# Patient Record
Sex: MALE | Race: WHITE | Employment: UNEMPLOYED | URBAN - METROPOLITAN AREA
[De-identification: names, ages, dates, MRNs, and addresses within clinical notes are randomized per-mention and may not be internally consistent; named-entity substitution may affect disease eponyms.]

---

## 2020-01-01 ENCOUNTER — TELEPHONE (OUTPATIENT)
Dept: ONCOLOGY | Facility: CLINIC | Age: 50
End: 2020-01-01

## 2020-01-01 ENCOUNTER — TRANSFERRED RECORDS (OUTPATIENT)
Dept: HEALTH INFORMATION MANAGEMENT | Facility: CLINIC | Age: 50
End: 2020-01-01

## 2020-01-01 ENCOUNTER — PATIENT OUTREACH (OUTPATIENT)
Dept: ONCOLOGY | Facility: CLINIC | Age: 50
End: 2020-01-01

## 2020-01-01 ENCOUNTER — VIRTUAL VISIT (OUTPATIENT)
Dept: ONCOLOGY | Facility: CLINIC | Age: 50
End: 2020-01-01
Attending: INTERNAL MEDICINE
Payer: COMMERCIAL

## 2020-01-01 ENCOUNTER — DOCUMENTATION ONLY (OUTPATIENT)
Facility: CLINIC | Age: 50
End: 2020-01-01

## 2020-01-01 ENCOUNTER — CARE COORDINATION (OUTPATIENT)
Dept: ONCOLOGY | Facility: CLINIC | Age: 50
End: 2020-01-01

## 2020-01-01 ENCOUNTER — ONCOLOGY VISIT (OUTPATIENT)
Dept: ONCOLOGY | Facility: CLINIC | Age: 50
End: 2020-01-01
Attending: DIETITIAN, REGISTERED
Payer: MEDICAID

## 2020-01-01 ENCOUNTER — VIRTUAL VISIT (OUTPATIENT)
Dept: ONCOLOGY | Facility: CLINIC | Age: 50
End: 2020-01-01
Attending: PHYSICIAN ASSISTANT
Payer: COMMERCIAL

## 2020-01-01 ENCOUNTER — APPOINTMENT (OUTPATIENT)
Dept: GENERAL RADIOLOGY | Facility: CLINIC | Age: 50
End: 2020-01-01
Attending: STUDENT IN AN ORGANIZED HEALTH CARE EDUCATION/TRAINING PROGRAM
Payer: MEDICAID

## 2020-01-01 ENCOUNTER — APPOINTMENT (OUTPATIENT)
Dept: LAB | Facility: CLINIC | Age: 50
DRG: 847 | End: 2020-01-01
Attending: INTERNAL MEDICINE
Payer: COMMERCIAL

## 2020-01-01 ENCOUNTER — VIRTUAL VISIT (OUTPATIENT)
Dept: ONCOLOGY | Facility: CLINIC | Age: 50
End: 2020-01-01
Payer: MEDICAID

## 2020-01-01 ENCOUNTER — TELEPHONE (OUTPATIENT)
Dept: PALLIATIVE CARE | Facility: CLINIC | Age: 50
End: 2020-01-01

## 2020-01-01 ENCOUNTER — DOCUMENTATION ONLY (OUTPATIENT)
Dept: PHARMACY | Facility: CLINIC | Age: 50
End: 2020-01-01

## 2020-01-01 ENCOUNTER — VIRTUAL VISIT (OUTPATIENT)
Dept: ONCOLOGY | Facility: CLINIC | Age: 50
End: 2020-01-01
Attending: PHYSICIAN ASSISTANT
Payer: MEDICAID

## 2020-01-01 ENCOUNTER — APPOINTMENT (OUTPATIENT)
Dept: GENERAL RADIOLOGY | Facility: CLINIC | Age: 50
DRG: 847 | End: 2020-01-01
Attending: PHYSICIAN ASSISTANT
Payer: COMMERCIAL

## 2020-01-01 ENCOUNTER — APPOINTMENT (OUTPATIENT)
Dept: PHYSICAL THERAPY | Facility: CLINIC | Age: 50
End: 2020-01-01
Attending: INTERNAL MEDICINE
Payer: COMMERCIAL

## 2020-01-01 ENCOUNTER — PATIENT OUTREACH (OUTPATIENT)
Dept: CARE COORDINATION | Facility: CLINIC | Age: 50
End: 2020-01-01

## 2020-01-01 ENCOUNTER — APPOINTMENT (OUTPATIENT)
Dept: LAB | Facility: CLINIC | Age: 50
End: 2020-01-01
Attending: INTERNAL MEDICINE
Payer: COMMERCIAL

## 2020-01-01 ENCOUNTER — DOCUMENTATION ONLY (OUTPATIENT)
Dept: ONCOLOGY | Facility: CLINIC | Age: 50
End: 2020-01-01

## 2020-01-01 ENCOUNTER — HOSPITAL ENCOUNTER (OUTPATIENT)
Dept: PET IMAGING | Facility: CLINIC | Age: 50
Discharge: HOME | End: 2020-06-08
Attending: PHYSICIAN ASSISTANT | Admitting: PHYSICIAN ASSISTANT
Payer: COMMERCIAL

## 2020-01-01 ENCOUNTER — HOSPITAL ENCOUNTER (INPATIENT)
Facility: CLINIC | Age: 50
LOS: 1 days | Discharge: HOSPICE | End: 2020-10-30
Attending: INTERNAL MEDICINE | Admitting: INTERNAL MEDICINE
Payer: COMMERCIAL

## 2020-01-01 ENCOUNTER — APPOINTMENT (OUTPATIENT)
Dept: CT IMAGING | Facility: CLINIC | Age: 50
DRG: 092 | End: 2020-01-01
Attending: EMERGENCY MEDICINE
Payer: COMMERCIAL

## 2020-01-01 ENCOUNTER — TELEPHONE (OUTPATIENT)
Dept: DENTISTRY | Facility: CLINIC | Age: 50
End: 2020-01-01

## 2020-01-01 ENCOUNTER — APPOINTMENT (OUTPATIENT)
Dept: PHYSICAL THERAPY | Facility: CLINIC | Age: 50
DRG: 092 | End: 2020-01-01
Payer: COMMERCIAL

## 2020-01-01 ENCOUNTER — APPOINTMENT (OUTPATIENT)
Dept: GENERAL RADIOLOGY | Facility: CLINIC | Age: 50
DRG: 092 | End: 2020-01-01
Attending: EMERGENCY MEDICINE
Payer: COMMERCIAL

## 2020-01-01 ENCOUNTER — ANESTHESIA EVENT (OUTPATIENT)
Dept: GASTROENTEROLOGY | Facility: CLINIC | Age: 50
End: 2020-01-01
Payer: COMMERCIAL

## 2020-01-01 ENCOUNTER — INFUSION THERAPY VISIT (OUTPATIENT)
Dept: ONCOLOGY | Facility: CLINIC | Age: 50
End: 2020-01-01
Attending: INTERNAL MEDICINE
Payer: MEDICAID

## 2020-01-01 ENCOUNTER — ALLIED HEALTH/NURSE VISIT (OUTPATIENT)
Dept: RADIATION ONCOLOGY | Facility: CLINIC | Age: 50
End: 2020-01-01
Attending: RADIOLOGY
Payer: COMMERCIAL

## 2020-01-01 ENCOUNTER — APPOINTMENT (OUTPATIENT)
Dept: GENERAL RADIOLOGY | Facility: CLINIC | Age: 50
End: 2020-01-01
Attending: PHYSICIAN ASSISTANT
Payer: COMMERCIAL

## 2020-01-01 ENCOUNTER — APPOINTMENT (OUTPATIENT)
Dept: PHYSICAL THERAPY | Facility: CLINIC | Age: 50
DRG: 847 | End: 2020-01-01
Attending: PHYSICIAN ASSISTANT
Payer: COMMERCIAL

## 2020-01-01 ENCOUNTER — NURSE TRIAGE (OUTPATIENT)
Dept: NURSING | Facility: CLINIC | Age: 50
End: 2020-01-01

## 2020-01-01 ENCOUNTER — APPOINTMENT (OUTPATIENT)
Dept: PET IMAGING | Facility: CLINIC | Age: 50
End: 2020-01-01
Attending: PHYSICIAN ASSISTANT
Payer: MEDICAID

## 2020-01-01 ENCOUNTER — ANESTHESIA (OUTPATIENT)
Dept: GASTROENTEROLOGY | Facility: CLINIC | Age: 50
End: 2020-01-01
Payer: COMMERCIAL

## 2020-01-01 ENCOUNTER — APPOINTMENT (OUTPATIENT)
Dept: CT IMAGING | Facility: CLINIC | Age: 50
End: 2020-01-01
Attending: PHYSICIAN ASSISTANT
Payer: MEDICAID

## 2020-01-01 ENCOUNTER — INFUSION THERAPY VISIT (OUTPATIENT)
Dept: ONCOLOGY | Facility: CLINIC | Age: 50
End: 2020-01-01
Attending: PATHOLOGY
Payer: COMMERCIAL

## 2020-01-01 ENCOUNTER — HOSPITAL ENCOUNTER (INPATIENT)
Facility: CLINIC | Age: 50
LOS: 4 days | Discharge: HOME | DRG: 092 | End: 2020-10-24
Attending: EMERGENCY MEDICINE | Admitting: STUDENT IN AN ORGANIZED HEALTH CARE EDUCATION/TRAINING PROGRAM
Payer: COMMERCIAL

## 2020-01-01 ENCOUNTER — HOSPITAL ENCOUNTER (INPATIENT)
Facility: CLINIC | Age: 50
LOS: 3 days | Discharge: HOME | End: 2020-08-05
Attending: EMERGENCY MEDICINE | Admitting: INTERNAL MEDICINE
Payer: COMMERCIAL

## 2020-01-01 ENCOUNTER — HOSPITAL ENCOUNTER (OUTPATIENT)
Dept: PET IMAGING | Facility: CLINIC | Age: 50
Discharge: HOME | End: 2020-09-15
Attending: PHYSICIAN ASSISTANT | Admitting: PHYSICIAN ASSISTANT
Payer: COMMERCIAL

## 2020-01-01 ENCOUNTER — HOME INFUSION (PRE-WILLOW HOME INFUSION) (OUTPATIENT)
Dept: PHARMACY | Facility: CLINIC | Age: 50
End: 2020-01-01

## 2020-01-01 ENCOUNTER — APPOINTMENT (OUTPATIENT)
Dept: CARDIOLOGY | Facility: CLINIC | Age: 50
DRG: 092 | End: 2020-01-01
Attending: PHYSICIAN ASSISTANT
Payer: COMMERCIAL

## 2020-01-01 ENCOUNTER — ONCOLOGY VISIT (OUTPATIENT)
Dept: ONCOLOGY | Facility: CLINIC | Age: 50
DRG: 847 | End: 2020-01-01
Attending: PHYSICIAN ASSISTANT
Payer: COMMERCIAL

## 2020-01-01 ENCOUNTER — TELEPHONE (OUTPATIENT)
Dept: TRANSPLANT | Facility: CLINIC | Age: 50
End: 2020-01-01

## 2020-01-01 ENCOUNTER — APPOINTMENT (OUTPATIENT)
Dept: CARDIOLOGY | Facility: CLINIC | Age: 50
End: 2020-01-01
Attending: INTERNAL MEDICINE
Payer: MEDICAID

## 2020-01-01 ENCOUNTER — HOSPITAL ENCOUNTER (OUTPATIENT)
Dept: PET IMAGING | Facility: CLINIC | Age: 50
End: 2020-07-31
Attending: INTERNAL MEDICINE
Payer: COMMERCIAL

## 2020-01-01 ENCOUNTER — APPOINTMENT (OUTPATIENT)
Dept: INTERVENTIONAL RADIOLOGY/VASCULAR | Facility: CLINIC | Age: 50
End: 2020-01-01
Attending: NURSE PRACTITIONER
Payer: MEDICAID

## 2020-01-01 ENCOUNTER — HOSPITAL ENCOUNTER (OUTPATIENT)
Facility: CLINIC | Age: 50
End: 2020-01-01
Attending: INTERNAL MEDICINE | Admitting: INTERNAL MEDICINE
Payer: COMMERCIAL

## 2020-01-01 ENCOUNTER — HOSPITAL ENCOUNTER (OUTPATIENT)
Facility: CLINIC | Age: 50
Setting detail: OBSERVATION
Discharge: HOME | End: 2020-09-19
Attending: INTERNAL MEDICINE | Admitting: PHYSICIAN ASSISTANT
Payer: COMMERCIAL

## 2020-01-01 ENCOUNTER — OFFICE VISIT (OUTPATIENT)
Dept: TRANSPLANT | Facility: CLINIC | Age: 50
End: 2020-01-01
Attending: INTERNAL MEDICINE
Payer: COMMERCIAL

## 2020-01-01 ENCOUNTER — HOSPITAL ENCOUNTER (INPATIENT)
Facility: CLINIC | Age: 50
LOS: 1 days | Discharge: HOSPICE | End: 2020-11-02
Attending: INTERNAL MEDICINE | Admitting: INTERNAL MEDICINE
Payer: COMMERCIAL

## 2020-01-01 ENCOUNTER — TELEPHONE (OUTPATIENT)
Dept: PHARMACY | Facility: CLINIC | Age: 50
End: 2020-01-01

## 2020-01-01 ENCOUNTER — APPOINTMENT (OUTPATIENT)
Dept: LAB | Facility: CLINIC | Age: 50
End: 2020-01-01
Payer: COMMERCIAL

## 2020-01-01 ENCOUNTER — APPOINTMENT (OUTPATIENT)
Dept: PHYSICAL THERAPY | Facility: CLINIC | Age: 50
DRG: 092 | End: 2020-01-01
Attending: PHYSICIAN ASSISTANT
Payer: COMMERCIAL

## 2020-01-01 ENCOUNTER — HOSPITAL ENCOUNTER (INPATIENT)
Facility: CLINIC | Age: 50
LOS: 9 days | Discharge: HOME | End: 2020-03-19
Attending: EMERGENCY MEDICINE | Admitting: INTERNAL MEDICINE
Payer: MEDICAID

## 2020-01-01 ENCOUNTER — APPOINTMENT (OUTPATIENT)
Dept: GENERAL RADIOLOGY | Facility: CLINIC | Age: 50
End: 2020-01-01
Attending: EMERGENCY MEDICINE
Payer: MEDICAID

## 2020-01-01 ENCOUNTER — APPOINTMENT (OUTPATIENT)
Dept: OCCUPATIONAL THERAPY | Facility: CLINIC | Age: 50
DRG: 092 | End: 2020-01-01
Attending: PHYSICIAN ASSISTANT
Payer: COMMERCIAL

## 2020-01-01 ENCOUNTER — PATIENT OUTREACH (OUTPATIENT)
Dept: GASTROENTEROLOGY | Facility: CLINIC | Age: 50
End: 2020-01-01

## 2020-01-01 ENCOUNTER — MYC MEDICAL ADVICE (OUTPATIENT)
Dept: ONCOLOGY | Facility: CLINIC | Age: 50
End: 2020-01-01

## 2020-01-01 ENCOUNTER — APPOINTMENT (OUTPATIENT)
Dept: MRI IMAGING | Facility: CLINIC | Age: 50
DRG: 092 | End: 2020-01-01
Attending: EMERGENCY MEDICINE
Payer: COMMERCIAL

## 2020-01-01 ENCOUNTER — APPOINTMENT (OUTPATIENT)
Dept: MRI IMAGING | Facility: CLINIC | Age: 50
End: 2020-01-01
Attending: PHYSICIAN ASSISTANT
Payer: COMMERCIAL

## 2020-01-01 ENCOUNTER — TELEPHONE (OUTPATIENT)
Dept: CARE COORDINATION | Facility: CLINIC | Age: 50
End: 2020-01-01

## 2020-01-01 ENCOUNTER — HOSPITAL ENCOUNTER (INPATIENT)
Facility: CLINIC | Age: 50
LOS: 2 days | Discharge: HOME | DRG: 847 | End: 2020-09-02
Attending: INTERNAL MEDICINE | Admitting: INTERNAL MEDICINE
Payer: COMMERCIAL

## 2020-01-01 ENCOUNTER — DOCUMENTATION ONLY (OUTPATIENT)
Dept: OTHER | Facility: CLINIC | Age: 50
End: 2020-01-01

## 2020-01-01 ENCOUNTER — HOSPITAL ENCOUNTER (INPATIENT)
Facility: CLINIC | Age: 50
LOS: 3 days | Discharge: HOME | End: 2020-08-10
Attending: INTERNAL MEDICINE | Admitting: INTERNAL MEDICINE
Payer: COMMERCIAL

## 2020-01-01 ENCOUNTER — APPOINTMENT (OUTPATIENT)
Dept: PHYSICAL THERAPY | Facility: CLINIC | Age: 50
DRG: 847 | End: 2020-01-01
Attending: INTERNAL MEDICINE
Payer: COMMERCIAL

## 2020-01-01 ENCOUNTER — APPOINTMENT (OUTPATIENT)
Dept: LAB | Facility: CLINIC | Age: 50
End: 2020-01-01
Attending: PHYSICIAN ASSISTANT
Payer: MEDICAID

## 2020-01-01 VITALS
OXYGEN SATURATION: 98 % | DIASTOLIC BLOOD PRESSURE: 87 MMHG | WEIGHT: 221.4 LBS | TEMPERATURE: 98.1 F | SYSTOLIC BLOOD PRESSURE: 153 MMHG | HEART RATE: 74 BPM | RESPIRATION RATE: 18 BRPM | BODY MASS INDEX: 31.77 KG/M2

## 2020-01-01 VITALS
HEART RATE: 65 BPM | OXYGEN SATURATION: 99 % | TEMPERATURE: 98.4 F | RESPIRATION RATE: 18 BRPM | DIASTOLIC BLOOD PRESSURE: 82 MMHG | BODY MASS INDEX: 29.93 KG/M2 | SYSTOLIC BLOOD PRESSURE: 128 MMHG | WEIGHT: 208.6 LBS

## 2020-01-01 VITALS
OXYGEN SATURATION: 100 % | RESPIRATION RATE: 16 BRPM | SYSTOLIC BLOOD PRESSURE: 117 MMHG | TEMPERATURE: 97.5 F | HEART RATE: 67 BPM | BODY MASS INDEX: 29.17 KG/M2 | WEIGHT: 197.5 LBS | DIASTOLIC BLOOD PRESSURE: 76 MMHG

## 2020-01-01 VITALS
RESPIRATION RATE: 24 BRPM | BODY MASS INDEX: 31.42 KG/M2 | HEART RATE: 77 BPM | WEIGHT: 219 LBS | SYSTOLIC BLOOD PRESSURE: 129 MMHG | DIASTOLIC BLOOD PRESSURE: 79 MMHG | TEMPERATURE: 98.3 F | OXYGEN SATURATION: 98 %

## 2020-01-01 VITALS
SYSTOLIC BLOOD PRESSURE: 124 MMHG | DIASTOLIC BLOOD PRESSURE: 68 MMHG | HEIGHT: 69 IN | WEIGHT: 202.6 LBS | RESPIRATION RATE: 16 BRPM | HEART RATE: 79 BPM | BODY MASS INDEX: 30.01 KG/M2 | TEMPERATURE: 97.8 F | OXYGEN SATURATION: 97 %

## 2020-01-01 VITALS
RESPIRATION RATE: 18 BRPM | HEART RATE: 57 BPM | DIASTOLIC BLOOD PRESSURE: 88 MMHG | SYSTOLIC BLOOD PRESSURE: 151 MMHG | WEIGHT: 210.1 LBS | TEMPERATURE: 98 F | OXYGEN SATURATION: 99 % | BODY MASS INDEX: 30.15 KG/M2

## 2020-01-01 VITALS
RESPIRATION RATE: 16 BRPM | SYSTOLIC BLOOD PRESSURE: 133 MMHG | TEMPERATURE: 97 F | HEART RATE: 59 BPM | DIASTOLIC BLOOD PRESSURE: 74 MMHG | BODY MASS INDEX: 30.79 KG/M2 | WEIGHT: 214.6 LBS | OXYGEN SATURATION: 100 %

## 2020-01-01 VITALS
RESPIRATION RATE: 16 BRPM | SYSTOLIC BLOOD PRESSURE: 148 MMHG | BODY MASS INDEX: 28.37 KG/M2 | WEIGHT: 192.1 LBS | DIASTOLIC BLOOD PRESSURE: 81 MMHG | TEMPERATURE: 98.1 F | OXYGEN SATURATION: 99 % | HEART RATE: 80 BPM

## 2020-01-01 VITALS
BODY MASS INDEX: 27.97 KG/M2 | SYSTOLIC BLOOD PRESSURE: 121 MMHG | DIASTOLIC BLOOD PRESSURE: 79 MMHG | HEART RATE: 73 BPM | OXYGEN SATURATION: 100 % | WEIGHT: 189.4 LBS | RESPIRATION RATE: 16 BRPM | TEMPERATURE: 96.4 F

## 2020-01-01 VITALS
OXYGEN SATURATION: 96 % | SYSTOLIC BLOOD PRESSURE: 105 MMHG | RESPIRATION RATE: 16 BRPM | TEMPERATURE: 96.4 F | DIASTOLIC BLOOD PRESSURE: 64 MMHG | HEART RATE: 66 BPM

## 2020-01-01 VITALS
HEIGHT: 70 IN | TEMPERATURE: 97.4 F | BODY MASS INDEX: 33.63 KG/M2 | SYSTOLIC BLOOD PRESSURE: 166 MMHG | OXYGEN SATURATION: 96 % | HEART RATE: 63 BPM | RESPIRATION RATE: 20 BRPM | DIASTOLIC BLOOD PRESSURE: 86 MMHG | WEIGHT: 234.9 LBS

## 2020-01-01 VITALS
DIASTOLIC BLOOD PRESSURE: 72 MMHG | TEMPERATURE: 98 F | RESPIRATION RATE: 18 BRPM | HEART RATE: 69 BPM | WEIGHT: 212.6 LBS | SYSTOLIC BLOOD PRESSURE: 131 MMHG | BODY MASS INDEX: 30.5 KG/M2 | OXYGEN SATURATION: 100 %

## 2020-01-01 VITALS
RESPIRATION RATE: 18 BRPM | BODY MASS INDEX: 28.55 KG/M2 | TEMPERATURE: 98.9 F | DIASTOLIC BLOOD PRESSURE: 76 MMHG | OXYGEN SATURATION: 99 % | HEART RATE: 82 BPM | WEIGHT: 193.3 LBS | SYSTOLIC BLOOD PRESSURE: 120 MMHG

## 2020-01-01 VITALS
OXYGEN SATURATION: 100 % | BODY MASS INDEX: 30.49 KG/M2 | HEART RATE: 68 BPM | DIASTOLIC BLOOD PRESSURE: 89 MMHG | SYSTOLIC BLOOD PRESSURE: 143 MMHG | RESPIRATION RATE: 16 BRPM | WEIGHT: 212.5 LBS | TEMPERATURE: 98.8 F

## 2020-01-01 VITALS
WEIGHT: 189.4 LBS | RESPIRATION RATE: 14 BRPM | SYSTOLIC BLOOD PRESSURE: 106 MMHG | BODY MASS INDEX: 27.97 KG/M2 | OXYGEN SATURATION: 93 % | DIASTOLIC BLOOD PRESSURE: 67 MMHG | HEART RATE: 70 BPM | TEMPERATURE: 97.3 F

## 2020-01-01 VITALS
OXYGEN SATURATION: 100 % | HEART RATE: 68 BPM | RESPIRATION RATE: 16 BRPM | DIASTOLIC BLOOD PRESSURE: 84 MMHG | TEMPERATURE: 98.5 F | BODY MASS INDEX: 30.35 KG/M2 | SYSTOLIC BLOOD PRESSURE: 137 MMHG | WEIGHT: 211.5 LBS

## 2020-01-01 VITALS
OXYGEN SATURATION: 96 % | RESPIRATION RATE: 18 BRPM | WEIGHT: 199 LBS | HEART RATE: 71 BPM | BODY MASS INDEX: 28.55 KG/M2 | DIASTOLIC BLOOD PRESSURE: 81 MMHG | TEMPERATURE: 98 F | SYSTOLIC BLOOD PRESSURE: 140 MMHG

## 2020-01-01 VITALS
OXYGEN SATURATION: 98 % | BODY MASS INDEX: 31.84 KG/M2 | RESPIRATION RATE: 16 BRPM | HEIGHT: 69 IN | TEMPERATURE: 98.3 F | WEIGHT: 215 LBS | HEART RATE: 58 BPM | SYSTOLIC BLOOD PRESSURE: 144 MMHG | DIASTOLIC BLOOD PRESSURE: 85 MMHG

## 2020-01-01 VITALS
OXYGEN SATURATION: 98 % | HEART RATE: 71 BPM | RESPIRATION RATE: 16 BRPM | TEMPERATURE: 98.4 F | DIASTOLIC BLOOD PRESSURE: 83 MMHG | SYSTOLIC BLOOD PRESSURE: 148 MMHG

## 2020-01-01 VITALS
WEIGHT: 202.8 LBS | TEMPERATURE: 96.7 F | RESPIRATION RATE: 18 BRPM | DIASTOLIC BLOOD PRESSURE: 74 MMHG | HEART RATE: 68 BPM | BODY MASS INDEX: 30.04 KG/M2 | SYSTOLIC BLOOD PRESSURE: 149 MMHG | HEIGHT: 69 IN | OXYGEN SATURATION: 99 %

## 2020-01-01 VITALS
DIASTOLIC BLOOD PRESSURE: 89 MMHG | BODY MASS INDEX: 28.33 KG/M2 | RESPIRATION RATE: 18 BRPM | SYSTOLIC BLOOD PRESSURE: 138 MMHG | WEIGHT: 191.3 LBS | HEART RATE: 65 BPM | TEMPERATURE: 98.4 F | OXYGEN SATURATION: 95 % | HEIGHT: 69 IN

## 2020-01-01 VITALS
DIASTOLIC BLOOD PRESSURE: 69 MMHG | HEART RATE: 65 BPM | HEIGHT: 69 IN | SYSTOLIC BLOOD PRESSURE: 154 MMHG | RESPIRATION RATE: 16 BRPM | TEMPERATURE: 97.5 F | WEIGHT: 213.4 LBS | BODY MASS INDEX: 31.61 KG/M2 | OXYGEN SATURATION: 99 %

## 2020-01-01 VITALS
HEART RATE: 63 BPM | SYSTOLIC BLOOD PRESSURE: 155 MMHG | TEMPERATURE: 98.5 F | RESPIRATION RATE: 18 BRPM | OXYGEN SATURATION: 100 % | DIASTOLIC BLOOD PRESSURE: 112 MMHG

## 2020-01-01 VITALS
SYSTOLIC BLOOD PRESSURE: 143 MMHG | OXYGEN SATURATION: 99 % | BODY MASS INDEX: 30.13 KG/M2 | WEIGHT: 210 LBS | TEMPERATURE: 98 F | RESPIRATION RATE: 16 BRPM | DIASTOLIC BLOOD PRESSURE: 79 MMHG | HEART RATE: 69 BPM

## 2020-01-01 VITALS
BODY MASS INDEX: 28.41 KG/M2 | OXYGEN SATURATION: 98 % | TEMPERATURE: 98.9 F | WEIGHT: 198 LBS | DIASTOLIC BLOOD PRESSURE: 76 MMHG | RESPIRATION RATE: 16 BRPM | SYSTOLIC BLOOD PRESSURE: 119 MMHG | HEART RATE: 67 BPM

## 2020-01-01 DIAGNOSIS — K12.30 MUCOSITIS: Primary | ICD-10-CM

## 2020-01-01 DIAGNOSIS — I48.0 PAROXYSMAL ATRIAL FIBRILLATION (H): ICD-10-CM

## 2020-01-01 DIAGNOSIS — C83.38 DIFFUSE LARGE B-CELL LYMPHOMA OF LYMPH NODES OF MULTIPLE REGIONS (H): ICD-10-CM

## 2020-01-01 DIAGNOSIS — M62.81 MUSCLE WEAKNESS (GENERALIZED): ICD-10-CM

## 2020-01-01 DIAGNOSIS — C83.38 DIFFUSE LARGE B-CELL LYMPHOMA OF LYMPH NODES OF MULTIPLE REGIONS (H): Primary | ICD-10-CM

## 2020-01-01 DIAGNOSIS — G89.3 CANCER RELATED PAIN: ICD-10-CM

## 2020-01-01 DIAGNOSIS — C83.398 DIFFUSE LARGE B-CELL LYMPHOMA OF EXTRANODAL SITE: ICD-10-CM

## 2020-01-01 DIAGNOSIS — C83.30 DIFFUSE LARGE B-CELL LYMPHOMA, UNSPECIFIED BODY REGION (H): Primary | ICD-10-CM

## 2020-01-01 DIAGNOSIS — R52 UNCONTROLLED PAIN: ICD-10-CM

## 2020-01-01 DIAGNOSIS — D64.9 ANEMIA, UNSPECIFIED TYPE: ICD-10-CM

## 2020-01-01 DIAGNOSIS — G62.9 NEUROPATHY: ICD-10-CM

## 2020-01-01 DIAGNOSIS — R10.13 ABDOMINAL PAIN, EPIGASTRIC: ICD-10-CM

## 2020-01-01 DIAGNOSIS — E83.51 HYPOCALCEMIA: ICD-10-CM

## 2020-01-01 DIAGNOSIS — I48.92 ATRIAL FLUTTER, UNSPECIFIED TYPE (H): ICD-10-CM

## 2020-01-01 DIAGNOSIS — E87.6 HYPOKALEMIA: ICD-10-CM

## 2020-01-01 DIAGNOSIS — G47.30 SLEEP APNEA, UNSPECIFIED TYPE: ICD-10-CM

## 2020-01-01 DIAGNOSIS — Z71.9 VISIT FOR COUNSELING: Primary | ICD-10-CM

## 2020-01-01 DIAGNOSIS — R52 PAIN CRISIS: ICD-10-CM

## 2020-01-01 DIAGNOSIS — M54.50 ACUTE MIDLINE LOW BACK PAIN WITHOUT SCIATICA: ICD-10-CM

## 2020-01-01 DIAGNOSIS — R06.6 HICCUPS: ICD-10-CM

## 2020-01-01 DIAGNOSIS — C83.30 DIFFUSE LARGE B-CELL LYMPHOMA, UNSPECIFIED BODY REGION (H): ICD-10-CM

## 2020-01-01 DIAGNOSIS — R19.00 ABDOMINAL MASS, UNSPECIFIED ABDOMINAL LOCATION: ICD-10-CM

## 2020-01-01 DIAGNOSIS — R52 UNCONTROLLED PAIN: Primary | ICD-10-CM

## 2020-01-01 DIAGNOSIS — D50.9 IRON DEFICIENCY ANEMIA, UNSPECIFIED IRON DEFICIENCY ANEMIA TYPE: ICD-10-CM

## 2020-01-01 DIAGNOSIS — E83.42 HYPOMAGNESEMIA: Primary | ICD-10-CM

## 2020-01-01 DIAGNOSIS — N17.9 ACUTE KIDNEY INJURY (H): ICD-10-CM

## 2020-01-01 DIAGNOSIS — C83.398 DIFFUSE LARGE B-CELL LYMPHOMA OF EXTRANODAL SITE: Primary | ICD-10-CM

## 2020-01-01 DIAGNOSIS — C79.51 METASTATIC CANCER TO SPINE (H): ICD-10-CM

## 2020-01-01 DIAGNOSIS — E83.42 HYPOMAGNESEMIA: ICD-10-CM

## 2020-01-01 DIAGNOSIS — K21.9 GASTROESOPHAGEAL REFLUX DISEASE WITHOUT ESOPHAGITIS: Primary | ICD-10-CM

## 2020-01-01 DIAGNOSIS — K59.03 DRUG-INDUCED CONSTIPATION: ICD-10-CM

## 2020-01-01 DIAGNOSIS — R52 PAIN CRISIS: Primary | ICD-10-CM

## 2020-01-01 DIAGNOSIS — Z20.822 COVID-19 RULED OUT BY LABORATORY TESTING: ICD-10-CM

## 2020-01-01 DIAGNOSIS — I63.9 CEREBROVASCULAR ACCIDENT (CVA), UNSPECIFIED MECHANISM (H): ICD-10-CM

## 2020-01-01 DIAGNOSIS — E87.6 HYPOKALEMIA: Primary | ICD-10-CM

## 2020-01-01 DIAGNOSIS — Z87.891 PERSONAL HISTORY OF TOBACCO USE, PRESENTING HAZARDS TO HEALTH: ICD-10-CM

## 2020-01-01 DIAGNOSIS — F43.21 ADJUSTMENT DISORDER WITH DEPRESSED MOOD: ICD-10-CM

## 2020-01-01 DIAGNOSIS — R53.1 WEAKNESS: Primary | ICD-10-CM

## 2020-01-01 DIAGNOSIS — R07.89 DISCOMFORT IN CHEST: ICD-10-CM

## 2020-01-01 DIAGNOSIS — K21.9 GASTROESOPHAGEAL REFLUX DISEASE, UNSPECIFIED WHETHER ESOPHAGITIS PRESENT: ICD-10-CM

## 2020-01-01 DIAGNOSIS — Z20.828 CONTACT WITH AND (SUSPECTED) EXPOSURE TO OTHER VIRAL COMMUNICABLE DISEASES: ICD-10-CM

## 2020-01-01 DIAGNOSIS — K12.31 MUCOSITIS DUE TO CHEMOTHERAPY: ICD-10-CM

## 2020-01-01 DIAGNOSIS — I31.39 PERICARDIAL EFFUSION: ICD-10-CM

## 2020-01-01 DIAGNOSIS — I10 ESSENTIAL HYPERTENSION: ICD-10-CM

## 2020-01-01 DIAGNOSIS — K08.89 PAIN, DENTAL: ICD-10-CM

## 2020-01-01 DIAGNOSIS — K21.9 GASTROESOPHAGEAL REFLUX DISEASE WITHOUT ESOPHAGITIS: ICD-10-CM

## 2020-01-01 DIAGNOSIS — J90 PLEURAL EFFUSION: ICD-10-CM

## 2020-01-01 DIAGNOSIS — M62.81 GENERALIZED MUSCLE WEAKNESS: ICD-10-CM

## 2020-01-01 DIAGNOSIS — R29.898 LEFT LEG WEAKNESS: ICD-10-CM

## 2020-01-01 LAB
ABO + RH BLD: NORMAL
ABS BASOPHILS: 0 CELLS/MM3 (ref 0–0.1)
ABS EOSINOPHILS: 0 CELLS/MM3 (ref 0–0.2)
ABS EOSINOPHILS: 0.02 CELLS/MM3 (ref 0–0.2)
ABS EOSINOPHILS: 0.02 CELLS/MM3 (ref 0–0.2)
ABS EOSINOPHILS: 0.03 CELLS/MM3 (ref 0–0.2)
ABS EOSINOPHILS: 0.03 CELLS/MM3 (ref 0–0.2)
ABS LYMPHOCYTES: 0.12 CELLS/MM3 (ref 1–5)
ABS LYMPHOCYTES: 0.14 CELLS/MM3 (ref 1–5)
ABS LYMPHOCYTES: 0.14 CELLS/MM3 (ref 1–5)
ABS LYMPHOCYTES: 0.17 CELLS/MM3 (ref 1–5)
ABS LYMPHOCYTES: 0.21 CELLS/MM3 (ref 1–5)
ABS MONOCYTE: 0 CELLS/MM3 (ref 0.3–1.2)
ABS MONOCYTE: 0.16 CELLS/MM3 (ref 0.3–1.2)
ABS MONOCYTE: 0.19 CELLS/MM3 (ref 0.3–1.2)
ABS MONOCYTE: 0.21 CELLS/MM3 (ref 0.3–1.2)
ABS MONOCYTE: 0.65 CELLS/MM3 (ref 0.6–1.2)
ABS NEUTROPHILS: 0.28 CELLS/MM3 (ref 1.5–6)
ABS NEUTROPHILS: 0.59 CELLS/MM3 (ref 1.5–6)
ABS NEUTROPHILS: 0.8 CELLS/MM3 (ref 1.5–6)
ABS NEUTROPHILS: 1.25 CELLS/MM3 (ref 1.5–6)
ABS NEUTROPHILS: 5.23 CELLS/MM3 (ref 1.5–6)
ACANTHOCYTES BLD QL SMEAR: ABNORMAL
ALBUMIN SERPL-MCNC: 1.8 G/DL (ref 3.4–5)
ALBUMIN SERPL-MCNC: 1.9 G/DL (ref 3.4–5)
ALBUMIN SERPL-MCNC: 2 G/DL (ref 3.4–5)
ALBUMIN SERPL-MCNC: 2.1 G/DL (ref 3.4–5)
ALBUMIN SERPL-MCNC: 2.2 G/DL (ref 3.4–5)
ALBUMIN SERPL-MCNC: 2.3 G/DL (ref 3.4–5)
ALBUMIN SERPL-MCNC: 2.3 G/DL (ref 3.4–5)
ALBUMIN SERPL-MCNC: 2.4 G/DL (ref 3.4–5)
ALBUMIN SERPL-MCNC: 2.6 G/DL (ref 3.4–5)
ALBUMIN SERPL-MCNC: 2.9 G/DL (ref 3.4–5)
ALBUMIN SERPL-MCNC: 2.9 G/DL (ref 3.4–5)
ALBUMIN SERPL-MCNC: 3.2 G/DL
ALBUMIN SERPL-MCNC: 3.3 G/DL (ref 3.5–5.2)
ALBUMIN SERPL-MCNC: 3.3 G/DL (ref 3.5–5.2)
ALBUMIN SERPL-MCNC: 3.4 G/DL (ref 3.5–5.2)
ALBUMIN SERPL-MCNC: 3.7 G/DL (ref 3.5–5.2)
ALBUMIN UR-MCNC: 10 MG/DL
ALBUMIN UR-MCNC: 30 MG/DL
ALBUMIN UR-MCNC: 300 MG/DL
ALP SERPL-CCNC: 100 U/L (ref 30–120)
ALP SERPL-CCNC: 100 U/L (ref 40–150)
ALP SERPL-CCNC: 102 U/L (ref 40–150)
ALP SERPL-CCNC: 105 U/L (ref 40–150)
ALP SERPL-CCNC: 105 U/L (ref 40–150)
ALP SERPL-CCNC: 106 U/L (ref 40–150)
ALP SERPL-CCNC: 106 U/L (ref 40–150)
ALP SERPL-CCNC: 107 U/L (ref 40–150)
ALP SERPL-CCNC: 108 U/L (ref 30–120)
ALP SERPL-CCNC: 108 U/L (ref 40–150)
ALP SERPL-CCNC: 112 U/L (ref 40–150)
ALP SERPL-CCNC: 114 U/L (ref 40–150)
ALP SERPL-CCNC: 117 U/L (ref 40–150)
ALP SERPL-CCNC: 118 U/L (ref 40–150)
ALP SERPL-CCNC: 123 U/L (ref 30–120)
ALP SERPL-CCNC: 125 U/L (ref 40–150)
ALP SERPL-CCNC: 132 U/L (ref 40–150)
ALP SERPL-CCNC: 133 U/L (ref 40–150)
ALP SERPL-CCNC: 137 U/L (ref 40–150)
ALP SERPL-CCNC: 147 U/L (ref 40–150)
ALP SERPL-CCNC: 64 U/L
ALP SERPL-CCNC: 83 U/L (ref 40–150)
ALP SERPL-CCNC: 83 U/L (ref 40–150)
ALP SERPL-CCNC: 84 U/L (ref 40–150)
ALP SERPL-CCNC: 84 U/L (ref 40–150)
ALP SERPL-CCNC: 85 U/L (ref 40–150)
ALP SERPL-CCNC: 88 U/L (ref 40–150)
ALP SERPL-CCNC: 90 U/L (ref 40–150)
ALP SERPL-CCNC: 90 U/L (ref 40–150)
ALP SERPL-CCNC: 91 U/L (ref 40–150)
ALP SERPL-CCNC: 91 U/L (ref 40–150)
ALP SERPL-CCNC: 92 U/L (ref 40–150)
ALP SERPL-CCNC: 94 U/L (ref 30–120)
ALP SERPL-CCNC: 94 U/L (ref 40–150)
ALP SERPL-CCNC: 98 U/L (ref 40–150)
ALT SERPL W P-5'-P-CCNC: 10 U/L (ref 0–70)
ALT SERPL W P-5'-P-CCNC: 12 U/L (ref 0–70)
ALT SERPL W P-5'-P-CCNC: 12 U/L (ref 0–70)
ALT SERPL W P-5'-P-CCNC: 13 U/L (ref 0–70)
ALT SERPL W P-5'-P-CCNC: 14 U/L (ref 0–70)
ALT SERPL W P-5'-P-CCNC: 14 U/L (ref 0–70)
ALT SERPL W P-5'-P-CCNC: 15 U/L (ref 0–70)
ALT SERPL W P-5'-P-CCNC: 16 U/L (ref 0–70)
ALT SERPL W P-5'-P-CCNC: 17 U/L (ref 0–70)
ALT SERPL W P-5'-P-CCNC: 18 U/L (ref 0–70)
ALT SERPL W P-5'-P-CCNC: 19 U/L (ref 0–70)
ALT SERPL W P-5'-P-CCNC: 19 U/L (ref 0–70)
ALT SERPL W P-5'-P-CCNC: 20 U/L (ref 0–70)
ALT SERPL W P-5'-P-CCNC: 22 U/L (ref 0–70)
ALT SERPL W P-5'-P-CCNC: 23 U/L (ref 0–70)
ALT SERPL W P-5'-P-CCNC: 23 U/L (ref 0–70)
ALT SERPL W P-5'-P-CCNC: 25 U/L (ref 0–70)
ALT SERPL W P-5'-P-CCNC: 41 U/L (ref 0–70)
ALT SERPL W P-5'-P-CCNC: 42 U/L (ref 0–70)
ALT SERPL W P-5'-P-CCNC: 48 U/L (ref 0–70)
ALT SERPL W P-5'-P-CCNC: 63 U/L (ref 0–70)
ALT SERPL-CCNC: 13 IU/L (ref 0–50)
ALT SERPL-CCNC: 17 U/L (ref 0–50)
ALT SERPL-CCNC: 19 U/L (ref 0–50)
ALT SERPL-CCNC: 27 U/L (ref 0–50)
ALT SERPL-CCNC: 5 U/L
ALT SERPL-CCNC: 6 U/L (ref 0–50)
AMMONIA PLAS-SCNC: 17 UMOL/L (ref 10–50)
ANION GAP SERPL CALCULATED.3IONS-SCNC: 10 MMOL/L (ref 3–14)
ANION GAP SERPL CALCULATED.3IONS-SCNC: 10 MMOL/L (ref 3–14)
ANION GAP SERPL CALCULATED.3IONS-SCNC: 10 MMOL/L (ref 8–16)
ANION GAP SERPL CALCULATED.3IONS-SCNC: 11 MMOL/L (ref 8–16)
ANION GAP SERPL CALCULATED.3IONS-SCNC: 12 MMOL/L (ref 8–16)
ANION GAP SERPL CALCULATED.3IONS-SCNC: 13 MMOL/L (ref 8–16)
ANION GAP SERPL CALCULATED.3IONS-SCNC: 13 MMOL/L (ref 8–16)
ANION GAP SERPL CALCULATED.3IONS-SCNC: 2 MMOL/L (ref 3–14)
ANION GAP SERPL CALCULATED.3IONS-SCNC: 4 MMOL/L (ref 3–14)
ANION GAP SERPL CALCULATED.3IONS-SCNC: 5 MMOL/L (ref 3–14)
ANION GAP SERPL CALCULATED.3IONS-SCNC: 6 MMOL/L
ANION GAP SERPL CALCULATED.3IONS-SCNC: 6 MMOL/L (ref 3–14)
ANION GAP SERPL CALCULATED.3IONS-SCNC: 7 MMOL/L
ANION GAP SERPL CALCULATED.3IONS-SCNC: 7 MMOL/L
ANION GAP SERPL CALCULATED.3IONS-SCNC: 7 MMOL/L (ref 3–14)
ANION GAP SERPL CALCULATED.3IONS-SCNC: 7 MMOL/L (ref 8–16)
ANION GAP SERPL CALCULATED.3IONS-SCNC: 8 MMOL/L
ANION GAP SERPL CALCULATED.3IONS-SCNC: 8 MMOL/L (ref 3–14)
ANION GAP SERPL CALCULATED.3IONS-SCNC: 8 MMOL/L (ref 8–16)
ANION GAP SERPL CALCULATED.3IONS-SCNC: 9 MMOL/L
ANION GAP SERPL CALCULATED.3IONS-SCNC: 9 MMOL/L
ANION GAP SERPL CALCULATED.3IONS-SCNC: 9 MMOL/L (ref 3–14)
ANION GAP SERPL CALCULATED.3IONS-SCNC: 9 MMOL/L (ref 8–16)
ANION GAP SERPL CALCULATED.3IONS-SCNC: 9 MMOL/L (ref 8–16)
ANISOCYTOSIS BLD QL SMEAR: SLIGHT
APPEARANCE CSF: CLEAR
APPEARANCE UR: ABNORMAL
APPEARANCE UR: CLEAR
APTT PPP: 31 SEC (ref 22–37)
APTT PPP: 33 SEC (ref 22–37)
APTT PPP: 33 SEC (ref 22–37)
APTT PPP: 37 SEC (ref 22–37)
APTT PPP: 42 SEC (ref 22–37)
APTT PPP: 47 SEC (ref 22–37)
APTT PPP: 48 SEC (ref 22–37)
APTT PPP: 58 SEC (ref 22–37)
APTT PPP: 59 SEC (ref 22–37)
AST SERPL W P-5'-P-CCNC: 10 U/L (ref 0–45)
AST SERPL W P-5'-P-CCNC: 10 U/L (ref 0–45)
AST SERPL W P-5'-P-CCNC: 11 U/L (ref 0–45)
AST SERPL W P-5'-P-CCNC: 11 U/L (ref 0–45)
AST SERPL W P-5'-P-CCNC: 12 U/L (ref 0–45)
AST SERPL W P-5'-P-CCNC: 13 U/L (ref 0–45)
AST SERPL W P-5'-P-CCNC: 14 U/L (ref 0–45)
AST SERPL W P-5'-P-CCNC: 14 U/L (ref 0–45)
AST SERPL W P-5'-P-CCNC: 15 U/L (ref 0–45)
AST SERPL W P-5'-P-CCNC: 15 U/L (ref 0–45)
AST SERPL W P-5'-P-CCNC: 16 U/L (ref 0–45)
AST SERPL W P-5'-P-CCNC: 16 U/L (ref 0–45)
AST SERPL W P-5'-P-CCNC: 19 U/L (ref 0–45)
AST SERPL W P-5'-P-CCNC: 19 U/L (ref 0–45)
AST SERPL W P-5'-P-CCNC: 20 U/L (ref 0–45)
AST SERPL W P-5'-P-CCNC: 21 U/L (ref 0–45)
AST SERPL W P-5'-P-CCNC: 22 U/L (ref 0–45)
AST SERPL W P-5'-P-CCNC: 22 U/L (ref 0–45)
AST SERPL W P-5'-P-CCNC: 24 U/L (ref 0–45)
AST SERPL W P-5'-P-CCNC: 33 U/L (ref 0–45)
AST SERPL W P-5'-P-CCNC: 35 U/L (ref 0–45)
AST SERPL W P-5'-P-CCNC: 39 U/L (ref 0–45)
AST SERPL W P-5'-P-CCNC: 45 U/L (ref 0–45)
AST SERPL W P-5'-P-CCNC: 47 U/L (ref 0–45)
AST SERPL W P-5'-P-CCNC: 47 U/L (ref 0–45)
AST SERPL W P-5'-P-CCNC: 56 U/L (ref 0–45)
AST SERPL W P-5'-P-CCNC: 62 U/L (ref 0–45)
AST SERPL W P-5'-P-CCNC: 8 U/L (ref 0–45)
AST SERPL W P-5'-P-CCNC: 8 U/L (ref 0–45)
AST SERPL W P-5'-P-CCNC: 82 U/L (ref 0–45)
AST SERPL-CCNC: 10 U/L (ref 0–50)
AST SERPL-CCNC: 11 U/L
AST SERPL-CCNC: 11 U/L (ref 0–50)
AST SERPL-CCNC: 16 U/L (ref 0–50)
AST SERPL-CCNC: 19 U/L (ref 0–50)
AST SERPL-CCNC: 24 IU/L (ref 0–50)
BACTERIA SPEC CULT: ABNORMAL
BACTERIA SPEC CULT: ABNORMAL
BACTERIA SPEC CULT: NO GROWTH
BANDS %: 0 (ref 0–10)
BANDS %: 5 (ref 0–10)
BASOPHILS # BLD AUTO: 0 10*3/UL
BASOPHILS # BLD AUTO: 0 10*3/UL (ref 0–0.1)
BASOPHILS # BLD AUTO: 0 10E9/L (ref 0–0.2)
BASOPHILS # BLD AUTO: 0 10^9/L (ref 0–0.1)
BASOPHILS # BLD AUTO: 0.1 10*3/UL (ref 0–0.1)
BASOPHILS # BLD AUTO: 0.1 10E9/L (ref 0–0.2)
BASOPHILS NFR BLD AUTO: 0 %
BASOPHILS NFR BLD AUTO: 0 % (ref 0–1)
BASOPHILS NFR BLD AUTO: 0.1 %
BASOPHILS NFR BLD AUTO: 0.1 % (ref 0–1)
BASOPHILS NFR BLD AUTO: 0.2 %
BASOPHILS NFR BLD AUTO: 0.2 % (ref 0–1)
BASOPHILS NFR BLD AUTO: 0.3 %
BASOPHILS NFR BLD AUTO: 0.3 % (ref 0–1)
BASOPHILS NFR BLD AUTO: 0.4 %
BASOPHILS NFR BLD AUTO: 0.4 %
BASOPHILS NFR BLD AUTO: 0.5 %
BASOPHILS NFR BLD AUTO: 0.5 % (ref 0–1)
BASOPHILS NFR BLD AUTO: 0.5 % (ref 0–1)
BASOPHILS NFR BLD AUTO: 0.6 %
BASOPHILS NFR BLD AUTO: 0.7 %
BASOPHILS NFR BLD AUTO: 0.8 %
BASOPHILS NFR BLD AUTO: 0.8 %
BASOPHILS NFR BLD AUTO: 0.8 % (ref 0–1)
BASOPHILS NFR BLD AUTO: 0.9 % (ref 0–1)
BASOPHILS NFR BLD AUTO: 1.2 % (ref 0–1)
BASOPHILS NFR BLD AUTO: 1.4 % (ref 0–1)
BASOPHILS NFR BLD AUTO: 1.8 % (ref 0–1)
BASOPHILS NFR BLD AUTO: 2 % (ref 0–1)
BASOPHILS NFR BLD AUTO: 2 % (ref 0–1)
BASOPHILS NFR BLD AUTO: 4.3 % (ref 0–1)
BASOPHILS NFR BLD AUTO: 5.3 % (ref 0–1)
BILIRUB DIRECT SERPL-MCNC: 0.3 MG/DL (ref 0–0.2)
BILIRUB DIRECT SERPL-MCNC: 0.4 MG/DL (ref 0–0.2)
BILIRUB DIRECT SERPL-MCNC: 0.4 MG/DL (ref 0–0.2)
BILIRUB SERPL-MCNC: 0.4 MG/DL (ref 0.2–1.3)
BILIRUB SERPL-MCNC: 0.5 MG/DL (ref 0.3–1.2)
BILIRUB SERPL-MCNC: 0.6 MG/DL (ref 0.2–1.3)
BILIRUB SERPL-MCNC: 0.7 MG/DL (ref 0.2–1.3)
BILIRUB SERPL-MCNC: 0.7 MG/DL (ref 0.3–1.2)
BILIRUB SERPL-MCNC: 0.8 MG/DL (ref 0.2–1.3)
BILIRUB SERPL-MCNC: 0.9 MG/DL (ref 0.2–1.3)
BILIRUB SERPL-MCNC: 0.9 MG/DL (ref 0.3–1.2)
BILIRUB SERPL-MCNC: 1 MG/DL (ref 0.2–1.3)
BILIRUB SERPL-MCNC: 1.1 MG/DL
BILIRUB SERPL-MCNC: 1.1 MG/DL (ref 0.3–1.2)
BILIRUB SERPL-MCNC: 1.2 MG/DL (ref 0.2–1.3)
BILIRUB SERPL-MCNC: 1.3 MG/DL (ref 0.2–1.3)
BILIRUB SERPL-MCNC: 1.8 MG/DL (ref 0.2–1.3)
BILIRUB UR QL STRIP: ABNORMAL
BILIRUB UR QL STRIP: NEGATIVE
BLAST %: 0
BLD GP AB SCN SERPL QL: NORMAL
BLD PROD TYP BPU: NORMAL
BLD UNIT ID BPU: 0
BLOOD BANK CMNT PATIENT-IMP: NORMAL
BLOOD PRODUCT CODE: NORMAL
BPU ID: NORMAL
BUN SERPL-MCNC: 10 MG/DL (ref 7–25)
BUN SERPL-MCNC: 10 MG/DL (ref 7–25)
BUN SERPL-MCNC: 10 MG/DL (ref 7–30)
BUN SERPL-MCNC: 11 MG/DL (ref 7–30)
BUN SERPL-MCNC: 11 MG/DL (ref 7–30)
BUN SERPL-MCNC: 12 MG/DL (ref 7–30)
BUN SERPL-MCNC: 13 MG/DL (ref 7–25)
BUN SERPL-MCNC: 13 MG/DL (ref 7–30)
BUN SERPL-MCNC: 14 MG/DL
BUN SERPL-MCNC: 14 MG/DL (ref 7–25)
BUN SERPL-MCNC: 14 MG/DL (ref 7–25)
BUN SERPL-MCNC: 14 MG/DL (ref 7–30)
BUN SERPL-MCNC: 15 MG/DL (ref 7–25)
BUN SERPL-MCNC: 15 MG/DL (ref 7–30)
BUN SERPL-MCNC: 16 MG/DL (ref 7–25)
BUN SERPL-MCNC: 18 MG/DL
BUN SERPL-MCNC: 18 MG/DL (ref 7–25)
BUN SERPL-MCNC: 18 MG/DL (ref 7–30)
BUN SERPL-MCNC: 19 MG/DL
BUN SERPL-MCNC: 19 MG/DL
BUN SERPL-MCNC: 19 MG/DL (ref 7–30)
BUN SERPL-MCNC: 20 MG/DL (ref 7–25)
BUN SERPL-MCNC: 20 MG/DL (ref 7–30)
BUN SERPL-MCNC: 20 MG/DL (ref 7–30)
BUN SERPL-MCNC: 21 MG/DL (ref 7–25)
BUN SERPL-MCNC: 21 MG/DL (ref 7–30)
BUN SERPL-MCNC: 21 MG/DL (ref 7–30)
BUN SERPL-MCNC: 22 MG/DL
BUN SERPL-MCNC: 22 MG/DL (ref 7–25)
BUN SERPL-MCNC: 22 MG/DL (ref 7–30)
BUN SERPL-MCNC: 23 MG/DL (ref 7–30)
BUN SERPL-MCNC: 23 MG/DL (ref 7–30)
BUN SERPL-MCNC: 24 MG/DL (ref 7–30)
BUN SERPL-MCNC: 24 MG/DL (ref 7–30)
BUN SERPL-MCNC: 25 MG/DL (ref 7–30)
BUN SERPL-MCNC: 26 MG/DL (ref 7–30)
BUN SERPL-MCNC: 26 MG/DL (ref 7–30)
BUN SERPL-MCNC: 27 MG/DL (ref 7–30)
BUN SERPL-MCNC: 27 MG/DL (ref 7–30)
BUN SERPL-MCNC: 30 MG/DL (ref 7–30)
BUN SERPL-MCNC: 33 MG/DL (ref 7–30)
BUN SERPL-MCNC: 36 MG/DL (ref 7–30)
BUN SERPL-MCNC: 36 MG/DL (ref 7–30)
BUN SERPL-MCNC: 39 MG/DL (ref 7–25)
BUN SERPL-MCNC: 4 MG/DL (ref 7–30)
BUN SERPL-MCNC: 43 MG/DL (ref 7–25)
BUN SERPL-MCNC: 5 MG/DL (ref 7–25)
BUN SERPL-MCNC: 5 MG/DL (ref 7–30)
BUN SERPL-MCNC: 5 MG/DL (ref 7–30)
BUN SERPL-MCNC: 55 MG/DL (ref 7–30)
BUN SERPL-MCNC: 6 MG/DL (ref 7–25)
BUN SERPL-MCNC: 6 MG/DL (ref 7–30)
BUN SERPL-MCNC: 7 MG/DL (ref 7–30)
BUN SERPL-MCNC: 7 MG/DL (ref 7–30)
BUN SERPL-MCNC: 8 MG/DL (ref 7–25)
BUN SERPL-MCNC: 8 MG/DL (ref 7–25)
BUN SERPL-MCNC: 9 MG/DL
BUN SERPL-MCNC: 9 MG/DL (ref 7–25)
BUN SERPL-MCNC: 9 MG/DL (ref 7–30)
BUN SERPL-MCNC: 9 MG/DL (ref 7–30)
BURR CELLS BLD QL SMEAR: ABNORMAL
BURR CELLS BLD QL SMEAR: SLIGHT
C DIFF TOX B STL QL: ABNORMAL
C DIFF TOX B STL QL: NEGATIVE
CA-I BLD-MCNC: 4.1 MG/DL (ref 4.4–5.2)
CA-I SERPL ISE-MCNC: 3.5 MG/DL (ref 4.4–5.2)
CA-I SERPL ISE-MCNC: 4.2 MG/DL (ref 4.4–5.2)
CALCIUM SERPL-MCNC: 5.6 MG/DL (ref 8.8–10.6)
CALCIUM SERPL-MCNC: 6 MG/DL (ref 8.8–10.6)
CALCIUM SERPL-MCNC: 7.1 MG/DL (ref 8.5–10.1)
CALCIUM SERPL-MCNC: 7.1 MG/DL (ref 8.8–10.6)
CALCIUM SERPL-MCNC: 7.5 MG/DL (ref 8.5–10.1)
CALCIUM SERPL-MCNC: 7.6 MG/DL (ref 8.5–10.1)
CALCIUM SERPL-MCNC: 7.6 MG/DL (ref 8.8–10.6)
CALCIUM SERPL-MCNC: 7.7 MG/DL (ref 8.5–10.1)
CALCIUM SERPL-MCNC: 7.7 MG/DL (ref 8.8–10.6)
CALCIUM SERPL-MCNC: 7.8 MG/DL
CALCIUM SERPL-MCNC: 7.9 MG/DL (ref 8.5–10.1)
CALCIUM SERPL-MCNC: 7.9 MG/DL (ref 8.8–10.6)
CALCIUM SERPL-MCNC: 7.9 MG/DL (ref 8.8–10.6)
CALCIUM SERPL-MCNC: 8 MG/DL
CALCIUM SERPL-MCNC: 8 MG/DL (ref 8.5–10.1)
CALCIUM SERPL-MCNC: 8 MG/DL (ref 8.5–10.1)
CALCIUM SERPL-MCNC: 8.1 MG/DL (ref 8.8–10.6)
CALCIUM SERPL-MCNC: 8.2 MG/DL
CALCIUM SERPL-MCNC: 8.2 MG/DL (ref 8.5–10.1)
CALCIUM SERPL-MCNC: 8.2 MG/DL (ref 8.8–10.6)
CALCIUM SERPL-MCNC: 8.2 MG/DL (ref 8.8–10.6)
CALCIUM SERPL-MCNC: 8.3 MG/DL (ref 8.5–10.1)
CALCIUM SERPL-MCNC: 8.3 MG/DL (ref 8.8–10.6)
CALCIUM SERPL-MCNC: 8.4 MG/DL (ref 8.5–10.1)
CALCIUM SERPL-MCNC: 8.4 MG/DL (ref 8.8–10.6)
CALCIUM SERPL-MCNC: 8.5 MG/DL
CALCIUM SERPL-MCNC: 8.5 MG/DL (ref 8.5–10.1)
CALCIUM SERPL-MCNC: 8.5 MG/DL (ref 8.8–10.6)
CALCIUM SERPL-MCNC: 8.6 MG/DL
CALCIUM SERPL-MCNC: 8.6 MG/DL (ref 8.5–10.1)
CALCIUM SERPL-MCNC: 8.6 MG/DL (ref 8.8–10.6)
CALCIUM SERPL-MCNC: 8.7 MG/DL (ref 8.5–10.1)
CALCIUM SERPL-MCNC: 8.7 MG/DL (ref 8.8–10.6)
CALCIUM SERPL-MCNC: 8.8 MG/DL (ref 8.5–10.1)
CALCIUM SERPL-MCNC: 8.8 MG/DL (ref 8.8–10.6)
CALCIUM SERPL-MCNC: 9 MG/DL (ref 8.5–10.1)
CALCIUM SERPL-MCNC: 9.3 MG/DL (ref 8.5–10.1)
CANCER AG19-9 SERPL-ACNC: 8 U/ML (ref 0–37)
CHLORIDE SERPL-SCNC: 100 MMOL/L (ref 94–109)
CHLORIDE SERPL-SCNC: 101 MMOL/L (ref 94–109)
CHLORIDE SERPL-SCNC: 101 MMOL/L (ref 94–109)
CHLORIDE SERPL-SCNC: 102 MMOL/L (ref 94–109)
CHLORIDE SERPL-SCNC: 103 MMOL/L (ref 94–109)
CHLORIDE SERPL-SCNC: 104 MMOL/L (ref 94–109)
CHLORIDE SERPL-SCNC: 105 MMOL/L (ref 94–109)
CHLORIDE SERPL-SCNC: 106 MMOL/L (ref 94–109)
CHLORIDE SERPL-SCNC: 107 MMOL/L (ref 94–109)
CHLORIDE SERPL-SCNC: 108 MMOL/L (ref 94–109)
CHLORIDE SERPL-SCNC: 109 MMOL/L (ref 94–109)
CHLORIDE SERPL-SCNC: 110 MMOL/L (ref 94–109)
CHLORIDE SERPL-SCNC: 111 MMOL/L (ref 94–109)
CHLORIDE SERPL-SCNC: 111 MMOL/L (ref 94–109)
CHLORIDE SERPL-SCNC: 112 MMOL/L (ref 94–109)
CHLORIDE SERPL-SCNC: 113 MMOL/L (ref 94–109)
CHLORIDE SERPL-SCNC: 114 MMOL/L (ref 94–109)
CHLORIDE SERPL-SCNC: 92 MMOL/L (ref 94–109)
CHLORIDE SERPL-SCNC: 94 MMOL/L (ref 94–109)
CHLORIDE SERPL-SCNC: 96 MMOL/L (ref 94–109)
CHLORIDE SERPL-SCNC: 98 MMOL/L (ref 94–109)
CHLORIDE SERPL-SCNC: 98 MMOL/L (ref 94–109)
CHLORIDE SERPL-SCNC: 99 MMOL/L (ref 94–109)
CHLORIDE SERPLBLD-SCNC: 100 MMOL/L
CHLORIDE SERPLBLD-SCNC: 100 MMOL/L (ref 98–107)
CHLORIDE SERPLBLD-SCNC: 101 MMOL/L
CHLORIDE SERPLBLD-SCNC: 101 MMOL/L (ref 98–107)
CHLORIDE SERPLBLD-SCNC: 102 MMOL/L
CHLORIDE SERPLBLD-SCNC: 102 MMOL/L (ref 98–107)
CHLORIDE SERPLBLD-SCNC: 103 MMOL/L (ref 98–107)
CHLORIDE SERPLBLD-SCNC: 104 MMOL/L
CHLORIDE SERPLBLD-SCNC: 105 MMOL/L
CHLORIDE SERPLBLD-SCNC: 105 MMOL/L
CHLORIDE SERPLBLD-SCNC: 105 MMOL/L (ref 98–107)
CHLORIDE SERPLBLD-SCNC: 105 MMOL/L (ref 98–107)
CHLORIDE SERPLBLD-SCNC: 106 MMOL/L (ref 98–107)
CHLORIDE SERPLBLD-SCNC: 106 MMOL/L (ref 98–107)
CHLORIDE SERPLBLD-SCNC: 107 MMOL/L (ref 98–107)
CHLORIDE SERPLBLD-SCNC: 107 MMOL/L (ref 98–107)
CHLORIDE SERPLBLD-SCNC: 108 MMOL/L (ref 98–107)
CHLORIDE SERPLBLD-SCNC: 87 MMOL/L (ref 98–107)
CHLORIDE SERPLBLD-SCNC: 92 MMOL/L (ref 98–107)
CHLORIDE SERPLBLD-SCNC: 98 MMOL/L (ref 98–107)
CHLORIDE SERPLBLD-SCNC: 99 MMOL/L (ref 98–107)
CO2 SERPL-SCNC: 20 MMOL/L (ref 20–32)
CO2 SERPL-SCNC: 22 MMOL/L (ref 20–32)
CO2 SERPL-SCNC: 23 MMOL/L (ref 20–32)
CO2 SERPL-SCNC: 23 MMOL/L (ref 21–31)
CO2 SERPL-SCNC: 24 MMOL/L
CO2 SERPL-SCNC: 24 MMOL/L
CO2 SERPL-SCNC: 24 MMOL/L (ref 20–32)
CO2 SERPL-SCNC: 24 MMOL/L (ref 21–31)
CO2 SERPL-SCNC: 25 MMOL/L
CO2 SERPL-SCNC: 25 MMOL/L (ref 20–32)
CO2 SERPL-SCNC: 25 MMOL/L (ref 20–32)
CO2 SERPL-SCNC: 25 MMOL/L (ref 21–31)
CO2 SERPL-SCNC: 26 MMOL/L (ref 20–32)
CO2 SERPL-SCNC: 26 MMOL/L (ref 21–31)
CO2 SERPL-SCNC: 27 MMOL/L
CO2 SERPL-SCNC: 27 MMOL/L (ref 20–32)
CO2 SERPL-SCNC: 27 MMOL/L (ref 21–31)
CO2 SERPL-SCNC: 28 MMOL/L
CO2 SERPL-SCNC: 28 MMOL/L (ref 20–32)
CO2 SERPL-SCNC: 28 MMOL/L (ref 21–31)
CO2 SERPL-SCNC: 29 MMOL/L
CO2 SERPL-SCNC: 29 MMOL/L (ref 20–32)
CO2 SERPL-SCNC: 29 MMOL/L (ref 21–31)
CO2 SERPL-SCNC: 29 MMOL/L (ref 21–31)
CO2 SERPL-SCNC: 30 MMOL/L (ref 20–32)
CO2 SERPL-SCNC: 30 MMOL/L (ref 21–31)
CO2 SERPL-SCNC: 31 MMOL/L (ref 20–32)
CO2 SERPL-SCNC: 32 MMOL/L (ref 20–32)
CO2 SERPL-SCNC: 33 MMOL/L (ref 20–32)
CO2 SERPL-SCNC: 34 MMOL/L (ref 20–32)
CO2 SERPL-SCNC: 34 MMOL/L (ref 21–31)
CO2 SERPL-SCNC: 34 MMOL/L (ref 21–31)
COLOR CSF: COLORLESS
COLOR UR AUTO: YELLOW
COPATH REPORT: NORMAL
CREAT SERPL-MCNC: 0.4 MG/DL (ref 0.66–1.25)
CREAT SERPL-MCNC: 0.42 MG/DL (ref 0.66–1.25)
CREAT SERPL-MCNC: 0.43 MG/DL (ref 0.66–1.25)
CREAT SERPL-MCNC: 0.44 MG/DL (ref 0.66–1.25)
CREAT SERPL-MCNC: 0.44 MG/DL (ref 0.67–1.17)
CREAT SERPL-MCNC: 0.45 MG/DL (ref 0.67–1.17)
CREAT SERPL-MCNC: 0.46 MG/DL
CREAT SERPL-MCNC: 0.46 MG/DL (ref 0.66–1.25)
CREAT SERPL-MCNC: 0.46 MG/DL (ref 0.66–1.25)
CREAT SERPL-MCNC: 0.46 MG/DL (ref 0.67–1.17)
CREAT SERPL-MCNC: 0.47 MG/DL (ref 0.66–1.25)
CREAT SERPL-MCNC: 0.48 MG/DL (ref 0.66–1.25)
CREAT SERPL-MCNC: 0.48 MG/DL (ref 0.67–1.17)
CREAT SERPL-MCNC: 0.5 MG/DL (ref 0.67–1.17)
CREAT SERPL-MCNC: 0.51 MG/DL (ref 0.66–1.25)
CREAT SERPL-MCNC: 0.51 MG/DL (ref 0.67–1.17)
CREAT SERPL-MCNC: 0.53 MG/DL (ref 0.66–1.25)
CREAT SERPL-MCNC: 0.53 MG/DL (ref 0.66–1.25)
CREAT SERPL-MCNC: 0.53 MG/DL (ref 0.67–1.17)
CREAT SERPL-MCNC: 0.53 MG/DL (ref 0.67–1.17)
CREAT SERPL-MCNC: 0.55 MG/DL (ref 0.66–1.25)
CREAT SERPL-MCNC: 0.56 MG/DL (ref 0.66–1.25)
CREAT SERPL-MCNC: 0.56 MG/DL (ref 0.66–1.25)
CREAT SERPL-MCNC: 0.57 MG/DL (ref 0.66–1.25)
CREAT SERPL-MCNC: 0.58 MG/DL (ref 0.66–1.25)
CREAT SERPL-MCNC: 0.58 MG/DL (ref 0.67–1.17)
CREAT SERPL-MCNC: 0.59 MG/DL (ref 0.66–1.25)
CREAT SERPL-MCNC: 0.61 MG/DL (ref 0.66–1.25)
CREAT SERPL-MCNC: 0.61 MG/DL (ref 0.67–1.17)
CREAT SERPL-MCNC: 0.62 MG/DL (ref 0.66–1.25)
CREAT SERPL-MCNC: 0.63 MG/DL (ref 0.66–1.25)
CREAT SERPL-MCNC: 0.67 MG/DL (ref 0.67–1.17)
CREAT SERPL-MCNC: 0.68 MG/DL (ref 0.66–1.25)
CREAT SERPL-MCNC: 0.68 MG/DL (ref 0.66–1.25)
CREAT SERPL-MCNC: 0.7 MG/DL (ref 0.66–1.25)
CREAT SERPL-MCNC: 0.72 MG/DL (ref 0.66–1.25)
CREAT SERPL-MCNC: 0.74 MG/DL (ref 0.67–1.17)
CREAT SERPL-MCNC: 0.77 MG/DL
CREAT SERPL-MCNC: 0.86 MG/DL (ref 0.67–1.17)
CREAT SERPL-MCNC: 0.87 MG/DL (ref 0.66–1.25)
CREAT SERPL-MCNC: 0.88 MG/DL (ref 0.67–1.17)
CREAT SERPL-MCNC: 0.9 MG/DL (ref 0.66–1.25)
CREAT SERPL-MCNC: 0.9 MG/DL (ref 0.67–1.17)
CREAT SERPL-MCNC: 0.92 MG/DL (ref 0.66–1.25)
CREAT SERPL-MCNC: 0.94 MG/DL (ref 0.66–1.25)
CREAT SERPL-MCNC: 0.94 MG/DL (ref 0.67–1.17)
CREAT SERPL-MCNC: 0.95 MG/DL (ref 0.66–1.25)
CREAT SERPL-MCNC: 1 MG/DL (ref 0.66–1.25)
CREAT SERPL-MCNC: 1 MG/DL (ref 0.66–1.25)
CREAT SERPL-MCNC: 1.01 MG/DL (ref 0.66–1.25)
CREAT SERPL-MCNC: 1.05 MG/DL (ref 0.67–1.17)
CREAT SERPL-MCNC: 1.1 MG/DL (ref 0.67–1.17)
CREAT SERPL-MCNC: 1.14 MG/DL (ref 0.66–1.25)
CREAT SERPL-MCNC: 1.18 MG/DL (ref 0.66–1.25)
CREAT SERPL-MCNC: 1.31 MG/DL
CREAT SERPL-MCNC: 1.36 MG/DL
CREAT SERPL-MCNC: 1.4 MG/DL (ref 0.66–1.25)
CREAT SERPL-MCNC: 1.41 MG/DL (ref 0.66–1.25)
CREAT SERPL-MCNC: 1.48 MG/DL (ref 0.66–1.25)
CREAT SERPL-MCNC: 1.74 MG/DL (ref 0.67–1.17)
CREAT SERPL-MCNC: 1.76 MG/DL
CREAT SERPL-MCNC: 1.84 MG/DL (ref 0.66–1.25)
CREAT SERPL-MCNC: 1.86 MG/DL (ref 0.67–1.17)
DIFFERENTIAL METHOD BLD: ABNORMAL
DIFFERENTIAL: ABNORMAL
ELLIPTOCYTES BLD QL SMEAR: ABNORMAL
ELLIPTOCYTES BLD QL SMEAR: SLIGHT
EOSINOPHIL # BLD AUTO: 0 10E9/L (ref 0–0.7)
EOSINOPHIL # BLD AUTO: 0.01 10*3/UL
EOSINOPHIL # BLD AUTO: 0.01 10*3/UL (ref 0–0.2)
EOSINOPHIL # BLD AUTO: 0.02 10*3/UL (ref 0–0.2)
EOSINOPHIL # BLD AUTO: 0.02 10^9/L (ref 0–0.2)
EOSINOPHIL # BLD AUTO: 0.03 10*3/UL (ref 0–0.2)
EOSINOPHIL # BLD AUTO: 0.03 10*3/UL (ref 0–0.2)
EOSINOPHIL # BLD AUTO: 0.04 10*3/UL
EOSINOPHIL # BLD AUTO: 0.05 10*3/UL
EOSINOPHIL # BLD AUTO: 0.05 10*3/UL (ref 0–0.2)
EOSINOPHIL # BLD AUTO: 0.05 10*3/UL (ref 0–0.2)
EOSINOPHIL # BLD AUTO: 0.1 10E9/L (ref 0–0.7)
EOSINOPHIL # BLD AUTO: 0.11 10*3/UL (ref 0–0.2)
EOSINOPHIL # BLD AUTO: 0.12 10*3/UL (ref 0–0.2)
EOSINOPHIL NFR BLD AUTO: 0 %
EOSINOPHIL NFR BLD AUTO: 0 % (ref 0.9–2.9)
EOSINOPHIL NFR BLD AUTO: 0.1 %
EOSINOPHIL NFR BLD AUTO: 0.1 % (ref 0.9–2.9)
EOSINOPHIL NFR BLD AUTO: 0.2 %
EOSINOPHIL NFR BLD AUTO: 0.2 % (ref 0.9–2.9)
EOSINOPHIL NFR BLD AUTO: 0.3 %
EOSINOPHIL NFR BLD AUTO: 0.4 %
EOSINOPHIL NFR BLD AUTO: 0.4 %
EOSINOPHIL NFR BLD AUTO: 0.4 % (ref 0.9–2.9)
EOSINOPHIL NFR BLD AUTO: 0.4 % (ref 0.9–2.9)
EOSINOPHIL NFR BLD AUTO: 0.5 %
EOSINOPHIL NFR BLD AUTO: 0.5 %
EOSINOPHIL NFR BLD AUTO: 0.5 % (ref 0.9–2.9)
EOSINOPHIL NFR BLD AUTO: 0.6 %
EOSINOPHIL NFR BLD AUTO: 0.6 % (ref 0.9–2.9)
EOSINOPHIL NFR BLD AUTO: 0.7 % (ref 0.9–2.9)
EOSINOPHIL NFR BLD AUTO: 0.8 %
EOSINOPHIL NFR BLD AUTO: 0.8 % (ref 0.9–2.9)
EOSINOPHIL NFR BLD AUTO: 1 % (ref 1–3)
EOSINOPHIL NFR BLD AUTO: 1.3 % (ref 0.9–2.9)
EOSINOPHIL NFR BLD AUTO: 1.5 % (ref 0.9–2.9)
EOSINOPHIL NFR BLD AUTO: 1.6 % (ref 0.9–2.9)
EOSINOPHIL NFR BLD AUTO: 1.8 % (ref 0.9–2.9)
EOSINOPHIL NFR BLD AUTO: 1.8 % (ref 0.9–2.9)
EOSINOPHIL NFR BLD AUTO: 2.6 % (ref 0.9–2.9)
EOSINOPHIL NFR BLD AUTO: 2.7 % (ref 0.9–2.9)
EOSINOPHIL NFR BLD AUTO: 3 % (ref 0.9–2.9)
EOSINOPHIL NFR BLD AUTO: 3 % (ref 1–3)
EOSINOPHIL NFR BLD AUTO: 8.7 % (ref 0.9–2.9)
ERYTHROCYTE [DISTWIDTH] IN BLOOD BY AUTOMATED COUNT: 16.6 % (ref 10–15)
ERYTHROCYTE [DISTWIDTH] IN BLOOD BY AUTOMATED COUNT: 16.7 % (ref 10–15)
ERYTHROCYTE [DISTWIDTH] IN BLOOD BY AUTOMATED COUNT: 16.8 % (ref 11–16)
ERYTHROCYTE [DISTWIDTH] IN BLOOD BY AUTOMATED COUNT: 16.9 % (ref 10–15)
ERYTHROCYTE [DISTWIDTH] IN BLOOD BY AUTOMATED COUNT: 17 % (ref 10–15)
ERYTHROCYTE [DISTWIDTH] IN BLOOD BY AUTOMATED COUNT: 17 % (ref 10–15)
ERYTHROCYTE [DISTWIDTH] IN BLOOD BY AUTOMATED COUNT: 17.1 % (ref 10–15)
ERYTHROCYTE [DISTWIDTH] IN BLOOD BY AUTOMATED COUNT: 17.2 %
ERYTHROCYTE [DISTWIDTH] IN BLOOD BY AUTOMATED COUNT: 17.2 % (ref 10–15)
ERYTHROCYTE [DISTWIDTH] IN BLOOD BY AUTOMATED COUNT: 17.2 % (ref 11–16)
ERYTHROCYTE [DISTWIDTH] IN BLOOD BY AUTOMATED COUNT: 17.3 % (ref 10–15)
ERYTHROCYTE [DISTWIDTH] IN BLOOD BY AUTOMATED COUNT: 17.4 % (ref 10–15)
ERYTHROCYTE [DISTWIDTH] IN BLOOD BY AUTOMATED COUNT: 17.4 % (ref 10–15)
ERYTHROCYTE [DISTWIDTH] IN BLOOD BY AUTOMATED COUNT: 17.5 % (ref 10–15)
ERYTHROCYTE [DISTWIDTH] IN BLOOD BY AUTOMATED COUNT: 17.6 % (ref 10–15)
ERYTHROCYTE [DISTWIDTH] IN BLOOD BY AUTOMATED COUNT: 17.6 % (ref 10–15)
ERYTHROCYTE [DISTWIDTH] IN BLOOD BY AUTOMATED COUNT: 17.7 % (ref 10–15)
ERYTHROCYTE [DISTWIDTH] IN BLOOD BY AUTOMATED COUNT: 17.8 % (ref 10–15)
ERYTHROCYTE [DISTWIDTH] IN BLOOD BY AUTOMATED COUNT: 17.8 % (ref 11–16)
ERYTHROCYTE [DISTWIDTH] IN BLOOD BY AUTOMATED COUNT: 17.9 % (ref 10–15)
ERYTHROCYTE [DISTWIDTH] IN BLOOD BY AUTOMATED COUNT: 17.9 % (ref 10–15)
ERYTHROCYTE [DISTWIDTH] IN BLOOD BY AUTOMATED COUNT: 18 % (ref 10–15)
ERYTHROCYTE [DISTWIDTH] IN BLOOD BY AUTOMATED COUNT: 18.1 % (ref 10–15)
ERYTHROCYTE [DISTWIDTH] IN BLOOD BY AUTOMATED COUNT: 18.1 % (ref 10–15)
ERYTHROCYTE [DISTWIDTH] IN BLOOD BY AUTOMATED COUNT: 18.2 % (ref 10–15)
ERYTHROCYTE [DISTWIDTH] IN BLOOD BY AUTOMATED COUNT: 18.3 % (ref 10–15)
ERYTHROCYTE [DISTWIDTH] IN BLOOD BY AUTOMATED COUNT: 18.3 % (ref 11–16)
ERYTHROCYTE [DISTWIDTH] IN BLOOD BY AUTOMATED COUNT: 18.5 % (ref 10–15)
ERYTHROCYTE [DISTWIDTH] IN BLOOD BY AUTOMATED COUNT: 18.5 % (ref 11–16)
ERYTHROCYTE [DISTWIDTH] IN BLOOD BY AUTOMATED COUNT: 18.6 % (ref 10–15)
ERYTHROCYTE [DISTWIDTH] IN BLOOD BY AUTOMATED COUNT: 18.6 % (ref 11–16)
ERYTHROCYTE [DISTWIDTH] IN BLOOD BY AUTOMATED COUNT: 18.7 % (ref 10–15)
ERYTHROCYTE [DISTWIDTH] IN BLOOD BY AUTOMATED COUNT: 18.8 % (ref 10–15)
ERYTHROCYTE [DISTWIDTH] IN BLOOD BY AUTOMATED COUNT: 18.8 % (ref 11–16)
ERYTHROCYTE [DISTWIDTH] IN BLOOD BY AUTOMATED COUNT: 19 % (ref 10–15)
ERYTHROCYTE [DISTWIDTH] IN BLOOD BY AUTOMATED COUNT: 19 % (ref 11–16)
ERYTHROCYTE [DISTWIDTH] IN BLOOD BY AUTOMATED COUNT: 19.1 %
ERYTHROCYTE [DISTWIDTH] IN BLOOD BY AUTOMATED COUNT: 19.1 % (ref 10–15)
ERYTHROCYTE [DISTWIDTH] IN BLOOD BY AUTOMATED COUNT: 19.1 % (ref 11–16)
ERYTHROCYTE [DISTWIDTH] IN BLOOD BY AUTOMATED COUNT: 19.1 % (ref 11–16)
ERYTHROCYTE [DISTWIDTH] IN BLOOD BY AUTOMATED COUNT: 19.4 % (ref 11–16)
ERYTHROCYTE [DISTWIDTH] IN BLOOD BY AUTOMATED COUNT: 19.6 %
ERYTHROCYTE [DISTWIDTH] IN BLOOD BY AUTOMATED COUNT: 19.6 % (ref 10–15)
ERYTHROCYTE [DISTWIDTH] IN BLOOD BY AUTOMATED COUNT: 19.7 % (ref 10–15)
ERYTHROCYTE [DISTWIDTH] IN BLOOD BY AUTOMATED COUNT: 19.7 % (ref 11–16)
ERYTHROCYTE [DISTWIDTH] IN BLOOD BY AUTOMATED COUNT: 19.8 % (ref 10–15)
ERYTHROCYTE [DISTWIDTH] IN BLOOD BY AUTOMATED COUNT: 19.8 % (ref 11–16)
ERYTHROCYTE [DISTWIDTH] IN BLOOD BY AUTOMATED COUNT: 19.8 % (ref 11–16)
ERYTHROCYTE [DISTWIDTH] IN BLOOD BY AUTOMATED COUNT: 19.9 % (ref 10–15)
ERYTHROCYTE [DISTWIDTH] IN BLOOD BY AUTOMATED COUNT: 20.2 % (ref 11–16)
ERYTHROCYTE [DISTWIDTH] IN BLOOD BY AUTOMATED COUNT: 20.3 % (ref 10–15)
ERYTHROCYTE [DISTWIDTH] IN BLOOD BY AUTOMATED COUNT: 20.3 % (ref 11–16)
ERYTHROCYTE [DISTWIDTH] IN BLOOD BY AUTOMATED COUNT: 20.7 % (ref 10–15)
ERYTHROCYTE [DISTWIDTH] IN BLOOD BY AUTOMATED COUNT: 21.1 % (ref 11–16)
ERYTHROCYTE [DISTWIDTH] IN BLOOD BY AUTOMATED COUNT: 21.6 %
ERYTHROCYTE [DISTWIDTH] IN BLOOD BY AUTOMATED COUNT: 21.9 % (ref 10–15)
ERYTHROCYTE [DISTWIDTH] IN BLOOD BY AUTOMATED COUNT: 21.9 % (ref 11–16)
ERYTHROCYTE [DISTWIDTH] IN BLOOD BY AUTOMATED COUNT: 22.3 % (ref 10–15)
ERYTHROCYTE [DISTWIDTH] IN BLOOD BY AUTOMATED COUNT: 24.8 % (ref 10–15)
ERYTHROCYTE [DISTWIDTH] IN BLOOD BY AUTOMATED COUNT: 28.5 % (ref 11–16)
FERRITIN SERPL-MCNC: 455 NG/ML (ref 26–388)
FIBRINOGEN PPP-MCNC: 440 MG/DL (ref 200–420)
FIBRINOGEN PPP-MCNC: 448 MG/DL (ref 200–420)
FIBRINOGEN PPP-MCNC: 448 MG/DL (ref 200–420)
FIBRINOGEN PPP-MCNC: 456 MG/DL (ref 200–420)
FIBRINOGEN PPP-MCNC: 536 MG/DL (ref 200–420)
FIBRINOGEN PPP-MCNC: 540 MG/DL (ref 200–420)
FIBRINOGEN PPP-MCNC: 559 MG/DL (ref 200–420)
FIBRINOGEN PPP-MCNC: 599 MG/DL (ref 200–420)
FIBRINOGEN PPP-MCNC: 614 MG/DL (ref 200–420)
FIBRINOGEN PPP-MCNC: 630 MG/DL (ref 200–420)
FIBRINOGEN PPP-MCNC: 791 MG/DL (ref 200–420)
GFR SERPL CREATININE-BSD FRML MDRD: 100 ML/MIN/1.73M2
GFR SERPL CREATININE-BSD FRML MDRD: 114 ML/MIN/1.73M2
GFR SERPL CREATININE-BSD FRML MDRD: 115 ML/MIN/1.73M2
GFR SERPL CREATININE-BSD FRML MDRD: 119 ML/MIN/1.73M2
GFR SERPL CREATININE-BSD FRML MDRD: 149 ML/MIN/1.73M2 (ref 60–?)
GFR SERPL CREATININE-BSD FRML MDRD: 158 ML/MIN/1.73M2
GFR SERPL CREATININE-BSD FRML MDRD: 175 ML/MIN/1.73M2
GFR SERPL CREATININE-BSD FRML MDRD: 176 ML/MIN/1.73M2
GFR SERPL CREATININE-BSD FRML MDRD: 184 ML/MIN/1.73M2
GFR SERPL CREATININE-BSD FRML MDRD: 188 ML/MIN/1.73M2
GFR SERPL CREATININE-BSD FRML MDRD: 197 ML/MIN/1.73M2
GFR SERPL CREATININE-BSD FRML MDRD: 207 ML/MIN/1.73M2
GFR SERPL CREATININE-BSD FRML MDRD: 207 ML/MIN/1.73M2
GFR SERPL CREATININE-BSD FRML MDRD: 212 ML/MIN/1.73M2
GFR SERPL CREATININE-BSD FRML MDRD: 218 ML/MIN/1.73M2
GFR SERPL CREATININE-BSD FRML MDRD: 41 ML/MIN/1.73M2
GFR SERPL CREATININE-BSD FRML MDRD: 42 ML/MIN/{1.73_M2}
GFR SERPL CREATININE-BSD FRML MDRD: 44 ML/MIN/1.73M2
GFR SERPL CREATININE-BSD FRML MDRD: 54 ML/MIN/{1.73_M2}
GFR SERPL CREATININE-BSD FRML MDRD: 58 ML/MIN/{1.73_M2}
GFR SERPL CREATININE-BSD FRML MDRD: 58 ML/MIN/{1.73_M2}
GFR SERPL CREATININE-BSD FRML MDRD: 59 ML/MIN/1.73M2
GFR SERPL CREATININE-BSD FRML MDRD: 62 ML/MIN/1.73M2
GFR SERPL CREATININE-BSD FRML MDRD: 66 ML/MIN/{1.73_M2}
GFR SERPL CREATININE-BSD FRML MDRD: 71 ML/MIN/{1.73_M2}
GFR SERPL CREATININE-BSD FRML MDRD: 74 ML/MIN/{1.73_M2}
GFR SERPL CREATININE-BSD FRML MDRD: 75 ML/MIN/1.73M2
GFR SERPL CREATININE-BSD FRML MDRD: 79 ML/MIN/1.73M2
GFR SERPL CREATININE-BSD FRML MDRD: 87 ML/MIN/{1.73_M2}
GFR SERPL CREATININE-BSD FRML MDRD: 88 ML/MIN/{1.73_M2}
GFR SERPL CREATININE-BSD FRML MDRD: 90 ML/MIN/1.73M2
GFR SERPL CREATININE-BSD FRML MDRD: 97 ML/MIN/1.73M2
GFR SERPL CREATININE-BSD FRML MDRD: >90 ML/MIN/{1.73_M2}
GFR SERPL CREATININE-BSD FRML MDRD: NORMAL ML/MIN/1.73M2
GLUCOSE CSF-MCNC: 47 MG/DL (ref 40–70)
GLUCOSE CSF-MCNC: 48 MG/DL (ref 40–70)
GLUCOSE CSF-MCNC: 49 MG/DL (ref 40–70)
GLUCOSE CSF-MCNC: 49 MG/DL (ref 40–70)
GLUCOSE CSF-MCNC: 83 MG/DL (ref 40–70)
GLUCOSE SERPL-MCNC: 101 MG/DL (ref 70–99)
GLUCOSE SERPL-MCNC: 102 MG/DL (ref 70–99)
GLUCOSE SERPL-MCNC: 102 MG/DL (ref 70–99)
GLUCOSE SERPL-MCNC: 103 MG/DL (ref 70–99)
GLUCOSE SERPL-MCNC: 103 MG/DL (ref 74–106)
GLUCOSE SERPL-MCNC: 103 MG/DL (ref 74–106)
GLUCOSE SERPL-MCNC: 104 MG/DL (ref 70–99)
GLUCOSE SERPL-MCNC: 104 MG/DL (ref 70–99)
GLUCOSE SERPL-MCNC: 104 MG/DL (ref 74–106)
GLUCOSE SERPL-MCNC: 105 MG/DL (ref 70–99)
GLUCOSE SERPL-MCNC: 106 MG/DL (ref 70–99)
GLUCOSE SERPL-MCNC: 106 MG/DL (ref 74–106)
GLUCOSE SERPL-MCNC: 108 MG/DL (ref 70–99)
GLUCOSE SERPL-MCNC: 108 MG/DL (ref 70–99)
GLUCOSE SERPL-MCNC: 108 MG/DL (ref 74–106)
GLUCOSE SERPL-MCNC: 109 MG/DL (ref 74–106)
GLUCOSE SERPL-MCNC: 112 MG/DL (ref 70–99)
GLUCOSE SERPL-MCNC: 112 MG/DL (ref 74–106)
GLUCOSE SERPL-MCNC: 113 MG/DL (ref 74–106)
GLUCOSE SERPL-MCNC: 113 MG/DL (ref 74–106)
GLUCOSE SERPL-MCNC: 114 MG/DL (ref 70–99)
GLUCOSE SERPL-MCNC: 114 MG/DL (ref 70–99)
GLUCOSE SERPL-MCNC: 115 MG/DL (ref 70–99)
GLUCOSE SERPL-MCNC: 119 MG/DL (ref 70–99)
GLUCOSE SERPL-MCNC: 121 MG/DL (ref 74–106)
GLUCOSE SERPL-MCNC: 122 MG/DL (ref 70–99)
GLUCOSE SERPL-MCNC: 123 MG/DL (ref 74–106)
GLUCOSE SERPL-MCNC: 124 MG/DL (ref 70–99)
GLUCOSE SERPL-MCNC: 124 MG/DL (ref 74–106)
GLUCOSE SERPL-MCNC: 124 MG/DL (ref 74–106)
GLUCOSE SERPL-MCNC: 133 MG/DL (ref 70–99)
GLUCOSE SERPL-MCNC: 134 MG/DL (ref 70–99)
GLUCOSE SERPL-MCNC: 136 MG/DL (ref 70–99)
GLUCOSE SERPL-MCNC: 138 MG/DL (ref 70–99)
GLUCOSE SERPL-MCNC: 140 MG/DL (ref 70–99)
GLUCOSE SERPL-MCNC: 140 MG/DL (ref 74–106)
GLUCOSE SERPL-MCNC: 142 MG/DL (ref 70–99)
GLUCOSE SERPL-MCNC: 159 MG/DL (ref 70–99)
GLUCOSE SERPL-MCNC: 168 MG/DL (ref 70–99)
GLUCOSE SERPL-MCNC: 204 MG/DL (ref 70–99)
GLUCOSE SERPL-MCNC: 208 MG/DL (ref 70–99)
GLUCOSE SERPL-MCNC: 241 MG/DL (ref 70–99)
GLUCOSE SERPL-MCNC: 80 MG/DL (ref 70–99)
GLUCOSE SERPL-MCNC: 81 MG/DL (ref 70–99)
GLUCOSE SERPL-MCNC: 82 MG/DL (ref 70–99)
GLUCOSE SERPL-MCNC: 82 MG/DL (ref 70–99)
GLUCOSE SERPL-MCNC: 83 MG/DL (ref 70–99)
GLUCOSE SERPL-MCNC: 85 MG/DL (ref 70–99)
GLUCOSE SERPL-MCNC: 85 MG/DL (ref 70–99)
GLUCOSE SERPL-MCNC: 86 MG/DL (ref 70–99)
GLUCOSE SERPL-MCNC: 87 MG/DL (ref 70–99)
GLUCOSE SERPL-MCNC: 90 MG/DL (ref 70–99)
GLUCOSE SERPL-MCNC: 91 MG/DL (ref 74–106)
GLUCOSE SERPL-MCNC: 92 MG/DL (ref 70–99)
GLUCOSE SERPL-MCNC: 92 MG/DL (ref 70–99)
GLUCOSE SERPL-MCNC: 92 MG/DL (ref 74–106)
GLUCOSE SERPL-MCNC: 93 MG/DL (ref 70–99)
GLUCOSE SERPL-MCNC: 93 MG/DL (ref 70–99)
GLUCOSE SERPL-MCNC: 94 MG/DL (ref 70–99)
GLUCOSE SERPL-MCNC: 94 MG/DL (ref 74–106)
GLUCOSE SERPL-MCNC: 96 MG/DL (ref 70–99)
GLUCOSE SERPL-MCNC: 97 MG/DL (ref 70–99)
GLUCOSE SERPL-MCNC: 98 MG/DL (ref 70–99)
GLUCOSE SERPL-MCNC: 98 MG/DL (ref 74–106)
GLUCOSE SERPL-MCNC: 99 MG/DL (ref 70–99)
GLUCOSE SERPL-MCNC: 99 MG/DL (ref 74–106)
GLUCOSE UR STRIP-MCNC: 30 MG/DL
GLUCOSE UR STRIP-MCNC: NEGATIVE MG/DL
GRAM STN SPEC: ABNORMAL
GRAM STN SPEC: NORMAL
GRAN CASTS #/AREA URNS LPF: 7 /LPF
GRAN CASTS #/AREA URNS LPF: 8 /LPF
HBV CORE AB SERPL QL IA: NONREACTIVE
HBV SURFACE AB SERPL IA-ACNC: 289.4 M[IU]/ML
HBV SURFACE AG SERPL QL IA: NONREACTIVE
HCT VFR BLD AUTO: 21.5 % (ref 40–53)
HCT VFR BLD AUTO: 21.9 % (ref 40–53)
HCT VFR BLD AUTO: 22 % (ref 36–51)
HCT VFR BLD AUTO: 22.1 %
HCT VFR BLD AUTO: 22.3 % (ref 40–53)
HCT VFR BLD AUTO: 22.4 %
HCT VFR BLD AUTO: 22.5 % (ref 40–53)
HCT VFR BLD AUTO: 22.6 % (ref 40–53)
HCT VFR BLD AUTO: 23.1 % (ref 36–51)
HCT VFR BLD AUTO: 23.7 % (ref 36–51)
HCT VFR BLD AUTO: 23.8 % (ref 36–51)
HCT VFR BLD AUTO: 23.9 % (ref 36–51)
HCT VFR BLD AUTO: 23.9 % (ref 36–51)
HCT VFR BLD AUTO: 23.9 % (ref 40–53)
HCT VFR BLD AUTO: 24 % (ref 36–51)
HCT VFR BLD AUTO: 24.1 % (ref 36–51)
HCT VFR BLD AUTO: 24.1 % (ref 36–51)
HCT VFR BLD AUTO: 24.1 % (ref 40–53)
HCT VFR BLD AUTO: 24.2 % (ref 36–51)
HCT VFR BLD AUTO: 24.2 % (ref 40–53)
HCT VFR BLD AUTO: 24.5 %
HCT VFR BLD AUTO: 24.6 % (ref 40–53)
HCT VFR BLD AUTO: 24.7 % (ref 40–53)
HCT VFR BLD AUTO: 24.7 % (ref 40–53)
HCT VFR BLD AUTO: 25 % (ref 40–53)
HCT VFR BLD AUTO: 25.1 % (ref 40–53)
HCT VFR BLD AUTO: 25.2 % (ref 36–51)
HCT VFR BLD AUTO: 25.2 % (ref 36–51)
HCT VFR BLD AUTO: 25.2 % (ref 40–53)
HCT VFR BLD AUTO: 25.3 % (ref 40–53)
HCT VFR BLD AUTO: 25.5 % (ref 36–51)
HCT VFR BLD AUTO: 25.5 % (ref 40–53)
HCT VFR BLD AUTO: 25.8 % (ref 36–51)
HCT VFR BLD AUTO: 25.8 % (ref 40–53)
HCT VFR BLD AUTO: 26 % (ref 40–53)
HCT VFR BLD AUTO: 26.2 % (ref 36–51)
HCT VFR BLD AUTO: 26.4 % (ref 36–51)
HCT VFR BLD AUTO: 26.5 % (ref 40–53)
HCT VFR BLD AUTO: 26.5 % (ref 40–53)
HCT VFR BLD AUTO: 26.6 % (ref 40–53)
HCT VFR BLD AUTO: 27 % (ref 40–53)
HCT VFR BLD AUTO: 27.2 % (ref 40–53)
HCT VFR BLD AUTO: 27.6 % (ref 40–53)
HCT VFR BLD AUTO: 28 % (ref 40–53)
HCT VFR BLD AUTO: 28.2 % (ref 40–53)
HCT VFR BLD AUTO: 28.4 % (ref 36–51)
HCT VFR BLD AUTO: 28.4 % (ref 40–53)
HCT VFR BLD AUTO: 28.6 % (ref 40–53)
HCT VFR BLD AUTO: 28.8 % (ref 40–53)
HCT VFR BLD AUTO: 29 % (ref 40–53)
HCT VFR BLD AUTO: 29.1 % (ref 40–53)
HCT VFR BLD AUTO: 29.3 % (ref 36–51)
HCT VFR BLD AUTO: 29.5 % (ref 36–51)
HCT VFR BLD AUTO: 29.5 % (ref 40–53)
HCT VFR BLD AUTO: 29.8 % (ref 40–53)
HCT VFR BLD AUTO: 30.1 % (ref 40–53)
HCT VFR BLD AUTO: 30.5 % (ref 36–51)
HCT VFR BLD AUTO: 30.6 % (ref 40–53)
HCT VFR BLD AUTO: 30.6 % (ref 40–53)
HCT VFR BLD AUTO: 30.8 % (ref 40–53)
HCT VFR BLD AUTO: 31 % (ref 40–53)
HCT VFR BLD AUTO: 32.4 % (ref 40–53)
HCT VFR BLD AUTO: 32.9 % (ref 40–53)
HCT VFR BLD AUTO: 33.6 % (ref 40–53)
HCT VFR BLD AUTO: 36.1 % (ref 40–53)
HCV AB SERPL QL IA: NONREACTIVE
HEMOGLOBIN: 7 G/DL (ref 13.3–17.7)
HEMOGLOBIN: 7.1 G/DL (ref 13.3–17.7)
HEMOGLOBIN: 7.3 G/DL (ref 12.7–17)
HEMOGLOBIN: 7.3 G/DL (ref 12.7–17)
HEMOGLOBIN: 7.4 G/DL (ref 12.7–17)
HEMOGLOBIN: 7.5 G/DL (ref 12.7–17)
HEMOGLOBIN: 7.6 G/DL (ref 12.7–17)
HEMOGLOBIN: 7.6 G/DL (ref 12.7–17)
HEMOGLOBIN: 7.7 G/DL (ref 12.7–17)
HEMOGLOBIN: 7.8 G/DL (ref 12.7–17)
HEMOGLOBIN: 7.8 G/DL (ref 13.3–17.7)
HEMOGLOBIN: 7.9 G/DL (ref 12.7–17)
HEMOGLOBIN: 8 G/DL (ref 12–17)
HEMOGLOBIN: 8.4 G/DL (ref 12.7–17)
HEMOGLOBIN: 8.6 G/DL (ref 12.7–17)
HEMOGLOBIN: 8.9 G/DL (ref 12.7–17)
HEMOGLOBIN: 9.1 G/DL (ref 12.7–17)
HEMOGLOBIN: 9.3 G/DL (ref 12.7–17)
HEMOGLOBIN: 9.3 G/DL (ref 12.7–17)
HGB BLD-MCNC: 10 G/DL (ref 13.3–17.7)
HGB BLD-MCNC: 10 G/DL (ref 13.3–17.7)
HGB BLD-MCNC: 10.1 G/DL (ref 13.3–17.7)
HGB BLD-MCNC: 10.5 G/DL (ref 13.3–17.7)
HGB BLD-MCNC: 10.6 G/DL (ref 13.3–17.7)
HGB BLD-MCNC: 10.9 G/DL (ref 13.3–17.7)
HGB BLD-MCNC: 11.5 G/DL (ref 13.3–17.7)
HGB BLD-MCNC: 6.3 G/DL (ref 13.3–17.7)
HGB BLD-MCNC: 6.4 G/DL (ref 13.3–17.7)
HGB BLD-MCNC: 6.8 G/DL (ref 13.3–17.7)
HGB BLD-MCNC: 6.9 G/DL (ref 13.3–17.7)
HGB BLD-MCNC: 7 G/DL (ref 13.3–17.7)
HGB BLD-MCNC: 7.2 G/DL (ref 13.3–17.7)
HGB BLD-MCNC: 7.4 G/DL (ref 13.3–17.7)
HGB BLD-MCNC: 7.4 G/DL (ref 13.3–17.7)
HGB BLD-MCNC: 7.5 G/DL (ref 13.3–17.7)
HGB BLD-MCNC: 7.5 G/DL (ref 13.3–17.7)
HGB BLD-MCNC: 7.6 G/DL (ref 13.3–17.7)
HGB BLD-MCNC: 7.7 G/DL (ref 13.3–17.7)
HGB BLD-MCNC: 7.8 G/DL (ref 13.3–17.7)
HGB BLD-MCNC: 7.9 G/DL (ref 13.3–17.7)
HGB BLD-MCNC: 8 G/DL (ref 13.3–17.7)
HGB BLD-MCNC: 8.1 G/DL (ref 13.3–17.7)
HGB BLD-MCNC: 8.1 G/DL (ref 13.3–17.7)
HGB BLD-MCNC: 8.2 G/DL (ref 13.3–17.7)
HGB BLD-MCNC: 8.2 G/DL (ref 13.3–17.7)
HGB BLD-MCNC: 8.4 G/DL (ref 13.3–17.7)
HGB BLD-MCNC: 8.6 G/DL (ref 13.3–17.7)
HGB BLD-MCNC: 8.8 G/DL (ref 13.3–17.7)
HGB BLD-MCNC: 8.9 G/DL (ref 13.3–17.7)
HGB BLD-MCNC: 8.9 G/DL (ref 13.3–17.7)
HGB BLD-MCNC: 9.1 G/DL (ref 13.3–17.7)
HGB BLD-MCNC: 9.1 G/DL (ref 13.3–17.7)
HGB BLD-MCNC: 9.3 G/DL (ref 13.3–17.7)
HGB BLD-MCNC: 9.7 G/DL (ref 13.3–17.7)
HGB BLD-MCNC: 9.8 G/DL (ref 13.3–17.7)
HGB BLD-MCNC: 9.8 G/DL (ref 13.3–17.7)
HGB BLD-MCNC: 9.9 G/DL (ref 13.3–17.7)
HGB UR QL STRIP: ABNORMAL
HGB UR QL STRIP: NEGATIVE
HGB UR QL STRIP: NEGATIVE
HIV 1+2 AB+HIV1 P24 AG SERPL QL IA: NONREACTIVE
HSV1 DNA SPEC QL NAA+PROBE: NEGATIVE
HSV2 DNA SPEC QL NAA+PROBE: NEGATIVE
HYALINE CASTS #/AREA URNS LPF: 1 /LPF (ref 0–2)
HYALINE CASTS #/AREA URNS LPF: 4 /LPF (ref 0–2)
IMM GRANULOCYTES # BLD: 0 10E9/L (ref 0–0.4)
IMM GRANULOCYTES # BLD: 0.1 10E9/L (ref 0–0.4)
IMM GRANULOCYTES # BLD: 0.2 10E9/L (ref 0–0.4)
IMM GRANULOCYTES # BLD: 0.3 10E9/L (ref 0–0.4)
IMM GRANULOCYTES # BLD: 0.3 10E9/L (ref 0–0.4)
IMM GRANULOCYTES # BLD: 0.4 10E9/L (ref 0–0.4)
IMM GRANULOCYTES # BLD: 0.4 10E9/L (ref 0–0.4)
IMM GRANULOCYTES # BLD: 0.7 10E9/L (ref 0–0.4)
IMM GRANULOCYTES NFR BLD: 0.4 %
IMM GRANULOCYTES NFR BLD: 0.5 %
IMM GRANULOCYTES NFR BLD: 0.6 %
IMM GRANULOCYTES NFR BLD: 0.7 %
IMM GRANULOCYTES NFR BLD: 0.8 %
IMM GRANULOCYTES NFR BLD: 0.8 %
IMM GRANULOCYTES NFR BLD: 0.9 %
IMM GRANULOCYTES NFR BLD: 1 %
IMM GRANULOCYTES NFR BLD: 1.1 %
IMM GRANULOCYTES NFR BLD: 1.1 %
IMM GRANULOCYTES NFR BLD: 1.2 %
IMM GRANULOCYTES NFR BLD: 1.2 %
IMM GRANULOCYTES NFR BLD: 1.3 %
IMM GRANULOCYTES NFR BLD: 1.5 %
IMM GRANULOCYTES NFR BLD: 1.6 %
IMM GRANULOCYTES NFR BLD: 1.7 %
IMM GRANULOCYTES NFR BLD: 1.8 %
IMM GRANULOCYTES NFR BLD: 2.1 %
IMM GRANULOCYTES NFR BLD: 2.5 %
IMM GRANULOCYTES NFR BLD: 3.1 %
IMM GRANULOCYTES NFR BLD: 3.3 %
INR PPP: 1.15 (ref 0.86–1.14)
INR PPP: 1.29 (ref 0.86–1.14)
INR PPP: 1.3 (ref 0.86–1.14)
INR PPP: 1.31 (ref 0.86–1.14)
INR PPP: 1.35 (ref 0.86–1.14)
INR PPP: 1.57 (ref 0.86–1.14)
INR PPP: 1.63 (ref 0.86–1.14)
INR PPP: 1.74 (ref 0.86–1.14)
INR PPP: 1.96 (ref 0.86–1.14)
INR PPP: 2.1 (ref 0.86–1.14)
INR PPP: 2.1 (ref 0.86–1.14)
INR PPP: 2.15 (ref 0.86–1.14)
INR PPP: 2.21 (ref 0.86–1.14)
INR PPP: 2.23 (ref 0.86–1.14)
INR PPP: 2.28 (ref 0.86–1.14)
INR PPP: 2.28 (ref 0.86–1.14)
INR PPP: 2.32 (ref 0.86–1.14)
INR PPP: 2.38 (ref 0.86–1.14)
INR PPP: 2.47 (ref 0.86–1.14)
INR PPP: 2.47 (ref 0.86–1.14)
INR PPP: 2.51 (ref 0.86–1.14)
INR PPP: 2.69 (ref 0.86–1.14)
INTERPRETATION ECG - MUSE: NORMAL
IRON SATN MFR SERPL: 12 % (ref 15–46)
IRON SERPL-MCNC: 23 UG/DL (ref 35–180)
KETONES UR STRIP-MCNC: 10 MG/DL
KETONES UR STRIP-MCNC: 40 MG/DL
KETONES UR STRIP-MCNC: NEGATIVE MG/DL
LABORATORY COMMENT REPORT: NORMAL
LABORATORY COMMENT REPORT: NORMAL
LACTATE BLD-SCNC: 3.4 MMOL/L (ref 0.7–2)
LACTATE BLD-SCNC: 3.6 MMOL/L (ref 0.7–2)
LACTATE BLD-SCNC: 3.7 MMOL/L (ref 0.7–2)
LACTATE BLD-SCNC: 3.9 MMOL/L (ref 0.7–2)
LACTATE BLD-SCNC: 4.2 MMOL/L (ref 0.7–2)
LACTATE BLD-SCNC: 5 MMOL/L (ref 0.7–2)
LACTOFERRIN STL QL IA: POSITIVE
LDH SERPL L TO P-CCNC: 196 U/L (ref 85–227)
LDH SERPL L TO P-CCNC: 197 U/L (ref 85–227)
LDH SERPL L TO P-CCNC: 215 U/L (ref 85–227)
LDH SERPL L TO P-CCNC: 219 U/L (ref 85–227)
LDH SERPL L TO P-CCNC: 220 U/L (ref 85–227)
LDH SERPL L TO P-CCNC: 233 U/L (ref 85–227)
LDH SERPL L TO P-CCNC: 246 U/L (ref 85–227)
LDH SERPL L TO P-CCNC: 250 U/L (ref 85–227)
LDH SERPL L TO P-CCNC: 251 U/L (ref 85–227)
LDH SERPL L TO P-CCNC: 256 U/L (ref 85–227)
LDH SERPL L TO P-CCNC: 267 U/L (ref 85–227)
LDH SERPL L TO P-CCNC: 267 U/L (ref 85–227)
LDH SERPL L TO P-CCNC: 270 U/L (ref 85–227)
LDH SERPL L TO P-CCNC: 288 U/L (ref 85–227)
LDH SERPL L TO P-CCNC: 334 U/L (ref 85–227)
LDH SERPL L TO P-CCNC: 341 U/L (ref 85–227)
LDH SERPL L TO P-CCNC: 400 U/L (ref 85–227)
LDH SERPL L TO P-CCNC: 408 U/L (ref 85–227)
LDH SERPL L TO P-CCNC: 416 U/L (ref 85–227)
LDH SERPL L TO P-CCNC: 437 U/L (ref 85–227)
LDH SERPL L TO P-CCNC: 472 U/L (ref 85–227)
LDH SERPL L TO P-CCNC: 512 U/L (ref 85–227)
LDH SERPL L TO P-CCNC: 524 U/L (ref 85–227)
LDH SERPL L TO P-CCNC: 529 U/L (ref 85–227)
LDH SERPL L TO P-CCNC: 534 U/L (ref 85–227)
LDH SERPL L TO P-CCNC: 544 U/L (ref 85–227)
LDH SERPL L TO P-CCNC: 613 U/L (ref 85–227)
LDH SERPL L TO P-CCNC: 689 U/L (ref 85–227)
LDH SERPL L TO P-CCNC: 713 U/L (ref 85–227)
LEUKOCYTE ESTERASE UR QL STRIP: ABNORMAL
LEUKOCYTE ESTERASE UR QL STRIP: ABNORMAL
LEUKOCYTE ESTERASE UR QL STRIP: NEGATIVE
LIPASE SERPL-CCNC: 14 U/L (ref 73–393)
LIPASE SERPL-CCNC: 144 U/L (ref 73–393)
LIPASE SERPL-CCNC: 16 U/L (ref 73–393)
LIPASE SERPL-CCNC: 20 U/L (ref 73–393)
LYMPHOCYTES # BLD AUTO: 0 10E9/L (ref 0.8–5.3)
LYMPHOCYTES # BLD AUTO: 0.07 10*3/UL (ref 1–5)
LYMPHOCYTES # BLD AUTO: 0.08 10*3/UL (ref 1–5)
LYMPHOCYTES # BLD AUTO: 0.09 10*3/UL (ref 1–5)
LYMPHOCYTES # BLD AUTO: 0.1 10E9/L (ref 0.8–5.3)
LYMPHOCYTES # BLD AUTO: 0.11 10*3/UL (ref 1–5)
LYMPHOCYTES # BLD AUTO: 0.13 10*3/UL (ref 1–5)
LYMPHOCYTES # BLD AUTO: 0.13 10^9/L (ref 1–5)
LYMPHOCYTES # BLD AUTO: 0.14 10*3/UL
LYMPHOCYTES # BLD AUTO: 0.15 10*3/UL
LYMPHOCYTES # BLD AUTO: 0.16 10*3/UL (ref 1–5)
LYMPHOCYTES # BLD AUTO: 0.2 10*3/UL (ref 1–5)
LYMPHOCYTES # BLD AUTO: 0.2 10E9/L (ref 0.8–5.3)
LYMPHOCYTES # BLD AUTO: 0.29 10*3/UL (ref 1–5)
LYMPHOCYTES # BLD AUTO: 0.3 10E9/L (ref 0.8–5.3)
LYMPHOCYTES # BLD AUTO: 0.34 10*3/UL (ref 1–5)
LYMPHOCYTES # BLD AUTO: 0.37 10*3/UL (ref 1–5)
LYMPHOCYTES # BLD AUTO: 0.4 10E9/L (ref 0.8–5.3)
LYMPHOCYTES # BLD AUTO: 0.49 10*3/UL (ref 1–5)
LYMPHOCYTES # BLD AUTO: 0.5 10E9/L (ref 0.8–5.3)
LYMPHOCYTES # BLD AUTO: 0.5 10E9/L (ref 0.8–5.3)
LYMPHOCYTES # BLD AUTO: 0.6 10E9/L (ref 0.8–5.3)
LYMPHOCYTES # BLD AUTO: 0.8 10E9/L (ref 0.8–5.3)
LYMPHOCYTES # BLD AUTO: 0.8 10E9/L (ref 0.8–5.3)
LYMPHOCYTES # BLD AUTO: 1.2 10E9/L (ref 0.8–5.3)
LYMPHOCYTES NFR BLD AUTO: 0.15 %
LYMPHOCYTES NFR BLD AUTO: 0.3 %
LYMPHOCYTES NFR BLD AUTO: 0.5 %
LYMPHOCYTES NFR BLD AUTO: 0.7 % (ref 20–50)
LYMPHOCYTES NFR BLD AUTO: 0.7 % (ref 20–50)
LYMPHOCYTES NFR BLD AUTO: 0.8 %
LYMPHOCYTES NFR BLD AUTO: 0.9 %
LYMPHOCYTES NFR BLD AUTO: 1 %
LYMPHOCYTES NFR BLD AUTO: 1.1 %
LYMPHOCYTES NFR BLD AUTO: 1.6 %
LYMPHOCYTES NFR BLD AUTO: 1.6 %
LYMPHOCYTES NFR BLD AUTO: 1.7 %
LYMPHOCYTES NFR BLD AUTO: 1.7 %
LYMPHOCYTES NFR BLD AUTO: 1.7 % (ref 20–50)
LYMPHOCYTES NFR BLD AUTO: 1.8 %
LYMPHOCYTES NFR BLD AUTO: 1.8 %
LYMPHOCYTES NFR BLD AUTO: 1.9 %
LYMPHOCYTES NFR BLD AUTO: 10.6 % (ref 20–50)
LYMPHOCYTES NFR BLD AUTO: 10.7 % (ref 20–50)
LYMPHOCYTES NFR BLD AUTO: 12 %
LYMPHOCYTES NFR BLD AUTO: 13.4 %
LYMPHOCYTES NFR BLD AUTO: 15.9 %
LYMPHOCYTES NFR BLD AUTO: 18.9 % (ref 20–50)
LYMPHOCYTES NFR BLD AUTO: 2 %
LYMPHOCYTES NFR BLD AUTO: 2 %
LYMPHOCYTES NFR BLD AUTO: 2.3 %
LYMPHOCYTES NFR BLD AUTO: 2.4 %
LYMPHOCYTES NFR BLD AUTO: 2.5 %
LYMPHOCYTES NFR BLD AUTO: 2.6 % (ref 20–50)
LYMPHOCYTES NFR BLD AUTO: 2.8 % (ref 20–50)
LYMPHOCYTES NFR BLD AUTO: 2.8 % (ref 20–50)
LYMPHOCYTES NFR BLD AUTO: 27.6 % (ref 20–50)
LYMPHOCYTES NFR BLD AUTO: 3 %
LYMPHOCYTES NFR BLD AUTO: 3.1 %
LYMPHOCYTES NFR BLD AUTO: 3.1 %
LYMPHOCYTES NFR BLD AUTO: 3.3 %
LYMPHOCYTES NFR BLD AUTO: 3.3 % (ref 20–50)
LYMPHOCYTES NFR BLD AUTO: 3.5 % (ref 20–50)
LYMPHOCYTES NFR BLD AUTO: 3.6 %
LYMPHOCYTES NFR BLD AUTO: 3.6 %
LYMPHOCYTES NFR BLD AUTO: 3.6 % (ref 20–50)
LYMPHOCYTES NFR BLD AUTO: 3.8 % (ref 20–50)
LYMPHOCYTES NFR BLD AUTO: 34.8 % (ref 20–50)
LYMPHOCYTES NFR BLD AUTO: 4 %
LYMPHOCYTES NFR BLD AUTO: 4.1 %
LYMPHOCYTES NFR BLD AUTO: 4.5 %
LYMPHOCYTES NFR BLD AUTO: 4.5 %
LYMPHOCYTES NFR BLD AUTO: 4.7 %
LYMPHOCYTES NFR BLD AUTO: 40 %
LYMPHOCYTES NFR BLD AUTO: 5.2 %
LYMPHOCYTES NFR BLD AUTO: 5.3 %
LYMPHOCYTES NFR BLD AUTO: 5.3 % (ref 20–50)
LYMPHOCYTES NFR BLD AUTO: 5.9 %
LYMPHOCYTES NFR BLD AUTO: 5.9 % (ref 20–50)
LYMPHOCYTES NFR BLD AUTO: 6.1 %
LYMPHOCYTES NFR BLD AUTO: 7.5 %
LYMPHOCYTES NFR BLD AUTO: 7.9 %
LYMPHOCYTES NFR BLD AUTO: 75 % (ref 20–50)
LYMPHOCYTES NFR BLD AUTO: 75 % (ref 5–55)
LYMPHOCYTES NFR BLD AUTO: 8 % (ref 5–55)
LYMPHOCYTES NFR BLD AUTO: 8.1 %
LYMPHOCYTES NFR BLD AUTO: 8.6 % (ref 20–50)
LYMPHOCYTES NFR BLD AUTO: 8.8 % (ref 20–50)
Lab: 0
Lab: 1.05 10^9/L (ref 1.5–6)
Lab: ABNORMAL
Lab: NORMAL
Lab: NORMAL
MACROCYTES BLD QL SMEAR: PRESENT
MAGNESIUM SERPL-MCNC: 1 MG/DL (ref 1.8–2.6)
MAGNESIUM SERPL-MCNC: 1.1 MG/DL (ref 1.8–2.6)
MAGNESIUM SERPL-MCNC: 1.1 MG/DL (ref 1.8–2.6)
MAGNESIUM SERPL-MCNC: 1.2 MG/DL (ref 1.8–2.6)
MAGNESIUM SERPL-MCNC: 1.3 MG/DL (ref 1.8–2.6)
MAGNESIUM SERPL-MCNC: 1.4 MG/DL
MAGNESIUM SERPL-MCNC: 1.4 MG/DL (ref 1.6–2.3)
MAGNESIUM SERPL-MCNC: 1.5 MG/DL (ref 1.6–2.3)
MAGNESIUM SERPL-MCNC: 1.6 MG/DL (ref 1.6–2.3)
MAGNESIUM SERPL-MCNC: 1.7 MG/DL (ref 1.6–2.3)
MAGNESIUM SERPL-MCNC: 1.8 MG/DL (ref 1.6–2.3)
MAGNESIUM SERPL-MCNC: 1.9 MG/DL (ref 1.6–2.3)
MAGNESIUM SERPL-MCNC: 2 MG/DL (ref 1.6–2.3)
MAGNESIUM SERPL-MCNC: 2.1 MG/DL (ref 1.6–2.3)
MAGNESIUM SERPL-MCNC: 2.2 MG/DL (ref 1.6–2.3)
MAGNESIUM SERPL-MCNC: 2.5 MG/DL (ref 1.6–2.3)
MCH RBC QN AUTO: 26.7 PG (ref 26.5–33)
MCH RBC QN AUTO: 27 PG (ref 26.5–33)
MCH RBC QN AUTO: 27 PG (ref 26.5–33)
MCH RBC QN AUTO: 27.2 PG (ref 26.5–33)
MCH RBC QN AUTO: 27.3 PG (ref 26.5–33)
MCH RBC QN AUTO: 27.5 PG (ref 25–35)
MCH RBC QN AUTO: 27.5 PG (ref 26.5–33)
MCH RBC QN AUTO: 27.6 PG (ref 25–35)
MCH RBC QN AUTO: 27.6 PG (ref 26.5–33)
MCH RBC QN AUTO: 27.7 PG
MCH RBC QN AUTO: 27.7 PG (ref 25–35)
MCH RBC QN AUTO: 27.7 PG (ref 26.5–33)
MCH RBC QN AUTO: 27.7 PG (ref 26.5–33)
MCH RBC QN AUTO: 27.8 PG (ref 26.5–33)
MCH RBC QN AUTO: 27.9 PG (ref 26.5–33)
MCH RBC QN AUTO: 27.9 PG (ref 26.5–33)
MCH RBC QN AUTO: 28 PG (ref 25–35)
MCH RBC QN AUTO: 28 PG (ref 26.5–33)
MCH RBC QN AUTO: 28 PG (ref 26.5–33)
MCH RBC QN AUTO: 28.1 PG (ref 25–35)
MCH RBC QN AUTO: 28.1 PG (ref 26.5–33)
MCH RBC QN AUTO: 28.1 PG (ref 26.5–33)
MCH RBC QN AUTO: 28.2 PG (ref 26.5–33)
MCH RBC QN AUTO: 28.3 PG (ref 26.5–33)
MCH RBC QN AUTO: 28.4 PG (ref 26.5–33)
MCH RBC QN AUTO: 28.5 PG (ref 25–35)
MCH RBC QN AUTO: 28.5 PG (ref 26.5–33)
MCH RBC QN AUTO: 28.5 PG (ref 26.5–33)
MCH RBC QN AUTO: 28.6 PG (ref 25–35)
MCH RBC QN AUTO: 28.7 PG (ref 26.5–33)
MCH RBC QN AUTO: 28.7 PG (ref 26.5–33)
MCH RBC QN AUTO: 28.9 PG (ref 25–35)
MCH RBC QN AUTO: 28.9 PG (ref 26.5–33)
MCH RBC QN AUTO: 29 PG (ref 26.5–33)
MCH RBC QN AUTO: 29.1 PG (ref 25–35)
MCH RBC QN AUTO: 29.1 PG (ref 25–35)
MCH RBC QN AUTO: 29.1 PG (ref 26.5–33)
MCH RBC QN AUTO: 29.2 PG (ref 26.5–33)
MCH RBC QN AUTO: 29.2 PG (ref 26.5–33)
MCH RBC QN AUTO: 29.3 PG (ref 25–35)
MCH RBC QN AUTO: 29.3 PG (ref 26.5–33)
MCH RBC QN AUTO: 29.4 PG
MCH RBC QN AUTO: 29.5 PG
MCH RBC QN AUTO: 29.6 PG (ref 26.5–33)
MCH RBC QN AUTO: 29.7 PG (ref 26.5–33)
MCH RBC QN AUTO: 29.8 PG (ref 26.5–33)
MCH RBC QN AUTO: 29.9 PG (ref 25–35)
MCH RBC QN AUTO: 29.9 PG (ref 25–35)
MCH RBC QN AUTO: 29.9 PG (ref 26.5–33)
MCH RBC QN AUTO: 30 PG (ref 25–35)
MCH RBC QN AUTO: 30.1 PG (ref 26.5–33)
MCH RBC QN AUTO: 30.2 PG (ref 25–35)
MCH RBC QN AUTO: 30.5 PG (ref 25–35)
MCH RBC QN AUTO: 30.6 PG
MCH RBC QN AUTO: 31.1 PG (ref 25–35)
MCHC RBC AUTO-ENTMCNC: 28.5 G/DL (ref 31.5–36.5)
MCHC RBC AUTO-ENTMCNC: 28.5 G/DL (ref 31.5–36.5)
MCHC RBC AUTO-ENTMCNC: 28.7 G/DL (ref 31.5–36.5)
MCHC RBC AUTO-ENTMCNC: 28.9 G/DL (ref 31.5–36.5)
MCHC RBC AUTO-ENTMCNC: 29 G/DL (ref 31.5–36.5)
MCHC RBC AUTO-ENTMCNC: 29.1 G/DL (ref 31.5–36.5)
MCHC RBC AUTO-ENTMCNC: 29.2 G/DL (ref 31.5–36.5)
MCHC RBC AUTO-ENTMCNC: 29.2 G/DL (ref 31.5–36.5)
MCHC RBC AUTO-ENTMCNC: 29.3 G/DL (ref 31.5–36.5)
MCHC RBC AUTO-ENTMCNC: 29.6 G/DL (ref 31.5–36.5)
MCHC RBC AUTO-ENTMCNC: 29.8 G/DL (ref 31.5–36.5)
MCHC RBC AUTO-ENTMCNC: 29.9 G/DL (ref 31.5–36.5)
MCHC RBC AUTO-ENTMCNC: 29.9 G/DL (ref 31.5–36.5)
MCHC RBC AUTO-ENTMCNC: 30.1 G/DL (ref 31.5–36.5)
MCHC RBC AUTO-ENTMCNC: 30.2 G/DL (ref 30–36)
MCHC RBC AUTO-ENTMCNC: 30.2 G/DL (ref 31.5–36.5)
MCHC RBC AUTO-ENTMCNC: 30.3 G/DL (ref 31.5–36.5)
MCHC RBC AUTO-ENTMCNC: 30.4 G/DL (ref 30–36)
MCHC RBC AUTO-ENTMCNC: 30.4 G/DL (ref 31.5–36.5)
MCHC RBC AUTO-ENTMCNC: 30.5 G/DL (ref 30–36)
MCHC RBC AUTO-ENTMCNC: 30.6 G/DL (ref 30–36)
MCHC RBC AUTO-ENTMCNC: 30.6 G/DL (ref 31.5–36.5)
MCHC RBC AUTO-ENTMCNC: 30.7 G/DL (ref 30–36)
MCHC RBC AUTO-ENTMCNC: 30.8 G/DL (ref 30–36)
MCHC RBC AUTO-ENTMCNC: 30.8 G/DL (ref 31.5–36.5)
MCHC RBC AUTO-ENTMCNC: 30.9 G/DL (ref 31.5–36.5)
MCHC RBC AUTO-ENTMCNC: 31 G/DL (ref 30–36)
MCHC RBC AUTO-ENTMCNC: 31 G/DL (ref 31.5–36.5)
MCHC RBC AUTO-ENTMCNC: 31.1 G/DL (ref 31.5–36.5)
MCHC RBC AUTO-ENTMCNC: 31.2 G/DL (ref 31.5–36.5)
MCHC RBC AUTO-ENTMCNC: 31.3 G/DL
MCHC RBC AUTO-ENTMCNC: 31.3 G/DL (ref 30–36)
MCHC RBC AUTO-ENTMCNC: 31.3 G/DL (ref 31.5–36.5)
MCHC RBC AUTO-ENTMCNC: 31.4 G/DL
MCHC RBC AUTO-ENTMCNC: 31.4 G/DL (ref 31.5–36.5)
MCHC RBC AUTO-ENTMCNC: 31.5 G/DL (ref 30–36)
MCHC RBC AUTO-ENTMCNC: 31.6 G/DL (ref 30–36)
MCHC RBC AUTO-ENTMCNC: 31.6 G/DL (ref 31.5–36.5)
MCHC RBC AUTO-ENTMCNC: 31.8 G/DL
MCHC RBC AUTO-ENTMCNC: 31.9 G/DL (ref 30–36)
MCHC RBC AUTO-ENTMCNC: 31.9 G/DL (ref 31.5–36.5)
MCHC RBC AUTO-ENTMCNC: 31.9 G/DL (ref 31.5–36.5)
MCHC RBC AUTO-ENTMCNC: 32 G/DL (ref 31.5–36.5)
MCHC RBC AUTO-ENTMCNC: 32.1 G/DL
MCHC RBC AUTO-ENTMCNC: 32.2 G/DL (ref 30–36)
MCHC RBC AUTO-ENTMCNC: 32.2 G/DL (ref 31.5–36.5)
MCHC RBC AUTO-ENTMCNC: 32.3 G/DL (ref 31.5–36.5)
MCHC RBC AUTO-ENTMCNC: 32.3 G/DL (ref 31.5–36.5)
MCHC RBC AUTO-ENTMCNC: 32.4 G/DL (ref 31.5–36.5)
MCHC RBC AUTO-ENTMCNC: 32.5 G/DL (ref 31.5–36.5)
MCHC RBC AUTO-ENTMCNC: 32.6 G/DL (ref 30–36)
MCHC RBC AUTO-ENTMCNC: 32.6 G/DL (ref 30–36)
MCHC RBC AUTO-ENTMCNC: 32.6 G/DL (ref 31.5–36.5)
MCHC RBC AUTO-ENTMCNC: 32.6 G/DL (ref 31.5–36.5)
MCHC RBC AUTO-ENTMCNC: 32.7 G/DL (ref 30–36)
MCHC RBC AUTO-ENTMCNC: 32.9 G/DL (ref 30–36)
MCHC RBC AUTO-ENTMCNC: 32.9 G/DL (ref 31.5–36.5)
MCHC RBC AUTO-ENTMCNC: 33 G/DL (ref 31.5–36.5)
MCHC RBC AUTO-ENTMCNC: 33.2 G/DL (ref 30–36)
MCHC RBC AUTO-ENTMCNC: 33.2 G/DL (ref 31.5–36.5)
MCV RBC AUTO: 84 FL (ref 78–100)
MCV RBC AUTO: 85 FL (ref 78–100)
MCV RBC AUTO: 85 FL (ref 78–100)
MCV RBC AUTO: 86 FL (ref 78–100)
MCV RBC AUTO: 86 FL (ref 80–100)
MCV RBC AUTO: 86 FL (ref 80–100)
MCV RBC AUTO: 87 FL (ref 78–100)
MCV RBC AUTO: 87 FL (ref 80–100)
MCV RBC AUTO: 88 FL (ref 78–100)
MCV RBC AUTO: 88 FL (ref 78–100)
MCV RBC AUTO: 88 FL (ref 80–100)
MCV RBC AUTO: 88 FL (ref 80–100)
MCV RBC AUTO: 89 FL (ref 78–100)
MCV RBC AUTO: 89 FL (ref 80–100)
MCV RBC AUTO: 89 FL (ref 80–100)
MCV RBC AUTO: 91 FL (ref 78–100)
MCV RBC AUTO: 91 FL (ref 78–100)
MCV RBC AUTO: 91 FL (ref 80–100)
MCV RBC AUTO: 91 FL (ref 80–100)
MCV RBC AUTO: 92 FL (ref 78–100)
MCV RBC AUTO: 92 FL (ref 80–100)
MCV RBC AUTO: 92 FL (ref 80–100)
MCV RBC AUTO: 93 FL
MCV RBC AUTO: 93 FL (ref 78–100)
MCV RBC AUTO: 94 FL (ref 78–100)
MCV RBC AUTO: 94 FL (ref 80–100)
MCV RBC AUTO: 95 FL
MCV RBC AUTO: 95 FL
MCV RBC AUTO: 95 FL (ref 78–100)
MCV RBC AUTO: 95 FL (ref 80–100)
MCV RBC AUTO: 96 FL (ref 78–100)
MCV RBC AUTO: 96 FL (ref 78–100)
MCV RBC AUTO: 96 FL (ref 80–100)
MCV RBC AUTO: 97 FL (ref 78–100)
MCV RBC AUTO: 97 FL (ref 78–100)
MCV RBC AUTO: 97 FL (ref 80–100)
MCV RBC AUTO: 99 FL (ref 78–100)
MCV RBC AUTO: 99 FL (ref 78–100)
METAMYELOCYTES %: 0
METAMYELOCYTES %: 0
MONOCYTES # BLD AUTO: 0 10E9/L (ref 0–1.3)
MONOCYTES # BLD AUTO: 0 10E9/L (ref 0–1.3)
MONOCYTES # BLD AUTO: 0.01 10*3/UL (ref 0.3–1.2)
MONOCYTES # BLD AUTO: 0.02 10*3/UL (ref 0.3–1.2)
MONOCYTES # BLD AUTO: 0.02 10^9/L (ref 0.3–1.2)
MONOCYTES # BLD AUTO: 0.05 10*3/UL (ref 0.3–1.2)
MONOCYTES # BLD AUTO: 0.05 10*3/UL (ref 0.3–1.2)
MONOCYTES # BLD AUTO: 0.1 10E9/L (ref 0–1.3)
MONOCYTES # BLD AUTO: 0.17 10*3/UL (ref 0.3–1.2)
MONOCYTES # BLD AUTO: 0.2 10E9/L (ref 0–1.3)
MONOCYTES # BLD AUTO: 0.22 10*3/UL (ref 0.3–1.2)
MONOCYTES # BLD AUTO: 0.3 10E9/L (ref 0–1.3)
MONOCYTES # BLD AUTO: 0.33 10*3/UL (ref 0.3–1.2)
MONOCYTES # BLD AUTO: 0.35 10*3/UL (ref 0.3–1.2)
MONOCYTES # BLD AUTO: 0.36 10*3/UL (ref 0.3–1.2)
MONOCYTES # BLD AUTO: 0.4 10*3/UL (ref 0.3–1.2)
MONOCYTES # BLD AUTO: 0.4 10E9/L (ref 0–1.3)
MONOCYTES # BLD AUTO: 0.4 10E9/L (ref 0–1.3)
MONOCYTES # BLD AUTO: 0.5 10E9/L (ref 0–1.3)
MONOCYTES # BLD AUTO: 0.5 10E9/L (ref 0–1.3)
MONOCYTES # BLD AUTO: 0.52 10*3/UL (ref 0.3–1.2)
MONOCYTES # BLD AUTO: 0.57 10*3/UL
MONOCYTES # BLD AUTO: 0.59 10*3/UL (ref 0.3–1.2)
MONOCYTES # BLD AUTO: 0.6 10E9/L (ref 0–1.3)
MONOCYTES # BLD AUTO: 0.7 10E9/L (ref 0–1.3)
MONOCYTES # BLD AUTO: 0.8 10E9/L (ref 0–1.3)
MONOCYTES # BLD AUTO: 0.85 10*3/UL
MONOCYTES # BLD AUTO: 0.9 10*3/UL (ref 0.3–1.2)
MONOCYTES # BLD AUTO: 0.9 10E9/L (ref 0–1.3)
MONOCYTES # BLD AUTO: 0.93 10*3/UL
MONOCYTES # BLD AUTO: 1 10E9/L (ref 0–1.3)
MONOCYTES # BLD AUTO: 1.1 10E9/L (ref 0–1.3)
MONOCYTES # BLD AUTO: 1.1 10E9/L (ref 0–1.3)
MONOCYTES # BLD AUTO: 1.2 10E9/L (ref 0–1.3)
MONOCYTES # BLD AUTO: 1.2 10E9/L (ref 0–1.3)
MONOCYTES # BLD AUTO: 1.3 10E9/L (ref 0–1.3)
MONOCYTES # BLD AUTO: 1.3 10E9/L (ref 0–1.3)
MONOCYTES # BLD AUTO: 1.5 10E9/L (ref 0–1.3)
MONOCYTES # BLD AUTO: 1.6 10E9/L (ref 0–1.3)
MONOCYTES NFR BLD AUTO: 0 %
MONOCYTES NFR BLD AUTO: 0 % (ref 3–6)
MONOCYTES NFR BLD AUTO: 0.1 % (ref 3–6)
MONOCYTES NFR BLD AUTO: 0.2 %
MONOCYTES NFR BLD AUTO: 0.3 % (ref 3–6)
MONOCYTES NFR BLD AUTO: 1.1 %
MONOCYTES NFR BLD AUTO: 1.3 %
MONOCYTES NFR BLD AUTO: 1.6 % (ref 3–6)
MONOCYTES NFR BLD AUTO: 1.9 %
MONOCYTES NFR BLD AUTO: 10.5 %
MONOCYTES NFR BLD AUTO: 10.5 %
MONOCYTES NFR BLD AUTO: 10.6 %
MONOCYTES NFR BLD AUTO: 10.6 % (ref 3–6)
MONOCYTES NFR BLD AUTO: 10.7 %
MONOCYTES NFR BLD AUTO: 10.8 %
MONOCYTES NFR BLD AUTO: 11.2 %
MONOCYTES NFR BLD AUTO: 11.5 %
MONOCYTES NFR BLD AUTO: 11.7 % (ref 20–50)
MONOCYTES NFR BLD AUTO: 11.9 % (ref 3–6)
MONOCYTES NFR BLD AUTO: 12 %
MONOCYTES NFR BLD AUTO: 12.1 %
MONOCYTES NFR BLD AUTO: 12.4 %
MONOCYTES NFR BLD AUTO: 12.5 % (ref 3–6)
MONOCYTES NFR BLD AUTO: 13.1 %
MONOCYTES NFR BLD AUTO: 13.5 %
MONOCYTES NFR BLD AUTO: 14.3 % (ref 3–6)
MONOCYTES NFR BLD AUTO: 15.1 % (ref 3–6)
MONOCYTES NFR BLD AUTO: 2.1 %
MONOCYTES NFR BLD AUTO: 2.3 %
MONOCYTES NFR BLD AUTO: 2.6 %
MONOCYTES NFR BLD AUTO: 2.6 %
MONOCYTES NFR BLD AUTO: 2.7 %
MONOCYTES NFR BLD AUTO: 2.7 %
MONOCYTES NFR BLD AUTO: 21.7 % (ref 3–6)
MONOCYTES NFR BLD AUTO: 27.6 % (ref 3–6)
MONOCYTES NFR BLD AUTO: 3 %
MONOCYTES NFR BLD AUTO: 3.5 %
MONOCYTES NFR BLD AUTO: 3.6 %
MONOCYTES NFR BLD AUTO: 3.7 % (ref 3–6)
MONOCYTES NFR BLD AUTO: 3.9 %
MONOCYTES NFR BLD AUTO: 3.9 %
MONOCYTES NFR BLD AUTO: 4.8 %
MONOCYTES NFR BLD AUTO: 4.8 % (ref 3–6)
MONOCYTES NFR BLD AUTO: 5 %
MONOCYTES NFR BLD AUTO: 5.1 %
MONOCYTES NFR BLD AUTO: 5.6 %
MONOCYTES NFR BLD AUTO: 5.6 %
MONOCYTES NFR BLD AUTO: 5.7 %
MONOCYTES NFR BLD AUTO: 5.8 %
MONOCYTES NFR BLD AUTO: 6 % (ref 3–6)
MONOCYTES NFR BLD AUTO: 6.6 % (ref 3–6)
MONOCYTES NFR BLD AUTO: 6.9 %
MONOCYTES NFR BLD AUTO: 7.1 %
MONOCYTES NFR BLD AUTO: 7.6 %
MONOCYTES NFR BLD AUTO: 8 %
MONOCYTES NFR BLD AUTO: 8 % (ref 3–6)
MONOCYTES NFR BLD AUTO: 8 % (ref 6–12)
MONOCYTES NFR BLD AUTO: 8 % (ref 6–12)
MONOCYTES NFR BLD AUTO: 8.2 % (ref 3–6)
MONOCYTES NFR BLD AUTO: 8.7 % (ref 3–6)
MONOCYTES NFR BLD AUTO: 8.8 %
MONOCYTES NFR BLD AUTO: 9 %
MONOCYTES NFR BLD AUTO: 9 %
MONOCYTES NFR BLD AUTO: 9.1 %
MONOCYTES NFR BLD AUTO: 9.3 %
MONOCYTES NFR BLD AUTO: 9.3 % (ref 3–6)
MONOCYTES NFR BLD AUTO: 9.6 %
MUCOUS THREADS #/AREA URNS LPF: PRESENT /LPF
MYELOCYTES # BLD: 0.2 10E9/L
MYELOCYTES %: 0
MYELOCYTES %: 0
MYELOCYTES NFR BLD MANUAL: 0.9 %
NEUTROPHILS # BLD AUTO: 0.01 10*3/UL (ref 1.5–6)
NEUTROPHILS # BLD AUTO: 0.07 10*3/UL (ref 1.5–6)
NEUTROPHILS # BLD AUTO: 1.2 10E9/L (ref 1.6–8.3)
NEUTROPHILS # BLD AUTO: 1.7 10E9/L (ref 1.6–8.3)
NEUTROPHILS # BLD AUTO: 10.4 10E9/L (ref 1.6–8.3)
NEUTROPHILS # BLD AUTO: 10.5 10E9/L (ref 1.6–8.3)
NEUTROPHILS # BLD AUTO: 10.5 10E9/L (ref 1.6–8.3)
NEUTROPHILS # BLD AUTO: 10.6 10E9/L (ref 1.6–8.3)
NEUTROPHILS # BLD AUTO: 10.7 10E9/L (ref 1.6–8.3)
NEUTROPHILS # BLD AUTO: 10.7 10E9/L (ref 1.6–8.3)
NEUTROPHILS # BLD AUTO: 10.9 10E9/L (ref 1.6–8.3)
NEUTROPHILS # BLD AUTO: 11.2 10E9/L (ref 1.6–8.3)
NEUTROPHILS # BLD AUTO: 11.6 10*3/UL (ref 1.5–6)
NEUTROPHILS # BLD AUTO: 11.7 10E9/L (ref 1.6–8.3)
NEUTROPHILS # BLD AUTO: 12.1 10E9/L (ref 1.6–8.3)
NEUTROPHILS # BLD AUTO: 12.2 10E9/L (ref 1.6–8.3)
NEUTROPHILS # BLD AUTO: 12.8 10E9/L (ref 1.6–8.3)
NEUTROPHILS # BLD AUTO: 15.1 10E9/L (ref 1.6–8.3)
NEUTROPHILS # BLD AUTO: 15.8 10E9/L (ref 1.6–8.3)
NEUTROPHILS # BLD AUTO: 16.4 10E9/L (ref 1.6–8.3)
NEUTROPHILS # BLD AUTO: 18.45 10*3/UL (ref 1.5–6)
NEUTROPHILS # BLD AUTO: 19 10E9/L (ref 1.6–8.3)
NEUTROPHILS # BLD AUTO: 19.6 10*3/UL (ref 1.5–6)
NEUTROPHILS # BLD AUTO: 2.4 10E9/L (ref 1.6–8.3)
NEUTROPHILS # BLD AUTO: 2.6 10E9/L (ref 1.6–8.3)
NEUTROPHILS # BLD AUTO: 2.72 10*3/UL (ref 1.5–6)
NEUTROPHILS # BLD AUTO: 21.1 10E9/L (ref 1.6–8.3)
NEUTROPHILS # BLD AUTO: 23 10E9/L (ref 1.6–8.3)
NEUTROPHILS # BLD AUTO: 24.2 10E9/L (ref 1.6–8.3)
NEUTROPHILS # BLD AUTO: 3.11 10*3/UL (ref 1.5–6)
NEUTROPHILS # BLD AUTO: 3.13 10*3/UL (ref 1.5–6)
NEUTROPHILS # BLD AUTO: 3.3 10E9/L (ref 1.6–8.3)
NEUTROPHILS # BLD AUTO: 3.44 10*3/UL (ref 1.5–6)
NEUTROPHILS # BLD AUTO: 3.78 10*3/UL (ref 1.5–6)
NEUTROPHILS # BLD AUTO: 3.9 10E9/L (ref 1.6–8.3)
NEUTROPHILS # BLD AUTO: 3.9 10E9/L (ref 1.6–8.3)
NEUTROPHILS # BLD AUTO: 3.94 10*3/UL
NEUTROPHILS # BLD AUTO: 30.3 10E9/L (ref 1.6–8.3)
NEUTROPHILS # BLD AUTO: 35.3 10E9/L (ref 1.6–8.3)
NEUTROPHILS # BLD AUTO: 4.17 10*3/UL (ref 1.5–6)
NEUTROPHILS # BLD AUTO: 4.2 10E9/L (ref 1.6–8.3)
NEUTROPHILS # BLD AUTO: 4.24 10*3/UL (ref 1.5–6)
NEUTROPHILS # BLD AUTO: 4.5 10E9/L (ref 1.6–8.3)
NEUTROPHILS # BLD AUTO: 4.81 10*3/UL (ref 1.5–6)
NEUTROPHILS # BLD AUTO: 5.1 10E9/L (ref 1.6–8.3)
NEUTROPHILS # BLD AUTO: 5.3 10E9/L (ref 1.6–8.3)
NEUTROPHILS # BLD AUTO: 5.7 10E9/L (ref 1.6–8.3)
NEUTROPHILS # BLD AUTO: 5.9 10E9/L (ref 1.6–8.3)
NEUTROPHILS # BLD AUTO: 5.9 10E9/L (ref 1.6–8.3)
NEUTROPHILS # BLD AUTO: 6.1 10E9/L (ref 1.6–8.3)
NEUTROPHILS # BLD AUTO: 6.5 10E9/L (ref 1.6–8.3)
NEUTROPHILS # BLD AUTO: 6.6 10E9/L (ref 1.6–8.3)
NEUTROPHILS # BLD AUTO: 6.6 10E9/L (ref 1.6–8.3)
NEUTROPHILS # BLD AUTO: 6.8 10E9/L (ref 1.6–8.3)
NEUTROPHILS # BLD AUTO: 6.9 10E9/L (ref 1.6–8.3)
NEUTROPHILS # BLD AUTO: 6.97 10*3/UL
NEUTROPHILS # BLD AUTO: 7.2 10E9/L (ref 1.6–8.3)
NEUTROPHILS # BLD AUTO: 7.8 10E9/L (ref 1.6–8.3)
NEUTROPHILS # BLD AUTO: 8.3 10E9/L (ref 1.6–8.3)
NEUTROPHILS # BLD AUTO: 9.02 10*3/UL (ref 1.5–6)
NEUTROPHILS # BLD AUTO: 9.4 10E9/L (ref 1.6–8.3)
NEUTROPHILS NFR BLD AUTO: 12 % (ref 34–66)
NEUTROPHILS NFR BLD AUTO: 12 % (ref 45–90)
NEUTROPHILS NFR BLD AUTO: 30.5 % (ref 34–66)
NEUTROPHILS NFR BLD AUTO: 36.9 % (ref 34–66)
NEUTROPHILS NFR BLD AUTO: 57 %
NEUTROPHILS NFR BLD AUTO: 71.4 % (ref 34–66)
NEUTROPHILS NFR BLD AUTO: 76.7 % (ref 34–66)
NEUTROPHILS NFR BLD AUTO: 78 % (ref 45–90)
NEUTROPHILS NFR BLD AUTO: 78.3 %
NEUTROPHILS NFR BLD AUTO: 79.7 % (ref 34–66)
NEUTROPHILS NFR BLD AUTO: 80 % (ref 34–66)
NEUTROPHILS NFR BLD AUTO: 80.1 %
NEUTROPHILS NFR BLD AUTO: 80.6 %
NEUTROPHILS NFR BLD AUTO: 80.6 % (ref 34–66)
NEUTROPHILS NFR BLD AUTO: 80.7 % (ref 34–66)
NEUTROPHILS NFR BLD AUTO: 81.9 %
NEUTROPHILS NFR BLD AUTO: 82.1 %
NEUTROPHILS NFR BLD AUTO: 82.3 %
NEUTROPHILS NFR BLD AUTO: 82.9 % (ref 34–66)
NEUTROPHILS NFR BLD AUTO: 83 %
NEUTROPHILS NFR BLD AUTO: 83.1 %
NEUTROPHILS NFR BLD AUTO: 83.4 %
NEUTROPHILS NFR BLD AUTO: 83.7 %
NEUTROPHILS NFR BLD AUTO: 83.9 %
NEUTROPHILS NFR BLD AUTO: 84.4 %
NEUTROPHILS NFR BLD AUTO: 84.5 %
NEUTROPHILS NFR BLD AUTO: 84.6 %
NEUTROPHILS NFR BLD AUTO: 84.7 %
NEUTROPHILS NFR BLD AUTO: 84.9 %
NEUTROPHILS NFR BLD AUTO: 85 %
NEUTROPHILS NFR BLD AUTO: 85.4 % (ref 34–66)
NEUTROPHILS NFR BLD AUTO: 85.6 % (ref 34–66)
NEUTROPHILS NFR BLD AUTO: 85.9 %
NEUTROPHILS NFR BLD AUTO: 86 %
NEUTROPHILS NFR BLD AUTO: 86.1 %
NEUTROPHILS NFR BLD AUTO: 86.8 %
NEUTROPHILS NFR BLD AUTO: 87.1 %
NEUTROPHILS NFR BLD AUTO: 87.4 %
NEUTROPHILS NFR BLD AUTO: 87.4 % (ref 34–66)
NEUTROPHILS NFR BLD AUTO: 87.4 % (ref 34–66)
NEUTROPHILS NFR BLD AUTO: 88 %
NEUTROPHILS NFR BLD AUTO: 88.1 %
NEUTROPHILS NFR BLD AUTO: 88.3 % (ref 34–66)
NEUTROPHILS NFR BLD AUTO: 88.4 %
NEUTROPHILS NFR BLD AUTO: 88.6 %
NEUTROPHILS NFR BLD AUTO: 89.3 % (ref 34–66)
NEUTROPHILS NFR BLD AUTO: 89.6 %
NEUTROPHILS NFR BLD AUTO: 90.4 %
NEUTROPHILS NFR BLD AUTO: 90.4 %
NEUTROPHILS NFR BLD AUTO: 91.1 % (ref 34–66)
NEUTROPHILS NFR BLD AUTO: 91.9 %
NEUTROPHILS NFR BLD AUTO: 92.5 %
NEUTROPHILS NFR BLD AUTO: 92.6 %
NEUTROPHILS NFR BLD AUTO: 92.6 %
NEUTROPHILS NFR BLD AUTO: 92.9 % (ref 34–66)
NEUTROPHILS NFR BLD AUTO: 94.3 %
NEUTROPHILS NFR BLD AUTO: 94.3 %
NEUTROPHILS NFR BLD AUTO: 94.7 %
NEUTROPHILS NFR BLD AUTO: 94.8 %
NEUTROPHILS NFR BLD AUTO: 95 %
NEUTROPHILS NFR BLD AUTO: 96.1 %
NEUTROPHILS NFR BLD AUTO: 96.2 %
NEUTROPHILS NFR BLD AUTO: 96.3 %
NEUTROPHILS NFR BLD AUTO: 97.1 %
NEUTROPHILS NFR BLD AUTO: 97.9 %
NEUTROPHILS NFR BLD AUTO: 98.6 % (ref 34–66)
NEUTROPHILS NFR BLD AUTO: 98.8 % (ref 34–66)
NITRATE UR QL: NEGATIVE
NRBC # BLD AUTO: 0 10*3/UL
NRBC BLD AUTO-RTO: 0 /100
NRBC BLD AUTO-RTO: 1 /100
NT-PROBNP SERPL-MCNC: 7289 PG/ML (ref 0–900)
NUM BPU REQUESTED: 1
NUM BPU REQUESTED: 2
NUM BPU REQUESTED: 2
NUM BPU REQUESTED: 3
OVALOCYTES BLD QL SMEAR: SLIGHT
PH UR STRIP: 5 PH (ref 5–7)
PH UR STRIP: 5.5 PH (ref 5–7)
PH UR STRIP: 5.5 PH (ref 5–7)
PH UR STRIP: 6 PH (ref 5–7)
PH UR STRIP: 6 PH (ref 5–7)
PHOSPHATE SERPL-MCNC: 2.8 MG/DL (ref 2.5–4.5)
PHOSPHATE SERPL-MCNC: 2.8 MG/DL (ref 2.5–4.5)
PHOSPHATE SERPL-MCNC: 3 MG/DL
PHOSPHATE SERPL-MCNC: 3.1 MG/DL (ref 2.5–4.5)
PHOSPHATE SERPL-MCNC: 3.1 MG/DL (ref 2.6–4.7)
PHOSPHATE SERPL-MCNC: 3.2 MG/DL (ref 2.5–4.5)
PHOSPHATE SERPL-MCNC: 3.2 MG/DL (ref 2.6–4.7)
PHOSPHATE SERPL-MCNC: 3.3 MG/DL (ref 2.5–4.5)
PHOSPHATE SERPL-MCNC: 3.3 MG/DL (ref 2.6–4.7)
PHOSPHATE SERPL-MCNC: 3.4 MG/DL (ref 2.5–4.5)
PHOSPHATE SERPL-MCNC: 3.4 MG/DL (ref 2.6–4.7)
PHOSPHATE SERPL-MCNC: 3.5 MG/DL (ref 2.5–4.5)
PHOSPHATE SERPL-MCNC: 3.6 MG/DL (ref 2.5–4.5)
PHOSPHATE SERPL-MCNC: 3.7 MG/DL (ref 2.5–4.5)
PHOSPHATE SERPL-MCNC: 3.7 MG/DL (ref 2.6–4.7)
PHOSPHATE SERPL-MCNC: 3.8 MG/DL (ref 2.5–4.5)
PHOSPHATE SERPL-MCNC: 3.8 MG/DL (ref 2.6–4.7)
PHOSPHATE SERPL-MCNC: 3.9 MG/DL (ref 2.5–4.5)
PHOSPHATE SERPL-MCNC: 4 MG/DL (ref 2.5–4.5)
PHOSPHATE SERPL-MCNC: 4 MG/DL (ref 2.5–4.5)
PHOSPHATE SERPL-MCNC: 4.1 MG/DL (ref 2.5–4.5)
PHOSPHATE SERPL-MCNC: 4.1 MG/DL (ref 2.5–4.5)
PHOSPHATE SERPL-MCNC: 4.1 MG/DL (ref 2.6–4.7)
PHOSPHATE SERPL-MCNC: 4.2 MG/DL (ref 2.5–4.5)
PHOSPHATE SERPL-MCNC: 4.2 MG/DL (ref 2.5–4.5)
PHOSPHATE SERPL-MCNC: 4.2 MG/DL (ref 2.6–4.7)
PHOSPHATE SERPL-MCNC: 4.2 MG/DL (ref 2.6–4.7)
PHOSPHATE SERPL-MCNC: 4.4 MG/DL (ref 2.5–4.5)
PHOSPHATE SERPL-MCNC: 4.4 MG/DL (ref 2.5–4.5)
PHOSPHATE SERPL-MCNC: 4.5 MG/DL (ref 2.5–4.5)
PHOSPHATE SERPL-MCNC: 4.5 MG/DL (ref 2.5–4.5)
PHOSPHATE SERPL-MCNC: 4.5 MG/DL (ref 2.6–4.7)
PHOSPHATE SERPL-MCNC: 4.6 MG/DL (ref 2.5–4.5)
PHOSPHATE SERPL-MCNC: 4.6 MG/DL (ref 2.6–4.7)
PHOSPHATE SERPL-MCNC: 4.7 MG/DL (ref 2.5–4.5)
PHOSPHATE SERPL-MCNC: 4.7 MG/DL (ref 2.6–4.7)
PHOSPHATE SERPL-MCNC: 4.8 MG/DL (ref 2.5–4.5)
PHOSPHATE SERPL-MCNC: 4.9 MG/DL (ref 2.5–4.5)
PHOSPHATE SERPL-MCNC: 5 MG/DL (ref 2.5–4.5)
PHOSPHATE SERPL-MCNC: 5 MG/DL (ref 2.6–4.7)
PHOSPHATE SERPL-MCNC: 5.2 MG/DL (ref 2.5–4.5)
PHOSPHATE SERPL-MCNC: 5.2 MG/DL (ref 2.5–4.5)
PHQ9 SCORE: 17
PLATELET # BLD AUTO: 102 10^9/L
PLATELET # BLD AUTO: 107 10E9/L (ref 150–450)
PLATELET # BLD AUTO: 11 10^9/L (ref 150–450)
PLATELET # BLD AUTO: 110 10E9/L (ref 150–450)
PLATELET # BLD AUTO: 116 10^9/L (ref 150–450)
PLATELET # BLD AUTO: 12 10^9/L (ref 150–450)
PLATELET # BLD AUTO: 128 10E9/L (ref 150–450)
PLATELET # BLD AUTO: 13 10E9/L (ref 150–450)
PLATELET # BLD AUTO: 131 10^9/L (ref 150–450)
PLATELET # BLD AUTO: 132 10E9/L (ref 150–450)
PLATELET # BLD AUTO: 132 10E9/L (ref 150–450)
PLATELET # BLD AUTO: 135 10^9/L (ref 150–450)
PLATELET # BLD AUTO: 137 10E9/L (ref 150–450)
PLATELET # BLD AUTO: 139 10E9/L (ref 150–450)
PLATELET # BLD AUTO: 14 10E9/L (ref 150–450)
PLATELET # BLD AUTO: 142 10E9/L (ref 150–450)
PLATELET # BLD AUTO: 146 10E9/L (ref 150–450)
PLATELET # BLD AUTO: 15 10E9/L (ref 150–450)
PLATELET # BLD AUTO: 150 10E9/L (ref 150–450)
PLATELET # BLD AUTO: 159 10E9/L (ref 150–450)
PLATELET # BLD AUTO: 167 10E9/L (ref 150–450)
PLATELET # BLD AUTO: 168 10E9/L (ref 150–450)
PLATELET # BLD AUTO: 17 10E9/L (ref 150–450)
PLATELET # BLD AUTO: 17 10E9/L (ref 150–450)
PLATELET # BLD AUTO: 17 10^9/L (ref 150–450)
PLATELET # BLD AUTO: 170 10E9/L (ref 150–450)
PLATELET # BLD AUTO: 174 10E9/L (ref 150–450)
PLATELET # BLD AUTO: 174 10^9/L (ref 150–450)
PLATELET # BLD AUTO: 175 10E9/L (ref 150–450)
PLATELET # BLD AUTO: 178 10E9/L (ref 150–450)
PLATELET # BLD AUTO: 181 10E9/L (ref 150–450)
PLATELET # BLD AUTO: 181 10E9/L (ref 150–450)
PLATELET # BLD AUTO: 19 10^9/L (ref 150–450)
PLATELET # BLD AUTO: 196 10E9/L (ref 150–450)
PLATELET # BLD AUTO: 199 10^9/L (ref 150–450)
PLATELET # BLD AUTO: 21 10E9/L (ref 150–450)
PLATELET # BLD AUTO: 217 10^9/L (ref 150–450)
PLATELET # BLD AUTO: 222 10E9/L (ref 150–450)
PLATELET # BLD AUTO: 236 10E9/L (ref 150–450)
PLATELET # BLD AUTO: 24 10^9/L
PLATELET # BLD AUTO: 25 10E9/L (ref 150–450)
PLATELET # BLD AUTO: 309 10E9/L (ref 150–450)
PLATELET # BLD AUTO: 310 10E9/L (ref 150–450)
PLATELET # BLD AUTO: 313 10E9/L (ref 150–450)
PLATELET # BLD AUTO: 315 10E9/L (ref 150–450)
PLATELET # BLD AUTO: 323 10E9/L (ref 150–450)
PLATELET # BLD AUTO: 329 10E9/L (ref 150–450)
PLATELET # BLD AUTO: 334 10E9/L (ref 150–450)
PLATELET # BLD AUTO: 342 10E9/L (ref 150–450)
PLATELET # BLD AUTO: 347 10E9/L (ref 150–450)
PLATELET # BLD AUTO: 35 10E9/L (ref 150–450)
PLATELET # BLD AUTO: 356 10E9/L (ref 150–450)
PLATELET # BLD AUTO: 37 10^9/L (ref 150–450)
PLATELET # BLD AUTO: 379 10E9/L (ref 150–450)
PLATELET # BLD AUTO: 390 10E9/L (ref 150–450)
PLATELET # BLD AUTO: 47 10^9/L
PLATELET # BLD AUTO: 47 10^9/L (ref 150–450)
PLATELET # BLD AUTO: 50 10E9/L (ref 150–450)
PLATELET # BLD AUTO: 50 10^9/L (ref 150–450)
PLATELET # BLD AUTO: 51 10^9/L (ref 150–450)
PLATELET # BLD AUTO: 52 10^9/L
PLATELET # BLD AUTO: 52 10^9/L (ref 150–450)
PLATELET # BLD AUTO: 54 10^9/L (ref 150–450)
PLATELET # BLD AUTO: 65 10^9/L (ref 150–450)
PLATELET # BLD AUTO: 65 10^9/L (ref 150–450)
PLATELET # BLD AUTO: 70 10^9/L (ref 150–450)
PLATELET # BLD AUTO: 79 10E9/L (ref 150–450)
PLATELET # BLD AUTO: 82 10E9/L (ref 150–450)
PLATELET # BLD AUTO: 96 10E9/L (ref 150–450)
PLATELET # BLD AUTO: 98 10E9/L (ref 150–450)
PLATELET # BLD EST: ABNORMAL 10*3/UL
PMV BLD: 10.1 FL (ref 6–11)
PMV BLD: 10.1 FL (ref 6–11)
PMV BLD: 10.2 FL
PMV BLD: 10.2 FL (ref 6–11)
PMV BLD: 10.3 FL (ref 6–11)
PMV BLD: 10.4 FL (ref 6–11)
PMV BLD: 10.5 FL (ref 6–11)
PMV BLD: 10.7 FL (ref 6–11)
PMV BLD: 10.8 FL (ref 6–11)
PMV BLD: 11 FL (ref 6–11)
PMV BLD: 11.2 FL (ref 6–11)
PMV BLD: 11.4 FL (ref 6–11)
PMV BLD: 11.5 FL (ref 6–11)
PMV BLD: 11.5 FL (ref 6–11)
PMV BLD: 11.6 FL (ref 6–11)
PMV BLD: 11.7 FL (ref 6–11)
PMV BLD: 12.5 FL
PMV BLD: 8.8 FL (ref 6–11)
PMV BLD: 9.7 FL (ref 6–11)
PMV BLD: 9.7 FL (ref 6–11)
POIKILOCYTOSIS BLD QL SMEAR: ABNORMAL
POIKILOCYTOSIS BLD QL SMEAR: ABNORMAL
POIKILOCYTOSIS BLD QL SMEAR: SLIGHT
POLYCHROMASIA BLD QL SMEAR: ABNORMAL
POLYCHROMASIA BLD QL SMEAR: SLIGHT
POTASSIUM SERPL-SCNC: 2.5 MMOL/L (ref 3.5–5.1)
POTASSIUM SERPL-SCNC: 2.8 MMOL/L (ref 3.4–5.3)
POTASSIUM SERPL-SCNC: 2.8 MMOL/L (ref 3.5–5.1)
POTASSIUM SERPL-SCNC: 2.9 MEQ/L (ref 3.5–5.1)
POTASSIUM SERPL-SCNC: 2.9 MMOL/L (ref 3.5–5.1)
POTASSIUM SERPL-SCNC: 3.1 MMOL/L (ref 3.4–5.3)
POTASSIUM SERPL-SCNC: 3.2 MMOL/L (ref 3.4–5.3)
POTASSIUM SERPL-SCNC: 3.2 MMOL/L (ref 3.4–5.3)
POTASSIUM SERPL-SCNC: 3.2 MMOL/L (ref 3.5–5.1)
POTASSIUM SERPL-SCNC: 3.3 MMOL/L
POTASSIUM SERPL-SCNC: 3.3 MMOL/L (ref 3.5–5.1)
POTASSIUM SERPL-SCNC: 3.3 MMOL/L (ref 3.5–5.1)
POTASSIUM SERPL-SCNC: 3.4 MMOL/L (ref 3.4–5.3)
POTASSIUM SERPL-SCNC: 3.4 MMOL/L (ref 3.4–5.3)
POTASSIUM SERPL-SCNC: 3.4 MMOL/L (ref 3.5–5.1)
POTASSIUM SERPL-SCNC: 3.5 MMOL/L
POTASSIUM SERPL-SCNC: 3.5 MMOL/L (ref 3.4–5.3)
POTASSIUM SERPL-SCNC: 3.5 MMOL/L (ref 3.5–5.1)
POTASSIUM SERPL-SCNC: 3.5 MMOL/L (ref 3.5–5.1)
POTASSIUM SERPL-SCNC: 3.6 MMOL/L (ref 3.4–5.3)
POTASSIUM SERPL-SCNC: 3.6 MMOL/L (ref 3.5–5.1)
POTASSIUM SERPL-SCNC: 3.7 MMOL/L (ref 3.4–5.3)
POTASSIUM SERPL-SCNC: 3.8 MMOL/L (ref 3.4–5.3)
POTASSIUM SERPL-SCNC: 3.8 MMOL/L (ref 3.5–5.1)
POTASSIUM SERPL-SCNC: 3.8 MMOL/L (ref 3.5–5.1)
POTASSIUM SERPL-SCNC: 3.9 MMOL/L (ref 3.4–5.3)
POTASSIUM SERPL-SCNC: 3.9 MMOL/L (ref 3.5–5.1)
POTASSIUM SERPL-SCNC: 4 MMOL/L (ref 3.4–5.3)
POTASSIUM SERPL-SCNC: 4 MMOL/L (ref 3.5–5.1)
POTASSIUM SERPL-SCNC: 4.1 MMOL/L (ref 3.4–5.3)
POTASSIUM SERPL-SCNC: 4.2 MMOL/L (ref 3.4–5.3)
POTASSIUM SERPL-SCNC: 4.3 MMOL/L (ref 3.4–5.3)
POTASSIUM SERPL-SCNC: 4.3 MMOL/L (ref 3.5–5.1)
POTASSIUM SERPL-SCNC: 4.4 MMOL/L (ref 3.4–5.3)
POTASSIUM SERPL-SCNC: 4.4 MMOL/L (ref 3.4–5.3)
POTASSIUM SERPL-SCNC: 4.5 MMOL/L
POTASSIUM SERPL-SCNC: 4.6 MMOL/L (ref 3.4–5.3)
POTASSIUM SERPL-SCNC: 4.6 MMOL/L (ref 3.4–5.3)
POTASSIUM SERPL-SCNC: 4.7 MMOL/L (ref 3.4–5.3)
POTASSIUM SERPL-SCNC: 4.8 MMOL/L
POTASSIUM SERPL-SCNC: 4.8 MMOL/L (ref 3.4–5.3)
POTASSIUM SERPL-SCNC: 4.9 MMOL/L (ref 3.4–5.3)
POTASSIUM SERPL-SCNC: 5 MMOL/L (ref 3.4–5.3)
POTASSIUM SERPL-SCNC: 5.1 MMOL/L (ref 3.4–5.3)
POTASSIUM SERPL-SCNC: 5.2 MMOL/L (ref 3.4–5.3)
POTASSIUM SERPL-SCNC: 5.3 MMOL/L (ref 3.4–5.3)
POTASSIUM SERPL-SCNC: 5.4 MMOL/L
POTASSIUM SERPL-SCNC: 5.4 MMOL/L (ref 3.4–5.3)
POTASSIUM SERPL-SCNC: 5.8 MMOL/L (ref 3.4–5.3)
POTASSIUM SERPL-SCNC: 6 MMOL/L (ref 3.4–5.3)
PROCALCITONIN SERPL-MCNC: 0.55 NG/ML
PROCALCITONIN SERPL-MCNC: 0.82 NG/ML
PROMYELOCYTE %: 0
PROMYELOCYTES: 0
PROT CSF-MCNC: 42 MG/DL (ref 15–60)
PROT CSF-MCNC: 54 MG/DL (ref 15–60)
PROT CSF-MCNC: 64 MG/DL (ref 15–60)
PROT CSF-MCNC: 65 MG/DL (ref 15–60)
PROT CSF-MCNC: 67 MG/DL (ref 15–60)
PROT SERPL-MCNC: 5.2 G/DL (ref 6.8–8.8)
PROT SERPL-MCNC: 5.4 G/DL (ref 6.6–8.3)
PROT SERPL-MCNC: 5.4 G/DL (ref 6.8–8.8)
PROT SERPL-MCNC: 5.4 G/DL (ref 6.8–8.8)
PROT SERPL-MCNC: 5.7 G/DL (ref 6.6–8.3)
PROT SERPL-MCNC: 5.8 G/DL (ref 6.8–8.8)
PROT SERPL-MCNC: 5.9 G/DL
PROT SERPL-MCNC: 5.9 G/DL (ref 6.6–8.3)
PROT SERPL-MCNC: 6 G/DL (ref 6.6–8.3)
PROT SERPL-MCNC: 6 G/DL (ref 6.8–8.8)
PROT SERPL-MCNC: 6.1 G/DL (ref 6.8–8.8)
PROT SERPL-MCNC: 6.2 G/DL (ref 6.8–8.8)
PROT SERPL-MCNC: 6.2 G/DL (ref 6.8–8.8)
PROT SERPL-MCNC: 6.3 G/DL (ref 6.8–8.8)
PROT SERPL-MCNC: 6.4 G/DL (ref 6.8–8.8)
PROT SERPL-MCNC: 6.5 G/DL (ref 6.8–8.8)
PROT SERPL-MCNC: 6.5 G/DL (ref 6.8–8.8)
PROT SERPL-MCNC: 6.6 G/DL (ref 6.8–8.8)
PROT SERPL-MCNC: 6.6 G/DL (ref 6.8–8.8)
PROT SERPL-MCNC: 6.8 G/DL (ref 6.8–8.8)
PROT SERPL-MCNC: 6.9 G/DL (ref 6.8–8.8)
PROT SERPL-MCNC: 7 G/DL (ref 6.8–8.8)
PROT SERPL-MCNC: 7.2 G/DL (ref 6.8–8.8)
PROT SERPL-MCNC: 7.2 G/DL (ref 6.8–8.8)
RBC # BLD AUTO: 2.18 10E12/L (ref 4.4–5.9)
RBC # BLD AUTO: 2.32 10E12/L (ref 4.4–5.9)
RBC # BLD AUTO: 2.32 10E12/L (ref 4.4–5.9)
RBC # BLD AUTO: 2.32 10^12/L
RBC # BLD AUTO: 2.35 10^12/L (ref 4–6)
RBC # BLD AUTO: 2.37 10^12/L
RBC # BLD AUTO: 2.41 10E12/L (ref 4.4–5.9)
RBC # BLD AUTO: 2.44 10^12/L (ref 4–6)
RBC # BLD AUTO: 2.49 10E12/L (ref 4.4–5.9)
RBC # BLD AUTO: 2.53 10E12/L (ref 4.4–5.9)
RBC # BLD AUTO: 2.54 10^12/L (ref 4–6)
RBC # BLD AUTO: 2.56 10E12/L (ref 4.4–5.9)
RBC # BLD AUTO: 2.56 10^12/L (ref 4–6)
RBC # BLD AUTO: 2.57 10E12/L (ref 4.4–5.9)
RBC # BLD AUTO: 2.58 10^12/L (ref 4–6)
RBC # BLD AUTO: 2.59 10E12/L (ref 4.4–5.9)
RBC # BLD AUTO: 2.6 10^12/L (ref 4–6)
RBC # BLD AUTO: 2.61 10^12/L (ref 4–6)
RBC # BLD AUTO: 2.63 10^12/L (ref 4–6)
RBC # BLD AUTO: 2.64 10E12/L (ref 4.4–5.9)
RBC # BLD AUTO: 2.64 10^12/L (ref 4–6)
RBC # BLD AUTO: 2.65 10E12/L (ref 4.4–5.9)
RBC # BLD AUTO: 2.65 10^12/L
RBC # BLD AUTO: 2.69 10E12/L (ref 4.4–5.9)
RBC # BLD AUTO: 2.7 10E12/L (ref 4.4–5.9)
RBC # BLD AUTO: 2.7 10^12/L (ref 4–6)
RBC # BLD AUTO: 2.71 10E12/L (ref 4.4–5.9)
RBC # BLD AUTO: 2.72 10E12/L (ref 4.4–5.9)
RBC # BLD AUTO: 2.73 10^12/L (ref 4–6)
RBC # BLD AUTO: 2.74 10E12/L (ref 4.4–5.9)
RBC # BLD AUTO: 2.76 10E12/L (ref 4.4–5.9)
RBC # BLD AUTO: 2.76 10^12/L (ref 4–6)
RBC # BLD AUTO: 2.77 10E12/L (ref 4.4–5.9)
RBC # BLD AUTO: 2.78 10^12/L (ref 4–6)
RBC # BLD AUTO: 2.8 10E12/L (ref 4.4–5.9)
RBC # BLD AUTO: 2.81 10E12/L (ref 4.4–5.9)
RBC # BLD AUTO: 2.82 10E12/L (ref 4.4–5.9)
RBC # BLD AUTO: 2.83 10E12/L (ref 4.4–5.9)
RBC # BLD AUTO: 2.86 10E12/L (ref 4.4–5.9)
RBC # BLD AUTO: 2.89 10^12/L (ref 4–6)
RBC # BLD AUTO: 2.91 10E12/L (ref 4.4–5.9)
RBC # BLD AUTO: 2.96 10E12/L (ref 4.4–5.9)
RBC # BLD AUTO: 2.96 10^12/L (ref 4–6)
RBC # BLD AUTO: 2.97 10E12/L (ref 4.4–5.9)
RBC # BLD AUTO: 2.98 10E12/L (ref 4.4–5.9)
RBC # BLD AUTO: 2.99 10^12/L (ref 4–6)
RBC # BLD AUTO: 3.03 10E12/L (ref 4.4–5.9)
RBC # BLD AUTO: 3.08 10E12/L (ref 4.4–5.9)
RBC # BLD AUTO: 3.08 10E12/L (ref 4.4–5.9)
RBC # BLD AUTO: 3.12 10E12/L (ref 4.4–5.9)
RBC # BLD AUTO: 3.13 10E12/L (ref 4.4–5.9)
RBC # BLD AUTO: 3.15 10^12/L (ref 4–6)
RBC # BLD AUTO: 3.17 10E12/L (ref 4.4–5.9)
RBC # BLD AUTO: 3.2 10E12/L (ref 4.4–5.9)
RBC # BLD AUTO: 3.2 10E12/L (ref 4.4–5.9)
RBC # BLD AUTO: 3.22 10E12/L (ref 4.4–5.9)
RBC # BLD AUTO: 3.22 10^12/L (ref 4–6)
RBC # BLD AUTO: 3.25 10^12/L (ref 4–6)
RBC # BLD AUTO: 3.25 10^12/L (ref 4–6)
RBC # BLD AUTO: 3.48 10E12/L (ref 4.4–5.9)
RBC # BLD AUTO: 3.48 10E12/L (ref 4.4–5.9)
RBC # BLD AUTO: 3.52 10E12/L (ref 4.4–5.9)
RBC # BLD AUTO: 3.53 10E12/L (ref 4.4–5.9)
RBC # BLD AUTO: 3.59 10E12/L (ref 4.4–5.9)
RBC # BLD AUTO: 3.61 10E12/L (ref 4.4–5.9)
RBC # BLD AUTO: 3.61 10E12/L (ref 4.4–5.9)
RBC # BLD AUTO: 3.71 10E12/L (ref 4.4–5.9)
RBC # BLD AUTO: 3.78 10E12/L (ref 4.4–5.9)
RBC # BLD AUTO: 3.93 10E12/L (ref 4.4–5.9)
RBC # BLD AUTO: 4.17 10E12/L (ref 4.4–5.9)
RBC # CSF MANUAL: 0 /UL (ref 0–2)
RBC # CSF MANUAL: 1 /UL (ref 0–2)
RBC # CSF MANUAL: 2 /UL (ref 0–2)
RBC #/AREA URNS AUTO: 0 /HPF (ref 0–2)
RBC #/AREA URNS AUTO: 1 /HPF (ref 0–2)
RBC #/AREA URNS AUTO: 1 /HPF (ref 0–2)
RBC #/AREA URNS AUTO: 122 /HPF (ref 0–2)
RBC #/AREA URNS AUTO: 3 /HPF (ref 0–2)
RETICS # AUTO: 105.5 10E9/L (ref 25–95)
RETICS # AUTO: 139.6 10E9/L (ref 25–95)
RETICS/RBC NFR AUTO: 3.4 % (ref 0.5–2)
RETICS/RBC NFR AUTO: 5.2 % (ref 0.5–2)
SARS-COV-2 RNA SPEC QL NAA+PROBE: NEGATIVE
SARS-COV-2 RNA SPEC QL NAA+PROBE: NEGATIVE
SARS-COV-2 RNA SPEC QL NAA+PROBE: NORMAL
SARS-COV-2 RNA SPEC QL NAA+PROBE: NORMAL
SARS-COV-2 RNA SPEC QL NAA+PROBE: NOT DETECTED
SODIUM SERPL-SCNC: 131 MMOL/L
SODIUM SERPL-SCNC: 132 MMOL/L (ref 133–144)
SODIUM SERPL-SCNC: 132 MMOL/L (ref 133–144)
SODIUM SERPL-SCNC: 133 MMOL/L (ref 133–144)
SODIUM SERPL-SCNC: 133 MMOL/L (ref 133–144)
SODIUM SERPL-SCNC: 134 MMOL/L (ref 133–144)
SODIUM SERPL-SCNC: 134 MMOL/L (ref 136–145)
SODIUM SERPL-SCNC: 134 MMOL/L (ref 136–145)
SODIUM SERPL-SCNC: 135 MMOL/L (ref 133–144)
SODIUM SERPL-SCNC: 135 MMOL/L (ref 133–144)
SODIUM SERPL-SCNC: 136 MMOL/L
SODIUM SERPL-SCNC: 136 MMOL/L
SODIUM SERPL-SCNC: 136 MMOL/L (ref 133–144)
SODIUM SERPL-SCNC: 136 MMOL/L (ref 136–145)
SODIUM SERPL-SCNC: 137 MMOL/L (ref 133–144)
SODIUM SERPL-SCNC: 137 MMOL/L (ref 136–145)
SODIUM SERPL-SCNC: 137 MMOL/L (ref 136–145)
SODIUM SERPL-SCNC: 138 MMOL/L
SODIUM SERPL-SCNC: 138 MMOL/L (ref 133–144)
SODIUM SERPL-SCNC: 138 MMOL/L (ref 136–145)
SODIUM SERPL-SCNC: 139 MMOL/L
SODIUM SERPL-SCNC: 139 MMOL/L (ref 133–144)
SODIUM SERPL-SCNC: 140 MMOL/L
SODIUM SERPL-SCNC: 140 MMOL/L (ref 133–144)
SODIUM SERPL-SCNC: 140 MMOL/L (ref 136–145)
SODIUM SERPL-SCNC: 141 MMOL/L (ref 133–144)
SODIUM SERPL-SCNC: 141 MMOL/L (ref 136–145)
SODIUM SERPL-SCNC: 141 MMOL/L (ref 136–145)
SODIUM SERPL-SCNC: 142 MMOL/L (ref 133–144)
SODIUM SERPL-SCNC: 142 MMOL/L (ref 136–145)
SODIUM SERPL-SCNC: 144 MMOL/L (ref 136–145)
SOURCE: ABNORMAL
SP GR UR STRIP: 1.01 (ref 1–1.03)
SP GR UR STRIP: 1.02 (ref 1–1.03)
SP GR UR STRIP: 1.03 (ref 1–1.03)
SPECIMEN EXP DATE BLD: NORMAL
SPECIMEN SOURCE: ABNORMAL
SPECIMEN SOURCE: NORMAL
SQUAMOUS #/AREA URNS AUTO: 1 /HPF (ref 0–1)
SQUAMOUS #/AREA URNS AUTO: 4 /HPF (ref 0–1)
SQUAMOUS #/AREA URNS AUTO: 4 /HPF (ref 0–1)
TIBC SERPL-MCNC: 185 UG/DL (ref 240–430)
TRANS CELLS #/AREA URNS HPF: 2 /HPF (ref 0–1)
TRANSF REACT PLASRBC-IMP: NORMAL
TRANSFUSION RXN BLOOD BANK NOTIFICATION: NORMAL
TRANSFUSION STATUS PATIENT QL: NORMAL
TROPONIN I SERPL-MCNC: 0.04 UG/L (ref 0–0.04)
TROPONIN I SERPL-MCNC: 0.04 UG/L (ref 0–0.04)
TROPONIN I SERPL-MCNC: <0.015 UG/L (ref 0–0.04)
TUBE # CSF: 3 #
TUBE # CSF: 4 #
UPPER EUS: NORMAL
URATE SERPL-MCNC: 1.6 MG/DL (ref 3.5–7.2)
URATE SERPL-MCNC: 2 MG/DL (ref 3.5–7.2)
URATE SERPL-MCNC: 2.2 MG/DL (ref 3.5–7.2)
URATE SERPL-MCNC: 2.4 MG/DL (ref 3.5–7.2)
URATE SERPL-MCNC: 2.5 MG/DL (ref 3.5–7.2)
URATE SERPL-MCNC: 2.8 MG/DL (ref 3.5–7.2)
URATE SERPL-MCNC: 2.9 MG/DL (ref 3.5–7.2)
URATE SERPL-MCNC: 3 MG/DL (ref 3.5–7.2)
URATE SERPL-MCNC: 3.1 MG/DL (ref 3.5–7.2)
URATE SERPL-MCNC: 3.4 MG/DL (ref 3.5–7.2)
URATE SERPL-MCNC: 3.7 MG/DL (ref 3.5–7.2)
URATE SERPL-MCNC: 3.7 MG/DL (ref 3.5–7.2)
URATE SERPL-MCNC: 3.8 MG/DL (ref 3.5–7.2)
URATE SERPL-MCNC: 3.8 MG/DL (ref 3.5–7.2)
URATE SERPL-MCNC: 4 MG/DL (ref 3.5–7.2)
URATE SERPL-MCNC: 4.1 MG/DL (ref 3.5–7.2)
URATE SERPL-MCNC: 4.2 MG/DL (ref 3.5–7.2)
URATE SERPL-MCNC: 4.4 MG/DL (ref 3.5–7.2)
URATE SERPL-MCNC: 4.4 MG/DL (ref 3.5–7.2)
URATE SERPL-MCNC: 4.5 MG/DL (ref 3.5–7.2)
URATE SERPL-MCNC: 4.6 MG/DL (ref 3.5–7.2)
URATE SERPL-MCNC: 4.6 MG/DL (ref 3.5–7.2)
URATE SERPL-MCNC: 4.7 MG/DL (ref 3.5–7.2)
URATE SERPL-MCNC: 4.7 MG/DL (ref 3.5–7.2)
URATE SERPL-MCNC: 4.8 MG/DL (ref 3.5–7.2)
URATE SERPL-MCNC: 5 MG/DL (ref 3.5–7.2)
URATE SERPL-MCNC: 5.1 MG/DL (ref 3.5–7.2)
URATE SERPL-MCNC: 5.3 MG/DL (ref 3.5–7.2)
URATE SERPL-MCNC: 5.4 MG/DL (ref 3.5–7.2)
URATE SERPL-MCNC: 5.5 MG/DL (ref 3.5–7.2)
URATE SERPL-MCNC: 5.8 MG/DL (ref 3.5–7.2)
URATE SERPL-MCNC: 6 MG/DL (ref 3.5–7.2)
URATE SERPL-MCNC: 6.1 MG/DL (ref 3.5–7.2)
URATE SERPL-MCNC: 6.4 MG/DL (ref 3.5–7.2)
URATE SERPL-MCNC: 6.4 MG/DL (ref 3.5–7.2)
URATE SERPL-MCNC: 7.4 MG/DL (ref 3.5–7.2)
UROBILINOGEN UR STRIP-MCNC: 8 MG/DL (ref 0–2)
UROBILINOGEN UR STRIP-MCNC: NORMAL MG/DL (ref 0–2)
VARIANT LYMPHS BLD QL SMEAR: PRESENT
VIT B12 SERPL-MCNC: 1662 PG/ML (ref 193–986)
WBC # BLD AUTO: 0.1 10^9/L (ref 5–10)
WBC # BLD AUTO: 0.2 10^9/L (ref 5–10)
WBC # BLD AUTO: 0.7 10^9/L (ref 5–10)
WBC # BLD AUTO: 0.8 10^9/L (ref 5–10)
WBC # BLD AUTO: 1.1 10^9/L (ref 5–10)
WBC # BLD AUTO: 1.2 10^9/L (ref 5–10)
WBC # BLD AUTO: 1.3 10E9/L (ref 4–11)
WBC # BLD AUTO: 1.6 10^9/L (ref 5–10)
WBC # BLD AUTO: 10.3 10^9/L (ref 5–10)
WBC # BLD AUTO: 10.6 10E9/L (ref 4–11)
WBC # BLD AUTO: 11 10E9/L (ref 4–11)
WBC # BLD AUTO: 11.3 10E9/L (ref 4–11)
WBC # BLD AUTO: 11.6 10E9/L (ref 4–11)
WBC # BLD AUTO: 11.7 10E9/L (ref 4–11)
WBC # BLD AUTO: 12 10E9/L (ref 4–11)
WBC # BLD AUTO: 12.1 10E9/L (ref 4–11)
WBC # BLD AUTO: 12.2 10E9/L (ref 4–11)
WBC # BLD AUTO: 12.4 10E9/L (ref 4–11)
WBC # BLD AUTO: 12.6 10E9/L (ref 4–11)
WBC # BLD AUTO: 13 10^9/L
WBC # BLD AUTO: 13.1 10E9/L (ref 4–11)
WBC # BLD AUTO: 13.7 10E9/L (ref 4–11)
WBC # BLD AUTO: 13.9 10E9/L (ref 4–11)
WBC # BLD AUTO: 14.1 10E9/L (ref 4–11)
WBC # BLD AUTO: 15.6 10E9/L (ref 4–11)
WBC # BLD AUTO: 17.1 10E9/L (ref 4–11)
WBC # BLD AUTO: 17.9 10E9/L (ref 4–11)
WBC # BLD AUTO: 18.3 10E9/L (ref 4–11)
WBC # BLD AUTO: 18.7 10^9/L (ref 5–10)
WBC # BLD AUTO: 18.8 10E9/L (ref 4–11)
WBC # BLD AUTO: 19.7 10E9/L (ref 4–11)
WBC # BLD AUTO: 19.9 10^9/L (ref 5–10)
WBC # BLD AUTO: 2.9 10E9/L (ref 4–11)
WBC # BLD AUTO: 21 10E9/L (ref 4–11)
WBC # BLD AUTO: 22.8 10E9/L (ref 4–11)
WBC # BLD AUTO: 24.3 10E9/L (ref 4–11)
WBC # BLD AUTO: 25.5 10E9/L (ref 4–11)
WBC # BLD AUTO: 3 10E9/L (ref 4–11)
WBC # BLD AUTO: 3.2 10E9/L (ref 4–11)
WBC # BLD AUTO: 3.4 10^9/L (ref 5–10)
WBC # BLD AUTO: 3.9 10E9/L (ref 4–11)
WBC # BLD AUTO: 3.9 10^9/L (ref 5–10)
WBC # BLD AUTO: 3.9 10^9/L (ref 5–10)
WBC # BLD AUTO: 31.5 10E9/L (ref 4–11)
WBC # BLD AUTO: 37.5 10E9/L (ref 4–11)
WBC # BLD AUTO: 4.2 10^9/L (ref 5–10)
WBC # BLD AUTO: 4.3 10^9/L (ref 5–10)
WBC # BLD AUTO: 4.6 10^9/L (ref 5–10)
WBC # BLD AUTO: 4.6 10^9/L (ref 5–10)
WBC # BLD AUTO: 4.7 10E9/L (ref 4–11)
WBC # BLD AUTO: 4.7 10^9/L
WBC # BLD AUTO: 4.9 10E9/L (ref 4–11)
WBC # BLD AUTO: 4.9 10E9/L (ref 4–11)
WBC # BLD AUTO: 5.1 10E9/L (ref 4–11)
WBC # BLD AUTO: 5.5 10E9/L (ref 4–11)
WBC # BLD AUTO: 5.5 10^9/L (ref 5–10)
WBC # BLD AUTO: 6.1 10^9/L (ref 5–10)
WBC # BLD AUTO: 6.4 10E9/L (ref 4–11)
WBC # BLD AUTO: 7 10E9/L (ref 4–11)
WBC # BLD AUTO: 7 10E9/L (ref 4–11)
WBC # BLD AUTO: 7.1 10E9/L (ref 4–11)
WBC # BLD AUTO: 7.1 10E9/L (ref 4–11)
WBC # BLD AUTO: 7.2 10^9/L
WBC # BLD AUTO: 7.5 10E9/L (ref 4–11)
WBC # BLD AUTO: 7.8 10E9/L (ref 4–11)
WBC # BLD AUTO: 7.8 10E9/L (ref 4–11)
WBC # BLD AUTO: 8 10E9/L (ref 4–11)
WBC # BLD AUTO: 8 10E9/L (ref 4–11)
WBC # BLD AUTO: 8.1 10E9/L (ref 4–11)
WBC # BLD AUTO: 8.1 10^9/L
WBC # BLD AUTO: 8.6 10E9/L (ref 4–11)
WBC # BLD AUTO: 9.3 10E9/L (ref 4–11)
WBC # CSF MANUAL: 0 /UL (ref 0–5)
WBC # CSF MANUAL: 1 /UL (ref 0–5)
WBC # CSF MANUAL: 1 /UL (ref 0–5)
WBC #/AREA URNS AUTO: 1 /HPF (ref 0–5)
WBC #/AREA URNS AUTO: 1 /HPF (ref 0–5)
WBC #/AREA URNS AUTO: 2 /HPF (ref 0–5)
WBC #/AREA URNS AUTO: 34 /HPF (ref 0–5)
WBC #/AREA URNS AUTO: 6 /HPF (ref 0–5)

## 2020-01-01 PROCEDURE — 36430 TRANSFUSION BLD/BLD COMPNT: CPT

## 2020-01-01 PROCEDURE — 86900 BLOOD TYPING SEROLOGIC ABO: CPT | Performed by: PHYSICIAN ASSISTANT

## 2020-01-01 PROCEDURE — 93306 TTE W/DOPPLER COMPLETE: CPT | Mod: 26 | Performed by: INTERNAL MEDICINE

## 2020-01-01 PROCEDURE — 25000132 ZZH RX MED GY IP 250 OP 250 PS 637: Performed by: INTERNAL MEDICINE

## 2020-01-01 PROCEDURE — 25000132 ZZH RX MED GY IP 250 OP 250 PS 637: Performed by: STUDENT IN AN ORGANIZED HEALTH CARE EDUCATION/TRAINING PROGRAM

## 2020-01-01 PROCEDURE — 250N000013 HC RX MED GY IP 250 OP 250 PS 637: Performed by: INTERNAL MEDICINE

## 2020-01-01 PROCEDURE — 83615 LACTATE (LD) (LDH) ENZYME: CPT | Performed by: PHYSICIAN ASSISTANT

## 2020-01-01 PROCEDURE — 80053 COMPREHEN METABOLIC PANEL: CPT | Performed by: INTERNAL MEDICINE

## 2020-01-01 PROCEDURE — 25000132 ZZH RX MED GY IP 250 OP 250 PS 637: Performed by: HOSPITALIST

## 2020-01-01 PROCEDURE — 84100 ASSAY OF PHOSPHORUS: CPT | Performed by: STUDENT IN AN ORGANIZED HEALTH CARE EDUCATION/TRAINING PROGRAM

## 2020-01-01 PROCEDURE — 25800030 ZZH RX IP 258 OP 636: Mod: ZF | Performed by: PHYSICIAN ASSISTANT

## 2020-01-01 PROCEDURE — 99443: CPT | Performed by: PHYSICIAN ASSISTANT

## 2020-01-01 PROCEDURE — 40000344 ZZHCL STATISTIC THAWING COMPONENT: Performed by: PHYSICIAN ASSISTANT

## 2020-01-01 PROCEDURE — 80048 BASIC METABOLIC PNL TOTAL CA: CPT | Performed by: STUDENT IN AN ORGANIZED HEALTH CARE EDUCATION/TRAINING PROGRAM

## 2020-01-01 PROCEDURE — 25000128 H RX IP 250 OP 636: Performed by: INTERNAL MEDICINE

## 2020-01-01 PROCEDURE — 85384 FIBRINOGEN ACTIVITY: CPT | Performed by: PHYSICIAN ASSISTANT

## 2020-01-01 PROCEDURE — 77417 THER RADIOLOGY PORT IMAGE(S): CPT | Performed by: RADIOLOGY

## 2020-01-01 PROCEDURE — 80048 BASIC METABOLIC PNL TOTAL CA: CPT | Performed by: PHYSICIAN ASSISTANT

## 2020-01-01 PROCEDURE — 36415 COLL VENOUS BLD VENIPUNCTURE: CPT | Performed by: INTERNAL MEDICINE

## 2020-01-01 PROCEDURE — 83735 ASSAY OF MAGNESIUM: CPT | Mod: 91 | Performed by: INTERNAL MEDICINE

## 2020-01-01 PROCEDURE — 27211389 ZZ H KIT, 5 FR DL BIOFLO OPEN ENDED PICC

## 2020-01-01 PROCEDURE — 85025 COMPLETE CBC W/AUTO DIFF WBC: CPT | Performed by: INTERNAL MEDICINE

## 2020-01-01 PROCEDURE — 84550 ASSAY OF BLOOD/URIC ACID: CPT | Performed by: INTERNAL MEDICINE

## 2020-01-01 PROCEDURE — 96450 CHEMOTHERAPY INTO CNS: CPT | Performed by: PHYSICIAN ASSISTANT

## 2020-01-01 PROCEDURE — 97530 THERAPEUTIC ACTIVITIES: CPT | Mod: GP

## 2020-01-01 PROCEDURE — 96415 CHEMO IV INFUSION ADDL HR: CPT

## 2020-01-01 PROCEDURE — 250N000013 HC RX MED GY IP 250 OP 250 PS 637: Performed by: STUDENT IN AN ORGANIZED HEALTH CARE EDUCATION/TRAINING PROGRAM

## 2020-01-01 PROCEDURE — 96417 CHEMO IV INFUS EACH ADDL SEQ: CPT

## 2020-01-01 PROCEDURE — 250N000011 HC RX IP 250 OP 636: Performed by: INTERNAL MEDICINE

## 2020-01-01 PROCEDURE — 0DJ08ZZ INSPECTION OF UPPER INTESTINAL TRACT, VIA NATURAL OR ARTIFICIAL OPENING ENDOSCOPIC: ICD-10-PCS | Performed by: INTERNAL MEDICINE

## 2020-01-01 PROCEDURE — 77334 RADIATION TREATMENT AID(S): CPT | Performed by: RADIOLOGY

## 2020-01-01 PROCEDURE — 25000132 ZZH RX MED GY IP 250 OP 250 PS 637: Performed by: PHYSICIAN ASSISTANT

## 2020-01-01 PROCEDURE — 25000132 ZZH RX MED GY IP 250 OP 250 PS 637: Mod: ZF | Performed by: PHYSICIAN ASSISTANT

## 2020-01-01 PROCEDURE — G0463 HOSPITAL OUTPT CLINIC VISIT: HCPCS | Mod: TEL,ZF

## 2020-01-01 PROCEDURE — 34300033 ZZH RX 343: Performed by: INTERNAL MEDICINE

## 2020-01-01 PROCEDURE — 40001009 ZZH VIDEO/TELEPHONE VISIT; NO CHARGE

## 2020-01-01 PROCEDURE — 25800030 ZZH RX IP 258 OP 636: Performed by: EMERGENCY MEDICINE

## 2020-01-01 PROCEDURE — 84550 ASSAY OF BLOOD/URIC ACID: CPT | Performed by: PHYSICIAN ASSISTANT

## 2020-01-01 PROCEDURE — 36592 COLLECT BLOOD FROM PICC: CPT

## 2020-01-01 PROCEDURE — 85610 PROTHROMBIN TIME: CPT | Performed by: STUDENT IN AN ORGANIZED HEALTH CARE EDUCATION/TRAINING PROGRAM

## 2020-01-01 PROCEDURE — 96375 TX/PRO/DX INJ NEW DRUG ADDON: CPT | Mod: 59

## 2020-01-01 PROCEDURE — 97116 GAIT TRAINING THERAPY: CPT | Mod: GP

## 2020-01-01 PROCEDURE — 88341 IMHCHEM/IMCYTCHM EA ADD ANTB: CPT | Performed by: PHYSICIAN ASSISTANT

## 2020-01-01 PROCEDURE — 80048 BASIC METABOLIC PNL TOTAL CA: CPT | Performed by: HOSPITALIST

## 2020-01-01 PROCEDURE — 85025 COMPLETE CBC W/AUTO DIFF WBC: CPT | Performed by: PHYSICIAN ASSISTANT

## 2020-01-01 PROCEDURE — 84100 ASSAY OF PHOSPHORUS: CPT | Performed by: PHYSICIAN ASSISTANT

## 2020-01-01 PROCEDURE — 40000986 XR CHEST PORT 1 VW

## 2020-01-01 PROCEDURE — 99233 SBSQ HOSP IP/OBS HIGH 50: CPT | Mod: GC | Performed by: INTERNAL MEDICINE

## 2020-01-01 PROCEDURE — 25000128 H RX IP 250 OP 636: Performed by: PHYSICIAN ASSISTANT

## 2020-01-01 PROCEDURE — 83735 ASSAY OF MAGNESIUM: CPT | Performed by: INTERNAL MEDICINE

## 2020-01-01 PROCEDURE — 40000275 ZZH STATISTIC RCP TIME EA 10 MIN

## 2020-01-01 PROCEDURE — 99223 1ST HOSP IP/OBS HIGH 75: CPT | Mod: AI | Performed by: INTERNAL MEDICINE

## 2020-01-01 PROCEDURE — 71046 X-RAY EXAM CHEST 2 VIEWS: CPT

## 2020-01-01 PROCEDURE — 77295 3-D RADIOTHERAPY PLAN: CPT | Performed by: RADIOLOGY

## 2020-01-01 PROCEDURE — 25000128 H RX IP 250 OP 636: Mod: ZF | Performed by: PHYSICIAN ASSISTANT

## 2020-01-01 PROCEDURE — 250N000013 HC RX MED GY IP 250 OP 250 PS 637: Performed by: PHYSICIAN ASSISTANT

## 2020-01-01 PROCEDURE — U0003 INFECTIOUS AGENT DETECTION BY NUCLEIC ACID (DNA OR RNA); SEVERE ACUTE RESPIRATORY SYNDROME CORONAVIRUS 2 (SARS-COV-2) (CORONAVIRUS DISEASE [COVID-19]), AMPLIFIED PROBE TECHNIQUE, MAKING USE OF HIGH THROUGHPUT TECHNOLOGIES AS DESCRIBED BY CMS-2020-01-R: HCPCS | Performed by: PHYSICIAN ASSISTANT

## 2020-01-01 PROCEDURE — 40000344 ZZHCL STATISTIC THAWING COMPONENT: Performed by: HOSPITALIST

## 2020-01-01 PROCEDURE — 36592 COLLECT BLOOD FROM PICC: CPT | Performed by: INTERNAL MEDICINE

## 2020-01-01 PROCEDURE — 83615 LACTATE (LD) (LDH) ENZYME: CPT | Performed by: INTERNAL MEDICINE

## 2020-01-01 PROCEDURE — 88185 FLOWCYTOMETRY/TC ADD-ON: CPT | Performed by: PHYSICIAN ASSISTANT

## 2020-01-01 PROCEDURE — 87205 SMEAR GRAM STAIN: CPT | Performed by: STUDENT IN AN ORGANIZED HEALTH CARE EDUCATION/TRAINING PROGRAM

## 2020-01-01 PROCEDURE — 36415 COLL VENOUS BLD VENIPUNCTURE: CPT

## 2020-01-01 PROCEDURE — 25000132 ZZH RX MED GY IP 250 OP 250 PS 637: Performed by: EMERGENCY MEDICINE

## 2020-01-01 PROCEDURE — 12000001 ZZH R&B MED SURG/OB UMMC

## 2020-01-01 PROCEDURE — 25800030 ZZH RX IP 258 OP 636: Performed by: INTERNAL MEDICINE

## 2020-01-01 PROCEDURE — 84157 ASSAY OF PROTEIN OTHER: CPT | Performed by: PHYSICIAN ASSISTANT

## 2020-01-01 PROCEDURE — 00000159 ZZHCL STATISTIC H-SEND OUTS PREP: Performed by: PHYSICIAN ASSISTANT

## 2020-01-01 PROCEDURE — 40000341 ZZHCL STATISTIC BB TRANSF RXN INVEST: Performed by: INTERNAL MEDICINE

## 2020-01-01 PROCEDURE — 87529 HSV DNA AMP PROBE: CPT | Performed by: NURSE PRACTITIONER

## 2020-01-01 PROCEDURE — 80053 COMPREHEN METABOLIC PANEL: CPT | Performed by: PHYSICIAN ASSISTANT

## 2020-01-01 PROCEDURE — 87389 HIV-1 AG W/HIV-1&-2 AB AG IA: CPT | Performed by: INTERNAL MEDICINE

## 2020-01-01 PROCEDURE — 87340 HEPATITIS B SURFACE AG IA: CPT | Performed by: INTERNAL MEDICINE

## 2020-01-01 PROCEDURE — 36415 COLL VENOUS BLD VENIPUNCTURE: CPT | Performed by: PHYSICIAN ASSISTANT

## 2020-01-01 PROCEDURE — 93005 ELECTROCARDIOGRAM TRACING: CPT | Performed by: EMERGENCY MEDICINE

## 2020-01-01 PROCEDURE — 37000008 ZZH ANESTHESIA TECHNICAL FEE, 1ST 30 MIN: Performed by: INTERNAL MEDICINE

## 2020-01-01 PROCEDURE — 84484 ASSAY OF TROPONIN QUANT: CPT | Performed by: EMERGENCY MEDICINE

## 2020-01-01 PROCEDURE — 96367 TX/PROPH/DG ADDL SEQ IV INF: CPT

## 2020-01-01 PROCEDURE — 258N000003 HC RX IP 258 OP 636: Performed by: PHYSICIAN ASSISTANT

## 2020-01-01 PROCEDURE — P9037 PLATE PHERES LEUKOREDU IRRAD: HCPCS | Performed by: PHYSICIAN ASSISTANT

## 2020-01-01 PROCEDURE — 85730 THROMBOPLASTIN TIME PARTIAL: CPT | Performed by: PHYSICIAN ASSISTANT

## 2020-01-01 PROCEDURE — 258N000003 HC RX IP 258 OP 636: Performed by: EMERGENCY MEDICINE

## 2020-01-01 PROCEDURE — 84132 ASSAY OF SERUM POTASSIUM: CPT | Performed by: INTERNAL MEDICINE

## 2020-01-01 PROCEDURE — 84145 PROCALCITONIN (PCT): CPT | Performed by: PHYSICIAN ASSISTANT

## 2020-01-01 PROCEDURE — 86850 RBC ANTIBODY SCREEN: CPT | Performed by: PHYSICIAN ASSISTANT

## 2020-01-01 PROCEDURE — 85384 FIBRINOGEN ACTIVITY: CPT | Performed by: INTERNAL MEDICINE

## 2020-01-01 PROCEDURE — P9016 RBC LEUKOCYTES REDUCED: HCPCS | Performed by: STUDENT IN AN ORGANIZED HEALTH CARE EDUCATION/TRAINING PROGRAM

## 2020-01-01 PROCEDURE — 82248 BILIRUBIN DIRECT: CPT | Performed by: INTERNAL MEDICINE

## 2020-01-01 PROCEDURE — 84550 ASSAY OF BLOOD/URIC ACID: CPT | Performed by: NURSE PRACTITIONER

## 2020-01-01 PROCEDURE — 83735 ASSAY OF MAGNESIUM: CPT | Performed by: PHYSICIAN ASSISTANT

## 2020-01-01 PROCEDURE — 78816 PET IMAGE W/CT FULL BODY: CPT | Mod: PS

## 2020-01-01 PROCEDURE — 250N000011 HC RX IP 250 OP 636: Performed by: STUDENT IN AN ORGANIZED HEALTH CARE EDUCATION/TRAINING PROGRAM

## 2020-01-01 PROCEDURE — 86923 COMPATIBILITY TEST ELECTRIC: CPT | Mod: TEL | Performed by: PHYSICIAN ASSISTANT

## 2020-01-01 PROCEDURE — G0463 HOSPITAL OUTPT CLINIC VISIT: HCPCS | Mod: 25

## 2020-01-01 PROCEDURE — 86301 IMMUNOASSAY TUMOR CA 19-9: CPT | Performed by: INTERNAL MEDICINE

## 2020-01-01 PROCEDURE — 99285 EMERGENCY DEPT VISIT HI MDM: CPT | Mod: 25 | Performed by: EMERGENCY MEDICINE

## 2020-01-01 PROCEDURE — 85027 COMPLETE CBC AUTOMATED: CPT | Performed by: HOSPITALIST

## 2020-01-01 PROCEDURE — 83690 ASSAY OF LIPASE: CPT | Performed by: INTERNAL MEDICINE

## 2020-01-01 PROCEDURE — 84100 ASSAY OF PHOSPHORUS: CPT | Performed by: INTERNAL MEDICINE

## 2020-01-01 PROCEDURE — 99223 1ST HOSP IP/OBS HIGH 75: CPT | Mod: GC | Performed by: INTERNAL MEDICINE

## 2020-01-01 PROCEDURE — 93306 TTE W/DOPPLER COMPLETE: CPT

## 2020-01-01 PROCEDURE — 84145 PROCALCITONIN (PCT): CPT | Performed by: STUDENT IN AN ORGANIZED HEALTH CARE EDUCATION/TRAINING PROGRAM

## 2020-01-01 PROCEDURE — A9585 GADOBUTROL INJECTION: HCPCS | Performed by: INTERNAL MEDICINE

## 2020-01-01 PROCEDURE — 93010 ELECTROCARDIOGRAM REPORT: CPT | Performed by: INTERNAL MEDICINE

## 2020-01-01 PROCEDURE — 250N000013 HC RX MED GY IP 250 OP 250 PS 637: Performed by: EMERGENCY MEDICINE

## 2020-01-01 PROCEDURE — 96377 APPLICATON ON-BODY INJECTOR: CPT | Mod: 59

## 2020-01-01 PROCEDURE — 88271 CYTOGENETICS DNA PROBE: CPT | Performed by: PHYSICIAN ASSISTANT

## 2020-01-01 PROCEDURE — 86850 RBC ANTIBODY SCREEN: CPT | Performed by: INTERNAL MEDICINE

## 2020-01-01 PROCEDURE — 89050 BODY FLUID CELL COUNT: CPT | Performed by: PHYSICIAN ASSISTANT

## 2020-01-01 PROCEDURE — 25000131 ZZH RX MED GY IP 250 OP 636 PS 637: Performed by: NURSE PRACTITIONER

## 2020-01-01 PROCEDURE — 83605 ASSAY OF LACTIC ACID: CPT | Performed by: STUDENT IN AN ORGANIZED HEALTH CARE EDUCATION/TRAINING PROGRAM

## 2020-01-01 PROCEDURE — 77412 RADIATION TX DELIVERY LVL 3: CPT | Performed by: RADIOLOGY

## 2020-01-01 PROCEDURE — 255N000002 HC RX 255 OP 636: Performed by: EMERGENCY MEDICINE

## 2020-01-01 PROCEDURE — 80048 BASIC METABOLIC PNL TOTAL CA: CPT | Performed by: NURSE PRACTITIONER

## 2020-01-01 PROCEDURE — 25000125 ZZHC RX 250: Performed by: STUDENT IN AN ORGANIZED HEALTH CARE EDUCATION/TRAINING PROGRAM

## 2020-01-01 PROCEDURE — 40000951 ZZHCL STATISTIC BONE MARROW INTERP TC 85097: Performed by: PHYSICIAN ASSISTANT

## 2020-01-01 PROCEDURE — 70549 MR ANGIOGRAPH NECK W/O&W/DYE: CPT

## 2020-01-01 PROCEDURE — 86900 BLOOD TYPING SEROLOGIC ABO: CPT | Performed by: STUDENT IN AN ORGANIZED HEALTH CARE EDUCATION/TRAINING PROGRAM

## 2020-01-01 PROCEDURE — 99239 HOSP IP/OBS DSCHRG MGMT >30: CPT | Performed by: INTERNAL MEDICINE

## 2020-01-01 PROCEDURE — 999N000044 HC STATISTIC CVC DRESSING CHANGE

## 2020-01-01 PROCEDURE — 81001 URINALYSIS AUTO W/SCOPE: CPT | Performed by: INTERNAL MEDICINE

## 2020-01-01 PROCEDURE — 93010 ELECTROCARDIOGRAM REPORT: CPT | Mod: Z6 | Performed by: EMERGENCY MEDICINE

## 2020-01-01 PROCEDURE — 89050 BODY FLUID CELL COUNT: CPT | Performed by: STUDENT IN AN ORGANIZED HEALTH CARE EDUCATION/TRAINING PROGRAM

## 2020-01-01 PROCEDURE — 86850 RBC ANTIBODY SCREEN: CPT | Mod: TEL | Performed by: PHYSICIAN ASSISTANT

## 2020-01-01 PROCEDURE — 25000131 ZZH RX MED GY IP 250 OP 636 PS 637: Performed by: INTERNAL MEDICINE

## 2020-01-01 PROCEDURE — 80053 COMPREHEN METABOLIC PANEL: CPT | Performed by: EMERGENCY MEDICINE

## 2020-01-01 PROCEDURE — 99233 SBSQ HOSP IP/OBS HIGH 50: CPT | Performed by: INTERNAL MEDICINE

## 2020-01-01 PROCEDURE — 84550 ASSAY OF BLOOD/URIC ACID: CPT | Mod: GT | Performed by: PATHOLOGY

## 2020-01-01 PROCEDURE — 88184 FLOWCYTOMETRY/ TC 1 MARKER: CPT | Performed by: NURSE PRACTITIONER

## 2020-01-01 PROCEDURE — 99153 MOD SED SAME PHYS/QHP EA: CPT

## 2020-01-01 PROCEDURE — P9016 RBC LEUKOCYTES REDUCED: HCPCS | Performed by: PHYSICIAN ASSISTANT

## 2020-01-01 PROCEDURE — 40000141 ZZH STATISTIC PERIPHERAL IV START W/O US GUIDANCE

## 2020-01-01 PROCEDURE — 84550 ASSAY OF BLOOD/URIC ACID: CPT | Performed by: STUDENT IN AN ORGANIZED HEALTH CARE EDUCATION/TRAINING PROGRAM

## 2020-01-01 PROCEDURE — 86901 BLOOD TYPING SEROLOGIC RH(D): CPT | Performed by: PHYSICIAN ASSISTANT

## 2020-01-01 PROCEDURE — 82945 GLUCOSE OTHER FLUID: CPT | Performed by: PHYSICIAN ASSISTANT

## 2020-01-01 PROCEDURE — 96374 THER/PROPH/DIAG INJ IV PUSH: CPT | Performed by: EMERGENCY MEDICINE

## 2020-01-01 PROCEDURE — 96413 CHEMO IV INFUSION 1 HR: CPT

## 2020-01-01 PROCEDURE — 3E0R305 INTRODUCTION OF OTHER ANTINEOPLASTIC INTO SPINAL CANAL, PERCUTANEOUS APPROACH: ICD-10-PCS | Performed by: PHYSICIAN ASSISTANT

## 2020-01-01 PROCEDURE — P9059 PLASMA, FRZ BETWEEN 8-24HOUR: HCPCS | Performed by: PHYSICIAN ASSISTANT

## 2020-01-01 PROCEDURE — 40000399 ZZHCL STATISTIC FLOW >15 ABY PF 88189: Performed by: INTERNAL MEDICINE

## 2020-01-01 PROCEDURE — 85027 COMPLETE CBC AUTOMATED: CPT | Performed by: PHYSICIAN ASSISTANT

## 2020-01-01 PROCEDURE — 25800030 ZZH RX IP 258 OP 636: Performed by: NURSE PRACTITIONER

## 2020-01-01 PROCEDURE — 94660 CPAP INITIATION&MGMT: CPT

## 2020-01-01 PROCEDURE — 86706 HEP B SURFACE ANTIBODY: CPT | Performed by: INTERNAL MEDICINE

## 2020-01-01 PROCEDURE — 80053 COMPREHEN METABOLIC PANEL: CPT | Performed by: PATHOLOGY

## 2020-01-01 PROCEDURE — 83615 LACTATE (LD) (LDH) ENZYME: CPT | Performed by: HOSPITALIST

## 2020-01-01 PROCEDURE — 25800030 ZZH RX IP 258 OP 636: Performed by: HOSPITALIST

## 2020-01-01 PROCEDURE — 36592 COLLECT BLOOD FROM PICC: CPT | Performed by: PHYSICIAN ASSISTANT

## 2020-01-01 PROCEDURE — 999N001193 HC VIDEO/TELEPHONE VISIT; NO CHARGE

## 2020-01-01 PROCEDURE — 120N000002 HC R&B MED SURG/OB UMMC

## 2020-01-01 PROCEDURE — 96375 TX/PRO/DX INJ NEW DRUG ADDON: CPT

## 2020-01-01 PROCEDURE — 40001004 ZZHCL STATISTIC FLOW INT 9-15 ABY TC 88188: Performed by: PHYSICIAN ASSISTANT

## 2020-01-01 PROCEDURE — 81001 URINALYSIS AUTO W/SCOPE: CPT | Performed by: EMERGENCY MEDICINE

## 2020-01-01 PROCEDURE — P9059 PLASMA, FRZ BETWEEN 8-24HOUR: HCPCS | Performed by: STUDENT IN AN ORGANIZED HEALTH CARE EDUCATION/TRAINING PROGRAM

## 2020-01-01 PROCEDURE — 25000128 H RX IP 250 OP 636: Mod: ZF | Performed by: INTERNAL MEDICINE

## 2020-01-01 PROCEDURE — 71260 CT THORAX DX C+: CPT

## 2020-01-01 PROCEDURE — 96361 HYDRATE IV INFUSION ADD-ON: CPT | Performed by: EMERGENCY MEDICINE

## 2020-01-01 PROCEDURE — 88185 FLOWCYTOMETRY/TC ADD-ON: CPT | Performed by: INTERNAL MEDICINE

## 2020-01-01 PROCEDURE — 27211417 ZZ H KIT, VPS RHYTHM STYLET

## 2020-01-01 PROCEDURE — 77332 RADIATION TREATMENT AID(S): CPT | Performed by: RADIOLOGY

## 2020-01-01 PROCEDURE — 99214 OFFICE O/P EST MOD 30 MIN: CPT | Mod: ZP | Performed by: PHYSICIAN ASSISTANT

## 2020-01-01 PROCEDURE — 3E04305 INTRODUCTION OF OTHER ANTINEOPLASTIC INTO CENTRAL VEIN, PERCUTANEOUS APPROACH: ICD-10-PCS | Performed by: INTERNAL MEDICINE

## 2020-01-01 PROCEDURE — 25000132 ZZH RX MED GY IP 250 OP 250 PS 637: Mod: ZF | Performed by: INTERNAL MEDICINE

## 2020-01-01 PROCEDURE — 71260 CT THORAX DX C+: CPT | Mod: PS

## 2020-01-01 PROCEDURE — 36569 INSJ PICC 5 YR+ W/O IMAGING: CPT

## 2020-01-01 PROCEDURE — 96409 CHEMO IV PUSH SNGL DRUG: CPT

## 2020-01-01 PROCEDURE — 77290 THER RAD SIMULAJ FIELD CPLX: CPT | Performed by: RADIOLOGY

## 2020-01-01 PROCEDURE — 82945 GLUCOSE OTHER FLUID: CPT | Performed by: STUDENT IN AN ORGANIZED HEALTH CARE EDUCATION/TRAINING PROGRAM

## 2020-01-01 PROCEDURE — 85730 THROMBOPLASTIN TIME PARTIAL: CPT | Performed by: INTERNAL MEDICINE

## 2020-01-01 PROCEDURE — 86923 COMPATIBILITY TEST ELECTRIC: CPT | Performed by: STUDENT IN AN ORGANIZED HEALTH CARE EDUCATION/TRAINING PROGRAM

## 2020-01-01 PROCEDURE — 85025 COMPLETE CBC W/AUTO DIFF WBC: CPT | Performed by: EMERGENCY MEDICINE

## 2020-01-01 PROCEDURE — 96450 CHEMOTHERAPY INTO CNS: CPT | Mod: ZF | Performed by: PHYSICIAN ASSISTANT

## 2020-01-01 PROCEDURE — 85610 PROTHROMBIN TIME: CPT | Performed by: INTERNAL MEDICINE

## 2020-01-01 PROCEDURE — 85610 PROTHROMBIN TIME: CPT | Performed by: PHYSICIAN ASSISTANT

## 2020-01-01 PROCEDURE — 99223 1ST HOSP IP/OBS HIGH 75: CPT | Performed by: PEDIATRICS

## 2020-01-01 PROCEDURE — 87040 BLOOD CULTURE FOR BACTERIA: CPT | Performed by: HOSPITALIST

## 2020-01-01 PROCEDURE — 40001004 ZZHCL STATISTIC FLOW INT 9-15 ABY TC 88188: Performed by: NURSE PRACTITIONER

## 2020-01-01 PROCEDURE — 86900 BLOOD TYPING SEROLOGIC ABO: CPT | Performed by: PATHOLOGY

## 2020-01-01 PROCEDURE — 85610 PROTHROMBIN TIME: CPT | Performed by: EMERGENCY MEDICINE

## 2020-01-01 PROCEDURE — 40000344 ZZHCL STATISTIC THAWING COMPONENT: Performed by: STUDENT IN AN ORGANIZED HEALTH CARE EDUCATION/TRAINING PROGRAM

## 2020-01-01 PROCEDURE — 99233 SBSQ HOSP IP/OBS HIGH 50: CPT | Performed by: HOSPITALIST

## 2020-01-01 PROCEDURE — 88185 FLOWCYTOMETRY/TC ADD-ON: CPT | Performed by: NURSE PRACTITIONER

## 2020-01-01 PROCEDURE — 86923 COMPATIBILITY TEST ELECTRIC: CPT | Performed by: PHYSICIAN ASSISTANT

## 2020-01-01 PROCEDURE — 87040 BLOOD CULTURE FOR BACTERIA: CPT | Performed by: EMERGENCY MEDICINE

## 2020-01-01 PROCEDURE — 85610 PROTHROMBIN TIME: CPT | Performed by: HOSPITALIST

## 2020-01-01 PROCEDURE — 88305 TISSUE EXAM BY PATHOLOGIST: CPT | Performed by: INTERNAL MEDICINE

## 2020-01-01 PROCEDURE — 36415 COLL VENOUS BLD VENIPUNCTURE: CPT | Performed by: STUDENT IN AN ORGANIZED HEALTH CARE EDUCATION/TRAINING PROGRAM

## 2020-01-01 PROCEDURE — 88280 CHROMOSOME KARYOTYPE STUDY: CPT | Performed by: PHYSICIAN ASSISTANT

## 2020-01-01 PROCEDURE — 88342 IMHCHEM/IMCYTCHM 1ST ANTB: CPT | Performed by: INTERNAL MEDICINE

## 2020-01-01 PROCEDURE — 96360 HYDRATION IV INFUSION INIT: CPT | Mod: 59 | Performed by: EMERGENCY MEDICINE

## 2020-01-01 PROCEDURE — 86850 RBC ANTIBODY SCREEN: CPT | Performed by: PATHOLOGY

## 2020-01-01 PROCEDURE — 88264 CHROMOSOME ANALYSIS 20-25: CPT | Performed by: PHYSICIAN ASSISTANT

## 2020-01-01 PROCEDURE — 99213 OFFICE O/P EST LOW 20 MIN: CPT | Mod: 95 | Performed by: INTERNAL MEDICINE

## 2020-01-01 PROCEDURE — 99215 OFFICE O/P EST HI 40 MIN: CPT | Mod: 95 | Performed by: INTERNAL MEDICINE

## 2020-01-01 PROCEDURE — 86900 BLOOD TYPING SEROLOGIC ABO: CPT | Performed by: INTERNAL MEDICINE

## 2020-01-01 PROCEDURE — 25800025 ZZH RX 258: Performed by: INTERNAL MEDICINE

## 2020-01-01 PROCEDURE — 97535 SELF CARE MNGMENT TRAINING: CPT | Mod: GO

## 2020-01-01 PROCEDURE — G0463 HOSPITAL OUTPT CLINIC VISIT: HCPCS

## 2020-01-01 PROCEDURE — 87040 BLOOD CULTURE FOR BACTERIA: CPT | Performed by: STUDENT IN AN ORGANIZED HEALTH CARE EDUCATION/TRAINING PROGRAM

## 2020-01-01 PROCEDURE — 40000114 ZZH STATISTIC NO CHARGE CLINIC VISIT

## 2020-01-01 PROCEDURE — 40001005 ZZHCL STATISTIC FLOW >15 ABY TC 88189: Performed by: PHYSICIAN ASSISTANT

## 2020-01-01 PROCEDURE — 84100 ASSAY OF PHOSPHORUS: CPT | Performed by: NURSE PRACTITIONER

## 2020-01-01 PROCEDURE — 82728 ASSAY OF FERRITIN: CPT | Performed by: PHYSICIAN ASSISTANT

## 2020-01-01 PROCEDURE — 88271 CYTOGENETICS DNA PROBE: CPT | Performed by: PATHOLOGY

## 2020-01-01 PROCEDURE — 43242 EGD US FINE NEEDLE BX/ASPIR: CPT | Performed by: INTERNAL MEDICINE

## 2020-01-01 PROCEDURE — 70450 CT HEAD/BRAIN W/O DYE: CPT | Mod: 26 | Performed by: RADIOLOGY

## 2020-01-01 PROCEDURE — 72157 MRI CHEST SPINE W/O & W/DYE: CPT

## 2020-01-01 PROCEDURE — G0463 HOSPITAL OUTPT CLINIC VISIT: HCPCS | Mod: ZF

## 2020-01-01 PROCEDURE — 86901 BLOOD TYPING SEROLOGIC RH(D): CPT | Performed by: INTERNAL MEDICINE

## 2020-01-01 PROCEDURE — 96411 CHEMO IV PUSH ADDL DRUG: CPT

## 2020-01-01 PROCEDURE — 88184 FLOWCYTOMETRY/ TC 1 MARKER: CPT | Performed by: PHYSICIAN ASSISTANT

## 2020-01-01 PROCEDURE — 99207 ZZC APP CREDIT; MD BILLING SHARED VISIT: CPT | Performed by: PHYSICIAN ASSISTANT

## 2020-01-01 PROCEDURE — 86803 HEPATITIS C AB TEST: CPT | Performed by: NURSE PRACTITIONER

## 2020-01-01 PROCEDURE — 258N000003 HC RX IP 258 OP 636: Performed by: INTERNAL MEDICINE

## 2020-01-01 PROCEDURE — 99214 OFFICE O/P EST MOD 30 MIN: CPT | Mod: 95 | Performed by: PHYSICIAN ASSISTANT

## 2020-01-01 PROCEDURE — 82565 ASSAY OF CREATININE: CPT | Performed by: INTERNAL MEDICINE

## 2020-01-01 PROCEDURE — 25000128 H RX IP 250 OP 636: Performed by: HOSPITALIST

## 2020-01-01 PROCEDURE — P9059 PLASMA, FRZ BETWEEN 8-24HOUR: HCPCS | Performed by: HOSPITALIST

## 2020-01-01 PROCEDURE — 0WBH4ZX EXCISION OF RETROPERITONEUM, PERCUTANEOUS ENDOSCOPIC APPROACH, DIAGNOSTIC: ICD-10-PCS | Performed by: INTERNAL MEDICINE

## 2020-01-01 PROCEDURE — 83735 ASSAY OF MAGNESIUM: CPT | Performed by: PATHOLOGY

## 2020-01-01 PROCEDURE — 25000128 H RX IP 250 OP 636: Performed by: STUDENT IN AN ORGANIZED HEALTH CARE EDUCATION/TRAINING PROGRAM

## 2020-01-01 PROCEDURE — 97165 OT EVAL LOW COMPLEX 30 MIN: CPT | Mod: GO

## 2020-01-01 PROCEDURE — 99223 1ST HOSP IP/OBS HIGH 75: CPT | Mod: AI | Performed by: STUDENT IN AN ORGANIZED HEALTH CARE EDUCATION/TRAINING PROGRAM

## 2020-01-01 PROCEDURE — 86901 BLOOD TYPING SEROLOGIC RH(D): CPT | Performed by: STUDENT IN AN ORGANIZED HEALTH CARE EDUCATION/TRAINING PROGRAM

## 2020-01-01 PROCEDURE — 70450 CT HEAD/BRAIN W/O DYE: CPT

## 2020-01-01 PROCEDURE — 77300 RADIATION THERAPY DOSE PLAN: CPT | Performed by: RADIOLOGY

## 2020-01-01 PROCEDURE — G0378 HOSPITAL OBSERVATION PER HR: HCPCS

## 2020-01-01 PROCEDURE — 258N000003 HC RX IP 258 OP 636: Performed by: STUDENT IN AN ORGANIZED HEALTH CARE EDUCATION/TRAINING PROGRAM

## 2020-01-01 PROCEDURE — 87086 URINE CULTURE/COLONY COUNT: CPT | Performed by: INTERNAL MEDICINE

## 2020-01-01 PROCEDURE — 84132 ASSAY OF SERUM POTASSIUM: CPT | Performed by: PHYSICIAN ASSISTANT

## 2020-01-01 PROCEDURE — 27211414 ZZ H ADHESIVE SKIN CLOSURE, DERMABOND

## 2020-01-01 PROCEDURE — 07DR3ZX EXTRACTION OF ILIAC BONE MARROW, PERCUTANEOUS APPROACH, DIAGNOSTIC: ICD-10-PCS | Performed by: PHYSICIAN ASSISTANT

## 2020-01-01 PROCEDURE — 82330 ASSAY OF CALCIUM: CPT | Performed by: INTERNAL MEDICINE

## 2020-01-01 PROCEDURE — 82330 ASSAY OF CALCIUM: CPT | Performed by: PHYSICIAN ASSISTANT

## 2020-01-01 PROCEDURE — 77334 RADIATION TREATMENT AID(S): CPT | Mod: 26 | Performed by: RADIOLOGY

## 2020-01-01 PROCEDURE — 83630 LACTOFERRIN FECAL (QUAL): CPT | Performed by: PHYSICIAN ASSISTANT

## 2020-01-01 PROCEDURE — A9552 F18 FDG: HCPCS | Performed by: PHYSICIAN ASSISTANT

## 2020-01-01 PROCEDURE — 00000161 ZZHCL STATISTIC H-SPHEME PROCESS B/S: Performed by: PHYSICIAN ASSISTANT

## 2020-01-01 PROCEDURE — 85025 COMPLETE CBC W/AUTO DIFF WBC: CPT | Performed by: PATHOLOGY

## 2020-01-01 PROCEDURE — 88365 INSITU HYBRIDIZATION (FISH): CPT | Performed by: PHYSICIAN ASSISTANT

## 2020-01-01 PROCEDURE — 83550 IRON BINDING TEST: CPT | Performed by: PHYSICIAN ASSISTANT

## 2020-01-01 PROCEDURE — 82310 ASSAY OF CALCIUM: CPT | Performed by: PHYSICIAN ASSISTANT

## 2020-01-01 PROCEDURE — 34300033 ZZH RX 343: Performed by: PHYSICIAN ASSISTANT

## 2020-01-01 PROCEDURE — 83735 ASSAY OF MAGNESIUM: CPT | Mod: GT | Performed by: PATHOLOGY

## 2020-01-01 PROCEDURE — 85027 COMPLETE CBC AUTOMATED: CPT | Performed by: INTERNAL MEDICINE

## 2020-01-01 PROCEDURE — P9100 PATHOGEN TEST FOR PLATELETS: HCPCS | Performed by: PHYSICIAN ASSISTANT

## 2020-01-01 PROCEDURE — 40000795 ZZHCL STATISTIC DNA PROCESS AND HOLD: Performed by: PHYSICIAN ASSISTANT

## 2020-01-01 PROCEDURE — 98968 PH1 ASSMT&MGMT NQHP 21-30: CPT | Performed by: DIETITIAN, REGISTERED

## 2020-01-01 PROCEDURE — 77431 RADIATION THERAPY MANAGEMENT: CPT | Performed by: RADIOLOGY

## 2020-01-01 PROCEDURE — 96374 THER/PROPH/DIAG INJ IV PUSH: CPT

## 2020-01-01 PROCEDURE — 77336 RADIATION PHYSICS CONSULT: CPT | Performed by: RADIOLOGY

## 2020-01-01 PROCEDURE — 88364 INSITU HYBRIDIZATION (FISH): CPT | Performed by: PHYSICIAN ASSISTANT

## 2020-01-01 PROCEDURE — 93005 ELECTROCARDIOGRAM TRACING: CPT | Mod: 76 | Performed by: EMERGENCY MEDICINE

## 2020-01-01 PROCEDURE — 88342 IMHCHEM/IMCYTCHM 1ST ANTB: CPT | Performed by: PHYSICIAN ASSISTANT

## 2020-01-01 PROCEDURE — A9585 GADOBUTROL INJECTION: HCPCS | Performed by: EMERGENCY MEDICINE

## 2020-01-01 PROCEDURE — 88313 SPECIAL STAINS GROUP 2: CPT | Performed by: PHYSICIAN ASSISTANT

## 2020-01-01 PROCEDURE — 86900 BLOOD TYPING SEROLOGIC ABO: CPT | Mod: TEL | Performed by: PHYSICIAN ASSISTANT

## 2020-01-01 PROCEDURE — 25000128 H RX IP 250 OP 636: Performed by: PEDIATRICS

## 2020-01-01 PROCEDURE — 97161 PT EVAL LOW COMPLEX 20 MIN: CPT | Mod: GP

## 2020-01-01 PROCEDURE — 77300 RADIATION THERAPY DOSE PLAN: CPT | Mod: 26 | Performed by: RADIOLOGY

## 2020-01-01 PROCEDURE — 86850 RBC ANTIBODY SCREEN: CPT | Performed by: STUDENT IN AN ORGANIZED HEALTH CARE EDUCATION/TRAINING PROGRAM

## 2020-01-01 PROCEDURE — 88305 TISSUE EXAM BY PATHOLOGIST: CPT | Performed by: PHYSICIAN ASSISTANT

## 2020-01-01 PROCEDURE — 07DD3ZX EXTRACTION OF AORTIC LYMPHATIC, PERCUTANEOUS APPROACH, DIAGNOSTIC: ICD-10-PCS | Performed by: RADIOLOGY

## 2020-01-01 PROCEDURE — 84550 ASSAY OF BLOOD/URIC ACID: CPT | Performed by: HOSPITALIST

## 2020-01-01 PROCEDURE — 250N000012 HC RX MED GY IP 250 OP 636 PS 637: Performed by: PHYSICIAN ASSISTANT

## 2020-01-01 PROCEDURE — 93010 ELECTROCARDIOGRAM REPORT: CPT | Performed by: EMERGENCY MEDICINE

## 2020-01-01 PROCEDURE — 36415 COLL VENOUS BLD VENIPUNCTURE: CPT | Performed by: HOSPITALIST

## 2020-01-01 PROCEDURE — 70553 MRI BRAIN STEM W/O & W/DYE: CPT

## 2020-01-01 PROCEDURE — 84132 ASSAY OF SERUM POTASSIUM: CPT | Performed by: HOSPITALIST

## 2020-01-01 PROCEDURE — 86901 BLOOD TYPING SEROLOGIC RH(D): CPT | Mod: TEL | Performed by: PHYSICIAN ASSISTANT

## 2020-01-01 PROCEDURE — G0379 DIRECT REFER HOSPITAL OBSERV: HCPCS

## 2020-01-01 PROCEDURE — 80076 HEPATIC FUNCTION PANEL: CPT | Performed by: PHYSICIAN ASSISTANT

## 2020-01-01 PROCEDURE — 25000128 H RX IP 250 OP 636: Performed by: NURSE ANESTHETIST, CERTIFIED REGISTERED

## 2020-01-01 PROCEDURE — 96376 TX/PRO/DX INJ SAME DRUG ADON: CPT

## 2020-01-01 PROCEDURE — 99442 ZZC PHYSICIAN TELEPHONE EVALUATION 11-20 MIN: CPT | Mod: ZP | Performed by: PHYSICIAN ASSISTANT

## 2020-01-01 PROCEDURE — 74018 RADEX ABDOMEN 1 VIEW: CPT | Mod: 76

## 2020-01-01 PROCEDURE — 99442 ZZC PHYSICIAN TELEPHONE EVALUATION 11-20 MIN: CPT | Performed by: PHYSICIAN ASSISTANT

## 2020-01-01 PROCEDURE — A9552 F18 FDG: HCPCS | Performed by: INTERNAL MEDICINE

## 2020-01-01 PROCEDURE — 88172 CYTP DX EVAL FNA 1ST EA SITE: CPT | Performed by: INTERNAL MEDICINE

## 2020-01-01 PROCEDURE — 250N000011 HC RX IP 250 OP 636: Performed by: PHYSICIAN ASSISTANT

## 2020-01-01 PROCEDURE — 99222 1ST HOSP IP/OBS MODERATE 55: CPT | Mod: AI | Performed by: INTERNAL MEDICINE

## 2020-01-01 PROCEDURE — 40000611 ZZHCL STATISTIC MORPHOLOGY W/INTERP HEMEPATH TC 85060: Performed by: PHYSICIAN ASSISTANT

## 2020-01-01 PROCEDURE — 80076 HEPATIC FUNCTION PANEL: CPT | Performed by: INTERNAL MEDICINE

## 2020-01-01 PROCEDURE — 71046 X-RAY EXAM CHEST 2 VIEWS: CPT | Mod: 26

## 2020-01-01 PROCEDURE — 83735 ASSAY OF MAGNESIUM: CPT | Performed by: HOSPITALIST

## 2020-01-01 PROCEDURE — 88184 FLOWCYTOMETRY/ TC 1 MARKER: CPT | Performed by: INTERNAL MEDICINE

## 2020-01-01 PROCEDURE — 84100 ASSAY OF PHOSPHORUS: CPT | Performed by: HOSPITALIST

## 2020-01-01 PROCEDURE — 80048 BASIC METABOLIC PNL TOTAL CA: CPT | Performed by: INTERNAL MEDICINE

## 2020-01-01 PROCEDURE — 36415 COLL VENOUS BLD VENIPUNCTURE: CPT | Performed by: NURSE PRACTITIONER

## 2020-01-01 PROCEDURE — 96372 THER/PROPH/DIAG INJ SC/IM: CPT

## 2020-01-01 PROCEDURE — 85045 AUTOMATED RETICULOCYTE COUNT: CPT | Performed by: INTERNAL MEDICINE

## 2020-01-01 PROCEDURE — U0003 INFECTIOUS AGENT DETECTION BY NUCLEIC ACID (DNA OR RNA); SEVERE ACUTE RESPIRATORY SYNDROME CORONAVIRUS 2 (SARS-COV-2) (CORONAVIRUS DISEASE [COVID-19]), AMPLIFIED PROBE TECHNIQUE, MAKING USE OF HIGH THROUGHPUT TECHNOLOGIES AS DESCRIBED BY CMS-2020-01-R: HCPCS | Performed by: EMERGENCY MEDICINE

## 2020-01-01 PROCEDURE — 49180 BIOPSY ABDOMINAL MASS: CPT

## 2020-01-01 PROCEDURE — 40000257 ZZH STATISTIC CONSULT NO CHARGE VASC ACCESS: Mod: ZF

## 2020-01-01 PROCEDURE — 25000125 ZZHC RX 250: Performed by: NURSE ANESTHETIST, CERTIFIED REGISTERED

## 2020-01-01 PROCEDURE — 40001004 ZZHCL STATISTIC FLOW INT 9-15 ABY TC 88188: Performed by: STUDENT IN AN ORGANIZED HEALTH CARE EDUCATION/TRAINING PROGRAM

## 2020-01-01 PROCEDURE — 87070 CULTURE OTHR SPECIMN AEROBIC: CPT | Performed by: STUDENT IN AN ORGANIZED HEALTH CARE EDUCATION/TRAINING PROGRAM

## 2020-01-01 PROCEDURE — 88333 PATH CONSLTJ SURG CYTO XM 1: CPT | Performed by: PHYSICIAN ASSISTANT

## 2020-01-01 PROCEDURE — 99207 PR MOONLIGHTING INDICATOR: CPT | Performed by: INTERNAL MEDICINE

## 2020-01-01 PROCEDURE — 93010 ELECTROCARDIOGRAM REPORT: CPT | Mod: 76 | Performed by: EMERGENCY MEDICINE

## 2020-01-01 PROCEDURE — 25000125 ZZHC RX 250: Performed by: RADIOLOGY

## 2020-01-01 PROCEDURE — 74018 RADEX ABDOMEN 1 VIEW: CPT

## 2020-01-01 PROCEDURE — 83880 ASSAY OF NATRIURETIC PEPTIDE: CPT | Performed by: PHYSICIAN ASSISTANT

## 2020-01-01 PROCEDURE — 84550 ASSAY OF BLOOD/URIC ACID: CPT | Performed by: PATHOLOGY

## 2020-01-01 PROCEDURE — 88185 FLOWCYTOMETRY/TC ADD-ON: CPT | Performed by: STUDENT IN AN ORGANIZED HEALTH CARE EDUCATION/TRAINING PROGRAM

## 2020-01-01 PROCEDURE — 99207 ZZC APP CREDIT; MD BILLING SHARED VISIT: CPT | Performed by: HOSPITALIST

## 2020-01-01 PROCEDURE — 85045 AUTOMATED RETICULOCYTE COUNT: CPT | Performed by: PHYSICIAN ASSISTANT

## 2020-01-01 PROCEDURE — 83605 ASSAY OF LACTIC ACID: CPT | Performed by: NURSE PRACTITIONER

## 2020-01-01 PROCEDURE — 85045 AUTOMATED RETICULOCYTE COUNT: CPT | Mod: TEL | Performed by: PHYSICIAN ASSISTANT

## 2020-01-01 PROCEDURE — 74177 CT ABD & PELVIS W/CONTRAST: CPT

## 2020-01-01 PROCEDURE — 99223 1ST HOSP IP/OBS HIGH 75: CPT | Performed by: INTERNAL MEDICINE

## 2020-01-01 PROCEDURE — 88341 IMHCHEM/IMCYTCHM EA ADD ANTB: CPT | Performed by: INTERNAL MEDICINE

## 2020-01-01 PROCEDURE — 25800030 ZZH RX IP 258 OP 636: Performed by: PHYSICIAN ASSISTANT

## 2020-01-01 PROCEDURE — 83605 ASSAY OF LACTIC ACID: CPT | Performed by: PHYSICIAN ASSISTANT

## 2020-01-01 PROCEDURE — 25500064 ZZH RX 255 OP 636: Performed by: INTERNAL MEDICINE

## 2020-01-01 PROCEDURE — 25000125 ZZHC RX 250: Performed by: PHYSICIAN ASSISTANT

## 2020-01-01 PROCEDURE — 62270 DX LMBR SPI PNXR: CPT

## 2020-01-01 PROCEDURE — 99441 ZZC PHYSICIAN TELEPHONE EVALUATION 5-10 MIN GOVT: CPT | Performed by: PHYSICIAN ASSISTANT

## 2020-01-01 PROCEDURE — 83690 ASSAY OF LIPASE: CPT | Performed by: EMERGENCY MEDICINE

## 2020-01-01 PROCEDURE — C9803 HOPD COVID-19 SPEC COLLECT: HCPCS | Performed by: EMERGENCY MEDICINE

## 2020-01-01 PROCEDURE — 25800025 ZZH RX 258: Performed by: HOSPITALIST

## 2020-01-01 PROCEDURE — 40000611 ZZHCL STATISTIC MORPHOLOGY W/INTERP HEMEPATH TC 85060: Performed by: INTERNAL MEDICINE

## 2020-01-01 PROCEDURE — 88275 CYTOGENETICS 100-300: CPT | Performed by: PATHOLOGY

## 2020-01-01 PROCEDURE — 88275 CYTOGENETICS 100-300: CPT | Performed by: PHYSICIAN ASSISTANT

## 2020-01-01 PROCEDURE — 99207 PR NO BILLABLE SERVICE THIS VISIT: CPT | Performed by: RADIOLOGY

## 2020-01-01 PROCEDURE — 70553 MRI BRAIN STEM W/O & W/DYE: CPT | Mod: 26 | Performed by: RADIOLOGY

## 2020-01-01 PROCEDURE — 40000141 ZZH STATISTIC PERIPHERAL IV START W/O US GUIDANCE: Mod: ZF

## 2020-01-01 PROCEDURE — 88237 TISSUE CULTURE BONE MARROW: CPT | Performed by: PHYSICIAN ASSISTANT

## 2020-01-01 PROCEDURE — 86923 COMPATIBILITY TEST ELECTRIC: CPT | Performed by: INTERNAL MEDICINE

## 2020-01-01 PROCEDURE — 27210909 ZZH NEEDLE CR5

## 2020-01-01 PROCEDURE — P9016 RBC LEUKOCYTES REDUCED: HCPCS | Performed by: INTERNAL MEDICINE

## 2020-01-01 PROCEDURE — 85730 THROMBOPLASTIN TIME PARTIAL: CPT | Performed by: EMERGENCY MEDICINE

## 2020-01-01 PROCEDURE — 86704 HEP B CORE ANTIBODY TOTAL: CPT | Performed by: INTERNAL MEDICINE

## 2020-01-01 PROCEDURE — 96377 APPLICATON ON-BODY INJECTOR: CPT

## 2020-01-01 PROCEDURE — 250N000012 HC RX MED GY IP 250 OP 636 PS 637: Performed by: INTERNAL MEDICINE

## 2020-01-01 PROCEDURE — 96376 TX/PRO/DX INJ SAME DRUG ADON: CPT | Performed by: EMERGENCY MEDICINE

## 2020-01-01 PROCEDURE — 88184 FLOWCYTOMETRY/ TC 1 MARKER: CPT | Performed by: STUDENT IN AN ORGANIZED HEALTH CARE EDUCATION/TRAINING PROGRAM

## 2020-01-01 PROCEDURE — 88311 DECALCIFY TISSUE: CPT | Performed by: PHYSICIAN ASSISTANT

## 2020-01-01 PROCEDURE — 25800030 ZZH RX IP 258 OP 636: Mod: ZF | Performed by: INTERNAL MEDICINE

## 2020-01-01 PROCEDURE — 82607 VITAMIN B-12: CPT | Performed by: PHYSICIAN ASSISTANT

## 2020-01-01 PROCEDURE — 99207 PR NO CHARGE LOS: CPT

## 2020-01-01 PROCEDURE — 40001005 ZZHCL STATISTIC FLOW >15 ABY TC 88189: Performed by: INTERNAL MEDICINE

## 2020-01-01 PROCEDURE — 25000125 ZZHC RX 250: Performed by: INTERNAL MEDICINE

## 2020-01-01 PROCEDURE — 86901 BLOOD TYPING SEROLOGIC RH(D): CPT | Performed by: PATHOLOGY

## 2020-01-01 PROCEDURE — 87493 C DIFF AMPLIFIED PROBE: CPT | Performed by: INTERNAL MEDICINE

## 2020-01-01 PROCEDURE — 37000009 ZZH ANESTHESIA TECHNICAL FEE, EACH ADDTL 15 MIN: Performed by: INTERNAL MEDICINE

## 2020-01-01 PROCEDURE — 72125 CT NECK SPINE W/O DYE: CPT

## 2020-01-01 PROCEDURE — 87015 SPECIMEN INFECT AGNT CONCNTJ: CPT | Performed by: STUDENT IN AN ORGANIZED HEALTH CARE EDUCATION/TRAINING PROGRAM

## 2020-01-01 PROCEDURE — P9016 RBC LEUKOCYTES REDUCED: HCPCS | Mod: TEL | Performed by: PHYSICIAN ASSISTANT

## 2020-01-01 PROCEDURE — 99215 OFFICE O/P EST HI 40 MIN: CPT | Mod: GC | Performed by: INTERNAL MEDICINE

## 2020-01-01 PROCEDURE — 72158 MRI LUMBAR SPINE W/O & W/DYE: CPT

## 2020-01-01 PROCEDURE — 36416 COLLJ CAPILLARY BLOOD SPEC: CPT | Performed by: PHYSICIAN ASSISTANT

## 2020-01-01 PROCEDURE — 00000155 ZZHCL STATISTIC H-CELL BLOCK W/STAIN: Performed by: INTERNAL MEDICINE

## 2020-01-01 PROCEDURE — 97162 PT EVAL MOD COMPLEX 30 MIN: CPT | Mod: GP

## 2020-01-01 PROCEDURE — 99152 MOD SED SAME PHYS/QHP 5/>YRS: CPT

## 2020-01-01 PROCEDURE — 25800030 ZZH RX IP 258 OP 636: Performed by: STUDENT IN AN ORGANIZED HEALTH CARE EDUCATION/TRAINING PROGRAM

## 2020-01-01 PROCEDURE — 99214 OFFICE O/P EST MOD 30 MIN: CPT | Mod: 95 | Performed by: INTERNAL MEDICINE

## 2020-01-01 PROCEDURE — 93306 TTE W/DOPPLER COMPLETE: CPT | Mod: 26 | Performed by: STUDENT IN AN ORGANIZED HEALTH CARE EDUCATION/TRAINING PROGRAM

## 2020-01-01 PROCEDURE — 83615 LACTATE (LD) (LDH) ENZYME: CPT | Performed by: STUDENT IN AN ORGANIZED HEALTH CARE EDUCATION/TRAINING PROGRAM

## 2020-01-01 PROCEDURE — 77003 FLUOROGUIDE FOR SPINE INJECT: CPT

## 2020-01-01 PROCEDURE — 93005 ELECTROCARDIOGRAM TRACING: CPT

## 2020-01-01 PROCEDURE — 88161 CYTOPATH SMEAR OTHER SOURCE: CPT | Performed by: PHYSICIAN ASSISTANT

## 2020-01-01 PROCEDURE — 25800030 ZZH RX IP 258 OP 636: Performed by: NURSE ANESTHETIST, CERTIFIED REGISTERED

## 2020-01-01 PROCEDURE — 70549 MR ANGIOGRAPH NECK W/O&W/DYE: CPT | Mod: 26 | Performed by: RADIOLOGY

## 2020-01-01 PROCEDURE — U0003 INFECTIOUS AGENT DETECTION BY NUCLEIC ACID (DNA OR RNA); SEVERE ACUTE RESPIRATORY SYNDROME CORONAVIRUS 2 (SARS-COV-2) (CORONAVIRUS DISEASE [COVID-19]), AMPLIFIED PROBE TECHNIQUE, MAKING USE OF HIGH THROUGHPUT TECHNOLOGIES AS DESCRIBED BY CMS-2020-01-R: HCPCS | Performed by: INTERNAL MEDICINE

## 2020-01-01 PROCEDURE — 83540 ASSAY OF IRON: CPT | Performed by: PHYSICIAN ASSISTANT

## 2020-01-01 PROCEDURE — 97110 THERAPEUTIC EXERCISES: CPT | Mod: GP

## 2020-01-01 PROCEDURE — 84157 ASSAY OF PROTEIN OTHER: CPT | Performed by: STUDENT IN AN ORGANIZED HEALTH CARE EDUCATION/TRAINING PROGRAM

## 2020-01-01 PROCEDURE — 25000128 H RX IP 250 OP 636: Performed by: EMERGENCY MEDICINE

## 2020-01-01 PROCEDURE — 88173 CYTOPATH EVAL FNA REPORT: CPT | Performed by: INTERNAL MEDICINE

## 2020-01-01 PROCEDURE — 40000114 ZZH STATISTIC NO CHARGE CLINIC VISIT: Mod: ZF | Performed by: DIETITIAN, REGISTERED

## 2020-01-01 PROCEDURE — 72125 CT NECK SPINE W/O DYE: CPT | Mod: 26 | Performed by: RADIOLOGY

## 2020-01-01 PROCEDURE — 82140 ASSAY OF AMMONIA: CPT | Performed by: EMERGENCY MEDICINE

## 2020-01-01 PROCEDURE — 96450 CHEMOTHERAPY INTO CNS: CPT

## 2020-01-01 RX ORDER — AMOXICILLIN 250 MG
2 CAPSULE ORAL 2 TIMES DAILY PRN
Status: DISCONTINUED | OUTPATIENT
Start: 2020-01-01 | End: 2020-01-01

## 2020-01-01 RX ORDER — PROCHLORPERAZINE MALEATE 5 MG
10 TABLET ORAL EVERY 6 HOURS PRN
Status: CANCELLED
Start: 2020-01-01

## 2020-01-01 RX ORDER — NALOXONE HYDROCHLORIDE 0.4 MG/ML
.1-.4 INJECTION, SOLUTION INTRAMUSCULAR; INTRAVENOUS; SUBCUTANEOUS
Status: DISCONTINUED | OUTPATIENT
Start: 2020-01-01 | End: 2020-01-01 | Stop reason: HOSPADM

## 2020-01-01 RX ORDER — METHYLPREDNISOLONE SODIUM SUCCINATE 125 MG/2ML
125 INJECTION, POWDER, LYOPHILIZED, FOR SOLUTION INTRAMUSCULAR; INTRAVENOUS
Status: CANCELLED
Start: 2020-01-01

## 2020-01-01 RX ORDER — MORPHINE SULFATE 15 MG/1
15 TABLET, FILM COATED, EXTENDED RELEASE ORAL EVERY 12 HOURS
Status: DISCONTINUED | OUTPATIENT
Start: 2020-01-01 | End: 2020-01-01

## 2020-01-01 RX ORDER — LIDOCAINE HYDROCHLORIDE 10 MG/ML
5 INJECTION, SOLUTION EPIDURAL; INFILTRATION; INTRACAUDAL; PERINEURAL ONCE
Status: COMPLETED | OUTPATIENT
Start: 2020-01-01 | End: 2020-01-01

## 2020-01-01 RX ORDER — CALCIUM CARBONATE 500 MG/1
1 TABLET, CHEWABLE ORAL 3 TIMES DAILY PRN
Status: ON HOLD
Start: 2020-01-01 | End: 2020-01-01

## 2020-01-01 RX ORDER — ACETAMINOPHEN 325 MG/1
650 TABLET ORAL EVERY 4 HOURS PRN
Status: DISCONTINUED | OUTPATIENT
Start: 2020-01-01 | End: 2020-01-01 | Stop reason: HOSPADM

## 2020-01-01 RX ORDER — NALOXONE HYDROCHLORIDE 0.4 MG/ML
.1-.4 INJECTION, SOLUTION INTRAMUSCULAR; INTRAVENOUS; SUBCUTANEOUS
Status: DISCONTINUED | OUTPATIENT
Start: 2020-01-01 | End: 2020-01-01

## 2020-01-01 RX ORDER — ALBUTEROL SULFATE 0.83 MG/ML
2.5 SOLUTION RESPIRATORY (INHALATION)
Status: DISCONTINUED | OUTPATIENT
Start: 2020-01-01 | End: 2020-01-01 | Stop reason: HOSPADM

## 2020-01-01 RX ORDER — BACLOFEN 10 MG/1
10 TABLET ORAL 3 TIMES DAILY PRN
Status: DISCONTINUED | OUTPATIENT
Start: 2020-01-01 | End: 2020-01-01 | Stop reason: HOSPADM

## 2020-01-01 RX ORDER — ALBUTEROL SULFATE 0.83 MG/ML
2.5 SOLUTION RESPIRATORY (INHALATION)
Status: CANCELLED | OUTPATIENT
Start: 2020-01-01

## 2020-01-01 RX ORDER — ALBUTEROL SULFATE 90 UG/1
1-2 AEROSOL, METERED RESPIRATORY (INHALATION)
Status: CANCELLED
Start: 2020-01-01

## 2020-01-01 RX ORDER — ONDANSETRON 8 MG/1
8 TABLET, FILM COATED ORAL EVERY 8 HOURS PRN
Status: DISCONTINUED | OUTPATIENT
Start: 2020-01-01 | End: 2020-01-01 | Stop reason: HOSPADM

## 2020-01-01 RX ORDER — POLYETHYLENE GLYCOL 3350 17 G/17G
1 POWDER, FOR SOLUTION ORAL DAILY PRN
Status: ON HOLD | COMMUNITY
End: 2020-01-01

## 2020-01-01 RX ORDER — GABAPENTIN 300 MG/1
300 CAPSULE ORAL AT BEDTIME
Status: DISCONTINUED | OUTPATIENT
Start: 2020-01-01 | End: 2020-01-01 | Stop reason: HOSPADM

## 2020-01-01 RX ORDER — HYDROMORPHONE HYDROCHLORIDE 2 MG/1
2-4 TABLET ORAL
Qty: 20 TABLET | Refills: 0 | Status: ON HOLD | OUTPATIENT
Start: 2020-01-01 | End: 2020-01-01

## 2020-01-01 RX ORDER — SODIUM CHLORIDE 9 MG/ML
1000 INJECTION, SOLUTION INTRAVENOUS CONTINUOUS PRN
Status: CANCELLED
Start: 2020-01-01

## 2020-01-01 RX ORDER — ACETAMINOPHEN 325 MG/1
650 TABLET ORAL ONCE
Status: CANCELLED | OUTPATIENT
Start: 2020-01-01

## 2020-01-01 RX ORDER — MORPHINE SULFATE 15 MG/1
15 TABLET ORAL EVERY 4 HOURS PRN
Status: DISCONTINUED | OUTPATIENT
Start: 2020-01-01 | End: 2020-01-01 | Stop reason: HOSPADM

## 2020-01-01 RX ORDER — CARVEDILOL 6.25 MG/1
12.5 TABLET ORAL 2 TIMES DAILY WITH MEALS
Status: DISCONTINUED | OUTPATIENT
Start: 2020-01-01 | End: 2020-01-01 | Stop reason: HOSPADM

## 2020-01-01 RX ORDER — ALLOPURINOL 300 MG/1
300 TABLET ORAL DAILY
Status: CANCELLED
Start: 2020-01-01

## 2020-01-01 RX ORDER — MAGNESIUM SULFATE HEPTAHYDRATE 40 MG/ML
2 INJECTION, SOLUTION INTRAVENOUS DAILY PRN
Status: DISCONTINUED | OUTPATIENT
Start: 2020-01-01 | End: 2020-01-01 | Stop reason: HOSPADM

## 2020-01-01 RX ORDER — SODIUM CHLORIDE 9 MG/ML
INJECTION, SOLUTION INTRAVENOUS CONTINUOUS
Status: DISCONTINUED | OUTPATIENT
Start: 2020-01-01 | End: 2020-01-01

## 2020-01-01 RX ORDER — MEPERIDINE HYDROCHLORIDE 25 MG/ML
25 INJECTION INTRAMUSCULAR; INTRAVENOUS; SUBCUTANEOUS
Status: CANCELLED | OUTPATIENT
Start: 2020-11-16

## 2020-01-01 RX ORDER — EPINEPHRINE 1 MG/ML
0.3 INJECTION, SOLUTION, CONCENTRATE INTRAVENOUS EVERY 5 MIN PRN
Status: DISCONTINUED | OUTPATIENT
Start: 2020-01-01 | End: 2020-01-01

## 2020-01-01 RX ORDER — EPINEPHRINE 1 MG/ML
0.3 INJECTION, SOLUTION, CONCENTRATE INTRAVENOUS EVERY 5 MIN PRN
Status: CANCELLED | OUTPATIENT
Start: 2020-11-23

## 2020-01-01 RX ORDER — PREDNISONE 50 MG/1
100 TABLET ORAL DAILY
Status: DISCONTINUED | OUTPATIENT
Start: 2020-01-01 | End: 2020-01-01

## 2020-01-01 RX ORDER — MAGNESIUM SULFATE HEPTAHYDRATE 40 MG/ML
4 INJECTION, SOLUTION INTRAVENOUS EVERY 4 HOURS PRN
Status: DISCONTINUED | OUTPATIENT
Start: 2020-01-01 | End: 2020-01-01 | Stop reason: HOSPADM

## 2020-01-01 RX ORDER — HEPARIN SODIUM,PORCINE 10 UNIT/ML
5 VIAL (ML) INTRAVENOUS
Status: CANCELLED | OUTPATIENT
Start: 2020-01-01

## 2020-01-01 RX ORDER — NALOXONE HYDROCHLORIDE 0.4 MG/ML
.1-.4 INJECTION, SOLUTION INTRAMUSCULAR; INTRAVENOUS; SUBCUTANEOUS
Status: CANCELLED | OUTPATIENT
Start: 2020-01-01

## 2020-01-01 RX ORDER — LORAZEPAM 2 MG/ML
0.5 INJECTION INTRAMUSCULAR EVERY 4 HOURS PRN
Status: CANCELLED
Start: 2020-01-01

## 2020-01-01 RX ORDER — EPINEPHRINE 0.3 MG/.3ML
0.3 INJECTION SUBCUTANEOUS EVERY 5 MIN PRN
Status: CANCELLED | OUTPATIENT
Start: 2020-01-01

## 2020-01-01 RX ORDER — HEPARIN SODIUM (PORCINE) LOCK FLUSH IV SOLN 100 UNIT/ML 100 UNIT/ML
5 SOLUTION INTRAVENOUS
Status: CANCELLED | OUTPATIENT
Start: 2020-01-01

## 2020-01-01 RX ORDER — ATORVASTATIN CALCIUM 40 MG/1
40 TABLET, FILM COATED ORAL DAILY
COMMUNITY
End: 2020-01-01

## 2020-01-01 RX ORDER — MORPHINE SULFATE 15 MG/1
15 TABLET, FILM COATED, EXTENDED RELEASE ORAL EVERY 12 HOURS SCHEDULED
Status: DISCONTINUED | OUTPATIENT
Start: 2020-01-01 | End: 2020-01-01 | Stop reason: HOSPADM

## 2020-01-01 RX ORDER — ALBUTEROL SULFATE 90 UG/1
1-2 AEROSOL, METERED RESPIRATORY (INHALATION)
Status: CANCELLED
Start: 2020-11-16

## 2020-01-01 RX ORDER — AMOXICILLIN 250 MG
2 CAPSULE ORAL AT BEDTIME
Status: DISCONTINUED | OUTPATIENT
Start: 2020-01-01 | End: 2020-01-01 | Stop reason: HOSPADM

## 2020-01-01 RX ORDER — POLYETHYLENE GLYCOL 3350 17 G/17G
1 POWDER, FOR SOLUTION ORAL DAILY PRN
Qty: 30 PACKET | Refills: 3 | Status: SHIPPED | OUTPATIENT
Start: 2020-01-01

## 2020-01-01 RX ORDER — HYDROCODONE BITARTRATE AND ACETAMINOPHEN 10; 325 MG/1; MG/1
TABLET ORAL
Status: ON HOLD | COMMUNITY
Start: 2020-01-01 | End: 2020-01-01

## 2020-01-01 RX ORDER — ONDANSETRON 4 MG/1
4 TABLET, FILM COATED ORAL EVERY 8 HOURS PRN
Qty: 30 TABLET | Refills: 0 | Status: ON HOLD | OUTPATIENT
Start: 2020-01-01 | End: 2020-01-01

## 2020-01-01 RX ORDER — OXYCODONE HYDROCHLORIDE 5 MG/1
5 TABLET ORAL EVERY 4 HOURS PRN
Status: DISCONTINUED | OUTPATIENT
Start: 2020-01-01 | End: 2020-01-01 | Stop reason: HOSPADM

## 2020-01-01 RX ORDER — POTASSIUM CHLORIDE 1.5 G/1.58G
20-40 POWDER, FOR SOLUTION ORAL
Status: DISCONTINUED | OUTPATIENT
Start: 2020-01-01 | End: 2020-01-01 | Stop reason: HOSPADM

## 2020-01-01 RX ORDER — MORPHINE SULFATE 30 MG/1
30 TABLET, FILM COATED, EXTENDED RELEASE ORAL EVERY 12 HOURS
Qty: 60 TABLET | Refills: 0 | Status: SHIPPED | OUTPATIENT
Start: 2020-01-01 | End: 2020-01-01

## 2020-01-01 RX ORDER — DIPHENHYDRAMINE HYDROCHLORIDE 50 MG/ML
50 INJECTION INTRAMUSCULAR; INTRAVENOUS
Status: CANCELLED
Start: 2020-01-01

## 2020-01-01 RX ORDER — MEPERIDINE HYDROCHLORIDE 25 MG/ML
25 INJECTION INTRAMUSCULAR; INTRAVENOUS; SUBCUTANEOUS EVERY 30 MIN PRN
Status: CANCELLED | OUTPATIENT
Start: 2020-01-01

## 2020-01-01 RX ORDER — ACYCLOVIR 400 MG/1
400 TABLET ORAL 2 TIMES DAILY
Qty: 60 TABLET | Refills: 1 | Status: ON HOLD | OUTPATIENT
Start: 2020-01-01 | End: 2020-01-01

## 2020-01-01 RX ORDER — LIDOCAINE 40 MG/G
CREAM TOPICAL
Status: CANCELLED | OUTPATIENT
Start: 2020-01-01

## 2020-01-01 RX ORDER — ONDANSETRON 2 MG/ML
INJECTION INTRAMUSCULAR; INTRAVENOUS PRN
Status: DISCONTINUED | OUTPATIENT
Start: 2020-01-01 | End: 2020-01-01

## 2020-01-01 RX ORDER — NICOTINE POLACRILEX 4 MG
15-30 LOZENGE BUCCAL
Status: ACTIVE | OUTPATIENT
Start: 2020-01-01 | End: 2020-01-01

## 2020-01-01 RX ORDER — POTASSIUM CHLORIDE 7.45 MG/ML
10 INJECTION INTRAVENOUS
Status: DISCONTINUED | OUTPATIENT
Start: 2020-01-01 | End: 2020-01-01 | Stop reason: HOSPADM

## 2020-01-01 RX ORDER — MORPHINE SULFATE 15 MG/1
15 TABLET, FILM COATED, EXTENDED RELEASE ORAL EVERY 12 HOURS SCHEDULED
Status: DISCONTINUED | OUTPATIENT
Start: 2020-01-01 | End: 2020-01-01

## 2020-01-01 RX ORDER — METOCLOPRAMIDE 10 MG/1
10 TABLET ORAL EVERY 6 HOURS PRN
Status: DISCONTINUED | OUTPATIENT
Start: 2020-01-01 | End: 2020-01-01 | Stop reason: HOSPADM

## 2020-01-01 RX ORDER — MAGNESIUM OXIDE 400 MG/1
400 TABLET ORAL 4 TIMES DAILY
Status: DISCONTINUED | OUTPATIENT
Start: 2020-01-01 | End: 2020-01-01 | Stop reason: HOSPADM

## 2020-01-01 RX ORDER — ACETAMINOPHEN 325 MG/1
650 TABLET ORAL ONCE
Status: CANCELLED
Start: 2020-11-23

## 2020-01-01 RX ORDER — AMLODIPINE BESYLATE 5 MG/1
5 TABLET ORAL DAILY
Status: ON HOLD | COMMUNITY
End: 2020-01-01

## 2020-01-01 RX ORDER — DOXORUBICIN HYDROCHLORIDE 2 MG/ML
50 INJECTION, SOLUTION INTRAVENOUS ONCE
Status: CANCELLED | OUTPATIENT
Start: 2020-01-01

## 2020-01-01 RX ORDER — METHYLPREDNISOLONE SODIUM SUCCINATE 40 MG/ML
80 INJECTION, POWDER, LYOPHILIZED, FOR SOLUTION INTRAMUSCULAR; INTRAVENOUS ONCE
Status: CANCELLED
Start: 2020-01-01 | End: 2020-01-01

## 2020-01-01 RX ORDER — POTASSIUM CHLORIDE 750 MG/1
40 TABLET, EXTENDED RELEASE ORAL 2 TIMES DAILY
Status: DISCONTINUED | OUTPATIENT
Start: 2020-01-01 | End: 2020-01-01

## 2020-01-01 RX ORDER — POLYETHYLENE GLYCOL 3350 17 G/17G
17 POWDER, FOR SOLUTION ORAL DAILY
Status: DISCONTINUED | OUTPATIENT
Start: 2020-01-01 | End: 2020-01-01

## 2020-01-01 RX ORDER — FENTANYL CITRATE 50 UG/ML
INJECTION, SOLUTION INTRAMUSCULAR; INTRAVENOUS PRN
Status: DISCONTINUED | OUTPATIENT
Start: 2020-01-01 | End: 2020-01-01

## 2020-01-01 RX ORDER — AMOXICILLIN 250 MG
2 CAPSULE ORAL 2 TIMES DAILY
Status: DISCONTINUED | OUTPATIENT
Start: 2020-01-01 | End: 2020-01-01

## 2020-01-01 RX ORDER — LISINOPRIL 20 MG/1
20 TABLET ORAL DAILY
Status: DISCONTINUED | OUTPATIENT
Start: 2020-01-01 | End: 2020-01-01 | Stop reason: HOSPADM

## 2020-01-01 RX ORDER — CIPROFLOXACIN 500 MG/1
500 TABLET, FILM COATED ORAL EVERY 12 HOURS SCHEDULED
Status: DISCONTINUED | OUTPATIENT
Start: 2020-01-01 | End: 2020-01-01

## 2020-01-01 RX ORDER — PROCHLORPERAZINE MALEATE 5 MG
5 TABLET ORAL EVERY 6 HOURS PRN
Status: DISCONTINUED | OUTPATIENT
Start: 2020-01-01 | End: 2020-01-01 | Stop reason: HOSPADM

## 2020-01-01 RX ORDER — PROCHLORPERAZINE MALEATE 10 MG
10 TABLET ORAL EVERY 6 HOURS PRN
Qty: 60 TABLET | Refills: 5 | Status: ON HOLD | OUTPATIENT
Start: 2020-01-01 | End: 2020-01-01

## 2020-01-01 RX ORDER — LORAZEPAM 2 MG/ML
.5-1 CONCENTRATE ORAL EVERY 4 HOURS PRN
Qty: 30 ML | Refills: 0 | Status: SHIPPED | OUTPATIENT
Start: 2020-01-01

## 2020-01-01 RX ORDER — FLUMAZENIL 0.1 MG/ML
0.2 INJECTION, SOLUTION INTRAVENOUS
Status: DISCONTINUED | OUTPATIENT
Start: 2020-01-01 | End: 2020-01-01 | Stop reason: HOSPADM

## 2020-01-01 RX ORDER — MEPERIDINE HYDROCHLORIDE 25 MG/ML
25 INJECTION INTRAMUSCULAR; INTRAVENOUS; SUBCUTANEOUS
Status: CANCELLED
Start: 2020-01-01

## 2020-01-01 RX ORDER — EPINEPHRINE 1 MG/ML
0.3 INJECTION, SOLUTION, CONCENTRATE INTRAVENOUS EVERY 5 MIN PRN
Status: DISCONTINUED | OUTPATIENT
Start: 2020-01-01 | End: 2020-01-01 | Stop reason: HOSPADM

## 2020-01-01 RX ORDER — AMOXICILLIN 250 MG
1-2 CAPSULE ORAL 2 TIMES DAILY PRN
Qty: 10 TABLET | Refills: 0 | Status: SHIPPED | OUTPATIENT
Start: 2020-01-01 | End: 2020-01-01

## 2020-01-01 RX ORDER — MIRTAZAPINE 15 MG/1
15 TABLET, FILM COATED ORAL AT BEDTIME
Status: ON HOLD | COMMUNITY
End: 2020-01-01

## 2020-01-01 RX ORDER — ACYCLOVIR 400 MG/1
400 TABLET ORAL 2 TIMES DAILY
Status: DISCONTINUED | OUTPATIENT
Start: 2020-01-01 | End: 2020-01-01 | Stop reason: HOSPADM

## 2020-01-01 RX ORDER — CALCIUM CARBONATE 500 MG/1
1 TABLET, CHEWABLE ORAL 3 TIMES DAILY
Qty: 15 TABLET | Refills: 0 | Status: ON HOLD | OUTPATIENT
Start: 2020-01-01 | End: 2020-01-01

## 2020-01-01 RX ORDER — ALLOPURINOL 300 MG/1
300 TABLET ORAL DAILY
Qty: 14 TABLET | Refills: 2 | Status: SHIPPED | OUTPATIENT
Start: 2020-01-01 | End: 2020-01-01

## 2020-01-01 RX ORDER — MORPHINE SULFATE 10 MG/5ML
15-30 SOLUTION ORAL
Qty: 300 ML | Refills: 0 | Status: SHIPPED | OUTPATIENT
Start: 2020-01-01

## 2020-01-01 RX ORDER — EPINEPHRINE 1 MG/ML
0.3 INJECTION, SOLUTION INTRAMUSCULAR; SUBCUTANEOUS EVERY 5 MIN PRN
Status: CANCELLED | OUTPATIENT
Start: 2020-01-01

## 2020-01-01 RX ORDER — CALCIUM CARBONATE 500 MG/1
500 TABLET, CHEWABLE ORAL DAILY PRN
Status: DISCONTINUED | OUTPATIENT
Start: 2020-01-01 | End: 2020-01-01 | Stop reason: HOSPADM

## 2020-01-01 RX ORDER — POTASSIUM CHLORIDE 1.5 G/1.58G
40 POWDER, FOR SOLUTION ORAL 2 TIMES DAILY
Qty: 18 PACKET | Refills: 0 | Status: SHIPPED | OUTPATIENT
Start: 2020-01-01 | End: 2020-01-01

## 2020-01-01 RX ORDER — ACETAMINOPHEN 325 MG/1
650 TABLET ORAL ONCE
Status: COMPLETED | OUTPATIENT
Start: 2020-01-01 | End: 2020-01-01

## 2020-01-01 RX ORDER — HYDRALAZINE HYDROCHLORIDE 20 MG/ML
10 INJECTION INTRAMUSCULAR; INTRAVENOUS EVERY 6 HOURS PRN
Status: DISCONTINUED | OUTPATIENT
Start: 2020-01-01 | End: 2020-01-01 | Stop reason: HOSPADM

## 2020-01-01 RX ORDER — MORPHINE SULFATE 4 MG/ML
4 INJECTION, SOLUTION INTRAMUSCULAR; INTRAVENOUS ONCE
Status: COMPLETED | OUTPATIENT
Start: 2020-01-01 | End: 2020-01-01

## 2020-01-01 RX ORDER — ONDANSETRON 8 MG/1
8 TABLET, FILM COATED ORAL EVERY 12 HOURS
Status: COMPLETED | OUTPATIENT
Start: 2020-01-01 | End: 2020-01-01

## 2020-01-01 RX ORDER — PREDNISOLONE ACETATE 10 MG/ML
2 SUSPENSION/ DROPS OPHTHALMIC 4 TIMES DAILY
Status: CANCELLED
Start: 2020-01-01

## 2020-01-01 RX ORDER — MORPHINE SULFATE 15 MG/1
15 TABLET, FILM COATED, EXTENDED RELEASE ORAL EVERY 12 HOURS
Qty: 180 TABLET | Refills: 0 | Status: ON HOLD | OUTPATIENT
Start: 2020-01-01 | End: 2020-01-01

## 2020-01-01 RX ORDER — PROCHLORPERAZINE MALEATE 10 MG
10 TABLET ORAL EVERY 6 HOURS PRN
Status: DISCONTINUED | OUTPATIENT
Start: 2020-01-01 | End: 2020-01-01 | Stop reason: HOSPADM

## 2020-01-01 RX ORDER — DEXAMETHASONE 4 MG/1
8 TABLET ORAL ONCE
Status: COMPLETED | OUTPATIENT
Start: 2020-01-01 | End: 2020-01-01

## 2020-01-01 RX ORDER — HYDROMORPHONE HYDROCHLORIDE 1 MG/ML
0.5 INJECTION, SOLUTION INTRAMUSCULAR; INTRAVENOUS; SUBCUTANEOUS ONCE
Status: COMPLETED | OUTPATIENT
Start: 2020-01-01 | End: 2020-01-01

## 2020-01-01 RX ORDER — ACYCLOVIR 400 MG/1
400 TABLET ORAL 2 TIMES DAILY
Qty: 60 TABLET | Refills: 3 | Status: ON HOLD | OUTPATIENT
Start: 2020-01-01 | End: 2020-01-01

## 2020-01-01 RX ORDER — PREDNISONE 50 MG/1
100 TABLET ORAL DAILY
Status: DISCONTINUED | OUTPATIENT
Start: 2020-01-01 | End: 2020-01-01 | Stop reason: HOSPADM

## 2020-01-01 RX ORDER — PREDNISONE 50 MG/1
100 TABLET ORAL DAILY
Qty: 10 TABLET | Refills: 0 | Status: SHIPPED | OUTPATIENT
Start: 2020-01-01 | End: 2020-01-01

## 2020-01-01 RX ORDER — ALLOPURINOL 300 MG/1
300 TABLET ORAL DAILY
Status: DISCONTINUED | OUTPATIENT
Start: 2020-01-01 | End: 2020-01-01 | Stop reason: HOSPADM

## 2020-01-01 RX ORDER — CARVEDILOL 12.5 MG/1
12.5 TABLET ORAL 2 TIMES DAILY WITH MEALS
Status: ON HOLD | COMMUNITY
End: 2020-01-01

## 2020-01-01 RX ORDER — PROCHLORPERAZINE MALEATE 10 MG
10 TABLET ORAL EVERY 6 HOURS PRN
Qty: 30 TABLET | Refills: 7 | Status: SHIPPED | OUTPATIENT
Start: 2020-01-01 | End: 2020-01-01

## 2020-01-01 RX ORDER — LORAZEPAM 2 MG/ML
0.5 INJECTION INTRAMUSCULAR EVERY 4 HOURS PRN
Status: CANCELLED | OUTPATIENT
Start: 2020-01-01

## 2020-01-01 RX ORDER — PREDNISONE 50 MG/1
100 TABLET ORAL DAILY
Qty: 6 TABLET | Refills: 0 | Status: SHIPPED | OUTPATIENT
Start: 2020-01-01 | End: 2020-01-01

## 2020-01-01 RX ORDER — METHYLPREDNISOLONE SODIUM SUCCINATE 125 MG/2ML
125 INJECTION, POWDER, LYOPHILIZED, FOR SOLUTION INTRAMUSCULAR; INTRAVENOUS
Status: DISCONTINUED | OUTPATIENT
Start: 2020-01-01 | End: 2020-01-01 | Stop reason: HOSPADM

## 2020-01-01 RX ORDER — PALONOSETRON 0.05 MG/ML
0.25 INJECTION, SOLUTION INTRAVENOUS ONCE
Status: CANCELLED | OUTPATIENT
Start: 2020-01-01

## 2020-01-01 RX ORDER — HYDROCHLOROTHIAZIDE 25 MG/1
25 TABLET ORAL DAILY
Status: ON HOLD | COMMUNITY
End: 2020-01-01

## 2020-01-01 RX ORDER — MAGNESIUM OXIDE 400 MG/1
400 TABLET ORAL 2 TIMES DAILY
Status: DISCONTINUED | OUTPATIENT
Start: 2020-01-01 | End: 2020-01-01

## 2020-01-01 RX ORDER — METHADONE HYDROCHLORIDE 5 MG/1
5 TABLET ORAL EVERY 12 HOURS
Status: DISCONTINUED | OUTPATIENT
Start: 2020-01-01 | End: 2020-01-01

## 2020-01-01 RX ORDER — MEPERIDINE HYDROCHLORIDE 25 MG/ML
25 INJECTION INTRAMUSCULAR; INTRAVENOUS; SUBCUTANEOUS
Status: CANCELLED | OUTPATIENT
Start: 2020-01-01

## 2020-01-01 RX ORDER — ALLOPURINOL 300 MG/1
300 TABLET ORAL DAILY
Status: DISCONTINUED | OUTPATIENT
Start: 2020-01-01 | End: 2020-01-01

## 2020-01-01 RX ORDER — ATORVASTATIN CALCIUM 40 MG/1
40 TABLET, FILM COATED ORAL DAILY
Qty: 30 TABLET | Refills: 0 | Status: SHIPPED | OUTPATIENT
Start: 2020-01-01 | End: 2020-01-01

## 2020-01-01 RX ORDER — MORPHINE SULFATE 15 MG/1
15 TABLET ORAL EVERY 4 HOURS PRN
Qty: 15 TABLET | Refills: 0 | Status: ON HOLD | OUTPATIENT
Start: 2020-01-01 | End: 2020-01-01

## 2020-01-01 RX ORDER — DIPHENHYDRAMINE HCL 50 MG
50 CAPSULE ORAL ONCE
Status: CANCELLED
Start: 2020-01-01

## 2020-01-01 RX ORDER — PALONOSETRON 0.05 MG/ML
0.25 INJECTION, SOLUTION INTRAVENOUS ONCE
Status: COMPLETED | OUTPATIENT
Start: 2020-01-01 | End: 2020-01-01

## 2020-01-01 RX ORDER — POTASSIUM CHLORIDE 750 MG/1
40 TABLET, EXTENDED RELEASE ORAL 2 TIMES DAILY
Status: DISCONTINUED | OUTPATIENT
Start: 2020-01-01 | End: 2020-01-01 | Stop reason: HOSPADM

## 2020-01-01 RX ORDER — SODIUM CHLORIDE 9 MG/ML
1000 INJECTION, SOLUTION INTRAVENOUS CONTINUOUS PRN
Status: DISCONTINUED | OUTPATIENT
Start: 2020-01-01 | End: 2020-01-01 | Stop reason: HOSPADM

## 2020-01-01 RX ORDER — METHYLPREDNISOLONE SODIUM SUCCINATE 40 MG/ML
80 INJECTION, POWDER, LYOPHILIZED, FOR SOLUTION INTRAMUSCULAR; INTRAVENOUS ONCE
Status: CANCELLED
Start: 2020-11-16 | End: 2020-11-16

## 2020-01-01 RX ORDER — SODIUM CHLORIDE, SODIUM LACTATE, POTASSIUM CHLORIDE, CALCIUM CHLORIDE 600; 310; 30; 20 MG/100ML; MG/100ML; MG/100ML; MG/100ML
INJECTION, SOLUTION INTRAVENOUS CONTINUOUS
Status: ACTIVE | OUTPATIENT
Start: 2020-01-01 | End: 2020-01-01

## 2020-01-01 RX ORDER — ALLOPURINOL 300 MG/1
300 TABLET ORAL DAILY
Qty: 30 TABLET | Refills: 1 | Status: SHIPPED | OUTPATIENT
Start: 2020-01-01 | End: 2020-01-01

## 2020-01-01 RX ORDER — AMOXICILLIN 250 MG
3 CAPSULE ORAL 2 TIMES DAILY
Status: DISCONTINUED | OUTPATIENT
Start: 2020-01-01 | End: 2020-01-01 | Stop reason: HOSPADM

## 2020-01-01 RX ORDER — MORPHINE SULFATE 4 MG/ML
4 INJECTION, SOLUTION INTRAMUSCULAR; INTRAVENOUS EVERY 4 HOURS PRN
Status: DISCONTINUED | OUTPATIENT
Start: 2020-01-01 | End: 2020-01-01 | Stop reason: HOSPADM

## 2020-01-01 RX ORDER — LORAZEPAM 2 MG/ML
.5-1 INJECTION INTRAMUSCULAR EVERY 6 HOURS PRN
Status: CANCELLED | OUTPATIENT
Start: 2020-01-01

## 2020-01-01 RX ORDER — PREDNISOLONE ACETATE 10 MG/ML
2 SUSPENSION/ DROPS OPHTHALMIC 4 TIMES DAILY
Qty: 1 BOTTLE | Refills: 0 | Status: ON HOLD
Start: 2020-01-01 | End: 2020-01-01

## 2020-01-01 RX ORDER — SULFAMETHOXAZOLE/TRIMETHOPRIM 800-160 MG
1 TABLET ORAL
Status: DISCONTINUED | OUTPATIENT
Start: 2020-01-01 | End: 2020-01-01

## 2020-01-01 RX ORDER — PALONOSETRON 0.05 MG/ML
0.25 INJECTION, SOLUTION INTRAVENOUS ONCE
Status: DISCONTINUED | OUTPATIENT
Start: 2020-01-01 | End: 2020-01-01

## 2020-01-01 RX ORDER — IOPAMIDOL 755 MG/ML
134 INJECTION, SOLUTION INTRAVASCULAR ONCE
Status: COMPLETED | OUTPATIENT
Start: 2020-01-01 | End: 2020-01-01

## 2020-01-01 RX ORDER — DIPHENHYDRAMINE HYDROCHLORIDE 50 MG/ML
50 INJECTION INTRAMUSCULAR; INTRAVENOUS
Status: DISCONTINUED | OUTPATIENT
Start: 2020-01-01 | End: 2020-01-01 | Stop reason: HOSPADM

## 2020-01-01 RX ORDER — MORPHINE SULFATE 10 MG/5ML
15-30 SOLUTION ORAL
Status: DISCONTINUED | OUTPATIENT
Start: 2020-01-01 | End: 2020-01-01 | Stop reason: HOSPADM

## 2020-01-01 RX ORDER — AMOXICILLIN 250 MG
1 CAPSULE ORAL 2 TIMES DAILY PRN
Qty: 30 TABLET | Refills: 5 | Status: ON HOLD | OUTPATIENT
Start: 2020-01-01 | End: 2020-01-01

## 2020-01-01 RX ORDER — OMEPRAZOLE 40 MG/1
40 CAPSULE, DELAYED RELEASE ORAL
Qty: 60 CAPSULE | Refills: 3 | Status: ON HOLD | OUTPATIENT
Start: 2020-01-01 | End: 2020-01-01

## 2020-01-01 RX ORDER — PREDNISONE 50 MG/1
100 TABLET ORAL DAILY
Qty: 10 TABLET | Refills: 0 | Status: ON HOLD | OUTPATIENT
Start: 2020-01-01 | End: 2020-01-01

## 2020-01-01 RX ORDER — IOPAMIDOL 755 MG/ML
60-135 INJECTION, SOLUTION INTRAVASCULAR ONCE
Status: COMPLETED | OUTPATIENT
Start: 2020-01-01 | End: 2020-01-01

## 2020-01-01 RX ORDER — MAGNESIUM CARB/ALUMINUM HYDROX 105-160MG
296 TABLET,CHEWABLE ORAL
Status: COMPLETED | OUTPATIENT
Start: 2020-01-01 | End: 2020-01-01

## 2020-01-01 RX ORDER — BACLOFEN 10 MG/1
5 TABLET ORAL 3 TIMES DAILY
Status: DISCONTINUED | OUTPATIENT
Start: 2020-01-01 | End: 2020-01-01 | Stop reason: HOSPADM

## 2020-01-01 RX ORDER — ALLOPURINOL 300 MG/1
300 TABLET ORAL DAILY
Qty: 30 TABLET | Refills: 3 | Status: ON HOLD | OUTPATIENT
Start: 2020-01-01 | End: 2020-01-01

## 2020-01-01 RX ORDER — FENTANYL CITRATE 50 UG/ML
25-50 INJECTION, SOLUTION INTRAMUSCULAR; INTRAVENOUS EVERY 5 MIN PRN
Status: DISCONTINUED | OUTPATIENT
Start: 2020-01-01 | End: 2020-01-01 | Stop reason: HOSPADM

## 2020-01-01 RX ORDER — POTASSIUM CL/LIDO/0.9 % NACL 10MEQ/0.1L
10 INTRAVENOUS SOLUTION, PIGGYBACK (ML) INTRAVENOUS
Status: DISCONTINUED | OUTPATIENT
Start: 2020-01-01 | End: 2020-01-01 | Stop reason: HOSPADM

## 2020-01-01 RX ORDER — LOPERAMIDE HCL 2 MG
2 CAPSULE ORAL 4 TIMES DAILY PRN
Status: DISCONTINUED | OUTPATIENT
Start: 2020-01-01 | End: 2020-01-01 | Stop reason: HOSPADM

## 2020-01-01 RX ORDER — LORAZEPAM 0.5 MG/1
.5-1 TABLET ORAL EVERY 6 HOURS PRN
Status: DISCONTINUED | OUTPATIENT
Start: 2020-01-01 | End: 2020-01-01 | Stop reason: HOSPADM

## 2020-01-01 RX ORDER — LORAZEPAM 0.5 MG/1
0.5 TABLET ORAL EVERY 4 HOURS PRN
Status: DISCONTINUED | OUTPATIENT
Start: 2020-01-01 | End: 2020-01-01 | Stop reason: HOSPADM

## 2020-01-01 RX ORDER — POLYETHYLENE GLYCOL 3350 17 G/17G
17 POWDER, FOR SOLUTION ORAL 2 TIMES DAILY
Status: DISCONTINUED | OUTPATIENT
Start: 2020-01-01 | End: 2020-01-01 | Stop reason: HOSPADM

## 2020-01-01 RX ORDER — GADOBUTROL 604.72 MG/ML
10 INJECTION INTRAVENOUS ONCE
Status: COMPLETED | OUTPATIENT
Start: 2020-01-01 | End: 2020-01-01

## 2020-01-01 RX ORDER — ONDANSETRON 2 MG/ML
4 INJECTION INTRAMUSCULAR; INTRAVENOUS EVERY 6 HOURS PRN
Status: DISCONTINUED | OUTPATIENT
Start: 2020-01-01 | End: 2020-01-01 | Stop reason: HOSPADM

## 2020-01-01 RX ORDER — DEXTROSE MONOHYDRATE, SODIUM CHLORIDE, AND POTASSIUM CHLORIDE 50; 1.49; 9 G/1000ML; G/1000ML; G/1000ML
INJECTION, SOLUTION INTRAVENOUS CONTINUOUS
Status: DISPENSED | OUTPATIENT
Start: 2020-01-01 | End: 2020-01-01

## 2020-01-01 RX ORDER — LIDOCAINE 40 MG/G
CREAM TOPICAL
Status: DISCONTINUED | OUTPATIENT
Start: 2020-01-01 | End: 2020-01-01 | Stop reason: HOSPADM

## 2020-01-01 RX ORDER — POTASSIUM CHLORIDE 1500 MG/1
40 TABLET, EXTENDED RELEASE ORAL 2 TIMES DAILY
Qty: 120 TABLET | Refills: 3 | Status: ON HOLD | OUTPATIENT
Start: 2020-01-01 | End: 2020-01-01

## 2020-01-01 RX ORDER — LORAZEPAM 0.5 MG/1
0.5 TABLET ORAL EVERY 4 HOURS PRN
Qty: 30 TABLET | Refills: 5 | Status: ON HOLD | OUTPATIENT
Start: 2020-01-01 | End: 2020-01-01

## 2020-01-01 RX ORDER — DIPHENHYDRAMINE HYDROCHLORIDE 50 MG/ML
50 INJECTION INTRAMUSCULAR; INTRAVENOUS
Status: CANCELLED
Start: 2020-11-16

## 2020-01-01 RX ORDER — SUCRALFATE ORAL 1 G/10ML
1 SUSPENSION ORAL 4 TIMES DAILY PRN
Qty: 420 ML | Refills: 0 | Status: SHIPPED | OUTPATIENT
Start: 2020-01-01

## 2020-01-01 RX ORDER — ACETAMINOPHEN 325 MG/1
650 TABLET ORAL ONCE
Status: CANCELLED
Start: 2020-11-16

## 2020-01-01 RX ORDER — ALBUTEROL SULFATE 90 UG/1
1-2 AEROSOL, METERED RESPIRATORY (INHALATION)
Status: CANCELLED
Start: 2020-11-23

## 2020-01-01 RX ORDER — LORAZEPAM 2 MG/ML
0.5 INJECTION INTRAMUSCULAR EVERY 4 HOURS PRN
Status: CANCELLED
Start: 2020-11-16

## 2020-01-01 RX ORDER — LORAZEPAM 0.5 MG/1
0.5 TABLET ORAL EVERY 4 HOURS PRN
Qty: 30 TABLET | Refills: 5 | Status: SHIPPED | OUTPATIENT
Start: 2020-01-01 | End: 2020-01-01

## 2020-01-01 RX ORDER — DEXAMETHASONE 4 MG/1
40 TABLET ORAL EVERY 24 HOURS
Status: DISCONTINUED | OUTPATIENT
Start: 2020-01-01 | End: 2020-01-01 | Stop reason: HOSPADM

## 2020-01-01 RX ORDER — ALBUTEROL SULFATE 90 UG/1
1-2 AEROSOL, METERED RESPIRATORY (INHALATION)
Status: DISCONTINUED | OUTPATIENT
Start: 2020-01-01 | End: 2020-01-01 | Stop reason: HOSPADM

## 2020-01-01 RX ORDER — LORAZEPAM 0.5 MG/1
.5-1 TABLET ORAL EVERY 6 HOURS PRN
Status: CANCELLED
Start: 2020-01-01

## 2020-01-01 RX ORDER — LORAZEPAM 2 MG/ML
.5-1 CONCENTRATE ORAL EVERY 4 HOURS PRN
Status: DISCONTINUED | OUTPATIENT
Start: 2020-01-01 | End: 2020-01-01 | Stop reason: HOSPADM

## 2020-01-01 RX ORDER — SODIUM CHLORIDE 9 MG/ML
1000 INJECTION, SOLUTION INTRAVENOUS CONTINUOUS PRN
Status: CANCELLED
Start: 2020-11-23

## 2020-01-01 RX ORDER — MEPERIDINE HYDROCHLORIDE 25 MG/ML
25 INJECTION INTRAMUSCULAR; INTRAVENOUS; SUBCUTANEOUS EVERY 30 MIN PRN
Status: DISCONTINUED | OUTPATIENT
Start: 2020-01-01 | End: 2020-01-01 | Stop reason: HOSPADM

## 2020-01-01 RX ORDER — HEPARIN SODIUM,PORCINE 10 UNIT/ML
5-10 VIAL (ML) INTRAVENOUS
Status: DISCONTINUED | OUTPATIENT
Start: 2020-01-01 | End: 2020-01-01 | Stop reason: HOSPADM

## 2020-01-01 RX ORDER — ONDANSETRON 8 MG/1
16 TABLET, FILM COATED ORAL ONCE
Status: DISCONTINUED | OUTPATIENT
Start: 2020-01-01 | End: 2020-01-01

## 2020-01-01 RX ORDER — DIPHENHYDRAMINE HYDROCHLORIDE 50 MG/ML
50 INJECTION INTRAMUSCULAR; INTRAVENOUS
Status: CANCELLED
Start: 2020-11-09

## 2020-01-01 RX ORDER — SODIUM CHLORIDE 9 MG/ML
1000 INJECTION, SOLUTION INTRAVENOUS CONTINUOUS
Status: CANCELLED
Start: 2020-01-01

## 2020-01-01 RX ORDER — METHYLPREDNISOLONE SODIUM SUCCINATE 125 MG/2ML
125 INJECTION, POWDER, LYOPHILIZED, FOR SOLUTION INTRAMUSCULAR; INTRAVENOUS
Status: CANCELLED
Start: 2020-11-23

## 2020-01-01 RX ORDER — MORPHINE SULFATE 30 MG/1
30 TABLET, FILM COATED, EXTENDED RELEASE ORAL EVERY 12 HOURS
Qty: 60 TABLET | Refills: 0 | Status: CANCELLED | OUTPATIENT
Start: 2020-01-01

## 2020-01-01 RX ORDER — MORPHINE SULFATE 30 MG/1
30 TABLET, FILM COATED, EXTENDED RELEASE ORAL EVERY 12 HOURS SCHEDULED
Status: COMPLETED | OUTPATIENT
Start: 2020-01-01 | End: 2020-01-01

## 2020-01-01 RX ORDER — ONDANSETRON 8 MG/1
16 TABLET, FILM COATED ORAL ONCE
Status: COMPLETED | OUTPATIENT
Start: 2020-01-01 | End: 2020-01-01

## 2020-01-01 RX ORDER — MEPERIDINE HYDROCHLORIDE 25 MG/ML
25 INJECTION INTRAMUSCULAR; INTRAVENOUS; SUBCUTANEOUS EVERY 30 MIN PRN
Status: CANCELLED | OUTPATIENT
Start: 2020-11-23

## 2020-01-01 RX ORDER — POLYETHYLENE GLYCOL 3350 17 G/17G
17 POWDER, FOR SOLUTION ORAL DAILY
Qty: 5 PACKET | Refills: 0 | Status: SHIPPED | OUTPATIENT
Start: 2020-01-01 | End: 2020-01-01

## 2020-01-01 RX ORDER — DIPHENHYDRAMINE HCL 25 MG
50 CAPSULE ORAL ONCE
Status: COMPLETED | OUTPATIENT
Start: 2020-01-01 | End: 2020-01-01

## 2020-01-01 RX ORDER — HEPARIN SODIUM,PORCINE 10 UNIT/ML
5-10 VIAL (ML) INTRAVENOUS EVERY 24 HOURS
Status: DISCONTINUED | OUTPATIENT
Start: 2020-01-01 | End: 2020-01-01 | Stop reason: HOSPADM

## 2020-01-01 RX ORDER — DEXTROSE MONOHYDRATE 50 MG/ML
10-20 INJECTION, SOLUTION INTRAVENOUS
Status: CANCELLED | OUTPATIENT
Start: 2020-01-01

## 2020-01-01 RX ORDER — OMEPRAZOLE 40 MG/1
40 CAPSULE, DELAYED RELEASE ORAL DAILY
Status: ON HOLD | COMMUNITY
Start: 2020-01-01 | End: 2020-01-01

## 2020-01-01 RX ORDER — POLYETHYLENE GLYCOL 3350 17 G/17G
17 POWDER, FOR SOLUTION ORAL DAILY PRN
Status: DISCONTINUED | OUTPATIENT
Start: 2020-01-01 | End: 2020-01-01 | Stop reason: HOSPADM

## 2020-01-01 RX ORDER — MORPHINE SULFATE 30 MG/1
30 TABLET, FILM COATED, EXTENDED RELEASE ORAL
Status: DISCONTINUED | OUTPATIENT
Start: 2020-01-01 | End: 2020-01-01

## 2020-01-01 RX ORDER — FLUCONAZOLE 200 MG/1
200 TABLET ORAL DAILY
Qty: 21 TABLET | Refills: 0 | Status: ON HOLD | OUTPATIENT
Start: 2020-01-01 | End: 2020-01-01

## 2020-01-01 RX ORDER — AMOXICILLIN 250 MG
1 CAPSULE ORAL 2 TIMES DAILY PRN
Qty: 60 TABLET | Refills: 3 | Status: ON HOLD | OUTPATIENT
Start: 2020-01-01 | End: 2020-01-01

## 2020-01-01 RX ORDER — GABAPENTIN 300 MG/1
600 CAPSULE ORAL DAILY
Status: DISCONTINUED | OUTPATIENT
Start: 2020-01-01 | End: 2020-01-01 | Stop reason: HOSPADM

## 2020-01-01 RX ORDER — POTASSIUM CHLORIDE 3 G/15ML
20 SOLUTION ORAL 2 TIMES DAILY
Qty: 473 ML | Refills: 0 | Status: ON HOLD | OUTPATIENT
Start: 2020-01-01 | End: 2020-01-01

## 2020-01-01 RX ORDER — EPINEPHRINE 1 MG/ML
0.3 INJECTION, SOLUTION, CONCENTRATE INTRAVENOUS EVERY 5 MIN PRN
Status: CANCELLED | OUTPATIENT
Start: 2020-11-09

## 2020-01-01 RX ORDER — LORAZEPAM 2 MG/ML
0.5 INJECTION INTRAMUSCULAR EVERY 4 HOURS PRN
Status: CANCELLED
Start: 2020-11-23

## 2020-01-01 RX ORDER — POTASSIUM CHLORIDE 29.8 MG/ML
20 INJECTION INTRAVENOUS
Status: DISCONTINUED | OUTPATIENT
Start: 2020-01-01 | End: 2020-01-01 | Stop reason: HOSPADM

## 2020-01-01 RX ORDER — EPINEPHRINE 1 MG/ML
0.3 INJECTION, SOLUTION, CONCENTRATE INTRAVENOUS EVERY 5 MIN PRN
Status: CANCELLED | OUTPATIENT
Start: 2020-01-01

## 2020-01-01 RX ORDER — POLYETHYLENE GLYCOL 3350 17 G/17G
17 POWDER, FOR SOLUTION ORAL DAILY PRN
Status: DISCONTINUED | OUTPATIENT
Start: 2020-01-01 | End: 2020-01-01

## 2020-01-01 RX ORDER — DIPHENHYDRAMINE HCL 25 MG
50 CAPSULE ORAL ONCE
Status: CANCELLED
Start: 2020-01-01

## 2020-01-01 RX ORDER — DEXAMETHASONE 4 MG/1
40 TABLET ORAL EVERY 24 HOURS
Qty: 20 TABLET | Refills: 0 | Status: ON HOLD | OUTPATIENT
Start: 2020-01-01 | End: 2020-01-01

## 2020-01-01 RX ORDER — WARFARIN SODIUM 5 MG/1
2.5 TABLET ORAL DAILY
Status: ON HOLD | COMMUNITY
End: 2020-01-01

## 2020-01-01 RX ORDER — OXYCODONE HYDROCHLORIDE 10 MG/1
10 TABLET ORAL EVERY 4 HOURS PRN
Status: DISCONTINUED | OUTPATIENT
Start: 2020-01-01 | End: 2020-01-01

## 2020-01-01 RX ORDER — AMOXICILLIN 250 MG
3-4 CAPSULE ORAL 2 TIMES DAILY
Qty: 60 TABLET | Refills: 3 | Status: SHIPPED | OUTPATIENT
Start: 2020-01-01 | End: 2020-01-01

## 2020-01-01 RX ORDER — LORAZEPAM 0.5 MG/1
0.5 TABLET ORAL EVERY 4 HOURS PRN
Status: DISCONTINUED | OUTPATIENT
Start: 2020-01-01 | End: 2020-01-01

## 2020-01-01 RX ORDER — OXYCODONE HYDROCHLORIDE 5 MG/1
5-10 TABLET ORAL EVERY 6 HOURS PRN
Qty: 60 TABLET | Refills: 0 | Status: ON HOLD | OUTPATIENT
Start: 2020-01-01 | End: 2020-01-01

## 2020-01-01 RX ORDER — AMOXICILLIN 250 MG
3 CAPSULE ORAL 2 TIMES DAILY PRN
Status: DISCONTINUED | OUTPATIENT
Start: 2020-01-01 | End: 2020-01-01 | Stop reason: HOSPADM

## 2020-01-01 RX ORDER — METHADONE HYDROCHLORIDE 5 MG/1
5 TABLET ORAL EVERY 12 HOURS SCHEDULED
Status: DISCONTINUED | OUTPATIENT
Start: 2020-01-01 | End: 2020-01-01 | Stop reason: HOSPADM

## 2020-01-01 RX ORDER — ACETAMINOPHEN 325 MG/1
650 TABLET ORAL ONCE
Status: CANCELLED
Start: 2020-11-09

## 2020-01-01 RX ORDER — SIMETHICONE
LIQUID (ML) MISCELLANEOUS PRN
Status: DISCONTINUED | OUTPATIENT
Start: 2020-01-01 | End: 2020-01-01 | Stop reason: HOSPADM

## 2020-01-01 RX ORDER — MORPHINE SULFATE 4 MG/ML
4 INJECTION, SOLUTION INTRAMUSCULAR; INTRAVENOUS
Status: DISCONTINUED | OUTPATIENT
Start: 2020-01-01 | End: 2020-01-01

## 2020-01-01 RX ORDER — ONDANSETRON 2 MG/ML
8 INJECTION INTRAMUSCULAR; INTRAVENOUS EVERY 8 HOURS PRN
Status: DISCONTINUED | OUTPATIENT
Start: 2020-01-01 | End: 2020-01-01 | Stop reason: HOSPADM

## 2020-01-01 RX ORDER — SULFAMETHOXAZOLE/TRIMETHOPRIM 800-160 MG
1 TABLET ORAL
Qty: 20 TABLET | Refills: 3 | Status: ON HOLD | OUTPATIENT
Start: 2020-01-01 | End: 2020-01-01

## 2020-01-01 RX ORDER — LIDOCAINE HYDROCHLORIDE 20 MG/ML
INJECTION, SOLUTION INFILTRATION; PERINEURAL PRN
Status: DISCONTINUED | OUTPATIENT
Start: 2020-01-01 | End: 2020-01-01

## 2020-01-01 RX ORDER — SODIUM CHLORIDE 9 MG/ML
INJECTION, SOLUTION INTRAVENOUS CONTINUOUS
Status: DISCONTINUED | OUTPATIENT
Start: 2020-01-01 | End: 2020-01-01 | Stop reason: HOSPADM

## 2020-01-01 RX ORDER — LISINOPRIL 20 MG/1
20 TABLET ORAL DAILY
Status: ON HOLD | COMMUNITY
End: 2020-01-01

## 2020-01-01 RX ORDER — MORPHINE SULFATE 15 MG/1
45 TABLET, FILM COATED, EXTENDED RELEASE ORAL EVERY 12 HOURS
Qty: 15 TABLET | Refills: 0 | Status: ON HOLD | OUTPATIENT
Start: 2020-01-01 | End: 2020-01-01

## 2020-01-01 RX ORDER — ACYCLOVIR 400 MG/1
TABLET ORAL
COMMUNITY
Start: 2020-01-01 | End: 2020-01-01

## 2020-01-01 RX ORDER — EPINEPHRINE 1 MG/ML
0.3 INJECTION, SOLUTION, CONCENTRATE INTRAVENOUS EVERY 5 MIN PRN
Status: CANCELLED | OUTPATIENT
Start: 2020-11-16

## 2020-01-01 RX ORDER — POLYETHYLENE GLYCOL 3350 17 G/17G
17 POWDER, FOR SOLUTION ORAL DAILY
Qty: 5 PACKET | Refills: 0 | Status: ON HOLD | OUTPATIENT
Start: 2020-01-01 | End: 2020-01-01

## 2020-01-01 RX ORDER — DOXORUBICIN HYDROCHLORIDE 2 MG/ML
50 INJECTION, SOLUTION INTRAVENOUS ONCE
Status: COMPLETED | OUTPATIENT
Start: 2020-01-01 | End: 2020-01-01

## 2020-01-01 RX ORDER — AZITHROMYCIN 250 MG/1
500 TABLET, FILM COATED ORAL DAILY
Status: DISCONTINUED | OUTPATIENT
Start: 2020-01-01 | End: 2020-01-01

## 2020-01-01 RX ORDER — AMOXICILLIN 250 MG
2-3 CAPSULE ORAL 2 TIMES DAILY
Status: DISCONTINUED | OUTPATIENT
Start: 2020-01-01 | End: 2020-01-01 | Stop reason: HOSPADM

## 2020-01-01 RX ORDER — LIDOCAINE HYDROCHLORIDE 10 MG/ML
5 INJECTION, SOLUTION EPIDURAL; INFILTRATION; INTRACAUDAL; PERINEURAL ONCE
Status: DISCONTINUED | OUTPATIENT
Start: 2020-01-01 | End: 2020-01-01 | Stop reason: HOSPADM

## 2020-01-01 RX ORDER — GABAPENTIN 600 MG/1
600 TABLET ORAL EVERY MORNING
Status: DISCONTINUED | OUTPATIENT
Start: 2020-01-01 | End: 2020-01-01 | Stop reason: HOSPADM

## 2020-01-01 RX ORDER — ONDANSETRON 4 MG/1
8 TABLET, ORALLY DISINTEGRATING ORAL EVERY 8 HOURS PRN
Status: DISCONTINUED | OUTPATIENT
Start: 2020-01-01 | End: 2020-01-01 | Stop reason: HOSPADM

## 2020-01-01 RX ORDER — CALCIUM CARBONATE 500 MG/1
1000 TABLET, CHEWABLE ORAL 3 TIMES DAILY PRN
Status: DISCONTINUED | OUTPATIENT
Start: 2020-01-01 | End: 2020-01-01 | Stop reason: HOSPADM

## 2020-01-01 RX ORDER — NALOXONE HYDROCHLORIDE 0.4 MG/ML
0.4 INJECTION, SOLUTION INTRAMUSCULAR; INTRAVENOUS; SUBCUTANEOUS
Status: DISCONTINUED | OUTPATIENT
Start: 2020-01-01 | End: 2020-01-01 | Stop reason: HOSPADM

## 2020-01-01 RX ORDER — ACETAMINOPHEN 325 MG/1
650 TABLET ORAL ONCE
Status: CANCELLED
Start: 2020-01-01

## 2020-01-01 RX ORDER — POTASSIUM CHLORIDE 750 MG/1
20-40 TABLET, EXTENDED RELEASE ORAL
Status: DISCONTINUED | OUTPATIENT
Start: 2020-01-01 | End: 2020-01-01 | Stop reason: HOSPADM

## 2020-01-01 RX ORDER — LORAZEPAM 2 MG/ML
.5-1 INJECTION INTRAMUSCULAR EVERY 6 HOURS PRN
Status: DISCONTINUED | OUTPATIENT
Start: 2020-01-01 | End: 2020-01-01 | Stop reason: HOSPADM

## 2020-01-01 RX ORDER — POTASSIUM CHLORIDE 1500 MG/1
20 TABLET, EXTENDED RELEASE ORAL 2 TIMES DAILY
Qty: 60 TABLET | Refills: 0 | Status: SHIPPED | OUTPATIENT
Start: 2020-01-01 | End: 2020-01-01

## 2020-01-01 RX ORDER — CARVEDILOL 6.25 MG/1
6.25 TABLET ORAL 2 TIMES DAILY WITH MEALS
Status: DISCONTINUED | OUTPATIENT
Start: 2020-01-01 | End: 2020-01-01 | Stop reason: HOSPADM

## 2020-01-01 RX ORDER — ONDANSETRON 8 MG/1
8 TABLET, FILM COATED ORAL EVERY 8 HOURS PRN
Qty: 60 TABLET | Refills: 3 | Status: SHIPPED | OUTPATIENT
Start: 2020-01-01

## 2020-01-01 RX ORDER — METHYLPREDNISOLONE SODIUM SUCCINATE 125 MG/2ML
125 INJECTION, POWDER, LYOPHILIZED, FOR SOLUTION INTRAMUSCULAR; INTRAVENOUS
Status: CANCELLED
Start: 2020-11-16

## 2020-01-01 RX ORDER — ALBUTEROL SULFATE 0.83 MG/ML
2.5 SOLUTION RESPIRATORY (INHALATION)
Status: CANCELLED | OUTPATIENT
Start: 2020-11-09

## 2020-01-01 RX ORDER — ALBUTEROL SULFATE 90 UG/1
1-2 AEROSOL, METERED RESPIRATORY (INHALATION)
Status: CANCELLED
Start: 2020-11-09

## 2020-01-01 RX ORDER — PROPOFOL 10 MG/ML
INJECTION, EMULSION INTRAVENOUS CONTINUOUS PRN
Status: DISCONTINUED | OUTPATIENT
Start: 2020-01-01 | End: 2020-01-01

## 2020-01-01 RX ORDER — SULFAMETHOXAZOLE/TRIMETHOPRIM 800-160 MG
1 TABLET ORAL
Qty: 16 TABLET | Refills: 5 | Status: ON HOLD | OUTPATIENT
Start: 2020-01-01 | End: 2020-01-01

## 2020-01-01 RX ORDER — GABAPENTIN 300 MG/1
600 CAPSULE ORAL EVERY MORNING
Status: DISCONTINUED | OUTPATIENT
Start: 2020-01-01 | End: 2020-01-01 | Stop reason: HOSPADM

## 2020-01-01 RX ORDER — DIPHENHYDRAMINE HYDROCHLORIDE 50 MG/ML
50 INJECTION INTRAMUSCULAR; INTRAVENOUS
Status: DISCONTINUED | OUTPATIENT
Start: 2020-01-01 | End: 2020-01-01

## 2020-01-01 RX ORDER — DIPHENHYDRAMINE HCL 50 MG
50 CAPSULE ORAL ONCE
Status: COMPLETED | OUTPATIENT
Start: 2020-01-01 | End: 2020-01-01

## 2020-01-01 RX ORDER — MORPHINE SULFATE 15 MG/1
15 TABLET, FILM COATED, EXTENDED RELEASE ORAL ONCE
Qty: 1 TABLET | Refills: 0 | Status: ON HOLD | OUTPATIENT
Start: 2020-01-01 | End: 2020-01-01

## 2020-01-01 RX ORDER — METHYLPREDNISOLONE SODIUM SUCCINATE 40 MG/ML
80 INJECTION, POWDER, LYOPHILIZED, FOR SOLUTION INTRAMUSCULAR; INTRAVENOUS ONCE
Status: CANCELLED
Start: 2020-11-23 | End: 2020-11-23

## 2020-01-01 RX ORDER — ATORVASTATIN CALCIUM 40 MG/1
40 TABLET, FILM COATED ORAL DAILY
Status: DISCONTINUED | OUTPATIENT
Start: 2020-01-01 | End: 2020-01-01 | Stop reason: HOSPADM

## 2020-01-01 RX ORDER — LORAZEPAM 0.5 MG/1
0.5 TABLET ORAL EVERY 4 HOURS PRN
Qty: 120 TABLET | Refills: 5 | Status: ON HOLD | COMMUNITY
Start: 2020-01-01 | End: 2020-01-01

## 2020-01-01 RX ORDER — MEGESTROL ACETATE 40 MG/ML
200 SUSPENSION ORAL DAILY
Status: DISCONTINUED | OUTPATIENT
Start: 2020-01-01 | End: 2020-01-01 | Stop reason: HOSPADM

## 2020-01-01 RX ORDER — MORPHINE SULFATE 15 MG/1
15 TABLET, FILM COATED, EXTENDED RELEASE ORAL EVERY 12 HOURS
Qty: 60 TABLET | Refills: 0 | Status: ON HOLD | OUTPATIENT
Start: 2020-01-01 | End: 2020-01-01

## 2020-01-01 RX ORDER — MEPERIDINE HYDROCHLORIDE 25 MG/ML
25 INJECTION INTRAMUSCULAR; INTRAVENOUS; SUBCUTANEOUS EVERY 30 MIN PRN
Status: DISCONTINUED | OUTPATIENT
Start: 2020-01-01 | End: 2020-01-01

## 2020-01-01 RX ORDER — MEGESTROL ACETATE 40 MG/ML
200 SUSPENSION ORAL DAILY
Qty: 150 ML | Refills: 0 | Status: ON HOLD | OUTPATIENT
Start: 2020-01-01 | End: 2020-01-01

## 2020-01-01 RX ORDER — MAGNESIUM OXIDE 400 MG/1
400 TABLET ORAL 2 TIMES DAILY
Qty: 4 TABLET | Refills: 0 | Status: SHIPPED | OUTPATIENT
Start: 2020-01-01 | End: 2020-01-01

## 2020-01-01 RX ORDER — BACLOFEN 10 MG/1
10 TABLET ORAL 3 TIMES DAILY
Status: DISCONTINUED | OUTPATIENT
Start: 2020-01-01 | End: 2020-01-01

## 2020-01-01 RX ORDER — SODIUM CHLORIDE 9 MG/ML
1000 INJECTION, SOLUTION INTRAVENOUS CONTINUOUS
Status: ACTIVE | OUTPATIENT
Start: 2020-01-01 | End: 2020-01-01

## 2020-01-01 RX ORDER — AMOXICILLIN 250 MG
1 CAPSULE ORAL AT BEDTIME
Status: DISCONTINUED | OUTPATIENT
Start: 2020-01-01 | End: 2020-01-01 | Stop reason: HOSPADM

## 2020-01-01 RX ORDER — CIPROFLOXACIN 500 MG/1
500 TABLET, FILM COATED ORAL ONCE
Status: COMPLETED | OUTPATIENT
Start: 2020-01-01 | End: 2020-01-01

## 2020-01-01 RX ORDER — CARVEDILOL 12.5 MG/1
12.5 TABLET ORAL 2 TIMES DAILY WITH MEALS
Qty: 60 TABLET | Refills: 3 | Status: ON HOLD | OUTPATIENT
Start: 2020-01-01 | End: 2020-01-01

## 2020-01-01 RX ORDER — ALBUTEROL SULFATE 90 UG/1
1-2 AEROSOL, METERED RESPIRATORY (INHALATION)
Status: DISCONTINUED | OUTPATIENT
Start: 2020-01-01 | End: 2020-01-01

## 2020-01-01 RX ORDER — SODIUM CHLORIDE, SODIUM LACTATE, POTASSIUM CHLORIDE, CALCIUM CHLORIDE 600; 310; 30; 20 MG/100ML; MG/100ML; MG/100ML; MG/100ML
INJECTION, SOLUTION INTRAVENOUS CONTINUOUS PRN
Status: DISCONTINUED | OUTPATIENT
Start: 2020-01-01 | End: 2020-01-01

## 2020-01-01 RX ORDER — DEXAMETHASONE 4 MG/1
12 TABLET ORAL ONCE
Status: COMPLETED | OUTPATIENT
Start: 2020-01-01 | End: 2020-01-01

## 2020-01-01 RX ORDER — GABAPENTIN 300 MG/1
300 CAPSULE ORAL EVERY EVENING
Qty: 30 CAPSULE | Refills: 0 | Status: SHIPPED | OUTPATIENT
Start: 2020-01-01 | End: 2020-01-01

## 2020-01-01 RX ORDER — MAGNESIUM CARB/ALUMINUM HYDROX 105-160MG
296 TABLET,CHEWABLE ORAL ONCE
Status: COMPLETED | OUTPATIENT
Start: 2020-01-01 | End: 2020-01-01

## 2020-01-01 RX ORDER — AMOXICILLIN 250 MG
1-2 CAPSULE ORAL 2 TIMES DAILY PRN
Status: DISCONTINUED | OUTPATIENT
Start: 2020-01-01 | End: 2020-01-01 | Stop reason: HOSPADM

## 2020-01-01 RX ORDER — EPINEPHRINE 0.3 MG/.3ML
0.3 INJECTION SUBCUTANEOUS EVERY 5 MIN PRN
Status: DISCONTINUED | OUTPATIENT
Start: 2020-01-01 | End: 2020-01-01 | Stop reason: HOSPADM

## 2020-01-01 RX ORDER — AMOXICILLIN 250 MG
3-4 CAPSULE ORAL 2 TIMES DAILY
Status: DISCONTINUED | OUTPATIENT
Start: 2020-01-01 | End: 2020-01-01

## 2020-01-01 RX ORDER — BACLOFEN 10 MG/1
5-10 TABLET ORAL 3 TIMES DAILY PRN
Status: DISCONTINUED | OUTPATIENT
Start: 2020-01-01 | End: 2020-01-01 | Stop reason: HOSPADM

## 2020-01-01 RX ORDER — MEPERIDINE HYDROCHLORIDE 25 MG/ML
25 INJECTION INTRAMUSCULAR; INTRAVENOUS; SUBCUTANEOUS EVERY 30 MIN PRN
Status: CANCELLED | OUTPATIENT
Start: 2020-11-16

## 2020-01-01 RX ORDER — ALBUTEROL SULFATE 0.83 MG/ML
2.5 SOLUTION RESPIRATORY (INHALATION)
Status: CANCELLED | OUTPATIENT
Start: 2020-11-16

## 2020-01-01 RX ORDER — GABAPENTIN 300 MG/1
300 CAPSULE ORAL AT BEDTIME
Qty: 30 CAPSULE | Refills: 3 | Status: SHIPPED | OUTPATIENT
Start: 2020-01-01 | End: 2020-01-01

## 2020-01-01 RX ORDER — ONDANSETRON 4 MG/1
8 TABLET, FILM COATED ORAL EVERY 12 HOURS
Status: CANCELLED
Start: 2020-01-01

## 2020-01-01 RX ORDER — SIMETHICONE 40MG/0.6ML
40 SUSPENSION, DROPS(FINAL DOSAGE FORM)(ML) ORAL EVERY 6 HOURS PRN
Status: DISCONTINUED | OUTPATIENT
Start: 2020-01-01 | End: 2020-01-01 | Stop reason: HOSPADM

## 2020-01-01 RX ORDER — FERROUS SULFATE 325(65) MG
TABLET ORAL
COMMUNITY
Start: 2020-01-01 | End: 2020-01-01

## 2020-01-01 RX ORDER — MORPHINE SULFATE 30 MG/1
30 TABLET, FILM COATED, EXTENDED RELEASE ORAL EVERY 12 HOURS
Qty: 60 TABLET | Refills: 0 | Status: ON HOLD | OUTPATIENT
Start: 2020-01-01 | End: 2020-01-01

## 2020-01-01 RX ORDER — DEXAMETHASONE 4 MG/1
12 TABLET ORAL ONCE
Status: CANCELLED
Start: 2020-01-01

## 2020-01-01 RX ORDER — ACETAMINOPHEN 325 MG/1
650 TABLET ORAL EVERY 4 HOURS PRN
COMMUNITY
Start: 2020-01-01

## 2020-01-01 RX ORDER — MAGNESIUM SULFATE HEPTAHYDRATE 40 MG/ML
2 INJECTION, SOLUTION INTRAVENOUS DAILY PRN
Status: CANCELLED
Start: 2020-01-01

## 2020-01-01 RX ORDER — MEPERIDINE HYDROCHLORIDE 25 MG/ML
25 INJECTION INTRAMUSCULAR; INTRAVENOUS; SUBCUTANEOUS
Status: CANCELLED | OUTPATIENT
Start: 2020-11-23

## 2020-01-01 RX ORDER — GABAPENTIN 300 MG/1
300 CAPSULE ORAL AT BEDTIME
Status: ON HOLD | COMMUNITY
End: 2020-01-01

## 2020-01-01 RX ORDER — OMEPRAZOLE 40 MG/1
40 CAPSULE, DELAYED RELEASE ORAL DAILY
Qty: 30 CAPSULE | Refills: 1 | Status: ON HOLD | OUTPATIENT
Start: 2020-01-01 | End: 2020-01-01

## 2020-01-01 RX ORDER — ALBUTEROL SULFATE 0.83 MG/ML
2.5 SOLUTION RESPIRATORY (INHALATION)
Status: DISCONTINUED | OUTPATIENT
Start: 2020-01-01 | End: 2020-01-01

## 2020-01-01 RX ORDER — DEXTROSE MONOHYDRATE 50 MG/ML
10-20 INJECTION, SOLUTION INTRAVENOUS
Status: DISCONTINUED | OUTPATIENT
Start: 2020-01-01 | End: 2020-01-01

## 2020-01-01 RX ORDER — MORPHINE SULFATE 4 MG/ML
4 INJECTION, SOLUTION INTRAMUSCULAR; INTRAVENOUS
Status: DISCONTINUED | OUTPATIENT
Start: 2020-01-01 | End: 2020-01-01 | Stop reason: HOSPADM

## 2020-01-01 RX ORDER — PROPOFOL 10 MG/ML
INJECTION, EMULSION INTRAVENOUS PRN
Status: DISCONTINUED | OUTPATIENT
Start: 2020-01-01 | End: 2020-01-01

## 2020-01-01 RX ORDER — MORPHINE SULFATE 30 MG/1
30 TABLET, FILM COATED, EXTENDED RELEASE ORAL EVERY 12 HOURS SCHEDULED
Status: DISCONTINUED | OUTPATIENT
Start: 2020-01-01 | End: 2020-01-01

## 2020-01-01 RX ORDER — HYDROMORPHONE HYDROCHLORIDE 1 MG/ML
.3-.5 INJECTION, SOLUTION INTRAMUSCULAR; INTRAVENOUS; SUBCUTANEOUS
Status: DISCONTINUED | OUTPATIENT
Start: 2020-01-01 | End: 2020-01-01 | Stop reason: HOSPADM

## 2020-01-01 RX ORDER — GABAPENTIN 300 MG/1
CAPSULE ORAL
Qty: 90 CAPSULE | Refills: 1 | Status: SHIPPED | OUTPATIENT
Start: 2020-01-01 | End: 2020-01-01

## 2020-01-01 RX ORDER — BISACODYL 10 MG
10 SUPPOSITORY, RECTAL RECTAL DAILY PRN
Status: DISCONTINUED | OUTPATIENT
Start: 2020-01-01 | End: 2020-01-01

## 2020-01-01 RX ORDER — CALCIUM CARBONATE 500 MG/1
500 TABLET, CHEWABLE ORAL 3 TIMES DAILY PRN
Status: DISCONTINUED | OUTPATIENT
Start: 2020-01-01 | End: 2020-01-01 | Stop reason: HOSPADM

## 2020-01-01 RX ORDER — LORAZEPAM 0.5 MG/1
0.5 TABLET ORAL EVERY 4 HOURS PRN
Qty: 30 TABLET | Refills: 0 | Status: SHIPPED | OUTPATIENT
Start: 2020-01-01 | End: 2020-01-01

## 2020-01-01 RX ORDER — DEXAMETHASONE 4 MG/1
8 TABLET ORAL ONCE
Status: CANCELLED
Start: 2020-01-01

## 2020-01-01 RX ORDER — POTASSIUM CHLORIDE 1500 MG/1
40 TABLET, EXTENDED RELEASE ORAL ONCE
Status: COMPLETED | OUTPATIENT
Start: 2020-01-01 | End: 2020-01-01

## 2020-01-01 RX ORDER — ONDANSETRON 8 MG/1
16 TABLET, FILM COATED ORAL ONCE
Status: CANCELLED
Start: 2020-01-01

## 2020-01-01 RX ORDER — ALLOPURINOL 100 MG/1
300 TABLET ORAL DAILY
Status: CANCELLED
Start: 2020-01-01

## 2020-01-01 RX ORDER — AMOXICILLIN 250 MG
1 CAPSULE ORAL 2 TIMES DAILY PRN
Status: DISCONTINUED | OUTPATIENT
Start: 2020-01-01 | End: 2020-01-01

## 2020-01-01 RX ORDER — PROCHLORPERAZINE MALEATE 10 MG
10 TABLET ORAL EVERY 6 HOURS PRN
Status: CANCELLED
Start: 2020-01-01

## 2020-01-01 RX ORDER — SODIUM CHLORIDE, SODIUM LACTATE, POTASSIUM CHLORIDE, CALCIUM CHLORIDE 600; 310; 30; 20 MG/100ML; MG/100ML; MG/100ML; MG/100ML
INJECTION, SOLUTION INTRAVENOUS CONTINUOUS
Status: DISPENSED | OUTPATIENT
Start: 2020-01-01 | End: 2020-01-01

## 2020-01-01 RX ORDER — ATORVASTATIN CALCIUM 20 MG/1
40 TABLET, FILM COATED ORAL EVERY EVENING
Status: DISCONTINUED | OUTPATIENT
Start: 2020-01-01 | End: 2020-01-01

## 2020-01-01 RX ORDER — GABAPENTIN 300 MG/1
CAPSULE ORAL
Qty: 90 CAPSULE | Refills: 1 | Status: ON HOLD | OUTPATIENT
Start: 2020-01-01 | End: 2020-01-01

## 2020-01-01 RX ORDER — ATORVASTATIN CALCIUM 40 MG/1
TABLET, FILM COATED ORAL
Qty: 30 TABLET | Refills: 0 | OUTPATIENT
Start: 2020-01-01

## 2020-01-01 RX ORDER — METHYLPREDNISOLONE SODIUM SUCCINATE 125 MG/2ML
125 INJECTION, POWDER, LYOPHILIZED, FOR SOLUTION INTRAMUSCULAR; INTRAVENOUS
Status: CANCELLED | OUTPATIENT
Start: 2020-01-01

## 2020-01-01 RX ORDER — ACYCLOVIR 400 MG/1
400 TABLET ORAL 2 TIMES DAILY
Status: ON HOLD | COMMUNITY
Start: 2020-01-01 | End: 2020-01-01

## 2020-01-01 RX ORDER — LEVOFLOXACIN 500 MG/1
500 TABLET, FILM COATED ORAL ONCE
Status: COMPLETED | OUTPATIENT
Start: 2020-01-01 | End: 2020-01-01

## 2020-01-01 RX ORDER — IOPAMIDOL 755 MG/ML
127 INJECTION, SOLUTION INTRAVASCULAR ONCE
Status: COMPLETED | OUTPATIENT
Start: 2020-01-01 | End: 2020-01-01

## 2020-01-01 RX ORDER — LORAZEPAM 2 MG/ML
0.5 INJECTION INTRAMUSCULAR EVERY 4 HOURS PRN
Status: CANCELLED
Start: 2020-11-09

## 2020-01-01 RX ORDER — IOPAMIDOL 755 MG/ML
10-135 INJECTION, SOLUTION INTRAVASCULAR ONCE
Status: COMPLETED | OUTPATIENT
Start: 2020-01-01 | End: 2020-01-01

## 2020-01-01 RX ORDER — SUCRALFATE ORAL 1 G/10ML
1 SUSPENSION ORAL 4 TIMES DAILY PRN
Status: DISCONTINUED | OUTPATIENT
Start: 2020-01-01 | End: 2020-01-01 | Stop reason: HOSPADM

## 2020-01-01 RX ORDER — HEPARIN SODIUM 5000 [USP'U]/.5ML
5000 INJECTION, SOLUTION INTRAVENOUS; SUBCUTANEOUS EVERY 8 HOURS
Status: COMPLETED | OUTPATIENT
Start: 2020-01-01 | End: 2020-01-01

## 2020-01-01 RX ORDER — LISINOPRIL 20 MG/1
20 TABLET ORAL DAILY
Status: DISCONTINUED | OUTPATIENT
Start: 2020-01-01 | End: 2020-01-01

## 2020-01-01 RX ORDER — BACLOFEN 10 MG/1
10 TABLET ORAL 3 TIMES DAILY PRN
Qty: 20 TABLET | Refills: 0 | Status: ON HOLD | OUTPATIENT
Start: 2020-01-01 | End: 2020-01-01

## 2020-01-01 RX ORDER — HYDROMORPHONE HYDROCHLORIDE 2 MG/1
2-4 TABLET ORAL
Status: DISCONTINUED | OUTPATIENT
Start: 2020-01-01 | End: 2020-01-01 | Stop reason: HOSPADM

## 2020-01-01 RX ORDER — POTASSIUM CHLORIDE 1500 MG/1
40 TABLET, EXTENDED RELEASE ORAL 2 TIMES DAILY
Qty: 120 TABLET | Refills: 0 | Status: ON HOLD | OUTPATIENT
Start: 2020-01-01 | End: 2020-01-01

## 2020-01-01 RX ORDER — AMOXICILLIN 250 MG
3 CAPSULE ORAL 2 TIMES DAILY
Status: DISCONTINUED | OUTPATIENT
Start: 2020-01-01 | End: 2020-01-01

## 2020-01-01 RX ORDER — METHADONE HYDROCHLORIDE 5 MG/1
5 TABLET ORAL EVERY 12 HOURS
Qty: 20 TABLET | Refills: 0 | Status: ON HOLD | OUTPATIENT
Start: 2020-01-01 | End: 2020-01-01

## 2020-01-01 RX ORDER — BACLOFEN 10 MG/1
10 TABLET ORAL 3 TIMES DAILY
Qty: 90 TABLET | Refills: 0 | Status: ON HOLD | OUTPATIENT
Start: 2020-01-01 | End: 2020-01-01

## 2020-01-01 RX ORDER — DIPHENHYDRAMINE HCL 50 MG
50 CAPSULE ORAL ONCE
Status: CANCELLED
Start: 2020-11-09

## 2020-01-01 RX ORDER — ONDANSETRON 4 MG/1
16 TABLET, FILM COATED ORAL ONCE
Status: CANCELLED
Start: 2020-01-01

## 2020-01-01 RX ORDER — LORAZEPAM 2 MG/ML
0.5 INJECTION INTRAMUSCULAR EVERY 4 HOURS PRN
Status: DISCONTINUED | OUTPATIENT
Start: 2020-01-01 | End: 2020-01-01 | Stop reason: HOSPADM

## 2020-01-01 RX ORDER — POTASSIUM CHLORIDE 750 MG/1
10 TABLET, EXTENDED RELEASE ORAL 2 TIMES DAILY
COMMUNITY
Start: 2020-01-01 | End: 2020-01-01

## 2020-01-01 RX ORDER — ALUMINA, MAGNESIA, AND SIMETHICONE 2400; 2400; 240 MG/30ML; MG/30ML; MG/30ML
30 SUSPENSION ORAL EVERY 4 HOURS PRN
Status: DISCONTINUED | OUTPATIENT
Start: 2020-01-01 | End: 2020-01-01 | Stop reason: HOSPADM

## 2020-01-01 RX ORDER — METHYLPREDNISOLONE SODIUM SUCCINATE 40 MG/ML
80 INJECTION, POWDER, LYOPHILIZED, FOR SOLUTION INTRAMUSCULAR; INTRAVENOUS ONCE
Status: CANCELLED
Start: 2020-11-09 | End: 2020-11-09

## 2020-01-01 RX ORDER — HEPARIN SODIUM,PORCINE 10 UNIT/ML
2-5 VIAL (ML) INTRAVENOUS
Status: DISCONTINUED | OUTPATIENT
Start: 2020-01-01 | End: 2020-01-01 | Stop reason: HOSPADM

## 2020-01-01 RX ORDER — GABAPENTIN 300 MG/1
600 CAPSULE ORAL EVERY MORNING
Qty: 60 CAPSULE | Refills: 3 | Status: SHIPPED | OUTPATIENT
Start: 2020-01-01 | End: 2020-01-01

## 2020-01-01 RX ORDER — PREDNISOLONE ACETATE 10 MG/ML
2 SUSPENSION/ DROPS OPHTHALMIC 4 TIMES DAILY
Status: DISCONTINUED | OUTPATIENT
Start: 2020-01-01 | End: 2020-01-01 | Stop reason: HOSPADM

## 2020-01-01 RX ORDER — DIPHENHYDRAMINE HCL 50 MG
50 CAPSULE ORAL ONCE
Status: CANCELLED
Start: 2020-11-23

## 2020-01-01 RX ORDER — AMOXICILLIN 250 MG
1-2 CAPSULE ORAL 2 TIMES DAILY PRN
Qty: 10 TABLET | Refills: 0 | Status: ON HOLD | OUTPATIENT
Start: 2020-01-01 | End: 2020-01-01

## 2020-01-01 RX ORDER — LIDOCAINE 40 MG/G
CREAM TOPICAL
Status: DISCONTINUED | OUTPATIENT
Start: 2020-01-01 | End: 2020-01-01

## 2020-01-01 RX ORDER — PROCHLORPERAZINE MALEATE 10 MG
10 TABLET ORAL EVERY 6 HOURS PRN
Qty: 30 TABLET | Refills: 5 | Status: ON HOLD | OUTPATIENT
Start: 2020-01-01 | End: 2020-01-01

## 2020-01-01 RX ORDER — HEPARIN SODIUM,PORCINE 10 UNIT/ML
5 VIAL (ML) INTRAVENOUS
Status: DISCONTINUED | OUTPATIENT
Start: 2020-01-01 | End: 2020-01-01 | Stop reason: HOSPADM

## 2020-01-01 RX ORDER — AMOXICILLIN 250 MG
1 CAPSULE ORAL 2 TIMES DAILY PRN
Qty: 30 TABLET | Refills: 0 | Status: SHIPPED | OUTPATIENT
Start: 2020-01-01 | End: 2020-01-01

## 2020-01-01 RX ORDER — HEPARIN SODIUM,PORCINE 10 UNIT/ML
5 VIAL (ML) INTRAVENOUS ONCE
Status: COMPLETED | OUTPATIENT
Start: 2020-01-01 | End: 2020-01-01

## 2020-01-01 RX ORDER — FLUCONAZOLE 100 MG/1
100 TABLET ORAL DAILY
COMMUNITY
Start: 2020-01-01 | End: 2020-01-01

## 2020-01-01 RX ORDER — ONDANSETRON 4 MG/1
4 TABLET, FILM COATED ORAL EVERY 8 HOURS PRN
Qty: 21 TABLET | Refills: 0 | Status: ON HOLD | OUTPATIENT
Start: 2020-01-01 | End: 2020-01-01

## 2020-01-01 RX ORDER — NALOXONE HYDROCHLORIDE 0.4 MG/ML
.1-.4 INJECTION, SOLUTION INTRAMUSCULAR; INTRAVENOUS; SUBCUTANEOUS
Status: CANCELLED | OUTPATIENT
Start: 2020-01-01 | End: 2020-01-01

## 2020-01-01 RX ORDER — AMOXICILLIN 250 MG
4 CAPSULE ORAL 2 TIMES DAILY
Status: DISCONTINUED | OUTPATIENT
Start: 2020-01-01 | End: 2020-01-01 | Stop reason: HOSPADM

## 2020-01-01 RX ORDER — NALOXONE HYDROCHLORIDE 0.4 MG/ML
.1-.4 INJECTION, SOLUTION INTRAMUSCULAR; INTRAVENOUS; SUBCUTANEOUS
Status: CANCELLED | OUTPATIENT
Start: 2020-11-16

## 2020-01-01 RX ORDER — SODIUM CHLORIDE 9 MG/ML
1000 INJECTION, SOLUTION INTRAVENOUS CONTINUOUS PRN
Status: CANCELLED
Start: 2020-11-09

## 2020-01-01 RX ORDER — PROCHLORPERAZINE MALEATE 10 MG
10 TABLET ORAL EVERY 6 HOURS PRN
Status: DISCONTINUED | OUTPATIENT
Start: 2020-01-01 | End: 2020-01-01

## 2020-01-01 RX ORDER — LORAZEPAM 2 MG/ML
0.5 INJECTION INTRAMUSCULAR EVERY 6 HOURS
Status: DISCONTINUED | OUTPATIENT
Start: 2020-01-01 | End: 2020-01-01

## 2020-01-01 RX ORDER — MEGESTROL ACETATE 40 MG/ML
200 SUSPENSION ORAL DAILY
Status: DISCONTINUED | OUTPATIENT
Start: 2020-01-01 | End: 2020-01-01

## 2020-01-01 RX ORDER — HEPARIN SODIUM,PORCINE 10 UNIT/ML
5-10 VIAL (ML) INTRAVENOUS EVERY 24 HOURS
Qty: 140 ML | Refills: 0 | Status: SHIPPED | OUTPATIENT
Start: 2020-01-01 | End: 2020-01-01

## 2020-01-01 RX ORDER — AMOXICILLIN 250 MG
1 CAPSULE ORAL 2 TIMES DAILY
Status: DISCONTINUED | OUTPATIENT
Start: 2020-01-01 | End: 2020-01-01

## 2020-01-01 RX ORDER — LORAZEPAM 0.5 MG/1
0.5 TABLET ORAL DAILY PRN
Status: DISCONTINUED | OUTPATIENT
Start: 2020-01-01 | End: 2020-01-01 | Stop reason: HOSPADM

## 2020-01-01 RX ORDER — MAGNESIUM SULFATE HEPTAHYDRATE 40 MG/ML
2 INJECTION, SOLUTION INTRAVENOUS ONCE
Status: COMPLETED | OUTPATIENT
Start: 2020-01-01 | End: 2020-01-01

## 2020-01-01 RX ORDER — AMOXICILLIN 250 MG
1-2 CAPSULE ORAL 2 TIMES DAILY PRN
Status: DISCONTINUED | OUTPATIENT
Start: 2020-01-01 | End: 2020-01-01

## 2020-01-01 RX ORDER — MEPERIDINE HYDROCHLORIDE 25 MG/ML
25 INJECTION INTRAMUSCULAR; INTRAVENOUS; SUBCUTANEOUS EVERY 30 MIN PRN
Status: CANCELLED | OUTPATIENT
Start: 2020-11-09

## 2020-01-01 RX ORDER — AMOXICILLIN 250 MG
3-4 CAPSULE ORAL 2 TIMES DAILY
Status: DISCONTINUED | OUTPATIENT
Start: 2020-01-01 | End: 2020-01-01 | Stop reason: HOSPADM

## 2020-01-01 RX ORDER — ATORVASTATIN CALCIUM 40 MG/1
40 TABLET, FILM COATED ORAL DAILY
Qty: 30 TABLET | Refills: 3 | Status: SHIPPED | OUTPATIENT
Start: 2020-01-01 | End: 2020-01-01

## 2020-01-01 RX ORDER — DEXAMETHASONE 4 MG/1
8 TABLET ORAL DAILY
Status: COMPLETED | OUTPATIENT
Start: 2020-01-01 | End: 2020-01-01

## 2020-01-01 RX ORDER — LORAZEPAM 2 MG/ML
.5-1 INJECTION INTRAMUSCULAR EVERY 6 HOURS PRN
Status: DISCONTINUED | OUTPATIENT
Start: 2020-01-01 | End: 2020-01-01

## 2020-01-01 RX ORDER — METOCLOPRAMIDE 10 MG/1
10 TABLET ORAL EVERY 6 HOURS PRN
Qty: 10 TABLET | Refills: 0 | Status: SHIPPED | OUTPATIENT
Start: 2020-01-01 | End: 2020-01-01

## 2020-01-01 RX ORDER — LORAZEPAM 0.5 MG/1
.5-1 TABLET ORAL EVERY 6 HOURS PRN
Status: DISCONTINUED | OUTPATIENT
Start: 2020-01-01 | End: 2020-01-01

## 2020-01-01 RX ORDER — LISINOPRIL 20 MG/1
20 TABLET ORAL DAILY
Qty: 30 TABLET | Refills: 1 | Status: ON HOLD | OUTPATIENT
Start: 2020-01-01 | End: 2020-01-01

## 2020-01-01 RX ORDER — DEXTROSE MONOHYDRATE, SODIUM CHLORIDE, AND POTASSIUM CHLORIDE 50; 1.49; 9 G/1000ML; G/1000ML; G/1000ML
INJECTION, SOLUTION INTRAVENOUS CONTINUOUS
Status: DISCONTINUED | OUTPATIENT
Start: 2020-01-01 | End: 2020-01-01 | Stop reason: HOSPADM

## 2020-01-01 RX ORDER — BACLOFEN 10 MG/1
5 TABLET ORAL 3 TIMES DAILY
Status: DISCONTINUED | OUTPATIENT
Start: 2020-01-01 | End: 2020-01-01

## 2020-01-01 RX ORDER — AMOXICILLIN 250 MG
3-4 CAPSULE ORAL 2 TIMES DAILY
Qty: 60 TABLET | Refills: 3 | Status: ON HOLD | OUTPATIENT
Start: 2020-01-01 | End: 2020-01-01

## 2020-01-01 RX ORDER — HYDROMORPHONE HCL/0.9% NACL/PF 0.2MG/0.2
.2-.3 SYRINGE (ML) INTRAVENOUS
Status: DISCONTINUED | OUTPATIENT
Start: 2020-01-01 | End: 2020-01-01 | Stop reason: HOSPADM

## 2020-01-01 RX ORDER — CALCIUM CARBONATE 500 MG/1
500 TABLET, CHEWABLE ORAL 3 TIMES DAILY
Status: DISCONTINUED | OUTPATIENT
Start: 2020-01-01 | End: 2020-01-01 | Stop reason: HOSPADM

## 2020-01-01 RX ORDER — GABAPENTIN 300 MG/1
300 CAPSULE ORAL 3 TIMES DAILY
Qty: 30 CAPSULE | Refills: 3 | Status: SHIPPED | OUTPATIENT
Start: 2020-01-01 | End: 2020-01-01

## 2020-01-01 RX ORDER — SULFAMETHOXAZOLE/TRIMETHOPRIM 800-160 MG
1 TABLET ORAL
Status: DISCONTINUED | OUTPATIENT
Start: 2020-01-01 | End: 2020-01-01 | Stop reason: HOSPADM

## 2020-01-01 RX ORDER — BACLOFEN 10 MG/1
5 TABLET ORAL 3 TIMES DAILY
Qty: 45 TABLET | Refills: 3 | Status: ON HOLD | OUTPATIENT
Start: 2020-01-01 | End: 2020-01-01

## 2020-01-01 RX ORDER — MEPERIDINE HYDROCHLORIDE 25 MG/ML
25 INJECTION INTRAMUSCULAR; INTRAVENOUS; SUBCUTANEOUS
Status: DISCONTINUED | OUTPATIENT
Start: 2020-01-01 | End: 2020-01-01 | Stop reason: HOSPADM

## 2020-01-01 RX ORDER — DEXAMETHASONE 4 MG/1
8 TABLET ORAL DAILY
Qty: 30 TABLET | Refills: 0 | Status: ON HOLD | OUTPATIENT
Start: 2020-01-01 | End: 2020-01-01

## 2020-01-01 RX ORDER — METHYLPREDNISOLONE SODIUM SUCCINATE 125 MG/2ML
125 INJECTION, POWDER, LYOPHILIZED, FOR SOLUTION INTRAMUSCULAR; INTRAVENOUS
Status: CANCELLED
Start: 2020-11-09

## 2020-01-01 RX ORDER — MEPERIDINE HYDROCHLORIDE 25 MG/ML
25 INJECTION INTRAMUSCULAR; INTRAVENOUS; SUBCUTANEOUS
Status: CANCELLED | OUTPATIENT
Start: 2020-11-09

## 2020-01-01 RX ORDER — DIPHENHYDRAMINE HCL 25 MG
50 CAPSULE ORAL ONCE
Status: CANCELLED | OUTPATIENT
Start: 2020-01-01

## 2020-01-01 RX ORDER — FERROUS SULFATE 325(65) MG
325 TABLET ORAL
Qty: 30 TABLET | Refills: 3 | Status: SHIPPED | OUTPATIENT
Start: 2020-01-01 | End: 2020-01-01

## 2020-01-01 RX ORDER — MORPHINE SULFATE 15 MG/1
15 TABLET, FILM COATED, EXTENDED RELEASE ORAL EVERY 12 HOURS SCHEDULED
Status: COMPLETED | OUTPATIENT
Start: 2020-01-01 | End: 2020-01-01

## 2020-01-01 RX ORDER — AMPICILLIN AND SULBACTAM 2; 1 G/1; G/1
3 INJECTION, POWDER, FOR SOLUTION INTRAMUSCULAR; INTRAVENOUS EVERY 6 HOURS
Status: DISCONTINUED | OUTPATIENT
Start: 2020-01-01 | End: 2020-01-01

## 2020-01-01 RX ORDER — HEPARIN SODIUM,PORCINE 10 UNIT/ML
5-10 VIAL (ML) INTRAVENOUS EVERY 24 HOURS
Qty: 140 ML | Refills: 1 | Status: SHIPPED | OUTPATIENT
Start: 2020-01-01

## 2020-01-01 RX ORDER — LEVOFLOXACIN 250 MG/1
250 TABLET, FILM COATED ORAL DAILY
Qty: 21 TABLET | Refills: 0 | Status: ON HOLD | OUTPATIENT
Start: 2020-01-01 | End: 2020-01-01

## 2020-01-01 RX ORDER — ASPIRIN 81 MG/1
324 TABLET, CHEWABLE ORAL ONCE
Status: COMPLETED | OUTPATIENT
Start: 2020-01-01 | End: 2020-01-01

## 2020-01-01 RX ORDER — LEVOFLOXACIN 500 MG/1
500 TABLET, FILM COATED ORAL DAILY
COMMUNITY
Start: 2020-01-01 | End: 2020-01-01

## 2020-01-01 RX ORDER — DEXAMETHASONE 4 MG/1
8 TABLET ORAL DAILY
Qty: 30 TABLET | Refills: 0 | Status: SHIPPED | OUTPATIENT
Start: 2020-01-01 | End: 2020-01-01

## 2020-01-01 RX ORDER — MORPHINE SULFATE 15 MG/1
45 TABLET, FILM COATED, EXTENDED RELEASE ORAL EVERY 12 HOURS
Qty: 180 TABLET | Refills: 0 | Status: SHIPPED | OUTPATIENT
Start: 2020-01-01 | End: 2020-01-01

## 2020-01-01 RX ORDER — GABAPENTIN 300 MG/1
300 CAPSULE ORAL EVERY EVENING
Status: DISCONTINUED | OUTPATIENT
Start: 2020-01-01 | End: 2020-01-01 | Stop reason: HOSPADM

## 2020-01-01 RX ORDER — MORPHINE SULFATE 15 MG/1
15 TABLET, FILM COATED, EXTENDED RELEASE ORAL EVERY 12 HOURS
Qty: 60 TABLET | Refills: 0 | Status: CANCELLED | OUTPATIENT
Start: 2020-01-01

## 2020-01-01 RX ORDER — NALOXONE HYDROCHLORIDE 0.4 MG/ML
.1-.4 INJECTION, SOLUTION INTRAMUSCULAR; INTRAVENOUS; SUBCUTANEOUS
Status: CANCELLED | OUTPATIENT
Start: 2020-11-23

## 2020-01-01 RX ORDER — LIDOCAINE 40 MG/G
CREAM TOPICAL
Status: ACTIVE | OUTPATIENT
Start: 2020-01-01 | End: 2020-01-01

## 2020-01-01 RX ORDER — ACYCLOVIR 400 MG/1
400 TABLET ORAL 2 TIMES DAILY
Qty: 60 TABLET | Refills: 5 | Status: ON HOLD | OUTPATIENT
Start: 2020-01-01 | End: 2020-01-01

## 2020-01-01 RX ORDER — SODIUM CHLORIDE 9 MG/ML
1000 INJECTION, SOLUTION INTRAVENOUS CONTINUOUS PRN
Status: CANCELLED
Start: 2020-11-16

## 2020-01-01 RX ORDER — AMOXICILLIN 250 MG
1 CAPSULE ORAL 2 TIMES DAILY PRN
Status: DISCONTINUED | OUTPATIENT
Start: 2020-01-01 | End: 2020-01-01 | Stop reason: HOSPADM

## 2020-01-01 RX ORDER — FLUMAZENIL 0.1 MG/ML
0.2 INJECTION, SOLUTION INTRAVENOUS
Status: CANCELLED | OUTPATIENT
Start: 2020-01-01 | End: 2020-01-01

## 2020-01-01 RX ORDER — FLUCONAZOLE 200 MG/1
200 TABLET ORAL DAILY
Qty: 21 TABLET | Refills: 0 | Status: SHIPPED | OUTPATIENT
Start: 2020-01-01 | End: 2020-01-01

## 2020-01-01 RX ORDER — DIPHENHYDRAMINE HYDROCHLORIDE 50 MG/ML
50 INJECTION INTRAMUSCULAR; INTRAVENOUS
Status: CANCELLED
Start: 2020-11-23

## 2020-01-01 RX ORDER — POTASSIUM CHLORIDE 1500 MG/1
20 TABLET, EXTENDED RELEASE ORAL DAILY
Qty: 7 TABLET | Refills: 0 | Status: SHIPPED | OUTPATIENT
Start: 2020-01-01 | End: 2020-01-01

## 2020-01-01 RX ORDER — ALLOPURINOL 300 MG/1
300 TABLET ORAL DAILY
Qty: 30 TABLET | Refills: 0 | Status: ON HOLD | OUTPATIENT
Start: 2020-01-01 | End: 2020-01-01

## 2020-01-01 RX ORDER — ATORVASTATIN CALCIUM 40 MG/1
40 TABLET, FILM COATED ORAL DAILY
Qty: 30 TABLET | Refills: 0 | Status: ON HOLD | OUTPATIENT
Start: 2020-01-01 | End: 2020-01-01

## 2020-01-01 RX ORDER — DIPHENHYDRAMINE HCL 50 MG
50 CAPSULE ORAL ONCE
Status: CANCELLED
Start: 2020-11-16

## 2020-01-01 RX ORDER — ALBUTEROL SULFATE 0.83 MG/ML
2.5 SOLUTION RESPIRATORY (INHALATION)
Status: CANCELLED | OUTPATIENT
Start: 2020-11-23

## 2020-01-01 RX ORDER — MORPHINE SULFATE 15 MG/1
15 TABLET, FILM COATED, EXTENDED RELEASE ORAL DAILY
Status: DISCONTINUED | OUTPATIENT
Start: 2020-01-01 | End: 2020-01-01

## 2020-01-01 RX ORDER — GABAPENTIN 300 MG/1
600 CAPSULE ORAL
Status: DISCONTINUED | OUTPATIENT
Start: 2020-01-01 | End: 2020-01-01 | Stop reason: HOSPADM

## 2020-01-01 RX ORDER — NALOXONE HYDROCHLORIDE 0.4 MG/ML
.1-.4 INJECTION, SOLUTION INTRAMUSCULAR; INTRAVENOUS; SUBCUTANEOUS
Status: CANCELLED | OUTPATIENT
Start: 2020-11-09

## 2020-01-01 RX ORDER — MORPHINE SULFATE 15 MG/1
15 TABLET, FILM COATED, EXTENDED RELEASE ORAL EVERY 12 HOURS
Qty: 60 TABLET | Refills: 0 | Status: SHIPPED | OUTPATIENT
Start: 2020-01-01 | End: 2020-01-01

## 2020-01-01 RX ORDER — MULTIPLE VITAMINS W/ MINERALS TAB 9MG-400MCG
1 TAB ORAL DAILY
Status: DISCONTINUED | OUTPATIENT
Start: 2020-01-01 | End: 2020-01-01 | Stop reason: HOSPADM

## 2020-01-01 RX ORDER — PREDNISONE 50 MG/1
100 TABLET ORAL DAILY
Qty: 6 TABLET | Refills: 0 | Status: ON HOLD | OUTPATIENT
Start: 2020-01-01 | End: 2020-01-01

## 2020-01-01 RX ORDER — METHYLPREDNISOLONE SODIUM SUCCINATE 125 MG/2ML
125 INJECTION, POWDER, LYOPHILIZED, FOR SOLUTION INTRAMUSCULAR; INTRAVENOUS
Status: DISCONTINUED | OUTPATIENT
Start: 2020-01-01 | End: 2020-01-01

## 2020-01-01 RX ORDER — LORAZEPAM 0.5 MG/1
0.5 TABLET ORAL EVERY 4 HOURS PRN
Qty: 120 TABLET | Refills: 5 | Status: ON HOLD | OUTPATIENT
Start: 2020-01-01 | End: 2020-01-01

## 2020-01-01 RX ORDER — SUCRALFATE ORAL 1 G/10ML
1 SUSPENSION ORAL 4 TIMES DAILY PRN
Qty: 420 ML | Refills: 0 | Status: SHIPPED | OUTPATIENT
Start: 2020-01-01 | End: 2020-01-01

## 2020-01-01 RX ORDER — DEXAMETHASONE 0.5 MG/1
40 TABLET ORAL EVERY 24 HOURS
Status: CANCELLED
Start: 2020-01-01

## 2020-01-01 RX ORDER — DEXAMETHASONE 4 MG/1
8 TABLET ORAL DAILY
Status: CANCELLED
Start: 2020-01-01

## 2020-01-01 RX ORDER — BACLOFEN 10 MG/1
5 TABLET ORAL 3 TIMES DAILY PRN
Status: DISCONTINUED | OUTPATIENT
Start: 2020-01-01 | End: 2020-01-01

## 2020-01-01 RX ORDER — METHADONE HYDROCHLORIDE 5 MG/1
2.5 TABLET ORAL EVERY 12 HOURS
Refills: 0
Start: 2020-01-01

## 2020-01-01 RX ORDER — OLANZAPINE 5 MG/1
TABLET, ORALLY DISINTEGRATING ORAL
Qty: 30 TABLET | Refills: 0 | Status: SHIPPED | OUTPATIENT
Start: 2020-01-01

## 2020-01-01 RX ORDER — ONDANSETRON 8 MG/1
8 TABLET, FILM COATED ORAL EVERY 8 HOURS PRN
Qty: 30 TABLET | Refills: 5 | Status: ON HOLD | OUTPATIENT
Start: 2020-01-01 | End: 2020-01-01

## 2020-01-01 RX ORDER — LORAZEPAM 2 MG/ML
0.5 INJECTION INTRAMUSCULAR EVERY 6 HOURS PRN
Status: DISCONTINUED | OUTPATIENT
Start: 2020-01-01 | End: 2020-01-01 | Stop reason: HOSPADM

## 2020-01-01 RX ORDER — LEVOFLOXACIN 250 MG/1
250 TABLET, FILM COATED ORAL DAILY
Qty: 21 TABLET | Refills: 0 | Status: SHIPPED | OUTPATIENT
Start: 2020-01-01 | End: 2020-01-01

## 2020-01-01 RX ORDER — DEXTROSE MONOHYDRATE 25 G/50ML
25-50 INJECTION, SOLUTION INTRAVENOUS
Status: ACTIVE | OUTPATIENT
Start: 2020-01-01 | End: 2020-01-01

## 2020-01-01 RX ORDER — HEPARIN SODIUM,PORCINE 10 UNIT/ML
2-5 VIAL (ML) INTRAVENOUS
Status: ACTIVE | OUTPATIENT
Start: 2020-01-01 | End: 2020-01-01

## 2020-01-01 RX ORDER — SODIUM CHLORIDE 9 MG/ML
1000 INJECTION, SOLUTION INTRAVENOUS CONTINUOUS PRN
Status: DISCONTINUED | OUTPATIENT
Start: 2020-01-01 | End: 2020-01-01

## 2020-01-01 RX ADMIN — ONDANSETRON HYDROCHLORIDE 8 MG: 8 TABLET, FILM COATED ORAL at 17:30

## 2020-01-01 RX ADMIN — CARVEDILOL 6.25 MG: 6.25 TABLET, FILM COATED ORAL at 08:15

## 2020-01-01 RX ADMIN — RITUXIMAB-ABBS 160 MG: 10 INJECTION, SOLUTION INTRAVENOUS at 19:01

## 2020-01-01 RX ADMIN — ACETAMINOPHEN 650 MG: 325 TABLET, FILM COATED ORAL at 02:12

## 2020-01-01 RX ADMIN — DOCUSATE SODIUM 286 ML: 50 LIQUID ORAL at 16:12

## 2020-01-01 RX ADMIN — ACYCLOVIR 400 MG: 400 TABLET ORAL at 19:37

## 2020-01-01 RX ADMIN — MORPHINE SULFATE 15 MG: 15 TABLET ORAL at 19:53

## 2020-01-01 RX ADMIN — ALUMINUM HYDROXIDE, MAGNESIUM HYDROXIDE, AND DIMETHICONE 30 ML: 400; 400; 40 SUSPENSION ORAL at 14:39

## 2020-01-01 RX ADMIN — FENTANYL CITRATE 25 MCG: 50 INJECTION, SOLUTION INTRAMUSCULAR; INTRAVENOUS at 10:57

## 2020-01-01 RX ADMIN — MORPHINE SULFATE 45 MG: 30 TABLET, FILM COATED, EXTENDED RELEASE ORAL at 07:56

## 2020-01-01 RX ADMIN — PREDNISONE 100 MG: 50 TABLET ORAL at 10:18

## 2020-01-01 RX ADMIN — Medication 5 ML: at 18:40

## 2020-01-01 RX ADMIN — DOCUSATE SODIUM 50 MG AND SENNOSIDES 8.6 MG 3 TABLET: 8.6; 5 TABLET, FILM COATED ORAL at 07:45

## 2020-01-01 RX ADMIN — MAGNESIUM OXIDE 400 MG: 400 TABLET ORAL at 08:38

## 2020-01-01 RX ADMIN — HYDROMORPHONE HYDROCHLORIDE 0.5 MG: 1 INJECTION, SOLUTION INTRAMUSCULAR; INTRAVENOUS; SUBCUTANEOUS at 05:31

## 2020-01-01 RX ADMIN — CARVEDILOL 6.25 MG: 6.25 TABLET, FILM COATED ORAL at 09:28

## 2020-01-01 RX ADMIN — CISPLATIN 209 MG: 1 INJECTION, SOLUTION INTRAVENOUS at 20:31

## 2020-01-01 RX ADMIN — GABAPENTIN 600 MG: 600 TABLET, FILM COATED ORAL at 10:03

## 2020-01-01 RX ADMIN — CARVEDILOL 6.25 MG: 6.25 TABLET, FILM COATED ORAL at 17:27

## 2020-01-01 RX ADMIN — GABAPENTIN 600 MG: 300 CAPSULE ORAL at 07:52

## 2020-01-01 RX ADMIN — OMEPRAZOLE 40 MG: 20 CAPSULE, DELAYED RELEASE ORAL at 09:25

## 2020-01-01 RX ADMIN — SODIUM CHLORIDE: 9 INJECTION, SOLUTION INTRAVENOUS at 08:12

## 2020-01-01 RX ADMIN — CARVEDILOL 12.5 MG: 6.25 TABLET, FILM COATED ORAL at 07:45

## 2020-01-01 RX ADMIN — OYSTER SHELL CALCIUM WITH VITAMIN D 1 TABLET: 500; 200 TABLET, FILM COATED ORAL at 08:38

## 2020-01-01 RX ADMIN — MORPHINE SULFATE 45 MG: 30 TABLET, EXTENDED RELEASE ORAL at 09:46

## 2020-01-01 RX ADMIN — SULFAMETHOXAZOLE AND TRIMETHOPRIM 1 TABLET: 800; 160 TABLET ORAL at 08:32

## 2020-01-01 RX ADMIN — LIDOCAINE HYDROCHLORIDE 50 MG: 20 INJECTION, SOLUTION INFILTRATION; PERINEURAL at 10:50

## 2020-01-01 RX ADMIN — ACYCLOVIR 400 MG: 400 TABLET ORAL at 10:18

## 2020-01-01 RX ADMIN — QUETIAPINE FUMARATE 12.5 MG: 25 TABLET, FILM COATED ORAL at 01:31

## 2020-01-01 RX ADMIN — POLYETHYLENE GLYCOL 3350 17 G: 17 POWDER, FOR SOLUTION ORAL at 09:46

## 2020-01-01 RX ADMIN — ALLOPURINOL 300 MG: 300 TABLET ORAL at 16:11

## 2020-01-01 RX ADMIN — DOXORUBICIN HYDROCHLORIDE 110 MG: 2 INJECTION, SOLUTION INTRAVENOUS at 10:58

## 2020-01-01 RX ADMIN — CARVEDILOL 12.5 MG: 6.25 TABLET, FILM COATED ORAL at 17:12

## 2020-01-01 RX ADMIN — ACETAMINOPHEN 650 MG: 325 TABLET, FILM COATED ORAL at 12:24

## 2020-01-01 RX ADMIN — TOPICAL ANESTHETIC 1 EACH: 200 SPRAY DENTAL; PERIODONTAL at 10:46

## 2020-01-01 RX ADMIN — POLYETHYLENE GLYCOL 3350 17 G: 17 POWDER, FOR SOLUTION ORAL at 19:48

## 2020-01-01 RX ADMIN — SODIUM CHLORIDE, POTASSIUM CHLORIDE, SODIUM LACTATE AND CALCIUM CHLORIDE: 600; 310; 30; 20 INJECTION, SOLUTION INTRAVENOUS at 09:47

## 2020-01-01 RX ADMIN — ONDANSETRON 4 MG: 2 INJECTION INTRAMUSCULAR; INTRAVENOUS at 11:48

## 2020-01-01 RX ADMIN — MORPHINE SULFATE 30 MG: 30 TABLET, EXTENDED RELEASE ORAL at 07:50

## 2020-01-01 RX ADMIN — CARVEDILOL 12.5 MG: 6.25 TABLET, FILM COATED ORAL at 07:56

## 2020-01-01 RX ADMIN — CARVEDILOL 12.5 MG: 6.25 TABLET, FILM COATED ORAL at 17:17

## 2020-01-01 RX ADMIN — RITUXIMAB 850 MG: 10 INJECTION, SOLUTION INTRAVENOUS at 19:15

## 2020-01-01 RX ADMIN — MORPHINE SULFATE 15 MG: 15 TABLET ORAL at 14:57

## 2020-01-01 RX ADMIN — DIPHENHYDRAMINE HYDROCHLORIDE 50 MG: 25 CAPSULE ORAL at 11:35

## 2020-01-01 RX ADMIN — SODIUM CHLORIDE: 9 INJECTION, SOLUTION INTRAVENOUS at 02:15

## 2020-01-01 RX ADMIN — DIPHENHYDRAMINE HYDROCHLORIDE 50 MG: 25 CAPSULE ORAL at 08:47

## 2020-01-01 RX ADMIN — POTASSIUM CHLORIDE 40 MEQ: 1.5 POWDER, FOR SOLUTION ORAL at 09:23

## 2020-01-01 RX ADMIN — ACETAMINOPHEN 650 MG: 325 TABLET, FILM COATED ORAL at 11:39

## 2020-01-01 RX ADMIN — Medication 5 MG: at 13:25

## 2020-01-01 RX ADMIN — LISINOPRIL 20 MG: 20 TABLET ORAL at 09:17

## 2020-01-01 RX ADMIN — ONDANSETRON HYDROCHLORIDE 8 MG: 8 TABLET, FILM COATED ORAL at 05:28

## 2020-01-01 RX ADMIN — OMEPRAZOLE 40 MG: 20 CAPSULE, DELAYED RELEASE ORAL at 08:17

## 2020-01-01 RX ADMIN — CARVEDILOL 6.25 MG: 6.25 TABLET, FILM COATED ORAL at 08:41

## 2020-01-01 RX ADMIN — RITUXIMAB-ABBS 800 MG: 10 INJECTION, SOLUTION INTRAVENOUS at 12:42

## 2020-01-01 RX ADMIN — MULTIPLE VITAMINS W/ MINERALS TAB 1 TABLET: TAB at 08:38

## 2020-01-01 RX ADMIN — OMEPRAZOLE 40 MG: 20 CAPSULE, DELAYED RELEASE ORAL at 09:35

## 2020-01-01 RX ADMIN — MEGESTROL ACETATE 200 MG: 40 SUSPENSION ORAL at 08:06

## 2020-01-01 RX ADMIN — ATORVASTATIN CALCIUM 40 MG: 20 TABLET, FILM COATED ORAL at 19:15

## 2020-01-01 RX ADMIN — FOSAPREPITANT 150 MG: 150 INJECTION, POWDER, LYOPHILIZED, FOR SOLUTION INTRAVENOUS at 00:47

## 2020-01-01 RX ADMIN — ASPIRIN 81 MG CHEWABLE TABLET 324 MG: 81 TABLET CHEWABLE at 00:13

## 2020-01-01 RX ADMIN — ACETAMINOPHEN 650 MG: 325 TABLET ORAL at 11:27

## 2020-01-01 RX ADMIN — LORAZEPAM 0.5 MG: 2 INJECTION INTRAMUSCULAR; INTRAVENOUS at 22:22

## 2020-01-01 RX ADMIN — FOSAPREPITANT 150 MG: 150 INJECTION, POWDER, LYOPHILIZED, FOR SOLUTION INTRAVENOUS at 10:13

## 2020-01-01 RX ADMIN — CALCIUM CARBONATE (ANTACID) CHEW TAB 500 MG 500 MG: 500 CHEW TAB at 08:13

## 2020-01-01 RX ADMIN — MORPHINE SULFATE 15 MG: 15 TABLET ORAL at 11:15

## 2020-01-01 RX ADMIN — VINCRISTINE SULFATE 2 MG: 1 INJECTION, SOLUTION INTRAVENOUS at 11:29

## 2020-01-01 RX ADMIN — PALONOSETRON 0.25 MG: 0.25 INJECTION, SOLUTION INTRAVENOUS at 10:07

## 2020-01-01 RX ADMIN — METHOTREXATE: 25 INJECTION INTRA-ARTERIAL; INTRAMUSCULAR; INTRATHECAL; INTRAVENOUS at 10:11

## 2020-01-01 RX ADMIN — FOSAPREPITANT 150 MG: 150 INJECTION, POWDER, LYOPHILIZED, FOR SOLUTION INTRAVENOUS at 09:33

## 2020-01-01 RX ADMIN — CALCIUM GLUCONATE 3 G: 98 INJECTION, SOLUTION INTRAVENOUS at 08:59

## 2020-01-01 RX ADMIN — RITUXIMAB 800 MG: 10 INJECTION, SOLUTION INTRAVENOUS at 10:18

## 2020-01-01 RX ADMIN — GADOBUTROL 10 ML: 604.72 INJECTION INTRAVENOUS at 20:11

## 2020-01-01 RX ADMIN — FENTANYL CITRATE 25 MCG: 50 INJECTION INTRAMUSCULAR; INTRAVENOUS at 15:13

## 2020-01-01 RX ADMIN — ACETAMINOPHEN 650 MG: 325 TABLET, FILM COATED ORAL at 00:30

## 2020-01-01 RX ADMIN — AZITHROMYCIN MONOHYDRATE 500 MG: 250 TABLET ORAL at 08:38

## 2020-01-01 RX ADMIN — MORPHINE SULFATE 15 MG: 15 TABLET, EXTENDED RELEASE ORAL at 08:05

## 2020-01-01 RX ADMIN — OMEPRAZOLE 40 MG: 20 CAPSULE, DELAYED RELEASE ORAL at 20:18

## 2020-01-01 RX ADMIN — CARVEDILOL 12.5 MG: 6.25 TABLET, FILM COATED ORAL at 09:25

## 2020-01-01 RX ADMIN — PREDNISOLONE ACETATE 2 DROP: 10 SUSPENSION/ DROPS OPHTHALMIC at 17:00

## 2020-01-01 RX ADMIN — POLYETHYLENE GLYCOL 3350 17 G: 17 POWDER, FOR SOLUTION ORAL at 09:28

## 2020-01-01 RX ADMIN — PREDNISOLONE ACETATE 2 DROP: 10 SUSPENSION/ DROPS OPHTHALMIC at 13:22

## 2020-01-01 RX ADMIN — DEXAMETHASONE 40 MG: 4 TABLET ORAL at 16:50

## 2020-01-01 RX ADMIN — OMEPRAZOLE 40 MG: 20 CAPSULE, DELAYED RELEASE ORAL at 20:21

## 2020-01-01 RX ADMIN — VINCRISTINE SULFATE 2 MG: 1 INJECTION, SOLUTION INTRAVENOUS at 10:35

## 2020-01-01 RX ADMIN — DOCUSATE SODIUM 50 MG AND SENNOSIDES 8.6 MG 3 TABLET: 8.6; 5 TABLET, FILM COATED ORAL at 00:10

## 2020-01-01 RX ADMIN — ACYCLOVIR 400 MG: 400 TABLET ORAL at 00:23

## 2020-01-01 RX ADMIN — Medication 4 MG: at 14:03

## 2020-01-01 RX ADMIN — ATORVASTATIN CALCIUM 40 MG: 20 TABLET, FILM COATED ORAL at 20:52

## 2020-01-01 RX ADMIN — RITUXIMAB 800 MG: 10 INJECTION, SOLUTION INTRAVENOUS at 12:34

## 2020-01-01 RX ADMIN — OXYCODONE HYDROCHLORIDE 15 MG: 5 TABLET ORAL at 11:18

## 2020-01-01 RX ADMIN — MAGNESIUM HYDROXIDE 30 ML: 400 SUSPENSION ORAL at 20:51

## 2020-01-01 RX ADMIN — POTASSIUM CHLORIDE: 2 INJECTION, SOLUTION, CONCENTRATE INTRAVENOUS at 09:39

## 2020-01-01 RX ADMIN — ACYCLOVIR 400 MG: 400 TABLET ORAL at 21:05

## 2020-01-01 RX ADMIN — MAGNESIUM OXIDE 400 MG: 400 TABLET ORAL at 21:05

## 2020-01-01 RX ADMIN — DIPHENHYDRAMINE HYDROCHLORIDE 50 MG: 25 CAPSULE ORAL at 11:30

## 2020-01-01 RX ADMIN — DOCUSATE SODIUM 50 MG AND SENNOSIDES 8.6 MG 3 TABLET: 8.6; 5 TABLET, FILM COATED ORAL at 20:10

## 2020-01-01 RX ADMIN — ACYCLOVIR 400 MG: 400 TABLET ORAL at 09:34

## 2020-01-01 RX ADMIN — SODIUM CHLORIDE 150 MG: 9 INJECTION, SOLUTION INTRAVENOUS at 10:08

## 2020-01-01 RX ADMIN — DOCUSATE SODIUM 50 MG AND SENNOSIDES 8.6 MG 1 TABLET: 8.6; 5 TABLET, FILM COATED ORAL at 06:29

## 2020-01-01 RX ADMIN — Medication: at 12:32

## 2020-01-01 RX ADMIN — ALLOPURINOL 300 MG: 300 TABLET ORAL at 09:47

## 2020-01-01 RX ADMIN — LIDOCAINE HYDROCHLORIDE 1 ML: 20 INJECTION, SOLUTION INFILTRATION; PERINEURAL at 17:13

## 2020-01-01 RX ADMIN — SODIUM CHLORIDE, POTASSIUM CHLORIDE, SODIUM LACTATE AND CALCIUM CHLORIDE: 600; 310; 30; 20 INJECTION, SOLUTION INTRAVENOUS at 03:48

## 2020-01-01 RX ADMIN — CARVEDILOL 12.5 MG: 6.25 TABLET, FILM COATED ORAL at 07:57

## 2020-01-01 RX ADMIN — RITUXIMAB 800 MG: 10 INJECTION, SOLUTION INTRAVENOUS at 12:36

## 2020-01-01 RX ADMIN — MAGNESIUM OXIDE 400 MG: 400 TABLET ORAL at 09:34

## 2020-01-01 RX ADMIN — MORPHINE SULFATE 4 MG: 4 INJECTION INTRAVENOUS at 23:43

## 2020-01-01 RX ADMIN — SODIUM CHLORIDE, POTASSIUM CHLORIDE, SODIUM LACTATE AND CALCIUM CHLORIDE: 600; 310; 30; 20 INJECTION, SOLUTION INTRAVENOUS at 21:34

## 2020-01-01 RX ADMIN — PREDNISOLONE ACETATE 2 DROP: 10 SUSPENSION/ DROPS OPHTHALMIC at 08:07

## 2020-01-01 RX ADMIN — OMEPRAZOLE 40 MG: 20 CAPSULE, DELAYED RELEASE ORAL at 07:45

## 2020-01-01 RX ADMIN — ACETAMINOPHEN 650 MG: 325 TABLET, FILM COATED ORAL at 13:24

## 2020-01-01 RX ADMIN — DIPHENHYDRAMINE HYDROCHLORIDE 50 MG: 25 CAPSULE ORAL at 09:23

## 2020-01-01 RX ADMIN — MORPHINE SULFATE 4 MG: 4 INJECTION INTRAVENOUS at 11:28

## 2020-01-01 RX ADMIN — Medication 12.5 MG: at 19:50

## 2020-01-01 RX ADMIN — HYDROMORPHONE HYDROCHLORIDE 4 MG: 2 TABLET ORAL at 07:44

## 2020-01-01 RX ADMIN — OYSTER SHELL CALCIUM WITH VITAMIN D 1 TABLET: 500; 200 TABLET, FILM COATED ORAL at 18:16

## 2020-01-01 RX ADMIN — CALCIUM CARBONATE (ANTACID) CHEW TAB 500 MG 500 MG: 500 CHEW TAB at 21:05

## 2020-01-01 RX ADMIN — OMEPRAZOLE 40 MG: 20 CAPSULE, DELAYED RELEASE ORAL at 07:44

## 2020-01-01 RX ADMIN — SODIUM CHLORIDE: 9 INJECTION, SOLUTION INTRAVENOUS at 11:38

## 2020-01-01 RX ADMIN — SODIUM CHLORIDE, PRESERVATIVE FREE 5 ML: 5 INJECTION INTRAVENOUS at 21:07

## 2020-01-01 RX ADMIN — SODIUM CHLORIDE 1000 ML: 9 INJECTION, SOLUTION INTRAVENOUS at 00:45

## 2020-01-01 RX ADMIN — CARVEDILOL 6.25 MG: 6.25 TABLET, FILM COATED ORAL at 17:31

## 2020-01-01 RX ADMIN — LISINOPRIL 20 MG: 20 TABLET ORAL at 16:11

## 2020-01-01 RX ADMIN — MORPHINE SULFATE 4 MG: 4 INJECTION INTRAVENOUS at 09:19

## 2020-01-01 RX ADMIN — ALLOPURINOL 300 MG: 300 TABLET ORAL at 09:00

## 2020-01-01 RX ADMIN — LISINOPRIL 20 MG: 20 TABLET ORAL at 11:19

## 2020-01-01 RX ADMIN — ALLOPURINOL 300 MG: 300 TABLET ORAL at 10:04

## 2020-01-01 RX ADMIN — PHYTONADIONE 2 MG: 10 INJECTION, EMULSION INTRAMUSCULAR; INTRAVENOUS; SUBCUTANEOUS at 17:43

## 2020-01-01 RX ADMIN — GABAPENTIN 300 MG: 300 CAPSULE ORAL at 21:39

## 2020-01-01 RX ADMIN — CARVEDILOL 12.5 MG: 6.25 TABLET, FILM COATED ORAL at 08:38

## 2020-01-01 RX ADMIN — GABAPENTIN 300 MG: 300 CAPSULE ORAL at 21:24

## 2020-01-01 RX ADMIN — MORPHINE SULFATE 45 MG: 30 TABLET, EXTENDED RELEASE ORAL at 10:01

## 2020-01-01 RX ADMIN — MAGNESIUM CITRATE 296 ML: 1.75 LIQUID ORAL at 09:58

## 2020-01-01 RX ADMIN — ONDANSETRON HYDROCHLORIDE 16 MG: 8 TABLET, FILM COATED ORAL at 19:37

## 2020-01-01 RX ADMIN — Medication 5 MG: at 10:19

## 2020-01-01 RX ADMIN — ALLOPURINOL 300 MG: 300 TABLET ORAL at 10:17

## 2020-01-01 RX ADMIN — CARVEDILOL 12.5 MG: 6.25 TABLET, FILM COATED ORAL at 08:08

## 2020-01-01 RX ADMIN — PEGFILGRASTIM 6 MG: KIT SUBCUTANEOUS at 11:14

## 2020-01-01 RX ADMIN — IOPAMIDOL 122 ML: 755 INJECTION, SOLUTION INTRAVENOUS at 11:30

## 2020-01-01 RX ADMIN — DIPHENHYDRAMINE HYDROCHLORIDE 50 MG: 50 CAPSULE ORAL at 18:35

## 2020-01-01 RX ADMIN — ATORVASTATIN CALCIUM 40 MG: 40 TABLET, FILM COATED ORAL at 19:58

## 2020-01-01 RX ADMIN — SODIUM CHLORIDE 1000 ML: 9 INJECTION, SOLUTION INTRAVENOUS at 23:43

## 2020-01-01 RX ADMIN — Medication 5 ML: at 06:54

## 2020-01-01 RX ADMIN — OMEPRAZOLE 40 MG: 20 CAPSULE, DELAYED RELEASE ORAL at 08:24

## 2020-01-01 RX ADMIN — SODIUM CHLORIDE, PRESERVATIVE FREE 5 ML: 5 INJECTION INTRAVENOUS at 09:39

## 2020-01-01 RX ADMIN — OMEPRAZOLE 40 MG: 20 CAPSULE, DELAYED RELEASE ORAL at 07:55

## 2020-01-01 RX ADMIN — DEXAMETHASONE SODIUM PHOSPHATE: 10 INJECTION, SOLUTION INTRAMUSCULAR; INTRAVENOUS at 08:49

## 2020-01-01 RX ADMIN — Medication 10 MG: at 22:01

## 2020-01-01 RX ADMIN — GABAPENTIN 300 MG: 300 CAPSULE ORAL at 03:10

## 2020-01-01 RX ADMIN — CIPROFLOXACIN HYDROCHLORIDE 500 MG: 500 TABLET, FILM COATED ORAL at 00:52

## 2020-01-01 RX ADMIN — ACETAMINOPHEN 650 MG: 325 TABLET, FILM COATED ORAL at 08:49

## 2020-01-01 RX ADMIN — OMEPRAZOLE 40 MG: 20 CAPSULE, DELAYED RELEASE ORAL at 08:38

## 2020-01-01 RX ADMIN — ALLOPURINOL 300 MG: 300 TABLET ORAL at 09:34

## 2020-01-01 RX ADMIN — AMPICILLIN SODIUM AND SULBACTAM SODIUM 3 G: 2; 1 INJECTION, POWDER, FOR SOLUTION INTRAMUSCULAR; INTRAVENOUS at 04:09

## 2020-01-01 RX ADMIN — POTASSIUM CHLORIDE 40 MEQ: 1.5 POWDER, FOR SOLUTION ORAL at 03:53

## 2020-01-01 RX ADMIN — PREDNISONE 100 MG: 50 TABLET ORAL at 11:39

## 2020-01-01 RX ADMIN — GABAPENTIN 300 MG: 300 CAPSULE ORAL at 02:36

## 2020-01-01 RX ADMIN — DIPHENHYDRAMINE HYDROCHLORIDE 50 MG: 25 CAPSULE ORAL at 10:44

## 2020-01-01 RX ADMIN — SODIUM CHLORIDE 150 MG: 9 INJECTION, SOLUTION INTRAVENOUS at 10:23

## 2020-01-01 RX ADMIN — LORAZEPAM 0.5 MG: 0.5 TABLET ORAL at 10:21

## 2020-01-01 RX ADMIN — CARVEDILOL 12.5 MG: 6.25 TABLET, FILM COATED ORAL at 18:09

## 2020-01-01 RX ADMIN — VINCRISTINE SULFATE 2 MG: 1 INJECTION, SOLUTION INTRAVENOUS at 09:10

## 2020-01-01 RX ADMIN — RITUXIMAB-ABBS 800 MG: 10 INJECTION, SOLUTION INTRAVENOUS at 17:26

## 2020-01-01 RX ADMIN — Medication 5 ML: at 00:14

## 2020-01-01 RX ADMIN — ALUMINUM HYDROXIDE, MAGNESIUM HYDROXIDE, AND DIMETHICONE 30 ML: 400; 400; 40 SUSPENSION ORAL at 17:17

## 2020-01-01 RX ADMIN — GABAPENTIN 600 MG: 300 CAPSULE ORAL at 09:34

## 2020-01-01 RX ADMIN — MORPHINE SULFATE 30 MG: 30 TABLET, FILM COATED, EXTENDED RELEASE ORAL at 20:28

## 2020-01-01 RX ADMIN — POTASSIUM CHLORIDE, DEXTROSE MONOHYDRATE AND SODIUM CHLORIDE: 150; 5; 900 INJECTION, SOLUTION INTRAVENOUS at 09:29

## 2020-01-01 RX ADMIN — VINCRISTINE SULFATE 2 MG: 1 INJECTION, SOLUTION INTRAVENOUS at 11:07

## 2020-01-01 RX ADMIN — Medication 5 MG: at 14:56

## 2020-01-01 RX ADMIN — ATORVASTATIN CALCIUM 40 MG: 40 TABLET, FILM COATED ORAL at 08:38

## 2020-01-01 RX ADMIN — GABAPENTIN 300 MG: 300 CAPSULE ORAL at 22:14

## 2020-01-01 RX ADMIN — OMEPRAZOLE 40 MG: 20 CAPSULE, DELAYED RELEASE ORAL at 16:46

## 2020-01-01 RX ADMIN — GABAPENTIN 300 MG: 300 CAPSULE ORAL at 21:54

## 2020-01-01 RX ADMIN — ALLOPURINOL 300 MG: 300 TABLET ORAL at 08:24

## 2020-01-01 RX ADMIN — POTASSIUM CHLORIDE, DEXTROSE MONOHYDRATE AND SODIUM CHLORIDE: 150; 5; 900 INJECTION, SOLUTION INTRAVENOUS at 15:26

## 2020-01-01 RX ADMIN — Medication 2.5 MG: at 11:49

## 2020-01-01 RX ADMIN — METOCLOPRAMIDE 10 MG: 10 TABLET ORAL at 21:39

## 2020-01-01 RX ADMIN — MAGNESIUM OXIDE 400 MG: 400 TABLET ORAL at 20:38

## 2020-01-01 RX ADMIN — POLYETHYLENE GLYCOL 3350 17 G: 17 POWDER, FOR SOLUTION ORAL at 00:10

## 2020-01-01 RX ADMIN — ALLOPURINOL 300 MG: 300 TABLET ORAL at 09:28

## 2020-01-01 RX ADMIN — DEXAMETHASONE 8 MG: 4 TABLET ORAL at 09:16

## 2020-01-01 RX ADMIN — MORPHINE SULFATE 15 MG: 15 TABLET, EXTENDED RELEASE ORAL at 09:33

## 2020-01-01 RX ADMIN — MORPHINE SULFATE 15 MG: 15 TABLET ORAL at 13:25

## 2020-01-01 RX ADMIN — IOPAMIDOL 135 ML: 755 INJECTION, SOLUTION INTRAVENOUS at 11:29

## 2020-01-01 RX ADMIN — DOXORUBICIN HYDROCHLORIDE 110 MG: 2 INJECTION, SOLUTION INTRAVENOUS at 11:16

## 2020-01-01 RX ADMIN — OMEPRAZOLE 40 MG: 20 CAPSULE, DELAYED RELEASE ORAL at 16:27

## 2020-01-01 RX ADMIN — MULTIPLE VITAMINS W/ MINERALS TAB 1 TABLET: TAB at 16:56

## 2020-01-01 RX ADMIN — HEPARIN SODIUM 5000 UNITS: 5000 INJECTION, SOLUTION INTRAVENOUS; SUBCUTANEOUS at 09:38

## 2020-01-01 RX ADMIN — OMEPRAZOLE 40 MG: 20 CAPSULE, DELAYED RELEASE ORAL at 08:13

## 2020-01-01 RX ADMIN — Medication 10 ML: at 15:42

## 2020-01-01 RX ADMIN — CALCIUM CARBONATE (ANTACID) CHEW TAB 500 MG 500 MG: 500 CHEW TAB at 19:54

## 2020-01-01 RX ADMIN — POLYETHYLENE GLYCOL 3350 17 G: 17 POWDER, FOR SOLUTION ORAL at 13:34

## 2020-01-01 RX ADMIN — MAGNESIUM SULFATE 2 G: 2 INJECTION INTRAVENOUS at 09:28

## 2020-01-01 RX ADMIN — MORPHINE SULFATE 45 MG: 30 TABLET, EXTENDED RELEASE ORAL at 09:16

## 2020-01-01 RX ADMIN — ACYCLOVIR 400 MG: 400 TABLET ORAL at 10:03

## 2020-01-01 RX ADMIN — MORPHINE SULFATE 45 MG: 30 TABLET, EXTENDED RELEASE ORAL at 20:09

## 2020-01-01 RX ADMIN — CYCLOPHOSPHAMIDE 1500 MG: 1 INJECTION, POWDER, FOR SOLUTION INTRAVENOUS; ORAL at 11:37

## 2020-01-01 RX ADMIN — DEXAMETHASONE 12 MG: 4 TABLET ORAL at 09:47

## 2020-01-01 RX ADMIN — CARVEDILOL 12.5 MG: 6.25 TABLET, FILM COATED ORAL at 09:17

## 2020-01-01 RX ADMIN — METHOTREXATE: 25 INJECTION INTRA-ARTERIAL; INTRAMUSCULAR; INTRATHECAL; INTRAVENOUS at 08:20

## 2020-01-01 RX ADMIN — RIVAROXABAN 20 MG: 20 TABLET, FILM COATED ORAL at 17:14

## 2020-01-01 RX ADMIN — FOSAPREPITANT 150 MG: 150 INJECTION, POWDER, LYOPHILIZED, FOR SOLUTION INTRAVENOUS at 19:38

## 2020-01-01 RX ADMIN — OXYCODONE HYDROCHLORIDE 5 MG: 5 TABLET ORAL at 08:23

## 2020-01-01 RX ADMIN — DEXAMETHASONE 8 MG: 4 TABLET ORAL at 10:04

## 2020-01-01 RX ADMIN — POLYETHYLENE GLYCOL 3350 17 G: 17 POWDER, FOR SOLUTION ORAL at 08:07

## 2020-01-01 RX ADMIN — ACETAMINOPHEN 650 MG: 325 TABLET, FILM COATED ORAL at 16:50

## 2020-01-01 RX ADMIN — FLUDEOXYGLUCOSE F-18 12.51 MCI.: 500 INJECTION, SOLUTION INTRAVENOUS at 10:02

## 2020-01-01 RX ADMIN — ACETAMINOPHEN 650 MG: 325 TABLET, FILM COATED ORAL at 02:22

## 2020-01-01 RX ADMIN — ALLOPURINOL 300 MG: 300 TABLET ORAL at 09:17

## 2020-01-01 RX ADMIN — VINCRISTINE SULFATE 2 MG: 1 INJECTION, SOLUTION INTRAVENOUS at 23:12

## 2020-01-01 RX ADMIN — ALLOPURINOL 300 MG: 300 TABLET ORAL at 10:56

## 2020-01-01 RX ADMIN — DOCUSATE SODIUM 50 MG AND SENNOSIDES 8.6 MG 3 TABLET: 8.6; 5 TABLET, FILM COATED ORAL at 19:37

## 2020-01-01 RX ADMIN — CARVEDILOL 6.25 MG: 6.25 TABLET, FILM COATED ORAL at 08:16

## 2020-01-01 RX ADMIN — ATORVASTATIN CALCIUM 40 MG: 40 TABLET, FILM COATED ORAL at 09:34

## 2020-01-01 RX ADMIN — CARVEDILOL 12.5 MG: 6.25 TABLET, FILM COATED ORAL at 17:00

## 2020-01-01 RX ADMIN — OXYCODONE HYDROCHLORIDE 5 MG: 5 TABLET ORAL at 08:15

## 2020-01-01 RX ADMIN — ONDANSETRON HYDROCHLORIDE 16 MG: 8 TABLET, FILM COATED ORAL at 22:59

## 2020-01-01 RX ADMIN — POTASSIUM CHLORIDE 40 MEQ: 750 TABLET, EXTENDED RELEASE ORAL at 10:18

## 2020-01-01 RX ADMIN — POTASSIUM CHLORIDE: 2 INJECTION, SOLUTION, CONCENTRATE INTRAVENOUS at 03:18

## 2020-01-01 RX ADMIN — POTASSIUM CHLORIDE 40 MEQ: 750 TABLET, EXTENDED RELEASE ORAL at 20:28

## 2020-01-01 RX ADMIN — FENTANYL CITRATE 100 MCG: 50 INJECTION INTRAMUSCULAR; INTRAVENOUS at 15:04

## 2020-01-01 RX ADMIN — PROPOFOL 100 MCG/KG/MIN: 10 INJECTION, EMULSION INTRAVENOUS at 10:50

## 2020-01-01 RX ADMIN — MORPHINE SULFATE 45 MG: 30 TABLET, EXTENDED RELEASE ORAL at 20:21

## 2020-01-01 RX ADMIN — CYCLOPHOSPHAMIDE 1500 MG: 1 INJECTION, POWDER, FOR SOLUTION INTRAVENOUS; ORAL at 09:21

## 2020-01-01 RX ADMIN — DOCUSATE SODIUM 50 MG AND SENNOSIDES 8.6 MG 3 TABLET: 8.6; 5 TABLET, FILM COATED ORAL at 20:21

## 2020-01-01 RX ADMIN — ACYCLOVIR 400 MG: 400 TABLET ORAL at 09:47

## 2020-01-01 RX ADMIN — CARVEDILOL 12.5 MG: 6.25 TABLET, FILM COATED ORAL at 18:25

## 2020-01-01 RX ADMIN — DOCUSATE SODIUM 50 MG AND SENNOSIDES 8.6 MG 3 TABLET: 8.6; 5 TABLET, FILM COATED ORAL at 08:18

## 2020-01-01 RX ADMIN — ETOPOSIDE 210 MG: 20 INJECTION, SOLUTION, CONCENTRATE INTRAVENOUS at 11:49

## 2020-01-01 RX ADMIN — DOXORUBICIN HYDROCHLORIDE 110 MG: 2 INJECTION, SOLUTION INTRAVENOUS at 10:51

## 2020-01-01 RX ADMIN — OMEPRAZOLE 40 MG: 20 CAPSULE, DELAYED RELEASE ORAL at 07:51

## 2020-01-01 RX ADMIN — MORPHINE SULFATE 30 MG: 30 TABLET, EXTENDED RELEASE ORAL at 21:39

## 2020-01-01 RX ADMIN — MORPHINE SULFATE 15 MG: 15 TABLET, EXTENDED RELEASE ORAL at 10:17

## 2020-01-01 RX ADMIN — ACETAMINOPHEN 650 MG: 325 TABLET ORAL at 08:47

## 2020-01-01 RX ADMIN — MORPHINE SULFATE 45 MG: 30 TABLET, EXTENDED RELEASE ORAL at 19:49

## 2020-01-01 RX ADMIN — GADOBUTROL 10 ML: 604.72 INJECTION INTRAVENOUS at 20:33

## 2020-01-01 RX ADMIN — ONDANSETRON HYDROCHLORIDE 8 MG: 8 TABLET, FILM COATED ORAL at 16:47

## 2020-01-01 RX ADMIN — MORPHINE SULFATE 15 MG: 15 TABLET ORAL at 22:28

## 2020-01-01 RX ADMIN — POLYETHYLENE GLYCOL 3350 17 G: 17 POWDER, FOR SOLUTION ORAL at 07:52

## 2020-01-01 RX ADMIN — PEGFILGRASTIM 6 MG: 6 INJECTION SUBCUTANEOUS at 11:37

## 2020-01-01 RX ADMIN — LISINOPRIL 20 MG: 20 TABLET ORAL at 10:03

## 2020-01-01 RX ADMIN — ACYCLOVIR 400 MG: 400 TABLET ORAL at 00:10

## 2020-01-01 RX ADMIN — ALLOPURINOL 300 MG: 300 TABLET ORAL at 08:06

## 2020-01-01 RX ADMIN — MORPHINE SULFATE 15 MG: 15 TABLET ORAL at 01:03

## 2020-01-01 RX ADMIN — MEGESTROL ACETATE 200 MG: 40 SUSPENSION ORAL at 10:18

## 2020-01-01 RX ADMIN — Medication: at 21:14

## 2020-01-01 RX ADMIN — DOCUSATE SODIUM 50 MG AND SENNOSIDES 8.6 MG 2 TABLET: 8.6; 5 TABLET, FILM COATED ORAL at 10:02

## 2020-01-01 RX ADMIN — MORPHINE SULFATE 15 MG: 15 TABLET ORAL at 10:39

## 2020-01-01 RX ADMIN — ACETAMINOPHEN 650 MG: 325 TABLET, FILM COATED ORAL at 18:35

## 2020-01-01 RX ADMIN — CARVEDILOL 12.5 MG: 6.25 TABLET, FILM COATED ORAL at 18:16

## 2020-01-01 RX ADMIN — BENDAMUSTINE HYDROCHLORIDE 185 MG: 25 INJECTION, SOLUTION INTRAVENOUS at 14:16

## 2020-01-01 RX ADMIN — ACYCLOVIR 400 MG: 400 TABLET ORAL at 09:17

## 2020-01-01 RX ADMIN — SODIUM CHLORIDE: 9 INJECTION, SOLUTION INTRAVENOUS at 18:12

## 2020-01-01 RX ADMIN — CYTARABINE 4180 MG: 100 INJECTION, SOLUTION INTRATHECAL; INTRAVENOUS; SUBCUTANEOUS at 06:04

## 2020-01-01 RX ADMIN — SODIUM CHLORIDE 110 MG: 9 INJECTION, SOLUTION INTRAVENOUS at 23:58

## 2020-01-01 RX ADMIN — HEPARIN SODIUM 5000 UNITS: 5000 INJECTION, SOLUTION INTRAVENOUS; SUBCUTANEOUS at 16:25

## 2020-01-01 RX ADMIN — OYSTER SHELL CALCIUM WITH VITAMIN D 1 TABLET: 500; 200 TABLET, FILM COATED ORAL at 08:07

## 2020-01-01 RX ADMIN — CALCIUM CARBONATE (ANTACID) CHEW TAB 500 MG 500 MG: 500 CHEW TAB at 16:56

## 2020-01-01 RX ADMIN — DOCUSATE SODIUM 50 MG AND SENNOSIDES 8.6 MG 3 TABLET: 8.6; 5 TABLET, FILM COATED ORAL at 08:24

## 2020-01-01 RX ADMIN — IOPAMIDOL 132 ML: 755 INJECTION, SOLUTION INTRAVENOUS at 13:33

## 2020-01-01 RX ADMIN — ACYCLOVIR 400 MG: 400 TABLET ORAL at 07:45

## 2020-01-01 RX ADMIN — HYDROMORPHONE HYDROCHLORIDE 0.5 MG: 1 INJECTION, SOLUTION INTRAMUSCULAR; INTRAVENOUS; SUBCUTANEOUS at 01:26

## 2020-01-01 RX ADMIN — ACETAMINOPHEN 650 MG: 325 TABLET, FILM COATED ORAL at 07:54

## 2020-01-01 RX ADMIN — POTASSIUM CHLORIDE: 2 INJECTION, SOLUTION, CONCENTRATE INTRAVENOUS at 01:38

## 2020-01-01 RX ADMIN — SODIUM CHLORIDE: 9 INJECTION, SOLUTION INTRAVENOUS at 03:06

## 2020-01-01 RX ADMIN — ATORVASTATIN CALCIUM 40 MG: 40 TABLET, FILM COATED ORAL at 08:14

## 2020-01-01 RX ADMIN — Medication 5 ML: at 13:14

## 2020-01-01 RX ADMIN — IOPAMIDOL 127 ML: 755 INJECTION, SOLUTION INTRAVENOUS at 10:02

## 2020-01-01 RX ADMIN — CARVEDILOL 6.25 MG: 6.25 TABLET, FILM COATED ORAL at 17:34

## 2020-01-01 RX ADMIN — OMEPRAZOLE 40 MG: 20 CAPSULE, DELAYED RELEASE ORAL at 10:17

## 2020-01-01 RX ADMIN — ACYCLOVIR 400 MG: 400 TABLET ORAL at 07:56

## 2020-01-01 RX ADMIN — MORPHINE SULFATE 45 MG: 30 TABLET, EXTENDED RELEASE ORAL at 08:07

## 2020-01-01 RX ADMIN — PALONOSETRON HYDROCHLORIDE 0.25 MG: 0.25 INJECTION, SOLUTION INTRAVENOUS at 10:30

## 2020-01-01 RX ADMIN — ALLOPURINOL 300 MG: 300 TABLET ORAL at 07:54

## 2020-01-01 RX ADMIN — CALCIUM GLUCONATE 1 G: 98 INJECTION, SOLUTION INTRAVENOUS at 21:53

## 2020-01-01 RX ADMIN — SENNOSIDES AND DOCUSATE SODIUM 2 TABLET: 8.6; 5 TABLET ORAL at 18:32

## 2020-01-01 RX ADMIN — GABAPENTIN 600 MG: 300 CAPSULE ORAL at 07:45

## 2020-01-01 RX ADMIN — ACETAMINOPHEN 650 MG: 325 TABLET, FILM COATED ORAL at 01:30

## 2020-01-01 RX ADMIN — PALONOSETRON 0.25 MG: 0.05 INJECTION, SOLUTION INTRAVENOUS at 08:16

## 2020-01-01 RX ADMIN — ALLOPURINOL 300 MG: 300 TABLET ORAL at 08:14

## 2020-01-01 RX ADMIN — ACYCLOVIR 400 MG: 400 TABLET ORAL at 08:23

## 2020-01-01 RX ADMIN — RIVAROXABAN 20 MG: 20 TABLET, FILM COATED ORAL at 16:50

## 2020-01-01 RX ADMIN — Medication 5 ML: at 01:07

## 2020-01-01 RX ADMIN — SODIUM CHLORIDE 1000 ML: 9 INJECTION, SOLUTION INTRAVENOUS at 13:51

## 2020-01-01 RX ADMIN — OYSTER SHELL CALCIUM WITH VITAMIN D 1 TABLET: 500; 200 TABLET, FILM COATED ORAL at 18:38

## 2020-01-01 RX ADMIN — MAGNESIUM HYDROXIDE 30 ML: 400 SUSPENSION ORAL at 09:03

## 2020-01-01 RX ADMIN — Medication 5 ML: at 12:09

## 2020-01-01 RX ADMIN — ACETAMINOPHEN 650 MG: 325 TABLET, FILM COATED ORAL at 10:56

## 2020-01-01 RX ADMIN — CARVEDILOL 6.25 MG: 6.25 TABLET, FILM COATED ORAL at 08:27

## 2020-01-01 RX ADMIN — DOCUSATE SODIUM 50 MG AND SENNOSIDES 8.6 MG 2 TABLET: 8.6; 5 TABLET, FILM COATED ORAL at 20:26

## 2020-01-01 RX ADMIN — MORPHINE SULFATE 15 MG: 15 TABLET ORAL at 06:49

## 2020-01-01 RX ADMIN — AZITHROMYCIN MONOHYDRATE 500 MG: 250 TABLET ORAL at 09:57

## 2020-01-01 RX ADMIN — SODIUM CHLORIDE 250 ML: 9 INJECTION, SOLUTION INTRAVENOUS at 08:34

## 2020-01-01 RX ADMIN — LISINOPRIL 20 MG: 20 TABLET ORAL at 08:08

## 2020-01-01 RX ADMIN — DEXAMETHASONE SODIUM PHOSPHATE: 10 INJECTION, SOLUTION INTRAMUSCULAR; INTRAVENOUS at 08:34

## 2020-01-01 RX ADMIN — CARVEDILOL 12.5 MG: 6.25 TABLET, FILM COATED ORAL at 18:11

## 2020-01-01 RX ADMIN — GABAPENTIN 300 MG: 300 CAPSULE ORAL at 00:23

## 2020-01-01 RX ADMIN — MORPHINE SULFATE 4 MG: 4 INJECTION INTRAVENOUS at 07:35

## 2020-01-01 RX ADMIN — ALLOPURINOL 300 MG: 300 TABLET ORAL at 07:45

## 2020-01-01 RX ADMIN — Medication 2.5 MG: at 19:50

## 2020-01-01 RX ADMIN — LISINOPRIL 20 MG: 20 TABLET ORAL at 07:45

## 2020-01-01 RX ADMIN — DOXORUBICIN HYDROCHLORIDE 110 MG: 2 INJECTION, SOLUTION INTRAVENOUS at 10:10

## 2020-01-01 RX ADMIN — OYSTER SHELL CALCIUM WITH VITAMIN D 1 TABLET: 500; 200 TABLET, FILM COATED ORAL at 08:14

## 2020-01-01 RX ADMIN — PHYTONADIONE 5 MG: 10 INJECTION, EMULSION INTRAMUSCULAR; INTRAVENOUS; SUBCUTANEOUS at 11:29

## 2020-01-01 RX ADMIN — BENDAMUSTINE HYDROCHLORIDE 185 MG: 25 INJECTION, SOLUTION INTRAVENOUS at 09:06

## 2020-01-01 RX ADMIN — LIDOCAINE HYDROCHLORIDE 1 ML: 10 INJECTION, SOLUTION EPIDURAL; INFILTRATION; INTRACAUDAL; PERINEURAL at 11:44

## 2020-01-01 RX ADMIN — ACYCLOVIR 400 MG: 400 TABLET ORAL at 19:49

## 2020-01-01 RX ADMIN — CYCLOPHOSPHAMIDE 1500 MG: 1 INJECTION, POWDER, FOR SOLUTION INTRAVENOUS; ORAL at 11:22

## 2020-01-01 RX ADMIN — MIDAZOLAM 2 MG: 1 INJECTION INTRAMUSCULAR; INTRAVENOUS at 12:28

## 2020-01-01 RX ADMIN — METHOTREXATE: 25 INJECTION, SOLUTION INTRA-ARTERIAL; INTRAMUSCULAR; INTRATHECAL; INTRAVENOUS at 10:08

## 2020-01-01 RX ADMIN — GABAPENTIN 600 MG: 600 TABLET, FILM COATED ORAL at 08:23

## 2020-01-01 RX ADMIN — CARVEDILOL 12.5 MG: 6.25 TABLET, FILM COATED ORAL at 08:14

## 2020-01-01 RX ADMIN — POLATUZUMAB VEDOTIN 156.2 MG: 140 INJECTION, POWDER, LYOPHILIZED, FOR SOLUTION INTRAVENOUS at 09:30

## 2020-01-01 RX ADMIN — MEGESTROL ACETATE 200 MG: 40 SUSPENSION ORAL at 13:06

## 2020-01-01 RX ADMIN — ACETAMINOPHEN 650 MG: 325 TABLET ORAL at 09:23

## 2020-01-01 RX ADMIN — CARVEDILOL 6.25 MG: 6.25 TABLET, FILM COATED ORAL at 09:00

## 2020-01-01 RX ADMIN — DIPHENHYDRAMINE HYDROCHLORIDE 50 MG: 50 CAPSULE ORAL at 16:50

## 2020-01-01 RX ADMIN — ALLOPURINOL 300 MG: 300 TABLET ORAL at 08:07

## 2020-01-01 RX ADMIN — MORPHINE SULFATE 15 MG: 15 TABLET ORAL at 04:22

## 2020-01-01 RX ADMIN — CYTARABINE 4180 MG: 100 INJECTION, SOLUTION INTRATHECAL; INTRAVENOUS; SUBCUTANEOUS at 18:01

## 2020-01-01 RX ADMIN — ALLOPURINOL 300 MG: 300 TABLET ORAL at 08:16

## 2020-01-01 RX ADMIN — CYCLOPHOSPHAMIDE 1695 MG: 2 INJECTION, POWDER, FOR SOLUTION INTRAVENOUS; ORAL at 10:41

## 2020-01-01 RX ADMIN — Medication 10 MG: at 00:52

## 2020-01-01 RX ADMIN — DEXAMETHASONE 40 MG: 4 TABLET ORAL at 17:14

## 2020-01-01 RX ADMIN — LISINOPRIL 20 MG: 20 TABLET ORAL at 07:52

## 2020-01-01 RX ADMIN — ATORVASTATIN CALCIUM 40 MG: 20 TABLET, FILM COATED ORAL at 19:48

## 2020-01-01 RX ADMIN — SODIUM CHLORIDE 1000 ML: 9 INJECTION, SOLUTION INTRAVENOUS at 16:13

## 2020-01-01 RX ADMIN — ACETAMINOPHEN 650 MG: 325 TABLET, FILM COATED ORAL at 18:14

## 2020-01-01 RX ADMIN — VINCRISTINE SULFATE 2 MG: 1 INJECTION, SOLUTION INTRAVENOUS at 11:14

## 2020-01-01 RX ADMIN — HYDROMORPHONE HYDROCHLORIDE 4 MG: 2 TABLET ORAL at 13:24

## 2020-01-01 RX ADMIN — GABAPENTIN 300 MG: 300 CAPSULE ORAL at 19:54

## 2020-01-01 RX ADMIN — SODIUM CHLORIDE 150 MG: 9 INJECTION, SOLUTION INTRAVENOUS at 10:32

## 2020-01-01 RX ADMIN — SODIUM CHLORIDE 500 ML: 9 INJECTION, SOLUTION INTRAVENOUS at 10:06

## 2020-01-01 RX ADMIN — MIDAZOLAM 2 MG: 1 INJECTION INTRAMUSCULAR; INTRAVENOUS at 15:04

## 2020-01-01 RX ADMIN — CARVEDILOL 12.5 MG: 6.25 TABLET, FILM COATED ORAL at 18:39

## 2020-01-01 RX ADMIN — MULTIPLE VITAMINS W/ MINERALS TAB 1 TABLET: TAB at 09:34

## 2020-01-01 RX ADMIN — SODIUM CHLORIDE, POTASSIUM CHLORIDE, SODIUM LACTATE AND CALCIUM CHLORIDE: 600; 310; 30; 20 INJECTION, SOLUTION INTRAVENOUS at 06:29

## 2020-01-01 RX ADMIN — LISINOPRIL 20 MG: 20 TABLET ORAL at 08:23

## 2020-01-01 RX ADMIN — ACYCLOVIR 400 MG: 400 TABLET ORAL at 08:07

## 2020-01-01 RX ADMIN — MORPHINE SULFATE 45 MG: 30 TABLET, EXTENDED RELEASE ORAL at 19:36

## 2020-01-01 RX ADMIN — MAGNESIUM OXIDE 400 MG: 400 TABLET ORAL at 07:44

## 2020-01-01 RX ADMIN — SODIUM CHLORIDE, PRESERVATIVE FREE 5 ML: 5 INJECTION INTRAVENOUS at 21:45

## 2020-01-01 RX ADMIN — ATORVASTATIN CALCIUM 40 MG: 20 TABLET, FILM COATED ORAL at 20:47

## 2020-01-01 RX ADMIN — MORPHINE SULFATE 4 MG: 4 INJECTION INTRAVENOUS at 03:37

## 2020-01-01 RX ADMIN — CARVEDILOL 6.25 MG: 6.25 TABLET, FILM COATED ORAL at 18:35

## 2020-01-01 RX ADMIN — DOXORUBICIN HYDROCHLORIDE 110 MG: 2 INJECTION, SOLUTION INTRAVENOUS at 08:58

## 2020-01-01 RX ADMIN — MAGNESIUM OXIDE 400 MG: 400 TABLET ORAL at 11:49

## 2020-01-01 RX ADMIN — GABAPENTIN 600 MG: 600 TABLET, FILM COATED ORAL at 09:46

## 2020-01-01 RX ADMIN — SODIUM CHLORIDE, POTASSIUM CHLORIDE, SODIUM LACTATE AND CALCIUM CHLORIDE: 600; 310; 30; 20 INJECTION, SOLUTION INTRAVENOUS at 10:46

## 2020-01-01 RX ADMIN — MULTIPLE VITAMINS W/ MINERALS TAB 1 TABLET: TAB at 08:07

## 2020-01-01 RX ADMIN — Medication 5 MG: at 19:53

## 2020-01-01 RX ADMIN — HYDROMORPHONE HYDROCHLORIDE 0.5 MG: 1 INJECTION, SOLUTION INTRAMUSCULAR; INTRAVENOUS; SUBCUTANEOUS at 08:50

## 2020-01-01 RX ADMIN — Medication 5 MG: at 16:31

## 2020-01-01 RX ADMIN — PEGFILGRASTIM 6 MG: KIT SUBCUTANEOUS at 14:04

## 2020-01-01 RX ADMIN — OYSTER SHELL CALCIUM WITH VITAMIN D 1 TABLET: 500; 200 TABLET, FILM COATED ORAL at 10:19

## 2020-01-01 RX ADMIN — Medication 5 ML: at 14:34

## 2020-01-01 RX ADMIN — MORPHINE SULFATE 15 MG: 15 TABLET ORAL at 08:51

## 2020-01-01 RX ADMIN — GABAPENTIN 600 MG: 600 TABLET, FILM COATED ORAL at 09:17

## 2020-01-01 RX ADMIN — OYSTER SHELL CALCIUM WITH VITAMIN D 1 TABLET: 500; 200 TABLET, FILM COATED ORAL at 18:09

## 2020-01-01 RX ADMIN — CARVEDILOL 12.5 MG: 6.25 TABLET, FILM COATED ORAL at 08:18

## 2020-01-01 RX ADMIN — GABAPENTIN 600 MG: 300 CAPSULE ORAL at 08:07

## 2020-01-01 RX ADMIN — SENNOSIDES AND DOCUSATE SODIUM 2 TABLET: 8.6; 5 TABLET ORAL at 17:26

## 2020-01-01 RX ADMIN — SODIUM CHLORIDE: 9 INJECTION, SOLUTION INTRAVENOUS at 16:18

## 2020-01-01 RX ADMIN — Medication 5 ML: at 05:56

## 2020-01-01 RX ADMIN — ONDANSETRON HYDROCHLORIDE 8 MG: 8 TABLET, FILM COATED ORAL at 11:28

## 2020-01-01 RX ADMIN — SODIUM CHLORIDE: 9 INJECTION, SOLUTION INTRAVENOUS at 20:30

## 2020-01-01 RX ADMIN — GABAPENTIN 600 MG: 600 TABLET, FILM COATED ORAL at 08:07

## 2020-01-01 RX ADMIN — MORPHINE SULFATE 4 MG: 4 INJECTION INTRAVENOUS at 22:07

## 2020-01-01 RX ADMIN — ALLOPURINOL 300 MG: 300 TABLET ORAL at 08:38

## 2020-01-01 RX ADMIN — LEVOFLOXACIN 500 MG: 500 TABLET, FILM COATED ORAL at 10:19

## 2020-01-01 RX ADMIN — LISINOPRIL 20 MG: 20 TABLET ORAL at 07:56

## 2020-01-01 RX ADMIN — ALLOPURINOL 300 MG: 300 TABLET ORAL at 07:56

## 2020-01-01 RX ADMIN — ACETAMINOPHEN 650 MG: 325 TABLET ORAL at 11:35

## 2020-01-01 RX ADMIN — ACYCLOVIR 400 MG: 400 TABLET ORAL at 08:13

## 2020-01-01 RX ADMIN — ATORVASTATIN CALCIUM 40 MG: 20 TABLET, FILM COATED ORAL at 20:34

## 2020-01-01 RX ADMIN — MEGESTROL ACETATE 200 MG: 40 SUSPENSION ORAL at 09:35

## 2020-01-01 RX ADMIN — MORPHINE SULFATE 15 MG: 15 TABLET, EXTENDED RELEASE ORAL at 14:27

## 2020-01-01 RX ADMIN — RIVAROXABAN 20 MG: 20 TABLET, FILM COATED ORAL at 16:59

## 2020-01-01 RX ADMIN — CARVEDILOL 12.5 MG: 6.25 TABLET, FILM COATED ORAL at 19:35

## 2020-01-01 RX ADMIN — ACYCLOVIR 400 MG: 400 TABLET ORAL at 20:05

## 2020-01-01 RX ADMIN — HYDROMORPHONE HYDROCHLORIDE 4 MG: 2 TABLET ORAL at 03:08

## 2020-01-01 RX ADMIN — DOCUSATE SODIUM 50 MG AND SENNOSIDES 8.6 MG 2 TABLET: 8.6; 5 TABLET, FILM COATED ORAL at 07:51

## 2020-01-01 RX ADMIN — Medication 10 ML: at 09:35

## 2020-01-01 RX ADMIN — Medication 5 MG: at 20:38

## 2020-01-01 RX ADMIN — ALLOPURINOL 300 MG: 300 TABLET ORAL at 08:15

## 2020-01-01 RX ADMIN — PREDNISONE 100 MG: 50 TABLET ORAL at 09:28

## 2020-01-01 RX ADMIN — MAGNESIUM OXIDE 400 MG: 400 TABLET ORAL at 08:13

## 2020-01-01 RX ADMIN — ETOPOSIDE 210 MG: 20 INJECTION, SOLUTION, CONCENTRATE INTRAVENOUS at 21:20

## 2020-01-01 RX ADMIN — LISINOPRIL 20 MG: 20 TABLET ORAL at 07:50

## 2020-01-01 RX ADMIN — MORPHINE SULFATE 15 MG: 15 TABLET, EXTENDED RELEASE ORAL at 19:53

## 2020-01-01 RX ADMIN — CARVEDILOL 12.5 MG: 6.25 TABLET, FILM COATED ORAL at 07:50

## 2020-01-01 RX ADMIN — CALCIUM CARBONATE (ANTACID) CHEW TAB 500 MG 500 MG: 500 CHEW TAB at 08:06

## 2020-01-01 RX ADMIN — Medication 5 ML: at 12:05

## 2020-01-01 RX ADMIN — Medication 10 ML: at 10:47

## 2020-01-01 RX ADMIN — SODIUM CHLORIDE 50 ML: 9 INJECTION, SOLUTION INTRAVENOUS at 19:53

## 2020-01-01 RX ADMIN — METHOTREXATE: 25 INJECTION INTRA-ARTERIAL; INTRAMUSCULAR; INTRATHECAL; INTRAVENOUS at 10:24

## 2020-01-01 RX ADMIN — AMPICILLIN SODIUM AND SULBACTAM SODIUM 3 G: 2; 1 INJECTION, POWDER, FOR SOLUTION INTRAMUSCULAR; INTRAVENOUS at 17:00

## 2020-01-01 RX ADMIN — MESNA 10600 MG: 100 INJECTION, SOLUTION INTRAVENOUS at 12:31

## 2020-01-01 RX ADMIN — POLYETHYLENE GLYCOL 3350 17 G: 17 POWDER, FOR SOLUTION ORAL at 19:37

## 2020-01-01 RX ADMIN — CARVEDILOL 12.5 MG: 6.25 TABLET, FILM COATED ORAL at 09:46

## 2020-01-01 RX ADMIN — POTASSIUM CHLORIDE, DEXTROSE MONOHYDRATE AND SODIUM CHLORIDE: 150; 5; 900 INJECTION, SOLUTION INTRAVENOUS at 02:36

## 2020-01-01 RX ADMIN — CARVEDILOL 6.25 MG: 6.25 TABLET, FILM COATED ORAL at 18:32

## 2020-01-01 RX ADMIN — POLYETHYLENE GLYCOL 3350 17 G: 17 POWDER, FOR SOLUTION ORAL at 20:18

## 2020-01-01 RX ADMIN — Medication 40 MG: at 06:26

## 2020-01-01 RX ADMIN — ACYCLOVIR 400 MG: 400 TABLET ORAL at 20:27

## 2020-01-01 RX ADMIN — Medication 4 MG: at 08:30

## 2020-01-01 RX ADMIN — Medication 2.5 MG: at 08:32

## 2020-01-01 RX ADMIN — OMEPRAZOLE 40 MG: 20 CAPSULE, DELAYED RELEASE ORAL at 08:07

## 2020-01-01 RX ADMIN — OMEPRAZOLE 40 MG: 20 CAPSULE, DELAYED RELEASE ORAL at 15:42

## 2020-01-01 RX ADMIN — MORPHINE SULFATE 45 MG: 30 TABLET, EXTENDED RELEASE ORAL at 20:18

## 2020-01-01 RX ADMIN — MEGESTROL ACETATE 200 MG: 40 SUSPENSION ORAL at 16:31

## 2020-01-01 RX ADMIN — ATORVASTATIN CALCIUM 40 MG: 40 TABLET, FILM COATED ORAL at 20:32

## 2020-01-01 RX ADMIN — Medication 5 ML: at 08:41

## 2020-01-01 RX ADMIN — ONDANSETRON HYDROCHLORIDE 16 MG: 8 TABLET, FILM COATED ORAL at 09:47

## 2020-01-01 RX ADMIN — PEGFILGRASTIM 6 MG: KIT SUBCUTANEOUS at 12:57

## 2020-01-01 RX ADMIN — CYCLOPHOSPHAMIDE 1695 MG: 2 INJECTION, POWDER, FOR SOLUTION INTRAVENOUS; ORAL at 01:19

## 2020-01-01 RX ADMIN — CARBOPLATIN 750 MG: 10 INJECTION, SOLUTION INTRAVENOUS at 10:45

## 2020-01-01 RX ADMIN — SODIUM CHLORIDE: 9 INJECTION, SOLUTION INTRAVENOUS at 06:08

## 2020-01-01 RX ADMIN — OXYCODONE HYDROCHLORIDE 5 MG: 5 TABLET ORAL at 09:00

## 2020-01-01 RX ADMIN — Medication 5 MG: at 09:34

## 2020-01-01 RX ADMIN — Medication 10 ML: at 14:28

## 2020-01-01 RX ADMIN — AMPICILLIN SODIUM AND SULBACTAM SODIUM 3 G: 2; 1 INJECTION, POWDER, FOR SOLUTION INTRAMUSCULAR; INTRAVENOUS at 22:15

## 2020-01-01 RX ADMIN — MAGNESIUM SULFATE IN WATER 2 G: 40 INJECTION, SOLUTION INTRAVENOUS at 07:45

## 2020-01-01 RX ADMIN — OYSTER SHELL CALCIUM WITH VITAMIN D 1 TABLET: 500; 200 TABLET, FILM COATED ORAL at 18:11

## 2020-01-01 RX ADMIN — GABAPENTIN 300 MG: 300 CAPSULE ORAL at 22:24

## 2020-01-01 RX ADMIN — ACYCLOVIR 400 MG: 400 TABLET ORAL at 20:38

## 2020-01-01 RX ADMIN — SODIUM CHLORIDE, POTASSIUM CHLORIDE, SODIUM LACTATE AND CALCIUM CHLORIDE: 600; 310; 30; 20 INJECTION, SOLUTION INTRAVENOUS at 16:20

## 2020-01-01 RX ADMIN — FLUDEOXYGLUCOSE F-18 11.82 MCI.: 500 INJECTION, SOLUTION INTRAVENOUS at 10:24

## 2020-01-01 RX ADMIN — SODIUM CHLORIDE 500 ML: 9 INJECTION, SOLUTION INTRAVENOUS at 08:16

## 2020-01-01 RX ADMIN — Medication 5 MG: at 14:27

## 2020-01-01 RX ADMIN — SENNOSIDES AND DOCUSATE SODIUM 1 TABLET: 8.6; 5 TABLET ORAL at 18:10

## 2020-01-01 RX ADMIN — ALLOPURINOL 300 MG: 300 TABLET ORAL at 18:14

## 2020-01-01 RX ADMIN — ACETAMINOPHEN 650 MG: 325 TABLET, FILM COATED ORAL at 16:56

## 2020-01-01 RX ADMIN — CALCIUM CARBONATE (ANTACID) CHEW TAB 500 MG 500 MG: 500 CHEW TAB at 14:49

## 2020-01-01 RX ADMIN — SODIUM CHLORIDE, POTASSIUM CHLORIDE, SODIUM LACTATE AND CALCIUM CHLORIDE: 600; 310; 30; 20 INJECTION, SOLUTION INTRAVENOUS at 10:24

## 2020-01-01 RX ADMIN — ACYCLOVIR 400 MG: 400 TABLET ORAL at 08:32

## 2020-01-01 RX ADMIN — CALCIUM CARBONATE (ANTACID) CHEW TAB 500 MG 500 MG: 500 CHEW TAB at 12:17

## 2020-01-01 RX ADMIN — DOCUSATE SODIUM 50 MG AND SENNOSIDES 8.6 MG 2 TABLET: 8.6; 5 TABLET, FILM COATED ORAL at 09:45

## 2020-01-01 RX ADMIN — Medication 5 ML: at 05:02

## 2020-01-01 RX ADMIN — MORPHINE SULFATE 45 MG: 30 TABLET, EXTENDED RELEASE ORAL at 19:58

## 2020-01-01 RX ADMIN — MAGNESIUM OXIDE 400 MG: 400 TABLET ORAL at 00:23

## 2020-01-01 RX ADMIN — ACYCLOVIR 400 MG: 400 TABLET ORAL at 20:18

## 2020-01-01 RX ADMIN — POTASSIUM CHLORIDE: 2 INJECTION, SOLUTION, CONCENTRATE INTRAVENOUS at 20:29

## 2020-01-01 RX ADMIN — PREDNISONE 100 MG: 50 TABLET ORAL at 12:23

## 2020-01-01 RX ADMIN — DEXAMETHASONE 8 MG: 4 TABLET ORAL at 18:14

## 2020-01-01 RX ADMIN — Medication 12.5 MG: at 08:45

## 2020-01-01 RX ADMIN — POTASSIUM CHLORIDE, DEXTROSE MONOHYDRATE AND SODIUM CHLORIDE: 150; 5; 900 INJECTION, SOLUTION INTRAVENOUS at 03:11

## 2020-01-01 RX ADMIN — POTASSIUM CHLORIDE 40 MEQ: 1500 TABLET, EXTENDED RELEASE ORAL at 08:47

## 2020-01-01 RX ADMIN — SODIUM CHLORIDE 250 ML: 9 INJECTION, SOLUTION INTRAVENOUS at 10:22

## 2020-01-01 RX ADMIN — DOCUSATE SODIUM 50 MG AND SENNOSIDES 8.6 MG 2 TABLET: 8.6; 5 TABLET, FILM COATED ORAL at 13:28

## 2020-01-01 RX ADMIN — MEGESTROL ACETATE 200 MG: 40 SUSPENSION ORAL at 08:30

## 2020-01-01 RX ADMIN — ACETAMINOPHEN 650 MG: 325 TABLET ORAL at 11:30

## 2020-01-01 RX ADMIN — RIVAROXABAN 20 MG: 20 TABLET, FILM COATED ORAL at 10:02

## 2020-01-01 RX ADMIN — Medication 5 ML: at 14:01

## 2020-01-01 RX ADMIN — SODIUM CHLORIDE 150 MG: 9 INJECTION, SOLUTION INTRAVENOUS at 08:18

## 2020-01-01 RX ADMIN — HYDROMORPHONE HYDROCHLORIDE 0.5 MG: 1 INJECTION, SOLUTION INTRAMUSCULAR; INTRAVENOUS; SUBCUTANEOUS at 04:16

## 2020-01-01 RX ADMIN — RIVAROXABAN 20 MG: 20 TABLET, FILM COATED ORAL at 09:47

## 2020-01-01 RX ADMIN — FLUDEOXYGLUCOSE F-18 13.8 MCI.: 500 INJECTION, SOLUTION INTRAVENOUS at 10:45

## 2020-01-01 RX ADMIN — ALLOPURINOL 300 MG: 300 TABLET ORAL at 08:27

## 2020-01-01 RX ADMIN — SODIUM CHLORIDE, POTASSIUM CHLORIDE, SODIUM LACTATE AND CALCIUM CHLORIDE: 600; 310; 30; 20 INJECTION, SOLUTION INTRAVENOUS at 13:23

## 2020-01-01 RX ADMIN — MORPHINE SULFATE 45 MG: 30 TABLET, FILM COATED, EXTENDED RELEASE ORAL at 20:05

## 2020-01-01 RX ADMIN — IOPAMIDOL 134 ML: 755 INJECTION, SOLUTION INTRAVENOUS at 12:27

## 2020-01-01 RX ADMIN — DIPHENHYDRAMINE HYDROCHLORIDE 50 MG: 50 CAPSULE ORAL at 18:14

## 2020-01-01 RX ADMIN — ACYCLOVIR 400 MG: 400 TABLET ORAL at 20:10

## 2020-01-01 RX ADMIN — Medication 5 ML: at 06:24

## 2020-01-01 RX ADMIN — POTASSIUM CHLORIDE 20 MEQ: 1.5 POWDER, FOR SOLUTION ORAL at 11:19

## 2020-01-01 RX ADMIN — CARVEDILOL 12.5 MG: 6.25 TABLET, FILM COATED ORAL at 17:14

## 2020-01-01 RX ADMIN — ACETAMINOPHEN 650 MG: 325 TABLET ORAL at 10:44

## 2020-01-01 RX ADMIN — DOCUSATE SODIUM 50 MG AND SENNOSIDES 8.6 MG 3 TABLET: 8.6; 5 TABLET, FILM COATED ORAL at 08:07

## 2020-01-01 RX ADMIN — LISINOPRIL 20 MG: 20 TABLET ORAL at 09:27

## 2020-01-01 RX ADMIN — CALCIUM CARBONATE (ANTACID) CHEW TAB 500 MG 500 MG: 500 CHEW TAB at 08:38

## 2020-01-01 RX ADMIN — ATORVASTATIN CALCIUM 40 MG: 40 TABLET, FILM COATED ORAL at 20:26

## 2020-01-01 RX ADMIN — HYDROMORPHONE HYDROCHLORIDE 4 MG: 2 TABLET ORAL at 14:06

## 2020-01-01 RX ADMIN — MIDAZOLAM 0.5 MG: 1 INJECTION INTRAMUSCULAR; INTRAVENOUS at 15:14

## 2020-01-01 RX ADMIN — CARVEDILOL 12.5 MG: 6.25 TABLET, FILM COATED ORAL at 07:52

## 2020-01-01 RX ADMIN — PREDNISONE 100 MG: 50 TABLET ORAL at 09:02

## 2020-01-01 RX ADMIN — DEXAMETHASONE 40 MG: 4 TABLET ORAL at 16:59

## 2020-01-01 RX ADMIN — DOCUSATE SODIUM 50 MG AND SENNOSIDES 8.6 MG 3 TABLET: 8.6; 5 TABLET, FILM COATED ORAL at 19:48

## 2020-01-01 RX ADMIN — OXYCODONE HYDROCHLORIDE 10 MG: 10 TABLET ORAL at 06:29

## 2020-01-01 RX ADMIN — CARVEDILOL 6.25 MG: 6.25 TABLET, FILM COATED ORAL at 18:07

## 2020-01-01 RX ADMIN — ACYCLOVIR 400 MG: 400 TABLET ORAL at 20:21

## 2020-01-01 RX ADMIN — DIPHENHYDRAMINE HYDROCHLORIDE 50 MG: 25 CAPSULE ORAL at 11:27

## 2020-01-01 RX ADMIN — GABAPENTIN 600 MG: 300 CAPSULE ORAL at 09:26

## 2020-01-01 RX ADMIN — SODIUM CHLORIDE: 9 INJECTION, SOLUTION INTRAVENOUS at 02:06

## 2020-01-01 RX ADMIN — POTASSIUM CHLORIDE 40 MEQ: 750 TABLET, EXTENDED RELEASE ORAL at 07:44

## 2020-01-01 RX ADMIN — LISINOPRIL 20 MG: 20 TABLET ORAL at 09:47

## 2020-01-01 RX ADMIN — SODIUM CHLORIDE 250 ML: 9 INJECTION, SOLUTION INTRAVENOUS at 09:29

## 2020-01-01 RX ADMIN — ATORVASTATIN CALCIUM 40 MG: 20 TABLET, FILM COATED ORAL at 20:17

## 2020-01-01 RX ADMIN — ACYCLOVIR 400 MG: 400 TABLET ORAL at 08:38

## 2020-01-01 RX ADMIN — METHYLNALTREXONE BROMIDE 12 MG: 12 INJECTION, SOLUTION SUBCUTANEOUS at 13:07

## 2020-01-01 RX ADMIN — MORPHINE SULFATE 15 MG: 15 TABLET ORAL at 18:28

## 2020-01-01 RX ADMIN — GABAPENTIN 300 MG: 300 CAPSULE ORAL at 21:56

## 2020-01-01 RX ADMIN — PEGFILGRASTIM 6 MG: KIT SUBCUTANEOUS at 12:38

## 2020-01-01 RX ADMIN — ACETAMINOPHEN 650 MG: 325 TABLET, FILM COATED ORAL at 03:08

## 2020-01-01 RX ADMIN — METOCLOPRAMIDE 10 MG: 10 TABLET ORAL at 08:17

## 2020-01-01 RX ADMIN — CALCIUM GLUCONATE 1 G: 98 INJECTION, SOLUTION INTRAVENOUS at 09:18

## 2020-01-01 RX ADMIN — DOCUSATE SODIUM 50 MG AND SENNOSIDES 8.6 MG 3 TABLET: 8.6; 5 TABLET, FILM COATED ORAL at 20:18

## 2020-01-01 RX ADMIN — MORPHINE SULFATE 15 MG: 15 TABLET ORAL at 06:36

## 2020-01-01 RX ADMIN — OMEPRAZOLE 40 MG: 20 CAPSULE, DELAYED RELEASE ORAL at 08:31

## 2020-01-01 RX ADMIN — OMEPRAZOLE 40 MG: 20 CAPSULE, DELAYED RELEASE ORAL at 19:48

## 2020-01-01 RX ADMIN — SODIUM CHLORIDE: 9 INJECTION, SOLUTION INTRAVENOUS at 09:03

## 2020-01-01 RX ADMIN — PREDNISOLONE ACETATE 2 DROP: 10 SUSPENSION/ DROPS OPHTHALMIC at 18:05

## 2020-01-01 RX ADMIN — GABAPENTIN 600 MG: 300 CAPSULE ORAL at 08:17

## 2020-01-01 RX ADMIN — MORPHINE SULFATE 45 MG: 30 TABLET, FILM COATED, EXTENDED RELEASE ORAL at 07:45

## 2020-01-01 RX ADMIN — MORPHINE SULFATE 45 MG: 30 TABLET, EXTENDED RELEASE ORAL at 08:23

## 2020-01-01 RX ADMIN — CARVEDILOL 6.25 MG: 6.25 TABLET, FILM COATED ORAL at 18:11

## 2020-01-01 RX ADMIN — ETOPOSIDE 210 MG: 20 INJECTION, SOLUTION, CONCENTRATE INTRAVENOUS at 15:53

## 2020-01-01 RX ADMIN — PREDNISONE 100 MG: 50 TABLET ORAL at 07:44

## 2020-01-01 RX ADMIN — SODIUM CHLORIDE 1000 ML: 9 INJECTION, SOLUTION INTRAVENOUS at 22:14

## 2020-01-01 RX ADMIN — PALONOSETRON 0.25 MG: 0.05 INJECTION, SOLUTION INTRAVENOUS at 10:23

## 2020-01-01 RX ADMIN — POLYETHYLENE GLYCOL 3350 17 G: 17 POWDER, FOR SOLUTION ORAL at 20:10

## 2020-01-01 RX ADMIN — MORPHINE SULFATE 30 MG: 30 TABLET, EXTENDED RELEASE ORAL at 13:24

## 2020-01-01 RX ADMIN — ACYCLOVIR 400 MG: 400 TABLET ORAL at 19:54

## 2020-01-01 RX ADMIN — CALCIUM CARBONATE (ANTACID) CHEW TAB 500 MG 500 MG: 500 CHEW TAB at 09:33

## 2020-01-01 RX ADMIN — Medication 1 TABLET: at 07:44

## 2020-01-01 RX ADMIN — PROCHLORPERAZINE MALEATE 10 MG: 10 TABLET ORAL at 10:50

## 2020-01-01 RX ADMIN — OXYCODONE HYDROCHLORIDE 5 MG: 5 TABLET ORAL at 07:29

## 2020-01-01 RX ADMIN — METHOTREXATE: 25 INJECTION INTRA-ARTERIAL; INTRAMUSCULAR; INTRATHECAL; INTRAVENOUS at 10:04

## 2020-01-01 RX ADMIN — MAGNESIUM CITRATE 296 ML: 1.75 LIQUID ORAL at 10:13

## 2020-01-01 RX ADMIN — CARVEDILOL 12.5 MG: 6.25 TABLET, FILM COATED ORAL at 10:17

## 2020-01-01 RX ADMIN — MORPHINE SULFATE 15 MG: 15 TABLET ORAL at 20:49

## 2020-01-01 RX ADMIN — MAGNESIUM SULFATE IN WATER 4 G: 40 INJECTION, SOLUTION INTRAVENOUS at 06:20

## 2020-01-01 RX ADMIN — POLYETHYLENE GLYCOL 3350 17 G: 17 POWDER, FOR SOLUTION ORAL at 10:03

## 2020-01-01 RX ADMIN — PALONOSETRON HYDROCHLORIDE 0.25 MG: 0.25 INJECTION, SOLUTION INTRAVENOUS at 09:29

## 2020-01-01 RX ADMIN — RITUXIMAB 800 MG: 10 INJECTION, SOLUTION INTRAVENOUS at 12:05

## 2020-01-01 RX ADMIN — LIDOCAINE HYDROCHLORIDE 3 ML: 10 INJECTION, SOLUTION EPIDURAL; INFILTRATION; INTRACAUDAL; PERINEURAL at 15:19

## 2020-01-01 RX ADMIN — CARVEDILOL 12.5 MG: 6.25 TABLET, FILM COATED ORAL at 09:33

## 2020-01-01 RX ADMIN — BACLOFEN 10 MG: 10 TABLET ORAL at 20:32

## 2020-01-01 RX ADMIN — SODIUM CHLORIDE 250 ML: 9 INJECTION, SOLUTION INTRAVENOUS at 08:44

## 2020-01-01 RX ADMIN — Medication 5 ML: at 15:27

## 2020-01-01 RX ADMIN — PEGFILGRASTIM 6 MG: 6 INJECTION SUBCUTANEOUS at 12:37

## 2020-01-01 RX ADMIN — Medication 5 ML: at 22:22

## 2020-01-01 RX ADMIN — ACETAMINOPHEN 650 MG: 325 TABLET, FILM COATED ORAL at 21:05

## 2020-01-01 RX ADMIN — CARVEDILOL 12.5 MG: 6.25 TABLET, FILM COATED ORAL at 18:14

## 2020-01-01 RX ADMIN — CARVEDILOL 12.5 MG: 6.25 TABLET, FILM COATED ORAL at 10:03

## 2020-01-01 RX ADMIN — GABAPENTIN 300 MG: 300 CAPSULE ORAL at 21:59

## 2020-01-01 RX ADMIN — Medication 5 MG: at 18:11

## 2020-01-01 RX ADMIN — CARVEDILOL 12.5 MG: 6.25 TABLET, FILM COATED ORAL at 18:02

## 2020-01-01 RX ADMIN — MEGESTROL ACETATE 200 MG: 40 SUSPENSION ORAL at 07:53

## 2020-01-01 RX ADMIN — SODIUM CHLORIDE 100 ML: 9 INJECTION, SOLUTION INTRAVENOUS at 20:34

## 2020-01-01 RX ADMIN — MORPHINE SULFATE 15 MG: 15 TABLET ORAL at 19:24

## 2020-01-01 RX ADMIN — ALLOPURINOL 300 MG: 300 TABLET ORAL at 09:02

## 2020-01-01 RX ADMIN — AMPICILLIN SODIUM AND SULBACTAM SODIUM 3 G: 2; 1 INJECTION, POWDER, FOR SOLUTION INTRAMUSCULAR; INTRAVENOUS at 09:54

## 2020-01-01 RX ADMIN — MAGNESIUM OXIDE 400 MG: 400 TABLET ORAL at 16:27

## 2020-01-01 RX ADMIN — GABAPENTIN 600 MG: 300 CAPSULE ORAL at 07:56

## 2020-01-01 RX ADMIN — FLUDEOXYGLUCOSE F-18 11.98 MCI.: 500 INJECTION, SOLUTION INTRAVENOUS at 12:33

## 2020-01-01 RX ADMIN — SODIUM CHLORIDE 1000 ML: 900 INJECTION, SOLUTION INTRAVENOUS at 18:30

## 2020-01-01 RX ADMIN — POTASSIUM CHLORIDE: 2 INJECTION, SOLUTION, CONCENTRATE INTRAVENOUS at 19:04

## 2020-01-01 RX ADMIN — SODIUM CHLORIDE 50 ML: 9 INJECTION, SOLUTION INTRAVENOUS at 17:25

## 2020-01-01 RX ADMIN — OXYCODONE HYDROCHLORIDE 15 MG: 5 TABLET ORAL at 20:32

## 2020-01-01 RX ADMIN — Medication 5 MG: at 20:43

## 2020-01-01 RX ADMIN — OYSTER SHELL CALCIUM WITH VITAMIN D 1 TABLET: 500; 200 TABLET, FILM COATED ORAL at 09:34

## 2020-01-01 RX ADMIN — MIDAZOLAM 2 MG: 1 INJECTION INTRAMUSCULAR; INTRAVENOUS at 10:43

## 2020-01-01 RX ADMIN — OXYCODONE HYDROCHLORIDE 5 MG: 5 TABLET ORAL at 12:24

## 2020-01-01 RX ADMIN — LIDOCAINE HYDROCHLORIDE 5 ML: 10 INJECTION, SOLUTION EPIDURAL; INFILTRATION; INTRACAUDAL; PERINEURAL at 09:47

## 2020-01-01 RX ADMIN — PREDNISONE 100 MG: 50 TABLET ORAL at 08:17

## 2020-01-01 RX ADMIN — POTASSIUM CHLORIDE 40 MEQ: 1.5 POWDER, FOR SOLUTION ORAL at 03:52

## 2020-01-01 RX ADMIN — LISINOPRIL 20 MG: 20 TABLET ORAL at 08:18

## 2020-01-01 RX ADMIN — GABAPENTIN 300 MG: 300 CAPSULE ORAL at 22:39

## 2020-01-01 RX ADMIN — CARVEDILOL 6.25 MG: 6.25 TABLET, FILM COATED ORAL at 08:23

## 2020-01-01 RX ADMIN — POLYETHYLENE GLYCOL 3350 17 G: 17 POWDER, FOR SOLUTION ORAL at 20:21

## 2020-01-01 RX ADMIN — CARVEDILOL 6.25 MG: 6.25 TABLET, FILM COATED ORAL at 09:02

## 2020-01-01 RX ADMIN — MORPHINE SULFATE 45 MG: 30 TABLET, EXTENDED RELEASE ORAL at 08:18

## 2020-01-01 RX ADMIN — OMEPRAZOLE 40 MG: 20 CAPSULE, DELAYED RELEASE ORAL at 16:11

## 2020-01-01 RX ADMIN — METOCLOPRAMIDE 10 MG: 10 TABLET ORAL at 13:28

## 2020-01-01 RX ADMIN — CARVEDILOL 12.5 MG: 6.25 TABLET, FILM COATED ORAL at 18:29

## 2020-01-01 RX ADMIN — PEGFILGRASTIM 6 MG: KIT SUBCUTANEOUS at 13:29

## 2020-01-01 RX ADMIN — POLYETHYLENE GLYCOL 3350 17 G: 17 POWDER, FOR SOLUTION ORAL at 20:00

## 2020-01-01 RX ADMIN — RITUXIMAB 800 MG: 10 INJECTION, SOLUTION INTRAVENOUS at 11:30

## 2020-01-01 RX ADMIN — POLYETHYLENE GLYCOL 3350 17 G: 17 POWDER, FOR SOLUTION ORAL at 08:23

## 2020-01-01 RX ADMIN — CARVEDILOL 6.25 MG: 6.25 TABLET, FILM COATED ORAL at 07:54

## 2020-01-01 RX ADMIN — ONDANSETRON HYDROCHLORIDE 8 MG: 8 TABLET, FILM COATED ORAL at 13:24

## 2020-01-01 RX ADMIN — CARVEDILOL 6.25 MG: 6.25 TABLET, FILM COATED ORAL at 18:24

## 2020-01-01 RX ADMIN — GABAPENTIN 300 MG: 300 CAPSULE ORAL at 20:38

## 2020-01-01 RX ADMIN — OMEPRAZOLE 40 MG: 20 CAPSULE, DELAYED RELEASE ORAL at 13:29

## 2020-01-01 RX ADMIN — BACLOFEN 10 MG: 10 TABLET ORAL at 17:12

## 2020-01-01 RX ADMIN — RIVAROXABAN 20 MG: 20 TABLET, FILM COATED ORAL at 09:17

## 2020-01-01 RX ADMIN — CARVEDILOL 12.5 MG: 6.25 TABLET, FILM COATED ORAL at 08:51

## 2020-01-01 RX ADMIN — HYDROMORPHONE HYDROCHLORIDE 0.5 MG: 1 INJECTION, SOLUTION INTRAMUSCULAR; INTRAVENOUS; SUBCUTANEOUS at 01:28

## 2020-01-01 RX ADMIN — HYDROMORPHONE HYDROCHLORIDE 0.5 MG: 1 INJECTION, SOLUTION INTRAMUSCULAR; INTRAVENOUS; SUBCUTANEOUS at 21:16

## 2020-01-01 RX ADMIN — HYDROMORPHONE HYDROCHLORIDE 0.5 MG: 1 INJECTION, SOLUTION INTRAMUSCULAR; INTRAVENOUS; SUBCUTANEOUS at 10:49

## 2020-01-01 RX ADMIN — ATORVASTATIN CALCIUM 40 MG: 40 TABLET, FILM COATED ORAL at 08:06

## 2020-01-01 RX ADMIN — MORPHINE SULFATE 15 MG: 15 TABLET ORAL at 02:39

## 2020-01-01 RX ADMIN — PROPOFOL 30 MG: 10 INJECTION, EMULSION INTRAVENOUS at 10:54

## 2020-01-01 RX ADMIN — Medication 5 ML: at 10:56

## 2020-01-01 RX ADMIN — CYCLOPHOSPHAMIDE 1695 MG: 2 INJECTION, POWDER, FOR SOLUTION INTRAVENOUS; ORAL at 11:13

## 2020-01-01 RX ADMIN — CALCIUM CARBONATE (ANTACID) CHEW TAB 500 MG 500 MG: 500 CHEW TAB at 20:38

## 2020-01-01 RX ADMIN — Medication 5 MG: at 08:51

## 2020-01-01 RX ADMIN — Medication 5 ML: at 06:14

## 2020-01-01 RX ADMIN — ACETAMINOPHEN 650 MG: 325 TABLET, FILM COATED ORAL at 09:00

## 2020-01-01 RX ADMIN — DOCUSATE SODIUM 50 MG AND SENNOSIDES 8.6 MG 1 TABLET: 8.6; 5 TABLET, FILM COATED ORAL at 20:32

## 2020-01-01 RX ADMIN — HYDROMORPHONE HYDROCHLORIDE 4 MG: 2 TABLET ORAL at 08:45

## 2020-01-01 RX ADMIN — HYDROMORPHONE HYDROCHLORIDE 2 MG: 2 TABLET ORAL at 00:10

## 2020-01-01 SDOH — HEALTH STABILITY: MENTAL HEALTH: HOW OFTEN DO YOU HAVE 6 OR MORE DRINKS ON ONE OCCASION?: LESS THAN MONTHLY

## 2020-01-01 SDOH — HEALTH STABILITY: MENTAL HEALTH: HOW MANY STANDARD DRINKS CONTAINING ALCOHOL DO YOU HAVE ON A TYPICAL DAY?: 1 OR 2

## 2020-01-01 ASSESSMENT — ACTIVITIES OF DAILY LIVING (ADL)
ADLS_ACUITY_SCORE: 12
ADLS_ACUITY_SCORE: 11
ADLS_ACUITY_SCORE: 12
WALKING_OR_CLIMBING_STAIRS_DIFFICULTY: YES
AMBULATION: 0-->INDEPENDENT
ADLS_ACUITY_SCORE: 12
ADLS_ACUITY_SCORE: 18
ADLS_ACUITY_SCORE: 11
DRESS: 0-->INDEPENDENT
ADLS_ACUITY_SCORE: 11
ADLS_ACUITY_SCORE: 12
ADLS_ACUITY_SCORE: 16
WHICH_OF_THE_ABOVE_FUNCTIONAL_RISKS_HAD_A_RECENT_ONSET_OR_CHANGE?: SWALLOWING
ADLS_ACUITY_SCORE: 12
ADLS_ACUITY_SCORE: 20
ADLS_ACUITY_SCORE: 12
ADLS_ACUITY_SCORE: 18
ADLS_ACUITY_SCORE: 16
ADLS_ACUITY_SCORE: 16
ADLS_ACUITY_SCORE: 12
ADLS_ACUITY_SCORE: 11
ADLS_ACUITY_SCORE: 12
ADLS_ACUITY_SCORE: 11
ADLS_ACUITY_SCORE: 12
ADLS_ACUITY_SCORE: 16
ADLS_ACUITY_SCORE: 12
ADLS_ACUITY_SCORE: 18
ADLS_ACUITY_SCORE: 16
ADLS_ACUITY_SCORE: 16
ADLS_ACUITY_SCORE: 11
RETIRED_EATING: 0-->INDEPENDENT
ADLS_ACUITY_SCORE: 11
ADLS_ACUITY_SCORE: 12
DIFFICULTY_EATING/SWALLOWING: NO
ADLS_ACUITY_SCORE: 16
ADLS_ACUITY_SCORE: 18
ADLS_ACUITY_SCORE: 16
ADLS_ACUITY_SCORE: 16
ADLS_ACUITY_SCORE: 11
ADLS_ACUITY_SCORE: 11
ADLS_ACUITY_SCORE: 19
ADLS_ACUITY_SCORE: 12
ADLS_ACUITY_SCORE: 11
RETIRED_COMMUNICATION: 0-->UNDERSTANDS/COMMUNICATES WITHOUT DIFFICULTY
ADLS_ACUITY_SCORE: 18
ADLS_ACUITY_SCORE: 11
ADLS_ACUITY_SCORE: 12
ADLS_ACUITY_SCORE: 16
ADLS_ACUITY_SCORE: 16
ADLS_ACUITY_SCORE: 18
ADLS_ACUITY_SCORE: 11
ADLS_ACUITY_SCORE: 16
ADLS_ACUITY_SCORE: 12
TOILETING_ISSUES: YES
ADLS_ACUITY_SCORE: 11
ADLS_ACUITY_SCORE: 11
ADLS_ACUITY_SCORE: 12
ADLS_ACUITY_SCORE: 16
ADLS_ACUITY_SCORE: 16
WALKING_OR_CLIMBING_STAIRS: AMBULATION DIFFICULTY, DEPENDENT;STAIR CLIMBING DIFFICULTY, DEPENDENT;TRANSFERRING DIFFICULTY, DEPENDENT
ADLS_ACUITY_SCORE: 11
ADLS_ACUITY_SCORE: 16
ADLS_ACUITY_SCORE: 18
ADLS_ACUITY_SCORE: 11
TOILETING_ASSISTANCE: TOILETING DIFFICULTY, ASSISTANCE 1 PERSON
DOING_ERRANDS_INDEPENDENTLY_DIFFICULTY: YES
ADLS_ACUITY_SCORE: 11
ADLS_ACUITY_SCORE: 12
ADLS_ACUITY_SCORE: 18
ADLS_ACUITY_SCORE: 11
ADLS_ACUITY_SCORE: 12
FALL_HISTORY_WITHIN_LAST_SIX_MONTHS: NO
ADLS_ACUITY_SCORE: 11
ADLS_ACUITY_SCORE: 16
ADLS_ACUITY_SCORE: 13
ADLS_ACUITY_SCORE: 11
DRESSING/BATHING_DIFFICULTY: YES
FALL_HISTORY_WITHIN_LAST_SIX_MONTHS: NO
ADLS_ACUITY_SCORE: 11
ADLS_ACUITY_SCORE: 16
ADLS_ACUITY_SCORE: 11
ADLS_ACUITY_SCORE: 11
ADLS_ACUITY_SCORE: 18
BATHING: 0-->INDEPENDENT
WEAR_GLASSES_OR_BLIND: NO
ADLS_ACUITY_SCORE: 17
ADLS_ACUITY_SCORE: 11
ADLS_ACUITY_SCORE: 12
COGNITION: 0 - NO COGNITION ISSUES REPORTED
WHICH_OF_THE_ABOVE_FUNCTIONAL_RISKS_HAD_A_RECENT_ONSET_OR_CHANGE?: AMBULATION;TRANSFERRING;TOILETING;BATHING;DRESSING;EATING;COGNITION;COMMUNICATION/SPEECH
ADLS_ACUITY_SCORE: 11
ADLS_ACUITY_SCORE: 11
ADLS_ACUITY_SCORE: 12
ADLS_ACUITY_SCORE: 16
ADLS_ACUITY_SCORE: 11
ADLS_ACUITY_SCORE: 16
ADLS_ACUITY_SCORE: 11
ADLS_ACUITY_SCORE: 12
ADLS_ACUITY_SCORE: 11
ADLS_ACUITY_SCORE: 19
ADLS_ACUITY_SCORE: 19
ADLS_ACUITY_SCORE: 12
ADLS_ACUITY_SCORE: 16
ADLS_ACUITY_SCORE: 11
ADLS_ACUITY_SCORE: 12
ADLS_ACUITY_SCORE: 16
SWALLOWING: 2-->DIFFICULTY SWALLOWING LIQUIDS/FOODS
ADLS_ACUITY_SCORE: 11
ADLS_ACUITY_SCORE: 16
ADLS_ACUITY_SCORE: 11
ADLS_ACUITY_SCORE: 16
ADLS_ACUITY_SCORE: 18
ADLS_ACUITY_SCORE: 11
ADLS_ACUITY_SCORE: 16
ADLS_ACUITY_SCORE: 18
ADLS_ACUITY_SCORE: 11
ADLS_ACUITY_SCORE: 12
ADLS_ACUITY_SCORE: 12
ADLS_ACUITY_SCORE: 20
ADLS_ACUITY_SCORE: 11
ADLS_ACUITY_SCORE: 18
ADLS_ACUITY_SCORE: 12
ADLS_ACUITY_SCORE: 19
ADLS_ACUITY_SCORE: 11
ADLS_ACUITY_SCORE: 11
ADLS_ACUITY_SCORE: 13
ADLS_ACUITY_SCORE: 11
ADLS_ACUITY_SCORE: 11
ADLS_ACUITY_SCORE: 18
TRANSFERRING: 0-->INDEPENDENT
ADLS_ACUITY_SCORE: 11
ADLS_ACUITY_SCORE: 11
ADLS_ACUITY_SCORE: 12
ADLS_ACUITY_SCORE: 12
ADLS_ACUITY_SCORE: 16
ADLS_ACUITY_SCORE: 11
ADLS_ACUITY_SCORE: 16
ADLS_ACUITY_SCORE: 16
ADLS_ACUITY_SCORE: 13
ADLS_ACUITY_SCORE: 12
ADLS_ACUITY_SCORE: 11
CONCENTRATING,_REMEMBERING_OR_MAKING_DECISIONS_DIFFICULTY: YES
DRESSING/BATHING: DRESSING DIFFICULTY, ASSISTANCE 1 PERSON;BATHING DIFFICULTY, ASSISTANCE 1 PERSON
ADLS_ACUITY_SCORE: 12
ADLS_ACUITY_SCORE: 19
TOILETING: 0-->INDEPENDENT
ADLS_ACUITY_SCORE: 19
HEARING_DIFFICULTY_OR_DEAF: NO
ADLS_ACUITY_SCORE: 16
ADLS_ACUITY_SCORE: 11
ADLS_ACUITY_SCORE: 11
DIFFICULTY_COMMUNICATING: YES
ADLS_ACUITY_SCORE: 12
ADLS_ACUITY_SCORE: 16
ADLS_ACUITY_SCORE: 16
ADLS_ACUITY_SCORE: 11

## 2020-01-01 ASSESSMENT — PAIN SCALES - GENERAL
PAINLEVEL: MILD PAIN (2)
PAINLEVEL: NO PAIN (0)
PAINLEVEL: MODERATE PAIN (5)
PAINLEVEL: NO PAIN (0)

## 2020-01-01 ASSESSMENT — ENCOUNTER SYMPTOMS
FEVER: 0
FATIGUE: 1
CHILLS: 0
BACK PAIN: 1
WEAKNESS: 1
DIFFICULTY URINATING: 0
CONFUSION: 0
UNEXPECTED WEIGHT CHANGE: 1
BACK PAIN: 1
FEVER: 0
CONSTIPATION: 1
NECK STIFFNESS: 0
BRUISES/BLEEDS EASILY: 1
COUGH: 0
EYE REDNESS: 0
DIARRHEA: 1
SHORTNESS OF BREATH: 0
NUMBNESS: 0
SHORTNESS OF BREATH: 0
FATIGUE: 1
APPETITE CHANGE: 1
ABDOMINAL PAIN: 1
COLOR CHANGE: 0
DIZZINESS: 1
WEAKNESS: 0
HEADACHES: 0
ARTHRALGIAS: 0

## 2020-01-01 ASSESSMENT — PAIN DESCRIPTION - DESCRIPTORS
DESCRIPTORS: CONSTANT
DESCRIPTORS: TIGHTNESS
DESCRIPTORS: ACHING
DESCRIPTORS: CONSTANT
DESCRIPTORS: CONSTANT;ACHING
DESCRIPTORS: NAGGING;CONSTANT
DESCRIPTORS: CONSTANT
DESCRIPTORS: SHARP
DESCRIPTORS: ACHING;DISCOMFORT
DESCRIPTORS: ACHING;DISCOMFORT
DESCRIPTORS: OTHER (COMMENT)
DESCRIPTORS: ACHING;DULL
DESCRIPTORS: SHARP
DESCRIPTORS: CONSTANT;ACHING

## 2020-01-01 ASSESSMENT — MIFFLIN-ST. JEOR
SCORE: 1866.9
SCORE: 1753.5
SCORE: 1777.09
SCORE: 1868.25
SCORE: 1753.95
SCORE: 1718.11
SCORE: 1757.58
SCORE: 1765.75
SCORE: 1696.37
SCORE: 1775.27
SCORE: 1709.04
SCORE: 1873.25
SCORE: 1880.96
SCORE: 1936.75
SCORE: 1825.86
SCORE: 1775.27
SCORE: 1892.75
SCORE: 1818.36
SCORE: 1774.37
SCORE: 1753.5
SCORE: 1777.99
SCORE: 1861.45
SCORE: 1909.08
SCORE: 1778.9
SCORE: 1872.34
SCORE: 1907.72
SCORE: 1754.41

## 2020-03-10 PROBLEM — K86.89 PANCREATIC MASS: Status: ACTIVE | Noted: 2020-01-01

## 2020-03-10 NOTE — CONSULTS
Patient is on IR schedule 3/11/2020 for a CT guided left RP lymph node biopsy.   INR is elevated to 2.47 today. This will need to be less than 1.8 to proceed with the biopsy. Team will plan to reverse.  Orders for NPO have been entered.   Consent will be done prior to procedure.     Please contact the IR charge RN at [phone] for estimated time of procedure.     Case discussed with Dr. Munson from IR, Dr. Suarez and Lindsay Shelton PA-C from GI. This is a 49 year old female with a history notable for atrial fibrillation (on chronic anticoagulation with warfarin), myocardial infarction, stroke (2017), HTN and HLD who presents from [facility] for evaluation of back pain and possible newly diagnosed pancreatic cancer. CT scan shows large LUQ mass encompassing the stomach, spleen, and pancreas as well as multiple mesenteric and RP nodules concerning for malignancy (lymphoma vs pancreatic vs other). GI was consulted for possible biopsy and this has been deferred to IR due to significant cardiac history and concerns with anesthesia. IR would opt to not biopsy the LUQ mass due to high risk of bleeding and significant vascular involvement. Will plan to biopsy a left RP lymph node.    Diagnostic lab orders need to be entered before the patient will be brought to IR. To be entered by Dr. Suarez.    Augustina Torrez DNP, APRN  Interventional Radiology  Pager: [phone]

## 2020-03-10 NOTE — ED NOTES
Called for RN-RN report, was advised oncoming staff was in report and they would call me back at [phone].

## 2020-03-10 NOTE — ED NOTES
Called to provided RN-RN report, was advised that the RN had been instructed by their Charge to wait until the Charge RN was out of their report, and they would call back.

## 2020-03-10 NOTE — ED PROVIDER NOTES
[place] EMERGENCY DEPARTMENT ([facility])  3/09/20    History     Chief Complaint   Patient presents with     Fatigue     HPI  Kenneth Tello is a 49 year old female with a history notable for atrial fibrillation (on chronic anticoagulation with warfarin), myocardial infarction, stroke (2017), HTN and HLD who presents from [facility] for evaluation of back pain and possible newly diagnosed pancreatic caner.  Patient reports he presented to the [facility] clinic this morning for worsening left lower sided intermittent back pain. He presents today after being told he likely has pancreatic cancer.  He denies recent falls or injury to his back.  He also complains of worsening numbness in bilateral legs as well as generalized abdominal pain.  Patient states the symptoms have been worsening over the past 2 or 3 weeks but states they have been present for approximately 6 months. He also notes losing 40 pounds since November.  He has lost his appetite and has become more fatigued.  The patient works as a  and has recently had difficulty working under vehicles. More frequently he has become dizzy from standing from a lying position.  Patient denies fever, chills, chest pain or shortness of breath.  He reports chronically experiencing diarrhea.  Patient is anticoagulated.  Patient reports recently decreasing his alcohol consumption from 2 drinks of liquor per day to 1.  Patient last smoked on January 2 of this year.  He denies other drug use.      XR Chest- [facility] (3/9/2020)    Impression:  1.  Left  lower lobe airspace disease and left effusion.  2.  Mild cardiomegaly on the basis of pericardial effusion.    CT abdomen and pelvis without contrast- [facility] (3/9/2020)    Impression:  1.  Left upper quadrant mass encompassing posterior wall stomach spleen, pancreas, and probably the left diaphragmatic crura with  multiple mesenteric and retroperitoneal masses.  Pancreatic neoplasm is suspected space-space lymphoma is a less likely possibility.  Splenic vein thrombosis is suspected.  Status of the portal vein and SMV is uncertain.  2.  Pericardial effusion.  Small amount of free pelvic fluid/ascites.  Presacral soft tissue thickening.  3.  Left pleural effusion and left lower lobe airspace disease.    PAST MEDICAL HISTORY  Past Medical History:   Diagnosis Date     Pancreatic cancer (H)      Stroke (H)      PAST SURGICAL HISTORY  History reviewed. No pertinent surgical history.  FAMILY HISTORY  History reviewed. No pertinent family history.  SOCIAL HISTORY  Social History     Tobacco Use     Smoking status: Former Smoker     Years: 2.00     Last attempt to quit: 2020     Years since quittin.1     Smokeless tobacco: Former User     Quit date: 3/9/2018   Substance Use Topics     Alcohol use: Yes     Comment: social     MEDICATIONS  No current facility-administered medications for this encounter.      No current outpatient medications on file.     ALLERGIES  No Known Allergies      I have reviewed the Medications, Allergies, Past Medical and Surgical History, and Social History in the Epic system.    Review of Systems   Constitutional: Positive for fatigue and unexpected weight change. Negative for chills and fever.   HENT: Negative for congestion.    Eyes: Negative for redness.   Respiratory: Negative for shortness of breath.    Cardiovascular: Negative for chest pain.   Gastrointestinal: Positive for abdominal pain and diarrhea.   Genitourinary: Negative for difficulty urinating.   Musculoskeletal: Positive for back pain. Negative for arthralgias and neck stiffness.   Skin: Negative for color change.   Neurological: Positive for dizziness and weakness. Negative for headaches.   Hematological: Bruises/bleeds easily.   Psychiatric/Behavioral: Negative for confusion.     Physical Exam   BP: (!) 111/37  Pulse: 74  Temp: 99  " F (37.2  C)  Resp: 14  Height: 175.3 cm (5' 9\")  Weight: 100.7 kg (222 lb)  SpO2: 98 %      Physical Exam  General: Afebrile, no acute distress. Appears stated age.   HENT: MMM, no oropharyngeal lesions  Eyes: PERRL, normal sclerae  Neck: non-tender, supple  Cardio: regular rate. irregular rhythm. Extremities well perfused  Resp: Normal work of breathing, diminished breath sounds  Chest/Back: no visual signs of trauma, no midline TTP, mild left lower lumbar paraspinal TTP, no CVA tenderness  Abdomen: no tenderness, non-distended, no rebound, no guarding  Neuro: alert and fully oriented. CN II-XII grossly intact. Grossly normal strength and sensation in all extremities.   MSK: no deformities.   Integumentary/Skin: no rash visualized, normal color  Psych: normal affect, normal behavior    ED Course        Procedures          EKG Interpretation:      Interpreted by Ana Laura Castrejon MD  Time reviewed: 2120  Symptoms at time of EKG: weakness   Rhythm: atrial fibrillation rate controlled  Rate: normal  Axis: normal  Ectopy: none  Conduction: normal  ST Segments/ T Waves: No ST-T wave changes  Q Waves: none  Comparison to prior: No old EKG available    Clinical Impression: Atrial fibrillation rate controlled      Results for orders placed or performed during the hospital encounter of 03/09/20   CBC with platelets differential     Status: Abnormal   Result Value Ref Range    WBC 17.1 (H) 4.0 - 11.0 10e9/L    RBC Count 3.61 (L) 4.4 - 5.9 10e12/L    Hemoglobin 10.1 (L) 13.3 - 17.7 g/dL    Hematocrit 30.6 (L) 40.0 - 53.0 %    MCV 85 78 - 100 fl    MCH 28.0 26.5 - 33.0 pg    MCHC 33.0 31.5 - 36.5 g/dL    RDW 16.7 (H) 10.0 - 15.0 %    Platelet Count 334 150 - 450 10e9/L    Diff Method Automated Method     % Neutrophils 88.4 %    % Lymphocytes 4.5 %    % Monocytes 5.8 %    % Eosinophils 0.1 %    % Basophils 0.2 %    % Immature Granulocytes 1.0 %    Nucleated RBCs 0 0 /100    Absolute Neutrophil 15.1 (H) 1.6 - 8.3 10e9/L    " Absolute Lymphocytes 0.8 0.8 - 5.3 10e9/L    Absolute Monocytes 1.0 0.0 - 1.3 10e9/L    Absolute Eosinophils 0.0 0.0 - 0.7 10e9/L    Absolute Basophils 0.0 0.0 - 0.2 10e9/L    Abs Immature Granulocytes 0.2 0 - 0.4 10e9/L    Absolute Nucleated RBC 0.0    Comprehensive metabolic panel     Status: Abnormal   Result Value Ref Range    Sodium 132 (L) 133 - 144 mmol/L    Potassium 3.5 3.4 - 5.3 mmol/L    Chloride 92 (L) 94 - 109 mmol/L    Carbon Dioxide 33 (H) 20 - 32 mmol/L    Anion Gap 6 3 - 14 mmol/L    Glucose 92 70 - 99 mg/dL    Urea Nitrogen 19 7 - 30 mg/dL    Creatinine 1.00 0.66 - 1.25 mg/dL    GFR Estimate 66 >60 mL/min/[1.73_m2]    GFR Estimate If Black 77 >60 mL/min/[1.73_m2]    Calcium 8.6 8.5 - 10.1 mg/dL    Bilirubin Total 1.0 0.2 - 1.3 mg/dL    Albumin 2.1 (L) 3.4 - 5.0 g/dL    Protein Total 6.8 6.8 - 8.8 g/dL    Alkaline Phosphatase 84 40 - 150 U/L    ALT 20 0 - 70 U/L    AST 39 0 - 45 U/L   Lipase     Status: None   Result Value Ref Range    Lipase 144 73 - 393 U/L   INR     Status: Abnormal   Result Value Ref Range    INR 2.21 (H) 0.86 - 1.14       Assessments & Plan (with Medical Decision Making)   Kenneth Tello is a 49 year old female with a history notable for atrial fibrillation (on chronic anticoagulation with warfarin), myocardial infarction, stroke (2017), HTN and HLD who presents from [facility] for evaluation of back pain and possible newly diagnosed pancreatic cancer.  On arrival patient is well-appearing, afebrile, no distress.  Patient is hemodynamically stable vital signs are within normal limits.  Patient will be admitted to the internal medicine/oncology service for further evaluation, biopsy, and plan.  Patient and family understand agree with plan.    I have reviewed the nursing notes.    I have reviewed the findings, diagnosis, plan and need for follow up with the patient.    New Prescriptions    No medications on file       Final diagnoses:   Weakness   Abdominal  mass, unspecified abdominal location   Pleural effusion   Pericardial effusion     I, Ben Rowland, am serving as a trained medical scribe to document services personally performed by Ana Laura Castrejon MD, based on the provider's statements to me.      I, Ana Laura Castrejon MD, was physically present and have reviewed and verified the accuracy of this note documented by Ben Rowland.    3/9/2020   [facility], [place], EMERGENCY DEPARTMENT     Ana Laura Castrejon MD  03/09/20 7004

## 2020-03-10 NOTE — PLAN OF CARE
Nursing Focus: Admission  D: Arrived at 0045 from ER via transport. Patient accompanied by family. Admitted for further evaluation of possible pancreatic cancer. Complains of upper abdominal and back pain.      I: Admission process began.  Patient oriented to room, enviroment, call light.  Md. notified of patients arrival on unit.     A: Vital signs stable, afebrile.  Patient stable at this time.  Pt c/o slight upper abdominal and back pain - no medications given, pt slept comfortably throughout night. Pt denied SOB/n/v. NPO for possible procedure/tests today. IV fluids running as ordered through PIV. No significant events this shift.    P: Implement plan of care when available. Continue to monitor patient. Nursing interventions as appropriate. Notify md with changes in pt status.

## 2020-03-10 NOTE — ED NOTES
[facility], [place]   ED Nurse to Floor Handoff     Kenneth Tello is a 49 year old male who speaks English and lives with family members,  in a home  They arrived in the ED by car from emergency room    ED Chief Complaint: Fatigue    ED Dx;   Final diagnoses:   Weakness   Abdominal mass, unspecified abdominal location   Pleural effusion   Pericardial effusion         Needed?: No    Allergies: No Known Allergies.  Past Medical Hx:   Past Medical History:   Diagnosis Date     Pancreatic cancer (H)      Stroke (H)       Baseline Mental status: WDL  Current Mental Status changes: at basesline    Infection present or suspected this encounter: no  Sepsis suspected: No  Isolation type: No active isolations     Activity level - Baseline/Home:  Independent  Activity Level - Current:   Independent    Bariatric equipment needed?: No    In the ED these meds were given: Medications - No data to display    Drips running?  No    Home pump  No    Current LDAs  Peripheral IV 03/09/20 Left (Active)   Site Assessment WDL 03/09/20 2034   Line Status Saline locked 03/09/20 2034   Number of days: 1       Labs results:   Labs Ordered and Resulted from Time of ED Arrival Up to the Time of Departure from the ED   CBC WITH PLATELETS DIFFERENTIAL - Abnormal; Notable for the following components:       Result Value    WBC 17.1 (*)     RBC Count 3.61 (*)     Hemoglobin 10.1 (*)     Hematocrit 30.6 (*)     RDW 16.7 (*)     Absolute Neutrophil 15.1 (*)     All other components within normal limits   COMPREHENSIVE METABOLIC PANEL - Abnormal; Notable for the following components:    Sodium 132 (*)     Chloride 92 (*)     Carbon Dioxide 33 (*)     Albumin 2.1 (*)     All other components within normal limits   INR - Abnormal; Notable for the following components:    INR 2.21 (*)     All other components within normal limits   LIPASE   PERIPHERAL IV CATHETER       Imaging Studies:   Recent Results (from the  "past 24 hour(s))   XR Chest 2 Views    Narrative    EXAM: XR CHEST 2 VW  LOCATION: [facility]  DATE/TIME: 3/9/2020 10:34 PM    INDICATION: Shortness of breath at with newly diagnosed lung malignancy.  COMPARISON: None.      Impression    IMPRESSION: Elevation left hemidiaphragm. Associated opacity posterior aspect of left lower lobe highly suspicious for volume loss or consolidation related to pneumonia. Recommend correlation for left basilar pneumonia. Likely associated minimal left   basilar pleural fluid. Right lung clear. Normal heart size. Normal pulmonary vascularity.       Recent vital signs:   BP 92/60   Pulse 67   Temp 99  F (37.2  C) (Oral)   Resp 15   Ht 1.753 m (5' 9\")   Wt 100.7 kg (222 lb)   SpO2 96%   BMI 32.78 kg/m      [place] Coma Scale Score: 15 (03/09/20 2253)       Cardiac Rhythm: Other  Pt needs tele? No  Skin/wound Issues: None    Code Status: Full Code    Pain control: good    Nausea control: good    Abnormal labs/tests/findings requiring intervention:     Family present during ED course? Yes   Family Comments/Social Situation comments:     Tasks needing completion: None    Isaiah Elizabeth, RN  [facility] --   [phone] [place] ED  [phone] [facility] ED      "

## 2020-03-10 NOTE — CONSULTS
"  Advanced Endoscopy/Pancreaticobiliary Consultation      Date of Admission:  3/9/2020  Reason for Admission: Back/abd. Pain, Evaluation of LUQ mass  Date of Consult  3/10/2020   Requesting Physician:  Mahesh Suarez MD           ASSESSMENT AND RECOMMENDATIONS:   Assessment:  49 year old male with a history of HTN, HLD, MI, CAD, atrial fibrillation (on coumadin), CVA with mild left deficit, WATSON, cholecystectomy, tobacco dependence, and alcohol use disorder who presents to [facility] ER on 3/9/2020 per recommendations from his primary after presenting to clinic yesterday with complaints of \"feeling like my life isn't going in the right direction,\" and GI consulted for concern for large LUQ mass suspicious for pancreatic neoplasm noted on abd/pelvis CT at [facility].    Recommendations:    #. LUQ Abdominal pain, back pain  #. LUQ mass seen on CT   Patient c/o \"cramping\" back pain x 6 mo radiating around the flank through the LUQ. Non-contrast CT scan at OSH on 3/9/2020 concerning for large LUQ mass indistinguishable from the stomach, pancreas, and spleen (please see PACs for outside scan).  - Repeat Chest, abd, pelvis CT WITH contrast per PANCREATIC PROTOCOL (ordered for you)  - EUS vs IR consult for biopsies based on above scan   - Analgesia/antiemetics per primary team  - OK for diet after above scan since no procedure is planned today      #. Atrial Fibrillation, CAD, MI, Presyncope  #. Pericardial effusion noted on TTE (3/10/2020)  #. Coagulopathy r/t coumadin  - Cardiology consultation-evaluation of pericardial effusion noted on OSH scan (BON completed this am) and cardiac clearance requested for anesthesia clearance for EUS and biopsies (requiring MAC vs GAC)  - Hold coumadin and will allow INR to slowly drift, if no <1.5 by time of procedure will consider Vit K  - Continue Carvedilol per PTA medications and primary team  - Tobacco cessation education     Above recommendations discussed with Dr Suarez of the " "primary hospitalist service    Thank you for involving us in this patient's care. Please do not hesitate to contact the GI service with any questions or concerns.     Pt seen and care plan discussed with Dr. Parikh, GI staff physician.    Jasmin Sutherland, Nurse Practitioner student, [facility] scribing for    Lindsay Shelton PA-C  Advanced Endoscopy/Pancreaticobiliary GI Service  [facility]  Pager *[phone]  Text Page  -------------------------------------------------------------------------------------------------------------------       Reason for Consultation:   Per primary team-malignancy work up, pancreatic mass           History of Present Illness:   Patient seen and examined at 0940. History is obtained from patient, patient's daughter, and outside medical records.    Kenneth Tello is a 49 year old male with a PMH significant for HTN, HLD, MI, CAD, atrial fibrillation (requiring coumadin), CVA with mild left deficit, WATSON, cholecystectomy, tobacco dependence, and alcohol use disorder who presents to [facility] ER on 3/9/2020 per recommendations from his primary after presenting to clinic yesterday with complaints of \"feeling like my life isn't going in the right direction,\"    The patient states that he has had difficulty regulating his INR as of late and presented to his clinic for a lab draw. After his lab draw he reported to work. 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 The patient reports feeling \"fed up\" and returned to his clinic for a PCP visit.     At this visit, the patient reports a variety of symptoms that have \"caused me depression\" over the past year including nausea and vomiting (x2 months), bloating (x1 year), decreased appetite (x5 months)-pt. Reports 40# weight loss since Nov., 2019, fatigue and \"dizziness\" (x1 year), \"cramping\" back pain that migrates around left side to " "midsternum-relieved with \"stretching\" (x6 months). The patient's PCP ordered a CT of abd and pelvis w/o contrast and CXR. Results concerning for pancreatic neoplasm per report.    The patient's PCP contacted the [facility] referral line where patient was instructed to report to the ER at [facility] for admission and further work-up of suspected neoplasm.    In the ED, he was afebrile, normotensive, not tachycardic or hypoxic.  Lab eval showed leukocytosis to 17.1, hgb 10.1, normal LFT and lipase. Per report, his creatinine was elevated to 2.21 at outside facility so a non contrast CT scan of the abdomen and pelvis was completed. He was started on D5 125ml/hr and admitted to the internal medicine service. Currently, the patient reports minimal pain, has not requested any pain medication since admission.              Past Medical History:   Reviewed and edited as appropriate  Past Medical History:   Diagnosis Date     Alcohol use disorder, mild, abuse      Atrial fibrillation (H)     coumadin     CAD (coronary artery disease)      Cardiomyopathy (H)      HLD (hyperlipidemia)      HTN (hypertension)      Myocardial infarction (H)      Neuropathy     LLE, left hand post-CVA     Obesity      WATSON (obstructive sleep apnea)     requires CPAP however does not have a machine     Pancreatic cancer (H)     suspected 3/9/2020 at OSH     Stroke (H)     2017, mild left sided deficit     Tobacco dependence      Urinary urgency             Past Surgical History:   Reviewed and edited as appropriate   Past Surgical History:   Procedure Laterality Date     CHOLECYSTECTOMY, LAPOROSCOPIC                Social History:   The patient lives in [place], [place]  With his wife and children  Employer:     Alcohol: 1 beer daily-recent change from 2 beers daily x \"years\" per pt. Report with occasional 6+ beers per social episode  Tobacco: Chewing tobacco daily x 39 years, began smoking 1 ppd in 2017 after CVA. Stopped on 1/2/2020  Illicit drugs: " "\"Never\" per pt. report           Family History:   Reviewed and edited as appropriate  Family History   Problem Relation Age of Onset     Cardiovascular Mother      Cardiovascular Father      Diabetes Type 2  Father      Cardiovascular Brother      Cancer Maternal Grandmother              Allergies:   Reviewed and edited as appropriate   No Known Allergies         Medications:     Current Facility-Administered Medications   Medication     acetaminophen (TYLENOL) tablet 650 mg     atorvastatin (LIPITOR) tablet 40 mg     calcium carbonate (TUMS) chewable tablet 1,000 mg     carvedilol (COREG) tablet 6.25 mg     dextrose 5% and 0.9% NaCl with potassium chloride 20 mEq infusion     gabapentin (NEURONTIN) capsule 300 mg     HYDROmorphone (DILAUDID) injection 0.2-0.3 mg     [START ON 3/11/2020] influenza quadrivalent (PF) vacc (FLUZONE) injection 0.5 mL     Medication Instruction     naloxone (NARCAN) injection 0.1-0.4 mg     ondansetron (ZOFRAN) injection 4 mg     oxyCODONE (ROXICODONE) tablet 5 mg     senna-docusate (SENOKOT-S/PERICOLACE) 8.6-50 MG per tablet 1 tablet    Or     senna-docusate (SENOKOT-S/PERICOLACE) 8.6-50 MG per tablet 2 tablet             Review of Systems:          General: +Weight loss  Skin: negative  Eyes: negative  Ears/Nose/Throat: negative  Respiratory: No shortness of breath, dyspnea on exertion, cough, or hemoptysis  Cardiovascular: positive for irregular heart beat, near syncope, negative for shortness of breath, dyspnea  Gastrointestinal: positive for poor appetite, nausea, vomiting, abdominal pain and excessive gas or bloating  Genitourinary: positive for urgency  Musculoskeletal: positive for back pain and muscular weakness  Neurologic: positive for syncope, numbness or tingling of hands, numbness or tingling of feet, incoordination and memory problems  Psychiatric: positive anxiety and depression  Hematologic/Lymphatic/Immunologic: negative  Endocrine: negative         Physical Exam: "   Temp: 99.3  F (37.4  C) Temp src: Oral BP: 110/66 Pulse: 84   Resp: 16 SpO2: 99 % O2 Device: None (Room air)    Wt:   Wt Readings from Last 2 Encounters:   03/10/20 99.6 kg (219 lb 8 oz)        General: Older than stated age appearing male in NAD.  Answers appropriately.    HEENT: Head is AT/NC. Sclera anicteric. No conjunctival injection.  Oropharynx is clear, moist and w/o exudate or lesions.  Neck: No masses, lymphadenopathy, thyromegaly.  Lungs: Clear to auscultation in upper bases and throughout right lung, LLL breath sounds absent, dull to percussion over left posterior lung fields,  No wheezes, rhonchi or crackles.    Heart: Iregular rate and rhythm.  No murmurs, gallops or rubs.  Normal S1 and S2.  Abdomen:  Abd soft, non-tender, non-distended in right quadrants and LLQ. LUQ tender, semi-firm.  BS +.  No hepatosplenomegaly. No rebound or peritoneal signs  Extremities: WWP, no pedal edema.  Heme/Lymph: No cervical or supraclavicular adenopathy.   Skin: No jaundice or rash  Neurologic: Non-focal.  CN 2-12 intact, left side slightly weaker than right side           Data:   Labs and imaging below were independently reviewed and interpreted    LAB WORK:    BMP  Recent Labs   Lab 03/10/20  0535 03/09/20  2032   * 132*   POTASSIUM 3.2* 3.5   CHLORIDE 94 92*   STACY 8.2* 8.6   CO2 31 33*   BUN 21 19   CR 1.01 1.00   * 92     CBC  Recent Labs   Lab 03/10/20  0535 03/09/20  2032   WBC 15.6* 17.1*   RBC 3.48* 3.61*   HGB 9.7* 10.1*   HCT 29.8* 30.6*   MCV 86 85   MCH 27.9 28.0   MCHC 32.6 33.0   RDW 16.9* 16.7*    334     INR  Recent Labs   Lab 03/10/20  0535 03/09/20 2032   INR 2.47* 2.21*     LFTs  Recent Labs   Lab 03/09/20 2032   ALKPHOS 84   AST 39   ALT 20   BILITOTAL 1.0   PROTTOTAL 6.8   ALBUMIN 2.1*      PANC  Recent Labs   Lab 03/09/20  2032   LIPASE 144       IMAGING:    BON ([facility]) 3/10/2020:    Interpretation Summary  Global and regional left ventricular function is normal with an EF  of 60-65%.  Global right ventricular function is normal.  Mild tricuspid regurgitation present.  Pulmonary artery systolic pressure is 34 mmHg, upper limits of normal.  Dilation of the inferior vena cava is present with normal respiratory  variation in diameter.  Small circumferential pericardial effusion is present without any hemodynamic  significance.    2 View CXR ([facility]) 3/9/2020:    IMPRESSION: Elevation left hemidiaphragm. Associated opacity posterior aspect of left lower lobe highly suspicious for volume loss or consolidation related to pneumonia. Recommend correlation for left basilar pneumonia. Likely associated minimal left   basilar pleural fluid. Right lung clear. Normal heart size. Normal pulmonary vascularity    CT Abd/pelvis w/o contrast ([facility]) 3/9/2020:    IMPRESSION: LUQ mass encompassing posterior wall of stomach, spleen, pancreas, and probably the left diaphragmatic crura with multiple mesenteric and retroperitoneal masses. Pancreatic neoplasm is suspect-lymphoma is a less likely possibility. Splenic vein thrombosis is suspect. Status of the portal vein and SMV is uncertain. Pericardial effusion. Small amount of free pelvic fluid/ascites. Presacral soft tissue thickening. Left pleural effusion and left lower lobe airspace disease.  =======================================================================

## 2020-03-10 NOTE — H&P
[facility], [place]    History and Physical - Hospitalist Service     Date of Admission:  3/9/2020    Assessment & Plan   Kenneth Tello is a 49 year old male admitted on 3/9/2020.     Kenneth Tello is a 49 year old male with a history notable for atrial fibrillation (on chronic anticoagulation with warfarin), myocardial infarction, stroke (2017), HTN and HLD who presents from [facility] for evaluation of left upper abdominal pain and back pain and possible new diagnosis of pancreatic cancer.   Patient is hemodynamically stable.   Patient being admitted to the internal medicine/oncology service for further evaluation including biopsy and formulation of management plan.        #Left upper abdominal pain, back pain  #History of significant weight loss, anorexia, fatigue  # CT abdomen and pelvis without contrast at outside hospital. Suspicious for pancreatic neoplasm.   -Left upper quadrant mass encompassing posterior wall stomach spleen, pancreas, and probably the left diaphragmatic crura with multiple mesenteric and retroperitoneal masses.  -Pericardial effusion, mild cardiomegaly (patient is symptomatic.  No chest pain or shortness of breath)  -Left pleural effusion and left lower lobe airspace disease   # Acute leukocytosis 17.1  # History of alcohol use disorder  # History of smoking  # Anemia     Keep patient n.p.o. after midnight  IV fluids while n.p.o.  Pain control: IV Dilaudid low-dose as needed, oxycodone as needed, Tylenol as needed   Continue home gabapentin  Consider GI (pancreatobiliary) consultation in the morning for likely biopsy  Hold anticoagulation tonight (patient says he took warfarin earlier in the evening)-follow-up INR in the morning.  INR reversal based on the procedure planned.  Oncology consultation based on the biopsy results  Echocardiogram in the morning to evaluate for pericardial effusion, history of cardiomyopathy?       # EMILY -  baseline unknown. Cr 2.21 on adm.   #Hyponatremia, mild, 132  -Suspect dehydration, diuretics, acei.  -IV, oral hydration  -Follow-up BMP, I/o.   -Avoid nephrotoxins, renal dosing  -Hold diuretics, lisinopril    #Chronic problems  -Hypertension  -Hyperlipidemia  -Coronary artery disease/MI  -Atrial fibrillation on anticoagulation with warfarin  -History of stroke 2017 with mild left-sided deficit  -History of sleep apnea, noncompliant with CPAP.    -Prior to admission medications include amlodipine, lisinopril, hydrochlorothiazide--hold for now.  Coreg 12.5 twice daily, resume at lower dose 6.25 twice daily.  Titrate up as needed.  Monitor vitals.  -Continue prior atorvastatin 40 daily  -Hold warfarin as above.  INR therapeutic.         Diet: Orders Placed This Encounter      NPO for Medical/Clinical Reasons Except for: Meds, Ice Chips  DVT Prophylaxis: Pneumatic Compression Devices  Gonzales Catheter: not present  Code Status: full code.     Disposition Plan   Expected discharge: tbd, recommended to prior living arrangement once pending medical course. .  Entered: Mazin Celis MD 03/10/2020, 12:35 AM     The patient's care was discussed with the Bedside Nurse, Patient and Patient's Family.    Mazin Celis MD  [facility], [facility]  Please see sticky note for cross cover information  ______________________________________________________________________    Chief Complaint     Left upper abdominal pain and back pain  Weight loss  Loss of appetite  Fatigue    History is obtained from the patient, electronic health record and emergency department physician and family    History of Present Illness     Kenneth Tello is a 49 year old male with a history notable for atrial fibrillation (on chronic anticoagulation with warfarin), myocardial infarction, stroke (2017), HTN and HLD who presents from [facility] for evaluation of left upper abdominal pain and back  pain and possible newly diagnosed pancreatic cancer.     Patient reports he presented to the [facility] clinic this morning for worsening left lower sided intermittent back pain and left upper abdominal pain. He presents here today after being told he likely has pancreatic cancer.  He denies recent falls or injury to his back.  Patient states the symptoms have been worsening over the past 2 or 3 weeks but states they have been present for approximately 6 months. He also notes losing 40 pounds since November.  He has lost his appetite and has become more fatigued.  Reports intermittent nausea. The patient works as a  and has recently had difficulty working under vehicles. More frequently he has become dizzy from standing from a lying position.  Patient denies fever, chills, chest pain or shortness of breath.  Denies obvious bleeding.  No lightheadedness or dizziness.    Patient reports recently decreasing his alcohol consumption from 2 drinks of liquor per day to 1.  Says he usually drinks 1 or 2 beers a day.  Denies alcohol withdrawal.   History of smoking.  Patient last smoked on January 2 of this year.  He denies other drug use.  No other concern  History of sleep apnea but not compliant with CPAP     [facility]     Impression:  1.  Left  lower lobe airspace disease and left effusion.  2.  Mild cardiomegaly on the basis of pericardial effusion.     CT abdomen and pelvis without contrast- [facility] (3/9/2020)     Impression:  1.  Left upper quadrant mass encompassing posterior wall stomach spleen, pancreas, and probably the left diaphragmatic crura with multiple mesenteric and retroperitoneal masses.  Pancreatic neoplasm is suspected space-space lymphoma is a less likely possibility.  Splenic vein thrombosis is suspected.  Status of the portal vein and SMV is uncertain.  2.  Pericardial effusion.  Small amount of free  pelvic fluid/ascites.  Presacral soft tissue thickening.  3.  Left pleural effusion and left lower lobe airspace disease.    Review of Systems    The 10 point Review of Systems is negative other than noted in the HPI or here.     Past Medical History    I have reviewed this patient's medical history and updated it with pertinent information if needed.   Past Medical History:   Diagnosis Date     Pancreatic cancer (H)      Stroke (H)    -Hypertension, hyperlipidemia  -Coronary disease/MI  -Sleep apnea  -Obesity  -Alcohol use-disorder, tobacco use disorder.    Past Surgical History   I have reviewed this patient's surgical history and updated it with pertinent information if needed.  History reviewed. No pertinent surgical history.    Social History   I have reviewed this patient's social history and updated it with pertinent information if needed.  Social History     Tobacco Use     Smoking status: Former Smoker     Years: 2.00     Last attempt to quit: 2020     Years since quittin.1     Smokeless tobacco: Former User     Quit date: 3/9/2018   Substance Use Topics     Alcohol use: Yes     Comment: social     Drug use: Never       Family History   I have reviewed this patient's family history and updated it with pertinent information if needed.   History reviewed. No pertinent family history.    Prior to Admission Medications   None     Allergies   No Known Allergies    Physical Exam   Vital Signs: Temp: 99  F (37.2  C) Temp src: Oral BP: 95/56 Pulse: 75   Resp: 14 SpO2: 96 % O2 Device: None (Room air)    Weight: 222 lbs 0 oz      General: alert, interactive, NAD, obese  HEENT: AT/NC, Anicteric, Moist MM  Neck: Supple. JVD not appreciated. No lad  Respi/Chest: Non labored, CTA BL.  CVS/Heart: S1S2 irregular, no murmur.   GI/Abd: Soft, mild discomfort LUQ, obese, BS +, No g/r.  MSK/Extremities: Distally warm, well perfused. bl edema 1+  Neuro: AO x 4, Grossly non focal.   Psychiatry: Stable mood.   Skin: no  rash on exposed areas.       Data   Data reviewed today: I reviewed all medications, new labs and imaging results over the last 24 hours. I personally reviewed no images or EKG's today.    Recent Labs   Lab 03/09/20 2032   WBC 17.1*   HGB 10.1*   MCV 85      INR 2.21*   *   POTASSIUM 3.5   CHLORIDE 92*   CO2 33*   BUN 19   CR 1.00   ANIONGAP 6   STACY 8.6   GLC 92   ALBUMIN 2.1*   PROTTOTAL 6.8   BILITOTAL 1.0   ALKPHOS 84   ALT 20   AST 39   LIPASE 144     Recent Results (from the past 24 hour(s))   XR Chest 2 Views    Narrative    EXAM: XR CHEST 2 VW  LOCATION: [facility]  DATE/TIME: 3/9/2020 10:34 PM    INDICATION: Shortness of breath at with newly diagnosed lung malignancy.  COMPARISON: None.      Impression    IMPRESSION: Elevation left hemidiaphragm. Associated opacity posterior aspect of left lower lobe highly suspicious for volume loss or consolidation related to pneumonia. Recommend correlation for left basilar pneumonia. Likely associated minimal left   basilar pleural fluid. Right lung clear. Normal heart size. Normal pulmonary vascularity.

## 2020-03-10 NOTE — PLAN OF CARE
Temp max 99s vss. Pt reports pain tolerable and denied need for medication intervention. Cardiac ECHO done, CT scan done. Signed and held orders for CT guided bx in IR for tomorrow. Also notes that INR will need to be corrected to <1.8 prior to bx (currently 2.47). Potassium level 3.2. MD notified, (pt does have 20meq KCl in each maintenance IVF bag [total 60meq/day]). Currently NPO, but MD paged to inquire about eating until [place].

## 2020-03-10 NOTE — PROGRESS NOTES
[facility], [facility] Progress Note - Hospitalist Service, Gold 11       Date of Admission:  3/9/2020  Assessment & Plan     Intra-abdominal mass concerning for neoplasm versus lymphoma  - CT scan shows large LUQ mass encompassing the stomach, spleen, and pancreas as well as multiple mesenteric and RP nodules concerning for malignancy (lymphoma vs pancreatic vs other). GI was consulted for possible biopsy and this has been deferred to IR due to significant cardiac history and concerns with anesthesia. IR would opt to not biopsy the LUQ mass due to high risk of bleeding and significant vascular involvement. Will plan to biopsy a left RP lymph node.  - Oncology has been consulted to aid with cytology and pathology orders  - Current INR is greater than 1.8, warfarin held, fresh frozen plasma consented, will revisit with INR in the morning to assure INR less than 1.8 for CT-guided biopsy  - Await final read of the CT scan    Nonspecific symptoms of weight loss, anorexia, fatigue, back pain  - Overall suspect this is associated with the above work-up for malignancy, patient is aware    Trace, small pericardial effusion  - Echo was reported with an EF of 60 to 65%, small circumferential pericardial effusion  - No sign of tamponade    Small left pleural effusion  - Await full CT scan results, no respiratory distress at present  -This may be early developing malignant effusion    EMILY, associated with hyponatremia, suspected dehydration  - Creatinine currently is 1.01 and is stable  - Will require potassium replacement with IVF    Chronic A. fib on anticoagulation with warfarin    History of CVA, stroke, 2017, mild left-sided deficits    Hypertension, stable at present  Hyperlipidemia  Coronary artery disease with history of MI  Obstructive sleep apnea noncompliant with CPAP    Leukocytosis, downtrending from 17-15, will continue to monitor    History of alcohol abuse  History of  smoking cigarette, nicotine dependence  Anemia, Multifactorial         Diet: Regular Diet Adult  NPO for Medical/Clinical Reasons Except for: No Exceptions    DVT Prophylaxis: Hold for any procedure and reversal of warfarin  Gonzales Catheter: not present  Code Status: Full Code      Disposition Plan   Expected discharge: 2 - 3 days, recommended to prior living arrangement once Pathology, prelim diagnosis, stale Hb and vitals..  Entered: Mahesh Suarez MD 03/10/2020, 4:56 PM       The patient's care was discussed with the Patient, Patient's Family and GI, IR, Oncology Team.    Mahesh Suarez MD  Hospitalist Service, [address], [place]  Pager: [phone]  Please see sticky note for cross cover information  ______________________________________________________________________    Interval History     Patient was seen and examined at bedside, multiple family members at bedside, patient denies any significant distress, family notes that he has exertionally dyspneic but otherwise asymptomatic at rest, patient confirms this    He otherwise denies any fevers chills, denies any bleeding  Resting comfortably  Denies any headache  Denies any significant abdominal pain      Data reviewed today: I reviewed all medications, new labs and imaging results over the last 24 hours. I    Physical Exam   Vital Signs: Temp: 98.2  F (36.8  C) Temp src: Oral BP: 112/61 Pulse: 63   Resp: 20 SpO2: 96 % O2 Device: None (Room air)    Weight: 219 lbs 8 oz    General: Alert awake and oriented, no distress, conversational  Eyes: Normal pink mucosa with mild signs of pallor, no scleral icterus.  Neck: No significant lymphadenopathy, no palpable LN, No JVD  Heart: Regular rate and rhythm, normal S1, normal S2, no murmurs rubs or gallops, PMI is nondisplaced   Peripherally there is no edema  Lungs: No increased work of breathing, good effort, lungs are clear to auscultation bilaterally   No wheezing or  crackles   Breathing on room air  Abdomen: Nontender, nondistended, normal active bowel sounds, mildly obese  Extremities: Normal capillary refill, no edema, no clubbing, no cyanosis  Neuro: Alert and oriented to person place location and situation   Grossly arms and legs are without any focal motor or sensory deficits   Gait was not assessed   Cranial nerves II through XII are grossly intact  Psych: No acute psychosis, good mood, good spirits       Data   Recent Labs   Lab 03/10/20  0535 20   WBC 15.6* 17.1*   HGB 9.7* 10.1*   MCV 86 85    334   INR 2.47* 2.21*   * 132*   POTASSIUM 3.2* 3.5   CHLORIDE 94 92*   CO2 31 33*   BUN 21 19   CR 1.01 1.00   ANIONGAP 7 6   STACY 8.2* 8.6   * 92   ALBUMIN  --  2.1*   PROTTOTAL  --  6.8   BILITOTAL  --  1.0   ALKPHOS  --  84   ALT  --  20   AST  --  39   LIPASE  --  144     Recent Results (from the past 24 hour(s))   XR Chest 2 Views    Narrative    EXAM: XR CHEST 2 VW  LOCATION: [facility]  DATE/TIME: 3/9/2020 10:34 PM    INDICATION: Shortness of breath at with newly diagnosed lung malignancy.  COMPARISON: None.      Impression    IMPRESSION: Elevation left hemidiaphragm. Associated opacity posterior aspect of left lower lobe highly suspicious for volume loss or consolidation related to pneumonia. Recommend correlation for left basilar pneumonia. Likely associated minimal left   basilar pleural fluid. Right lung clear. Normal heart size. Normal pulmonary vascularity.   Echo Complete    Narrative    [id number]  [id number]  [id number]  [id number]^ELENA^JESSICA           [facility],[place]  Echocardiography Laboratory  [address] [place] [id number]     Name: RICKEY WATKINS  MRN: [id number]  : 1970  Study Date: 03/10/2020 07:46 AM  Age: 49 yrs  Gender: Male  Patient Location: [facility], [place]  Reason For Study: Pericardial Effusion, Cardiomyopathy  Ordering Physician: JESSICA PARKER  Performed By:  Greer Vigil     BSA: 2.2 m2  Height: 70 in  Weight: 220 lb  HR: 70  _____________________________________________________________________________  __        Procedure  Echocardiogram with two-dimensional, color and spectral Doppler performed. The  patient's rhythm is atrial fibrillation.  _____________________________________________________________________________  __        Interpretation Summary  Global and regional left ventricular function is normal with an EF of 60-65%.  Global right ventricular function is normal.  Mild tricuspid regurgitation present.  Pulmonary artery systolic pressure is 34 mmHg, upper limits of normal.  Dilation of the inferior vena cava is present with normal respiratory  variation in diameter.  Small circumferential pericardial effusion is present without any hemodynamic  significance.  _____________________________________________________________________________  __        Left Ventricle  Left ventricular size is normal. Global and regional left ventricular function  is normal with an EF of 60-65%. Mild concentric wall thickening consistent  with left ventricular hypertrophy is present. Diastolic function not assessed  due to atrial fibrillation.     Right Ventricle  The right ventricle is normal size. Global right ventricular function is  normal.     Atria  The right atria appears normal. Severe left atrial enlargement is present.        Mitral Valve  Mild mitral annular calcification is present. Trace mitral insufficiency is  present.     Aortic Valve  Aortic valve is normal in structure and function.     Tricuspid Valve  Mild tricuspid insufficiency is present. Estimated pulmonary artery systolic  pressure is 26 mmHg plus right atrial pressure. Pulmonary artery systolic  pressure is normal.     Pulmonic Valve  The pulmonic valve is normal.     Vessels  The aorta root is normal. Dilation of the inferior vena cava is present with  normal respiratory variation in diameter. IVC  diameter and respiratory changes  fall into an intermediate range suggesting an RA pressure of 8 mmHg.     Pericardium  Small circumferential pericardial effusion is present without any hemodynamic  significance.        Compared to Previous Study  Previous study not available for comparison.  _____________________________________________________________________________  __     MMode/2D Measurements & Calculations  IVSd: 1.4 cm  LVIDd: 4.6 cm  LVIDs: 2.9 cm  LVPWd: 1.6 cm  FS: 37.0 %  LV mass(C)d: 284.3 grams  LV mass(C)dI: 130.8 grams/m2  asc Aorta Diam: 2.9 cm  LVOT diam: 2.0 cm  LVOT area: 3.2 cm2  LA Volume Index (BP): 53.9 ml/m2  RWT: 0.71              _____________________________________________________________________________  __        Report approved by: Damion Peña 03/10/2020 11:29 AM        Medications     dextrose 5% and 0.9% NaCl with potassium chloride 20 mEq 125 mL/hr at 03/10/20 0929     - MEDICATION INSTRUCTIONS -         atorvastatin  40 mg Oral QPM     carvedilol  6.25 mg Oral BID w/meals     gabapentin  300 mg Oral At Bedtime     [START ON 3/11/2020] influenza vaccine adult (product based on age)  0.5 mL Intramuscular Prior to discharge

## 2020-03-11 NOTE — PLAN OF CARE
"[id number]    /63 (BP Location: Right arm)   Pulse 63   Temp 96.5  F (35.8  C) (Oral)   Resp 18   Ht 1.778 m (5' 10\")   Wt 99.7 kg (219 lb 12.8 oz)   SpO2 100%   BMI 31.54 kg/m      Reason for admission: Pancreatic mass  Activity: SBA  Pain: Denies  Neuro: AxOx4. Neuros intact.   Cardiac: INR 2.69, given 1 dose of plasma. Tmax 100.8. Given Tylenol with effect. Otherwise VSS.  Respiratory: Non labored breathing on RA. O2 sats WDL.   GI/: Voiding independently.   Diet: NPO for biopsy  Lines: L PIV intact.   Wounds: Skin intact    Continue to monitor and follow POC    "

## 2020-03-11 NOTE — CONSULTS
Laboratory Medicine and Pathology  Transfusion Medicine- Transfusion Reaction    Kenneth Tello MRN# [id number]   YOB: 1970 Age: 49 year old   Date of Reaction: 3/10/2020       Transfusion Reaction Evaluation   Impression  In this 49 year old male who had a fever to 100.7F (from 98.4F pre-transfusion) within four hours post-transfusion of a unit of plasma, the symptom meets the National [employer] Safety Network criteria for a febrile non-hemolytic transfusion reaction.  Because there is no evidence of this patient having labile temperatures, the favored etiology of the fever is likely to be the blood product.  The transfused product bag was not returned to the lab, and thus a gram stain and culture was not performed.         Recommendation    Transfuse as needed.       ----------------------------------    History  Kenneth Tello is a 49 year old male with a newly discovered large intra-abdominal (splenic/pancreatic) mass concerning for solid neoplasm or lymphoma.  Patient had been on warfarin due to history of atrial fibrillation and stroke.      Reported Symptoms  A unit of plasma was started at 17:25 and finished at 19:45.  By 22:13 and within four hours post-transfusion, the patient's temperature had increased to 100.7F from 98.4F pre-transfusion.      Vitals  03/10/20 1725 (pre-transfusion) 71  --  111/63  98.4  F (36.9  C)  20  --  --  --    03/10/20 1738  74  --  106/54  99.2  F (37.3  C)  16  --  None (Room air)  --    03/10/20 1834 (mid-transfusion) 73  --  104/53  99.8  F (37.7  C)  16  --  None (Room air)  --    03/10/20 1945 (post-transfusion) 63  --  117/60  99.7  F (37.6  C)  18  --  None (Room air)  --    03/10/20 2213  72  --  127/61  100.7  F (38.2  C)  18  --  None (Room air)  --      Blood Bank Investigation  Product Type: plasma  Unit Number: [id number]  Amount Remaining: n/a - product not returned  Post-Transfusion Clerical Check: Correct  ABO/Rh: The unit type was AB  pos and the patient was O pos. The unit was compatible.  TEMITOPE: Negative  Post-Transfusion Plasma: straw-colored    [facility]  Case Definition: Definitive for febrile non-hemolytic transfusion reaction (FNHTR)  Severity: Non-severe  Imputability: Possible      Alana Dubois MD  Laboratory Medicine and Pathology Resident, PGY-3  [phone]

## 2020-03-11 NOTE — PLAN OF CARE
Afebrile, vss. Pt reports left upper abd pain radiating to back. Received tylenol with no change in pain. Hesitent to take oxycodone because his mom told him he was allergic to narcotics 20 years ago, but he can't recall the context or what the reaction would have been other than feeling clammy and lightheaded when he had his gall bladder removed. Pt decided that pain was tolerable and wanted to wait to try oxycodone for pain. In preparation for CT guided bx in IR, transfused plama to correct INR to <1.8. Received 1 unit plasma (#3 total) with post INR 2.28. Received another unit plasma (#4 total) with post INR 2.15. Received another unit plasma (#5 total) post INR defered per IR. Report given to IR and pt left for CT guided bx in IR at 1430.

## 2020-03-11 NOTE — PROGRESS NOTES
3/11/2020 Gastroenterology Brief Note    Chart reviewed  Following patient for abdominal mass. IR plans to biopsy RP lymph nodes today if INR appropriate. GI will sign off, please call with questions. We can be available for EUS with biopsy if adequate tissue cannot be obtained via perc route.    Above in collaboration with Dr. Micheline Shelton PA-C  [facility]/Pancreaticobiliary GI Service  Pager *[phone]

## 2020-03-11 NOTE — PROCEDURES
[facility], [facility]    Procedure: IR Procedure Note    Date/Time: 3/11/2020 3:33 PM  Performed by: Terrell Stewart MD  Authorized by: Terrell Stewart MD   IR Fellow Physician: Terrell Stewart  Other(s) attending procedure: Staff: Dr. Hernández    UNIVERSAL PROTOCOL   Site Marked: NA  Prior Images Obtained and Reviewed:  Yes  Required items: Required blood products, implants, devices and special equipment available    Patient identity confirmed:  Verbally with patient, arm band, provided demographic data and hospital-assigned identification number  Patient was reevaluated immediately before administering moderate or deep sedation or anesthesia  Confirmation Checklist:  Patient's identity using two indicators, relevant allergies, procedure was appropriate and matched the consent or emergent situation and correct equipment/implants were available  Time out: Immediately prior to the procedure a time out was called    Universal Protocol: the Joint Commission Universal Protocol was followed    Preparation: Patient was prepped and draped in usual sterile fashion           ANESTHESIA    Anesthesia: Local infiltration  Local Anesthetic:  Lidocaine 1% without epinephrine      SEDATION    Patient Sedated: Yes    Vital signs: Vital signs monitored during sedation    See dictated procedure note for full details.  Findings: -CT guided biopsy of left retroperitoneal LN mass    Specimens: fluid and/or tissue for laboratory analysis and culture    Complications: None    Condition: Stable    Plan: -Bed rest x 2 hours then resume prior activities.     PROCEDURE   Patient Tolerance:  Patient tolerated the procedure well with no immediate complications    Length of time physician/provider present for 1:1 monitoring during sedation: 30

## 2020-03-11 NOTE — PRE-PROCEDURE
GENERAL PRE-PROCEDURE:   Procedure:  Retroperitoneal mass biopsy     Verbal consent obtained?: Yes    Written consent obtained?: Yes    Risks and benefits: Risks, benefits and alternatives were discussed    Consent given by:  Patient  Patient states understanding of procedure being performed: Yes    Patient's understanding of procedure matches consent: Yes    Procedure consent matches procedure scheduled: Yes    Expected level of sedation:  Moderate  Appropriately NPO:  Yes  ASA Class:  Class 2- mild systemic disease, no acute problems, no functional limitations  Mallampati  :  Grade 2- soft palate, base of uvula, tonsillar pillars, and portion of posterior pharyngeal wall visible  Lungs:  Lungs clear with good breath sounds bilaterally  Heart:  Normal heart sounds and rate  History & Physical reviewed:  History and physical reviewed and no updates needed  Statement of review:  I have reviewed the lab findings, diagnostic data, medications, and the plan for sedation

## 2020-03-11 NOTE — PROGRESS NOTES
[facility], [facility] Progress Note - Hospitalist Service, Gold 11       Date of Admission:  3/9/2020  Assessment & Plan     Large Intra-abdominal (splenic/pancreatic) mass concerning for solid neoplasm or lymphoma  - CT scan shows large LUQ mass encompassing the stomach, spleen, and pancreas as well as multiple mesenteric and RP nodules concerning for malignancy (lymphoma vs pancreatic vs other). GI was consulted for possible biopsy and this has been deferred to IR due to significant cardiac history and concerns with anesthesia. IR would opt to not biopsy the LUQ mass due to high risk of bleeding and significant vascular involvement.   - Oncology has been consulted to aid with cytology and pathology orders  - Current INR is greater than 1.8, warfarin held, fresh frozen plasma ongoing to lower INR  - Await INR to allow IR biopsy today, will remain inpatient to have path/cytology results.  - Elevated Uric acid, will start allopurinol, trend LDH    Nonspecific symptoms of weight loss, anorexia, fatigue, back pain  - Overall suspect this is associated with the above work-up for malignancy, patient is aware    Trace, small pericardial effusion  - Echo was reported with an EF of 60 to 65%, small circumferential pericardial effusion  - No sign of tamponade    Small left pleural effusion  -This may be early developing malignant effusion    EMILY, associated with hyponatremia, suspected dehydration  - Creatinine currently is 1.01 and is stable  - Will require potassium replacement with IVF    Chronic A. fib on anticoagulation with warfarin, held currently    History of CVA, stroke, 2017, mild left-sided deficits    Hypertension, stable at present  Hyperlipidemia  Coronary artery disease with history of MI  Obstructive sleep apnea noncompliant with CPAP    Leukocytosis, remains elevated from 17 to 15 to 18, will continue to monitor    History of alcohol abuse  History of smoking  cigarette, nicotine dependence  Anemia, Multifactorial         Diet: NPO per Anesthesia Guidelines for Procedure/Surgery Except for: Meds    DVT Prophylaxis: Hold for any procedure and reversal of warfarin  Gonzales Catheter: not present  Code Status: Full Code      Disposition Plan   Expected discharge: 2 - 3 days, recommended to prior living arrangement once Pathology, prelim diagnosis, stale Hb and vitals..  Entered: Mahesh Suarez MD 03/11/2020, 12:46 PM       The patient's care was discussed with the Patient, Patient's Family and GI, IR, Oncology Team.    Mahesh Suarez MD  Hospitalist Service, [address], [place]  Pager: [phone]  Please see sticky note for cross cover information  ______________________________________________________________________    Interval History     No acute overnight events per patient or patient's family.    As of: March 11, 2020  Patient denies any headache, sore throat, chest pain, shortness of breath, new rash or swelling  Patient denies any sleeping disturbance  Patient denies any nausea or vomiting or abdominal pain    Wants to get the IR biopsy and awaiting treatment options for presumed cancer diagnosis.    Data reviewed today: I reviewed all medications, new labs and imaging results over the last 24 hours. I    Physical Exam   Vital Signs: Temp: 98.3  F (36.8  C) Temp src: Oral BP: 115/62 Pulse: 65 Heart Rate: 65 Resp: 18 SpO2: 98 % O2 Device: None (Room air)    Weight: 219 lbs 12.78 oz    General: Alert awake and oriented, no distress, conversational upright in bedside chair.  Eyes: Normal pink mucosa with mild signs of pallor, no scleral icterus.  Neck: No significant lymphadenopathy, no palpable LN, No JVD  Heart: Regular rate and rhythm, normal S1, normal S2, no murmurs rubs or gallops, PMI is nondisplaced   Peripherally there is no edema  Lungs: No increased work of breathing, good effort, lungs are clear to auscultation bilaterally   No  wheezing or crackles   Breathing on room air  Abdomen: Nontender, nondistended, normal active bowel sounds, mildly obese  Extremities: Normal capillary refill, no edema, no clubbing, no cyanosis  Neuro: Alert and oriented to person place location and situation   Grossly arms and legs are without any focal motor or sensory deficits   Gait was not assessed   Cranial nerves II through XII are grossly intact  Psych: No acute psychosis, good mood, good spirits       Data   Recent Labs   Lab 03/11/20  1157 03/11/20  0932 03/11/20  0815 03/11/20  0255 03/10/20  1746 03/10/20  0535 03/09/20 2032   WBC  --   --   --  18.8*  --  15.6* 17.1*   HGB  --   --   --  9.8*  --  9.7* 10.1*   MCV  --   --   --  84  --  86 85   PLT  --   --   --  323  --  329 334   INR 2.15* 2.10* 2.28* 2.69*  --  2.47* 2.21*   NA  --   --   --  133  --  132* 132*   POTASSIUM  --   --   --  3.6 3.5 3.2* 3.5   CHLORIDE  --   --   --  96  --  94 92*   CO2  --   --   --  31  --  31 33*   BUN  --   --   --  13  --  21 19   CR  --   --   --  0.70  --  1.01 1.00   ANIONGAP  --   --   --  6  --  7 6   STACY  --   --   --  8.2*  --  8.2* 8.6   GLC  --   --   --  90  --  108* 92   ALBUMIN  --   --   --   --   --   --  2.1*   PROTTOTAL  --   --   --   --   --   --  6.8   BILITOTAL  --   --   --   --   --   --  1.0   ALKPHOS  --   --   --   --   --   --  84   ALT  --   --   --   --   --   --  20   AST  --   --   --   --   --   --  39   LIPASE  --   --   --   --   --   --  144     Recent Results (from the past 24 hour(s))   CT Chest/Abdomen/Pelvis w Contrast    Narrative    EXAMINATION: CT CHEST/ABDOMEN/PELVIS W CONTRAST, 3/10/2020 12:47 PM    TECHNIQUE: Axial CT images from the lung apices through the symphysis  pubis were obtained  with IV contrast. Coronal and sagittal reformats  also provided. Contrast dose: iopamidol (ISOVUE-370) solution 134 mL    COMPARISON: None available at time of dictation. Patient reportedly  had outside imaging done without  contrast.    HISTORY: PANCREAS PROTOCOL, LUQ abd pain, new panc/gastric/splenic  mass on OSH imaging (done without contrast), please eval mass with IV  contrast along with staging CT chest    FINDINGS:    CHEST  Left thyroid lobe is slightly larger than the right without discrete  nodule visualized. Standard aortic branching pattern with mild  atherosclerosis. There is some calcification/high density along the  azygos vein. Coronary artery calcification. Small pericardial effusion  present. Prominent subcarinal lymph node has a fatty hilum, likely  reactive. Remainder of the mediastinal and hilar lymph nodes are  subcentimeter short axis.    Small left pleural effusion demonstrated with adjacent lower lobe  atelectasis. Scattered tiny granulomas. The additional tiny  noncalcified nodules may also represent granulomas. A subpleural  nodule medially at the right base measures 6 mm and has some  calcification on coronal imaging, series 7 image 92, somewhat  nonspecific. Septal thickening more evident on the right demonstrated  with overall some increased density in the dependent lung suggests  edema. Small subpleural bleb medially at the right apex measures 1.3  cm.    ABDOMEN  Liver:  Liver is normal in size and CT density. A 4.9 cm cyst in the left  lateral segment is demonstrated. Slightly heterogeneous subcapsular  enhancement in the right hepatic lobe demonstrated without focal  lesion. Portal vein is patent.    Biliary/Gallbladder:   Gallbladder has been resected. Mild prominence of the biliary tree  within normal limits for same.    Spleen:  The spleen is enlarged and heterogeneous with central hypoattenuating  mass extending to involve the pancreas and adjacent stomach. This is  difficult to marginate due to its infiltrative nature but is  approximately 17.8 x 15.3 cm on series 9 image 270. There is some  perisplenic increased vascularity with collateral vessels and slight  stranding. Discrete splenic vein not  visualized, presumably  thrombosed. Mass extends into the retroperitoneum with retroperitoneal  lymphadenopathy, discussed below.    Pancreas:   Pancreatic head and neck within normal limits. The pancreatic body and  tail is enlarged and not  from the large left upper quadrant  mass. Duct is not distended.    Adrenals:   Right adrenal gland within normal limits. There is nodular thickening  towards the apex of the left adrenal gland which abuts the  retroperitoneal lymphadenopathy. Representative lymph nodes will be  discussed below.    Kidneys:   Kidneys are normal in size. There are no stones or hydronephrosis.  Perinephric stranding.    Retroperitoneal/Vasculature:  Atherosclerosis of the aorta demonstrated. There is extensive  lymphadenopathy encasing the aorta and in the left retroperitoneum.  Representative conglomerate lymph node mass in the retroperitoneum  extending to the left at the level of the renal vessels is 5 x 7.2 cm,  series 9 image 331. In the superior abdomen, the lymphadenopathy  appears contiguous with the large mass extending from the splenic  hilum involving the pancreas and stomach. Additional gastrohepatic and  paty hepatis lymph nodes also present.    Portal vein is patent. Occlusion of the splenic vein present with left  abdominal collaterals.    Gastrointestinal/Mesentery:   The gastric cardia, fundus and proximal body is involved with the  large left upper quadrant mass. No definite gas within the lesion is  appreciated to suggest fistulization although central portion of the  lesion does appear necrotic. Splenic flexure does not appear to be  involved. No small bowel obstruction. Colonic diverticulosis distally  with no evidence of acute diverticulitis.    There is a small amount of pelvic ascites. Presacral edema present.  There is also some stranding in the left retroperitoneum and along the  paracolic gutter.    No free air.    PELVIS  Bladder:   Bladder is collapsed.  Circumferential wall thickening may be in part  due to degree of distention.    Genital:   The prostate and seminal vesicles are within normal limits.    Other:  Body wall edema. Postsurgical changes inferior to the umbilicus. Small  fat-containing inguinal hernias.    Bony Structures:   Visualized bony structures are consistent with the patient's age with  degenerative changes.      Impression    IMPRESSION:   1.  Large infiltrative mass centered in the left upper quadrant  appears to be arising from the spleen and involving the adjacent  stomach and pancreas. Occlusion of the splenic vein with adjacent  collateralization. Upper abdominal and retroperitoneal lymphadenopathy  also present. Differential considerations would include primary  splenic malignancy versus lymphoma. Pancreatic or gastric lesion  invading the spleen can not be excluded on this imaging given the  large size of the mass. Correlation with pending retroperitoneal lymph  node biopsy recommended.  2.  Majority of the nodules in the thorax favored to represent  granulomas. There is one nodule at the right base which only has a  small peripheral area of calcification. Attention on follow-up.  3.  Overall third spacing of fluid demonstrated with suspected  pulmonary edema, left effusion, pericardial effusion and ascites.  4.  Other ancillary findings as above.    FEDERICO MARTIN MD     Medications     - MEDICATION INSTRUCTIONS -         allopurinol  300 mg Oral Daily     atorvastatin  40 mg Oral QPM     carvedilol  6.25 mg Oral BID w/meals     gabapentin  300 mg Oral At Bedtime     influenza vaccine adult (product based on age)  0.5 mL Intramuscular Prior to discharge

## 2020-03-11 NOTE — PROGRESS NOTES
"CLINICAL NUTRITION SERVICES - ASSESSMENT NOTE     Nutrition Prescription    RECOMMENDATIONS FOR MDs/PROVIDERS TO ORDER:  Recommend checking Zinc level to risk for deficiency secondary to reported chronic diarrhea PTA.    Malnutrition Status:    Unable to determine this time    Recommendations already ordered by Registered Dietitian (RD):  Enter order for ONS for pt to request as needed.    Future/Additional Recommendations:  Monitor tolerance and intakes with diet adv and need for calorie count monitoring to help quantify adequacy if appetite remains poor.      REASON FOR ASSESSMENT  Kenneth Tello is a/an 49 year old male assessed by the dietitian for Admission Nutrition Risk Screen for unintentional loss of 10# or more in the past two months and reduced oral intake over the last month    NUTRITION HISTORY  Unable to obtain from pt despite multiple attempts to see.   Per H&P, pt presents for evaluation of left upper abdominal pain and back pain and possible newly diagnosed Pancreatic Ca. Has had a loss of appetite and increase fatigue, in addition to intermittent nausea. H/o ETOH intakes, but reduced from 2 drinks per day to 1.     CURRENT NUTRITION ORDERS  Diet: NPO at MN and now adv to Regular as of 1600 today  Intake/Tolerance: No meals ordered yet today, one meal ordered yesterday evening.    LABS  Labs reviewed    MEDICATIONS  Medications reviewed    ANTHROPOMETRICS  Height: 177.8 cm (5' 10\")  Most Recent Weight: 99.7 kg (219 lb 12.8 oz) - today's (lowest wt this admission)  IBW: 75.5 kg  BMI: Obesity Grade I BMI 30-34.9  Weight History: Unable to obtain from pt at this time. Per H&P, hx of 40 lbs wt loss reported since November. Based on current wt, 15% wt loss x past 4 mos.  Wt Readings from Last 10 Encounters:   03/11/20 99.7 kg (219 lb 12.8 oz)       Dosing Weight: 82 kg (adjusted based on lowest wt of 99.7 kg and IBW)    ASSESSED NUTRITION NEEDS  Estimated Energy Needs: 4811-3962 kcals/day (25 - 30 " kcals/kg)  Justification: Maintenance  Estimated Protein Needs: 100-125 grams protein/day (1.2 - 1.5 grams of pro/kg)  Justification: Repletion  Estimated Fluid Needs: (1 mL/kcal)   Justification: Maintenance    PHYSICAL FINDINGS  See malnutrition section below.  Unable to assess at this time d/t unable to see pt.  Chronic diarrhea - no stool recorded since admit    MALNUTRITION  % Intake: Unable to assess  % Weight Loss: > 7.5% in 3 months (severe)  Subcutaneous Fat Loss: Unable to assess  Muscle Loss: Unable to assess  Fluid Accumulation/Edema: Does not meet criteria per chart review  Malnutrition Diagnosis: Unable to determine due to unable to obtain diet hx and unable to complete nutritional physical assessment.    NUTRITION DIAGNOSIS  Unintended weight loss related to suspect inadequate nutritional intakes secondary to h/o abdominal and back pain, loss of appetite, fatigue and  intermittent nausea hindering po as evidenced by wt loss of 15% x past 4 mos and reduced oral intakes over the last month.     INTERVENTIONS  Implementation  Nutrition Education: No education needs assessed at this time   Medical food supplement therapy     Goals  1. Wt to remain > 99 kg  2. Patient to consume > 75% of nutritionally adequate meal trays TID, or the equivalent with supplements/snacks.     Monitoring/Evaluation  Progress toward goals will be monitored and evaluated per protocol.  Michelle Perez RD,LD  7D Unit pager [phone]

## 2020-03-11 NOTE — PROGRESS NOTES
Patient Name: Kenneth Tello  Medical Record Number: [id number]  Today's Date: 3/11/2020    Procedure: Imaging Guided Retroperitoneal Biopsy  Proceduralist: Gwendolyn Solis    Procedure Start: 1500  Procedure end: 1526  Sedation medications administered: Fentanyl 125 mcg, Versed 2.5 mg  Sedation Time: 30 minutes    Report given to: Harish VALDEZ RN    D: Pt arrived via cart, accompanied by RN. No c/o pain.  VS baseline upon arrival; pt in afib.  Consent signed in the procedure room.  Pt positioned prone on the procedure table with head going first into the gantry.  I: Monitored and sedated pt during procedure.  A: Pt tolerated the procedure well. VS baseline throughout.  6 cores taken and sent to pathology.  Pathology present for procedure.  Insertion site is clean, dry, and covered.  P: Pt care to continue on 7D.

## 2020-03-11 NOTE — PLAN OF CARE
T max 100.7. Received one unit of plasma and temp spike was within 6 hours so transfusion rxn workup was ordered and MD was notified. No other s/s of transfusion rxn noted. INR to be rechecked in the AM and will receive another unit of plasma before biopsy if INR is >1.8. [facility] d/c'ed. Carvedilol held per MD as pt's SBP was maintained around 110 all shift. One loose BM this shift. Pt's diet changed to regular and pt ate dinner and reported a good appetite; will be NPO at midnight for biopsy tomorrow. Signed and held orders released; first of 3 scrubs completed to pt's back per orders. Ambulated in halls; up independently. Continue with POC.

## 2020-03-12 NOTE — PLAN OF CARE
Pt arrived back to unit from IR around 1545. T max 99.8; OVSS. Biopsy site dressing c/d/i. Denies pain. Pt only had 125 mL in UOP this shift; bladder scan was 116 mL. Pt inquiring about using CPAP at night while in the hospital; however, pt does not have home settings to reference as he said he never followed up with getting a machine at home. 2 L NC applied at night for comfort. He also expressed concerns about his health insurance expiring on April 1; Social Work consult ordered. Back on regular diet; good appetite. No BM today. Continue with POC.

## 2020-03-12 NOTE — PLAN OF CARE
Afebrile with stable vs. Left, mid back bx site d/I. Received oxycodone 5mg for back pain with good relief. Awaiting pathology, hopefully by tomorrow (per oncology).

## 2020-03-12 NOTE — PLAN OF CARE
"[id number]    BP (!) 149/68 (BP Location: Right arm)   Pulse 54   Temp 100.3  F (37.9  C) (Oral)   Resp 20   Ht 1.778 m (5' 10\")   Wt 99.7 kg (219 lb 12.8 oz)   SpO2 93%   BMI 31.54 kg/m      Reason for admission: Pancreatic mass  Activity: SBA  Pain: Denies  Neuro: AxOx4. Neuros intact. Baseline numbness/tingling in L hand.   Cardiac: Tmax 100.3. Given PRN Tylenol. Recheck this morning of 97.1.   Respiratory: Non labored breathing on 2LNC for sleeping. O2 sats WDL. Pt interested in discussing WATSON, Cpap with team prior to discharge.    GI/: Voiding independently with low UOP. Abdomen softly distended, nt. +BS.   Diet: Tolerating regular diet.   Lines: L PIV intact, SL. Site WDL.   Wounds: Skin intact. Biopsy site dressing CDI.      Continue to monitor and follow POC    "

## 2020-03-12 NOTE — PROGRESS NOTES
[facility], [facility] Progress Note - Hospitalist Service, Gold 11       Date of Admission:  3/9/2020  Assessment & Plan     Large Intra-abdominal (splenic/pancreatic) mass concerning for solid neoplasm or lymphoma  - CT scan shows large LUQ mass encompassing the stomach, spleen, and pancreas as well as multiple mesenteric and RP nodules concerning for malignancy (lymphoma vs pancreatic vs other). GI was consulted for possible biopsy and this has been deferred to IR due to significant cardiac history and concerns with anesthesia. IR would opt to not biopsy the LUQ mass due to high risk of bleeding and significant vascular involvement.   - LDH and Uric acid were elevated, started on Allopurinol  - 3/11: s/p RP LN Biopsy, successful  - Plan: We continue to wait for his pathology review results, continue allopurinol, appreciate oncology recommendations and follow-up for his pathology    Nonspecific symptoms of weight loss, anorexia, fatigue, back pain  - Overall suspect this is associated with the above work-up for malignancy, patient is aware  - No significant symptoms noted on 3/12    Trace, small pericardial effusion  - Echo was reported with an EF of 60 to 65%, small circumferential pericardial effusion  - No sign of tamponade    Small left pleural effusion  -This may be early developing malignant effusion    EMILY, associated with hyponatremia, suspected dehydration  - Creatinine currently is 1.01 and is stable  - Will require potassium replacement with IVF    Chronic A. fib on anticoagulation with warfarin, held currently  - 3/12: INR 2.23, will continue to hold anticoagulation and will repeat INR in the morning    History of CVA, stroke, 2017, mild left-sided deficits    Hypertension, stable at present  Hyperlipidemia  Coronary artery disease with history of MI  Obstructive sleep apnea noncompliant with CPAP    Leukocytosis, remains elevated from 17 to 15 to 18, will continue  to monitor    History of alcohol abuse  History of smoking cigarette, nicotine dependence  Anemia, Multifactorial, oncology workup as above       Diet: Regular Diet Adult  Snacks/Supplements Adult: Other; Per pt's request; With Meals    DVT Prophylaxis: Hold for any procedure and reversal of warfarin  Gonzales Catheter: not present  Code Status: Full Code      Disposition Plan   Expected discharge: 4 - 7 days, recommended to prior living arrangement once Pathology, prelim diagnosis, stale Hb and vitals..  Entered: Mahesh Suarez MD 03/12/2020, 12:09 PM       The patient's care was discussed with the Patient, Patient's Family and GI, IR, Oncology Team.    Mahesh Suarez MD  Hospitalist Service, [address], [place]  Pager: [phone]  Please see sticky note for cross cover information  ______________________________________________________________________    Interval History     No acute overnight events per patient.  He is anxious to find any pathology results and know the plan of action.  Notes no pain, at site of biopsy from day prior.    As of: March 12, 2020  Patient denies any headache, sore throat, chest pain, shortness of breath, new rash or swelling  Patient denies any sleeping disturbance  Patient denies any nausea or vomiting or abdominal pain    Wants to get the IR biopsy and awaiting treatment options for presumed cancer diagnosis.    Data reviewed today: I reviewed all medications, new labs and imaging results over the last 24 hours. I    Physical Exam   Vital Signs: Temp: 98  F (36.7  C) Temp src: Oral BP: 116/62 Pulse: 75 Heart Rate: 83 Resp: 18 SpO2: 93 % O2 Device: Nasal cannula Oxygen Delivery: 1 LPM  Weight: 222 lbs 9.6 oz    General: Alert awake and oriented, no distress, conversational upright in bedside chair.  Eyes: Normal pink mucosa with mild signs of pallor, no scleral icterus.  Neck: No significant lymphadenopathy, no palpable LN, No JVD  Heart: Regular rate and  rhythm, normal S1, normal S2, no murmurs rubs or gallops, PMI is nondisplaced   Peripherally there is no edema  Lungs: No increased work of breathing, good effort, lungs are clear to auscultation bilaterally   No wheezing or crackles   Breathing on room air  Abdomen: Nontender, nondistended, normal active bowel sounds, mildly obese  Extremities: Normal capillary refill, no edema, no clubbing, no cyanosis  Neuro: Alert and oriented to person place location and situation   Grossly arms and legs are without any focal motor or sensory deficits   Gait was not assessed   Cranial nerves II through XII are grossly intact  Psych: No acute psychosis, good mood, good spirits       Data   Recent Labs   Lab 03/12/20  0840 03/12/20  0550 03/11/20  1157 03/11/20  0932  03/11/20  0255 03/10/20  1746 03/10/20  0535 03/09/20 2032   WBC  --  19.7*  --   --   --  18.8*  --  15.6* 17.1*   HGB  --  9.9*  --   --   --  9.8*  --  9.7* 10.1*   MCV  --  86  --   --   --  84  --  86 85   PLT  --  315  --   --   --  323  --  329 334   INR 2.23*  --  2.15* 2.10*   < > 2.69*  --  2.47* 2.21*   NA  --  137  --   --   --  133  --  132* 132*   POTASSIUM  --  3.4  --   --   --  3.6 3.5 3.2* 3.5   CHLORIDE  --  99  --   --   --  96  --  94 92*   CO2  --  28  --   --   --  31  --  31 33*   BUN  --  13  --   --   --  13  --  21 19   CR  --  0.58*  --   --   --  0.70  --  1.01 1.00   ANIONGAP  --  10  --   --   --  6  --  7 6   STACY  --  8.3*  --   --   --  8.2*  --  8.2* 8.6   GLC  --  97  --   --   --  90  --  108* 92   ALBUMIN  --   --   --   --   --   --   --   --  2.1*   PROTTOTAL  --   --   --   --   --   --   --   --  6.8   BILITOTAL  --   --   --   --   --   --   --   --  1.0   ALKPHOS  --   --   --   --   --   --   --   --  84   ALT  --   --   --   --   --   --   --   --  20   AST  --   --   --   --   --   --   --   --  39   LIPASE  --   --   --   --   --   --   --   --  144    < > = values in this interval not displayed.     Recent Results  (from the past 24 hour(s))   CT Abdomen Retroperitoneal Biopsy    Narrative    Procedures: CT-guided biopsy of left retroperitoneal mass 3/11/2020    Clinical indication: CT guided left RP lymph node biopsy    Comparison studies: CT 3/10/2020    PROCEDURE:        Interventional radiologists: Dr. Hernández (IR staff)  Fellow: Terrell Stewart    Consent: verbal and written informed consent obtained prior to  procedure.    Procedure details: Patient placed in prone position. Left back prepped  and draped in standard sterile fashion. Using CT guidance, 17-gauge  introducer needle was advanced into the left retroperitoneal mass  after local anesthesia and 5 cores were obtained using the 18-gauge  biopsy device. Pathologist confirmed adequate specimen. Needle was  removed, dressing was applied and postprocedure scan was obtained  which did not reveal any complications.      Medications: fentanyl 125 mcg IV, midazolam 2.5 mg IV, 1% lidocaine  for local anesthesia.     Monitoring: The patient was placed on continuous monitoring. Vital  signs and sedation monitored by nursing staff under my supervision.  The patient remained stable throughout the procedure.    Sedation time: 30 minutes    Complications: None.      Impression    IMPRESSION:     Successful CT-guided biopsy of left retroperitoneal mass.    PLAN:    Bedrest as ordered. Pathology pending.    I, VIKY HERNÁNDEZ MD, attest that I was present for all critical  portions of the procedure and was immediately available to provide  guidance and assistance during the remainder of the procedure.    I have personally reviewed the examination and initial interpretation  and I agree with the findings.    VIKY HERNÁNDEZ MD     Medications     - MEDICATION INSTRUCTIONS -         allopurinol  300 mg Oral Daily     atorvastatin  40 mg Oral QPM     carvedilol  6.25 mg Oral BID w/meals     gabapentin  300 mg Oral At Bedtime     influenza vaccine adult (product based on age)  0.5 mL  Intramuscular Prior to discharge

## 2020-03-12 NOTE — PLAN OF CARE
[id number]  tmax 99.4. VSS. Sating 97% on RA, occasionally will wear 1L NC for comfort. LS diminished in LLL. C/o intermittent pain in LUQ. Declines pain meds. Up independently in room. Tolerating diet, fair appetite. PIV SL'd. Voiding small amounts per urinal. L back biopsy site CDI. Waiting for pathology results.

## 2020-03-13 NOTE — PLAN OF CARE
([id number]) Afebrile, VSS. C/o L abd pain improved after Tylenol with Oxycodone x 1. Denied nausea.  Poor appetite but did take ensure supplement x 2. Adequate urine output and BM today. Up ad maryanne. No acute events.  Pt awaits biopsy results.

## 2020-03-13 NOTE — PROGRESS NOTES
BRIEF ONCOLOGY NOTE    We were following peripherally for new LUQ mass arising from the spleen and encasing the stomach and pancreas, along with multiple retroperitoneal lymph nodes. He was told he had pancreatic cancer at an outside hospital but presentation/imaging is more consistent with lymphoma. One of the RP lymph nodes was biopsied by IR on 3/10/20 and has just come back showing diffuse large B-cell lymphoma (to be formally signed out this weekend). Flow cytometry also shows 20% lambda-monotypic B-cells.     I discussed the preliminary diagnosis and imaging findings with the patient. We will plan to transfer him to the Heme Malignancy service first thing tomorrow. Patient will need a staging PET-CT but otherwise already had TTE and HIV/Hep B testing. FISH was also sent and is pending. As he is quite symptomatic from his lymphoma (back pain, weight loss, postprandial fullness), we will plan to start chemotherapy inpatient once remaining tests are done. Baseline uric acid was 7.4 (has been started on allopurinol) and baseline LDH was 512; will need to monitor closely for tumor lysis syndrome.     Oncology will sign off at this time, but please feel free to contact us if additional questions arise.    Henrietta Sanderson MD  Hematology-Oncology Fellow, PGY4

## 2020-03-13 NOTE — PLAN OF CARE
"[id number]    /59 (BP Location: Right arm)   Pulse 78   Temp 98.1  F (36.7  C) (Oral)   Resp 18   Ht 1.778 m (5' 10\")   Wt 101 kg (222 lb 9.6 oz)   SpO2 91%   BMI 31.94 kg/m      Reason for admission: Pancreatic mass  Activity: SBA  Pain: Denies  Neuro: AxOx4. Neuros intact. Baseline numbness/tingling in L hand.   Cardiac: Afebrile overnight.    Respiratory: Cpap overnight for sleep. O2 sats WDL.   GI/: Voiding independently with low UOP. Abdomen softly distended, nt. +BS.   Diet: Tolerating regular diet.   Lines: L PIV intact, SL. Site WDL.   Wounds: Skin intact. Biopsy site dressing CDI.   Labs/imaging: Reviewed. Notable for 18.3 WBC, 2.51 INR, Fibrinogen 614.     Continue to monitor and follow POC  "

## 2020-03-13 NOTE — PROGRESS NOTES
[facility], [facility] Progress Note - Hospitalist Service, Gold 11       Date of Admission:  3/9/2020  Assessment & Plan     Large Intra-abdominal (splenic/pancreatic) mass concerning for solid neoplasm or lymphoma  - CT scan shows large LUQ mass encompassing the stomach, spleen, and pancreas as well as multiple mesenteric and RP nodules concerning for malignancy (lymphoma vs pancreatic vs other). GI was consulted for possible biopsy and this has been deferred to IR due to significant cardiac history and concerns with anesthesia. IR would opt to not biopsy the LUQ mass due to high risk of bleeding and significant vascular involvement.   - LDH and Uric acid were elevated, started on Allopurinol  - 3/11: s/p RP LN Biopsy, successful  - 3/13: Preliminary cytology and flow suggests a type of B lymphoma  - Plan: Continue allopurinol and anticipate transition to inpatient hematology service for consideration of inpatient chemotherapy prior to discharge as well as a full review of this diagnosis and expectations prognosis per the malignant hematology service    Nonspecific symptoms of weight loss, anorexia, fatigue, back pain  - Overall suspect this is associated with the above work-up for malignancy, patient is aware  - No significant symptoms noted on 3/13    Trace, small pericardial effusion  - Echo was reported with an EF of 60 to 65%, small circumferential pericardial effusion  - No sign of tamponade    Small left pleural effusion  -This may be early developing malignant effusion    EMILY, associated with hyponatremia, suspected dehydration  - Creatinine currently is 1.01 and is stable    Chronic A. fib on anticoagulation with warfarin, held currently  - 3/12: INR 2.23,   - 3/13: INR 2.51, will continue to hold anticoagulation and will repeat INR in the morning    History of CVA, stroke, 2017, mild left-sided deficits    Hypertension, stable at present  Hyperlipidemia  Coronary  artery disease with history of MI  Obstructive sleep apnea noncompliant with CPAP    Leukocytosis, remains elevated from 17 to 15 to 18, will continue to monitor    History of alcohol abuse  History of smoking cigarette, nicotine dependence  Anemia, Multifactorial, oncology workup as above       Diet: Regular Diet Adult  Snacks/Supplements Adult: Boost Plus; Between Meals    DVT Prophylaxis: Hold for any procedure and reversal of warfarin  Gonzales Catheter: not present  Code Status: Full Code      Disposition Plan   Expected discharge: 4 - 7 days, recommended to prior living arrangement once Pathology, prelim diagnosis, stale Hb and vitals..  Entered: Mahesh Suarez MD 03/13/2020, 2:05 PM       The patient's care was discussed with the Patient, Patient's Family and GI, IR, Oncology Team.    Mahesh Suarez MD  Hospitalist Service, [address], [place]  Pager: [phone]  Please see sticky note for cross cover information  ______________________________________________________________________    Interval History     No acute overnight events per patient.  He is remains anxious to find any pathology results and know the plan of action.  Notes no pain, at site of biopsy.  Slept well using the CPAP overnight.    As of: March 13, 2020  Patient denies any headache, sore throat, chest pain, shortness of breath, new rash or swelling  Patient denies any sleeping disturbance  Patient denies any nausea or vomiting or abdominal pain    Data reviewed today: I reviewed all medications, new labs and imaging results over the last 24 hours. I    Physical Exam   Vital Signs: Temp: 97  F (36.1  C) Temp src: Oral BP: 119/75 Pulse: 58 Heart Rate: 68 Resp: 18 SpO2: 94 % O2 Device: None (Room air) Oxygen Delivery: 2 LPM  Weight: 220 lbs 11.2 oz    General: Alert awake and oriented, no distress, conversational upright in bedside chair.  Eyes: Normal pink mucosa with mild signs of pallor, no scleral  icterus.  Neck: No significant lymphadenopathy, no palpable LN, No JVD  Heart: Regular rate and rhythm, normal S1, normal S2, no murmurs rubs or gallops, PMI is nondisplaced   Peripherally there is no edema  Lungs: No increased work of breathing, good effort, lungs are clear to auscultation bilaterally   No wheezing or crackles   Breathing on room air  Abdomen: Nontender, nondistended, normal active bowel sounds, mildly obese  Extremities: Normal capillary refill, no edema, no clubbing, no cyanosis  Neuro: Alert and oriented to person place location and situation   Grossly arms and legs are without any focal motor or sensory deficits   Gait was not assessed   Cranial nerves II through XII are grossly intact  Psych: No acute psychosis, good mood, good spirits       Data   Recent Labs   Lab 03/13/20  0457 03/12/20  0840 03/12/20  0550 03/11/20  1157  03/11/20  0255  03/09/20 2032   WBC 18.3*  --  19.7*  --   --  18.8*   < > 17.1*   HGB 10.0*  --  9.9*  --   --  9.8*   < > 10.1*   MCV 86  --  86  --   --  84   < > 85     --  315  --   --  323   < > 334   INR 2.51* 2.23*  --  2.15*   < > 2.69*   < > 2.21*     --  137  --   --  133   < > 132*   POTASSIUM 3.6  --  3.4  --   --  3.6   < > 3.5   CHLORIDE 98  --  99  --   --  96   < > 92*   CO2 28  --  28  --   --  31   < > 33*   BUN 14  --  13  --   --  13   < > 19   CR 0.68  --  0.58*  --   --  0.70   < > 1.00   ANIONGAP 8  --  10  --   --  6   < > 6   STACY 8.5  --  8.3*  --   --  8.2*   < > 8.6   GLC 83  --  97  --   --  90   < > 92   ALBUMIN  --   --   --   --   --   --   --  2.1*   PROTTOTAL  --   --   --   --   --   --   --  6.8   BILITOTAL  --   --   --   --   --   --   --  1.0   ALKPHOS  --   --   --   --   --   --   --  84   ALT  --   --   --   --   --   --   --  20   AST  --   --   --   --   --   --   --  39   LIPASE  --   --   --   --   --   --   --  144    < > = values in this interval not displayed.     No results found for this or any previous  visit (from the past 24 hour(s)).  Medications     - MEDICATION INSTRUCTIONS -         allopurinol  300 mg Oral Daily     atorvastatin  40 mg Oral QPM     carvedilol  6.25 mg Oral BID w/meals     gabapentin  300 mg Oral At Bedtime     influenza vaccine adult (product based on age)  0.5 mL Intramuscular Prior to discharge

## 2020-03-14 NOTE — PROGRESS NOTES
[facility] [place]    Hematology / Oncology Progress Note     Assessment & Plan    Kenneth Tello is a 49 year old male with a history notable for atrial fibrillation (on chronic anticoagulation with warfarin), myocardial infarction, stroke (2017), HTN and HLD who presented to [facility] from [facility] for evaluation of left upper abdominal pain and back pain, confirmed to have new diagnosis of DLCBL.  Now transferred from medicine service to heme/malignancy service for further evaluation and workup.     # New diagnosis DLBCL. Patient presented to [facility] with complaints of L upper abdominal pain and low back pain, fatigue, weight loss of 40 lbs in past 4 months.  CT CAP 3/10 demonstrated a large infiltrative mass in LUQ, arising from spleen and involving the stomach, pancreas, occluding splenic vein.  Also with upper abdominal and RP lymphadenopathy present.  IR performed a Retroperitoneal LN biopsy on 3/11. Pathology confirmed DLBCL. Biopsy flow cytometry demonstrates Lambda-monotypic B cells (20%). Baseline uric acid was 7.4, .   - FISH is pending   - peripheral blood flow sent   - viral studies sent (Hep B, Hep C, HIV, HSV)   - echo done 3/10, EF 60-65%   - PET/CT will need to be completed to finish staging, this is unable to be done over the weekend.  Will need to call and arrange on Monday AM, needs prior auth.   - cont allopurinol 300mg/day   - daily CBC  - TLS labs BID (BMP, phos, Uric acid)   - as patient is symptomatic due to large disease burden, we will start him on prednisone 100mg/day as of x 3/14 until we have staging completed and results of FISH in order to get therapy started.   - patient will also need a LP arranged this next week with IT methotrexate.     #TLS monitoring.   - cont allopurinol 300mg/day   - BMP, phos, Uric acid BID   - IVF 100ml/hr     # Anemia. Likely related to lymphoma.   - monitor CBC daily   - transfuse if Hbg less  "than 7     #leukocytosis.  Likely reactive from DLBCL. No fevers or infectious symptoms.   - monitor CBC    # Chronic A. fib on anticoagulation with warfarin, held currently  - 3/12: INR 2.23,   - 3/13: INR 2.51,  - 3/14: INR 2.28,  will continue to hold anticoagulation and will repeat INR in the morning    # h/o CVA, MI 2017.  Has mild left sided numbness leftover from this. Reports his BP was out of control, had a stroke/MI at the same time.     #Hypertension, stable at present  #Hyperlipidemia  #Coronary artery disease with history of MI  - cont atorvastatin  - cont carvedilol      #Obstructive sleep apnea noncompliant with CPAP.   - will need to get appt with sleep medicine at discharge to get CPAP machine   - declining to use one inpatient     # History of alcohol abuse  # History of smoking cigarette, nicotine dependence.     Ppx:   VTE: patient is on therapeutic anticoagulation, currently on hold.     FEN  Regular diet as tolerated   lyte replacement prn   MIVF 100ml/hr       Disposition Plan   Expected discharge: 4 - 7 days, recommended to prior living arrangement once chemotherapy completed .     Entered: Meenakshi Delatorre 03/14/2020, 11:54 AM   Information in the above section will display in the discharge planner report.    Discussed with Dr. Cary, staff attending.    Meenakshi Delatorre, DNP, APRN, CNP  Hematology/oncology      Interval History     Kenneth (\"Jd\") is seen in his room this morning.  He is sitting on bed.  Denies headaches, chest pain, SOB, nausea/vomiting.  Reports he has been overall quite fatigued. Denies night sweats, fevers, cough.  Has abdominal pressure/pain and low back pain. Pain is mainly in LUQ of abdomen.  Has noted that he feels like he has to void frequently, but doesn't actually have to go much. No constipation.  Has been having some loose stools.  Not eating or drinking well. Has lost 40 lbs since Nov 2019.  No mouth sores.  Has baseline neuropathy in L hand since " previous CVA. He has not noted any palpable lymphadenopathy.     Physical Exam   Temp: 96.9  F (36.1  C) Temp src: Oral BP: 98/77 Pulse: 74 Heart Rate: 63 Resp: 18 SpO2: 96 % O2 Device: None (Room air)    Vitals:    03/12/20 0751 03/13/20 0811 03/14/20 0822   Weight: 101 kg (222 lb 9.6 oz) 100.1 kg (220 lb 11.2 oz) 99 kg (218 lb 4.8 oz)     Vital Signs with Ranges  Temp:  [96.1  F (35.6  C)-99.8  F (37.7  C)] 96.9  F (36.1  C)  Pulse:  [74] 74  Heart Rate:  [63-86] 63  Resp:  [18-20] 18  BP: ()/(65-95) 98/77  SpO2:  [90 %-98 %] 96 %  I/O last 3 completed shifts:  In: 700 [P.O.:700]  Out: 375 [Urine:375]    Constitutional: Awake, alert, cooperative, no apparent distress, and appears stated age.   Eyes: Lids and lashes normal, pupils equal, round and reactive to light, extra ocular muscles intact, sclera clear, conjunctiva normal.  ENT: Normocephalic, without obvious abnormality, atraumatic.  Lymph: no palpable cervical, submandibular,subclavicular, supraclavicular preauricular,postauricular, axillary LN.    Respiratory: No increased work of breathing. No oxygen. LS clear, no crackles or wheezes Cardiovascular: Regular rate, regular rhythm, no murmurs.  GI: Normal bowel sounds,abd is firm,distended, + splenomegaly. + tender in LUQ with palpation   Musculoskeletal: No edema B/L LE. No obvious joint swelling or deformities.   Neurologic: A&O x4, cranial nerves II-XII appear to be grossly intact.  No focal deficits.   Neuropsychiatric: Calm, normal eye contact, alert, normal affect memory for past and recent events intact and thought process normal.      Medications     - MEDICATION INSTRUCTIONS -       sodium chloride 100 mL/hr at 03/14/20 1138       allopurinol  300 mg Oral Daily     atorvastatin  40 mg Oral QPM     carvedilol  6.25 mg Oral BID w/meals     gabapentin  300 mg Oral At Bedtime     influenza vaccine adult (product based on age)  0.5 mL Intramuscular Prior to discharge     predniSONE  100 mg Oral  Daily       Data   Results for orders placed or performed during the hospital encounter of 03/09/20 (from the past 24 hour(s))   CBC with platelets differential   Result Value Ref Range    WBC 21.0 (H) 4.0 - 11.0 10e9/L    RBC Count 3.93 (L) 4.4 - 5.9 10e12/L    Hemoglobin 10.9 (L) 13.3 - 17.7 g/dL    Hematocrit 33.6 (L) 40.0 - 53.0 %    MCV 86 78 - 100 fl    MCH 27.7 26.5 - 33.0 pg    MCHC 32.4 31.5 - 36.5 g/dL    RDW 17.2 (H) 10.0 - 15.0 %    Platelet Count 356 150 - 450 10e9/L    Diff Method Automated Method     % Neutrophils 90.4 %    % Lymphocytes 3.0 %    % Monocytes 5.1 %    % Eosinophils 0.1 %    % Basophils 0.2 %    % Immature Granulocytes 1.2 %    Nucleated RBCs 0 0 /100    Absolute Neutrophil 19.0 (H) 1.6 - 8.3 10e9/L    Absolute Lymphocytes 0.6 (L) 0.8 - 5.3 10e9/L    Absolute Monocytes 1.1 0.0 - 1.3 10e9/L    Absolute Eosinophils 0.0 0.0 - 0.7 10e9/L    Absolute Basophils 0.0 0.0 - 0.2 10e9/L    Abs Immature Granulocytes 0.3 0 - 0.4 10e9/L    Absolute Nucleated RBC 0.0    Basic metabolic panel   Result Value Ref Range    Sodium 134 133 - 144 mmol/L    Potassium 3.7 3.4 - 5.3 mmol/L    Chloride 98 94 - 109 mmol/L    Carbon Dioxide 27 20 - 32 mmol/L    Anion Gap 9 3 - 14 mmol/L    Glucose 96 70 - 99 mg/dL    Urea Nitrogen 18 7 - 30 mg/dL    Creatinine 0.61 (L) 0.66 - 1.25 mg/dL    GFR Estimate >90 >60 mL/min/[1.73_m2]    GFR Estimate If Black >90 >60 mL/min/[1.73_m2]    Calcium 8.8 8.5 - 10.1 mg/dL   Lactate Dehydrogenase   Result Value Ref Range    Lactate Dehydrogenase 544 (H) 85 - 227 U/L   Uric acid   Result Value Ref Range    Uric Acid 5.1 3.5 - 7.2 mg/dL   INR   Result Value Ref Range    INR 2.28 (H) 0.86 - 1.14   Partial thromboplastin time   Result Value Ref Range    PTT 59 (H) 22 - 37 sec   Fibrinogen activity   Result Value Ref Range    Fibrinogen 630 (H) 200 - 420 mg/dL     Recent Labs   Lab 03/14/20  0439 03/13/20  0457 03/12/20  0840 03/12/20  0550  03/09/20  2032   WBC 21.0* 18.3*  --  19.7*    < > 17.1*   HGB 10.9* 10.0*  --  9.9*   < > 10.1*   MCV 86 86  --  86   < > 85    309  --  315   < > 334   INR 2.28* 2.51* 2.23*  --    < > 2.21*    134  --  137   < > 132*   POTASSIUM 3.7 3.6  --  3.4   < > 3.5   CHLORIDE 98 98  --  99   < > 92*   CO2 27 28  --  28   < > 33*   BUN 18 14  --  13   < > 19   CR 0.61* 0.68  --  0.58*   < > 1.00   ANIONGAP 9 8  --  10   < > 6   STACY 8.8 8.5  --  8.3*   < > 8.6   GLC 96 83  --  97   < > 92   ALBUMIN  --   --   --   --   --  2.1*   PROTTOTAL  --   --   --   --   --  6.8   BILITOTAL  --   --   --   --   --  1.0   ALKPHOS  --   --   --   --   --  84   ALT  --   --   --   --   --  20   AST  --   --   --   --   --  39   LIPASE  --   --   --   --   --  144    < > = values in this interval not displayed.     No results found for this or any previous visit (from the past 24 hour(s)).

## 2020-03-14 NOTE — PROVIDER NOTIFICATION
03/14/20 1300   Call Information   Date of Call 03/14/20   Time of Call 1338   Name of person requesting the team Brooke OLIVER   Title of person requesting team RN   RRT Arrival time 1341   Time RRT ended 1350   Reason for call   Type of RRT Adult   Primary reason for call Sepsis suspected   Sepsis Suspected Elevated Lactate level;WBC <4 or >12   Was patient transferred from the ED, ICU, or PACU within last 24 hours prior to RRT call? No   SBAR   Situation LA = 4.2   Background Hx of Afib, MI, stroke (2017), HTN, HLD, new diagnosis of DLCBL.   Notable History/Conditions Cancer;Cardiac;Hypertension   Assessment Pt AOx4, VSS, denies pain, no apparent distress.    Interventions Fluid bolus;IV fluids;Labs;Other (describe)   Adjustments to Recommend Recheck LA this evening   Patient Outcome   Patient Outcome Stabilized on unit   RRT Team   Attending/Primary/Covering Physician Heme/Maglignancy   Date Attending Physician notified 03/14/20   Time Attending Physician notified 1340   Physician(s) Lee Jonas RN Pawan RUVALCABA   Post RRT Intervention Assessment   Post RRT Assessment Stable/Improved   Date Follow Up Done 03/14/20   Time Follow Up Done 1748   Comments AOx4, afebrile, VSS. No complaints from patient or bedside nurse. Lactic acid recheck pending.

## 2020-03-14 NOTE — PROGRESS NOTES
[facility], [facility] Progress Note - Hospitalist Service, Gold 11       Date of Admission:  3/9/2020  Assessment & Plan     Large Intra-abdominal (splenic/pancreatic) mass concerning for solid neoplasm or lymphoma  - CT scan shows large LUQ mass encompassing the stomach, spleen, and pancreas as well as multiple mesenteric and RP nodules concerning for malignancy (lymphoma vs pancreatic vs other). GI was consulted for possible biopsy and this has been deferred to IR due to significant cardiac history and concerns with anesthesia. IR would opt to not biopsy the LUQ mass due to high risk of bleeding and significant vascular involvement.   - LDH and Uric acid were elevated, started on Allopurinol  - 3/11: s/p RP LN Biopsy, successful  - 3/13: Dx: DLBCL  - 3/14: We will transition care to malignant hematology service  - Medicine service will sign off at this time  - Signout and case was discussed with malignant team service NP    Nonspecific symptoms of weight loss, anorexia, fatigue, back pain  - Overall suspect this is associated with the above work-up for malignancy, patient is aware  - No significant symptoms noted on 3/13    Trace, small pericardial effusion  - Echo was reported with an EF of 60 to 65%, small circumferential pericardial effusion  - No sign of tamponade    Small left pleural effusion  -This may be early developing malignant effusion    EMILY, associated with hyponatremia, suspected dehydration  - Creatinine currently is 1.01 and is stable    Chronic A. fib on anticoagulation with warfarin, held currently  - 3/12: INR 2.23,   - 3/13: INR 2.51,  - 3/14: INR 2.28,  will continue to hold anticoagulation and will repeat INR in the morning    History of CVA, stroke, 2017, mild left-sided deficits    Hypertension, stable at present  Hyperlipidemia  Coronary artery disease with history of MI  Obstructive sleep apnea noncompliant with CPAP    Leukocytosis, remains  elevated from 17 to 15 to 18, will continue to monitor    History of alcohol abuse  History of smoking cigarette, nicotine dependence  Anemia, Multifactorial, oncology workup as above       Diet: Regular Diet Adult  Snacks/Supplements Adult: Boost Plus; Between Meals    DVT Prophylaxis: Hold for any procedure and reversal of warfarin  Gonzales Catheter: not present  Code Status: Full Code      Disposition Plan   Expected discharge: 4 - 7 days, recommended to prior living arrangement once Pathology, prelim diagnosis, stale Hb and vitals..  Entered: Mahesh Suarez MD 03/14/2020, 10:29 AM       The patient's care was discussed with the Patient, Patient's Family and GI, IR, Oncology Team.    Mahesh Suarez MD  Hospitalist Service, [address], [place]  Pager: [phone]  Please see sticky note for cross cover information  ______________________________________________________________________    Interval History     No acute overnight events per patient or patient's family.  Patient is aware the diagnosis and transition to the malignant hematology service  He has no further questions at this time      As of: March 14, 2020  Patient denies any headache, sore throat, chest pain, shortness of breath, new rash or swelling  Patient denies any sleeping disturbance  Patient denies any nausea or vomiting or abdominal pain      Data reviewed today: I reviewed all medications, new labs and imaging results over the last 24 hours.     Physical Exam   Vital Signs: Temp: 96.9  F (36.1  C) Temp src: Oral BP: 98/77 Pulse: 74 Heart Rate: 63 Resp: 18 SpO2: 96 % O2 Device: None (Room air)    Weight: 218 lbs 4.8 oz    General: Alert awake and oriented, no distress, conversational upright in bedside chair.  Eyes: Normal pink mucosa with mild signs of pallor, no scleral icterus.  Neck: No significant palpable lymphadenopathy, no palpable LN, No JVD  Heart: Regular rate and rhythm, normal S1, normal S2, no murmurs  rubs or gallops, PMI is nondisplaced   Peripherally there is no edema  Lungs: No increased work of breathing, good effort, lungs are clear to auscultation bilaterally   No wheezing or crackles   Breathing on room air  Abdomen: Nontender, nondistended, normal active bowel sounds, mildly obese  Extremities: Normal capillary refill, no edema, no clubbing, no cyanosis  Neuro: Alert and oriented to person place location and situation   Grossly arms and legs are without any focal motor or sensory deficits   Gait was not assessed   Cranial nerves II through XII are grossly intact  Psych: No acute psychosis, good mood, good spirits       Data   Recent Labs   Lab 03/14/20  0439 03/13/20  0457 03/12/20  0840 03/12/20  0550  03/09/20 2032   WBC 21.0* 18.3*  --  19.7*   < > 17.1*   HGB 10.9* 10.0*  --  9.9*   < > 10.1*   MCV 86 86  --  86   < > 85    309  --  315   < > 334   INR 2.28* 2.51* 2.23*  --    < > 2.21*    134  --  137   < > 132*   POTASSIUM 3.7 3.6  --  3.4   < > 3.5   CHLORIDE 98 98  --  99   < > 92*   CO2 27 28  --  28   < > 33*   BUN 18 14  --  13   < > 19   CR 0.61* 0.68  --  0.58*   < > 1.00   ANIONGAP 9 8  --  10   < > 6   STACY 8.8 8.5  --  8.3*   < > 8.6   GLC 96 83  --  97   < > 92   ALBUMIN  --   --   --   --   --  2.1*   PROTTOTAL  --   --   --   --   --  6.8   BILITOTAL  --   --   --   --   --  1.0   ALKPHOS  --   --   --   --   --  84   ALT  --   --   --   --   --  20   AST  --   --   --   --   --  39   LIPASE  --   --   --   --   --  144    < > = values in this interval not displayed.     No results found for this or any previous visit (from the past 24 hour(s)).  Medications     - MEDICATION INSTRUCTIONS -         allopurinol  300 mg Oral Daily     atorvastatin  40 mg Oral QPM     carvedilol  6.25 mg Oral BID w/meals     gabapentin  300 mg Oral At Bedtime     influenza vaccine adult (product based on age)  0.5 mL Intramuscular Prior to discharge     predniSONE  100 mg Oral Daily

## 2020-03-14 NOTE — PROVIDER NOTIFICATION
Lactic Acid 4.2 MD notified. Rapid response called. Pt afebrile with stable vs. Pt has new diagnosis of lymphoma and elevated lactic acid is likely related to disease. IVF started today.

## 2020-03-14 NOTE — PLAN OF CARE
Pt afebrile with stable vs. Triggered the sepsis protocol today due to increased RR and high WBC. Lactic acid came back at 4.2. (See provider notification note). Pt received 1L NS bolus, pt stable and lactic acid will be rechecked in 4 hours. Oxycodone 5mg for back pain, with relief. Prednisone started today. Awaiting PET scan on Monday to start chemotherapy.

## 2020-03-14 NOTE — PROGRESS NOTES
Sepsis Evaluation Progress Note    I was called to see Kennethlinda [place]audburg due to abnormal vital signs triggering the Sepsis SIRS screening alert. He is not known to have an infection.     No localizing symptoms for infection (cough, dysuria, subjective fevers/chills/nausea/vomiting/constipation/diarrhea)    Physical Exam   Vital Signs:  Temp: 97.6  F (36.4  C) Temp src: Oral BP: 128/72 Pulse: 74 Heart Rate: 66 Resp: 16 SpO2: 96 % O2 Device: None (Room air)      Lab:  Lactate for Sepsis Protocol   Date Value Ref Range Status   03/14/2020 4.2 (HH) 0.7 - 2.0 mmol/L Final     Comment:     NOTIFIED RN SAMUEL DOBBINS ON 7D ON 03/14/2020 AT 1335 BY BN       The patient is at baseline mental status.     The rest of their physical exam is significant for clear lungs     Assessment & Plan   NO EVIDENCE OF SEPSIS at this time.  Vital sign, physical exam, and lab findings are likely due to steroids, lymphoma.    - 1L normal saline bolus  - Lactate checks q4h  - Blood cultures x2    Disposition: The patient will remain on the current unit. We will continue to monitor this patient closely..  Dc Yu MD    Sepsis Criteria   Sepsis: 2+ SIRS criteria due to infection  Severe Sepsis: Sepsis AND 1+ new sign of acute organ dysfunction (Note: lactate >2 is organ dysfunction)  Septic Shock: Sepsis AND hypotension despite volume resuscitation with 30 ml/kg crystalloid

## 2020-03-14 NOTE — PLAN OF CARE
VS stable, afebrile. Pt c/o slight abdominal pain beginning of shift, no medication given and pt slept comfortably throughout night. Denied SOB/n/v. Pleased to have been told a diagnosis, eager to get chemo started. Plan for PET scan. No significant events, continue POC.

## 2020-03-15 NOTE — PROGRESS NOTES
[facility], [place]    Hematology / Oncology Progress Note  3/15/2020     Assessment & Plan    Kenneth Tello is a 49 year old male with a history notable for atrial fibrillation (chronic anticoagulation with warfarin), myocardial infarction, stroke (2017), HTN and HLD who presented to [facility] from [facility] for evaluation of left upper abdominal pain and back pain, confirmed to have new diagnosis of DLCBL. He was transferred from medicine service to heme/malignancy service for further evaluation and workup.     # New diagnosis DLBCL.   Presented to [facility] [place], [place] with complaints of L upper abdominal pain and low back pain, fatigue, weight loss of 40 lbs in past 4 months.  CT CAP 3/10 demonstrated a large infiltrative mass in LUQ, arising from spleen and involving the stomach, pancreas, occluding splenic vein.  Also with upper abdominal and RP lymphadenopathy present.  IR performed a Retroperitoneal LN biopsy on 3/11. Pathology confirmed DLBCL. Biopsy flow cytometry demonstrates Lambda-monotypic B cells (20%). Baseline uric acid was 7.4, .   - FISH pending   - Peripheral blood flow sent   - Viral studies sent (Hep B, Hep C, HIV, HSV): Hep B antigen negative, hep B surface antibody positive, hep B core antibody negative, HIV negative  - Echo done 3/10, EF 60-65%   - PET/CT will need to be completed to finish staging, this is unable to be done over the weekend.  Will need to call and arrange on Monday AM, needs prior auth.   - Cont allopurinol 300mg/day   - Daily CBC  - TLS labs BID (BMP, phos, Uric acid)   - As patient is symptomatic due to large disease burden, we will start him on prednisone 100mg/day as of x 3/14 until we have staging completed and results of FISH in order to get therapy started. Patient feeling better today.  - Patient will also need a LP arranged this next week with IT methotrexate.     #TLS monitoring  - Cont allopurinol 300mg/day   -  BMP, phos, Uric acid BID   - IVF 100ml/hr     #Lactic acidosis, type B  Triggered sepsis protocol x2 yesterday, lactate likely secondary to DLBCL.   - Monitor clinically, labs BID    # Anemia. Likely related to lymphoma.   - Monitor CBC daily   - Transfuse if Hbg less than 7     # Leukocytosis.  Likely reactive from DLBCL. No fevers or infectious symptoms.   - Monitor CBC    # Chronic A. fib on anticoagulation with warfarin, held currently  - 3/12: INR 2.23, 3/13: INR 2.51, 3/14: INR 2.28, 3/15: INR 2.10  - Continue to hold warfarin given plan for LP this week    # H/o CVA, MI 2017.  Residual mild left sided numbness. Reports his BP was out of control, had a stroke/MI at the same time.     #Hypertension, stable at present  #Hyperlipidemia  #Coronary artery disease with history of MI  - Continue atorvastatin  - Continue carvedilol      #Obstructive sleep apnea noncompliant with CPAP.   - Will need to get appt with sleep medicine at discharge to get CPAP machine   - Declining to use one inpatient     # History of alcohol abuse  # History of smoking cigarette, nicotine dependence.     Ppx:   VTE: patient is on therapeutic anticoagulation, currently on hold.     FEN  Regular diet as tolerated   lyte replacement prn   MIVF 100ml/hr     Disposition Plan   Expected discharge: 4 - 7 days, recommended to prior living arrangement once chemotherapy completed .   Information in the above section will display in the discharge planner report.    Discussed with Dr. Cary, staff attending.    Loren Paula MD  Heme-Onc [facility]  Pager [phone]      Interval History   Patient triggered the sepsis protocol twice yesterday but looked clinically well. He denies any pain this morning. He feels much better after starting the prednisone. He reports his appetite is better this morning than it has been in months. He denies any nausea or vomiting.     Physical Exam   Temp: 96.9  F (36.1  C) Temp src: Oral BP: 124/78 Pulse: 77 Heart  Rate: 84 Resp: 16 SpO2: 95 % O2 Device: None (Room air)    Vitals:    03/12/20 0751 03/13/20 0811 03/14/20 0822   Weight: 101 kg (222 lb 9.6 oz) 100.1 kg (220 lb 11.2 oz) 99 kg (218 lb 4.8 oz)     Vital Signs with Ranges  Temp:  [96.9  F (36.1  C)-98  F (36.7  C)] 96.9  F (36.1  C)  Pulse:  [70-77] 77  Heart Rate:  [63-84] 84  Resp:  [16-26] 16  BP: ()/(59-95) 124/78  SpO2:  [94 %-98 %] 95 %  I/O last 3 completed shifts:  In: 3795 [P.O.:960; I.V.:1835; IV Piggyback:1000]  Out: 650 [Urine:650]    Constitutional: Awake, alert, pleasant man in NAD.  HEENT: AT/NC. Anicteric sclera. EOMI. Nares patent without congestion. MMM.  Respiratory: Clear to auscultation bilaterally. No wheezing or crackles. Good air entry bilaterally.  Cardiovascular: RRR, normal S1/S2, no murmurs.   GI: Soft, distended, non-tender, splenomegaly. Normoactive bowel sounds. No guarding or rebound.  Musculoskeletal: No peripheral edema.  Neurologic: Alert and oriented x3. CNII-XII grossly intact. Moving all extremities.  Access: Left PIV c/d/i.      Medications     - MEDICATION INSTRUCTIONS -       sodium chloride 100 mL/hr at 03/15/20 0608       allopurinol  300 mg Oral Daily     atorvastatin  40 mg Oral QPM     carvedilol  6.25 mg Oral BID w/meals     gabapentin  300 mg Oral At Bedtime     influenza vaccine adult (product based on age)  0.5 mL Intramuscular Prior to discharge     predniSONE  100 mg Oral Daily       Data   Results for orders placed or performed during the hospital encounter of 03/09/20 (from the past 24 hour(s))   Lactic acid level STAT   Result Value Ref Range    Lactate for Sepsis Protocol 4.2 (HH) 0.7 - 2.0 mmol/L   Blood culture    Specimen: Blood    Right Arm   Result Value Ref Range    Specimen Description Blood Right Arm     Culture Micro No growth after 1 hour    Blood culture    Specimen: Blood    Left Arm   Result Value Ref Range    Specimen Description Blood Left Arm     Culture Micro No growth after 1 hour     Basic metabolic panel   Result Value Ref Range    Sodium 136 133 - 144 mmol/L    Potassium 4.0 3.4 - 5.3 mmol/L    Chloride 100 94 - 109 mmol/L    Carbon Dioxide 26 20 - 32 mmol/L    Anion Gap 10 3 - 14 mmol/L    Glucose 208 (H) 70 - 99 mg/dL    Urea Nitrogen 19 7 - 30 mg/dL    Creatinine 0.61 (L) 0.66 - 1.25 mg/dL    GFR Estimate >90 >60 mL/min/[1.73_m2]    GFR Estimate If Black >90 >60 mL/min/[1.73_m2]    Calcium 8.3 (L) 8.5 - 10.1 mg/dL   Uric acid   Result Value Ref Range    Uric Acid 4.7 3.5 - 7.2 mg/dL   Phosphorus   Result Value Ref Range    Phosphorus 3.4 2.5 - 4.5 mg/dL   Lactic acid whole blood   Result Value Ref Range    Lactic Acid 5.0 (HH) 0.7 - 2.0 mmol/L   Uric acid   Result Value Ref Range    Uric Acid 4.5 3.5 - 7.2 mg/dL   Phosphorus   Result Value Ref Range    Phosphorus 3.8 2.5 - 4.5 mg/dL   Basic metabolic panel   Result Value Ref Range    Sodium 137 133 - 144 mmol/L    Potassium 4.0 3.4 - 5.3 mmol/L    Chloride 100 94 - 109 mmol/L    Carbon Dioxide 31 20 - 32 mmol/L    Anion Gap 6 3 - 14 mmol/L    Glucose 134 (H) 70 - 99 mg/dL    Urea Nitrogen 19 7 - 30 mg/dL    Creatinine 0.62 (L) 0.66 - 1.25 mg/dL    GFR Estimate >90 >60 mL/min/[1.73_m2]    GFR Estimate If Black >90 >60 mL/min/[1.73_m2]    Calcium 8.4 (L) 8.5 - 10.1 mg/dL   CBC with platelets differential   Result Value Ref Range    WBC 24.3 (H) 4.0 - 11.0 10e9/L    RBC Count 4.17 (L) 4.4 - 5.9 10e12/L    Hemoglobin 11.5 (L) 13.3 - 17.7 g/dL    Hematocrit 36.1 (L) 40.0 - 53.0 %    MCV 87 78 - 100 fl    MCH 27.6 26.5 - 33.0 pg    MCHC 31.9 31.5 - 36.5 g/dL    RDW 17.1 (H) 10.0 - 15.0 %    Platelet Count 390 150 - 450 10e9/L    Diff Method Automated Method     % Neutrophils 95.0 %    % Lymphocytes 1.9 %    % Monocytes 2.1 %    % Eosinophils 0.0 %    % Basophils 0.1 %    % Immature Granulocytes 0.9 %    Nucleated RBCs 0 0 /100    Absolute Neutrophil 23.0 (H) 1.6 - 8.3 10e9/L    Absolute Lymphocytes 0.5 (L) 0.8 - 5.3 10e9/L    Absolute Monocytes  0.5 0.0 - 1.3 10e9/L    Absolute Eosinophils 0.0 0.0 - 0.7 10e9/L    Absolute Basophils 0.0 0.0 - 0.2 10e9/L    Abs Immature Granulocytes 0.2 0 - 0.4 10e9/L    Absolute Nucleated RBC 0.0    Lactate Dehydrogenase   Result Value Ref Range    Lactate Dehydrogenase 524 (H) 85 - 227 U/L   INR   Result Value Ref Range    INR 2.10 (H) 0.86 - 1.14   Partial thromboplastin time   Result Value Ref Range    PTT 48 (H) 22 - 37 sec   Fibrinogen activity   Result Value Ref Range    Fibrinogen 599 (H) 200 - 420 mg/dL   Hepatic panel   Result Value Ref Range    Bilirubin Direct 0.4 (H) 0.0 - 0.2 mg/dL    Bilirubin Total 0.8 0.2 - 1.3 mg/dL    Albumin 2.1 (L) 3.4 - 5.0 g/dL    Protein Total 6.6 (L) 6.8 - 8.8 g/dL    Alkaline Phosphatase 100 40 - 150 U/L    ALT 19 0 - 70 U/L    AST 45 0 - 45 U/L   Magnesium   Result Value Ref Range    Magnesium 1.9 1.6 - 2.3 mg/dL   Uric acid   Result Value Ref Range    Uric Acid 4.8 3.5 - 7.2 mg/dL   Phosphorus   Result Value Ref Range    Phosphorus 4.7 (H) 2.5 - 4.5 mg/dL   Lactic acid whole blood   Result Value Ref Range    Lactic Acid 3.9 (H) 0.7 - 2.0 mmol/L   Basic metabolic panel   Result Value Ref Range    Sodium 137 133 - 144 mmol/L    Potassium 4.3 3.4 - 5.3 mmol/L    Chloride 102 94 - 109 mmol/L    Carbon Dioxide 28 20 - 32 mmol/L    Anion Gap 7 3 - 14 mmol/L    Glucose 168 (H) 70 - 99 mg/dL    Urea Nitrogen 26 7 - 30 mg/dL    Creatinine 0.62 (L) 0.66 - 1.25 mg/dL    GFR Estimate >90 >60 mL/min/[1.73_m2]    GFR Estimate If Black >90 >60 mL/min/[1.73_m2]    Calcium 8.8 8.5 - 10.1 mg/dL     Recent Labs   Lab 03/15/20  0532 03/14/20  2137 03/14/20  1756 03/14/20  0439 03/13/20  0457  03/09/20 2032   WBC 24.3*  --   --  21.0* 18.3*   < > 17.1*   HGB 11.5*  --   --  10.9* 10.0*   < > 10.1*   MCV 87  --   --  86 86   < > 85     --   --  356 309   < > 334   INR 2.10*  --   --  2.28* 2.51*   < > 2.21*    137 136 134 134   < > 132*   POTASSIUM 4.3 4.0 4.0 3.7 3.6   < > 3.5   CHLORIDE  102 100 100 98 98   < > 92*   CO2 28 31 26 27 28   < > 33*   BUN 26 19 19 18 14   < > 19   CR 0.62* 0.62* 0.61* 0.61* 0.68   < > 1.00   ANIONGAP 7 6 10 9 8   < > 6   STACY 8.8 8.4* 8.3* 8.8 8.5   < > 8.6   * 134* 208* 96 83   < > 92   ALBUMIN 2.1*  --   --   --   --   --  2.1*   PROTTOTAL 6.6*  --   --   --   --   --  6.8   BILITOTAL 0.8  --   --   --   --   --  1.0   ALKPHOS 100  --   --   --   --   --  84   ALT 19  --   --   --   --   --  20   AST 45  --   --   --   --   --  39   LIPASE  --   --   --   --   --   --  144    < > = values in this interval not displayed.     No results found for this or any previous visit (from the past 24 hour(s)).

## 2020-03-15 NOTE — PLAN OF CARE
"Pt. Afebrile. AVSS on room air. A&Ox4. Denies pain, nausea, sob. Pt states \"this is the best jason felt recently\" up independently in room. Voiding spontaneously, instructed to save urine but has not saved any yet. Rapid called and doctor notified of lactic acid of 5.0, no additional interventions at this time. (see previous notes). Pt refusing gabapentin. No acute events this shift. Will continue with poc.   "

## 2020-03-15 NOTE — PROVIDER NOTIFICATION
03/14/20 2000   Call Information   Date of Call 03/14/20   Time of Call 1933   Name of person requesting the team Sera   Title of person requesting team RN   RRT Arrival time 1935   Time RRT ended 1945   Reason for call   Type of RRT Adult   Primary reason for call Sepsis suspected   Sepsis Suspected Elevated Lactate level   Was patient transferred from the ED, ICU, or PACU within last 24 hours prior to RRT call? No   SBAR   Situation LA 5.0   Background Hx of Afib, MI, Stroke (2017), HTN, HLD, New dx of DLCbL   Notable History/Conditions Cancer;Cardiac;Hypertension   Assessment Pt AOx4, VSS, No apparent distress.   Interventions Labs  (Dr. Yu asked to write another sepis note. )   Adjustments to Recommend Recheck LA in AM.    Patient Outcome   Patient Outcome Stabilized on unit   RRT Team   Attending/Primary/Covering Physician Heme/ Malignancy   Date Attending Physician notified 03/14/20   Time Attending Physician notified 1941   Physician(s) Lee Yu   Lead RN Malu TRIPP   Post RRT Intervention Assessment   Post RRT Assessment Stable/Improved   Date Follow Up Done 03/14/20   Time Follow Up Done 2215   Comments VSS; Patient states that he feels better than before and that the Prednisone seems to be working. No distress.

## 2020-03-15 NOTE — PLAN OF CARE
VS stable, afebrile. Pt denied pain/SOB/n/v. NS infusion running as ordered, adequate UOP, 1 BM over night. No significant events, pt rested comfortably. Continue POC.

## 2020-03-15 NOTE — PLAN OF CARE
Pt afebrile with stable vs. Pt continues to received NS at 100cc/hr. Urine output still a little low. Denies need for pain medication since starting prednisone. Awaiting PET scan tomorrow to start chemotherapy.

## 2020-03-15 NOTE — PROVIDER NOTIFICATION
Provider notified for Lactic acid 5.0 RRT called.   No additional fluids will be given at this time.   Next lactic acid to be drawn with morning labs, switched from q4h, and TLS labs to be drawn q8h. (switched from q12h).

## 2020-03-15 NOTE — PROGRESS NOTES
Sepsis Evaluation Progress Note    I was not called to see Kenneth Tello for sepsis protocol. I went to see him because a lactate I had ordered earlier resulted from 4.2->5.0. He is not known to have an infection.     Physical Exam   Vital Signs:  Temp: 97.7  F (36.5  C) Temp src: Oral BP: 120/78 Pulse: 70 Heart Rate: 82 Resp: 20 SpO2: 98 % O2 Device: None (Room air)      Lab:  Lactic Acid   Date Value Ref Range Status   03/14/2020 5.0 (HH) 0.7 - 2.0 mmol/L Final     Comment:     Critical Value called to and read back by  CHRIS STEVENS RN 7D 1822 3/14/20 BY DAPHNE       Lactate for Sepsis Protocol   Date Value Ref Range Status   03/14/2020 4.2 (HH) 0.7 - 2.0 mmol/L Final     Comment:     NOTIFIED ZOILA DOBBINS ON 7D ON 03/14/2020 AT 1335 BY BN       The patient is at baseline mental status.     The rest of their physical exam is significant for clear lungs, no pretibial pitting edema, abd s/nt/nd.    Assessment & Plan   NO EVIDENCE OF SEPSIS at this time.  Vital sign, physical exam, and lab findings are likely due to lymphoma, 100 mg of prednisone.    I discussed this patient's rising lactate level with the hematology oncology fellow on call, Dr Girard. Given that the patient has no subjective complaints and is hemodynamically normal other than an elevated white blood cell count, I do not believe him to be septic at this time. There is no evidence of infection on physical exam or patient interview.    I have already administered a 1L fluid bolus (which did not prevent the lactate from rising) and I have already drawn blood cultures x2, which are NGTD at present.     I will NOT given further fluid boluses as patient has significant third spacing on his most recent CT. His elevated lactate and leukocytosis are likely 2/2 to his advanced DLBCL. We can continue to monitor. Will redraw a lactate in the AM and change TLS labs from q12h to q8h.    Disposition: The patient will remain on the current unit. We will  continue to monitor this patient closely..  Dc Yu MD    Sepsis Criteria   Sepsis: 2+ SIRS criteria due to infection  Severe Sepsis: Sepsis AND 1+ new sign of acute organ dysfunction (Note: lactate >2 is organ dysfunction)  Septic Shock: Sepsis AND hypotension despite volume resuscitation with 30 ml/kg crystalloid

## 2020-03-15 NOTE — PLAN OF CARE
[id number]  AVSS. Denied pain, nausea/vomiting. Up ad maryanne in room. Poor po intake r/t decreased appetite, early satiety and poor dentation. Oliguric. Constipated. LBM 3/14. Passing flatus. Received 2 tabs of senna, no results as of yet. Plan to have PET/CT tomorrow (needs to be ordered).

## 2020-03-16 NOTE — PROGRESS NOTES
Vitals stable on room air, afebrile. Constipated. MOM given without results. Oliguric. PET scan today for staging. Chemo to start following. PRN simethicone given for gas pains. Up independently.   Lactic acid 3.6, MD paged. This is not new and down from what it's been.

## 2020-03-16 NOTE — PROGRESS NOTES
[facility], [place]    Hematology / Oncology Progress Note     Assessment & Plan   Kenneth Tello is a 49 year old male with a history notable for atrial fibrillation (chronic anticoagulation with warfarin), myocardial infarction, stroke (2017), HTN and HLD who presented to [facility] from [facility] for evaluation of left upper abdominal pain and back pain, confirmed to have new diagnosis of DLCBL. He was transferred from medicine service to heme/malignancy service on 3/14/20 for further evaluation and workup.     Today, 3/16:  --PET/CT scheduled for this Wednesday 3/18 at 3pm, will need to be NPO 6 hours prior, this is the earliest possible time slot available, unless there are significant cancellations  --Continue TLS labs BID (BMP, phos, Uric acid)   -- Patient will also need a LP arranged this next week with IT methotrexate, address on 3/17       # New diagnosis DLBCL.   [place] [facility] of [place] upper abdominal pain and low back pain, fatigue, weight loss of 40 lbs in past 4 months.  CT CAP 3/10 demonstrated a large infiltrative mass in LUQ, arising from spleen and involving the stomach, pancreas, occluding splenic vein.  Also with upper abdominal and RP lymphadenopathy present.  IR performed a Retroperitoneal LN biopsy on 3/11. Pathology confirmed DLBCL. Biopsy flow cytometry demonstrates Lambda-monotypic B cells (20%). Baseline uric acid was 7.4, .   - FISH pending, await results to determine chemotherapy regimen  - Peripheral blood flow sent   - Viral studies sent (Hep B, Hep C, HIV, HSV): Hep B antigen negative, hep B surface antibody positive, hep B core antibody negative, HIV negative  - Echo done 3/10, EF 60-65%   - PET/CT will need to be completed to finish staging but will not preclude patient from starting chemo if FISH results come back prior, scheduled for this Wednesday 3/18 at 3pm, will need to be NPO 6 hours prior, order  placed, this is the earliest possible time slot available, unless there are significant cancellations  - Cont allopurinol 300mg/day   - Daily CBC  - TLS labs BID (BMP, phos, Uric acid)   - As patient is symptomatic due to large disease burden, we will start him on prednisone 100mg/day as of x 3/14 until we have staging completed and results of FISH in order to get therapy started.  Pain/discomfort much improved with this.  - Patient will also need a LP arranged this week with IT methotrexate, address on 3/17     #TLS monitoring  - Cont allopurinol 300mg/day   - BMP, phos, Uric acid BID   - discontinue IVF today given weight gain of 10# over last 2 days and edema, monitor for worsening fluid overload (patient asymptomatic currently)     #Lactic acidosis, type B  Triggered sepsis protocol this am though lactate is overall downtrending (3.6 today), lactic acidosis likely secondary to DLBCL.   - Monitor clinically, labs BID     # Anemia. Likely related to lymphoma.   - Monitor CBC daily   - Transfuse if Hbg less than 7      # Leukocytosis.  Likely reactive from DLBCL. No fevers or infectious symptoms.   - Monitor CBC     # Chronic A. fib on anticoagulation with warfarin, held currently  - 3/12: INR 2.23, 3/13: INR 2.51, 3/14: INR 2.28, 3/15: INR 2.10, today 2.32  - Continue to hold warfarin given plan for LP this week     # H/o CVA, MI 2017.  Residual mild left sided numbness. Reports his BP was out of control, had a stroke/MI at the same time.      #Hypertension, stable at present  #Hyperlipidemia   #Coronary artery disease with history of MI  - Continue carvedilol    - hold atorvastatin while getting chemo    #Obstructive sleep apnea noncompliant with CPAP.   - Will need to get appt with sleep medicine at discharge to get CPAP machine   - Declining to use one inpatient      # History of alcohol abuse  # History of smoking cigarette, nicotine dependence.       Ppx:   VTE: patient is on therapeutic anticoagulation,  currently on hold in anticipation of LP, SCDs, encourage ambulation     FEN  Regular diet as tolerated   lyte replacement prn   No MIVF for now with signs of fluid overload, continue to monitor for need        Disposition Plan     Expected discharge: Pending work-up of DLBCL/cytogenetics results.     The plan was staffed with Dr. Cary.    Beatris Esquviel PA-C    Interval History   He denies any new complaints.  His constipation has improved, he did have a small BM this am.  His appetite continues to be poor.  His back/LUQ pain is well-controlled.  He denies headache, dizziness, new abdominal pain, chest pain, shortness of breath, cough, vomiting.      Physical Exam   Temp: 97.1  F (36.2  C) Temp src: Oral BP: 135/72 Pulse: 65 Heart Rate: 56 Resp: 18 SpO2: 96 % O2 Device: None (Room air)    Vitals:    03/13/20 0811 03/14/20 0822 03/15/20 0900   Weight: 100.1 kg (220 lb 11.2 oz) 99 kg (218 lb 4.8 oz) 102.2 kg (225 lb 3.2 oz)     Vital Signs with Ranges  Temp:  [96.1  F (35.6  C)-97.5  F (36.4  C)] 97.1  F (36.2  C)  Pulse:  [65-76] 65  Heart Rate:  [56-70] 56  Resp:  [16-18] 18  BP: (113-141)/(72-91) 135/72  SpO2:  [94 %-99 %] 96 %  I/O last 3 completed shifts:  In: 3860 [P.O.:1460; I.V.:2400]  Out: 400 [Urine:400]    Constitutional: very pleasant, in NAD  ENT: mmm, neck is supple  Respiratory: normal effort, fine crackles left base otherwise CTA  Cardiovascular: nl s1/s2 no MRGs  GI: abdomen is soft, NT, +BS  Skin: warm, dry  Musculoskeletal: mild pitting edema bilat lower extremities  Neurologic: awake/alert, speech is clear, answers questions appropriately        Medications     - MEDICATION INSTRUCTIONS -       sodium chloride 100 mL/hr at 03/16/20 0215       allopurinol  300 mg Oral Daily     atorvastatin  40 mg Oral QPM     carvedilol  6.25 mg Oral BID w/meals     gabapentin  300 mg Oral At Bedtime     influenza vaccine adult (product based on age)  0.5 mL Intramuscular Prior to discharge      magnesium hydroxide  30 mL Oral Daily     predniSONE  100 mg Oral Daily     senna-docusate  1 tablet Oral At Bedtime    Or     senna-docusate  2 tablet Oral At Bedtime       Data   Results for orders placed or performed during the hospital encounter of 03/09/20 (from the past 24 hour(s))   Uric acid   Result Value Ref Range    Uric Acid 4.6 3.5 - 7.2 mg/dL   Phosphorus   Result Value Ref Range    Phosphorus 3.6 2.5 - 4.5 mg/dL   Basic metabolic panel   Result Value Ref Range    Sodium 138 133 - 144 mmol/L    Potassium 4.1 3.4 - 5.3 mmol/L    Chloride 105 94 - 109 mmol/L    Carbon Dioxide 24 20 - 32 mmol/L    Anion Gap 9 3 - 14 mmol/L    Glucose 204 (H) 70 - 99 mg/dL    Urea Nitrogen 25 7 - 30 mg/dL    Creatinine 0.58 (L) 0.66 - 1.25 mg/dL    GFR Estimate >90 >60 mL/min/[1.73_m2]    GFR Estimate If Black >90 >60 mL/min/[1.73_m2]    Calcium 8.7 8.5 - 10.1 mg/dL   CBC with platelets differential   Result Value Ref Range    WBC 22.8 (H) 4.0 - 11.0 10e9/L    RBC Count 3.61 (L) 4.4 - 5.9 10e12/L    Hemoglobin 10.0 (L) 13.3 - 17.7 g/dL    Hematocrit 31.0 (L) 40.0 - 53.0 %    MCV 86 78 - 100 fl    MCH 27.7 26.5 - 33.0 pg    MCHC 32.3 31.5 - 36.5 g/dL    RDW 17.2 (H) 10.0 - 15.0 %    Platelet Count 347 150 - 450 10e9/L    Diff Method Automated Method     % Neutrophils 92.6 %    % Lymphocytes 1.9 %    % Monocytes 3.9 %    % Eosinophils 0.0 %    % Basophils 0.1 %    % Immature Granulocytes 1.5 %    Nucleated RBCs 0 0 /100    Absolute Neutrophil 21.1 (H) 1.6 - 8.3 10e9/L    Absolute Lymphocytes 0.4 (L) 0.8 - 5.3 10e9/L    Absolute Monocytes 0.9 0.0 - 1.3 10e9/L    Absolute Eosinophils 0.0 0.0 - 0.7 10e9/L    Absolute Basophils 0.0 0.0 - 0.2 10e9/L    Abs Immature Granulocytes 0.4 0 - 0.4 10e9/L    Absolute Nucleated RBC 0.0    Lactate Dehydrogenase   Result Value Ref Range    Lactate Dehydrogenase 437 (H) 85 - 227 U/L   INR   Result Value Ref Range    INR 2.32 (H) 0.86 - 1.14   Partial thromboplastin time   Result Value Ref  Range    PTT 47 (H) 22 - 37 sec   Fibrinogen activity   Result Value Ref Range    Fibrinogen 448 (H) 200 - 420 mg/dL   Hepatic panel   Result Value Ref Range    Bilirubin Direct 0.3 (H) 0.0 - 0.2 mg/dL    Bilirubin Total 0.6 0.2 - 1.3 mg/dL    Albumin 1.9 (L) 3.4 - 5.0 g/dL    Protein Total 6.0 (L) 6.8 - 8.8 g/dL    Alkaline Phosphatase 91 40 - 150 U/L    ALT 25 0 - 70 U/L    AST 47 (H) 0 - 45 U/L   Magnesium   Result Value Ref Range    Magnesium 2.0 1.6 - 2.3 mg/dL   Lactic acid whole blood   Result Value Ref Range    Lactic Acid 3.6 (H) 0.7 - 2.0 mmol/L   Uric acid   Result Value Ref Range    Uric Acid 4.8 3.5 - 7.2 mg/dL   Phosphorus   Result Value Ref Range    Phosphorus 4.2 2.5 - 4.5 mg/dL   Basic metabolic panel   Result Value Ref Range    Sodium 141 133 - 144 mmol/L    Potassium 3.9 3.4 - 5.3 mmol/L    Chloride 107 94 - 109 mmol/L    Carbon Dioxide 25 20 - 32 mmol/L    Anion Gap 9 3 - 14 mmol/L    Glucose 140 (H) 70 - 99 mg/dL    Urea Nitrogen 23 7 - 30 mg/dL    Creatinine 0.57 (L) 0.66 - 1.25 mg/dL    GFR Estimate >90 >60 mL/min/[1.73_m2]    GFR Estimate If Black >90 >60 mL/min/[1.73_m2]    Calcium 8.4 (L) 8.5 - 10.1 mg/dL

## 2020-03-16 NOTE — PROGRESS NOTES
Sepsis Evaluation Progress Note    I was called to see Kenneth Tello due to elevated lactate.  He is not known to have an infection.     Physical Exam   Vital Signs:  Temp: 97.1  F (36.2  C) Temp src: Oral BP: 135/72 Pulse: 65 Heart Rate: 56 Resp: 18 SpO2: 96 % O2 Device: None (Room air)      Lab:  Lactic Acid   Date Value Ref Range Status   03/16/2020 3.6 (H) 0.7 - 2.0 mmol/L Final     Lactate for Sepsis Protocol   Date Value Ref Range Status   03/14/2020 4.2 (HH) 0.7 - 2.0 mmol/L Final     Comment:     NOTIFIED ZOILA DOBBINS ON 7D ON 03/14/2020 AT 1335 BY BN       The patient is at baseline mental status.     The rest of their physical exam is significant for clear lungs, normal respiratory and heart rate.    Assessment & Plan   NO EVIDENCE OF SEPSIS at this time.  Vital sign, physical exam, and lab findings are likely due to DLBCL.     Lactate is elevated but has improved over last 48 hours.    No additional IVF ordered due to weight gain, mild pitting edema.    Disposition: The patient will remain on the current unit. We will continue to monitor this patient closely..  Beatris Esquivel PA-C    Sepsis Criteria   Sepsis: 2+ SIRS criteria due to infection  Severe Sepsis: Sepsis AND 1+ new sign of acute organ dysfunction (Note: lactate >2 is organ dysfunction)  Septic Shock: Sepsis AND hypotension despite volume resuscitation with 30 ml/kg crystalloid

## 2020-03-16 NOTE — PLAN OF CARE
Pt afebrile with stable vs. Lactic acid recheck still 3.7, but is related to his disease, not sepsis. Urine output still a little low, wt increased, IVF d/c'd. INR still 2.32 (despite no coumadin since admission and received 5 unit plasma prior to bx last week). Denies need for pain medication since starting prednisone. PET scan tomorrow at 0945am, NPO (and no dextrose) after MN. Pt expressing frustration about the long wait before starting treatment. Educated on reasoning.

## 2020-03-17 PROBLEM — C83.38 DIFFUSE LARGE B-CELL LYMPHOMA OF LYMPH NODES OF MULTIPLE REGIONS (H): Status: ACTIVE | Noted: 2020-01-01

## 2020-03-17 NOTE — PROGRESS NOTES
[facility], [place]    Hematology / Oncology Progress Note     Assessment & Plan   Kenneth Tello is a 49 year old male with a history notable for atrial fibrillation (chronic anticoagulation with warfarin), myocardial infarction, stroke (2017), HTN and HLD who presented to [facility] from [facility] for evaluation of left upper abdominal pain and back pain, confirmed to have new diagnosis of DLCBL. He was transferred from medicine service to heme/malignancy service on 3/14/20 for further evaluation and workup.      Today, 3/17:  --PET/CT scheduled for this am (moved up from Wednesday), patient is NPO  --Continue TLS labs BID (BMP, phos, Uric acid)   --Patient will also need a LP arranged this week with IT methotrexate  --Await FISH results, should have back today        # New diagnosis DLBCL.   [place] [facility] [place] of L upper abdominal pain and low back pain, fatigue, weight loss of 40 lbs in past 4 months.  CT CAP 3/10 demonstrated a large infiltrative mass in LUQ, arising from spleen and involving the stomach, pancreas, occluding splenic vein.  Also with upper abdominal and RP lymphadenopathy present.  IR performed a Retroperitoneal LN biopsy on 3/11. Pathology confirmed DLBCL. Biopsy flow cytometry demonstrates Lambda-monotypic B cells (20%). Baseline uric acid was 7.4, .   - FISH pending, await results to determine chemotherapy regimen (EPOCH vs CHOP)  - Peripheral blood flow sent 3/14, shows Polytypic B cells (0.4%).  - Viral studies sent (Hep B, Hep C, HIV, HSV): Hep B antigen negative, hep B surface antibody positive, hep B core antibody negative, HIV negative  - Echo done 3/10, EF 60-65%   - staging PET/CT today, 3/17, await results  - Cont allopurinol 300mg/day   - Daily CBC  - TLS labs BID (BMP, phos, Uric acid)   - As patient is symptomatic due to large disease burden, we will start him on prednisone 100mg/day as of x 3/14 until  we have staging completed and results of FISH in order to get therapy started.  Pain/discomfort much improved with this.  - Patient will also need a LP arranged sometime this week with IT methotrexate     #TLS monitoring  - Cont allopurinol 300mg/day   - BMP, phos, Uric acid BID   - not currently on MIVF given mild fluid overload, continue to monitor    #Fluid overload  Mild-moderate pitting edema in LE with 10# weight gain 3/14-3/16  -Discontinue MIVF for now  -Discussed documented low urine output on 3/16 (in Epic) w/RN, only 240 out per record, RN feels inaccurate and will provide hats for patient to try to assess more accurately  -Strict I/Os, monitor fluid status closely and watch daily creatinine which has been stable     #Lactic acidosis, type B  Lactate elevated again today, though it continues to downtrend, this am 3.4, likely due to DLBCL and not sepsis   - Continue to monitor clinically, labs BID     # Anemia. Likely related to lymphoma.   - Monitor CBC daily   - Transfuse if Hbg less than 7      # Leukocytosis.  Likely reactive from DLBCL. No fevers or infectious symptoms.   - Monitor CBC     # Chronic A. fib on anticoagulation with warfarin, held currently  - 3/12: INR 2.23, 3/13: INR 2.51, 3/14: INR 2.28, 3/15: INR 2.10, 3/16 2.32, today 2.47  - Continue to hold warfarin given plan for LP this week     # H/o CVA, MI 2017.  Residual mild left sided numbness. Reports his BP was out of control, had a stroke/MI at the same time.      #Hypertension, stable at present  #Hyperlipidemia   #Coronary artery disease with history of MI  - Continue carvedilol    - hold atorvastatin while getting chemo     #Obstructive sleep apnea noncompliant with CPAP.   - Will need to get appt with sleep medicine at discharge to get CPAP machine   - Declining to use one inpatient      # History of alcohol abuse  # History of smoking cigarette, nicotine dependence.       Ppx:   VTE: patient is on therapeutic anticoagulation,  currently on hold in anticipation of LP, SCDs ordered and d/w RN, encourage ambulation     FEN  Regular diet as tolerated   lyte replacement prn   No MIVF for now with signs of fluid overload (see above)        Disposition Plan     Expected discharge: Pending completion of work-up/resolution of acute issues.        The plan was staffed with Dr. Cary.    Beatris Esquivel PA-C    Interval History   He reports return of back pain today, mostly on the left side, it is not new but improved over the weekend and increased about an hour ago.  He states percocet helps.  He states his low urine output is chronic and has been ongoing for years, unchanged.  He denies any sensation of bladder fullness, dysuria but reports urgency, which is also not new.  He denies headache, dizziness, chest pain, shortness of breath, nausea/vomiting, cough, diarrhea.  His constipation has resolved.      Physical Exam   Temp: 96.2  F (35.7  C) Temp src: Oral BP: (!) 159/96(RN notified) Pulse: 70 Heart Rate: 80 Resp: 20 SpO2: 97 % O2 Device: None (Room air)    Vitals:    03/14/20 0822 03/15/20 0900 03/16/20 0737   Weight: 99 kg (218 lb 4.8 oz) 102.2 kg (225 lb 3.2 oz) 103.8 kg (228 lb 12.8 oz)     Vital Signs with Ranges  Temp:  [95.7  F (35.4  C)-97.3  F (36.3  C)] 96.2  F (35.7  C)  Pulse:  [70-72] 70  Heart Rate:  [60-80] 80  Resp:  [18-20] 20  BP: (124-159)/(70-96) 159/96  SpO2:  [95 %-98 %] 97 %  I/O last 3 completed shifts:  In: 1886.67 [P.O.:1500; I.V.:386.67]  Out: 390 [Urine:390]    Constitutional: very pleasant, in NAD  ENT: mmm, neck is supple  Respiratory: fine crackles left base otherwise CTAB, normal effort  Cardiovascular: nl s1/s2 no MRGs  GI: abdomen is soft, NT, +BS  Skin: warm, dry  Musculoskeletal: mild-moderate pitting edema bilat lower extremities  Neurologic: awake/alert, speech is clear, answers questions appropriately        Medications     - MEDICATION INSTRUCTIONS -         allopurinol  300 mg Oral Daily      carvedilol  6.25 mg Oral BID w/meals     gabapentin  300 mg Oral At Bedtime     influenza vaccine adult (product based on age)  0.5 mL Intramuscular Prior to discharge     magnesium hydroxide  30 mL Oral Daily     predniSONE  100 mg Oral Daily     senna-docusate  1 tablet Oral At Bedtime    Or     senna-docusate  2 tablet Oral At Bedtime       Data   Results for orders placed or performed during the hospital encounter of 03/09/20 (from the past 24 hour(s))   Lactic acid whole blood   Result Value Ref Range    Lactic Acid 3.7 (H) 0.7 - 2.0 mmol/L   Uric acid   Result Value Ref Range    Uric Acid 4.5 3.5 - 7.2 mg/dL   Phosphorus   Result Value Ref Range    Phosphorus 3.9 2.5 - 4.5 mg/dL   Basic metabolic panel   Result Value Ref Range    Sodium 139 133 - 144 mmol/L    Potassium 4.3 3.4 - 5.3 mmol/L    Chloride 107 94 - 109 mmol/L    Carbon Dioxide 24 20 - 32 mmol/L    Anion Gap 8 3 - 14 mmol/L    Glucose 241 (H) 70 - 99 mg/dL    Urea Nitrogen 24 7 - 30 mg/dL    Creatinine 0.58 (L) 0.66 - 1.25 mg/dL    GFR Estimate >90 >60 mL/min/[1.73_m2]    GFR Estimate If Black >90 >60 mL/min/[1.73_m2]    Calcium 8.6 8.5 - 10.1 mg/dL   CBC with platelets differential   Result Value Ref Range    WBC 25.5 (H) 4.0 - 11.0 10e9/L    RBC Count 3.78 (L) 4.4 - 5.9 10e12/L    Hemoglobin 10.6 (L) 13.3 - 17.7 g/dL    Hematocrit 32.9 (L) 40.0 - 53.0 %    MCV 87 78 - 100 fl    MCH 28.0 26.5 - 33.0 pg    MCHC 32.2 31.5 - 36.5 g/dL    RDW 17.9 (H) 10.0 - 15.0 %    Platelet Count 379 150 - 450 10e9/L    Diff Method Manual Differential     % Neutrophils 94.8 %    % Lymphocytes 1.7 %    % Monocytes 2.6 %    % Eosinophils 0.0 %    % Basophils 0.0 %    % Myelocytes 0.9 %    Absolute Neutrophil 24.2 (H) 1.6 - 8.3 10e9/L    Absolute Lymphocytes 0.4 (L) 0.8 - 5.3 10e9/L    Absolute Monocytes 0.7 0.0 - 1.3 10e9/L    Absolute Eosinophils 0.0 0.0 - 0.7 10e9/L    Absolute Basophils 0.0 0.0 - 0.2 10e9/L    Absolute Myelocytes 0.2 (H) 0 10e9/L    Anisocytosis  Slight     Poikilocytosis Marked     Polychromasia Moderate     Elliptocytes Moderate     Lissy Cells Marked     Macrocytes Present     Platelet Estimate Confirming automated cell count    Lactate Dehydrogenase   Result Value Ref Range    Lactate Dehydrogenase 472 (H) 85 - 227 U/L   INR   Result Value Ref Range    INR 2.47 (H) 0.86 - 1.14   Partial thromboplastin time   Result Value Ref Range    PTT 42 (H) 22 - 37 sec   Fibrinogen activity   Result Value Ref Range    Fibrinogen 448 (H) 200 - 420 mg/dL   Hepatic panel   Result Value Ref Range    Bilirubin Direct 0.3 (H) 0.0 - 0.2 mg/dL    Bilirubin Total 0.7 0.2 - 1.3 mg/dL    Albumin 2.1 (L) 3.4 - 5.0 g/dL    Protein Total 6.4 (L) 6.8 - 8.8 g/dL    Alkaline Phosphatase 112 40 - 150 U/L    ALT 42 0 - 70 U/L    AST 62 (H) 0 - 45 U/L   Magnesium   Result Value Ref Range    Magnesium 2.2 1.6 - 2.3 mg/dL   Lactic acid whole blood   Result Value Ref Range    Lactic Acid 3.4 (H) 0.7 - 2.0 mmol/L   Uric acid   Result Value Ref Range    Uric Acid 4.7 3.5 - 7.2 mg/dL   Phosphorus   Result Value Ref Range    Phosphorus 4.5 2.5 - 4.5 mg/dL   Basic metabolic panel   Result Value Ref Range    Sodium 140 133 - 144 mmol/L    Potassium 4.3 3.4 - 5.3 mmol/L    Chloride 108 94 - 109 mmol/L    Carbon Dioxide 25 20 - 32 mmol/L    Anion Gap 7 3 - 14 mmol/L    Glucose 102 (H) 70 - 99 mg/dL    Urea Nitrogen 21 7 - 30 mg/dL    Creatinine 0.62 (L) 0.66 - 1.25 mg/dL    GFR Estimate >90 >60 mL/min/[1.73_m2]    GFR Estimate If Black >90 >60 mL/min/[1.73_m2]    Calcium 8.7 8.5 - 10.1 mg/dL

## 2020-03-17 NOTE — PROVIDER NOTIFICATION
DATE/TIME  (DOT-TD, DOT-NOW) CHEMO CHECK ACTIVITY (REGIMEN & DOSE CHECK, DAY, DOSE #, NAME OF CHEMO #1)  CHEMO DRUG #2  CHEMO DRUG #3 NAME OF RN #1 (USE DOT-ME HERE) NAME OF RN#2 (2ND RN TO LOG IN SEPARATELY)   3/17/2020 2:10 PM R-CHOP protocol double check   Johana Armando, ZOILA Levi, ZOILA     03/17/20  7:03 PM   Rituximab dose # 1 double check   ZOILA Arias RN   03/17/20  10:20 PM   Vincristine dose # 1  Double check Doxorubicin dose # 1 double check Cytoxan dose # 1  Double check ZOILA Arias, ZOILA     3/18/2020 7:10 AM IT Methotrexate double check   Johana Armando, ZOILA Saleem RN

## 2020-03-17 NOTE — PLAN OF CARE
[id number]: Afebrile, VSS on RA. Denies pain/nausea. Pt states his gas pains improved after soft BMs today. Pt up independently, 240mL UO this shift. Ambulation encouraged. Plan to be NPO at midnight for a PET scan tomorrow @ 0945, pt aware. Continue to monitor and with POC.

## 2020-03-17 NOTE — PLAN OF CARE
Hypertensive this morning r/t pain. OVSS. Denied nausea/vomiting. C/O bilateral lower back pain, comfortably manageable with 5 mg prn oxycodone x 2 and 650 mg prn tylenol x 2. Up ad maryanne. NPO for majority of the shift in preparation for PET scan. Adequate urine output. LBM 3/16. PET scan completed. Plan to receive R-CHOP this evening with possible discharge to home tomorrow.

## 2020-03-17 NOTE — PROGRESS NOTES
Spoke with Dr. Cary after his review of diagnostic data. Pt is not double hit, so we will plan to start R-CHOP chemotherapy inpatient today. Orders signed and released.     Pt will need LP with IT methotrexate tomorrow (3/18). Plts WNL but INR is 2.47 today despite holding warfarin.   - plan to give Vitamin K 2mg IV x once today  - orders for FFP if INR >1.5 on 3/18 AM. Recheck INR as needed.  - plan for bedside LP tomorrow; will talk with CAPS team in morning. Pt denies any prior back surgeries.     Discussed plan with patient and obtained blood consent.     Ghazal An PA-C  Heme/Onc  [phone]

## 2020-03-17 NOTE — PROGRESS NOTES
Vitals stable, afebrile. Continues to have low urine output. PET scan at 09:45 this AM, pt has been NPO since midnight. Chemo regime to be decided following this and FISH results. Up ad maryanne.     Lactic acid drawn with AM labs level 3.4, MD paged.

## 2020-03-18 NOTE — PLAN OF CARE
Jd is very frustrated due to not being able to discharge today. Hypertensive, OVSS. Denied pain, nausea/vomiting. Ativan given x 1 for anxiety. Up ad maryanne. Poor po intake r/t decreased appetite and poor dentition. Adequate urine output. LBM 3/18. Received 1 unit of plasma and vitamin K for elevated INR in preparation to have LP with IT chemo this afternoon. INR remains elevated. Plan to have LP with IT chemo tomorrow morning at 10 AM in Xray. Will have labs drawn at 0230. PRN vitamin K available if INR is not less than 1.5.

## 2020-03-18 NOTE — PROGRESS NOTES
Nursing Focus: Chemotherapy  D: Positive blood return via PICC. Insertion site is clean/dry/intact, dressing intact with no complaints of pain.  Urine output is recorded in intake in Doc Flowsheet.    I: Premedications given per order (see electronic medical administration record). Dose #1 of vincritsine started to infuse by gravity. Dose #1 of doxorubicin infused over 30 minutes. Dose #1 of cytoxan started to infuse over 60 minutes. Reviewed pt teaching on chemotherapy side effects.  Pt denies need for further teaching. Chemotherapy double checked per protocol by two chemotherapy competent RN's.   A: Tolerating procedure well. Denies nausea and or pain.   P: Continue to monitor urine output and symptoms of nausea. Screen for symptoms of toxicity.

## 2020-03-18 NOTE — PROGRESS NOTES
CLINICAL NUTRITION SERVICES - REASSESSMENT NOTE     Nutrition Prescription    RECOMMENDATIONS FOR MDs/PROVIDERS TO ORDER:  None at this time    Malnutrition Status:    Severe malnutrition in the context of chronic illness    Recommendations already ordered by Registered Dietitian (RD):  Order Boost Plus (strawberry flavor) for 2 pm today    Future/Additional Recommendations:  Now at this time, pt hoping to discharge today.     EVALUATION OF THE PROGRESS TOWARD GOALS   Diet: Regular with Boost Plus supplement betw meals   - Pt reports he has tried Chocolate Boost Plus and expressed interest in trying strawberry flavor.     Intake: Pt reports eating poorly for over a month d/t lack of appetite with illness and now with chemo. Also mentioned that he struggles with finding foods to eat d/t his poor dentition (states his teeth are rotting). Noted to be drinking floridalma (pop) and leanne peanut butter cups at bedside. Commented that he has tried scrambled eggs as well.      NEW FINDINGS   Per chart review, pt started on chemo yesterday with R-CHOP d/t new diagnosis of high-grade B cell lymphoma.     Weight: 106.5 kg (today), 99.7 kg (3/11): Suspect wt gain r/t increased fluids. Noted pt received MIVF's from 3/14 thru 3/16  - Reported his UBW was betw 255 to 260 lbs (115.9 kg to 118 kg) prior to onset of illness.    Labs:  PO4 > normal x 2 days; question d/t tumor lysis syndrome. No phosphate binder ordered at this time. Per Pharmacy, value expect to improve over next few days.      MALNUTRITION  % Intake: </=50% for >/= 1 month (severe)  % Weight Loss: > 7.5% in 3 months (severe)  Subcutaneous Fat Loss: Unable to assess  Muscle Loss: Unable to assess  Fluid Accumulation/Edema: Mild  Malnutrition Diagnosis: Severe malnutrition in the context of chronic illness    Previous Goals   1. Wt to remain > 99 kg    Evaluation: Met    2. Patient to consume > 75% of nutritionally adequate meal trays TID, or the equivalent with  supplements/snacks.     Evaluation: Not met    Previous Nutrition Diagnosis  Unintended weight loss related to suspect inadequate nutritional intakes secondary to h/o abdominal and back pain, loss of appetite, fatigue and  intermittent nausea hindering po as evidenced by wt loss of 15% x past 4 mos and reduced oral intakes over the last month.     Evaluation: No longer applicable, nutrition diagnosis changed below    CURRENT NUTRITION DIAGNOSIS  Inadequate oral intake related to ongoing poor appetite d/t illness and now chemo, in addition to poor dentition at baseline as evidenced by poor po intakes since admit with h/o poor po intakes x > month PTA and h/o 40 lbs wt loss PTA.    INTERVENTIONS  Implementation  Nutrition education for recommended modifications; Provided diet education on tips for improving appetite with small frequent meals and consuming nutrient dense foods. Also discussed utilizing high calorie/high protein supplements to help optimize his nutritional intakes. Pt receptive to recommendation.     Goals  Patient to consume % of nutritionally adequate meal trays TID, or the equivalent with supplements/snacks.    Monitoring/Evaluation  Progress toward goals will be monitored and evaluated per protocol.    Michelle Perez RD,LD  [facility] pager [phone]

## 2020-03-18 NOTE — DISCHARGE SUMMARY
[facility], [place]    Discharge Summary  Hematology / Oncology    Date of Admission:  3/9/2020  Date of Discharge:  3/19/2020  Discharging Provider: Rayne Anthony MD  Date of Service (when I saw the patient): 3/19/2020    Discharge Diagnoses   Patient Active Problem List   Diagnosis     Diffuse large B-cell lymphoma of lymph nodes of multiple regions (H)       History of Present Illness   Kenneth Tello is a 49 year old male with a history notable for atrial fibrillation (previously on Warfarin), myocardial infarction (2017), CVA (2017), HTN, and HLD who presented from [facility] for evaluation of left upper abdominal pain and back pain, fatigue, and unintentional weight loss and was found to have a new samaria-pancreatic mass. He was admitted to [facility] on 3/9/2020 and underwent an IR-guided retroperitoneal lymph node biopsy on 3/11, results of which were consistent with DLBCL. Double-hit negative; however, given patient's underlying medical comorbidities, he was kept inpatient for initiation of chemotherapy and is now status post Cycle 1 of R-CHOP (3/17). IT chemo given x1 on 3/19. Patient tolerated initiation of chemotherapy well, with no significant side effects. He will receive outpatient follow-up with weekly labs at his local hospital in [place], [place], and will have a tele-health appointment with a [facility] provider early next week. He will be re-admitted for Cycle 2 of R-CHOP on 4/7/2020. On the day of discharge, patient was overall well-appearing and hemodynamically stable and felt safe and comfortable with the plan for discharge and follow-up.    Discharge medications: Allopurinol, apixaban, compazine, carvedilol, senna, lisinopril; prednisone for C2 (to start 4/7)  Next appointment: 3/23 @ 3:30 PM with Lelo Fortune (via telephone)     Hospital Course   Kenneth Tello was admitted on 3/9/2020.  The following problems were addressed during his  hospitalization:    HEME/ONC  # Diffuse Large B-cell Lymphoma  Patient initially presented to an OSH with complaints of left upper abdominal pain and low back pain, fatigue, and weight loss of 40 lbs in past 4 months.  CT CAP 3/10 demonstrated a large infiltrative mass in LUQ, arising from spleen and involving the stomach, pancreas, occluding splenic vein.  Also with upper abdominal and retroperitoneal lymphadenopathy present.  IR performed a retroperitoneal LN biopsy on 3/11. Pathology confirmed DLBCL. Biopsy flow cytometry demonstrates Lambda-monotypic B cells (20%). Baseline uric acid was 7.4, . Double hit negative.  - FISH shows rearrangement of BCL6, rearrangement of IGH, loss of BCL2  - Peripheral blood flow sent 3/14, shows Polytypic B cells (0.4%).  - Viral studies sent (Hep B, Hep C, HIV, HSV): Hep B antigen negative, hep B surface antibody positive, hep B core antibody negative, HIV negative, HepC Ab negative, HSV 1/2 negative  - Echo done 3/10, EF 60-65%   - Staging PET/CT on 3/17 shows nancy disease above and below the diaphragm.  - Weekly labs in [place], [place] (CBC/CMP)  - Continue allopurinol 300mg/day x14 days on discharge. Follow uric acid level.      Treatment Plan: R-CHOP (C1=3/17).   - Doxorubicin 50 mg/m2 x1 dose -- Day 1   - Rituximab 375 mg/m2 x1 dose -- Day 1   - Vincristine 2 mg IV x1 dose -- Day 1   - Cyclophosphamide 750 mg/m2 x1 dose -- Day 1   - Prednisone 100 mg daily x5 days -- Days 1-5   - IT methotrexate 12 mg in NaCl 0.9% 6 mL -- Day 2; delayed to 3/19 due to elevated INR requiring pre-procedural correction    # Normocytic anemia  Presumed related to underlying malignancy with contribution from recent chemotherapy. No evidence of bleeding.   - Follow CBC w/ diff weekly  - Transfuse to keep Hgb >7  - Laboratory monitoring to be done in [place], [place] per patient preference; orders faxed 3/20 AM. However, patient may be spending some time with his brother in [place], [place] as well. Will  make back-up lab appts in the [facility] as well; patient to cancel whichever appts he cannot attend.    # Leukocytosis.    Presumably related to high-dose steroid use; significant neutrophilia consistent with demargination. No fevers or infectious symptoms.   - Follow CBC     #TLS monitoring  Uric acid mildly elevated on admission to 7.4. No significant TLS concerns during this admission.  - Continue allopurinol 300mg/day x14 days  - Follow uric acid level weekly     #Lactic acidosis  Persistent elevated lactic acid levels during admission. Attributable to underlying malignancy. No localizing infectious symptoms or fevers. BCx 3/14 negative.     FEN/GI  # Severe malnutrition in the setting of chronic illness  - RD involved. Boost supplementation initiated.     CV  # Chronic atrial fibrillation   CHADS2-VASc score 4. Previously on anticoagulation with warfarin; held due to drug-drug interactions. INR required reversal in the samaria-procedural setting with FFP/Vitamin K. Anticoagulation switched to Eliquis (first 30 days free). Eliquis held overnight on 3/18 due to planned LP.  - Resume Eliquis 5 mg BID tomorrow, 3/20     # H/o CVA   # Hyperlipidemia   # Coronary artery disease with history of MI  CVA in 2017 with residual mild left-sided numbness. Reportedly due to uncontrolled hypertension.  - Continue carvedilol    - Resume atorvastatin on discharge    #Hypertension  Antihypertensives held on admission. BP relatively stable throughout admission but up-trending throughout hospital stay.   - Restart lisinopril 20 mg daily  - Patient advised to check his BP at home daily and communicate results to provider at his follow-up appointment next week; outpatient team to re-introduce additional antihypertensives as appropriate. (Patient previously on amlodipine 5 mg daily, hydrochlorothiazide 25 mg daily.)    #Fluid overload  Mild-moderate pitting edema in LE with 12-lb. weight gain during this admission. Not diuresed;  anticipate patient will auto-diurese status post chemotherapy.  - Monitor weight     PULM  #Obstructive sleep apnea noncompliant with CPAP.   - Outpatient sleep medicine referral placed on discharge  - Patient repeatedly declining to use CPAP inpatient     Plan of care discussed with attending physician, Dr. Cary.    Rayne Anthony PAAmbarC  Hematology/Oncology  Pager: [phone]    Significant Results and Procedures   PET scan showing bulky abdominal lymphoma, single right neck lymphadenopathy    CT guided biopsy of retroperitoneal mass on 3/11/2020 showing DLBCL    CT Chest/Abd/Pelvis showing LUQ mass and lymphadenopathy    Echo EF of 60-65%    EKG showing atrial fibrillation    Pending Results   These results will be followed up by [facility] appointment within 3 weeks from discharge  Unresulted Labs Ordered in the Past 30 Days of this Admission     Date and Time Order Name Status Description    3/18/2020 1800 Leukemia Lymphoma Evaluation CSF In process     3/18/2020 1312 CSF Culture Aerobic Bacterial Tube 2 Preliminary     3/18/2020 1312 Gram stain CSF Tube 2 Preliminary     3/18/2020 1312 Protein total CSF: In process     3/18/2020 1312 Glucose CSF: In process     3/14/2020 1349 Blood culture Preliminary     3/14/2020 1349 Blood culture Preliminary     3/11/2020 1257 Plasma prepare order unit conditional In process     3/10/2020 2235 Transfusion reaction evaluation In process           Code Status   Full Code    Primary Care Physician   Nina Jeter    Physical Exam   Temp: 97.4  F (36.3  C) Temp src: Oral BP: (!) 166/86 Pulse: 63 Heart Rate: 81 Resp: 20 SpO2: 96 % O2 Device: None (Room air)    Vitals:    03/16/20 0737 03/17/20 0727 03/18/20 0816   Weight: 103.8 kg (228 lb 12.8 oz) 103.6 kg (228 lb 8 oz) 106.5 kg (234 lb 14.4 oz)     Vital Signs with Ranges  Temp:  [97.3  F (36.3  C)-97.7  F (36.5  C)] 97.4  F (36.3  C)  Pulse:  [63] 63  Heart Rate:  [61-81] 81  Resp:  [18-20] 20  BP: (129-166)/(81-86)  166/86  SpO2:  [95 %-98 %] 96 %  I/O last 3 completed shifts:  In: 865   Out: 1055 [Urine:1055]    Constitutional: Well appearing, in NAD. Seated in a chair at the bedside.  Eyes: No scleral icterus, conjugate gaze.  ENT: No mucositis.  Respiratory: Lungs clear to auscultation.  Cardiovascular: Irregularly irregular rhythm, no murmur.  GI: Soft, non-tender, non-distended. +BS.  Skin: No rashes on limited exam.  Musculoskeletal: 1-2+ pretibial pitting edema, normal muscle bulk and tone.  Neurologic: Alert and oriented, normal gait.  Neuropsychiatric: Affect is appropriate.    Time Spent on this Encounter   I, Rayne Anthony PA-C, personally saw the patient today and spent greater than 30 minutes discharging this patient.    Discharge Disposition   Discharged to home  Condition at discharge: Stable    Consultations This Hospital Stay   GI PANCREATICOBILIARY ADULT IP CONSULT  GI PANCREATICOBILIARY ADULT IP CONSULT  INTERVENTIONAL RADIOLOGY ADULT/PEDS IP CONSULT  ONCOLOGY ADULT IP CONSULT  SOCIAL WORK IP CONSULT  VASCULAR ACCESS CARE ADULT IP CONSULT  VASCULAR ACCESS CARE ADULT IP CONSULT  PHARMACY TO DOSE PHYTONADIONE    Discharge Orders      Reason for your hospital stay    You were admitted for an abdominal mass and diagnosed with Diffuse Large B-Cell Lymphoma (DLBCL). You received Cycle 1 of R-CHOP (chemotherapy).     Follow Up and recommended labs and tests    An appointment for hospital follow up was requested for you. If it is not scheduled by the time you discharge you will be contacted with the date and time. You may call clinic to makes changes to this appointment if needed.    Already scheduled appointments are listed below.     Activity    Your activity upon discharge: activity as tolerated     When to contact your care team    Please call the [facility] at [phone] (option 6 for Hematology/Oncology, then option 4 for clinical questions) if you develop  temperature above 100.4, shortness of breath, chest pain, headaches, vision changes, bleeding, uncontrolled nausea, vomiting, diarrhea, or pain.     Diet    Follow this diet upon discharge: Regular     Discharge Medications   Current Discharge Medication List      START taking these medications    Details   apixaban ANTICOAGULANT (ELIQUIS) 5 MG tablet Take 1 tablet (5 mg) by mouth 2 times daily  Qty: 100 tablet, Refills: 0    Comments: TEST claim. Was on warfarin, now w/ lympha needs procedures.Difficult to reverse his INR requiring multiple bags of FFP, multiple doses of IV vitamin K, delaying his discharge.  Associated Diagnoses: Paroxysmal atrial fibrillation (H)      senna-docusate (SENOKOT-S/PERICOLACE) 8.6-50 MG tablet Take 1 tablet by mouth 2 times daily as needed for constipation  Qty: 30 tablet, Refills: 5    Associated Diagnoses: Diffuse large B-cell lymphoma of lymph nodes of multiple regions (H)      allopurinol (ZYLOPRIM) 300 MG tablet Take 1 tablet (300 mg) by mouth daily for 14 days  Qty: 14 tablet, Refills: 2    Associated Diagnoses: Diffuse large B-cell lymphoma of lymph nodes of multiple regions (H)      predniSONE (DELTASONE) 50 MG tablet Take 2 tablets (100 mg) by mouth daily for 5 days Take on Days 1 through 5. Take first dose in AM prior to chemotherapy.  Qty: 10 tablet, Refills: 0    Associated Diagnoses: Diffuse large B-cell lymphoma of lymph nodes of multiple regions (H)      prochlorperazine (COMPAZINE) 10 MG tablet Take 1 tablet (10 mg) by mouth every 6 hours as needed (Nausea/Vomiting)  Qty: 30 tablet, Refills: 7    Associated Diagnoses: Diffuse large B-cell lymphoma of lymph nodes of multiple regions (H)         CONTINUE these medications which have NOT CHANGED    Details   atorvastatin (LIPITOR) 40 MG tablet Take 40 mg by mouth daily      carvedilol (COREG) 12.5 MG tablet Take 12.5 mg by mouth 2 times daily (with meals)      lisinopril (ZESTRIL) 20 MG tablet Take 20 mg by mouth daily          STOP taking these medications       amLODIPine (NORVASC) 5 MG tablet Comments:   Reason for Stopping:         gabapentin (NEURONTIN) 300 MG capsule Comments:   Reason for Stopping:         hydrochlorothiazide (HYDRODIURIL) 25 MG tablet Comments:   Reason for Stopping:         warfarin ANTICOAGULANT (COUMADIN) 5 MG tablet Comments:   Reason for Stopping:             Allergies   No Known Allergies  Data   Most Recent 3 CBC's:  Recent Labs   Lab Test 03/19/20  0505 03/18/20  0543 03/17/20  0459   WBC 31.5* 37.5* 25.5*   HGB 9.8* 10.5* 10.6*   MCV 87 87 87    313 379      Most Recent 3 BMP's:  Recent Labs   Lab Test 03/19/20  0505 03/18/20  1933 03/18/20  0543 03/17/20  1703     --  139 138   POTASSIUM 4.4 4.6 4.6 4.0   CHLORIDE 106  --  107 105   CO2 26  --  24 24   BUN 20  --  20 22   CR 0.56* 0.59* 0.63* 0.61*   ANIONGAP 7  --  9 9   STACY 8.7  --  8.5 8.8   *  --  94 159*     Most Recent 2 LFT's:  Recent Labs   Lab Test 03/19/20  0505 03/18/20  0543   AST 56* 82*   ALT 48 63   ALKPHOS 133 105   BILITOTAL 0.8 0.7     Most Recent INR's and Anticoagulation Dosing History:  Anticoagulation Dose History     Recent Dosing and Labs Latest Ref Rng & Units 3/16/2020 3/17/2020 3/18/2020 3/18/2020 3/18/2020 3/19/2020 3/19/2020    INR 0.86 - 1.14 2.32(H) 2.47(H) 1.74(H) 1.63(H) 1.57(H) 1.35(H) 1.31(H)        Most Recent 3 Troponin's:No lab results found.  Most Recent Cholesterol Panel:No lab results found.  Most Recent 6 Bacteria Isolates From Any Culture (See EPIC Reports for Culture Details):  Recent Labs   Lab Test 03/19/20  1005 03/14/20  1406 03/14/20  1402 03/11/20  0300 03/11/20  0254   CULT PENDING No growth after 5 days No growth after 5 days No growth No growth     Most Recent TSH, T4 and A1c Labs:No lab results found.  Results for orders placed or performed during the hospital encounter of 03/09/20   XR Chest 2 Views    Narrative    EXAM: XR CHEST 2 VW  LOCATION: [facility]  Services  DATE/TIME: 3/9/2020 10:34 PM    INDICATION: Shortness of breath at with newly diagnosed lung malignancy.  COMPARISON: None.      Impression    IMPRESSION: Elevation left hemidiaphragm. Associated opacity posterior aspect of left lower lobe highly suspicious for volume loss or consolidation related to pneumonia. Recommend correlation for left basilar pneumonia. Likely associated minimal left   basilar pleural fluid. Right lung clear. Normal heart size. Normal pulmonary vascularity.   CT Chest/Abdomen/Pelvis w Contrast    Narrative    EXAMINATION: CT CHEST/ABDOMEN/PELVIS W CONTRAST, 3/10/2020 12:47 PM    TECHNIQUE: Axial CT images from the lung apices through the symphysis  pubis were obtained  with IV contrast. Coronal and sagittal reformats  also provided. Contrast dose: iopamidol (ISOVUE-370) solution 134 mL    COMPARISON: None available at time of dictation. Patient reportedly  had outside imaging done without contrast.    HISTORY: PANCREAS PROTOCOL, LUQ abd pain, new panc/gastric/splenic  mass on OSH imaging (done without contrast), please eval mass with IV  contrast along with staging CT chest    FINDINGS:    CHEST  Left thyroid lobe is slightly larger than the right without discrete  nodule visualized. Standard aortic branching pattern with mild  atherosclerosis. There is some calcification/high density along the  azygos vein. Coronary artery calcification. Small pericardial effusion  present. Prominent subcarinal lymph node has a fatty hilum, likely  reactive. Remainder of the mediastinal and hilar lymph nodes are  subcentimeter short axis.    Small left pleural effusion demonstrated with adjacent lower lobe  atelectasis. Scattered tiny granulomas. The additional tiny  noncalcified nodules may also represent granulomas. A subpleural  nodule medially at the right base measures 6 mm and has some  calcification on coronal imaging, series 7 image 92, somewhat  nonspecific. Septal thickening more evident  on the right demonstrated  with overall some increased density in the dependent lung suggests  edema. Small subpleural bleb medially at the right apex measures 1.3  cm.    ABDOMEN  Liver:  Liver is normal in size and CT density. A 4.9 cm cyst in the left  lateral segment is demonstrated. Slightly heterogeneous subcapsular  enhancement in the right hepatic lobe demonstrated without focal  lesion. Portal vein is patent.    Biliary/Gallbladder:   Gallbladder has been resected. Mild prominence of the biliary tree  within normal limits for same.    Spleen:  The spleen is enlarged and heterogeneous with central hypoattenuating  mass extending to involve the pancreas and adjacent stomach. This is  difficult to marginate due to its infiltrative nature but is  approximately 17.8 x 15.3 cm on series 9 image 270. There is some  perisplenic increased vascularity with collateral vessels and slight  stranding. Discrete splenic vein not visualized, presumably  thrombosed. Mass extends into the retroperitoneum with retroperitoneal  lymphadenopathy, discussed below.    Pancreas:   Pancreatic head and neck within normal limits. The pancreatic body and  tail is enlarged and not  from the large left upper quadrant  mass. Duct is not distended.    Adrenals:   Right adrenal gland within normal limits. There is nodular thickening  towards the apex of the left adrenal gland which abuts the  retroperitoneal lymphadenopathy. Representative lymph nodes will be  discussed below.    Kidneys:   Kidneys are normal in size. There are no stones or hydronephrosis.  Perinephric stranding.    Retroperitoneal/Vasculature:  Atherosclerosis of the aorta demonstrated. There is extensive  lymphadenopathy encasing the aorta and in the left retroperitoneum.  Representative conglomerate lymph node mass in the retroperitoneum  extending to the left at the level of the renal vessels is 5 x 7.2 cm,  series 9 image 331. In the superior abdomen, the  lymphadenopathy  appears contiguous with the large mass extending from the splenic  hilum involving the pancreas and stomach. Additional gastrohepatic and  paty hepatis lymph nodes also present.    Portal vein is patent. Occlusion of the splenic vein present with left  abdominal collaterals.    Gastrointestinal/Mesentery:   The gastric cardia, fundus and proximal body is involved with the  large left upper quadrant mass. No definite gas within the lesion is  appreciated to suggest fistulization although central portion of the  lesion does appear necrotic. Splenic flexure does not appear to be  involved. No small bowel obstruction. Colonic diverticulosis distally  with no evidence of acute diverticulitis.    There is a small amount of pelvic ascites. Presacral edema present.  There is also some stranding in the left retroperitoneum and along the  paracolic gutter.    No free air.    PELVIS  Bladder:   Bladder is collapsed. Circumferential wall thickening may be in part  due to degree of distention.    Genital:   The prostate and seminal vesicles are within normal limits.    Other:  Body wall edema. Postsurgical changes inferior to the umbilicus. Small  fat-containing inguinal hernias.    Bony Structures:   Visualized bony structures are consistent with the patient's age with  degenerative changes.      Impression    IMPRESSION:   1.  Large infiltrative mass centered in the left upper quadrant  appears to be arising from the spleen and involving the adjacent  stomach and pancreas. Occlusion of the splenic vein with adjacent  collateralization. Upper abdominal and retroperitoneal lymphadenopathy  also present. Differential considerations would include primary  splenic malignancy versus lymphoma. Pancreatic or gastric lesion  invading the spleen can not be excluded on this imaging given the  large size of the mass. Correlation with pending retroperitoneal lymph  node biopsy recommended.  2.  Majority of the nodules in  the thorax favored to represent  granulomas. There is one nodule at the right base which only has a  small peripheral area of calcification. Attention on follow-up.  3.  Overall third spacing of fluid demonstrated with suspected  pulmonary edema, left effusion, pericardial effusion and ascites.  4.  Other ancillary findings as above.    FEDERICO MARTIN MD   CT Abdomen Retroperitoneal Biopsy    Narrative    Procedures: CT-guided biopsy of left retroperitoneal mass 3/11/2020    Clinical indication: CT guided left RP lymph node biopsy    Comparison studies: CT 3/10/2020    PROCEDURE:        Interventional radiologists: Dr. Hernández (IR staff)  Fellow: Terrell Stewart    Consent: verbal and written informed consent obtained prior to  procedure.    Procedure details: Patient placed in prone position. Left back prepped  and draped in standard sterile fashion. Using CT guidance, 17-gauge  introducer needle was advanced into the left retroperitoneal mass  after local anesthesia and 5 cores were obtained using the 18-gauge  biopsy device. Pathologist confirmed adequate specimen. Needle was  removed, dressing was applied and postprocedure scan was obtained  which did not reveal any complications.      Medications: fentanyl 125 mcg IV, midazolam 2.5 mg IV, 1% lidocaine  for local anesthesia.     Monitoring: The patient was placed on continuous monitoring. Vital  signs and sedation monitored by nursing staff under my supervision.  The patient remained stable throughout the procedure.    Sedation time: 30 minutes    Complications: None.      Impression    IMPRESSION:     Successful CT-guided biopsy of left retroperitoneal mass.    PLAN:    Bedrest as ordered. Pathology pending.    I, VIKY HERNÁNDEZ MD, attest that I was present for all critical  portions of the procedure and was immediately available to provide  guidance and assistance during the remainder of the procedure.    I have personally reviewed the examination and  initial interpretation  and I agree with the findings.    VIKY JACKSON MD   PET Oncology Whole Body    Narrative    Combined Report of:    PET and CT on  3/17/2020 12:01 PM :    1. PET of the neck, chest, abdomen, and pelvis.  2. PET CT Fusion for Attenuation Correction and Anatomical  Localization:    3. Diagnostic CT scan of the chest, abdomen, and pelvis with  intravenous contrast for interpretation.  3. CT of the chest, abdomen and pelvis obtained for diagnostic  interpretation.  4. 3D MIP and PET-CT fused images were processed on an independent  workstation and archived to PACS and reviewed by a radiologist.    Technique:    1. PET: The patient received 13.8 mCi of F-18-FDG; the serum glucose  was 94 prior to administration, body weight was 103.6 kg. Images were  evaluated in the axial, sagittal, and coronal planes as well as the  rotational whole body MIP. Images were acquired from the Vertex to the  Feet.    UPTAKE WAS MEASURED AT 60 MINUTES.     BACKGROUND:  Liver SUV max= 2.85,   Aorta Blood SUV Max: 2.10.     2. CT: Volumetric acquisition for clinical interpretation of the  chest, abdomen, and pelvis acquired at 3 mm sections . The chest,  abdomen, and pelvis were evaluated at 5 mm sections in bone, soft  tissue, and lung windows.      The patient received 135 cc. Of Isovue 370 intravenously for the  examination.    --    3. 3D MIP and PET-CT fused images were processed on an independent  workstation and archived to PACS and reviewed by a radiologist.    INDICATION: New diagnosis lymphoma, work up    ADDITIONAL INFORMATION OBTAINED FROM EMR: New diagnosis    COMPARISON: 3/10/2020    FINDINGS:     HEAD/NECK:  Right level 5B lymphadenopathy measuring 2.8 x 1.6 cm with SUV max of  25.39. No other hypermetabolic lymphadenopathy in the neck. No  suspicious neck mucosal lesions. No suspicious salivary glands  lesions. Small hypodense nodules in the thyroid, likely colloid.  Paranasal sinuses and mastoid air  cells are clear. Patent main neck  vessels. No suspicious focal uptake in the brain.    CHEST:  No suspicious lung lesions. Minimally enlarging left pleural effusion  and adjacent atelectasis. Heart is within normal limits. There are no  hypermetabolic mediastinal, hilar or axillary lymphadenopathy.    ABDOMEN AND PELVIS:  Bulky left upper quadrant centrally necrotic conglomerating  lymphadenopathy with maximum diameters of 13.7 x 20.1 x 24.0 cm with  SUV max up to 40.09 invading the spleen, left adrenal gland, the left  hemidiaphragm, the proximal stomach, the tail of the pancreas and part  of the body, partially encasing the aorta, celiac trunk and the origin  of the SMA and completely encasing the splenic artery and the left  renal artery which appears diminutive throughout its tract and  occluding the splenic vein. Left renal vein also appears diminutive  due to satellite retroperitoneal lymphadenopathy. Innumerable  satellite retroperitoneal and mesenteric hypermetabolic  lymphadenopathies. Urinary bladder is partially decompressed prostate  is normal no suspicious focal bowel uptake. There is diffuse  mesenteric haziness. No suspicious liver lesions. Stable large left  fever cystic lesion. No suspicious focal bowel uptake.    LOWER EXTREMITIES:   No abnormal masses or hypermetabolic lesions.    BONES:   There are no suspicious lytic or blastic osseous lesions.  There is no  abnormal FDG uptake in the skeleton.        Impression    IMPRESSION: In this patient with newly diagnosed diffuse large B-cell  lymphoma:  1. Bulky abdominal disease involving the stomach, left adrenal gland,  left diaphragmatic, spleen, pancreas and left adrenal gland and  partially or completely encasing multiple vessels as described above.  2. Innumerable additional retroperitoneal and mesenteric  hypermetabolic lymphadenopathy.  3. Single right neck lymphadenopathy.    I have personally reviewed the examination and initial  interpretation  and I agree with the findings.    ERIN YEH MD   XR Lumbar Punc Intrathecal Chemo Admin    Narrative    Intrathecal chemo instillation induction by fluoroscopic-guided Lumbar  Puncture 3/19/2020    History: Lymphoma needs IT methotrexate    Procedure note: Verbal and written consent for lumbar puncture was  obtained from the patient, and benefits and risk of the procedure were  explained, including but not limited to worsening headache,  hemorrhage, infection, lower extremity pain, or nerve root injury. The  patient was sterilely prepped and draped with the patient in the left  lateral decubitus position, over the lower back. Under fluoroscopic  guidance, the interlaminar spaces were noted. 1% lidocaine was  administered for local anesthetic over the L2-3 interlaminar space,  and a 22 gauge 3.5 inch needle was advanced into the thecal sac under  fluoroscopic guidance. There was initial show of clear CSF.  Approximately 13 cc of CSF were collected.    The needle was removed with the stylet in place. There was no  immediate complication associated with the procedure. Samples were  sent for the requested laboratory testing.    Estimated blood loss: Less than 1 cc.    Fluoroscopic time: 19 seconds    Dose: 14.56 mGy    Oncology team was present for chemo instillation.      Impression    Impression: Successful fluoroscopic guided lumbar puncture and  subsequent chemo instillation without immediate complication.   Echo Complete    Narrative    [id number]  [id number]  [id number]  [id number]^ELENA^JESSICA           [facility],[place]  Echocardiography Laboratory  [address] [place] [id number]     Name: RICKEY WATKINS  MRN: [id number]  : 1970  Study Date: 03/10/2020 07:46 AM  Age: 49 yrs  Gender: Male  Patient Location: [facility], [place]  Reason For Study: Pericardial Effusion, Cardiomyopathy  Ordering Physician: JESSICA PARKER  Performed By: Greer Vigil     BSA: 2.2  m2  Height: 70 in  Weight: 220 lb  HR: 70  _____________________________________________________________________________  __        Procedure  Echocardiogram with two-dimensional, color and spectral Doppler performed. The  patient's rhythm is atrial fibrillation.  _____________________________________________________________________________  __        Interpretation Summary  Global and regional left ventricular function is normal with an EF of 60-65%.  Global right ventricular function is normal.  Mild tricuspid regurgitation present.  Pulmonary artery systolic pressure is 34 mmHg, upper limits of normal.  Dilation of the inferior vena cava is present with normal respiratory  variation in diameter.  Small circumferential pericardial effusion is present without any hemodynamic  significance.  _____________________________________________________________________________  __        Left Ventricle  Left ventricular size is normal. Global and regional left ventricular function  is normal with an EF of 60-65%. Mild concentric wall thickening consistent  with left ventricular hypertrophy is present. Diastolic function not assessed  due to atrial fibrillation.     Right Ventricle  The right ventricle is normal size. Global right ventricular function is  normal.     Atria  The right atria appears normal. Severe left atrial enlargement is present.        Mitral Valve  Mild mitral annular calcification is present. Trace mitral insufficiency is  present.     Aortic Valve  Aortic valve is normal in structure and function.     Tricuspid Valve  Mild tricuspid insufficiency is present. Estimated pulmonary artery systolic  pressure is 26 mmHg plus right atrial pressure. Pulmonary artery systolic  pressure is normal.     Pulmonic Valve  The pulmonic valve is normal.     Vessels  The aorta root is normal. Dilation of the inferior vena cava is present with  normal respiratory variation in diameter. IVC diameter and respiratory  changes  fall into an intermediate range suggesting an RA pressure of 8 mmHg.     Pericardium  Small circumferential pericardial effusion is present without any hemodynamic  significance.        Compared to Previous Study  Previous study not available for comparison.  _____________________________________________________________________________  __     MMode/2D Measurements & Calculations  IVSd: 1.4 cm  LVIDd: 4.6 cm  LVIDs: 2.9 cm  LVPWd: 1.6 cm  FS: 37.0 %  LV mass(C)d: 284.3 grams  LV mass(C)dI: 130.8 grams/m2  asc Aorta Diam: 2.9 cm  LVOT diam: 2.0 cm  LVOT area: 3.2 cm2  LA Volume Index (BP): 53.9 ml/m2  RWT: 0.71              _____________________________________________________________________________  __        Report approved by: Damion Peña 03/10/2020 11:29 AM

## 2020-03-18 NOTE — PLAN OF CARE
Nursing Focus: Chemotherapy    D: Positive blood return via PIV. Insertion site is clean/dry/intact, dressing intact with no complaints of pain.  Urine output is recorded in intake in Doc Flowsheet.      I: Premedications given per order (see electronic medical administration record). Dose # 1 of Rituxan started to infuse at 50mg hour (increase by adding 50mg/hr every half hour to max of 400mg/hr if no reaction occurs). Reviewed pt teaching on chemotherapy side effects.  Pt denies need for further teaching. Chemotherapy double checked per protocol by two chemotherapy competent RN's.     A: Tolerating procedure well. Denies nausea and or pain.     P: Continue to monitor urine output and symptoms of nausea. Screen for symptoms of toxicity.

## 2020-03-18 NOTE — PLAN OF CARE
VSS. Afebrile. O2 saturations >92% on RA. Tolerating regular diet; poor appetite. Denied pain this shift. L PIV; intermittently infusing otherwise saline locked.  Phytonadione, and Rituximab given this shift; provided patient first-dose education for both medications; patient tolerated both well without s/sx of hypersensitivity reaction. Phytonadione given d/t INR 2.47; plan for LP with IT chemo tomorrow so there are orders to transfuse plasma if AM INR > 1.5. Plan for night shift to give Doxorubicin, Vincristine, Cytotoxan; medications have been double-checked by chemo certified RN. Up ad maryanne independently. Adequate urine output; good oral fluid intake. BM this shift; patient reported was dark brown and liquid diarrhea; held Senna. Plan to discharge tomorrow.

## 2020-03-18 NOTE — PROGRESS NOTES
[facility], [place]    Hematology / Oncology Progress Note     Assessment & Plan   Kenneth Tello is a 49 year old male with a history notable for atrial fibrillation (chronic anticoagulation with warfarin), myocardial infarction, stroke (2017), HTN and HLD who presented to [facility] from [facility] for evaluation of left upper abdominal pain and back pain, confirmed to have new diagnosis of DLCBL. He was transferred from medicine service to heme/malignancy service on 3/14/20 for further evaluation and workup.      Today, 3/18:  -- Got FFP and Vitamin K to try and get INR < 1.5 for LP and IT chemo today. However, could only get down to 1.57. Pt is now on the schedule with radiology for xray LP tomorrow at 10 AM. Orders including flow cytometry are placed. IT chemo will be pushed by Melvina Hawkins.   -- Rechecking INR at 2:30 AM. If >1.5, will give FFP and vitamin K.  -- Pt needs a DOAC on discharge. He is currently uninsured. However, per discussion with Stacy Childs, pt qualifies for a 30 day varghese for rivaroxaban. He is currently working with social work to obtain health insurance. Anticipate discharge tomorrow after IT chemo with rivaroxaban  -- Today is last day of prednisone (5 days total)  -- On discharge, pt will need weekly labs and f/u in 3 weeks with an REGINA prior to starting Cycle 2 of R-CHOP. He wants to have his labs drawn at his home town of [place].      # New diagnosis DLBCL.   Presented to [facility] with complaints of L upper abdominal pain and low back pain, fatigue, weight loss of 40 lbs in past 4 months.  CT CAP 3/10 demonstrated a large infiltrative mass in LUQ, arising from spleen and involving the stomach, pancreas, occluding splenic vein.  Also with upper abdominal and RP lymphadenopathy present.  IR performed a Retroperitoneal LN biopsy on 3/11. Pathology confirmed DLBCL. Biopsy flow cytometry demonstrates Lambda-monotypic B cells (20%).  Baseline uric acid was 7.4, . Double hit negative.  - FISH shows rearrangement of BCL6, rearrangement of IGH, loss of BCL2  - Peripheral blood flow sent 3/14, shows Polytypic B cells (0.4%).  - Viral studies sent (Hep B, Hep C, HIV, HSV): Hep B antigen negative, hep B surface antibody positive, hep B core antibody negative, HIV negative  - Echo done 3/10, EF 60-65%   - staging PET/CT on 3/17 shows nancy disease above and below the diaphragm  - Cont allopurinol 300mg/day   - Daily CBC  - TLS labs BID (BMP, phos, Uric acid)   - Is now s/p 5 days of prednisone 100 mg po qday, which improved his symptoms of abdominal pain greatly  - Could not receive LP with IT chemo on 3/18 secondary to elevated INR. Is scheduled for LP with IT chemo (orders placed) on 3/19 at 10:00 AM.     #TLS monitoring  - Cont allopurinol 300mg/day   - BMP, phos, Uric acid BID   - not currently on MIVF given mild fluid overload, continue to monitor    #Fluid overload  Mild-moderate pitting edema in LE with 10# weight gain 3/14-3/16  -Discontinue MIVF for now  -Had issues, with I/O recording. Has been rectified and UOP is adequate.   -Strict I/Os, monitor fluid status closely and watch daily creatinine which has been stable     #Lactic acidosis, type B  Lactate elevated again today, though it continues to downtrend, this am 3.4, likely due to DLBCL and not sepsis   - Continue to monitor clinically, labs BID     # Severe malnutrition in the setting of chronic illness  - Boost shakes    # Anemia. Likely related to lymphoma.   - Monitor CBC daily   - Transfuse if Hbg less than 7      # Leukocytosis.  Likely reactive from DLBCL. No fevers or infectious symptoms.   - Monitor CBC     # Chronic A. fib on anticoagulation with warfarin, held currently   - Elevated INR was resistant to reversal with multiple bags of FFP and multiple administrations of vitamin K  - Will need to be discharged on rivaroxaban (with 30 day varghese through [facility])  - Continue to  hold warfarin given plan for LP this week     # H/o CVA, MI 2017.  Residual mild left sided numbness. Reports his BP was out of control, had a stroke/MI at the same time.      #Hypertension, stable at present  #Hyperlipidemia   #Coronary artery disease with history of MI  - Continue carvedilol    - hold atorvastatin while getting chemo     #Obstructive sleep apnea noncompliant with CPAP.   - Will need to get appt with sleep medicine at discharge to get CPAP machine   - Declining to use one inpatient      # History of alcohol abuse  # History of smoking cigarette, nicotine dependence.       Ppx:   VTE: patient is on therapeutic anticoagulation, currently on hold in anticipation of LP, SCDs ordered and d/w RN, encourage ambulation     FEN  Regular diet as tolerated   lyte replacement prn   No MIVF for now with signs of fluid overload (see above)        Disposition Plan     Expected discharge: Tomorrow following LP and IT Chemo.     The plan was staffed with Dr. Cary.    --  Dc Yu MD PhD  Hematology, Oncology, and Bone Marrow Transplantation Service, [facility], [place]  Pager: [phone]  Please see sticky note for cross cover information    Interval History   No fever overnight. Had some very mild HTN with systolics in the 140s but otherwise no acute events. Ate pudding and some liquids. Had BM x1 yesterday. Is ambulating. Denies cp, sob, headache, changes in vision, n/v. Endorses some fluid retention and chronic back pain. Is anxious to get LP and IT chemo so he can discharge and take care of his 16 year old son.    Physical Exam   Temp: 96.9  F (36.1  C) Temp src: Oral BP: 136/87 Pulse: 65 Heart Rate: 70 Resp: 16 SpO2: 95 % O2 Device: None (Room air)    Vitals:    03/16/20 0737 03/17/20 0727 03/18/20 0816   Weight: 103.8 kg (228 lb 12.8 oz) 103.6 kg (228 lb 8 oz) 106.5 kg (234 lb 14.4 oz)     Vital Signs with Ranges  Temp:  [96  F (35.6  C)-97.8  F (36.6   C)] 96.9  F (36.1  C)  Pulse:  [62-82] 65  Heart Rate:  [70-79] 70  Resp:  [16-18] 16  BP: (129-152)/(75-91) 136/87  SpO2:  [95 %-98 %] 95 %  I/O last 3 completed shifts:  In: 50 [I.V.:50]  Out: 540 [Urine:540]    Constitutional: very pleasant, in NAD  ENT: mmm, neck is supple  Respiratory: CTAB, strong respiratory effort  Cardiovascular: nl s1/s2 no MRGs  GI: abdomen is soft, NT, +BS  Skin: warm, dry  Musculoskeletal: +3 pretibial pitting edema, no gross deformities  Neurologic: awake/alert, speech is clear, answers questions appropriately        Medications     - MEDICATION INSTRUCTIONS -       - MEDICATION INSTRUCTIONS -       sodium chloride         allopurinol  300 mg Oral Daily     carvedilol  6.25 mg Oral BID w/meals     gabapentin  300 mg Oral At Bedtime     influenza vaccine adult (product based on age)  0.5 mL Intramuscular Prior to discharge     magnesium hydroxide  30 mL Oral Daily     [START ON 3/19/2020] INTRATHECAL - Cytarabine and/or methotrexate and/or Hydrocortisone   Intrathecal Once     senna-docusate  1 tablet Oral At Bedtime    Or     senna-docusate  2 tablet Oral At Bedtime       Data   Results for orders placed or performed during the hospital encounter of 03/09/20 (from the past 24 hour(s))   Uric acid   Result Value Ref Range    Uric Acid 4.6 3.5 - 7.2 mg/dL   Phosphorus   Result Value Ref Range    Phosphorus 4.9 (H) 2.5 - 4.5 mg/dL   Basic metabolic panel   Result Value Ref Range    Sodium 138 133 - 144 mmol/L    Potassium 4.0 3.4 - 5.3 mmol/L    Chloride 105 94 - 109 mmol/L    Carbon Dioxide 24 20 - 32 mmol/L    Anion Gap 9 3 - 14 mmol/L    Glucose 159 (H) 70 - 99 mg/dL    Urea Nitrogen 22 7 - 30 mg/dL    Creatinine 0.61 (L) 0.66 - 1.25 mg/dL    GFR Estimate >90 >60 mL/min/[1.73_m2]    GFR Estimate If Black >90 >60 mL/min/[1.73_m2]    Calcium 8.8 8.5 - 10.1 mg/dL   Plasma prepare order unit   Result Value Ref Range    Blood Component Type Plasma     Units Ordered 2    Blood component    Result Value Ref Range    Unit Number [id number]     Blood Component Type Plasma, Thawed     Division Number 00     Status of Unit Released to care unit 03/18/2020 0612     Blood Product Code G0899D57     Unit Status ISS    Blood component   Result Value Ref Range    Unit Number [id number]     Blood Component Type Plasma, Thawed     Division Number 00     Status of Unit Released to care unit 03/18/2020 0804     Blood Product Code G7129P25     Unit Status ISS    CBC with platelets differential   Result Value Ref Range    WBC 37.5 (H) 4.0 - 11.0 10e9/L    RBC Count 3.71 (L) 4.4 - 5.9 10e12/L    Hemoglobin 10.5 (L) 13.3 - 17.7 g/dL    Hematocrit 32.4 (L) 40.0 - 53.0 %    MCV 87 78 - 100 fl    MCH 28.3 26.5 - 33.0 pg    MCHC 32.4 31.5 - 36.5 g/dL    RDW 18.1 (H) 10.0 - 15.0 %    Platelet Count 313 150 - 450 10e9/L    Diff Method Automated Method     % Neutrophils 94.3 %    % Lymphocytes 0.9 %    % Monocytes 2.6 %    % Eosinophils 0.2 %    % Basophils 0.2 %    % Immature Granulocytes 1.8 %    Nucleated RBCs 0 0 /100    Absolute Neutrophil 35.3 (H) 1.6 - 8.3 10e9/L    Absolute Lymphocytes 0.3 (L) 0.8 - 5.3 10e9/L    Absolute Monocytes 1.0 0.0 - 1.3 10e9/L    Absolute Eosinophils 0.1 0.0 - 0.7 10e9/L    Absolute Basophils 0.1 0.0 - 0.2 10e9/L    Abs Immature Granulocytes 0.7 (H) 0 - 0.4 10e9/L    Absolute Nucleated RBC 0.0    Lactate Dehydrogenase   Result Value Ref Range    Lactate Dehydrogenase 613 (H) 85 - 227 U/L   INR   Result Value Ref Range    INR 1.74 (H) 0.86 - 1.14   Partial thromboplastin time   Result Value Ref Range    PTT 33 22 - 37 sec   Fibrinogen activity   Result Value Ref Range    Fibrinogen 440 (H) 200 - 420 mg/dL   Magnesium   Result Value Ref Range    Magnesium 2.0 1.6 - 2.3 mg/dL   Uric acid   Result Value Ref Range    Uric Acid 4.4 3.5 - 7.2 mg/dL   Phosphorus   Result Value Ref Range    Phosphorus 5.2 (H) 2.5 - 4.5 mg/dL   Comprehensive metabolic panel   Result Value Ref Range    Sodium 139  133 - 144 mmol/L    Potassium 4.6 3.4 - 5.3 mmol/L    Chloride 107 94 - 109 mmol/L    Carbon Dioxide 24 20 - 32 mmol/L    Anion Gap 9 3 - 14 mmol/L    Glucose 94 70 - 99 mg/dL    Urea Nitrogen 20 7 - 30 mg/dL    Creatinine 0.63 (L) 0.66 - 1.25 mg/dL    GFR Estimate >90 >60 mL/min/[1.73_m2]    GFR Estimate If Black >90 >60 mL/min/[1.73_m2]    Calcium 8.5 8.5 - 10.1 mg/dL    Bilirubin Total 0.7 0.2 - 1.3 mg/dL    Albumin 2.2 (L) 3.4 - 5.0 g/dL    Protein Total 6.3 (L) 6.8 - 8.8 g/dL    Alkaline Phosphatase 105 40 - 150 U/L    ALT 63 0 - 70 U/L    AST 82 (H) 0 - 45 U/L   Bilirubin direct   Result Value Ref Range    Bilirubin Direct 0.4 (H) 0.0 - 0.2 mg/dL   INR   Result Value Ref Range    INR 1.63 (H) 0.86 - 1.14   INR   Result Value Ref Range    INR 1.57 (H) 0.86 - 1.14

## 2020-03-18 NOTE — PROGRESS NOTES
Vitals stable on room air, slightly hypertensive. Denies pain/nausea. Vincristine, doxorubicin and cytoxan infused without complications. Slept throughout the night. Monitoring TLS labs TID. Phos trending up 5.2. 1 unit plasma started for INR level 1.74. Pt to have LP w/IT chemo today. Orders for INR to be less than 1.5. Will need 2nd unit of plasma. Pt likely to discharge following LP and pending TLS labs. Up ad maryanne. Good urine output. LBM 3/17.

## 2020-03-18 NOTE — PHARMACY-CONSULT NOTE
Pharmacy was consulted to dose Vitamin K (Phytonadione) for elevated INR in setting of planned lumbar puncture.     Pt received Vit K 2mg IV yesterday with some response in INR, but not below goal of 1.5 for procedure. Pt also received multiple units of FFP this morning.     I discussed with provider that Vit K may have delayed response, and that for a more immediate response could give more FFP or KCentra if procedure cannot be delayed.    Decision was made to proceed with Vit K dosing and recheck INR early afternoon. Will order Vit K 5mg IV now, as pt has some reponse but not robust enough with 2mg dose yesterday.    Contact pharmacy with any questions.    Sadie Sprague, PharmD  [phone] (text capable)

## 2020-03-19 NOTE — PLAN OF CARE
2300 - 0700  VS stable, afebrile. Pt denied pain/SOB/n/v. INR check at 0230 1.35 - Vit K and Plasma infusions not necessary at this time. LP with IT chemo scheduled for 10AM. Pt rested comfortably throughout night, good UOP. Pt hopeful for discharge today. No significant events, continue POC.

## 2020-03-19 NOTE — DISCHARGE INSTRUCTIONS
You will need to go to your local clinic ([facility] [phone]) For weekly labs. Please call to get on the schedule when you get home.

## 2020-03-19 NOTE — PROGRESS NOTES
Patient has clinic visit within 24-48 hours of Discharge so no post DC follow up call is needed     3/23/2020 Status: Moraima   Time: 3:30 PM Length: 60   Visit Type: TELEPHONE VISIT [7811] MALLORY: [id number]   Provider: Lelo Fortune PA-C Department:  ONCOLOGY ADULT

## 2020-03-19 NOTE — PROGRESS NOTES
[phone] [facility]        Care Coordinator - Discharge Planning    Admission Date/Time:  3/9/2020  Attending MD:  Avery Cary MD     Data  Date of initial CC assessment: 3/19/2020  Chart reviewed, discussed with interdisciplinary team.   Patient was admitted for:   1. Weakness    2. Abdominal mass, unspecified abdominal location    3. Pleural effusion    4. Pericardial effusion    5. Personal history of tobacco use, presenting hazards to health    6. Diffuse large B-cell lymphoma of lymph nodes of multiple regions (H)    7. Paroxysmal atrial fibrillation (H)      Assessment   Concerns with insurance coverage for discharge needs: None.  Current Living Situation: Patient lives alone.  Support System: Supportive and Involved-siblings  Services Involved: none PTA  Transportation at Discharge: Car and Family or friend will provide  Transportation to Medical Appointments:    - Brothers transport  Barriers to Discharge: Medical stability    Coordination of Care and Referrals:   Per MD team, patient is ready for discharge today. He is from out [facility] and will need labs weekly at his local clinic. Met with patient to discuss this. He reports that he may stay locally with his brother and if he is still here next week he will get his labs here. He reports he has had his INRs drawn at his local clinic so does not think it should be a problem to get labs done t his local clinic.  This RNCC placed a call to the [facility] ([phone]). I left a message and have not heard back.  10:37 ADDENDUM: Spoke with clinic. They are able to do labs. I have faxed orders ([phone]). Patient is to call for an appointment once he is home. I updated patient on the plan ans placed lab info on discharge instructions.    Plan  Anticipated Discharge Date:  Today  Anticipated Discharge Plan: Home with lab follow up at his local clinic    Robert Mcclendon, RN Care Coordinator [phone]

## 2020-03-19 NOTE — PLAN OF CARE
Discharge to Home:  D: Orders for discharge and outpatient medications written. Pt verbalized readiness to discharge to home this afternoon.  I: PIV's discontinued per MD order and in accordance to protocol. Home medications and return to clinic schedule reviewed with patient. Discharge instructions and parameters for calling Health Care Provider reviewed. Patient ambulated off of the unit at 11:50 AM.   A: Patient verbalized understanding of all discharge medications and discharge paperwork and was ready for discharge.   P: Patient instructed to  medications at the discharge pharmacy prior to leaving the hospital. Pt to follow up as scheduled/documented on discharge paperwork.

## 2020-03-19 NOTE — PROCEDURES
Date of Procedure: 3/19/20  DIAGNOSIS: Diffuse Large B-cell Lymphoma   PROCEDURE: Intrathecal chemo administration   LOCATION: Radiology  INDICATION: Diffuse Large B-cell Lymphoma per R-CHOP protocol  Lumbar puncture performed and CSF samples collected by the General Radiology team under fluoroscopy.    Chemotherapy was double-checked by this writer and Radiology RN. This writer then administered methotrexate (PF) 12 mg in sodium chloride (PF) 0.9% 6 mL without apparent complication.  Complications: None immediately.   Chemo instillation performed by: Tara Anthony PA-C  Hematology/Oncology  Pager: #[phone]

## 2020-03-19 NOTE — PLAN OF CARE
VSS. Afebrile. O2 saturations >95% on RA. Tolerating regular diet; improved appetite this shift. Denies pain. L PIV x2; both saline locked. INR to be checked @ 0230; PRN vitamin K and transfuse orders for plasma available if INR > 1.5. Plan for LP with IT chemo tomorrow if INR appropriate. LP planned for 1000. Patient frustrated that the LP was not possible today. Up ad maryanne independently. Last BM 3/18/20. Good fluid intake.

## 2020-03-23 NOTE — LETTER
"  3/23/2020      RE: Kenneth Tello  [address]  [place] [address]       Error      Kenneth Tello is a 49 year old male who is being evaluated via a billable telephone visit.      The patient has been notified of following:     \"This telephone visit will be conducted via a call between you and your physician/provider. We have found that certain health care needs can be provided without the need for a physical exam.  This service lets us provide the care you need with a short phone conversation.  If a prescription is necessary we can send it directly to your pharmacy.  If lab work is needed we can place an order for that and you can then stop by our lab to have the test done at a later time.    If during the course of the call the physician/provider feels a telephone visit is not appropriate, you will not be charged for this service.\"     Kenneth Tello complains of    Chief Complaint   Patient presents with     Telephone     Return; B Cell Lymphoma       I have reviewed and updated the patient's Past Medical History, Social History, Family History and Medication List.    ALLERGIES  Patient has no known allergies.    Additional information: No refills needed. Patient notes swelling in feet.    Jo Millard CMA    Additional provider notes:   Kenneth Tello is a 49 year old male with a history notable for atrial fibrillation (previously on Warfarin), myocardial infarction (2017), CVA (2017), HTN, and HLD who presented from [facility] for evaluation of left upper abdominal pain and back pain, fatigue, and unintentional weight loss and was found to have a new samaria-pancreatic mass. He was admitted to [facility] on 3/9/2020 and underwent an IR-guided retroperitoneal lymph node biopsy on 3/11, results of which were consistent with DLBCL. Double-hit negative; however, given patient's underlying medical comorbidities, he was kept inpatient for initiation of chemotherapy and is now " status post Cycle 1 of R-CHOP (3/17). IT chemo given x1 on 3/19. Patient tolerated initiation of chemotherapy well, with no significant side effects.     He is feeling overall well. His left upper abdominal pain and back pain has nearly resolved. Energy is better. No fevers, chills, b/b. In regards to chemotherapy side effects, he is doing overall well. He has bilateral KILO which is slightly improved. No mucositis, nausea, vomiting, diarrhea, constipation. No new neuropathy (stable tingling in right hand and left leg weakness since CVA). He has poor dentition and dental pain in bilateral upper teeth which is not new. No focal areas of pain or gum swelling/redness. He is having difficulty coping with his new diagnosis. He is a  and lives in [facility] with his 16 year old son. No history of depression or anxiety.     Vital signs reviewed and stable.     Labs:  Results for RICKEY WATKINS (MRN [id number]) as of 3/23/2020 15:51   Ref. Range 3/23/2020 15:02   Sodium Latest Ref Range: 133 - 144 mmol/L 133   Potassium Latest Ref Range: 3.4 - 5.3 mmol/L 4.2   Chloride Latest Ref Range: 94 - 109 mmol/L 101   Carbon Dioxide Latest Ref Range: 20 - 32 mmol/L 27   Urea Nitrogen Latest Ref Range: 7 - 30 mg/dL 10   Creatinine Latest Ref Range: 0.66 - 1.25 mg/dL 0.47 (L)   GFR Estimate Latest Ref Range: >60 mL/min/1.73_m2 >90   GFR Estimate If Black Latest Ref Range: >60 mL/min/1.73_m2 >90   Calcium Latest Ref Range: 8.5 - 10.1 mg/dL 8.2 (L)   Anion Gap Latest Ref Range: 3 - 14 mmol/L 5   Phosphorus Latest Ref Range: 2.5 - 4.5 mg/dL 3.2   Lactate Dehydrogenase Latest Ref Range: 85 - 227 U/L 288 (H)   Uric Acid Latest Ref Range: 3.5 - 7.2 mg/dL 2.4 (L)   Glucose Latest Ref Range: 70 - 99 mg/dL 115 (H)   WBC Latest Ref Range: 4.0 - 11.0 10e9/L 1.3 (L)   Hemoglobin Latest Ref Range: 13.3 - 17.7 g/dL 7.9 (L)   Hematocrit Latest Ref Range: 40.0 - 53.0 % 24.2 (L)   Platelet Count Latest Ref Range: 150 - 450 10e9/L 142 (L)   RBC  Count Latest Ref Range: 4.4 - 5.9 10e12/L 2.86 (L)   MCV Latest Ref Range: 78 - 100 fl 85   MCH Latest Ref Range: 26.5 - 33.0 pg 27.6   MCHC Latest Ref Range: 31.5 - 36.5 g/dL 32.6   RDW Latest Ref Range: 10.0 - 15.0 % 17.4 (H)   Diff Method Unknown Manual Differential   % Neutrophils Latest Units: % 92.5   % Lymphocytes Latest Units: % 7.5   % Monocytes Latest Units: % 0.0   % Eosinophils Latest Units: % 0.0   % Basophils Latest Units: % 0.0   Absolute Neutrophil Latest Ref Range: 1.6 - 8.3 10e9/L 1.2 (L)   Absolute Lymphocytes Latest Ref Range: 0.8 - 5.3 10e9/L 0.1 (L)   Absolute Monocytes Latest Ref Range: 0.0 - 1.3 10e9/L 0.0   Absolute Eosinophils Latest Ref Range: 0.0 - 0.7 10e9/L 0.0   Absolute Basophils Latest Ref Range: 0.0 - 0.2 10e9/L 0.0   Anisocytosis Unknown Slight   Poikilocytosis Unknown Marked   Acanthocytes Unknown Marked   Elliptocytes Unknown Slight   [place] Cells Unknown Slight   Platelet Estimate Unknown Confirming automated cell count       Assessment/Plan:  HEME/ONC  # Diffuse Large B-cell Lymphoma  Patient initially presented to an OSH with complaints of left upper abdominal pain and low back pain, fatigue, and weight loss of 40 lbs in past 4 months.  CT CAP 3/10 demonstrated a large infiltrative mass in LUQ, arising from spleen and involving the stomach, pancreas, occluding splenic vein.  Also with upper abdominal and retroperitoneal lymphadenopathy present.  IR performed a retroperitoneal LN biopsy on 3/11. Pathology confirmed DLBCL. Biopsy flow cytometry demonstrates Lambda-monotypic B cells (20%). Baseline uric acid was 7.4, . Double hit negative.  - FISH shows rearrangement of BCL6, rearrangement of IGH, loss of BCL2  - Peripheral blood flow sent 3/14, shows Polytypic B cells (0.4%).  - Echo done 3/10, EF 60-65%   - Staging PET/CT on 3/17 shows nancy disease above and below the diaphragm.  - Weekly labs in [place], [place] (CBC/CMP)  - Continue allopurinol 300mg/day x14 days on  discharge. Follow uric acid level.  -pain has improved and LDH coming down  -Follow-up 4/7 with REGINA, labs, follow up, and IT chemo    #Pancytopenia  -- Viral studies sent (Hep B, Hep C, HIV, HSV): Hep B antigen negative, hep B surface antibody positive, hep B core antibody negative, HIV negative, HepC Ab negative, HSV 1/2 negative  -- Discussed neutropenic precautions.   --No ppx abx per Dr. Cary (unless issues with infection)     # Normocytic anemia  Presumed related to underlying malignancy with contribution from recent chemotherapy. No evidence of bleeding.   - Follow CBC w/ diff weekly  - Transfuse to keep Hgb >7  - Laboratory monitoring to be done in [place], [place] per patient preference; orders faxed 3/20 AM. However, patient may be spending some time with his brother in [place], [place] as well. Will make back-up lab appts in the [facility] as well; patient to cancel whichever appts he cannot attend.     #TLS monitoring  Uric acid mildly elevated on admission to 7.4. No significant TLS concerns during this admission.  - Continue allopurinol 300mg/day x14 days  - Follow uric acid level weekly. Uric acid 2.4 today     #Lactic acidosis  Persistent elevated lactic acid levels during admission. Attributable to underlying malignancy. No localizing infectious symptoms or fevers. BCx 3/14 negative.     FEN/GI  # Severe malnutrition in the setting of chronic illness  - RD involved. Boost supplementation initiated.     CV  # Chronic atrial fibrillation   CHADS2-VASc score 4. Previously on anticoagulation with warfarin; held due to drug-drug interactions. INR required reversal in the samaria-procedural setting with FFP/Vitamin K. Anticoagulation switched to Eliquis (first 30 days free). Eliquis held overnight on 3/18 due to planned LP.  - On Eliquis 5 mg BID. He was told to hold the AM of the LP    # H/o CVA   # Hyperlipidemia   # Coronary artery disease with history of MI  CVA in 2017 with residual mild left-sided  numbness. Reportedly due to uncontrolled hypertension.  - On carvedilol and atorvastatin     #Hypertension.   - On lisinopril 20 mg daily  - Patient advised to check his BP at home daily and communicate results to provider at his follow-up appointment next week; outpatient team to re-introduce additional antihypertensives as appropriate. (Patient previously on amlodipine 5 mg daily, hydrochlorothiazide 25 mg daily.)  -BP ok today     #Fluid overload  Mild-moderate pitting edema in LE with 12-lb. weight gain during this admission. Not diuresed; anticipate patient will auto-diurese status post chemotherapy.  - Monitor weight     PULM  #Obstructive sleep apnea noncompliant with CPAP.   - Outpatient sleep medicine referral placed on discharge  - Patient repeatedly declining to use CPAP inpatient     PSYCH  #Adjustment disorder  -Palliative care referral  -LISW to call with resources    DENTAL  #Dental pain.   -See dentist locally, no dental work unless counts are ok    Phone call duration: 20 minutes    YARELIS Melendez PA-C

## 2020-03-23 NOTE — PROGRESS NOTES
"Kenneth Tello is a 49 year old male who is being evaluated via a billable telephone visit.      The patient has been notified of following:     \"This telephone visit will be conducted via a call between you and your physician/provider. We have found that certain health care needs can be provided without the need for a physical exam.  This service lets us provide the care you need with a short phone conversation.  If a prescription is necessary we can send it directly to your pharmacy.  If lab work is needed we can place an order for that and you can then stop by our lab to have the test done at a later time.    If during the course of the call the physician/provider feels a telephone visit is not appropriate, you will not be charged for this service.\"     Kenneth Tello complains of    Chief Complaint   Patient presents with     Telephone     Return; B Cell Lymphoma       I have reviewed and updated the patient's Past Medical History, Social History, Family History and Medication List.    ALLERGIES  Patient has no known allergies.    Additional information: No refills needed. Patient notes swelling in feet.    Jo Millard CMA    Additional provider notes:   Kenneth Tello is a 49 year old male with a history notable for atrial fibrillation (previously on Warfarin), myocardial infarction (2017), CVA (2017), HTN, and HLD who presented from [facility] for evaluation of left upper abdominal pain and back pain, fatigue, and unintentional weight loss and was found to have a new samaria-pancreatic mass. He was admitted to [facility] on 3/9/2020 and underwent an IR-guided retroperitoneal lymph node biopsy on 3/11, results of which were consistent with DLBCL. Double-hit negative; however, given patient's underlying medical comorbidities, he was kept inpatient for initiation of chemotherapy and is now status post Cycle 1 of R-CHOP (3/17). IT chemo given x1 on 3/19. Patient tolerated initiation of " chemotherapy well, with no significant side effects.     He is feeling overall well. His left upper abdominal pain and back pain has nearly resolved. Energy is better. No fevers, chills, b/b. In regards to chemotherapy side effects, he is doing overall well. He has bilateral KILO which is slightly improved. No mucositis, nausea, vomiting, diarrhea, constipation. No new neuropathy (stable tingling in right hand and left leg weakness since CVA). He has poor dentition and dental pain in bilateral upper teeth which is not new. No focal areas of pain or gum swelling/redness. He is having difficulty coping with his new diagnosis. He is a  and lives in [facility] with his 16 year old son. No history of depression or anxiety.     Vital signs reviewed and stable.     Labs:  Results for RICKEY WATKINS (MRN [id number]) as of 3/23/2020 15:51   Ref. Range 3/23/2020 15:02   Sodium Latest Ref Range: 133 - 144 mmol/L 133   Potassium Latest Ref Range: 3.4 - 5.3 mmol/L 4.2   Chloride Latest Ref Range: 94 - 109 mmol/L 101   Carbon Dioxide Latest Ref Range: 20 - 32 mmol/L 27   Urea Nitrogen Latest Ref Range: 7 - 30 mg/dL 10   Creatinine Latest Ref Range: 0.66 - 1.25 mg/dL 0.47 (L)   GFR Estimate Latest Ref Range: >60 mL/min/1.73_m2 >90   GFR Estimate If Black Latest Ref Range: >60 mL/min/1.73_m2 >90   Calcium Latest Ref Range: 8.5 - 10.1 mg/dL 8.2 (L)   Anion Gap Latest Ref Range: 3 - 14 mmol/L 5   Phosphorus Latest Ref Range: 2.5 - 4.5 mg/dL 3.2   Lactate Dehydrogenase Latest Ref Range: 85 - 227 U/L 288 (H)   Uric Acid Latest Ref Range: 3.5 - 7.2 mg/dL 2.4 (L)   Glucose Latest Ref Range: 70 - 99 mg/dL 115 (H)   WBC Latest Ref Range: 4.0 - 11.0 10e9/L 1.3 (L)   Hemoglobin Latest Ref Range: 13.3 - 17.7 g/dL 7.9 (L)   Hematocrit Latest Ref Range: 40.0 - 53.0 % 24.2 (L)   Platelet Count Latest Ref Range: 150 - 450 10e9/L 142 (L)   RBC Count Latest Ref Range: 4.4 - 5.9 10e12/L 2.86 (L)   MCV Latest Ref Range: 78 - 100 fl 85   MCH  Latest Ref Range: 26.5 - 33.0 pg 27.6   MCHC Latest Ref Range: 31.5 - 36.5 g/dL 32.6   RDW Latest Ref Range: 10.0 - 15.0 % 17.4 (H)   Diff Method Unknown Manual Differential   % Neutrophils Latest Units: % 92.5   % Lymphocytes Latest Units: % 7.5   % Monocytes Latest Units: % 0.0   % Eosinophils Latest Units: % 0.0   % Basophils Latest Units: % 0.0   Absolute Neutrophil Latest Ref Range: 1.6 - 8.3 10e9/L 1.2 (L)   Absolute Lymphocytes Latest Ref Range: 0.8 - 5.3 10e9/L 0.1 (L)   Absolute Monocytes Latest Ref Range: 0.0 - 1.3 10e9/L 0.0   Absolute Eosinophils Latest Ref Range: 0.0 - 0.7 10e9/L 0.0   Absolute Basophils Latest Ref Range: 0.0 - 0.2 10e9/L 0.0   Anisocytosis Unknown Slight   Poikilocytosis Unknown Marked   Acanthocytes Unknown Marked   Elliptocytes Unknown Slight   [place] Cells Unknown Slight   Platelet Estimate Unknown Confirming automated cell count       Assessment/Plan:  HEME/ONC  # Diffuse Large B-cell Lymphoma  Patient initially presented to an OSH with complaints of left upper abdominal pain and low back pain, fatigue, and weight loss of 40 lbs in past 4 months.  CT CAP 3/10 demonstrated a large infiltrative mass in LUQ, arising from spleen and involving the stomach, pancreas, occluding splenic vein.  Also with upper abdominal and retroperitoneal lymphadenopathy present.  IR performed a retroperitoneal LN biopsy on 3/11. Pathology confirmed DLBCL. Biopsy flow cytometry demonstrates Lambda-monotypic B cells (20%). Baseline uric acid was 7.4, . Double hit negative.  - FISH shows rearrangement of BCL6, rearrangement of IGH, loss of BCL2  - Peripheral blood flow sent 3/14, shows Polytypic B cells (0.4%).  - Echo done 3/10, EF 60-65%   - Staging PET/CT on 3/17 shows nancy disease above and below the diaphragm.  - Weekly labs in [place], [place] (CBC/CMP)  - Continue allopurinol 300mg/day x14 days on discharge. Follow uric acid level.  -pain has improved and LDH coming down  -Follow-up 4/7 with REGINA,  labs, follow up, and IT chemo    #Pancytopenia  -- Viral studies sent (Hep B, Hep C, HIV, HSV): Hep B antigen negative, hep B surface antibody positive, hep B core antibody negative, HIV negative, HepC Ab negative, HSV 1/2 negative  -- Discussed neutropenic precautions.   --No ppx abx per Dr. Cary (unless issues with infection)     # Normocytic anemia  Presumed related to underlying malignancy with contribution from recent chemotherapy. No evidence of bleeding.   - Follow CBC w/ diff weekly  - Transfuse to keep Hgb >7  - Laboratory monitoring to be done in [place], [place] per patient preference; orders faxed 3/20 AM. However, patient may be spending some time with his brother in [place], [place] as well. Will make back-up lab appts in the [facility] as well; patient to cancel whichever appts he cannot attend.     #TLS monitoring  Uric acid mildly elevated on admission to 7.4. No significant TLS concerns during this admission.  - Continue allopurinol 300mg/day x14 days  - Follow uric acid level weekly. Uric acid 2.4 today     #Lactic acidosis  Persistent elevated lactic acid levels during admission. Attributable to underlying malignancy. No localizing infectious symptoms or fevers. BCx 3/14 negative.     FEN/GI  # Severe malnutrition in the setting of chronic illness  - RD involved. Boost supplementation initiated.     CV  # Chronic atrial fibrillation   CHADS2-VASc score 4. Previously on anticoagulation with warfarin; held due to drug-drug interactions. INR required reversal in the samaria-procedural setting with FFP/Vitamin K. Anticoagulation switched to Eliquis (first 30 days free). Eliquis held overnight on 3/18 due to planned LP.  - On Eliquis 5 mg BID. He was told to hold the AM of the LP    # H/o CVA   # Hyperlipidemia   # Coronary artery disease with history of MI  CVA in 2017 with residual mild left-sided numbness. Reportedly due to uncontrolled hypertension.  - On carvedilol and  atorvastatin     #Hypertension.   - On lisinopril 20 mg daily  - Patient advised to check his BP at home daily and communicate results to provider at his follow-up appointment next week; outpatient team to re-introduce additional antihypertensives as appropriate. (Patient previously on amlodipine 5 mg daily, hydrochlorothiazide 25 mg daily.)  -BP ok today     #Fluid overload  Mild-moderate pitting edema in LE with 12-lb. weight gain during this admission. Not diuresed; anticipate patient will auto-diurese status post chemotherapy.  - Monitor weight     PULM  #Obstructive sleep apnea noncompliant with CPAP.   - Outpatient sleep medicine referral placed on discharge  - Patient repeatedly declining to use CPAP inpatient     PSYCH  #Adjustment disorder  -Palliative care referral  -LISW to call with resources    DENTAL  #Dental pain.   -See dentist locally, no dental work unless counts are ok    Phone call duration: 20 minutes    Lelo Fortune PA-C

## 2020-03-24 NOTE — TELEPHONE ENCOUNTER
"Social Work Note: Telephone Call  Oncology Clinic    Data/Intervention:  Patient Name:  Kenneth Tello  /Age:  1970 (49 year old)    Call From:  KAREN contacted patient by phone.  Reason for Call:  Referral from YARELIS to discuss resources.    Assessment:  SW spoke with patient over the phone to introduce self and role in clinic. Patient indicated he is \"on his way home\" and expressed he is very much looking forward to his return there as he has been hospitalized in the [facility] for several days.      Patient aware that an MA application was completed during  Hospitalization.  He stated he needs to present income documents (ie pay stubs) and that the remaining documents are at home.  He has a contact within financial counseling that he intends to send these to when he gets home.  Patient had some questions about what types of benefits MA will offer him as well as the [facility].  SW explained that MA would open up options such as transportation benefits, PCA services and waiver programs if needed. SW also discussed assistance programs the [facility] could provide including food stamps, cash assistance, emergency assistance, etc.     Patient lives at home with his 16 year old son.  He also has a 26 year old daughter in [place]. Patient did not seem concerned about a lack of social support, he indicated having stepchildren in the area as well and stated \"they would all help me if I needed it.\" He was very focused on getting to return home during conversation, indicating he had felt very stressed in the hospital.  SW inquired about his mental health with his sudden diagnosis and need to urgently seek treatment. Patient indicated \"well, I felt really sad and scared while I was telling everyone I needed to leave and it was hard in the hospital, but I feel a lot better now that I get to go home.\" Patient's brother lives in [place] and he intends to stay with his brother around the time of appointments.  SW discussed [facility] [place] as a future option for patient (currently closed due to COVID-19 precautions).      SW inquired with patient about what his primary concerns are for undergoing treatment.  Patient indicated he works as a  and that he is unable to work at this time due to his diagnosis and treatment.  He is the primary income earner in the household and the family has limited savings.  SW discussed application for Social Security disability.  Education was provided over the phone regarding application process, eligibility under compassionate allowance list, benefit, etc. Per request of patient, SW agreed to also email links to online application, contact for SSA, helpful resources for application, etc ([email or web address]).  SW also discussed [employer] emergency assistance grants and applications for general and brittany's [employer] were submitted.  SW also completed application for Patient Aid Fund through [employer].  SW explained that [facility] does have other financial assistance grants that SW can review with patient for applications (ie. copay assistance program, travel assistance fund); however, patient needs to have active health insurance as required by application and due to his current MA pending status, he would not eligible at this time. Patient expressed understanding and indicated he would touch base with SW once insurance is active to re-review.    Patient indicated appreciation for SW outreach. Contact information was provided for any other needs.     Plan:  SW will continue to follow and is available to assist with any other identified needs, questions or concerns.     Soo Yeon Han, MSW, [facility]HealthSW  Pager: [phone]  Phone: [phone]

## 2020-03-26 NOTE — LETTER
"3/26/2020       RE: Kenneth Tello  [address]  [place] [address]     Dear Colleague,    Thank you for referring your patient, Kenneth Tello, to the [facility]. Please see a copy of my visit note below.    Nutrition Services:     Called Kenneth today per request of Ellie JONES, indicating that he would like to talk with a dietitian during treatment.  He found the dietitian helpful when he was in the hospital.     Kenneth expresses his desire to talk with a dietitian via phone on the days he has chemotherapy.  He would like RD to call back on 4/7 between 10-2pm.      Patient has B-Cell Lymphoma.  He is receiving RCHOP    ANTHROPOMETRICS  Height: 5'10\"  Weight: 219 lbs/99kg  Weight Status:  Obese BMI 30  IBW: 166 lbs (131%)  Weight History:   Wt Readings from Last 10 Encounters:   03/23/20 99.3 kg (219 lb)   03/18/20 106.5 kg (234 lb 14.4 oz)     Dosing Weight: 82kg    ASSESSED NUTRITION NEEDS:  Estimated Energy Needs: 4934-2511 kcals (30-35 Kcal/Kg)  Justification: increased needs with treatment  Estimated Protein Needs: 125 grams protein (1.2-1.5 g pro/Kg)  Justification: increased needs with treatment  Estimated Fluid Needs: 0769-5373  mL   Justification: increased needs with chemotherapy    Patient scheduled to teleconference with RD on 4/7 during chemotherapy.     Sylvia Arboleda RDN, LDN  [facility]  [phone]            "

## 2020-03-26 NOTE — TELEPHONE ENCOUNTER
Pt called in to triage reporting blood from the penis and flakes of blood seen in urine. He was inpatient and last received chemo [facility] 3/19. Started on eliquis 5 mg BID. Denied any history of urinary or penile symptoms. Stated his first urination was tinged red, now urine is clear but he is still seeing flakes. Has spots of blood in underwear. Denied any pain or burning with urination but state flow is slower than usual. Denied any other bleeding or bruising, has poor dentition and state painful to brush teeth but no blood seen. Asked pt to wash and examine penis and meatus for any cuts or wounds or if bleeding is coming from meatus. Forgot to ask pt if he had a monterroso in the hospital. Paged Dr. Cary.    Per Dr. Cary: hold Eliquis and get CBC w/ platelets/diff today. Pt state he is back home in [place], would like labs faxed to [facility], did not have number handy. He had called to schedule his labs for Monday and was told they did not get standing orders from the hospital. He stated he did not have a monterroso in the hospital and does not see any open areas on penis. He had already taken his morning dose of Eliquis. Informed him to get CBC today, will call him back with result if he should resume or not, he verbalized understanding.    Writer called [place] and was told pt needs to make an apt by calling [phone], fax labs to [phone]. Order entered and faxed for today, also re-faxed standing orders by Lelo Fortune for next week's labs.

## 2020-03-26 NOTE — PROGRESS NOTES
"Nutrition Services:     Called Kenneth today per request of Ellie JONES, indicating that he would like to talk with a dietitian during treatment.  He found the dietitian helpful when he was in the hospital.     Kenneth expresses his desire to talk with a dietitian via phone on the days he has chemotherapy.  He would like RD to call back on 4/7 between 10-2pm.      Patient has B-Cell Lymphoma.  He is receiving RCHOP    ANTHROPOMETRICS  Height: 5'10\"  Weight: 219 lbs/99kg  Weight Status:  Obese BMI 30  IBW: 166 lbs (131%)  Weight History:   Wt Readings from Last 10 Encounters:   03/23/20 99.3 kg (219 lb)   03/18/20 106.5 kg (234 lb 14.4 oz)     Dosing Weight: 82kg    ASSESSED NUTRITION NEEDS:  Estimated Energy Needs: 3464-2397 kcals (30-35 Kcal/Kg)  Justification: increased needs with treatment  Estimated Protein Needs: 125 grams protein (1.2-1.5 g pro/Kg)  Justification: increased needs with treatment  Estimated Fluid Needs: 2676-0200  mL   Justification: increased needs with chemotherapy    Patient scheduled to teleconference with RD on 4/7 during chemotherapy.     Sylvia Arboleda RDN, LDN  [facility]  [phone]                "

## 2020-03-27 NOTE — TELEPHONE ENCOUNTER
Did not receive results of CBC, called [place] and was told pt did not come for labs yesterday. Called pt and he stated he thought he was only supposed to go on Monday. Informed him Dr. Cary did want a CBC prior to Monday, all orders were faxed yesterday if he can make an appointment and have labs drawn today. He verbalized understanding.

## 2020-03-30 NOTE — TELEPHONE ENCOUNTER
Pt calling to report increased whole mouth tooth pain after first round of chemo. Previous poor dentition and pain in bilateral upper teeth but was able to chew, now he is not.No new swelling or open gum areas.  Is requesting dental appt prior to next infusion on 4/7/20 as all local dentists are closed.    Paged to Lelo Menjivar to contact dental clinic and see if can get standing labs drawn and see DDS on 4/6/20 prior to 4/7 appts and potential admit..Currently labs are not adequate for any dental work.   Make sure pt continues Boost or Ensure.     Paged Dr Payan, Dental resident on call @ [phone]

## 2020-03-31 NOTE — TELEPHONE ENCOUNTER
Spoke with patient over the phone regarding oral pain and dental treatment plan. Patient states that he has not been to the dentist or had imaging done in a long time, but that he knows his teeth are bad and some occasionally flare up. Patient states he has already received his magic mouthwash script per oncology care team, and it is helping to alleviate some pain but he does still have intermittent breakthrough pain. He has been requested to stop acetaminophen use per care team due to excess use in the last 24 hours. Discussed with patient that pain management with care team is appropriate, and that he should continue magic mouthwash use as directed and if pain is not tolerable to consult care team again and that the care team can consult dental regarding pain as needed. Discussed with patient regarding finding a dental home, patient will attempt a local dentist but due to closure of offices during COVID-19 pandemic he may be unable to. Recommended that if patient is unable to find local office, we will plan for Dental CT at his April 7 appointment and will continue coordinating care with his care team from there. Patient is satisfied with this plan.

## 2020-03-31 NOTE — TELEPHONE ENCOUNTER
Pt called back to state overnight he was taking Tylenol 1000 mg every hour, had up to 5 doses with last dose at 7 am this morning. Ruth yesterday unable to eat anything harder than mac and cheese. Wondering if we were able to reach our dental fellow for an apt. Denied new fevers, swelling in mouth, sores. Uses [facility]. Paged dental fellow 9:51 AM. Paged Dr. Cary.    Per Dr. Cary: start MMW to see if this helps with pain. Refrain from using tylenol, if MMW is not helpful pt should go to a local urgent/ED dental office to rule out infection. Monitor for fevers and call back. Relayed above information to pt, assisted him with 3 different numbers to local dentist offices that offered emergency services. Erx sent for MMW.

## 2020-04-03 NOTE — PROGRESS NOTES
Writer trired to call Kenneth a few times, NA,left brief message as to why writer was calling, to go over plan. Noted he has dental issues and taking a lot of tyelonel. Also is neutropenic. Wanting to review neutropenic precautions with him and get update to how he is doing.     7:22 PM-Still no answer on call back. Resource number left on earlier VM.

## 2020-04-07 NOTE — PATIENT INSTRUCTIONS
Your Neulasta Onpro will start infusing at 4pm on 4/8. You can take it off after it is done around 5pm on 4/8. You can take claritin once daily for 10 days starting today to help with bone pain from Neulasta.    Drink 64 oz non-caffeinated, non-alcoholic fluid daily. Eat frequent, small meals.     Get your labs checked locally next week. Hold your Eliquis for four doses prior to your next infusion appointment. See calendar for dates.    [facility] Triage and after hours / weekends / holidays:  [phone]    Please call the triage or after hours line if you experience a temperature greater than or equal to 100.5, shaking chills, have uncontrolled nausea, vomiting and/or diarrhea, dizziness, shortness of breath, chest pain, bleeding, unexplained bruising, or if you have any other new/concerning symptoms, questions or concerns.      If you are having any concerning symptoms or wish to speak to a provider before your next infusion visit, please call your care coordinator or triage to notify them so we can adequately serve you.     If you need a refill on a narcotic prescription or other medication, please call before your infusion appointment.          Recent Results (from the past 24 hour(s))   CBC with platelets differential    Collection Time: 04/07/20  8:14 AM   Result Value Ref Range    WBC 6.4 4.0 - 11.0 10e9/L    RBC Count 3.22 (L) 4.4 - 5.9 10e12/L    Hemoglobin 8.8 (L) 13.3 - 17.7 g/dL    Hematocrit 28.2 (L) 40.0 - 53.0 %    MCV 88 78 - 100 fl    MCH 27.3 26.5 - 33.0 pg    MCHC 31.2 (L) 31.5 - 36.5 g/dL    RDW 20.3 (H) 10.0 - 15.0 %    Platelet Count 342 150 - 450 10e9/L    Diff Method Automated Method     % Neutrophils 80.1 %    % Lymphocytes 6.1 %    % Monocytes 12.4 %    % Eosinophils 0.0 %    % Basophils 0.3 %    % Immature Granulocytes 1.1 %    Nucleated RBCs 0 0 /100    Absolute Neutrophil 5.1 1.6 - 8.3 10e9/L    Absolute Lymphocytes 0.4 (L) 0.8 - 5.3 10e9/L    Absolute Monocytes 0.8 0.0 - 1.3 10e9/L     Absolute Eosinophils 0.0 0.0 - 0.7 10e9/L    Absolute Basophils 0.0 0.0 - 0.2 10e9/L    Abs Immature Granulocytes 0.1 0 - 0.4 10e9/L    Absolute Nucleated RBC 0.0    Comprehensive metabolic panel    Collection Time: 04/07/20  8:14 AM   Result Value Ref Range    Sodium 142 133 - 144 mmol/L    Potassium 3.7 3.4 - 5.3 mmol/L    Chloride 109 94 - 109 mmol/L    Carbon Dioxide 28 20 - 32 mmol/L    Anion Gap 6 3 - 14 mmol/L    Glucose 97 70 - 99 mg/dL    Urea Nitrogen 4 (L) 7 - 30 mg/dL    Creatinine 0.53 (L) 0.66 - 1.25 mg/dL    GFR Estimate >90 >60 mL/min/[1.73_m2]    GFR Estimate If Black >90 >60 mL/min/[1.73_m2]    Calcium 8.2 (L) 8.5 - 10.1 mg/dL    Bilirubin Total 0.6 0.2 - 1.3 mg/dL    Albumin 2.3 (L) 3.4 - 5.0 g/dL    Protein Total 6.1 (L) 6.8 - 8.8 g/dL    Alkaline Phosphatase 137 40 - 150 U/L    ALT 41 0 - 70 U/L    AST 19 0 - 45 U/L   Uric acid    Collection Time: 04/07/20  8:14 AM   Result Value Ref Range    Uric Acid 5.0 3.5 - 7.2 mg/dL   Lactate Dehydrogenase    Collection Time: 04/07/20  8:14 AM   Result Value Ref Range    Lactate Dehydrogenase 267 (H) 85 - 227 U/L   Phosphorus    Collection Time: 04/07/20  8:14 AM   Result Value Ref Range    Phosphorus 3.4 2.5 - 4.5 mg/dL

## 2020-04-07 NOTE — PROGRESS NOTES
Infusion Nursing Note:  Kenneth Tello presents today for Cycle 2 Day 1 Doxorubicin, Vincristine, Cytoxan, Rituxan, Prednisone.    Patient seen by provider today: Yes: JAMISON Koo    Note: TORB 4/7/20 0930 Nicole SWIFT/Tara Mccabe RN: OK to treat today. No IT Methotrexate to be given with cycle 2. Patient to get labs drawn locally next week. Patient to hold Eliquis for four doses prior to next infusion appointment due to scheduled LP with [facility].    Patient educated on above information. Aware to get labs drawn locally. Heather Campos RNCC notified by Nicole Giraldo RNCC of plan. Patient educated to hold Eliquis for four doses prior to next infusion appointment on 4/29/20.     Calendar given to patient for when to take prednisone and when to hold Eliquis. Verbalized understanding of when to take/hold medications.  Written handout given for Neulasta Onpro and reviewed with patient. Patient educated to take claritin daily for bone pain, drink 64 oz non-caffeinated, non-alcoholic fluid daily, call triage for temp > 100.4, shaking chills, uncontrolled symptoms. Verbalized understanding.     Intravenous Access:  Peripheral IV placed.    Treatment Conditions:  Lab Results   Component Value Date    HGB 8.8 04/07/2020     Lab Results   Component Value Date    WBC 6.4 04/07/2020      Lab Results   Component Value Date    ANEU 5.1 04/07/2020     Lab Results   Component Value Date     04/07/2020      Lab Results   Component Value Date     04/07/2020                   Lab Results   Component Value Date    POTASSIUM 3.7 04/07/2020           Lab Results   Component Value Date    MAG 2.1 03/19/2020            Lab Results   Component Value Date    CR 0.53 04/07/2020                   Lab Results   Component Value Date    STACY 8.2 04/07/2020                Lab Results   Component Value Date    BILITOTAL 0.6 04/07/2020           Lab Results   Component Value Date    ALBUMIN 2.3 04/07/2020                     Lab Results   Component Value Date    ALT 41 04/07/2020           Lab Results   Component Value Date    AST 19 04/07/2020     Results reviewed, labs MET treatment parameters, ok to proceed with treatment.  ECHO/MUGA completed 3/10/20  EF 60-65%.      Post Infusion Assessment:  Patient tolerated infusion without incident.  Blood return noted pre and post infusion.  Blood return noted during Doxorubicin administration every 2 cc.  Blood return noted pre, mid, and post Vincristine administration.  Site patent and intact, free from redness, edema or discomfort.  No evidence of extravasations.  Access discontinued per protocol.       Neulasta Onpro On-Body injector applied to Right Abdomen at 1300 with light facing up.  Writer discussed Neulasta injection would start tomorrow 4/8/20 at 4pm, approximately 27 hours after application applied today.  Written and Verbal instruction reviewed with patient.  Pt instructed when the dose delivery starts, it will take about 45 minutes to complete.  Pt aware Neulasta Onpro On-Body should have green flashing light and to call triage or on-call MD if injector flashes red or appears to be leaking. Pt aware to keep Onpro On-Body Neulasta 4 inches away from electrical equipment and to avoid showering 4 hours prior to injection.   Neulasta Onpro Lot number: [id number]      Discharge Plan:   Patient has prednisone at home. States that he has enough for this cycle.  Discharge instructions reviewed with: Patient.  Patient verbalized understanding of discharge instructions and all questions answered.  Copy of AVS reviewed with patient. Patient will return 4/29/20 for next infusion appointment.  Patient discharged in stable condition accompanied by: self.  Face to Face time: 0.    KATHY DUONG RN

## 2020-04-07 NOTE — LETTER
"4/7/2020      RE: Kenneth Tello  [address]  [place] [address]       Subjective     Kenneth Tello is a 49 year old male who is being evaluated via a billable telephone visit.      The patient has been notified of following:     \"This telephone visit will be conducted via a call between you and your physician/provider. We have found that certain health care needs can be provided without the need for a physical exam.  This service lets us provide the care you need with a short phone conversation.  If a prescription is necessary we can send it directly to your pharmacy.  If lab work is needed we can place an order for that and you can then stop by our lab to have the test done at a later time.    If during the course of the call the physician/provider feels a telephone visit is not appropriate, you will not be charged for this service.\"     Patient has given verbal consent for Telephone visit?  Yes    Kenneth Tello complains of   Chief Complaint   Patient presents with     Telephone     Diffuse large B-cell lymphoma of lymph nodes of multiple regions       ALLERGIES  Patient has no known allergies.    I have reviewed and updated the patient's allergies and medication list.    Concerns: Patient has no new concerns.    Refills: N/A    Osman Fortune, EMT    Provider Note:   Apr 7, 2020     Interim History:   Not eating due to teeth pain. Appetite has improved some. Has not been able to get in to see a dentist due to COVID outbreak. Has been using MMW with some improvement. Still only eating spaghettios and mac and cheese. Will occasional use protein shakes.      All of his pain has now resolved; abdominal pain and back pain. He still has fatigue though he is able to get up and move more. He used to sleep most of the day though now he is up all day. No N/V. Has occasional constipation and has been using softener 2-3x/week. Currently constipation has resolved. Denies any new neuropathy. He " has baseline left side tingling and numbness from prior CVA.     ROS: 10 point ROS neg other than the symptoms noted above in the HPI.      Objective   Reported vitals:    Vital Signs 4/7/2020   Systolic 143   Diastolic 79   Pulse 69   Temperature 98   Respirations 16   Weight (LB) 210 lb   Height    BMI (Calculated)    Pain    O2 99     Wt Readings from Last 4 Encounters:   04/07/20 95.3 kg (210 lb)   03/23/20 99.3 kg (219 lb)   03/18/20 106.5 kg (234 lb 14.4 oz)     alert and no distress  Psych: Alert and oriented times 3; coherent speech, normal   rate and volume, able to articulate logical thoughts, able   to abstract reason, no tangential thoughts, no hallucinations   or delusions      Labs:    4/7/2020 08:14   Sodium 142   Potassium 3.7   Chloride 109   Carbon Dioxide 28   Urea Nitrogen 4 (L)   Creatinine 0.53 (L)   GFR Estimate >90   GFR Estimate If Black >90   Calcium 8.2 (L)   Anion Gap 6   Phosphorus 3.4   Albumin 2.3 (L)   Protein Total 6.1 (L)   Bilirubin Total 0.6   Alkaline Phosphatase 137   ALT 41   AST 19   Lactate Dehydrogenase 267 (H)   Uric Acid 5.0   Glucose 97   WBC 6.4   Hemoglobin 8.8 (L)   Hematocrit 28.2 (L)   Platelet Count 342   RBC Count 3.22 (L)   MCV 88   MCH 27.3   MCHC 31.2 (L)   RDW 20.3 (H)   Diff Method Automated Method   % Neutrophils 80.1   % Lymphocytes 6.1   % Monocytes 12.4   % Eosinophils 0.0   % Basophils 0.3   % Immature Granulocytes 1.1   Nucleated RBCs 0   Absolute Neutrophil 5.1   Absolute Lymphocytes 0.4 (L)   Absolute Monocytes 0.8   Absolute Eosinophils 0.0   Absolute Basophils 0.0   Abs Immature Granulocytes 0.1   Absolute Nucleated RBC 0.0     Imaging:  No new imaging    Assessment/Plan:    # High Grade Diffuse Large B-cell Lymphoma, Stage IV  Patient initially presented to an OSH with complaints of left upper abdominal pain and low back pain, fatigue, and weight loss of 40 lbs in past 4 months.  CT CAP 3/10 demonstrated a large infiltrative mass in LUQ, arising  from spleen and involving the stomach, pancreas, occluding splenic vein.  Also with upper abdominal and retroperitoneal lymphadenopathy present. High grade (exranodal, stage, LDH).    FISH shows rearrangement of BCL6, rearrangement of IGH, loss of BCL2; Double hit negative.  - initiated RCHOP on 3/17 IP due to high risk of TLS and complications. Also received IT chemo (MTX)  -plan for 6 cycles of RCHOP with IT chemo   -will continue with Cycle 2 today. Was supposed to have IT chemo today as well though he did not hold his Eliquis. Per Dr. Cary, ok to skip IT chemo this cycle. Goal is for at least 4 doses. Will have infusion RN write on his paperwork today about holding Eliquis 48 hours prior to next chemo for IT chemo  -will follow-up again in 3 weeks prior to next cycle. Will have him get labs next week since hgb is lower to make sure he does not need a transfusion   -will repeat PET after cycle 4 and then follow-up with Dr. Cary      --No ppx abx per Dr. Cary (unless issues with infection). Has now completed allopurinol. No signs of TLS on labs today. No further need for allopurinol     # Normocytic anemia  Presumed related to underlying malignancy with contribution from recent chemotherapy. No evidence of bleeding.   - Transfuse to keep Hgb >8  -will have him get labs in a week locally on Day 7 to make sure he doesn't need a transfusion (will verify he has local orders)     CV  # Chronic atrial fibrillation   CHADS2-VASc score 4. Previously on anticoagulation with warfarin; held due to drug-drug interactions.   -Now on Eliquis 5 mg BID. Will need to hold 48 hours prior to chemo due to need for LP for IT chemo. 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 Not worse with chemo. Reportedly due to uncontrolled hypertension.  - On carvedilol and atorvastatin  - On lisinopril 20 mg daily. Held  amlodipine 5 mg and HCTZ 25 mg daily due to soft pressures.   - he has not been checking his BP at home. Encouraged him to do so as his BP has been high in clinic, though these often are falsely elevated. If his BP at home was consistently >140/90s, would add back HCTZ or amlodipine     #Dental pain.   #Mucositis?  -has been having a lot of mouth pain. This was occurring prior to chemotherapy, though has been worse lately. Was supposed see a dentist locally though due to COVID, has not been able to get in. Will reach out to RNCC to see if we can have our dental team or call and get him in locally as it is complicating intake and he is losing weight    #FEN: Talked with Sylvia Castañeda RDN on 3/26/20. Recommended 125 g of protein/day and 2674-8481 kcals. She is going to talk with him again today. Nutrition has been limited by pain (see above). Likely will need to increase protein supplements to make up for solid food intake    Phone call duration: 16 minutes    Nicole Giraldo PA-C

## 2020-04-07 NOTE — NURSING NOTE
Chief Complaint   Patient presents with     Blood Draw     labs drawn via piv start by rn. vs taken     Labs drawn via PIV start by rn in lab. Flushed with saline. Vitals taken. Pt checked in for next appointment.   Mallorie Gonsalez RN

## 2020-04-07 NOTE — PROGRESS NOTES
"Subjective     Kenneth Tello is a 49 year old male who is being evaluated via a billable telephone visit.      The patient has been notified of following:     \"This telephone visit will be conducted via a call between you and your physician/provider. We have found that certain health care needs can be provided without the need for a physical exam.  This service lets us provide the care you need with a short phone conversation.  If a prescription is necessary we can send it directly to your pharmacy.  If lab work is needed we can place an order for that and you can then stop by our lab to have the test done at a later time.    If during the course of the call the physician/provider feels a telephone visit is not appropriate, you will not be charged for this service.\"     Patient has given verbal consent for Telephone visit?  Yes    Kenneth Tello complains of   Chief Complaint   Patient presents with     Telephone     Diffuse large B-cell lymphoma of lymph nodes of multiple regions       ALLERGIES  Patient has no known allergies.    I have reviewed and updated the patient's allergies and medication list.    Concerns: Patient has no new concerns.    Refills: N/A    Osman Fortune, EMT    Provider Note:   Apr 7, 2020     Interim History:   Not eating due to teeth pain. Appetite has improved some. Has not been able to get in to see a dentist due to COVID outbreak. Has been using MMW with some improvement. Still only eating spaghettios and mac and cheese. Will occasional use protein shakes.      All of his pain has now resolved; abdominal pain and back pain. He still has fatigue though he is able to get up and move more. He used to sleep most of the day though now he is up all day. No N/V. Has occasional constipation and has been using softener 2-3x/week. Currently constipation has resolved. Denies any new neuropathy. He has baseline left side tingling and numbness from prior CVA.     ROS: 10 point ROS neg other " than the symptoms noted above in the HPI.      Objective   Reported vitals:    Vital Signs 4/7/2020   Systolic 143   Diastolic 79   Pulse 69   Temperature 98   Respirations 16   Weight (LB) 210 lb   Height    BMI (Calculated)    Pain    O2 99     Wt Readings from Last 4 Encounters:   04/07/20 95.3 kg (210 lb)   03/23/20 99.3 kg (219 lb)   03/18/20 106.5 kg (234 lb 14.4 oz)     alert and no distress  Psych: Alert and oriented times 3; coherent speech, normal   rate and volume, able to articulate logical thoughts, able   to abstract reason, no tangential thoughts, no hallucinations   or delusions      Labs:    4/7/2020 08:14   Sodium 142   Potassium 3.7   Chloride 109   Carbon Dioxide 28   Urea Nitrogen 4 (L)   Creatinine 0.53 (L)   GFR Estimate >90   GFR Estimate If Black >90   Calcium 8.2 (L)   Anion Gap 6   Phosphorus 3.4   Albumin 2.3 (L)   Protein Total 6.1 (L)   Bilirubin Total 0.6   Alkaline Phosphatase 137   ALT 41   AST 19   Lactate Dehydrogenase 267 (H)   Uric Acid 5.0   Glucose 97   WBC 6.4   Hemoglobin 8.8 (L)   Hematocrit 28.2 (L)   Platelet Count 342   RBC Count 3.22 (L)   MCV 88   MCH 27.3   MCHC 31.2 (L)   RDW 20.3 (H)   Diff Method Automated Method   % Neutrophils 80.1   % Lymphocytes 6.1   % Monocytes 12.4   % Eosinophils 0.0   % Basophils 0.3   % Immature Granulocytes 1.1   Nucleated RBCs 0   Absolute Neutrophil 5.1   Absolute Lymphocytes 0.4 (L)   Absolute Monocytes 0.8   Absolute Eosinophils 0.0   Absolute Basophils 0.0   Abs Immature Granulocytes 0.1   Absolute Nucleated RBC 0.0     Imaging:  No new imaging    Assessment/Plan:    # High Grade Diffuse Large B-cell Lymphoma, Stage IV  Patient initially presented to an OSH with complaints of left upper abdominal pain and low back pain, fatigue, and weight loss of 40 lbs in past 4 months.  CT CAP 3/10 demonstrated a large infiltrative mass in LUQ, arising from spleen and involving the stomach, pancreas, occluding splenic vein.  Also with upper  abdominal and retroperitoneal lymphadenopathy present. High grade (exranodal, stage, LDH).    FISH shows rearrangement of BCL6, rearrangement of IGH, loss of BCL2; Double hit negative.  - initiated RCHOP on 3/17 IP due to high risk of TLS and complications. Also received IT chemo (MTX)  -plan for 6 cycles of RCHOP with IT chemo   -will continue with Cycle 2 today. Was supposed to have IT chemo today as well though he did not hold his Eliquis. Per Dr. Cary, ok to skip IT chemo this cycle. Goal is for at least 4 doses. Will have infusion RN write on his paperwork today about holding Eliquis 48 hours prior to next chemo for IT chemo  -will follow-up again in 3 weeks prior to next cycle. Will have him get labs next week since hgb is lower to make sure he does not need a transfusion   -will repeat PET after cycle 4 and then follow-up with Dr. Cary      --No ppx abx per Dr. Cary (unless issues with infection). Has now completed allopurinol. No signs of TLS on labs today. No further need for allopurinol     # Normocytic anemia  Presumed related to underlying malignancy with contribution from recent chemotherapy. No evidence of bleeding.   - Transfuse to keep Hgb >8  -will have him get labs in a week locally on Day 7 to make sure he doesn't need a transfusion (will verify he has local orders)     CV  # Chronic atrial fibrillation   CHADS2-VASc score 4. Previously on anticoagulation with warfarin; held due to drug-drug interactions.   -Now on Eliquis 5 mg BID. Will need to hold 48 hours prior to chemo due to need for LP for IT chemo. 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 Not worse with chemo. Reportedly due to uncontrolled hypertension.  - On carvedilol and atorvastatin  - On lisinopril 20 mg daily. Held amlodipine 5 mg and HCTZ 25 mg daily due to soft pressures.   - he has not been checking his BP at  home. Encouraged him to do so as his BP has been high in clinic, though these often are falsely elevated. If his BP at home was consistently >140/90s, would add back HCTZ or amlodipine     #Dental pain.   #Mucositis?  -has been having a lot of mouth pain. This was occurring prior to chemotherapy, though has been worse lately. Was supposed see a dentist locally though due to COVID, has not been able to get in. Will reach out to [facility] to see if we can have our dental team or call and get him in locally as it is complicating intake and he is losing weight    #FEN: Talked with Sylvia Castañeda RDN on 3/26/20. Recommended 125 g of protein/day and [id number] kcals. She is going to talk with him again today. Nutrition has been limited by pain (see above). Likely will need to increase protein supplements to make up for solid food intake    Phone call duration: 16 minutes    Nicole Giraldo PA-C

## 2020-04-08 NOTE — PROGRESS NOTES
"Writer called Kenneth Tello to go over plan and upcoming labs and appointments. He is new to writer. Writer has called but never made contact with him. He lives in [facility] and will get labs there. Orders have been faxed. Introduced myself to Kenneth and role as Care Coordinator with Doctor Raeann. Kenneth is in need of dental work but du to the COVID crisis this is not likely to be done. He sates he has needed this for some time. He has check ed with [facility] today and they are not doing any dental work unless it is an emergency. He was told this also in [facility]. He sates the pain comes and goes. Right now he feel good. With the dental pain he has lost a fair amount of weigh so nutrition is an issue. He has met with Sylvia zaragoza dietitian and has was given foods to try to help get calories.     Last cycle he went neutropenic so writer strongly discouraged him from taking the tylenol or the generic form of it that he was taking for the pain. Educated that this could \"mask\" a fever. If needed something for pain he should let us know. Reviewed what neutropenic means and what the precautions are and when he should call. He was able to repeat back when asked.     He agreed to be more available when called. To call back when messages left. Plan at this point will be for weekly labs. He is on Eliquis. Will hold prior to his next appointment here on 4/27 for LP.   "

## 2020-04-08 NOTE — PROGRESS NOTES
Jason called to discuss his prednisone, he just wanted to make sure he should only take it for 5 days. We went over the prescription instructions for Days 1-5, and he verbalized understanding.    Anna Salinas RN

## 2020-04-08 NOTE — PROGRESS NOTES
"Kenneth Tello is a 49 year old male who is being evaluated via a billable telephone visit.      The patient has been notified of following:     \"This telephone visit will be conducted via a call between you and your physician/provider. We have found that certain health care needs can be provided without the need for a physical exam.  This service lets us provide the care you need with a short phone conversation.  If a prescription is necessary we can send it directly to your pharmacy.  If lab work is needed we can place an order for that and you can then stop by our lab to have the test done at a later time.    Telephone visits are billed at different rates depending on your insurance coverage. During this emergency period, for some insurers they may be billed the same as an in-person visit.  Please reach out to your insurance provider with any questions.    If during the course of the call the physician/provider feels a telephone visit is not appropriate, you will not be charged for this service.\"      CLINICAL NUTRITION SERVICES - ASSESSMENT NOTE    Kenneth Tello 49 year old referred for MNT related to B cell lymphoma    Time Spent: 30 minutes  Visit Type: phone  Referring Physician: Raeann      Nutrition Prescription   Recommendations Suggested by Registered Dietitian (RD):   1. 5-6 small frequent meals  2. 2100-2500kcal, 125 grams protein daily  3. 2-3 high jessica/high protein ONS daily    Malnutrition: severe malnutrition in the context of chronic illness     NUTRITION HISTORY  Factors affecting nutrition intake include: pain and difficulty chewing will poor teeth  Current diet: soft foods  Current appetite/intake: fair, improved   PEG Tube: No  Chemotherapy: ANTHONYMIKAELA Cooper complains of pain with chewing due to poor teeth.    He has been focused on eating mostly soft, pureed foods and ONS.  With this, he admits to not eating enough.   He has been eating peanut butter by the spoon, pudding, " "spaghetti-o's, mac and cheese, mashed potatoes and drinking Boost shakes.  He is only drinking 1-2 Boost shakes/day.      ANTHROPOMETRICS  Height: 5'10\"  Weight:  210 lbs/95kg  BMI: 30  Weight Status:  Obesity Grade I BMI 30-34.9  IBW: 166 (131%)  Weight History: down 24 lb x past 3 weeks (11%)  Wt Readings from Last 10 Encounters:   04/07/20 95.3 kg (210 lb)   03/23/20 99.3 kg (219 lb)   03/18/20 106.5 kg (234 lb 14.4 oz)       Dosing Weight: 82kg    Medications/vitamins/minerals/herbals:   Reviewed    Labs:   Labs reviewed    NUTRITION FOCUSED PHYSICAL ASSESSMENT FOR DIAGNOSING MALNUTRITION:  Observed:  not observed    ASSESSED NUTRITION NEEDS:  Estimated Energy Needs: 5107-4233 kcals (30-35 Kcal/Kg)  Justification: repletion  Estimated Protein Needs: 125 grams protein (1.5 g pro/Kg)  Justification: Repletion  Estimated Fluid Needs: 2500  mL   Justification: maintenance     MALNUTRITION:  % Weight Loss:  > 5% in 1 month (severe malnutrition)  % Intake:  </= 50% for >/= 1 month (severe malnutrition)  Subcutaneous Fat Loss:  None observed  Muscle Loss:  None observed  Fluid Retention:  None noted    Malnutrition Diagnosis: Severe malnutrition  In Context of:  Chronic illness or disease    NUTRITION DIAGNOSIS:  Inadequate oral intake related to pain and difficulty chewing as evidenced by 11% wt loss x past 3 weeks.    INTERVENTIONS  Provided written & verbal education:   - Discussed ways to maximize and fortify foods with calories and protein via small frequent meals and soft, pureed foods.    - Advised pt to aim for at least 2400kcal and 125g protein daily.  - Reviewed ONS options (Mass Pro weight mumtaz, Ensure Enlive, Ensure Plus/Boost Plus, CIB, Benecalorie etc). Encouraged utilizing these ONS in home made shakes/smoothies to prevent flavor fatigue.  Encouraged pt to start consuming 2 ONS or home made shakes/smoothies daily.       Provided pt with corresponding education materials/handouts on:  Six Small Meals a " Day, High Calorie, High Protein Recipe booklet, Tips to Increase the Calories in Your Diet and Sources of Protein. - send via email    Pt verbalize understanding of materials provided during consult.   Patient Understanding: Excellent  Expected Compliance: Excellent    Goals  1.  Aim for 5-6 small frequent meals  2.  Aim for 2400kcal and 125g protein/day  3. Weight maintenance       Follow-Up Plans: Pt has RD contact information for questions.      Sylvia Arboleda RDN, LD

## 2020-04-13 NOTE — TELEPHONE ENCOUNTER
DATE:  4/13/20  TIME OF RECEIPT FROM LAB:  1009  LAB TEST:  WBC=0.7, ANC=0.59  RESULTS PAGED TO (PROVIDER): Dr Cary     TIME LAB VALUE REPORTED TO PROVIDER: 1013    10:16 AM  Acknowledged by Dr Cary, no further action by triage is needed.

## 2020-04-14 NOTE — PROGRESS NOTES
"Kenneth Tello called with recent labs. ANC at 0.59, Reviewed neutropenic precautions, including good handwashing, staying away from sick people/crowds and calling temperature over 100.4 day or night.  No fevers per patient.     States the only pain at present is from constipation but he is \"working on that\". Has stool softener. Plts 119.  Hgb at 7.5 instructed to move slow. Denies any SOB or lightheadedness. Is taking all COVID precautions anyway's. Told him especially important with low ANC.     Has a good understanding of precautions.    Mouth/dental pain good at present.     He appreciated the call. Will have repeat labs next Monday. Will call sooner if needed.   "

## 2020-04-20 NOTE — TELEPHONE ENCOUNTER
DATE:  4/20/2020   TIME OF RECEIPT FROM LAB:  10:14 AM  LAB TEST:  hgb   LAB VALUE:  7.4  RESULTS GIVEN WITH READ-BACK TO (PROVIDER):  KIRILL BOBO  TIME LAB VALUE REPORTED TO PROVIDER:   Paged 10:14 AM    Wbc 5.5   Anc 4.81  Plt 135    Acknowledged by Dr. Bobo, no new instructions.

## 2020-04-22 NOTE — PROGRESS NOTES
Writer called MADISON Huber/VIELKA on identified VM that his counts are good, recovered, ANC is good. No transfusions are needed. He should continue to take precautions with the COVID, using the CDC recommendations. [facility], video visit on Tuesday with Nicole and L/P and R-CHOP next Wednesday in clinic. Reminded him he needs to hold Eliquis prior to his LP. Will try calling back later this week to go over the above with him.     4/24/2020-Called Kenneth to remind him to stop his Eliquis. He states he is already done that because his insurance won't fill it. Has been off for two days now. Writer will sanjeev into this on Monday. Will be off now until after L/P and more drug can be obtained. [place] will get labs at [place] on Monday as scheduled.

## 2020-04-27 NOTE — NURSING NOTE
Chief Complaint   Patient presents with     Blood Draw     Labs drawn via VPT by RN in lab. VS taken.      Labs collected from venipuncture by RN. Vitals taken.     Destiny TRIPP RN PHN BSN  BMT/Oncology Lab

## 2020-04-28 NOTE — LETTER
"4/28/2020       RE: Kenneth Tello  [address]  [facility] [address]     Dear Colleague,    Thank you for referring your patient, Kenneth Tello, to the [facility]. Please see a copy of my visit note below.    Kenneth Tello is a 49 year old male who is being evaluated via a billable telephone visit.      The patient has been notified of following:     \"This telephone visit will be conducted via a call between you and your physician/provider. We have found that certain health care needs can be provided without the need for a physical exam.  This service lets us provide the care you need with a short phone conversation.  If a prescription is necessary we can send it directly to your pharmacy.  If lab work is needed we can place an order for that and you can then stop by our lab to have the test done at a later time.    Telephone visits are billed at different rates depending on your insurance coverage. During this emergency period, for some insurers they may be billed the same as an in-person visit.  Please reach out to your insurance provider with any questions.    If during the course of the call the physician/provider feels a telephone visit is not appropriate, you will not be charged for this service.\"    Patient has given verbal consent for Telephone visit?  Yes    How would you like to obtain your AVS? Tana     I have reviewed and updated the patient's allergies and medication list.    Concerns: Patient has concerns about a possible hemorrhoid, painful to sit and walk.      Refills: Eliquis (insurance needs prior auth) and atorvastatin     Best contact #:  [phone]    Osman Fortune, EMT        Provider Note:   Apr 28, 2020     Interim History:   He felt like overall this cycle went well. He did developed a hemorrhoid about a week after chemo. Was having a hard time having a BM due to pain. It also hurt to lay or walk. He talked with his local pharmacist who " had his try a topical cream (not sure which one) and this had helped a lot. Stools have been soft throughout treatment. Denies any diarrhea or constipation.     Eating better foods. Mouth is feeling a little better. Still needs to watch what he eats. Drink about 3-4 Boost a day. Hasn't been able to see a dentist due to COVID.     Denies any new neuropathy. He has baseline left side tingling and numbness from prior CVA. Having left knee pain, especially with stairs which is new. Denies edema or bleeding. Denies black stools.     ROS: 10 point ROS neg other than the symptoms noted above in the HPI.      Objective       Vital Signs 4/27/2020   Systolic 137   Diastolic 84   Pulse 68   Temperature 98.5   Respirations 16   Weight (LB) 211 lb 8 oz   O2 100       Wt Readings from Last 4 Encounters:   04/27/20 95.9 kg (211 lb 8 oz)   04/07/20 95.3 kg (210 lb)   03/23/20 99.3 kg (219 lb)   03/18/20 106.5 kg (234 lb 14.4 oz)     alert and no distress  Psych: Alert and oriented times 3; coherent speech, normal   rate and volume, able to articulate logical thoughts, able   to abstract reason, no tangential thoughts, no hallucinations   or delusions      Labs:      4/27/2020 10:12   Sodium 139   Potassium 3.8   Chloride 108   Carbon Dioxide 26   Urea Nitrogen 7   Creatinine 0.56 (L)   GFR Estimate >90   GFR Estimate If Black >90   Calcium 8.2 (L)   Anion Gap 5   Phosphorus 3.3   Albumin 2.6 (L)   Protein Total 6.2 (L)   Bilirubin Total 0.6   Alkaline Phosphatase 91   ALT 14   AST 8   Lactate Dehydrogenase 215   Uric Acid 6.0   Glucose 87   WBC 8.0   Hemoglobin 7.5 (L)   Hematocrit 25.2 (L)   Platelet Count 236   RBC Count 2.70 (L)   MCV 93   MCH 27.8   MCHC 29.8 (L)   RDW 24.8 (H)   Diff Method Automated Method   % Neutrophils 84.6   % Lymphocytes 3.1   % Monocytes 10.7   % Eosinophils 0.1   % Basophils 0.5   % Immature Granulocytes 1.0   Nucleated RBCs 0   Absolute Neutrophil 6.8   Absolute Lymphocytes 0.3 (L)   Absolute  Monocytes 0.9   Absolute Eosinophils 0.0   Absolute Basophils 0.0   Abs Immature Granulocytes 0.1   Absolute Nucleated RBC 0.0       Imaging:  No new imaging    Assessment/Plan:    # High Grade Diffuse Large B-cell Lymphoma, Stage IV  Patient initially presented to an OSH with complaints of left upper abdominal pain and low back pain, fatigue, and weight loss of 40 lbs in past 4 months.  CT CAP 3/10 demonstrated a large infiltrative mass in LUQ, arising from spleen and involving the stomach, pancreas, occluding splenic vein.  Also with upper abdominal and retroperitoneal lymphadenopathy present. High grade (exranodal, stage, LDH).    FISH shows rearrangement of BCL6, rearrangement of IGH, loss of BCL2; Double hit negative.  - initiated RCHOP on 3/17 IP due to high risk of TLS and complications. Also received IT chemo (MTX)  -plan for 6 cycles of RCHOP with IT chemo   -Did skip LP with IT chemo with Cycle 2 due to failure to hold anticoagulation (ok per Dr. Cary)  -will continue with Cycle 3 tomorrow  -will follow-up again in 3 weeks prior to next cycle. Will have him get labs next week since hgb is lower to make sure he does not need another transfusion   -will repeat PET after cycle 4 and then follow-up with Dr. Cary      --No ppx abx per Dr. Cary (unless issues with infection). Has now completed allopurinol.      # Normocytic anemia  Presumed related to underlying malignancy with contribution from recent chemotherapy. Never did have a workup. Hgb continues to downtrend and did not recover after last cycle. Will add on anemia workup today.   - Transfuse to keep Hgb >8. He is symptomatic and Hgb 7.5 today. Will give him 1 unit of PRBCs tomorrow in addition to chemo. Placed a blood plan. Will need to consent tomorrow when he is in the building   -will have him continue to get weekly labs, josefina to watch hgb. Will see if we could set up possible transfusion for next week.      Addendum: Workup showed high  retic and low iron. Question if he is having GI bleeding vs poor nutrition due to dental pain. No evidence of GI bleed currently and also not doing colonoscopies at this time due to COVID. Will keep in mind moving forward. Will start him on ferrous sulfate daily to help replace iron. If he does not tolerate can give Injectafer.     CV  # Chronic atrial fibrillation   CHADS2-VASc score 4. Previously on anticoagulation with warfarin; held due to drug-drug interactions.   -was on Eliquis 5 mg BID, though insurance denied PA. Discussed with pharmacy. Will need to switch him to Xarelto (20 mg daily). Should start tomorrow night after LP tomorrow AM. Will need to hold anticoagulation 48 hours prior to chemo due to need for LP for IT chemo. 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 Not worse with chemo. Reportedly due to uncontrolled hypertension.  - On carvedilol and atorvastatin (given him a refill of this today since he was out though instructed him to follow-up with cards for all other refills)  - On lisinopril 20 mg daily. Held amlodipine 5 mg and HCTZ 25 mg daily due to soft pressures.   - BP has been better controlled lately. If his BP at home was consistently >140/90s, would add back HCTZ or amlodipine     #Dental pain.   #Mucositis?  -Mouth pain, started prior to chemotherapy. Was supposed see a dentist locally though due to COVID, has not been able to get in. Still persistent though better. [facility] tried to get him an appt and was unsuccessful. Will need to see dentist as soon as they open again    #FEN: Has met with Sylvia Castañeda RDN, last on 4/7. Recommended 125 g of protein/day and [id number] kcals. She gave him tips on how to get protein and calories with mouth pain. He has been able to eat more and he is drinking 3-4 Boost per day. Weight has now been stable. Continue to progress diet as tolerated.      #hemorrhoids: Improved with topical creams. Continue prn. Should keep stools soft and daily    Phone call duration: 19 minutes    Nicole Giraldo PA-C    Again, thank you for allowing me to participate in the care of your patient.      Sincerely,    Nicole Giraldo PA-C

## 2020-04-28 NOTE — PROGRESS NOTES
Provider Note:   Apr 28, 2020     Interim History:   He felt like overall this cycle went well. He did developed a hemorrhoid about a week after chemo. Was having a hard time having a BM due to pain. It also hurt to lay or walk. He talked with his local pharmacist who had his try a topical cream (not sure which one) and this had helped a lot. Stools have been soft throughout treatment. Denies any diarrhea or constipation.     Eating better foods. Mouth is feeling a little better. Still needs to watch what he eats. Drink about 3-4 Boost a day. Hasn't been able to see a dentist due to COVID.     Denies any new neuropathy. He has baseline left side tingling and numbness from prior CVA. Having left knee pain, especially with stairs which is new. Denies edema or bleeding. Denies black stools.     ROS: 10 point ROS neg other than the symptoms noted above in the HPI.      Objective       Vital Signs 4/27/2020   Systolic 137   Diastolic 84   Pulse 68   Temperature 98.5   Respirations 16   Weight (LB) 211 lb 8 oz   O2 100       Wt Readings from Last 4 Encounters:   04/27/20 95.9 kg (211 lb 8 oz)   04/07/20 95.3 kg (210 lb)   03/23/20 99.3 kg (219 lb)   03/18/20 106.5 kg (234 lb 14.4 oz)     alert and no distress  Psych: Alert and oriented times 3; coherent speech, normal   rate and volume, able to articulate logical thoughts, able   to abstract reason, no tangential thoughts, no hallucinations   or delusions      Labs:      4/27/2020 10:12   Sodium 139   Potassium 3.8   Chloride 108   Carbon Dioxide 26   Urea Nitrogen 7   Creatinine 0.56 (L)   GFR Estimate >90   GFR Estimate If Black >90   Calcium 8.2 (L)   Anion Gap 5   Phosphorus 3.3   Albumin 2.6 (L)   Protein Total 6.2 (L)   Bilirubin Total 0.6   Alkaline Phosphatase 91   ALT 14   AST 8   Lactate Dehydrogenase 215   Uric Acid 6.0   Glucose 87   WBC 8.0   Hemoglobin 7.5 (L)   Hematocrit 25.2 (L)   Platelet Count 236   RBC Count 2.70 (L)   MCV 93   MCH 27.8   MCHC 29.8 (L)    RDW 24.8 (H)   Diff Method Automated Method   % Neutrophils 84.6   % Lymphocytes 3.1   % Monocytes 10.7   % Eosinophils 0.1   % Basophils 0.5   % Immature Granulocytes 1.0   Nucleated RBCs 0   Absolute Neutrophil 6.8   Absolute Lymphocytes 0.3 (L)   Absolute Monocytes 0.9   Absolute Eosinophils 0.0   Absolute Basophils 0.0   Abs Immature Granulocytes 0.1   Absolute Nucleated RBC 0.0       Imaging:  No new imaging    Assessment/Plan:    # High Grade Diffuse Large B-cell Lymphoma, Stage IV  Patient initially presented to an OSH with complaints of left upper abdominal pain and low back pain, fatigue, and weight loss of 40 lbs in past 4 months.  CT CAP 3/10 demonstrated a large infiltrative mass in LUQ, arising from spleen and involving the stomach, pancreas, occluding splenic vein.  Also with upper abdominal and retroperitoneal lymphadenopathy present. High grade (exranodal, stage, LDH).    FISH shows rearrangement of BCL6, rearrangement of IGH, loss of BCL2; Double hit negative.  - initiated RCHOP on 3/17 IP due to high risk of TLS and complications. Also received IT chemo (MTX)  -plan for 6 cycles of RCHOP with IT chemo   -Did skip LP with IT chemo with Cycle 2 due to failure to hold anticoagulation (ok per Dr. Cary)  -will continue with Cycle 3 tomorrow  -will follow-up again in 3 weeks prior to next cycle. Will have him get labs next week since hgb is lower to make sure he does not need another transfusion   -will repeat PET after cycle 4 and then follow-up with Dr. Cary      --No ppx abx per Dr. Cary (unless issues with infection). Has now completed allopurinol.      # Normocytic anemia  Presumed related to underlying malignancy with contribution from recent chemotherapy. Never did have a workup. Hgb continues to downtrend and did not recover after last cycle. Will add on anemia workup today.   - Transfuse to keep Hgb >8. He is symptomatic and Hgb 7.5 today. Will give him 1 unit of PRBCs tomorrow in  addition to chemo. Placed a blood plan. Will need to consent tomorrow when he is in the building   -will have him continue to get weekly labs, josefina to watch hgb. Will see if we could set up possible transfusion for next week.      Addendum: Workup showed high retic and low iron. Question if he is having GI bleeding vs poor nutrition due to dental pain. No evidence of GI bleed currently and also not doing colonoscopies at this time due to COVID. Will keep in mind moving forward. Will start him on ferrous sulfate daily to help replace iron. If he does not tolerate can give Injectafer.     CV  # Chronic atrial fibrillation   CHADS2-VASc score 4. Previously on anticoagulation with warfarin; held due to drug-drug interactions.   -was on Eliquis 5 mg BID, though insurance denied PA. Discussed with pharmacy. Will need to switch him to Xarelto (20 mg daily). Should start tomorrow night after LP tomorrow AM. Will need to hold anticoagulation 48 hours prior to chemo due to need for LP for IT chemo. 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 Not worse with chemo. Reportedly due to uncontrolled hypertension.  - On carvedilol and atorvastatin (given him a refill of this today since he was out though instructed him to follow-up with cards for all other refills)  - On lisinopril 20 mg daily. Held amlodipine 5 mg and HCTZ 25 mg daily due to soft pressures.   - BP has been better controlled lately. If his BP at home was consistently >140/90s, would add back HCTZ or amlodipine     #Dental pain.   #Mucositis?  -Mouth pain, started prior to chemotherapy. Was supposed see a dentist locally though due to COVID, has not been able to get in. Still persistent though better. RNCC tried to get him an appt and was unsuccessful. Will need to see dentist as soon as they open again    #FEN: Has met with Sylvia Castañeda RDN, last on 4/7.  Recommended 125 g of protein/day and [id number] kcals. She gave him tips on how to get protein and calories with mouth pain. He has been able to eat more and he is drinking 3-4 Boost per day. Weight has now been stable. Continue to progress diet as tolerated.     #hemorrhoids: Improved with topical creams. Continue prn. Should keep stools soft and daily    Phone call duration: 19 minutes    Nicole Giraldo PA-C

## 2020-04-28 NOTE — PROGRESS NOTES
"Kenneth Tello is a 49 year old male who is being evaluated via a billable telephone visit.      The patient has been notified of following:     \"This telephone visit will be conducted via a call between you and your physician/provider. We have found that certain health care needs can be provided without the need for a physical exam.  This service lets us provide the care you need with a short phone conversation.  If a prescription is necessary we can send it directly to your pharmacy.  If lab work is needed we can place an order for that and you can then stop by our lab to have the test done at a later time.    Telephone visits are billed at different rates depending on your insurance coverage. During this emergency period, for some insurers they may be billed the same as an in-person visit.  Please reach out to your insurance provider with any questions.    If during the course of the call the physician/provider feels a telephone visit is not appropriate, you will not be charged for this service.\"    Patient has given verbal consent for Telephone visit?  Yes    How would you like to obtain your AVS? MyChart     I have reviewed and updated the patient's allergies and medication list.    Concerns: Patient has concerns about a possible hemorrhoid, painful to sit and walk.      Refills: Eliquis (insurance needs prior auth) and atorvastatin     Best contact #:  [phone]    Osman Fortune, EMT      "

## 2020-04-29 NOTE — LETTER
4/29/2020      RE: Kenneth ENGLE [place]  [address]  [place] [address]       BMT ONC Adult Lumbar Puncture and Intrathecal Chemotherapy Administration Procedure Note    April 29, 2020    Procedure: Lumbar Puncture to obtain CSF and give intrathecal chemotherapy    Diagnosis: diffuse large B cell lymphoma    Learning needs assessment complete within 12 months: YES     Vitals reviewed:  YES    Informed Consent: Procedure, benefits, risks, and alternatives explained to the patient who voiced understanding of the information and agreed to proceed with lumbar puncture. Risks include bleeding, headache, and or infection.   Patient safety checklist completed.    Labs: Reviewed:     Lab Results   Component Value Date    INR 1.31 03/19/2020     04/27/2020       Description:. The patient was seated at the bedside with knees dangling. The L4-5 disc space was prepped and draped in a sterile fashion. Local anesthesia with 3 mL 1% preservative-free lidocaine was used. A 22 gauge, 4 inch needle was placed on the second attempt.  8 mL spinal fluid collected. Specimen appears clear. Specimen sent for cell count and differential, protein, glucose and flow cytometry.     Methotrexate in 6 mL of 0.9% preservative free sodium chloride was administered intrathecally slowly in a barbotage fashion.  The needle was removed and a bandage was applied to the site.  Chemotherapy double-check was performed per institutional policy.  Patient tolerated well.  Post-procedure pain assessment: 0 out of 10 on the numeric pain rating scale  Interventions: No    Complications: No     Disposition: Patient will remain on back for 1 hour post procedure. Avoid soaking in a tub tonight.  Call if develops headaches, fevers and or chills. Recommend starting Xarelto tomorrow evening.     Performed by:   YARELIS Villavicencio PA-C

## 2020-04-29 NOTE — PROGRESS NOTES
BMT ONC Adult Lumbar Puncture and Intrathecal Chemotherapy Administration Procedure Note    April 29, 2020    Procedure: Lumbar Puncture to obtain CSF and give intrathecal chemotherapy    Diagnosis: diffuse large B cell lymphoma    Learning needs assessment complete within 12 months: YES     Vitals reviewed:  YES    Informed Consent: Procedure, benefits, risks, and alternatives explained to the patient who voiced understanding of the information and agreed to proceed with lumbar puncture. Risks include bleeding, headache, and or infection.   Patient safety checklist completed.    Labs: Reviewed:     Lab Results   Component Value Date    INR 1.31 03/19/2020     04/27/2020       Description:. The patient was seated at the bedside with knees dangling. The L4-5 disc space was prepped and draped in a sterile fashion. Local anesthesia with 3 mL 1% preservative-free lidocaine was used. A 22 gauge, 4 inch needle was placed on the second attempt.  8 mL spinal fluid collected. Specimen appears clear. Specimen sent for cell count and differential, protein, glucose and flow cytometry.     Methotrexate in 6 mL of 0.9% preservative free sodium chloride was administered intrathecally slowly in a barbotage fashion.  The needle was removed and a bandage was applied to the site.  Chemotherapy double-check was performed per institutional policy.  Patient tolerated well.  Post-procedure pain assessment: 0 out of 10 on the numeric pain rating scale  Interventions: No    Complications: No     Disposition: Patient will remain on back for 1 hour post procedure. Avoid soaking in a tub tonight.  Call if develops headaches, fevers and or chills. Recommend starting Xarelto tomorrow evening.     Performed by:   Stacy Grimes PA-C

## 2020-04-29 NOTE — PROGRESS NOTES
Infusion Nursing Note:  Kenneth Tello presents today for Cycle 3 Day 1 Doxorubicin, Vincristine, Cytoxan, Rituxan, PO Prednisone, Neulasta Onpro, IT Methotrexate, 1 unit PRBC.  Patient seen by provider today: Yes: Stacy Cornelio did LP in infusion. Visit with JAMISON Koo on 4/27/20.    Note: Patient reports no changes overnight since he talked to Nicole yesterday. Stated that he took his prednisone dose this morning and verbalized understanding to take as prescribed. Patient laid flat for one hour after LP. Dressing to LP site clean, dry, intact.    4/28/20 TORB Nicole SWIFT / Josee Dougherty RN: OK to give chemo today with Hgb 7.5 on 4/27. Pt will get 1 unit PRBC today with chemo.     TORB 4/29/20 1400 Nicole SWIFT/Tara Mccabe RN: Patient to start taking po ferrous sulfate as prescribed. Patient to hold Xarelto 48 hours prior to infusion appointment with LP. Patient to not take his daily evening dose on 5/18 and 5/19 prior to Cycle 4.    Patient educated on above information, post LP and blood transfusion discharge instructions. Verbalized understanding.     Intravenous Access:  Peripheral IV placed.    Treatment Conditions:  Lab Results   Component Value Date    HGB 7.5 04/27/2020     Lab Results   Component Value Date    WBC 8.0 04/27/2020      Lab Results   Component Value Date    ANEU 6.8 04/27/2020     Lab Results   Component Value Date     04/27/2020      Lab Results   Component Value Date     04/27/2020                   Lab Results   Component Value Date    POTASSIUM 3.8 04/27/2020           Lab Results   Component Value Date    MAG 2.1 03/19/2020            Lab Results   Component Value Date    CR 0.56 04/27/2020                   Lab Results   Component Value Date    STACY 8.2 04/27/2020                Lab Results   Component Value Date    BILITOTAL 0.6 04/27/2020           Lab Results   Component Value Date    ALBUMIN 2.6 04/27/2020                    Lab Results    Component Value Date    ALT 14 04/27/2020           Lab Results   Component Value Date    AST 8 04/27/2020     Results reviewed, labs did NOT meet treatment parameters: See TORB in treatment plan.  ECHO/MUGA completed 3/10/20  EF 60-65%.  Blood transfusion consent signed 4/29/20.      Post Infusion Assessment:  Patient tolerated infusion without incident.  Blood return noted pre and post infusion.  Blood return noted during Doxorubicin administration every 2 cc.  Blood return noted pre, mid, and post Vincristine administration.  No evidence of extravasations.  Access discontinued per protocol.       Neulasta Onpro On-Body injector applied to Right Abdomen at 1330 with light facing up.  Writer discussed Neulasta injection would start tomorrow 4/30/20 at 1630, approximately 27 hours after application applied today.  Written and Verbal instruction reviewed with patient.  Pt instructed when the dose delivery starts, it will take about 45 minutes to complete.  Pt aware Neulasta Onpro On-Body should have green flashing light and to call triage or on-call MD if injector flashes red or appears to be leaking. Pt aware to keep Onpro On-Body Neulasta 4 inches away from electrical equipment and to avoid showering 4 hours prior to injection.   Neulasta Onpro Lot number: [id number]      Discharge Plan:   Prescriptions given for ferrous sulfate.  Discharge instructions reviewed with: Patient.  Patient verbalized understanding of discharge instructions and all questions answered.  Copy of AVS reviewed with patient. Patient will return 5/19/20 for RTC and 5/20/20 for next infusion appointment.  Patient discharged in stable condition accompanied by: self.  Face to Face time: 0.    KATHY DUONG RN

## 2020-04-29 NOTE — PATIENT INSTRUCTIONS
Start taking your Xarelto on the evening of 4/30.    Per Nicole, Hold Xarelto 48 hours prior to infusion appointment with LP. Patient to not take his daily evening dose on 5/18 and 5/19 prior to Cycle 4.    Your Neulasta Onpro will start infusing on 4/30 at 4:30pm. You can take it off after it is done at 5:30pm on 4/30.    [facility] Triage and after hours / weekends / holidays:  [phone]    Please call the triage or after hours line if you experience a temperature greater than or equal to 100.5, shaking chills, have uncontrolled nausea, vomiting and/or diarrhea, dizziness, shortness of breath, chest pain, bleeding, unexplained bruising, or if you have any other new/concerning symptoms, questions or concerns.      If you are having any concerning symptoms or wish to speak to a provider before your next infusion visit, please call your care coordinator or triage to notify them so we can adequately serve you.     If you need a refill on a narcotic prescription or other medication, please call before your infusion appointment.   Patient Education     After a Lumbar Puncture  After the test    When you re able to leave, have an adult family member or friend drive you home.     When you get home, rest lying down for a certain period of time as directed by your healthcare provider.     If you get a headache, lying flat and drinking plenty of fluids may help relieve it. Often the headache is more painful when you stand. It gets better over time. You may also want to take an over-the-counter pain reliever. But don't take aspirin. Caffeine drinks may help relieve a headache after a spinal tap. These include soda, coffee, or tea. A headache may also occur with an upset stomach (nausea), vomiting, or dizziness.    The day after your spinal tap, you can remove your bandage.     Your healthcare provider will tell you when your test results are ready.  Call your healthcare provider if you have:    A severe headache or a  headache that lasts 2 or more days    Double vision    Pain in your back that doesn't go away    Tingling in your groin or legs    Fever    Change in bowel or urination functions  Date Last Reviewed: 2/1/2018 2000-2019 [employer]. [address] [place] [address]. All rights reserved. This information is not intended as a substitute for professional medical care. Always follow your healthcare professional's instructions.    Patient Education   After Your Blood Transfusion  Discharge Instructions  After you leave  After a blood transfusion (received red blood cells, platelets, plasma, cryo or granulocytes), you will need to watch for signs of a reaction for the next 48 hours.  Call your clinic or 911 (or go to the Emergency room) if you have any signs of a reaction:    Shaking or chills    Fever (temperature above 100.0 F)    Headache    Nausea    Hives    Itching    Swelling of the face or feeling flushed    Ongoing dry cough (nothing is coughed up)    Trouble breathing or wheezing  Some signs of a reaction won't show up for a few days or up to 4 weeks.   These may include:    Fatigue    Dizziness    Pink or red urine  For informational purposes only. Not to replace the advice of your health care provider.   Copyright   2015 [facility]. All rights reserved. [employer] 471299 - Rev 07/16.

## 2020-05-04 NOTE — TELEPHONE ENCOUNTER
DATE:  5/4/2020   TIME OF RECEIPT FROM LAB:  11:46 AM from [facility]  LAB TEST:  hgb   LAB VALUE:  7.2  RESULTS GIVEN WITH READ-BACK TO (PROVIDER):  KIRILL BOBO  TIME LAB VALUE REPORTED TO PROVIDER:   Paged 11:47 AM    WBC 13.74  Plt 80  ANC 13.4   Will fax the rest of results.    Results acknowledged by Dr. Bobo.

## 2020-05-06 NOTE — PROGRESS NOTES
Called Kenneth to set up blood transfusion in [place], [place]. He reports that he is miserable with the constipation. Writer remember this from last cycle. He is getting Vincristine. He is taking Senna S bid. He is taking fluids but it doesn't  sound like a lot. He says the pain from hemorrhoids he did not know he had is bad. He also states the lack of energy is not helping. Hgb done on Monday at local clinic remains in the mid 7's.    Transfusion orders and consent faxed to [facility].    Instructed Kenneth to increase his fluids and senna to two bid as needed      Called Kenneth back after talking  with doctor Raeann, he can add Mirlax to the above. Kenneth report since writer talked with him earlier he took a second senna S and her did go some, dark/hard. He will continue with bowel routine and call if things do not get better.    [facility] will call him to set up the transfusion for later this week.    Kenneth aware of the plan.

## 2020-05-06 NOTE — PROGRESS NOTES
Blood orders and blood consent printed and faxed to [facility], fax # [phone] per request of RNCC.  Confirmation was verified via rightfax.    Jo Millard CMA

## 2020-05-09 NOTE — TELEPHONE ENCOUNTER
"Jason calling with symptoms of constipation/ diarrhea which has been present for the past 4 days. He is currently being treated for cancer and received last chemo on 4/29/20. Patient reports increase diarrhea symptoms with stools which are black in color. Denies vomiting and able to drink fluids, but is also cautious of taking anything in as then diarrhea symptoms start again. Yesterday he lost control of bowel function before he could get to a restroom. Denies losing consciousness or feeling faint. Pt also c/o of rectal pain with difficulty in getting comfortable or sitting (\"hurts so bad\" when experiencing symptoms). Per COVID screening, denies having a fever as well.     Triage guidelines recommend to be seen in the ED. FNA unsure of cancer clinic recommendations to send a patient to the ED during COVID. Patient lives in [place], [place]. FNA paged on call provider via the Answering service at 10:24am to call FNA back directly. On call provider listed as Henrietta Sanderson, but then is being covered by Jeb Diaz - Staff (ID: [id number]). Paged x2 at 10:48am via Smart Web. No call back from provider. FNA called patient back and advised to be seen in the ED. Patient states that he has a hospital across the street that he will go and seek care. FNA advised that he will need to be in contact with [facility] and his care team depending on what he finds out at his community hospital. RN advised to call back with any changes, worsening of symptoms, and questions or concerns. Patient verbalized understanding of and agreement with plan and had no further questions.     Reason for Disposition    Black or tarry bowel movements  (Exception: chronic-unchanged  black-grey bowel movements AND is taking iron pills or peptobismol)    Additional Information    Negative: Shock suspected (e.g., cold/pale/clammy skin, too weak to stand, low BP, rapid pulse)    Negative: Difficult to awaken or acting confused (e.g., disoriented, slurred speech)    " Negative: Sounds like a life-threatening emergency to the triager    Negative: [1] SEVERE abdominal pain (e.g., excruciating) AND [2] present > 1 hour    Negative: [1] SEVERE abdominal pain AND [2] age > 60    Negative: [1] Blood in the stool AND [2] moderate or large amount of blood    Protocols used: CANCER - DIARRHEA-A-AH    Ann Cazares RN  [facility]

## 2020-05-11 NOTE — TELEPHONE ENCOUNTER
[facility] calling with critical lab values  WBC 1.6  ANC 1.25    Paged to Dr Cary     RNCC, Heather Campos will contact pt to notify. Are not critical per our lab

## 2020-05-13 NOTE — PROGRESS NOTES
"Writer called Kenneth with labs from earlier this week. NA/LM for him on identified VM. Hgb at 7.6  after receiving a unit of blood late last week in [place], [place]. Plt dropping at 50, WBC/ANC 1.6/1,25.    [place] nurse/lab reported a guaiac positive stool last week. Stool itself not grossly bloody. Kenneth had noted problems with constipation during that time. Recently IP overnight in [place].    Writer will call back again later today for update. Will ask Kenneth if he wants to try another transfusion for Hgb of 7.6.    5/14/2020-Kenneth reports that he is now feeling somewhat better although he has not felt \"good \"for a long tome. His stools and regularity are now back to normal in frequency, color and consistency- \"almost to the other way\" per him. He will get labs tomorrow, Friday to evaluate the Hgb and see if it is holding stable. He does not feel the transfusions he received/two total, one OP and one when IP did him a lot of good.     Plan is for labs and next cycle of R-CHOP next week at [facility]. Talked to Kenneth about need to be proactive on the bowels to try to prevent the severe constipation this cycle.      Writer will follow local labs tomorrow .    5/15/2020-Local labs done and improving.    Hgb 9.1  Plts 174  WBC/ANC 6.1/5.23    Kenneth says he is \"feeling good', so good he does not know if he wants to do it all over again. He is VERY worried about the constipation he is experiencing after the chemotherapy. So bad he does not want to do it again. He was so uncomfortable he could not sit or walk. He tired increasing his Senna and took Mirlax with little relief.     He is due to start next cycle R-CHOP next week. labs stable so will do labs prior to his chemotherapy on Wednesday so [place]       "

## 2020-05-19 NOTE — PROGRESS NOTES
Provider Note:   May 19, 2020     Interim History:     Constipation takes about a week to start. Stools are not formed currently.    Has been feeling better and up and around more.    Left knee is bad again.        ROS: 10 point ROS neg other than the symptoms noted above in the HPI.      Objective           Wt Readings from Last 4 Encounters:   04/29/20 95.3 kg (210 lb 1.6 oz)   04/27/20 95.9 kg (211 lb 8 oz)   04/07/20 95.3 kg (210 lb)   03/23/20 99.3 kg (219 lb)     alert and no distress  Psych: Alert and oriented times 3; coherent speech, normal   rate and volume, able to articulate logical thoughts, able   to abstract reason, no tangential thoughts, no hallucinations   or delusions      Labs:      5/15/2020 00:00   Sodium 141   Potassium 3.8   Chloride 107   Carbon Dioxide 26   Urea Nitrogen 8   Creatinine 0.53 (A)   GFR Estimate 176   Calcium 8.3 (A)   Anion Gap 8   Phosphorus 3.7   Uric Acid 5.8   Glucose 108 (A)   WBC 6.1   Hemoglobin 9.1 (A)   Hematocrit 29.5 (A)   Platelet Count 174   MPV 10.8   RBC Count 3.15 (A)   MCV 94   MCH 28.9   MCHC 30.8   RDW 21.1 (A)   % Neutrophils 85.6 (A)   % Lymphocytes 2.8 (A)   % Monocytes 10.6 (A)   % Eosinophils 0.5 (A)   % Basophils 0.5   Abs Neutrophils 5.23   Abs Lymphocytes 0.17 (A)   Abs Monocytes 0.65   Abs Eosinophils 0.03   Abs Basophils 0       Imaging:  No new imaging    Assessment/Plan:    # High Grade Diffuse Large B-cell Lymphoma, Stage IV  Patient initially presented to an OSH with complaints of left upper abdominal pain and low back pain, fatigue, and weight loss of 40 lbs in past 4 months.  CT CAP 3/10 demonstrated a large infiltrative mass in LUQ, arising from spleen and involving the stomach, pancreas, occluding splenic vein.  Also with upper abdominal and retroperitoneal lymphadenopathy present. High grade (exranodal, stage, LDH).    FISH shows rearrangement of BCL6, rearrangement of IGH, loss of BCL2; Double hit negative.  - initiated RCHOP on 3/17 IP  due to high risk of TLS and complications. Also received IT chemo (MTX)  -plan for 6 cycles of RCHOP with IT chemo   -Did skip LP with IT chemo with Cycle 2 due to failure to hold anticoagulation (ok per Dr. Cary)  -will continue with Cycle 3 tomorrow  -will follow-up again in 3 weeks prior to next cycle. Will have him get labs next week since hgb is lower to make sure he does not need another transfusion   -will repeat PET after cycle 4 and then follow-up with Dr. Cary      --No ppx abx per Dr. Cary (unless issues with infection). Has now completed allopurinol.      # Normocytic anemia  Presumed related to underlying malignancy with contribution from recent chemotherapy. Never did have a workup. Hgb continues to downtrend and did not recover after last cycle. Will add on anemia workup today.   - Transfuse to keep Hgb >8. He is symptomatic and Hgb 7.5 today. Will give him 1 unit of PRBCs tomorrow in addition to chemo. Placed a blood plan. Will need to consent tomorrow when he is in the building   -will have him continue to get weekly labs, josefina to watch hgb. Will see if we could set up possible transfusion for next week.      Addendum: Workup showed high retic and low iron. Question if he is having GI bleeding vs poor nutrition due to dental pain. No evidence of GI bleed currently and also not doing colonoscopies at this time due to COVID. Will keep in mind moving forward. Will start him on ferrous sulfate daily to help replace iron. If he does not tolerate can give Injectafer.     CV  # Chronic atrial fibrillation   CHADS2-VASc score 4. Previously on anticoagulation with warfarin; held due to drug-drug interactions.   -was on Eliquis 5 mg BID, though insurance denied PA. Discussed with pharmacy. Will need to switch him to Xarelto (20 mg daily). Should start tomorrow night after LP tomorrow AM. Will need to hold anticoagulation 48 hours prior to chemo due to need for LP for IT chemo. Also hold if plt  <50     # H/o CVA   # Coronary artery disease with history of MI  # Hypertension  CVA in 2017 with residual mild left-sided numbness. Not worse with chemo. Reportedly due to uncontrolled hypertension.  - On carvedilol and atorvastatin (given him a refill of this today since he was out though instructed him to follow-up with cards for all other refills)  - On lisinopril 20 mg daily. Held amlodipine 5 mg and HCTZ 25 mg daily due to soft pressures.   - BP has been better controlled lately. If his BP at home was consistently >140/90s, would add back HCTZ or amlodipine     #Dental pain.   #Mucositis?  -Mouth pain, started prior to chemotherapy. Was supposed see a dentist locally though due to COVID, has not been able to get in. Still persistent though better. [facility] tried to get him an appt and was unsuccessful. Will need to see dentist as soon as they open again    #FEN: Has met with Sylvia Castañeda RDN, last on 4/7. Recommended 125 g of protein/day and [id number] kcals. She gave him tips on how to get protein and calories with mouth pain. He has been able to eat more and he is drinking 3-4 Boost per day. Weight has now been stable. Continue to progress diet as tolerated.     #hemorrhoids: Improved with topical creams. Continue prn. Should keep stools soft and daily    #left knee pain, from prior stroke: Was on gabapentin 300 mg at bedtime. He is not sure why it was stopped. By chart review, not sure why they would have stopped it. May have just fell off the list and he never refilled? I have no problems with him taking this. I am not sure it will help his knee pain but it is worth a try. Will refill for him today    Phone call duration: 23 minutes    Nicole Giraldo PA-C

## 2020-05-19 NOTE — LETTER
"    5/19/2020         RE: Kenneth Tello  [address]  [facility] [address]        Dear Colleague,    Thank you for referring your patient, Kenneth Tello, to the [facility]. Please see a copy of my visit note below.    Kenneth Tello is a 49 year old male who is being evaluated via a billable telephone visit.      The patient has been notified of following:     \"This telephone visit will be conducted via a call between you and your physician/provider. We have found that certain health care needs can be provided without the need for a physical exam.  This service lets us provide the care you need with a short phone conversation.  If a prescription is necessary we can send it directly to your pharmacy.  If lab work is needed we can place an order for that and you can then stop by our lab to have the test done at a later time.    Telephone visits are billed at different rates depending on your insurance coverage. During this emergency period, for some insurers they may be billed the same as an in-person visit.  Please reach out to your insurance provider with any questions.    If during the course of the call the physician/provider feels a telephone visit is not appropriate, you will not be charged for this service.\"    Patient has given verbal consent for Telephone visit?  Yes    What phone number would you like to be contacted at? [phone]    How would you like to obtain your AVS? Laurenhart     Patient had vitals done recently in the hospital.    Johana Cruz, [facility]      Provider Note:   May 19, 2020     Oncology History:   Kenneth Tello is a 49 year old male with a history notable for atrial fibrillation (previously on Warfarin), myocardial infarction (2017), CVA (2017), HTN, and HLD who presented from [facility] for evaluation of left upper abdominal pain and back pain, fatigue, and unintentional weight loss and was found to have a new " samaria-pancreatic mass. He was admitted to [facility] on 3/9/2020 and underwent an IR-guided retroperitoneal lymph node biopsy on 3/11, results of which were consistent with DLBCL. Double-hit negative; however, given patient's underlying medical comorbidities, he was kept inpatient for initiation of chemotherapy and is now status post Cycle 1 of R-CHOP (3/17) with IT chemo given x1 on 3/19.     IT chemo was held with C2 due to not holding anticoagulation.     He is here today for consideration of C4.     Interim History:   He tolerated chemo well besides his constipation then resulting hemorrhoids and pain. He states that he had to go to the hospital (dont have those records).  He was taking Senna, though only a couple pills a day. He states that his constipation takes about a week to start and this has been true with each cycle of chemo. He is then not able to get control of it and then he was in so much pain that he could not sit or walk. He states that he was miserable and is not sure he wants to do that again.     Currently his stools are looser though not diarrhea. He is not having pain now. Using topical treatments for hemorrhoids.    Left knee is bad again. He states this started being a problem after his stroke. He had improved though it is worse again. He states he is no longer on gabapentin. He is not sure why this was stopped. He states when he went home from the hospital it was no longer on his list so he stopped taking it.     ROS: 10 point ROS neg other than the symptoms noted above in the HPI.      Objective       Will get vitals tomorrow with chemo.     Wt Readings from Last 4 Encounters:   04/29/20 95.3 kg (210 lb 1.6 oz)   04/27/20 95.9 kg (211 lb 8 oz)   04/07/20 95.3 kg (210 lb)   03/23/20 99.3 kg (219 lb)     alert and no distress  Psych: Alert and oriented times 3; coherent speech, normal   rate and volume, able to articulate logical thoughts, able   to abstract reason, no tangential thoughts, no  hallucinations   or delusions      Labs:     Labs were reviewed from last week. Will repeat labs next week      5/15/2020 00:00   Sodium 141   Potassium 3.8   Chloride 107   Carbon Dioxide 26   Urea Nitrogen 8   Creatinine 0.53 (A)   GFR Estimate 176   Calcium 8.3 (A)   Anion Gap 8   Phosphorus 3.7   Uric Acid 5.8   Glucose 108 (A)   WBC 6.1   Hemoglobin 9.1 (A)   Hematocrit 29.5 (A)   Platelet Count 174   MPV 10.8   RBC Count 3.15 (A)   MCV 94   MCH 28.9   MCHC 30.8   RDW 21.1 (A)   % Neutrophils 85.6 (A)   % Lymphocytes 2.8 (A)   % Monocytes 10.6 (A)   % Eosinophils 0.5 (A)   % Basophils 0.5   Abs Neutrophils 5.23   Abs Lymphocytes 0.17 (A)   Abs Monocytes 0.65   Abs Eosinophils 0.03   Abs Basophils 0       Imaging:  No new imaging    Assessment/Plan:    # High Grade Diffuse Large B-cell Lymphoma, Stage IV  Patient initially presented to an OSH with complaints of left upper abdominal pain and low back pain, fatigue, and weight loss of 40 lbs in past 4 months.  CT CAP 3/10 demonstrated a large infiltrative mass in LUQ, arising from spleen and involving the stomach, pancreas, occluding splenic vein.  Also with upper abdominal and retroperitoneal lymphadenopathy present. High grade (exranodal, stage, LDH).    FISH shows rearrangement of BCL6, rearrangement of IGH, loss of BCL2; Double hit negative.  - initiated RCHOP on 3/17 IP due to high risk of TLS and complications. Also received IT chemo (MTX)  -plan for 6 cycles of RCHOP with IT chemo   -will continue with Cycle 4 tomorrow with IT chemo  -will have him follow-up with me next week to manage symptoms, specifically constipation    -will repeat PET in 3 weeks and then follow-up with Dr. Cary      --No ppx abx per Dr. Cary (unless issues with infection). Has now completed allopurinol.      # Normocytic anemia  Presumed related to underlying malignancy with contribution from recent chemotherapy. - Transfuse to keep Hgb >8. He did get 2 transfusions with last  cycle and he was unsure if he felt better clinically after.  -will have him continue to get weekly labs, josefina to watch hgb.  -anemia w/u showed iron 23, retic 5.2 and ferritin >450. Likely anemia of chronic disease due to lymphoma. Had him start iron pills though was then supposed to stop with his high ferritin, though he never did. This likely worsen his constipation. He should stop today.     CV  # Chronic atrial fibrillation   CHADS2-VASc score 4. Previously on anticoagulation with warfarin; held due to drug-drug interactions.   -was on Eliquis 5 mg BID, though insurance denied. Switched him to Xarelto (20 mg daily). Will need to hold anticoagulation 48 hours prior to chemo due to need for LP for IT chemo. 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 Not worse with chemo. Reportedly due to uncontrolled hypertension.  - On carvedilol and atorvastatin (given him a refill of this today since he was out though instructed him to follow-up with cards for all other refills)  - On lisinopril 20 mg daily. Held amlodipine 5 mg and HCTZ 25 mg daily due to soft pressures.   - BP has been better controlled lately. If his BP at home was consistently >140/90s, would add back HCTZ or amlodipine     #Dental pain.   #Mucositis?  -Mouth pain, started prior to chemotherapy. Was supposed see a dentist locally though due to COVID, has not been able to get in. Still persistent though better. [facility] tried to get him an appt and was unsuccessful. Will need to see dentist as soon as they open again, encouraged him to call as dentist have been reopening     #FEN: Has met with Sylvia Castañeda RDN, last on 4/7. Recommended 125 g of protein/day and [id number] kcals. She gave him tips on how to get protein and calories with mouth pain. He has been able to eat more and he is drinking 3-4 Boost per day. Weight has now been stable. Continue to  progress diet as tolerated.     #constipation: Poorly managed. Was taking a couple Senna a day. Discussed with the predictive nature of his constipation, he should start stool softeners next Tuesday. We discussed use of Senna and Miralax. Can take up to 8 pills a day of Senna and there is no max dose on Miralax it is not absorbed systemically; the only side effect is diarrhea and increased flatulence. The goal is to have 1-2 stools each day that are soft and formed  -can use 1/2 bottle of Mg citrate if needed    #hemorrhoids: Improved with topical creams. Continue prn. Should keep stools soft and daily    #left knee pain, from prior stroke: Was on gabapentin 300 mg at bedtime. He is not sure why it was stopped. By chart review, not sure why they would have stopped it. May have just fell off the list and he never refilled? I have no problems with him taking this. I am not sure it will help his knee pain but it is worth a try. Will refill for him today (sent locally)    Phone call duration: 23 minutes    Nicole Giraldo PA-C    Again, thank you for allowing me to participate in the care of your patient.        Sincerely,        Nicole Giraldo PA-C

## 2020-05-19 NOTE — PROGRESS NOTES
"Kenneth Tello is a 49 year old male who is being evaluated via a billable telephone visit.      The patient has been notified of following:     \"This telephone visit will be conducted via a call between you and your physician/provider. We have found that certain health care needs can be provided without the need for a physical exam.  This service lets us provide the care you need with a short phone conversation.  If a prescription is necessary we can send it directly to your pharmacy.  If lab work is needed we can place an order for that and you can then stop by our lab to have the test done at a later time.    Telephone visits are billed at different rates depending on your insurance coverage. During this emergency period, for some insurers they may be billed the same as an in-person visit.  Please reach out to your insurance provider with any questions.    If during the course of the call the physician/provider feels a telephone visit is not appropriate, you will not be charged for this service.\"    Patient has given verbal consent for Telephone visit?  Yes    What phone number would you like to be contacted at? [phone]    How would you like to obtain your AVS? Panchitomanuel     Patient had vitals done recently in the hospital.    Johana Cruz [facility]    "

## 2020-05-20 NOTE — PATIENT INSTRUCTIONS
-Your on-body neulasta injection will start tomorrow (5/21) at 2:15 PM and should finish around 3:00 PM    [facility] Triage and after hours / weekends / holidays:  [phone]    Please call the triage or after hours line if you experience a temperature greater than or equal to 100.5, shaking chills, have uncontrolled nausea, vomiting and/or diarrhea, dizziness, shortness of breath, chest pain, bleeding, unexplained bruising, or if you have any other new/concerning symptoms, questions or concerns.      If you are having any concerning symptoms or wish to speak to a provider before your next infusion visit, please call your care coordinator or triage to notify them so we can adequately serve you.     If you need a refill on a narcotic prescription or other medication, please call before your infusion appointment.           May 2020      [place] Monday Tuesday Wednesday Thursday Friday Saturday                            1     2       3     4     5     6     7     8     9       10     11     12     13     14     15     16       17     18     19    TELEPHONE VISIT RETURN  10:00 AM   (50 min.)   Nicole Giraldo PA-C [facility] 20    [facility] DRAW   7:00 AM   (15 min.)    [facility] DRAW   [facility] Lab Draw    [facility] ONC INFUSION 360   7:30 AM   (360 min.)   [facility]ION   [facility] LUMBAR PUNCTURE   7:35 AM   (50 min.)   Silvia Renae PA-C [facility] 21     22     23       24     25     26     27     28    TELEPHONE VISIT RETURN   4:40 PM   (50 min.)   Nicole Giraldo PA-C [facility] 29 30 31 June 2020 Sunday Monday Tuesday Wednesday Thursday Friday Saturday        1     2     3     4     5     6       7     8    PET ONCOLOGY WHOLE BODY   9:00 AM   (45 min.)   UUPET1   [facility] PET CT 9     10    TELEPHONE VISIT RETURN   8:00 AM    (30 min.)   Avery Cary MD   [facility] 11    [facility] LUMBAR PUNCTURE   9:15 AM   (50 min.)   Stacy Grimes PA-C   [facility] ONC INFUSION 360   9:30 AM   (360 min.)   [facility] ONCOL[facility] INFUSION   [facility] 12     13       14     15     16     17     18     19     20       21     22     23     24     25     26     27       28     29     30                                        Recent Results (from the past 24 hour(s))   Comprehensive metabolic panel    Collection Time: 05/20/20  7:16 AM   Result Value Ref Range    Sodium 142 133 - 144 mmol/L    Potassium 4.1 3.4 - 5.3 mmol/L    Chloride 110 (H) 94 - 109 mmol/L    Carbon Dioxide 26 20 - 32 mmol/L    Anion Gap 7 3 - 14 mmol/L    Glucose 97 70 - 99 mg/dL    Urea Nitrogen 11 7 - 30 mg/dL    Creatinine 0.72 0.66 - 1.25 mg/dL    GFR Estimate >90 >60 mL/min/[1.73_m2]    GFR Estimate If Black >90 >60 mL/min/[1.73_m2]    Calcium 8.6 8.5 - 10.1 mg/dL    Bilirubin Total 0.4 0.2 - 1.3 mg/dL    Albumin 2.9 (L) 3.4 - 5.0 g/dL    Protein Total 6.5 (L) 6.8 - 8.8 g/dL    Alkaline Phosphatase 88 40 - 150 U/L    ALT 20 0 - 70 U/L    AST 12 0 - 45 U/L   Reticulocyte Count    Collection Time: 05/20/20  7:16 AM   Result Value Ref Range    % Retic 3.4 (H) 0.5 - 2.0 %    Absolute Retic 105.5 (H) 25 - 95 10e9/L   CBC with platelets differential    Collection Time: 05/20/20  7:16 AM   Result Value Ref Range    WBC 7.0 4.0 - 11.0 10e9/L    RBC Count 3.12 (L) 4.4 - 5.9 10e12/L    Hemoglobin 9.1 (L) 13.3 - 17.7 g/dL    Hematocrit 29.1 (L) 40.0 - 53.0 %    MCV 93 78 - 100 fl    MCH 29.2 26.5 - 33.0 pg    MCHC 31.3 (L) 31.5 - 36.5 g/dL    RDW 20.7 (H) 10.0 - 15.0 %    Platelet Count 174 150 - 450 10e9/L    Diff Method Automated Method     % Neutrophils 81.9 %    % Lymphocytes 5.3 %    % Monocytes 11.2 %    % Eosinophils 0.0 %    % Basophils 0.6 %    % Immature Granulocytes 1.0 %    Nucleated RBCs 0 0 /100    Absolute  Neutrophil 5.7 1.6 - 8.3 10e9/L    Absolute Lymphocytes 0.4 (L) 0.8 - 5.3 10e9/L    Absolute Monocytes 0.8 0.0 - 1.3 10e9/L    Absolute Eosinophils 0.0 0.0 - 0.7 10e9/L    Absolute Basophils 0.0 0.0 - 0.2 10e9/L    Abs Immature Granulocytes 0.1 0 - 0.4 10e9/L    Absolute Nucleated RBC 0.0          Patient Education     Normal Lumbar Puncture    You have had a lumbar puncture. This test is also called a spinal tap. Your test results were normal. This means that you do not have any sign of infection or any other abnormality in your spinal fluid. It will be safe for you to go home.  Home care  Follow these tips when caring for yourself at home:    Once at home, rest as directed by your healthcare provider.    You may develop a headache that will normally go away on its own in 1 to 2 days. You should find relief from this pain if you lie down.     You may also use acetaminophen or ibuprofen for pain relief, unless another medicine was prescribed. Note: If you have chronic liver or kidney disease, talk with your healthcare provider before taking these medicines. Also talk with your provider if you ve had a stomach ulcer or gastrointestinal bleeding. Don t give aspirin to anyone younger than 18 years old who is ill with a fever. It may cause severe liver damage.  Follow-up care  Follow up with your healthcare provider, or as advised.   When to seek medical advice  Call your healthcare provider right away if any of these occur:    Head or neck pain that does not go away or gets worse    You feel less alert or have difficulty waking up    Repeated nausea or vomiting    Swelling, pain, bruising or redness at the puncture site    Fever of 100.4 F (38  C) or higher, or as advised  Date Last Reviewed: 8/1/2016 2000-2019 [employer] [address] [place] [address], [place] [address]. All rights reserved. This information is not intended as a substitute for professional medical care. Always follow your healthcare  professional's instructions.

## 2020-05-20 NOTE — LETTER
5/20/2020         RE: Kenneth Tello  [address]  [place] [address]        Dear Colleague,    Thank you for referring your patient, Kenneth Tello, to the [facility]. Please see a copy of my visit note below.    BMT ONC Adult Lumbar Puncture and Intrathecal Chemotherapy Administration Procedure Note    May 20, 2020    Procedure: Lumbar Puncture to obtain CSF and give intrathecal chemotherapy    Diagnosis: DLBCL    Learning needs assessment complete within 12 months: YES     Vitals reviewed:  YES    Informed Consent: Procedure, benefits, risks, and alternatives explained to the patient who voiced understanding of the information and agreed to proceed with lumbar puncture. Risks include bleeding, headache, and or infection.   Patient safety checklist completed.    Labs: Reviewed:     Lab Results   Component Value Date    INR 1.31 03/19/2020     05/20/2020        Other HELD xarelto x 48 hours     Description:. The patient was seated at the bedside with knees dangling. The L4-5 disc space was prepped and draped in a sterile fashion. Local anesthesia with 3 mL 1% preservative-free lidocaine was used. A 22 gauge, 4 inch needle was placed on the first  attempt.  9 mL spinal fluid collected. Specimen appears clear/colorless. Specimen sent for cell count and differential, protein, glucose and flow cytometry.     Methotrexate in 6 mL of 0.9% preservative free sodium chloride was administered intrathecally slowly in a barbotage fashion.  The needle was removed and a bandage was applied to the site.  Chemotherapy double-check was performed per institutional policy.  Patient tolerated well.  Post-procedure pain assessment: 0 out of 10 on the numeric pain rating scale  Interventions: No    Complications: No     Disposition: Patient will remain on back for 1 hour post procedure. Avoid soaking in a tub tonight.  Call if develops headaches, fevers and or chills.     Performed  by:   Silvia Renae PA-C    Again, thank you for allowing me to participate in the care of your patient.        Sincerely,        Silvia Renae PA-C

## 2020-05-20 NOTE — ORAL ONC MGMT
[facility]  When fulfilling Kenneth's retail medication requests in clinic, I noticed that the dosing of his gabapentin changed from 300mg once daily at bedtime to 300mg three times daily. Patient had discontinued gabapentin at discharge about a month ago and Nicole had restarted his prescription yesterday. I inbasketed Nicole to ask if the new dosing was intentional since it was unclear from her note. If so, titration up may be advised. I had not heard back from Nicole so I talked to Carmen Johansen who is covering for Heather Campos. We agreed to tell him to take one 300mg tablet tonight if he picks it up, and the dose will be clarified to him tomorrow. I called the patient and he is okay with plan. Care team to follow up tomorrow.   Leslee Gardner  Pharmacy Intern  [facility]  [phone]

## 2020-05-20 NOTE — PROGRESS NOTES
Provider Note:   May 19, 2020     Oncology History:   Kenneth Tello is a 49 year old male with a history notable for atrial fibrillation (previously on Warfarin), myocardial infarction (2017), CVA (2017), HTN, and HLD who presented from [facility] for evaluation of left upper abdominal pain and back pain, fatigue, and unintentional weight loss and was found to have a new samaria-pancreatic mass. He was admitted to [facility] on 3/9/2020 and underwent an IR-guided retroperitoneal lymph node biopsy on 3/11, results of which were consistent with DLBCL. Double-hit negative; however, given patient's underlying medical comorbidities, he was kept inpatient for initiation of chemotherapy and is now status post Cycle 1 of R-CHOP (3/17) with IT chemo given x1 on 3/19.     IT chemo was held with C2 due to not holding anticoagulation.     He is here today for consideration of C4.     Interim History:   He tolerated chemo well besides his constipation then resulting hemorrhoids and pain. He states that he had to go to the hospital (dont have those records).  He was taking Senna, though only a couple pills a day. He states that his constipation takes about a week to start and this has been true with each cycle of chemo. He is then not able to get control of it and then he was in so much pain that he could not sit or walk. He states that he was miserable and is not sure he wants to do that again.     Currently his stools are looser though not diarrhea. He is not having pain now. Using topical treatments for hemorrhoids.    Left knee is bad again. He states this started being a problem after his stroke. He had improved though it is worse again. He states he is no longer on gabapentin. He is not sure why this was stopped. He states when he went home from the hospital it was no longer on his list so he stopped taking it.     ROS: 10 point ROS neg other than the symptoms noted above in the HPI.      Objective       Will get  vitals tomorrow with chemo.     Wt Readings from Last 4 Encounters:   04/29/20 95.3 kg (210 lb 1.6 oz)   04/27/20 95.9 kg (211 lb 8 oz)   04/07/20 95.3 kg (210 lb)   03/23/20 99.3 kg (219 lb)     alert and no distress  Psych: Alert and oriented times 3; coherent speech, normal   rate and volume, able to articulate logical thoughts, able   to abstract reason, no tangential thoughts, no hallucinations   or delusions      Labs:     Labs were reviewed from last week. Will repeat labs next week      5/15/2020 00:00   Sodium 141   Potassium 3.8   Chloride 107   Carbon Dioxide 26   Urea Nitrogen 8   Creatinine 0.53 (A)   GFR Estimate 176   Calcium 8.3 (A)   Anion Gap 8   Phosphorus 3.7   Uric Acid 5.8   Glucose 108 (A)   WBC 6.1   Hemoglobin 9.1 (A)   Hematocrit 29.5 (A)   Platelet Count 174   MPV 10.8   RBC Count 3.15 (A)   MCV 94   MCH 28.9   MCHC 30.8   RDW 21.1 (A)   % Neutrophils 85.6 (A)   % Lymphocytes 2.8 (A)   % Monocytes 10.6 (A)   % Eosinophils 0.5 (A)   % Basophils 0.5   Abs Neutrophils 5.23   Abs Lymphocytes 0.17 (A)   Abs Monocytes 0.65   Abs Eosinophils 0.03   Abs Basophils 0       Imaging:  No new imaging    Assessment/Plan:    # High Grade Diffuse Large B-cell Lymphoma, Stage IV  Patient initially presented to an OSH with complaints of left upper abdominal pain and low back pain, fatigue, and weight loss of 40 lbs in past 4 months.  CT CAP 3/10 demonstrated a large infiltrative mass in LUQ, arising from spleen and involving the stomach, pancreas, occluding splenic vein.  Also with upper abdominal and retroperitoneal lymphadenopathy present. High grade (exranodal, stage, LDH).    FISH shows rearrangement of BCL6, rearrangement of IGH, loss of BCL2; Double hit negative.  - initiated RCHOP on 3/17 IP due to high risk of TLS and complications. Also received IT chemo (MTX)  -plan for 6 cycles of RCHOP with IT chemo   -will continue with Cycle 4 tomorrow with IT chemo  -will have him follow-up with me next week  to manage symptoms, specifically constipation    -will repeat PET in 3 weeks and then follow-up with Dr. Cary      --No ppx abx per Dr. Cary (unless issues with infection). Has now completed allopurinol.      # Normocytic anemia  Presumed related to underlying malignancy with contribution from recent chemotherapy. - Transfuse to keep Hgb >8. He did get 2 transfusions with last cycle and he was unsure if he felt better clinically after.  -will have him continue to get weekly labs, josefina to watch hgb.  -anemia w/u showed iron 23, retic 5.2 and ferritin >450. Likely anemia of chronic disease due to lymphoma. Had him start iron pills though was then supposed to stop with his high ferritin, though he never did. This likely worsen his constipation. He should stop today.     CV  # Chronic atrial fibrillation   CHADS2-VASc score 4. Previously on anticoagulation with warfarin; held due to drug-drug interactions.   -was on Eliquis 5 mg BID, though insurance denied. Switched him to Xarelto (20 mg daily). Will need to hold anticoagulation 48 hours prior to chemo due to need for LP for IT chemo. 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 Not worse with chemo. Reportedly due to uncontrolled hypertension.  - On carvedilol and atorvastatin (given him a refill of this today since he was out though instructed him to follow-up with cards for all other refills)  - On lisinopril 20 mg daily. Held amlodipine 5 mg and HCTZ 25 mg daily due to soft pressures.   - BP has been better controlled lately. If his BP at home was consistently >140/90s, would add back HCTZ or amlodipine     #Dental pain.   #Mucositis?  -Mouth pain, started prior to chemotherapy. Was supposed see a dentist locally though due to COVID, has not been able to get in. Still persistent though better. [facility] tried to get him an appt and was unsuccessful. Will need  to see dentist as soon as they open again, encouraged him to call as dentist have been reopening     #FEN: Has met with Sylvia Castañeda RDN, last on 4/7. Recommended 125 g of protein/day and [id number] kcals. She gave him tips on how to get protein and calories with mouth pain. He has been able to eat more and he is drinking 3-4 Boost per day. Weight has now been stable. Continue to progress diet as tolerated.     #constipation: Poorly managed. Was taking a couple Senna a day. Discussed with the predictive nature of his constipation, he should start stool softeners next Tuesday. We discussed use of Senna and Miralax. Can take up to 8 pills a day of Senna and there is no max dose on Miralax it is not absorbed systemically; the only side effect is diarrhea and increased flatulence. The goal is to have 1-2 stools each day that are soft and formed  -can use 1/2 bottle of Mg citrate if needed    #hemorrhoids: Improved with topical creams. Continue prn. Should keep stools soft and daily    #left knee pain, from prior stroke: Was on gabapentin 300 mg at bedtime. He is not sure why it was stopped. By chart review, not sure why they would have stopped it. May have just fell off the list and he never refilled? I have no problems with him taking this. I am not sure it will help his knee pain but it is worth a try. Will refill for him today (sent locally)    Phone call duration: 23 minutes    Nicole Giraldo PA-C

## 2020-05-20 NOTE — NURSING NOTE
PIV placed in left hand  and labs drawn by rn. Pt tolerated well.  VS taken.  Pt checked in for next appt.    Mai Mann RN

## 2020-05-20 NOTE — PROGRESS NOTES
BMT ONC Adult Lumbar Puncture and Intrathecal Chemotherapy Administration Procedure Note    May 20, 2020    Procedure: Lumbar Puncture to obtain CSF and give intrathecal chemotherapy    Diagnosis: DLBCL    Learning needs assessment complete within 12 months: YES     Vitals reviewed:  YES    Informed Consent: Procedure, benefits, risks, and alternatives explained to the patient who voiced understanding of the information and agreed to proceed with lumbar puncture. Risks include bleeding, headache, and or infection.   Patient safety checklist completed.    Labs: Reviewed:     Lab Results   Component Value Date    INR 1.31 03/19/2020     05/20/2020        Other HELD xarelto x 48 hours     Description:. The patient was seated at the bedside with knees dangling. The L4-5 disc space was prepped and draped in a sterile fashion. Local anesthesia with 3 mL 1% preservative-free lidocaine was used. A 22 gauge, 4 inch needle was placed on the first  attempt.  9 mL spinal fluid collected. Specimen appears clear/colorless. Specimen sent for cell count and differential, protein, glucose and flow cytometry.     Methotrexate in 6 mL of 0.9% preservative free sodium chloride was administered intrathecally slowly in a barbotage fashion.  The needle was removed and a bandage was applied to the site.  Chemotherapy double-check was performed per institutional policy.  Patient tolerated well.  Post-procedure pain assessment: 0 out of 10 on the numeric pain rating scale  Interventions: No    Complications: No     Disposition: Patient will remain on back for 1 hour post procedure. Avoid soaking in a tub tonight.  Call if develops headaches, fevers and or chills.     Performed by:   Silvia Renae PA-C

## 2020-05-20 NOTE — PROGRESS NOTES
Infusion Nursing Note:  Kenneth Tello presents today for cycle 4, day 1 rapid rituxan, adriamycin, vincristine, cytoxan, lumbar puncture, intrathecal methotrexate   Patient seen by provider today: No  Patient had a visit with JAMISON Koo yesterday and denies any changes   present during visit today: Not Applicable.    Note: N/A.    Intravenous Access:  Peripheral IV placed in lab    Treatment Conditions:  Lab Results   Component Value Date    HGB 9.1 05/20/2020     Lab Results   Component Value Date    WBC 7.0 05/20/2020      Lab Results   Component Value Date    ANEU 5.7 05/20/2020     Lab Results   Component Value Date     05/20/2020      Lab Results   Component Value Date     05/20/2020                   Lab Results   Component Value Date    POTASSIUM 4.1 05/20/2020           Lab Results   Component Value Date    MAG 2.1 03/19/2020            Lab Results   Component Value Date    CR 0.72 05/20/2020                   Lab Results   Component Value Date    STACY 8.6 05/20/2020                Lab Results   Component Value Date    BILITOTAL 0.4 05/20/2020           Lab Results   Component Value Date    ALBUMIN 2.9 05/20/2020                    Lab Results   Component Value Date    ALT 20 05/20/2020           Lab Results   Component Value Date    AST 12 05/20/2020       Results reviewed, labs MET treatment parameters, ok to proceed with treatment.  ECHO/MUGA completed 3/10/20  EF 60-65%.      Procedure:  Hematology Procedure: Lumbar puncture performed by JAMISON Callaway.  Risks and benefits of procedure were explained to patient by JAMISON Callaway.  Patient verbalized understanding.   Patient has a  and the consent form has been signed, prior to any medications given for this procedure.   Medications given prior to procedure: none.       Post Procedure:  Patient tolerated the procedure without incident..  Patient laid flat for 30 minutes post procedure.  Patient is tolerating  oral fluids prior to discharge.   Dressing clean, dry and intact prior to discharge.    Patient instructed to keep dressing on for 24 hours and to notify MD with temperature, increased pain, redness or drainage at site.    Post Pain Assessment: 0 out of 10.      Post Infusion Assessment:  Patient tolerated infusion without incident.  Blood return noted pre and post infusion.  Blood return noted during adriamycin administration every 2 ml and pre, mid, and post vincristine infusion by gravity  Site patent and intact, free from redness, edema or discomfort.  No evidence of extravasations.  Access discontinued per protocol.     Neulasta Onpro On-Body injector applied to right abdomen at 1115 with light facing toward navel.  Writer discussed Neulasta injection would start tomorrow at 1415, approximately 27 hours after application applied today.  Written and Verbal instruction reviewed with patient.  Pt instructed when the dose delivery starts, it will take about 45 minutes to complete.  Pt aware Neulasta Onpro On-Body should have green flashing light and to call triage or on-call MD if injector flashes red or appears to be leaking. Pt aware to keep Onpro On-Body  Neulasta 4 inches away from electrical equipment and to avoid showering 4 hours prior to injection.       Discharge Plan:   Prescription refills given for prednisone.  Patient will  gabapentin at his local pharmacy.  Discharge instructions reviewed with: Patient.  Patient and/or family verbalized understanding of discharge instructions and all questions answered.  Copy of AVS reviewed with patient and/or family.  Patient will return 5/28/20 for next appointment.  Patient discharged in stable condition accompanied by: self.  Departure Mode: Ambulatory.  Face to Face time: 0.    Rossi Soni RN

## 2020-05-26 NOTE — PROGRESS NOTES
Today's labs, platelets at 51 but on the way down. Jason called, MADISON/VIELKA on identified VM asking him to hold his Xarlelto. Will call back tomorrow to confirm he got the message and go over other labs. Hgb at 7.9. Does he want/need a transfusion. Transfusions done at local clinic in [place].     5/28/2020-Writer still has not been able to directly contact Kenneth. Left another message on his VM asking him to call writer to check in, especially if he is having any problems. Last time writer called him he was OK with a Hgb at 7.9  Reminded him again we want him to be hold his Xarletto.

## 2020-05-26 NOTE — TELEPHONE ENCOUNTER
DATE:  5/26/2020   TIME OF RECEIPT FROM LAB:  11:18 AM from [facility]  LAB TEST:  Wbc 1.2, hgb 7.9  RESULTS GIVEN WITH READ-BACK TO (PROVIDER):  AVERY CHAUDHARI  TIME LAB VALUE REPORTED TO PROVIDER:   Paged 11:19 AM  Plt 51    Results acknowledged by provider, video visit on Thursday 5/28.

## 2020-05-28 NOTE — PROGRESS NOTES
Provider Note:   May 28, 2020     Oncology History:   Kenneth Tello is a 49 year old male with a history notable for atrial fibrillation (previously on Warfarin), myocardial infarction (2017), CVA (2017), HTN, and HLD who presented from [facility] for evaluation of left upper abdominal pain and back pain, fatigue, and unintentional weight loss and was found to have a new samaria-pancreatic mass. He was admitted to [facility] on 3/9/2020 and underwent an IR-guided retroperitoneal lymph node biopsy on 3/11, results of which were consistent with DLBCL. Double-hit negative; however, given patient's underlying medical comorbidities, he was kept inpatient for initiation of chemotherapy and is now status post Cycle 1 of R-CHOP (3/17) with IT chemo given x1 on 3/19.     IT chemo was held with C2 due to not holding anticoagulation.     Has completed 4 cycles of RCHOP now.    Interim History:   He has been able to managed his stools much better this cycle with Miralax and this has made his tolerance of chemo much better. He states that if it was like this previously, he would not have considered stopping chemo. Currently he is using about 80 g of Miralax a day (4 doses). He is having diarrhea. He states he has been able to eat and drink more now because his bowels are moving. No abdominal pain. No hemorrhoid pain. No f/c. Breathing well.     Steroids make him hungry and have insomnia. Improving now.     Restarted the gabapentin. Didn't really help with the knee.     ROS: 10 point ROS neg other than the symptoms noted above in the HPI.      Objective      Wt Readings from Last 4 Encounters:   05/20/20 94.6 kg (208 lb 9.6 oz)   04/29/20 95.3 kg (210 lb 1.6 oz)   04/27/20 95.9 kg (211 lb 8 oz)   04/07/20 95.3 kg (210 lb)     alert and no distress  Psych: Alert and oriented times 3; coherent speech, normal   rate and volume, able to articulate logical thoughts, able   to abstract reason, no tangential thoughts, no  hallucinations   or delusions      Labs:   Labs were reviewed from last week. Will repeat labs next week      5/26/2020 00:00   Sodium 144   Potassium 3.5   Chloride 108 (A)   Carbon Dioxide 28   Urea Nitrogen 21   Creatinine 0.51 (A)   GFR Estimate 184   Calcium 8.60 (A)   Anion Gap 8   Phosphorus 4.5   4-Hydroxyphenylacetic 1.05 (A)   Uric Acid 5.4   Glucose 112 (A)   WBC 1.2 (A)   Hemoglobin 7.9 (A)   Hematocrit 26.2 (A)   Platelet Count 51 (A)   MPV 8.8   RBC Count 2.76 (A)   MCV 95   MCH 28.6   MCHC 30.2   RDW 19.1 (A)   % Neutrophils 85.4 (A)   % Lymphocytes 10.6 (A)   % Monocytes 1.6 (A)   % Eosinophils 1.6   % Basophils 0.8   Absolute Lymphocytes 0.13 (A)   Absolute Monocytes 0.02 (A)   Absolute Eosinophils 0.02   Absolute Basophils 0.0       Imaging:  No new imaging    Assessment/Plan:    # High Grade Diffuse Large B-cell Lymphoma, Stage IV  Patient initially presented to an OSH with complaints of left upper abdominal pain and low back pain, fatigue, and weight loss of 40 lbs in past 4 months.  CT CAP 3/10 demonstrated a large infiltrative mass in LUQ, arising from spleen and involving the stomach, pancreas, occluding splenic vein.  Also with upper abdominal and retroperitoneal lymphadenopathy present. High grade (exranodal, stage, LDH).    FISH shows rearrangement of BCL6, rearrangement of IGH, loss of BCL2; Double hit negative.  - initiated RCHOP on 3/17 IP due to high risk of TLS and complications. Also received IT chemo (MTX)  -plan for 6 cycles of RCHOP with IT chemo   -s/p 4 cycles or RCHOP. This cycle was much better with better stool/constipation management  -will repeat PET in 2 weeks and then follow-up with Dr. Cary      --No ppx abx per Dr. Cary (unless issues with infection). Has now completed allopurinol.      # Normocytic anemia  Presumed related to underlying malignancy with contribution from recent chemotherapy. - Transfuse to keep Hgb >8. He did get 2 transfusions with last cycle  and he was unsure if he felt better clinically after.  -will have him continue to get weekly labs, josefina to watch hgb.  -anemia w/u showed iron 23, retic 5.2 and ferritin >450. Likely anemia of chronic disease due to lymphoma.     CV  # Chronic atrial fibrillation   CHADS2-VASc score 4. Previously on anticoagulation with warfarin; held due to drug-drug interactions.   -was on Eliquis 5 mg BID, though insurance denied. Switched him to Xarelto (20 mg daily). Will need to hold anticoagulation 48 hours prior to chemo due to need for LP for IT chemo. Also hold if plt <50     #FEN: Has met with Sylvia Castañeda RDN, last on 4/7. Recommended 125 g of protein/day and [id number] kcals. She gave him tips on how to get protein and calories with mouth pain. He has been able to eat more and he is drinking 3-4 Boost per day. Appetite better with better control of stools    #constipation: Has been utilizing Miralax with much better control. Currently taking about 4 doses a day. His stools are diarrhea now, though he states he prefers this to what he has experienced previously. Discussed that we do not want his stool too loose to too long as then he is going to loose lytes and fluid. Should start to taper back on dosing in the next day or so. Should make sure he is getting at least 64 oz of fluids, if not more and continue pushing good PO intake.     #hemorrhoids: Improved with topical creams. Since stools have been soft, no reoccurrence of symptoms. Can use creams prn    #left knee pain, from prior stroke: Was on gabapentin 300 mg at bedtime. Unsure why this was stopped. Restarted as he felt it may have helped his knee pain. Taking 300 mg at bedtime and this hasn't seemed to make much difference. Could try increasing, though I am unsure if this is going to provide him much benefit    Phone call duration: 8 minutes    Nicole Giraldo PA-C

## 2020-05-28 NOTE — LETTER
"  5/28/2020       RE: Kenneth Tello  [address]  [facility] [address]      Dear Colleague,    Thank you for referring your patient, Kenneth Tello, to the [facility]. Please see a copy of my visit note below.    Kenneth Tello is a 49 year old male who is being evaluated via a billable telephone visit.      The patient has been notified of following:     \"This telephone visit will be conducted via a call between you and your physician/provider. We have found that certain health care needs can be provided without the need for a physical exam.  This service lets us provide the care you need with a short phone conversation.  If a prescription is necessary we can send it directly to your pharmacy.  If lab work is needed we can place an order for that and you can then stop by our lab to have the test done at a later time.    Telephone visits are billed at different rates depending on your insurance coverage. During this emergency period, for some insurers they may be billed the same as an in-person visit.  Please reach out to your insurance provider with any questions.    If during the course of the call the physician/provider feels a telephone visit is not appropriate, you will not be charged for this service.\"    Patient has given verbal consent for Telephone visit?  Yes    What phone number would you like to be contacted at? [phone]    How would you like to obtain your AVS? Tana Deleon LPN    Provider Note:   May 28, 2020     Oncology History:   Kenneth Tello is a 49 year old male with a history notable for atrial fibrillation (previously on Warfarin), myocardial infarction (2017), CVA (2017), HTN, and HLD who presented from [facility] for evaluation of left upper abdominal pain and back pain, fatigue, and unintentional weight loss and was found to have a new samaria-pancreatic mass. He was admitted to [facility] on 3/9/2020 and underwent " an IR-guided retroperitoneal lymph node biopsy on 3/11, results of which were consistent with DLBCL. Double-hit negative; however, given patient's underlying medical comorbidities, he was kept inpatient for initiation of chemotherapy and is now status post Cycle 1 of R-CHOP (3/17) with IT chemo given x1 on 3/19.     IT chemo was held with C2 due to not holding anticoagulation.     Has completed 4 cycles of RCHOP now.    Interim History:   He has been able to managed his stools much better this cycle with Miralax and this has made his tolerance of chemo much better. He states that if it was like this previously, he would not have considered stopping chemo. Currently he is using about 80 g of Miralax a day (4 doses). He is having diarrhea. He states he has been able to eat and drink more now because his bowels are moving. No abdominal pain. No hemorrhoid pain. No f/c. Breathing well.     Steroids make him hungry and have insomnia. Improving now.     Restarted the gabapentin. Didn't really help with the knee.     ROS: 10 point ROS neg other than the symptoms noted above in the HPI.      Objective      Wt Readings from Last 4 Encounters:   05/20/20 94.6 kg (208 lb 9.6 oz)   04/29/20 95.3 kg (210 lb 1.6 oz)   04/27/20 95.9 kg (211 lb 8 oz)   04/07/20 95.3 kg (210 lb)     alert and no distress  Psych: Alert and oriented times 3; coherent speech, normal   rate and volume, able to articulate logical thoughts, able   to abstract reason, no tangential thoughts, no hallucinations   or delusions      Labs:   Labs were reviewed from last week. Will repeat labs next week      5/26/2020 00:00   Sodium 144   Potassium 3.5   Chloride 108 (A)   Carbon Dioxide 28   Urea Nitrogen 21   Creatinine 0.51 (A)   GFR Estimate 184   Calcium 8.60 (A)   Anion Gap 8   Phosphorus 4.5   4-Hydroxyphenylacetic 1.05 (A)   Uric Acid 5.4   Glucose 112 (A)   WBC 1.2 (A)   Hemoglobin 7.9 (A)   Hematocrit 26.2 (A)   Platelet Count 51 (A)   MPV 8.8   RBC  Count 2.76 (A)   MCV 95   MCH 28.6   MCHC 30.2   RDW 19.1 (A)   % Neutrophils 85.4 (A)   % Lymphocytes 10.6 (A)   % Monocytes 1.6 (A)   % Eosinophils 1.6   % Basophils 0.8   Absolute Lymphocytes 0.13 (A)   Absolute Monocytes 0.02 (A)   Absolute Eosinophils 0.02   Absolute Basophils 0.0       Imaging:  No new imaging    Assessment/Plan:    # High Grade Diffuse Large B-cell Lymphoma, Stage IV  Patient initially presented to an OSH with complaints of left upper abdominal pain and low back pain, fatigue, and weight loss of 40 lbs in past 4 months.  CT CAP 3/10 demonstrated a large infiltrative mass in LUQ, arising from spleen and involving the stomach, pancreas, occluding splenic vein.  Also with upper abdominal and retroperitoneal lymphadenopathy present. High grade (exranodal, stage, LDH).    FISH shows rearrangement of BCL6, rearrangement of IGH, loss of BCL2; Double hit negative.  - initiated RCHOP on 3/17 IP due to high risk of TLS and complications. Also received IT chemo (MTX)  -plan for 6 cycles of RCHOP with IT chemo   -s/p 4 cycles or RCHOP. This cycle was much better with better stool/constipation management  -will repeat PET in 2 weeks and then follow-up with Dr. Cary      --No ppx abx per Dr. Cary (unless issues with infection). Has now completed allopurinol.      # Normocytic anemia  Presumed related to underlying malignancy with contribution from recent chemotherapy. - Transfuse to keep Hgb >8. He did get 2 transfusions with last cycle and he was unsure if he felt better clinically after.  -will have him continue to get weekly labs, josefina to watch hgb.  -anemia w/u showed iron 23, retic 5.2 and ferritin >450. Likely anemia of chronic disease due to lymphoma.     CV  # Chronic atrial fibrillation   CHADS2-VASc score 4. Previously on anticoagulation with warfarin; held due to drug-drug interactions.   -was on Eliquis 5 mg BID, though insurance denied. Switched him to Xarelto (20 mg daily). Will need  to hold anticoagulation 48 hours prior to chemo due to need for LP for IT chemo. Also hold if plt <50     #FEN: Has met with Sylvia Castañeda RDN, last on 4/7. Recommended 125 g of protein/day and [id number] kcals. She gave him tips on how to get protein and calories with mouth pain. He has been able to eat more and he is drinking 3-4 Boost per day. Appetite better with better control of stools    #constipation: Has been utilizing Miralax with much better control. Currently taking about 4 doses a day. His stools are diarrhea now, though he states he prefers this to what he has experienced previously. Discussed that we do not want his stool too loose to too long as then he is going to loose lytes and fluid. Should start to taper back on dosing in the next day or so. Should make sure he is getting at least 64 oz of fluids, if not more and continue pushing good PO intake.     #hemorrhoids: Improved with topical creams. Since stools have been soft, no reoccurrence of symptoms. Can use creams prn    #left knee pain, from prior stroke: Was on gabapentin 300 mg at bedtime. Unsure why this was stopped. Restarted as he felt it may have helped his knee pain. Taking 300 mg at bedtime and this hasn't seemed to make much difference. Could try increasing, though I am unsure if this is going to provide him much benefit    Phone call duration: 8 minutes    Nicole Giraldo PA-C

## 2020-05-28 NOTE — PROGRESS NOTES
"Kenneth Tello is a 49 year old male who is being evaluated via a billable telephone visit.      The patient has been notified of following:     \"This telephone visit will be conducted via a call between you and your physician/provider. We have found that certain health care needs can be provided without the need for a physical exam.  This service lets us provide the care you need with a short phone conversation.  If a prescription is necessary we can send it directly to your pharmacy.  If lab work is needed we can place an order for that and you can then stop by our lab to have the test done at a later time.    Telephone visits are billed at different rates depending on your insurance coverage. During this emergency period, for some insurers they may be billed the same as an in-person visit.  Please reach out to your insurance provider with any questions.    If during the course of the call the physician/provider feels a telephone visit is not appropriate, you will not be charged for this service.\"    Patient has given verbal consent for Telephone visit?  Yes    What phone number would you like to be contacted at? [phone]    How would you like to obtain your AVS? Tana Deleon LPN  "

## 2020-05-29 NOTE — PROGRESS NOTES
Called Kenneth to check status and make sure he had received my message left earlier in week. Pain in back overnight. Lower back to shoulders, took a tylenol, was awake for a few hours, fell asleep. When awoke this am, he felt better, not gone but better. Not sure what triggered it. He did get my message earlier this week. Stopped his xarelto. Feels good otherwise. Does not need a transfusion for Hgb of 7.9. Feels much better this cycle. Constipation not an issue with the Mirelax. Made the difference.    He will have labs on Monday. Will call if back pain get worse or go to the local ER which is across the street.

## 2020-06-04 NOTE — PROGRESS NOTES
Writer called Kenneth Tello to go over this weeks labs and med's. Platelets still remain around 50 so per Doctor Raeann he is to continue to hold his Xarelto. His ANC  is good so if he was still on, he was not sure, he can stop his ppx Levaquin and  Fluconazole.    He is feeling very well. Hgb was last at 7.7. He does not want a transfusion for this. He is due for a PET scan on Monday, visit with Raeann on Wednesday and infusion on Thursday of next week.     Will get labs prior to PET on Monday, writer will request.     Writer will continue to follow.

## 2020-06-08 NOTE — PROGRESS NOTES
Credit Disability Claim paperwork received from patient from [employer] - Credit Insurance Claims Department. Will be placed in provider folder for signature upon completion.     Fax: [phone]    Jo Millard CMA

## 2020-06-10 NOTE — PROGRESS NOTES
"Kenneth Tello is a 49 year old male who is being evaluated via a billable telephone visit.      The patient has been notified of following:     \"This telephone visit will be conducted via a call between you and your physician/provider. We have found that certain health care needs can be provided without the need for a physical exam.  This service lets us provide the care you need with a short phone conversation.  If a prescription is necessary we can send it directly to your pharmacy.  If lab work is needed we can place an order for that and you can then stop by our lab to have the test done at a later time.    Telephone visits are billed at different rates depending on your insurance coverage. During this emergency period, for some insurers they may be billed the same as an in-person visit.  Please reach out to your insurance provider with any questions.    If during the course of the call the physician/provider feels a telephone visit is not appropriate, you will not be charged for this service.\"    Patient has given verbal consent for Telephone visit?  Yes    What phone number would you like to be contacted at? [phone]    How would you like to obtain your AVS? MyChart     I have reviewed and updated the patient's allergies and medication list.    Refills: Prednisone    Osman Fortune, HARRY    REASON FOR VISIT:  Management of diffuse large B cell lymphoma (DLBCL)    HISTORY OF PRESENT ILLNESS:  Mr. Tello is a 49 year old man with DLBCL.  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  DLBCL-IPI was high (stage, extranodal sites, LDH) and clinical stage IV with extranodal involvement.  He was started on R-CHOP in 03/2020 with IT methotrexate planned with each cycle for CNS prophylaxis.  Medical history also " notable for A fib on anticoagulation, myocardial infarction, and stroke in 2017.  Course has been complicated by constipation but otherwise well tolerated.  Visit for ongoing management.      Energy level and appetite wax and wane as expected  Weight stable.  No fevers, chills, or night sweats.  No headache, vision changes, focal weakness or sensory changes.  No dyspnea, cough, or chest pain.  Bowels improved with more aggressive MiraLax, previously quite constipated.  No rectal pain or blood in stool although was an issues a while back.  No urinary complaints.  No bleeding, bruising, or skin rashes.  Knee pain/weakness midcycle that improves with recovery.  No lumps or bumps.  Overall, managing well.    REVIEW OF SYSTEMS:  A complete review of systems was negative other than noted.    MEDICATIONS:  Current Outpatient Medications   Medication     acyclovir (ZOVIRAX) 400 MG tablet     atorvastatin (LIPITOR) 40 MG tablet     carvedilol (COREG) 12.5 MG tablet     FEROSUL 325 (65 Fe) MG tablet     fluconazole (DIFLUCAN) 100 MG tablet     gabapentin (NEURONTIN) 300 MG capsule     levofloxacin (LEVAQUIN) 500 MG tablet     lisinopril (ZESTRIL) 20 MG tablet     omeprazole (PRILOSEC) 40 MG DR capsule     polyethylene glycol (MIRALAX) 17 g packet     potassium chloride ER (K-TAB/KLOR-CON) 10 MEQ CR tablet     prochlorperazine (COMPAZINE) 10 MG tablet     rivaroxaban ANTICOAGULANT (XARELTO ANTICOAGULANT) 20 MG TABS tablet     senna-docusate (SENOKOT-S/PERICOLACE) 8.6-50 MG tablet     No current facility-administered medications for this visit.      PHYSICAL EXAMINATION:  There were no vitals taken for this visit.  Wt Readings from Last 5 Encounters:   05/20/20 94.6 kg (208 lb 9.6 oz)   04/29/20 95.3 kg (210 lb 1.6 oz)   04/27/20 95.9 kg (211 lb 8 oz)   04/07/20 95.3 kg (210 lb)   03/23/20 99.3 kg (219 lb)     A comprehensive physical examination is deferred due to PHE (public health emergency) virtual visit  restrictions    LABORATORY DATA:  Recent Labs   Lab Test 06/08/20  0757 06/01/20 05/26/20 05/20/20  0716  04/29/20  1053   WBC 4.9 3.9* 1.2* 7.0   < >  --    HGB 7.5* 7.7* 7.9* 9.1*   < >  --    * 52* 51* 174   < >  --    ANEU 4.2 3.11  --  5.7  --   --    ALYM 0.2* 0.34* 0.13* 0.4*  --   --    RETICABSCT  --   --   --  105.5*  --  139.6*    < > = values in this interval not displayed.     Recent Labs   Lab Test 06/08/20  0757 06/01/20 05/27/20 05/26/20  05/15/20  04/27/20  1012  04/07/20  0814  03/23/20  1502 03/19/20  0505  03/18/20  0543  03/17/20  0459    140  --  144   < > 141   < > 139   < > 142   < > 133 139  --  139   < > 140   POTASSIUM 3.5 3.8  --  3.5   < > 3.8   < > 3.8   < > 3.7   < > 4.2 4.4   < > 4.6   < > 4.3   CHLORIDE 110* 107  --  108*   < > 107   < > 108   < > 109   < > 101 106  --  107   < > 108   CO2 27 25  --  28   < > 26   < > 26   < > 28   < > 27 26  --  24   < > 25   BUN 6* 9  --  21   < > 8   < > 7   < > 4*   < > 10 20  --  20   < > 21   CR 0.68 0.58*  --  0.51*   < > 0.53*   < > 0.56*   < > 0.53*   < > 0.47* 0.56*   < > 0.63*   < > 0.62*   STACY 8.2* 8.50*  --  8.60*   < > 8.3*   < > 8.2*   < > 8.2*   < > 8.2* 8.7  --  8.5   < > 8.7   MAG  --   --   --   --   --   --   --   --   --   --   --   --  2.1  --  2.0  --  2.2   PHOS  --  3.4 4.5  --   --  3.7   < > 3.3   < > 3.4   < > 3.2 4.8*   < > 5.2*   < > 4.5   URIC  --  6.1  --  5.4  --  5.8   < > 6.0   < > 5.0   < > 2.4* 4.5   < > 4.4   < > 4.7   LDH  --   --   --   --   --   --   --  215  --  267*  --  288* 689*  --  613*  --  472*    < > = values in this interval not displayed.     Recent Labs   Lab Test 05/20/20  0716 04/27/20  1012 04/07/20  0814 03/19/20  0505 03/19/20  0212 03/18/20  1219   AST 12 8 19 56*  --   --    ALT 20 14 41 48  --   --    ALKPHOS 88 91 137 133  --   --    BILITOTAL 0.4 0.6 0.6 0.8  --   --    INR  --   --   --  1.31* 1.35* 1.57*     IMAGING DATA:  6/8/2020 PET/CT reviewed by me shows dramatic  reduction in lymphoma burden with single remaining PET-avid retroperitoneal lesion, rectosigmoid signal possible colitis, overall consistent with a good partial response    IMPRESSION AND PLAN:  Mr. Tello is a 49 year old man with DLBCL.    He has completed 4 cycles of R-CHOP with Neulasta support and 3 doses of IT methotrexate with a good partial response.  We will proceed with R-CHOP with Neulasta support and IT methotrexate to complete 6 cycles and plan to repeat PET/CT and a bone marrow biopsy about 6 weeks after the final cycle of treatment to evaluate response and guide further management.      He has no active infectious issues.  We will plan to address pneumococcal vaccination after he has competed all planned treatment.    His bowel issues have improved with MiraLax that he will continue to use.  We will pay attention to rectosigmoid colon on follow up PET/CT but avoid more invasive evaluation in the absence of symptoms or problems at this time.  He will use APAP as needed for his knee that is managable.  He has ongoing normocytic anemia that is likely related to anemia of chronic disease/inflammation, lymphoma, and chemotherapy.  He will notify us of any dark stools or any bleeding.  Iron studies support ACD and B12 levels were adequate.  We will continue to monitor and transfuse as necessary.  He will continue rixaroxaban for A fib and hold for IT chemo and if platelets are less than 50k/mcL.    He will continue to work with Dr. Jeter for other medical issues.    He will receive his next R-CHOP and IT chemo later this week.  We will continue weekly labs and request arrangements be made for the final cycle in about 3 weeks.  I reminded him to call if questions, concerns, or new issues arise between visits.    Call start time: 10:29 AM  Call end time: 10:47 AM  Call duration: 18 minutes    Avery Cary MD, PhD  Attending Physician, [facility]   of Medicine,  [facility]  Division of Hematology, Oncology, and Transplantation  [email or web address] email  [phone] clinic  [phone] pager

## 2020-06-10 NOTE — PATIENT INSTRUCTIONS
Contact Numbers  [facility]: [phone]    After Hours:  [phone]  Triage: [phone]    Please call the [facility] Triage line if you experience a temperature greater than or equal to 100.5, shaking chills, have uncontrolled nausea, vomiting and/or diarrhea, dizziness, shortness of breath, chest pain, bleeding, unexplained bruising, or if you have any other new/concerning symptoms, questions or concerns.     If it is after hours, weekends, or holidays, please call the main hospital  at  [phone] and ask to speak to the Oncology doctor on call.     If you are having any concerning symptoms or wish to speak to a provider before your next infusion visit, please call your care coordinator or triage to notify them so we can adequately serve you.     If you need a refill on a narcotic prescription or other medication, please call triage before your infusion appointment.

## 2020-06-10 NOTE — LETTER
Kat 10, 2020       TO: Kenneth Tello  [address]  [place] [address]       DearMr.Omer,    We are writing to inform you of your test results.    {[facility] results letter list:[id number]}    No results found from the In Basket message.    ***

## 2020-06-10 NOTE — PROGRESS NOTES
Infusion Nursing Note:  Kenneth Tello presents today for 1 unit RBC.    Patient seen by provider today: Yes: Dr Cary   present during visit today: Not Applicable.    Note: PT feeling well, no new issues.   No s/s of infection or bleeding.    Intravenous Access:  Peripheral IV placed.    Treatment Conditions:  Lab Results   Component Value Date    HGB 7.5 06/08/2020     Lab Results   Component Value Date    WBC 4.9 06/08/2020      Lab Results   Component Value Date    ANEU 4.2 06/08/2020     Lab Results   Component Value Date     06/08/2020      Blood transfusion consent signed 4/29/20.      Post Infusion Assessment:  Patient tolerated infusion without incident.  Blood return noted pre and post infusion.  Site patent and intact, free from redness, edema or discomfort.  No evidence of extravasations.  Access discontinued per protocol.       Discharge Plan:   Patient declined prescription refills.  Discharge instructions reviewed with: Patient.  Patient and/or family verbalized understanding of discharge instructions and all questions answered.  Copy of AVS reviewed with patient and/or family.  Patient will return 6/11 for next appointment.  Patient discharged in stable condition accompanied by: self.  Departure Mode: Ambulatory.    Katelyn Finn RN

## 2020-06-10 NOTE — LETTER
"    6/10/2020         RE: Kenneth Tello  [address]  [facility] [address]        Dear Colleague,    Thank you for referring your patient, Kenneth Tello, to the [facility]. Please see a copy of my visit note below.    Kenneth Tello is a 49 year old male who is being evaluated via a billable telephone visit.      The patient has been notified of following:     \"This telephone visit will be conducted via a call between you and your physician/provider. We have found that certain health care needs can be provided without the need for a physical exam.  This service lets us provide the care you need with a short phone conversation.  If a prescription is necessary we can send it directly to your pharmacy.  If lab work is needed we can place an order for that and you can then stop by our lab to have the test done at a later time.    Telephone visits are billed at different rates depending on your insurance coverage. During this emergency period, for some insurers they may be billed the same as an in-person visit.  Please reach out to your insurance provider with any questions.    If during the course of the call the physician/provider feels a telephone visit is not appropriate, you will not be charged for this service.\"    Patient has given verbal consent for Telephone visit?  Yes    What phone number would you like to be contacted at? [phone]    How would you like to obtain your AVS? MyChart     I have reviewed and updated the patient's allergies and medication list.    Refills: Prednisone    Osman Fortune, EMT    REASON FOR VISIT:  Management of diffuse large B cell lymphoma (DLBCL)    HISTORY OF PRESENT ILLNESS:  Mr. Tello is a 49 year old man with DLBCL.  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  DLBCL-IPI was high (stage, extranodal sites, LDH) and clinical stage IV with extranodal involvement.  He was started on R-CHOP in 03/2020 with IT methotrexate planned with each cycle for CNS prophylaxis.  Medical history also notable for A fib on anticoagulation, myocardial infarction, and stroke in 2017.  Course has been complicated by constipation but otherwise well tolerated.  Visit for ongoing management.      Energy level and appetite wax and wane as expected  Weight stable.  No fevers, chills, or night sweats.  No headache, vision changes, focal weakness or sensory changes.  No dyspnea, cough, or chest pain.  Bowels improved with more aggressive MiraLax, previously quite constipated.  No rectal pain or blood in stool although was an issues a while back.  No urinary complaints.  No bleeding, bruising, or skin rashes.  Knee pain/weakness midcycle that improves with recovery.  No lumps or bumps.  Overall, managing well.    REVIEW OF SYSTEMS:  A complete review of systems was negative other than noted.    MEDICATIONS:  Current Outpatient Medications   Medication     acyclovir (ZOVIRAX) 400 MG tablet     atorvastatin (LIPITOR) 40 MG tablet     carvedilol (COREG) 12.5 MG tablet     FEROSUL 325 (65 Fe) MG tablet     fluconazole (DIFLUCAN) 100 MG tablet     gabapentin (NEURONTIN) 300 MG capsule     levofloxacin (LEVAQUIN) 500 MG tablet     lisinopril (ZESTRIL) 20 MG tablet     omeprazole (PRILOSEC) 40 MG DR capsule     polyethylene glycol (MIRALAX) 17 g packet     potassium chloride ER (K-TAB/KLOR-CON) 10 MEQ CR tablet     prochlorperazine (COMPAZINE) 10 MG tablet     rivaroxaban ANTICOAGULANT (XARELTO ANTICOAGULANT) 20 MG TABS tablet     senna-docusate (SENOKOT-S/PERICOLACE) 8.6-50 MG tablet     No current facility-administered medications for this visit.      PHYSICAL EXAMINATION:  There were no vitals taken for this visit.  Wt Readings from Last 5 Encounters:   05/20/20 94.6  kg (208 lb 9.6 oz)   04/29/20 95.3 kg (210 lb 1.6 oz)   04/27/20 95.9 kg (211 lb 8 oz)   04/07/20 95.3 kg (210 lb)   03/23/20 99.3 kg (219 lb)     A comprehensive physical examination is deferred due to PHE (public health emergency) virtual visit restrictions    LABORATORY DATA:  Recent Labs   Lab Test 06/08/20  0757 06/01/20 05/26/20 05/20/20  0716  04/29/20  1053   WBC 4.9 3.9* 1.2* 7.0   < >  --    HGB 7.5* 7.7* 7.9* 9.1*   < >  --    * 52* 51* 174   < >  --    ANEU 4.2 3.11  --  5.7  --   --    ALYM 0.2* 0.34* 0.13* 0.4*  --   --    RETICABSCT  --   --   --  105.5*  --  139.6*    < > = values in this interval not displayed.     Recent Labs   Lab Test 06/08/20  0757 06/01/20 05/27/20 05/26/20  05/15/20  04/27/20  1012  04/07/20  0814  03/23/20  1502 03/19/20  0505  03/18/20  0543  03/17/20  0459    140  --  144   < > 141   < > 139   < > 142   < > 133 139  --  139   < > 140   POTASSIUM 3.5 3.8  --  3.5   < > 3.8   < > 3.8   < > 3.7   < > 4.2 4.4   < > 4.6   < > 4.3   CHLORIDE 110* 107  --  108*   < > 107   < > 108   < > 109   < > 101 106  --  107   < > 108   CO2 27 25  --  28   < > 26   < > 26   < > 28   < > 27 26  --  24   < > 25   BUN 6* 9  --  21   < > 8   < > 7   < > 4*   < > 10 20  --  20   < > 21   CR 0.68 0.58*  --  0.51*   < > 0.53*   < > 0.56*   < > 0.53*   < > 0.47* 0.56*   < > 0.63*   < > 0.62*   STACY 8.2* 8.50*  --  8.60*   < > 8.3*   < > 8.2*   < > 8.2*   < > 8.2* 8.7  --  8.5   < > 8.7   MAG  --   --   --   --   --   --   --   --   --   --   --   --  2.1  --  2.0  --  2.2   PHOS  --  3.4 4.5  --   --  3.7   < > 3.3   < > 3.4   < > 3.2 4.8*   < > 5.2*   < > 4.5   URIC  --  6.1  --  5.4  --  5.8   < > 6.0   < > 5.0   < > 2.4* 4.5   < > 4.4   < > 4.7   LDH  --   --   --   --   --   --   --  215  --  267*  --  288* 689*  --  613*  --  472*    < > = values in this interval not displayed.     Recent Labs   Lab Test 05/20/20  0716 04/27/20  1012 04/07/20  0814 03/19/20  0505 03/19/20  0212  03/18/20  1219   AST 12 8 19 56*  --   --    ALT 20 14 41 48  --   --    ALKPHOS 88 91 137 133  --   --    BILITOTAL 0.4 0.6 0.6 0.8  --   --    INR  --   --   --  1.31* 1.35* 1.57*     IMAGING DATA:  6/8/2020 PET/CT reviewed by me shows dramatic reduction in lymphoma burden with single remaining PET-avid retroperitoneal lesion, rectosigmoid signal possible colitis, overall consistent with a good partial response    IMPRESSION AND PLAN:  Mr. Tello is a 49 year old man with DLBCL.    He has completed 4 cycles of R-CHOP with Neulasta support and 3 doses of IT methotrexate with a good partial response.  We will proceed with R-CHOP with Neulasta support and IT methotrexate to complete 6 cycles and plan to repeat PET/CT and a bone marrow biopsy about 6 weeks after the final cycle of treatment to evaluate response and guide further management.      He has no active infectious issues.  We will plan to address pneumococcal vaccination after he has competed all planned treatment.    His bowel issues have improved with MiraLax that he will continue to use.  We will pay attention to rectosigmoid colon on follow up PET/CT but avoid more invasive evaluation in the absence of symptoms or problems at this time.  He will use APAP as needed for his knee that is managable.  He has ongoing normocytic anemia that is likely related to anemia of chronic disease/inflammation, lymphoma, and chemotherapy.  He will notify us of any dark stools or any bleeding.  Iron studies support ACD and B12 levels were adequate.  We will continue to monitor and transfuse as necessary.  He will continue rixaroxaban for A fib and hold for IT chemo and if platelets are less than 50k/mcL.    He will continue to work with Dr. Jeter for other medical issues.    He will receive his next R-CHOP and IT chemo later this week.  We will continue weekly labs and request arrangements be made for the final cycle in about 3 weeks.  I reminded him to call if  questions, concerns, or new issues arise between visits.    Call start time: 10:29 AM  Call end time: 10:47 AM  Call duration: 18 minutes    Avery Cary MD, PhD  Attending Physician, [facility]   of Medicine, [facility]  Division of Hematology, Oncology, and Transplantation  [email or web address] email  [phone] clinic  [phone] pager

## 2020-06-10 NOTE — PROGRESS NOTES
Forms filled out and placed in provider folder for approval. Patient did not fill out portion for patient, or sign authorizations in paperwork to release information. [employer] message sent to patient to see how he would like to proceed.    Jo Millard CMA

## 2020-06-11 NOTE — LETTER
6/11/2020         RE: Kenneth Tello  [address]  [facility] [address]      BMT ONC Adult Lumbar Puncture and Intrathecal Chemotherapy Administration Procedure Note    June 11, 2020    Procedure: Lumbar Puncture to obtain CSF and give intrathecal chemotherapy    Diagnosis: diffuse large B cell lymphoma    Learning needs assessment complete within 12 months: YES     Vitals reviewed:  YES    Informed Consent: Procedure, benefits, risks, and alternatives explained to the patient who voiced understanding of the information and agreed to proceed with lumbar puncture. Risks include bleeding, headache, and or infection.   Patient safety checklist completed.    Labs: Reviewed:     Lab Results   Component Value Date    INR 1.31 03/19/2020     06/08/2020       Description:. The patient was seated at the bedside with knees dangling. The L4-5 disc space was prepped and draped in a sterile fashion. Local anesthesia with 2 mL 1% preservative-free lidocaine was used. A 22 gauge, 4 inch needle was placed on the first  attempt.  8 mL spinal fluid collected. Specimen appears clear. Specimen sent for cell count and differential, protein, glucose and flow cytometry.     Methotrexate in 6 mL of 0.9% preservative free sodium chloride was administered intrathecally slowly in a barbotage fashion.  The needle was removed and a bandage was applied to the site.  Chemotherapy double-check was performed per institutional policy.  Patient tolerated well.  Post-procedure pain assessment: 0 out of 10 on the numeric pain rating scale  Interventions: No    Complications: No     Disposition: Patient will remain on back for 1 hour post procedure. Avoid soaking in a tub tonight.  Call if develops headaches, fevers and or chills.     Performed by:   Stacy Grimes PA-C  [facility]  [address] [place] [address]  [phone]        Stacy Grimes PA-C

## 2020-06-11 NOTE — PROGRESS NOTES
BMT ONC Adult Lumbar Puncture and Intrathecal Chemotherapy Administration Procedure Note    June 11, 2020    Procedure: Lumbar Puncture to obtain CSF and give intrathecal chemotherapy    Diagnosis: diffuse large B cell lymphoma    Learning needs assessment complete within 12 months: YES     Vitals reviewed:  YES    Informed Consent: Procedure, benefits, risks, and alternatives explained to the patient who voiced understanding of the information and agreed to proceed with lumbar puncture. Risks include bleeding, headache, and or infection.   Patient safety checklist completed.    Labs: Reviewed:     Lab Results   Component Value Date    INR 1.31 03/19/2020     06/08/2020       Description:. The patient was seated at the bedside with knees dangling. The L4-5 disc space was prepped and draped in a sterile fashion. Local anesthesia with 2 mL 1% preservative-free lidocaine was used. A 22 gauge, 4 inch needle was placed on the first  attempt.  8 mL spinal fluid collected. Specimen appears clear. Specimen sent for cell count and differential, protein, glucose and flow cytometry.     Methotrexate in 6 mL of 0.9% preservative free sodium chloride was administered intrathecally slowly in a barbotage fashion.  The needle was removed and a bandage was applied to the site.  Chemotherapy double-check was performed per institutional policy.  Patient tolerated well.  Post-procedure pain assessment: 0 out of 10 on the numeric pain rating scale  Interventions: No    Complications: No     Disposition: Patient will remain on back for 1 hour post procedure. Avoid soaking in a tub tonight.  Call if develops headaches, fevers and or chills.     Performed by:   Stacy Grimes PA-C  [facility] [address] [address] [phone]

## 2020-06-11 NOTE — PROGRESS NOTES
Forms signed and brought to patient in infusion.  Patient states he will fill out his portion and get it to the insurance company.      Patient informed to let us know if we missed anything or if any further assistance was needed.     Copy made and sent to scanning.     Jo Bartholomew, CMA

## 2020-06-11 NOTE — PROGRESS NOTES
Signed forms received from provider.  Left message for patient to call me back at [phone] or [phone] option 5 then option 2 and to ask for me.     Need to know:     1. Patient portion of forms not completed, how would patient like to receive them?  Should we send the forms incomplete to fax number provider and have them get back to patient?  Or should we mail/email forms to patient then patient can fax in completed forms to requested fax number once their portion is complete?     Jo Bartholomew, CMA

## 2020-06-11 NOTE — PROGRESS NOTES
Infusion Nursing Note:  Kenneth Tello presents today for cycle 5 day 1 Rituxan, Adriamycin, Vincristine, Cytoxan, Prednisone, on body neulasta and IT methotrexate.    Patient seen by provider today: No   present during visit today: Not Applicable.    Note: Pt had a virtual visit with Dr. Cary yesterday, patient reports no changes since. BP elevated on arrival today, decreased with rechecks. Pt admits to taking his BP medication this morning, denies any vision changes, chest pain, headache, or any other symptoms. Pt states he feels it is probably due to him being here today.    Neulasta On Pro- On Body injector applied to patient today on left lower quadrant at 1405 with light facing up. Writer discussed Neulasta injection would start tomorrow at 1705 approximately 27 hours after application applied today.  Written and Verbal instruction reviewed with patient.  Pt instructed when the dose delivery starts, it will take about 45 minutes to complete. Pt aware Neulasta Onpro On-Body should have green flashing light and to call triage or on-call MD if injector flashes red or appears to be leaking. Pt aware to keep Onpro On-Body Neualsta 4 inches away from electrical equipment and to avoid showering 4 hours prior to injection.     Intravenous Access:  Peripheral IV placed.    Pt complained of pain above PIV site, stated he noticed it when he bent his arm while getting up to go to the bathroom. Infusion stopped. Blood return noted but not as brisk as previously. PIV removed due to pain. No redness or edema visualized.     Treatment Conditions:  Lab Results   Component Value Date    HGB 7.5 06/08/2020     Lab Results   Component Value Date    WBC 4.9 06/08/2020      Lab Results   Component Value Date    ANEU 4.2 06/08/2020     Lab Results   Component Value Date     06/08/2020      Lab Results   Component Value Date     06/08/2020                   Lab Results   Component Value Date     POTASSIUM 3.5 06/08/2020           Lab Results   Component Value Date    MAG 2.1 03/19/2020            Lab Results   Component Value Date    CR 0.68 06/08/2020                   Lab Results   Component Value Date    STACY 8.2 06/08/2020                Lab Results   Component Value Date    BILITOTAL 0.4 05/20/2020           Lab Results   Component Value Date    ALBUMIN 2.9 05/20/2020                    Lab Results   Component Value Date    ALT 20 05/20/2020           Lab Results   Component Value Date    AST 12 05/20/2020       Results reviewed, labs MET treatment parameters, ok to proceed with treatment.  ECHO/MUGA completed 3/10/20  EF 60-65%.    Procedure:  Hematology Procedure: Lumbar puncture performed by JAMISON Beal  Risks and benefits of procedure were explained to patient by JAMISON Beal.  Patient verbalized understanding.   Patient has a  and the consent form has been signed, prior to any medications given for this procedure.   Medications given prior to procedure: none.         Post Procedure:  Patient tolerated the procedure without incident..  Patient laid flat for 60 minutes post procedure.  Patient is tolerating oral fluids prior to discharge.   Dressing clean, dry and intact prior to discharge.    Patient instructed to keep dressing on for 24 hours and to notify MD with temperature, increased pain, redness or drainage at site.     Post Pain Assessment: 0 out of 10.    Post Infusion Assessment:  Patient tolerated infusion without incident.  Blood return noted pre and post infusion.  Blood return noted during adriamycin administration every 2 ml and before, during, and after vincristine.  Site patent and intact, free from redness, edema or discomfort.  No evidence of extravasations.  Access discontinued per protocol.       Discharge Plan:   Prescription refills given for prednisone.  Discharge instructions reviewed with: Patient.  Patient and/or family verbalized understanding of discharge  instructions and all questions answered.  Copy of AVS reviewed with patient and/or family.  Patient will return in three weeks(not yet scheduled) for next appointment.  Patient discharged in stable condition accompanied by: self.  Departure Mode: Ambulatory.    Mirella Burris RN

## 2020-06-11 NOTE — PATIENT INSTRUCTIONS
Contact Numbers:   [facility] [place]: [phone]    Call triage to speak with triage if you are experiencing chills and/or temperature greater than or equal to 100.5, uncontrolled nausea/vomiting, diarrhea, constipation, dizziness, shortness of breath, chest pain, bleeding, unexplained bruising, or any new/concerning symptoms, questions/concerns.     If you are having any concerning symptoms or wish to speak to a provider before your next infusion visit, please call your care coordinator or triage to notify them so we can adequately serve you.     If you need a refill on a narcotic prescription, please call triage or your care coordinator before your infusion appointment.                 June 2020 Sunday Monday Tuesday Wednesday Thursday Friday Saturday        1     2     3     4     5     6       7     8    LAB   7:30 AM   (15 min.)    LAB   [facility]    PET ONCOLOGY WHOLE BODY   9:00 AM   (45 min.)   [id number]   [facility] [place] PET CT 9     10    [facility] ONC INFUSION 360   7:00 AM   (360 min.)    ONCOLOGY INFUSION   [facility]    TELEPHONE VISIT RETURN  10:30 AM   (30 min.)   Avery Cary MD   [facility] 11    [facility] LUMBAR PUNCTURE   9:15 AM   (50 min.)   Stacy Grimes PA-C   [facility] ONC INFUSION 360   9:30 AM   (360 min.)    ONCOLOGY INFUSION   [facility] 12     13       14     15     16     17     18     19     20       21     22     23     24     25     26     27       28     29     30                                     July 2020 Sunday Monday Tuesday Wednesday Thursday Friday Saturday                  1     2     3     4       5     6     7     8     9     10     11       12     13     14     15     16     17     18       19     20     21     22     23     24     25       26     27     28     29     30     31                          Lab Results:  Recent Results (from the past 12 hour(s))   Cell  count with differential CSF:    Collection Time: 06/11/20 10:15 AM   Result Value Ref Range    WBC CSF 0 0 - 5 /uL    RBC CSF 1 0 - 2 /uL    Tube Number 3 #    Color CSF Colorless CLRL^Colorless    Appearance CSF Clear CLER^Clear   Glucose CSF:    Collection Time: 06/11/20 10:15 AM   Result Value Ref Range    Glucose CSF 47 40 - 70 mg/dL   Protein total CSF:    Collection Time: 06/11/20 10:15 AM   Result Value Ref Range    Protein Total CSF 64 (H) 15 - 60 mg/dL

## 2020-06-19 NOTE — PROGRESS NOTES
Potassium 2.9 on this weeks labs that we received today.    When writer called Kenneth he reports the pills just do right thorough him whole, why his potassium is low. Writer changed script to liquid.     He reports his Gabapentin script is wrong. He takes it 300 mg, 2 tabs in am, one tab at HS.Takes for nerve pain in his left hand and left knee since his stroke., Worse when he is at his [place].     Has good days, followed by bad days. Some constipation this week, better now. Worried when he saw high high WBC, received Neulasta. Felt better after writier explained this to him. Hgb 7.8 but still declines a transfusion at this level.    Writer sent scripts to his local pharmacy..

## 2020-06-22 NOTE — TELEPHONE ENCOUNTER
Critical lab values called to triage from Kecia at [facility].    WBC 1.1  Hgb 7.3   Plts 37k    Paged to Dr Cary @ 9028 . To be sure pt is holding anticoagulant. Pt is currently taking Xarelto so instructed to hold. Pt states understanding and will have labs rechecked next Monday.

## 2020-06-23 NOTE — TELEPHONE ENCOUNTER
Per Nicole Giraldo REGINA; should be filled by pcp. Called pt to relay info, he stated his pcp already filled at his pharmacy in [place], does not need filled by [facility] in [place]. Denied request.

## 2020-06-23 NOTE — PROGRESS NOTES
Writer returned Jason call. He stated that someone was asking about an appointment he had at the [facility] tomorrow. He/we thought his transfusion appointment at [facility] were cancelled. His Hgb this week was 7.3 and he is feeling the effects. He has previously declined a transfusions for the upper 7's. Writer offered him and transfusion again and he accepted. She stated she could set him up in [place], the clinic/hospital is right across the street but he said he would rather come to [facility]. A much smoother process. He cannot get her tonight in time for him to stop at the clinic for a type and cross match so he knows that tomorrow he will have to wait.     Kenneth states this is the time in his cycle he feels the worse so wants to take anything to counteract this. He states he feels good enough to drive up to the [facility]. Verified that he did stop his Xarelto(plts 37)    He states he has not picked up his liquid potassium writer called in for him. He is not taking the pills either. Last week Potassium was low at 2.9 and he states he is having loose stools for the Mirlax. Labs drawn but only critical called. Writer called and they will fax the rest. Did tell writer his Potassium yesterday was 3.4.     Kenneth appreciated the call and clarification.    6/24/2020-Kenneth here for blood, Hgb at 7.8, WBC/ANC 3.0/ 2.4 Plts 98    Called Kenneth to inform him he can restart his Xarelto due to platelet count of 98. He said he hopes he remembers that by the time he gets home. Writer called infusion to ask nurse(Esme) to write it on his discharge sheet from today also.

## 2020-06-24 NOTE — PATIENT INSTRUCTIONS
[facility] Triage and after hours / weekends / holidays:  [phone]    Please call the triage or after hours line if you experience a temperature greater than or equal to 100.5, shaking chills, have uncontrolled nausea, vomiting and/or diarrhea, dizziness, shortness of breath, chest pain, bleeding, unexplained bruising, or if you have any other new/concerning symptoms, questions or concerns.      If you are having any concerning symptoms or wish to speak to a provider before your next infusion visit, please call your care coordinator or triage to notify them so we can adequately serve you.     If you need a refill on a narcotic prescription or other medication, please call before your infusion appointment.                 June 2020 Sunday Monday Tuesday Wednesday Thursday Friday Saturday        1     2     3     4     5     6       7     8    LAB   7:30 AM   (15 min.)    LAB   [facility] Lab    PET ONCOLOGY WHOLE BODY   9:00 AM   (45 min.)   UUPET1   [facility] [place] PET CT 9     10    [facility] ONC INFUSION 360   7:00 AM   (360 min.)    ONCOLOGY INFUSION   [facility]    TELEPHONE VISIT RETURN  10:30 AM   (30 min.)   Avery Cary MD   [facility] 11    [facility] LUMBAR PUNCTURE   9:15 AM   (50 min.)   Stacy Grimes PA-C   [facility] ONC INFUSION 360   9:30 AM   (360 min.)    ONCOLOGY INFUSION   [facility] 12     13       14     15     16     17     18     19     20       21     22     23     24    [facility] DRAW   7:30 AM   (15 min.)   [facility] D[facility]   [facility] Lab Draw    [facility] ONC INFUSION 360   8:00 AM   (360 min.)    ONCOLOGY INFUSION   [facility] 25     26     27       28     29     30                                     July 2020 Sunday Monday Tuesday Wednesday Thursday Friday Saturday                  1    [facility] LAB DRAW   7:30 AM   (15 min.)   [facility] LAB  DRAW   [facility] Lab Draw    [facility] ONC INFUSION 360   8:00 AM   (360 min.)    ONCOLOGY INFUSION   [facility] 2    [facility] LAB DRAW   9:00 AM   (15 min.)    [facility] LAB DRAW   [facility] Lab Draw    P RETURN   9:15 AM   (50 min.)   Stacy Grimes PA-C   [facility] ONC INFUSION 360   9:30 AM   (360 min.)    ONCOLOGY INFUSION   [facility] 3     4       5     6     7     8     9     10     11       12     13     14     15     16     17     18       19     20     21     22     23     24     25       26     27     28     29     30     31    [facility] BONE MARROW BIOPSY   7:35 AM   (90 min.)   Carlos Orellnaa PA   [facility]    PET ONCOLOGY WHOLE BODY  10:00 AM   (45 min.)   UUPET1   [facility] [place] PET CT                     Recent Results (from the past 24 hour(s))   Platelets prepare order unit    Collection Time: 06/24/20  7:00 AM   Result Value Ref Range    Blood Component Type PLT Pheresis     Units Ordered 1    Blood component    Collection Time: 06/24/20  7:00 AM   Result Value Ref Range    Unit Number [id number]     Blood Component Type PlateletPheresis LeukoReduced Irradiated     Division Number 00     Status of Unit Ready for patient 06/24/2020 0709     Blood Product Code [id number]     Unit Status RACQUEL    CBC with platelets differential    Collection Time: 06/24/20  8:10 AM   Result Value Ref Range    WBC 3.0 (L) 4.0 - 11.0 10e9/L    RBC Count 2.59 (L) 4.4 - 5.9 10e12/L    Hemoglobin 7.8 (L) 13.3 - 17.7 g/dL    Hematocrit 24.7 (L) 40.0 - 53.0 %    MCV 95 78 - 100 fl    MCH 30.1 26.5 - 33.0 pg    MCHC 31.6 31.5 - 36.5 g/dL    RDW 19.7 (H) 10.0 - 15.0 %    Platelet Count 98 (L) 150 - 450 10e9/L    Diff Method Automated Method     % Neutrophils 78.3 %    % Lymphocytes 5.9 %    % Monocytes 13.5 %    % Eosinophils 0.3 %    % Basophils 0.7 %    % Immature Granulocytes 1.3 %    Nucleated RBCs 0 0 /100     Absolute Neutrophil 2.4 1.6 - 8.3 10e9/L    Absolute Lymphocytes 0.2 (L) 0.8 - 5.3 10e9/L    Absolute Monocytes 0.4 0.0 - 1.3 10e9/L    Absolute Eosinophils 0.0 0.0 - 0.7 10e9/L    Absolute Basophils 0.0 0.0 - 0.2 10e9/L    Abs Immature Granulocytes 0.0 0 - 0.4 10e9/L    Absolute Nucleated RBC 0.0    ABO/Rh type and screen    Collection Time: 06/24/20  8:10 AM   Result Value Ref Range    Units Ordered 1     ABO O     RH(D) Pos     Antibody Screen Neg     Test Valid Only At          [facility]    Specimen Expires 06/27/2020     Crossmatch Red Blood Cells    Blood component    Collection Time: 06/24/20  8:10 AM   Result Value Ref Range    Unit Number [id number]     Blood Component Type Red Blood Cells Leukocyte Reduced     Division Number 00     Status of Unit IN-TRANSIT     Blood Product Code [id number]     Unit Status

## 2020-06-24 NOTE — PROGRESS NOTES
Infusion Nursing Note:  Kenneth Tello presents today for 1 unit PRBCs.    Patient seen by provider today: No   present during visit today: Not Applicable.    Note: Patient reports he is feeling OK. He felt very run down over the weekend, but starting to feel better today. He was having multiple episodes of loose stool daily, but reports that he likely had 5 yesterday and only 1 so far today. He feels like this is much improved as well. Encouraged him to continue to stay hydrated as much as possible. He is unaware of which medications he is taking. Paged PA to clarify what he should be on.     JAMISON Martel/Esme Foster RN- Pt does not need to take prophylactic levaquin, acyclovir, or diflucan today. Pt does not need iron supplement as well. He can resume the xarelto since his platelets are back above 50 today.     Medications discontinued per PA on medication list. Reviewed instructions with pt and encouraged him to double check his pill bottles at home to ensure he still has pills left.     Pt will receive 1 unit of PRBCs today for Hgb <8, he does not meet parameters for platelets as Plt count is >10.     Intravenous Access:  Peripheral IV placed by lab.    Treatment Conditions:  Lab Results   Component Value Date    HGB 7.8 06/24/2020     Lab Results   Component Value Date    WBC 3.0 06/24/2020      Lab Results   Component Value Date    ANEU 2.4 06/24/2020     Lab Results   Component Value Date    PLT 98 06/24/2020      Results reviewed, labs MET treatment parameters, ok to proceed with treatment.  Blood transfusion consent signed 4/29/2020.      Post Infusion Assessment:  Patient tolerated infusion without incident.  Blood return noted pre and post infusion.  Site patent and intact, free from redness, edema or discomfort.  No evidence of extravasations.  Access discontinued per protocol.       Discharge Plan:   Patient declined prescription refills.  Discharge instructions reviewed  with: Patient.  Patient and/or family verbalized understanding of discharge instructions and all questions answered.  Copy of AVS reviewed with patient and/or family.  Patient will return 7/1/2020 for next infusion appointment.  Patient discharged in stable condition accompanied by: self.  Departure Mode: Ambulatory.    Esme Foster RN

## 2020-07-01 NOTE — PROGRESS NOTES
Request for Medical Opinion forms received via pt in clinic.      Forms to be completed and put in folder for provider to approve.      Pooja Ledbetter CMA ([facility])

## 2020-07-01 NOTE — PATIENT INSTRUCTIONS
[facility] Triage and after hours / weekends / holidays:  [phone]    Please call the triage or after hours line if you experience a temperature greater than or equal to 100.5, shaking chills, have uncontrolled nausea, vomiting and/or diarrhea, dizziness, shortness of breath, chest pain, bleeding, unexplained bruising, or if you have any other new/concerning symptoms, questions or concerns.      If you are having any concerning symptoms or wish to speak to a provider before your next infusion visit, please call your care coordinator or triage to notify them so we can adequately serve you.     If you need a refill on a narcotic prescription or other medication, please call before your infusion appointment.                 July 2020 Sunday Monday Tuesday Wednesday Thursday Friday Saturday                  1    [facility] LAB DRAW   7:30 AM   (15 min.)    [facility] LAB DRAW   [facility] Lab Draw    [facility] ONC INFUSION 360   8:00 AM   (360 min.)    ONCOLOGY INFUSION   [facility] 2    [facility] LAB DRAW   9:00 AM   (15 min.)   [facility] D[facility]   [facility] Lab Draw    [facility] LUMBAR PUNCTURE   9:15 AM   (50 min.)   Stacy Grimes PA-C   [facility] ONC INFUSION 360   9:30 AM   (360 min.)    ONCOLOGY INFUSION   [facility] 3     4       5     6     7     8     9     10     11       12     13     14     15     16     17     18       19     20     21     22     23     24     25       26     27     28     29     30     31    [facility] BONE MARROW BIOPSY   7:35 AM   (90 min.)   Carlos Orellana PA   [facility]    PET ONCOLOGY WHOLE BODY  10:00 AM   (45 min.)   UUPET1   [facility] [place] PET CT                  August 2020 Sunday Monday Tuesday Wednesday Thursday Friday Saturday                                 1       2     3     4     5    [facility] RETURN   7:15 AM   (30 min.)   Avery Cary MD   [facility]  [facility] 6     7     8       9     10     11     12     13     14     15       16     17     18     19     20  Happy Birthday!     21     22       23     24     25     26     27     28     29       30     31                                             Recent Results (from the past 24 hour(s))   Blood component    Collection Time: 07/01/20  7:00 AM   Result Value Ref Range    Unit Number [id number]     Blood Component Type PlateletPheresis LeukoReduced Irradiated     Division Number 00     Status of Unit Ready for patient 07/01/2020 0730     Blood Product Code [id number]     Unit Status RACQUEL    CBC with platelets differential    Collection Time: 07/01/20  7:50 AM   Result Value Ref Range    WBC 7.8 4.0 - 11.0 10e9/L    RBC Count 2.96 (L) 4.4 - 5.9 10e12/L    Hemoglobin 8.8 (L) 13.3 - 17.7 g/dL    Hematocrit 28.0 (L) 40.0 - 53.0 %    MCV 95 78 - 100 fl    MCH 29.7 26.5 - 33.0 pg    MCHC 31.4 (L) 31.5 - 36.5 g/dL    RDW 17.9 (H) 10.0 - 15.0 %    Platelet Count 178 150 - 450 10e9/L    Diff Method Automated Method     % Neutrophils 83.4 %    % Lymphocytes 3.3 %    % Monocytes 12.0 %    % Eosinophils 0.0 %    % Basophils 0.8 %    % Immature Granulocytes 0.5 %    Nucleated RBCs 0 0 /100    Absolute Neutrophil 6.5 1.6 - 8.3 10e9/L    Absolute Lymphocytes 0.3 (L) 0.8 - 5.3 10e9/L    Absolute Monocytes 0.9 0.0 - 1.3 10e9/L    Absolute Eosinophils 0.0 0.0 - 0.7 10e9/L    Absolute Basophils 0.1 0.0 - 0.2 10e9/L    Abs Immature Granulocytes 0.0 0 - 0.4 10e9/L    Absolute Nucleated RBC 0.0

## 2020-07-01 NOTE — PROGRESS NOTES
Medical opinion paperwork completed, signed and faxed to [facility] @ [phone]. A copy was made, sent to scanning and original mailed to patient at home address.    Successful transmission verified in Right Fax.      Pooja Ledbetter CMA

## 2020-07-01 NOTE — PROGRESS NOTES
Called Kenneth Tello to make sure he was holding  his Xarelto. He states he did not take it 6/30/20 and will not again tonight. He thought he was to hold it prior to his LP's.     LP and chemotherapy scheduled for tomorrow. Encouraged that it is his last scheduled cycle. Discouraged that this last round did not go as good as the one previous when it came to his bowels.

## 2020-07-01 NOTE — PROGRESS NOTES
"Infusion Nursing Note:  Kenneth Tello presents today for Possible PRBC/Plt - Not Needed Today.    Patient seen by provider today: No   present during visit today: Not Applicable.    Note: Pt arrives to infusion today feeling tired. He says he hasn't done much, just sleeps a lot. Denies dizziness, bleeding. SOB only going up stairs due to deconditioning. He was having diarrhea now he is constipated and is worried to try any medications because he doesn't want to have diarrhea again. Encouraged him to try senna and/or prune juice. He also hasn't been eating because he doesn't want to \"put anything in there.\" He has a hard lump on the top of his left hand which has been there for awhile with some mild hand swelling. He states he is from an old IV. Encouraged him to put heat on it. IB sent to care team about these concerns of pt.     Intravenous Access:  Peripheral IV placed.    Treatment Conditions:  Lab Results   Component Value Date    HGB 8.8 07/01/2020     Lab Results   Component Value Date    WBC 7.8 07/01/2020      Lab Results   Component Value Date    ANEU 6.5 07/01/2020     Lab Results   Component Value Date     07/01/2020      Results reviewed, labs did NOT meet treatment parameters.    Post Infusion Assessment:  Site patent and intact, free from redness, edema or discomfort.  No evidence of extravasations.  Access discontinued per protocol.     Discharge Plan:   Patient declined prescription refills.  AVS to patient via [email or web address].  Patient will return 7/2 for next appointment.   Patient discharged in stable condition accompanied by: self.  Departure Mode: Ambulatory.  Face to Face time: 0.    Greer Carrasquillo RN                      "

## 2020-07-01 NOTE — NURSING NOTE
Chief Complaint   Patient presents with     Blood Draw     labs drawn with piv start by rn.  vs taken     Labs drawn with PIV start by rn.  Pt tolerated well.  VS taken and pt checked in for next appt.    Meaghan Garrido RN

## 2020-07-02 NOTE — PROGRESS NOTES
Infusion Nursing Note:  Kenneth Tello presents today for Cycle 6 Day 1 Rituxan, Doxorubicin, Vincristine, and Cytoxan.      Note:     Pt had a LP in clinic today with IT Methotrexate.  This procedure was performed by Stacy SWIFT.  This RN was not present during procedure.  Pt was flat for 1 hour post procedure.  Denies pain and/or headache while in clinic.  CSF samples take to lab by Javan Wyatt RN.      Neulasta Onpro On-Body injector applied to R abdomen at 1230 with light facing up.  Writer discussed Neulasta injection would start tomorrow 7/3/20 at 0930, approximately 27 hours after application applied today.  Written and Verbal instruction reviewed with patient.  Pt instructed when the dose delivery starts, it will take about 45 minutes to complete.  Pt aware Neulasta Onpro On-Body should have green flashing light and to call triage or on-call MD if injector flashes red or appears to be leaking. Pt aware to keep Onpro On-Body Neulasta 4 inches away from electrical equipment and to avoid showering 4 hours prior to injection.   Neulasta Onpro Lot number: [id number]          Intravenous Access:  Peripheral IV placed in lab  Doxorubicin infused into free flowing NS.  Positive blood return q2c.  PIV site without redness or swelling.  Vincristine infused into free flowing.  Positive blood return pre, mid, and post infusion.  PIV site without redness or swelling.  Positive blood return pre and post cytoxan.      Treatment Conditions:  Lab Results   Component Value Date    HGB 8.9 07/02/2020     Lab Results   Component Value Date    WBC 8.0 07/02/2020      Lab Results   Component Value Date    ANEU 6.6 07/02/2020     Lab Results   Component Value Date     07/02/2020      Lab Results   Component Value Date     07/02/2020                   Lab Results   Component Value Date    POTASSIUM 3.8 07/02/2020           Lab Results   Component Value Date    MAG 2.1 03/19/2020            Lab Results   Component  Value Date    CR 0.62 07/02/2020                   Lab Results   Component Value Date    STACY 8.5 07/02/2020                Lab Results   Component Value Date    BILITOTAL 0.8 07/02/2020           Lab Results   Component Value Date    ALBUMIN 2.9 07/02/2020                    Lab Results   Component Value Date    ALT 12 07/02/2020           Lab Results   Component Value Date    AST 8 07/02/2020     ECHO/MUGA completed 3/10/20  EF 60-65%.  Results reviewed, labs MET treatment parameters, ok to proceed with treatment.        Post Infusion Assessment:  Patient tolerated infusion without incident.       Discharge Plan:   Prescription refills given for prednisone.  Pt took his first dose here in infusion today.  Copy of AVS reviewed with patient and/or family.  Pt scheduled to have a visit with Carlos DELGADO on 7/31/20.  This was pt's last cycle of [facility].  A message was sent to Heather Campos to make sure pt has lab appointments set up locally prior to this appointment and to clarify if pt needed blood transfusion appointments here at the  prior to 7/31    Face to Face time: 0.    Batool Kimble RN

## 2020-07-02 NOTE — PROGRESS NOTES
BMT ONC Adult Lumbar Puncture and Intrathecal Chemotherapy Administration Procedure Note    July 2, 2020    Procedure: Lumbar Puncture to obtain CSF and give intrathecal chemotherapy    Diagnosis: DLBCL    Learning needs assessment complete within 12 months: YES     Vitals reviewed:  YES    Informed Consent: Procedure, benefits, risks, and alternatives explained to the patient who voiced understanding of the information and agreed to proceed with lumbar puncture. Risks include bleeding, headache, and or infection.   Patient safety checklist completed.    Labs: Reviewed:     Lab Results   Component Value Date    INR 1.31 03/19/2020     07/02/2020       Description:. The patient was seated at the bedside with knees dangling. The L4-5 disc space was prepped and draped in a sterile fashion. Local anesthesia with 2 mL 1% preservative-free lidocaine was used. A 22 gauge, 4 inch needle was placed on the second attempt.  7 mL spinal fluid collected. Specimen appears clear. Specimen sent for cell count and differential, protein, glucose and flow cytometry.     Methotrexate in 6 mL of 0.9% preservative free sodium chloride was administered intrathecally slowly in a barbotage fashion.  The needle was removed and a bandage was applied to the site.  Chemotherapy double-check was performed per institutional policy.  Patient tolerated well.  Post-procedure pain assessment: 0 out of 10 on the numeric pain rating scale  Interventions: No    Complications: No     Disposition: Patient will remain on back for 1 hour post procedure. Avoid soaking in a tub tonight.  Call if develops headaches, fevers and or chills.     Performed by:   Stacy Grimes PA-C

## 2020-07-02 NOTE — LETTER
7/2/2020         RE: Kenneth ENGLE [place]  [address]  [place] [address]      BMT ONC Adult Lumbar Puncture and Intrathecal Chemotherapy Administration Procedure Note    July 2, 2020    Procedure: Lumbar Puncture to obtain CSF and give intrathecal chemotherapy    Diagnosis: DLBCL    Learning needs assessment complete within 12 months: YES     Vitals reviewed:  YES    Informed Consent: Procedure, benefits, risks, and alternatives explained to the patient who voiced understanding of the information and agreed to proceed with lumbar puncture. Risks include bleeding, headache, and or infection.   Patient safety checklist completed.    Labs: Reviewed:     Lab Results   Component Value Date    INR 1.31 03/19/2020     07/02/2020       Description:. The patient was seated at the bedside with knees dangling. The L4-5 disc space was prepped and draped in a sterile fashion. Local anesthesia with 2 mL 1% preservative-free lidocaine was used. A 22 gauge, 4 inch needle was placed on the second attempt.  7 mL spinal fluid collected. Specimen appears clear. Specimen sent for cell count and differential, protein, glucose and flow cytometry.     Methotrexate in 6 mL of 0.9% preservative free sodium chloride was administered intrathecally slowly in a barbotage fashion.  The needle was removed and a bandage was applied to the site.  Chemotherapy double-check was performed per institutional policy.  Patient tolerated well.  Post-procedure pain assessment: 0 out of 10 on the numeric pain rating scale  Interventions: No    Complications: No     Disposition: Patient will remain on back for 1 hour post procedure. Avoid soaking in a tub tonight.  Call if develops headaches, fevers and or chills.     Performed by:   YARELIS Villavicencio PA-C

## 2020-07-02 NOTE — NURSING NOTE
Chief Complaint   Patient presents with     Blood Draw     labs drawn via PIV placed by RN in lab     /72 (BP Location: Right arm, Patient Position: Chair, Cuff Size: Adult Large)   Pulse 69   Temp 98  F (36.7  C) (Oral)   Resp 18   Wt 96.4 kg (212 lb 9.6 oz)   SpO2 100%   BMI 30.50 kg/m      PIV placed right lower forearm by RN in lab for infusion and labs. Labs drawn and sent. Pt tolerated well.   Pt checked in for next appointment.    Nurys Hitchcock, RN

## 2020-07-07 NOTE — TELEPHONE ENCOUNTER
DATE:  7/7/20  TIME OF RECEIPT FROM LAB:  6800  LAB TEST:  Hgb=7.1  RESULTS PAGED TO (PROVIDER):  Dr Cary  TIME LAB VALUE REPORTED TO PROVIDER: 3565      4:24 PM  Acknowledged by Dr Cary, no further action needed.

## 2020-07-27 NOTE — TELEPHONE ENCOUNTER
"Since his last chemo treatment 3-4 week ago/ has had mid back pain/ rates as a \"4-5/10\" he was seen locally yesterday and was given pain meds/ but they only help for a short while and they were uncertain what was causing the pain/ does have burning initally with urination but he has had that all along/ goes to the [facility] at the  did connect with the triage RN there, Abby, who will call him   Lucio Ordaz RN [phone]    Additional Information    Negative: Passed out (i.e., lost consciousness, collapsed and was not responding)    Negative: Shock suspected (e.g., cold/pale/clammy skin, too weak to stand, low BP, rapid pulse)    Negative: Sounds like a life-threatening emergency to the triager    Negative: Major injury to the back (e.g., MVA, fall > 10 feet or 3 meters, penetrating injury, etc.)    Negative: Followed a tailbone injury    Negative: [1] Pain in the upper back over the ribs (rib cage) AND [2] radiates (travels, goes) into chest    Negative: [1] Pain in the upper back over the ribs (rib cage) AND [2] worsened by coughing (or clearly increases with breathing)    Negative: Back pain during pregnancy    Negative: Pain mainly in flank (i.e., in the side, over the lower ribs or just below the ribs)    Negative: [1] SEVERE back pain (e.g., excruciating) AND [2] sudden onset AND [3] age > 60    Negative: [1] Unable to urinate (or only a few drops) > 4 hours AND [2] bladder feels very full (e.g., palpable bladder or strong urge to urinate)    Negative: [1] Loss of bladder or bowel control (urine or bowel incontinence; wetting self, leaking stool) AND [2] new onset    Negative: Numbness in groin or rectal area (i.e., loss of sensation)    Negative: [1] SEVERE abdominal pain AND [2] present > 1 hour    Negative: [1] Abdominal pain AND [2] age > 60    Negative: Weakness of a leg or foot (e.g., unable to bear weight, dragging foot)    Negative: Unable to walk    Negative: Patient sounds very sick " "or weak to the triager    Negative: [1] SEVERE back pain (e.g., excruciating, unable to do any normal activities) AND [2] not improved 2 hours after pain medicine    Negative: [1] Pain radiates into the thigh or further down the leg AND [2] both legs    Negative: [1] Fever > 100.0 F (37.8 C) AND [2] flank pain (i.e., in side, below ribs and above hip)    Negative: [1] Pain or burning with passing urine (urination) AND [2] flank pain (i.e., in side, below ribs and above hip)    Negative: Numbness in a leg or foot (i.e., loss of sensation)    Negative: [1] Numbness in an arm or hand (i.e., loss of sensation) AND [2] upper back pain    Negative: High-risk adult (e.g., history of cancer, HIV, or IV drug abuse)    Negative: [1] Fever AND [2] no symptoms of UTI  (Exception: has generalized muscle pains, not localized back pain)    Negative: Rash in same area as pain (may be described as \"small blisters\")    Negative: Blood in urine (red, pink, or tea-colored)    [1] MODERATE back pain (e.g., interferes with normal activities) AND [2] present > 3 days    Protocols used: BACK PAIN-A-AH      "

## 2020-07-27 NOTE — PROGRESS NOTES
"Writer called Kenneth to go over symptoms. Last week writer had called Kenneth to ask about labs since they had not sent them to us for a few weeks. He had them done 7/20 but they had not sent them to us. Called labs good. He mentioned he had been in the hospital for a blood transfusion the week prior due to low blood. We had not received these counts. He said they had done a CT scan because they thought his low blood was due to his spleen. He mentioned he had back pain but stated it was better. He was to call if it did not get better.  Writer requester labs and radiology reports to be sent to [facility].     Called Kenneth today. States pain is now requiring pain med's sometimes every three hours. Mid back up to this shoulder. Writer asked about his walking because he stated in note earlier he had trouble walking. This is only due to the pain and sometimes double up/abdominal cramping with it. Moving bowel, has softeners if needed. No loss of sensation or bowel or bladder with the pain. Does state the pain is on his spine up to his shoulders bilaterally.     Will come on Wednesday for \"in person\" appointment with Doctor Cary. With labs prior. No labs done this week locally.   Will go to the local ER if condition worsens prior to then.     Will have  if unable to safely drive himself.     Kenneth expressed good understanding of the above.    "

## 2020-07-27 NOTE — TELEPHONE ENCOUNTER
FNA called to triage to report Kenneth has mid back pain 4-5/10. Having trouble walking. Requesting triage call him to assess.    Routed to Triage

## 2020-07-29 NOTE — LETTER
7/29/2020         RE: Kenneth Tello  [address]  [place] [address]        Dear Colleague,    Thank you for referring your patient, Kenneth Tello, to the [facility]. Please see a copy of my visit note below.    REASON FOR VISIT:  Management of diffuse large B cell lymphoma (DLBCL)    HISTORY OF PRESENT ILLNESS:  Mr. Tello is a 49 year old man with DLBCL.  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  DLBCL-IPI was high (stage, extranodal sites, LDH) and clinical stage IV with extranodal involvement.  He was started on R-CHOP in 03/2020 with IT methotrexate planned with each cycle for CNS prophylaxis.  Medical history also notable for A fib on anticoagulation, myocardial infarction, and stroke in 2017.  Course has been complicated by constipation and more recently abdominal pain.  Visit for worsening abdominal pain.    Interval Hx:   Patient reports that he has been doing unwell due to pain that started after chemotherapy about 1 month ago. Pain located on his back, more left than right. Not associated with food. Pain is at times intolerable. He has been trying to limit his hydrocodone, however, finds that he is using it 4-5 times a day and occasionally supplementing with tylenol.     Endorses poor energy, fatigue. Appetite has also been poor, he endorses 60-70 pound weight loss over the past several months.  No fevers, chills, or night sweats.  No headache, vision changes, focal weakness or sensory changes.  No dyspnea, cough, or chest pain.  Endorses some dysphagia as well as occasional episodes of hiccups.     Regular bowel movements, he has been taking Miralax PRN.  No melena or hematochezia.  Endorses urinary frequency with small amounts of urine.  No lumps or  bumps.      REVIEW OF SYSTEMS:  A complete review of systems was negative other than noted.    MEDICATIONS:  Current Outpatient Medications   Medication     baclofen (LIORESAL) 10 MG tablet     oxyCODONE (ROXICODONE) 5 MG tablet     rivaroxaban ANTICOAGULANT (XARELTO ANTICOAGULANT) 20 MG TABS tablet     atorvastatin (LIPITOR) 40 MG tablet     carvedilol (COREG) 12.5 MG tablet     gabapentin (NEURONTIN) 300 MG capsule     HYDROcodone-acetaminophen (NORCO)  MG per tablet     lisinopril (ZESTRIL) 20 MG tablet     omeprazole (PRILOSEC) 40 MG DR capsule     polyethylene glycol (MIRALAX) 17 g packet     Potassium Chloride 40 MEQ/15ML (20%) SOLN     prochlorperazine (COMPAZINE) 10 MG tablet     senna-docusate (SENOKOT-S/PERICOLACE) 8.6-50 MG tablet     No current facility-administered medications for this visit.      PHYSICAL EXAMINATION:  /76 (BP Location: Right arm, Patient Position: Sitting, Cuff Size: Adult Regular)   Pulse 67   Temp 98.9  F (37.2  C) (Oral)   Resp 16   Wt 89.8 kg (198 lb)   SpO2 98%   BMI 28.41 kg/m    Wt Readings from Last 5 Encounters:   07/29/20 89.8 kg (198 lb)   07/02/20 96.4 kg (212 lb 9.6 oz)   07/01/20 97.3 kg (214 lb 9.6 oz)   06/24/20 96.4 kg (212 lb 8 oz)   06/11/20 100.4 kg (221 lb 6.4 oz)     General:  No acute distress.  HEENT: Sclerae anicteric. No OP lesions, masses, or tonsilar enlargement.  Lungs: Clear bilaterally without wheezing or crackles.  Heart: Regular rate and irregularly irregular rhythm without murmurs.  Gastrointestinal: Bowel sounds present, mild tenderness to deep palpation in epigastric area, spleen tip not palpable.  Extremities: No lower extremity edema.  Lymph:  No cervical, clavicular, axillary, epitrochlear, or inguinal lymphadenopathy.  Neuro: Grossly non-focal.  Skin/access: No visible rash.  Performance status: ECOG 1/2    LABORATORY DATA:  Recent Labs   Lab Test 07/20/20 07/02/20  0903 07/01/20  0750  05/20/20  0716  04/29/20  1053   WBC  10.3* 8.0 7.8   < > 7.0   < >  --    HGB 9.3* 8.9* 8.8*   < > 9.1*   < >  --     181 178   < > 174   < >  --    ANEU 9.02* 6.6 6.5   < > 5.7  --   --    ALYM 0.37* 0.3* 0.3*   < > 0.4*  --   --    RETICABSCT  --   --   --   --  105.5*  --  139.6*    < > = values in this interval not displayed.     Recent Labs   Lab Test 07/02/20  0903 06/22/20 06/15/20  06/01/20  04/27/20  1012  04/07/20  0814  03/19/20  0505  03/18/20  0543  03/17/20  0459    138 142   < > 140   < > 139   < > 142   < > 139  --  139   < > 140   POTASSIUM 3.8 3.4* 2.9*   < > 3.8   < > 3.8   < > 3.7   < > 4.4   < > 4.6   < > 4.3   CHLORIDE 107 103 106   < > 107   < > 108   < > 109   < > 106  --  107   < > 108   CO2 28 27 29   < > 25   < > 26   < > 28   < > 26  --  24   < > 25   BUN 7 6* 13   < > 9   < > 7   < > 4*   < > 20  --  20   < > 21   CR 0.62* 0.61* 0.50*   < > 0.58*   < > 0.56*   < > 0.53*   < > 0.56*   < > 0.63*   < > 0.62*   STACY 8.5 8.8 8.30*   < > 8.50*   < > 8.2*   < > 8.2*   < > 8.7  --  8.5   < > 8.7   MAG  --   --   --   --   --   --   --   --   --   --  2.1  --  2.0  --  2.2   PHOS  --  4.2 3.8  --  3.4   < > 3.3   < > 3.4   < > 4.8*   < > 5.2*   < > 4.5   URIC  --  5.5 5.3  --  6.1   < > 6.0   < > 5.0   < > 4.5   < > 4.4   < > 4.7     --   --   --   --   --  215  --  267*   < > 689*  --  613*  --  472*    < > = values in this interval not displayed.     Recent Labs   Lab Test 07/02/20  0903 05/20/20  0716 04/27/20  1012  03/19/20  0505 03/19/20  0212 03/18/20  1219   AST 8 12 8   < > 56*  --   --    ALT 12 20 14   < > 48  --   --    ALKPHOS 84 88 91   < > 133  --   --    BILITOTAL 0.8 0.4 0.6   < > 0.8  --   --    INR  --   --   --   --  1.31* 1.35* 1.57*    < > = values in this interval not displayed.     Outside films reviewed by me  CT 7/13/20:  Impression   1. Mild splenomegaly  2. Irregular hypo attenuating area extending along short axis of spleen w/ fluid in splenic hilum and around pancreatic tail  concerning for splenic laceration.  3. Gastrohepatic ligament mass and subjacent nodule, likely representing sites of lymphoma involvement. Nodule abuts superior margin of the superior mesenteric artery, w/o occluding it.     IMPRESSION AND PLAN:  Mr. Tello is a 49 year old man with DLBCL.    Oncology: He has completed 6 cycles of R-CHOP with Neulasta support and IT methotrexate. Repeat PET/CT w/ bone marrow biopsy planned for 7/31.  Overall, pt reports doing worse, he is endorsing weightloss, intractable hiccups and worsening back pain. CT 7/13 demonstrated increasing gastrohepatic ligament mass concerning for refractory lymphoma .  Will try arrange for EUS biopsy (location of mass makes IR guided biopsy less likely) to confirm pathology.  Additionally, plan to proceed w/ PET/CT to identify other lesions and formally assess mass.  No colonic wall thickening noted on recent CT, however, on PET from 6/8 area of FDG avid rectosigmoid colonic wall thickening, will compare w/ upcoming PET/CT.  Pending PET/CT and biopsy results, will determine future management.   - Continue Weekly labs  - F/u on PET/CT 7/31  - hoping for EUS for biopsy of gastrohepatic ligament mass   - Baclofen 10 mg TID PRN for intractable hiccups   - Oxycodone 5-10 mg Q6H for cancer related pain, advised NOT to use in combination with Vicodin, prefer oxycodone to avoid APAP component of Vicodin  - Add on lipase    - Miralax PRN    Normocytic Anemia  2/2 anemia of chronic disease/lymphoma/chemo. Iron studies supports chronic disease. B12 adequate. Hgb stable. Will continue trending  - Transfusions as needed    Afib  Plts >50.  - hold rivaroxaban for now for procedures    He has no active infectious issues.  We will plan to address pneumococcal vaccination after he has competed all planned treatment.    He will continue to work with Dr. Jeter for other medical issues.    Doretha Hernandez  Internal Medicine, PGY2    ATTENDING ADDENDUM:  The  patient was seen and evaluated by me.  I have reviewed the vital signs, medications, labs, and imaging results independently, and have discussed the patient and plan with the fellow, and agree with the findings and plan outlined in this note.  The impression and plan were jointly made.    Avery Cary MD, PhD  Attending Physician, [facility]   of Medicine, [facility]  Division of Hematology, Oncology, and Transplantation  [email or web address]  [phone] clinic  [phone] pager

## 2020-07-29 NOTE — NURSING NOTE
Chief Complaint   Patient presents with     Blood Draw     Labs drawn via  by RN in lab. VS taken.      Chang Catherine RN

## 2020-07-29 NOTE — PROGRESS NOTES
REASON FOR VISIT:  Management of diffuse large B cell lymphoma (DLBCL)    HISTORY OF PRESENT ILLNESS:  Mr. Tello is a 49 year old man with DLBCL.  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  DLBCL-IPI was high (stage, extranodal sites, LDH) and clinical stage IV with extranodal involvement.  He was started on R-CHOP in 03/2020 with IT methotrexate planned with each cycle for CNS prophylaxis.  Medical history also notable for A fib on anticoagulation, myocardial infarction, and stroke in 2017.  Course has been complicated by constipation and more recently abdominal pain.  Visit for worsening abdominal pain.    Interval Hx:   Patient reports that he has been doing unwell due to pain that started after chemotherapy about 1 month ago. Pain located on his back, more left than right. Not associated with food. Pain is at times intolerable. He has been trying to limit his hydrocodone, however, finds that he is using it 4-5 times a day and occasionally supplementing with tylenol.     Endorses poor energy, fatigue. Appetite has also been poor, he endorses 60-70 pound weight loss over the past several months.  No fevers, chills, or night sweats.  No headache, vision changes, focal weakness or sensory changes.  No dyspnea, cough, or chest pain.  Endorses some dysphagia as well as occasional episodes of hiccups.     Regular bowel movements, he has been taking Miralax PRN.  No melena or hematochezia.  Endorses urinary frequency with small amounts of urine.  No lumps or bumps.      REVIEW OF SYSTEMS:  A complete review of systems was negative other than noted.    MEDICATIONS:  Current Outpatient Medications   Medication     baclofen (LIORESAL) 10 MG tablet     oxyCODONE (ROXICODONE) 5 MG tablet     rivaroxaban ANTICOAGULANT  (XARELTO ANTICOAGULANT) 20 MG TABS tablet     atorvastatin (LIPITOR) 40 MG tablet     carvedilol (COREG) 12.5 MG tablet     gabapentin (NEURONTIN) 300 MG capsule     HYDROcodone-acetaminophen (NORCO)  MG per tablet     lisinopril (ZESTRIL) 20 MG tablet     omeprazole (PRILOSEC) 40 MG DR capsule     polyethylene glycol (MIRALAX) 17 g packet     Potassium Chloride 40 MEQ/15ML (20%) SOLN     prochlorperazine (COMPAZINE) 10 MG tablet     senna-docusate (SENOKOT-S/PERICOLACE) 8.6-50 MG tablet     No current facility-administered medications for this visit.      PHYSICAL EXAMINATION:  /76 (BP Location: Right arm, Patient Position: Sitting, Cuff Size: Adult Regular)   Pulse 67   Temp 98.9  F (37.2  C) (Oral)   Resp 16   Wt 89.8 kg (198 lb)   SpO2 98%   BMI 28.41 kg/m    Wt Readings from Last 5 Encounters:   07/29/20 89.8 kg (198 lb)   07/02/20 96.4 kg (212 lb 9.6 oz)   07/01/20 97.3 kg (214 lb 9.6 oz)   06/24/20 96.4 kg (212 lb 8 oz)   06/11/20 100.4 kg (221 lb 6.4 oz)     General:  No acute distress.  HEENT: Sclerae anicteric. No OP lesions, masses, or tonsilar enlargement.  Lungs: Clear bilaterally without wheezing or crackles.  Heart: Regular rate and irregularly irregular rhythm without murmurs.  Gastrointestinal: Bowel sounds present, mild tenderness to deep palpation in epigastric area, spleen tip not palpable.  Extremities: No lower extremity edema.  Lymph:  No cervical, clavicular, axillary, epitrochlear, or inguinal lymphadenopathy.  Neuro: Grossly non-focal.  Skin/access: No visible rash.  Performance status: ECOG 1/2    LABORATORY DATA:  Recent Labs   Lab Test 07/20/20 07/02/20  0903 07/01/20  0750  05/20/20  0716  04/29/20  1053   WBC 10.3* 8.0 7.8   < > 7.0   < >  --    HGB 9.3* 8.9* 8.8*   < > 9.1*   < >  --     181 178   < > 174   < >  --    ANEU 9.02* 6.6 6.5   < > 5.7  --   --    ALYM 0.37* 0.3* 0.3*   < > 0.4*  --   --    RETICABSCT  --   --   --   --  105.5*  --  139.6*    < > =  values in this interval not displayed.     Recent Labs   Lab Test 07/02/20  0903 06/22/20 06/15/20  06/01/20  04/27/20  1012  04/07/20  0814  03/19/20  0505  03/18/20  0543  03/17/20  0459    138 142   < > 140   < > 139   < > 142   < > 139  --  139   < > 140   POTASSIUM 3.8 3.4* 2.9*   < > 3.8   < > 3.8   < > 3.7   < > 4.4   < > 4.6   < > 4.3   CHLORIDE 107 103 106   < > 107   < > 108   < > 109   < > 106  --  107   < > 108   CO2 28 27 29   < > 25   < > 26   < > 28   < > 26  --  24   < > 25   BUN 7 6* 13   < > 9   < > 7   < > 4*   < > 20  --  20   < > 21   CR 0.62* 0.61* 0.50*   < > 0.58*   < > 0.56*   < > 0.53*   < > 0.56*   < > 0.63*   < > 0.62*   STACY 8.5 8.8 8.30*   < > 8.50*   < > 8.2*   < > 8.2*   < > 8.7  --  8.5   < > 8.7   MAG  --   --   --   --   --   --   --   --   --   --  2.1  --  2.0  --  2.2   PHOS  --  4.2 3.8  --  3.4   < > 3.3   < > 3.4   < > 4.8*   < > 5.2*   < > 4.5   URIC  --  5.5 5.3  --  6.1   < > 6.0   < > 5.0   < > 4.5   < > 4.4   < > 4.7     --   --   --   --   --  215  --  267*   < > 689*  --  613*  --  472*    < > = values in this interval not displayed.     Recent Labs   Lab Test 07/02/20  0903 05/20/20  0716 04/27/20  1012  03/19/20  0505 03/19/20  0212 03/18/20  1219   AST 8 12 8   < > 56*  --   --    ALT 12 20 14   < > 48  --   --    ALKPHOS 84 88 91   < > 133  --   --    BILITOTAL 0.8 0.4 0.6   < > 0.8  --   --    INR  --   --   --   --  1.31* 1.35* 1.57*    < > = values in this interval not displayed.     Outside films reviewed by me  CT 7/13/20:  Impression   1. Mild splenomegaly  2. Irregular hypo attenuating area extending along short axis of spleen w/ fluid in splenic hilum and around pancreatic tail concerning for splenic laceration.  3. Gastrohepatic ligament mass and subjacent nodule, likely representing sites of lymphoma involvement. Nodule abuts superior margin of the superior mesenteric artery, w/o occluding it.     IMPRESSION AND PLAN:  Mr. Tello is a 49  year old man with DLBCL.    Oncology: He has completed 6 cycles of R-CHOP with Neulasta support and IT methotrexate. Repeat PET/CT w/ bone marrow biopsy planned for 7/31.  Overall, pt reports doing worse, he is endorsing weightloss, intractable hiccups and worsening back pain. CT 7/13 demonstrated increasing gastrohepatic ligament mass concerning for refractory lymphoma .  Will try arrange for EUS biopsy (location of mass makes IR guided biopsy less likely) to confirm pathology.  Additionally, plan to proceed w/ PET/CT to identify other lesions and formally assess mass.  No colonic wall thickening noted on recent CT, however, on PET from 6/8 area of FDG avid rectosigmoid colonic wall thickening, will compare w/ upcoming PET/CT.  Pending PET/CT and biopsy results, will determine future management.   - Continue Weekly labs  - F/u on PET/CT 7/31  - hoping for EUS for biopsy of gastrohepatic ligament mass   - Baclofen 10 mg TID PRN for intractable hiccups   - Oxycodone 5-10 mg Q6H for cancer related pain, advised NOT to use in combination with Vicodin, prefer oxycodone to avoid APAP component of Vicodin  - Add on lipase    - Miralax PRN    Normocytic Anemia  2/2 anemia of chronic disease/lymphoma/chemo. Iron studies supports chronic disease. B12 adequate. Hgb stable. Will continue trending  - Transfusions as needed    Afib  Plts >50.  - hold rivaroxaban for now for procedures    He has no active infectious issues.  We will plan to address pneumococcal vaccination after he has competed all planned treatment.    He will continue to work with Dr. Jeter for other medical issues.    Doretha Hernandez  Internal Medicine, PGY2    ATTENDING ADDENDUM:  The patient was seen and evaluated by me.  I have reviewed the vital signs, medications, labs, and imaging results independently, and have discussed the patient and plan with the fellow, and agree with the findings and plan outlined in this note.  The impression and plan  were jointly made.    Avery Cary MD, PhD  Attending Physician, [facility]   of Medicine, [facility]  Division of Hematology, Oncology, and Transplantation  [email or web address]  [phone] clinic  [phone] pager

## 2020-07-29 NOTE — NURSING NOTE
"Oncology Rooming Note    July 29, 2020 12:59 PM   Kenneth Tello is a 49 year old male who presents for:    Chief Complaint   Patient presents with     Blood Draw     Labs drawn via  by RN in lab. VS taken.      Oncology Clinic Visit     Pt is here for a rtn for B-Cell Lymphoma     Initial Vitals: Blood Pressure 119/76 (BP Location: Right arm, Patient Position: Sitting, Cuff Size: Adult Regular)   Pulse 67   Temperature 98.9  F (37.2  C) (Oral)   Respiration 16   Weight 89.8 kg (198 lb)   Oxygen Saturation 98%   Body Mass Index 28.41 kg/m   Estimated body mass index is 28.41 kg/m  as calculated from the following:    Height as of 3/10/20: 1.778 m (5' 10\").    Weight as of this encounter: 89.8 kg (198 lb). Body surface area is 2.11 meters squared.  Moderate Pain (5) Comment: Data Unavailable   No LMP for male patient.  Allergies reviewed: Yes  Medications reviewed: Yes    Medications: Medication refills not needed today.  Pharmacy name entered into [facility]:    [id number] - [place], [place] - [address]  [id number] - [place], [place] - [address] [place] [address] AT [address] [facility]    Clinical concerns: none       Ana M Bolaños MA            "

## 2020-07-30 NOTE — PROGRESS NOTES
Writer placed call to patient to review instructions for upcoming COVID test as well as to hold his Xarelto, per Dr Cary, until after his EUS biopsy currently scheduled for Tuesday, August 4 in Endoscopy. Patient voiced understanding of plan and will wait to hear from Heather Campos, RNCC, next week with regards to if he needs to keep his August 5 appointment with Dr Cary.

## 2020-07-30 NOTE — PROGRESS NOTES
Care Coordination New Patient Referral  [facility]    NP referral date: 7/30/20  Referred to MD: advanced endoscopy    Referring MD: Raeann  Referring contact information see initial referral note - no  Referral for EUS    Diagnosis: abdominal mass  diffuse large B cell lymphoma (DLBCL)  Patient with history of A Fib    Imaging:      CT   Location:  Date:  7/14/20  Impression   1. Mild splenomegaly  2. Irregular hypo attenuating area extending along short axis of spleen w/ fluid in splenic hilum and around pancreatic tail concerning for splenic laceration.  3. Gastrohepatic ligament mass and subjacent nodule, likely representing sites of lymphoma involvement. Nodule abuts superior margin of the superior mesenteric artery, w/o occluding it.       Procedures:  none    Referral  reviewed by Dr. Tobias     Referral discussed with endoscopy  scheduling and the patient will be contacted with appointment for next Tuesday.     Oncology will arrange for Xarelto to be held per Dr. Tobias's request and will also arrange for covid testing for tomorrow if possible.    Henrietta MERCEDESN, HNBC  RN Care Coordinator  [facility]  Ph: [phone]  Email: [email or web address]

## 2020-07-30 NOTE — PROGRESS NOTES
Patient did not hold his Xarelto yesterday, though did hold this morning. Discussed with Dr. Cary and risk of procedure while still on anticoagulation outweighs the need to get the biopsy done today.     Discussed this with patient. Will get bone marrow biopsy next week after his EUS. He will still get PET scan today. Confirmed with patient he is to HOLD his Xarelto through all of these procedures and he understood this.    Carlos Orellana PA-C

## 2020-07-30 NOTE — PROGRESS NOTES
Writer placed call to patient to update him on status of EUS biopsy and to inform him that he will need to hold his Xarelto for at least tomorrow. Patient voiced understanding and last took his dose of Xarelto at approximately 0800 today. Writer working with GI Onc Surgical team to establish if patient is cleared to have EUS biopsy done in Endoscopy or in OR. Patient aware of need for COVID test prior to procedure, possibly tomorrow or Sunday/Monday dependent on when he is scheduled for EUS biopsy.

## 2020-07-31 NOTE — NURSING NOTE
Chief Complaint   Patient presents with     Bone Marrow Biopsy     Patient here for BMBX r/t DLBCL     Blood Draw     VS done, labs collected via venipuncture and PIV placed.  Hx DLBCL.

## 2020-07-31 NOTE — NURSING NOTE
Patient's last dose of xarelto was 7/30 in the am. Patient is on daily dose. Provider aware.     IV left in for PET scan.     BMT Teaching Flowsheet   Teaching Topic: post biopsy instructions    Person(s) involved in teaching: Patient  Motivation Level  Asks Questions: Yes  Eager to Learn: Yes  Cooperative: Yes  Receptive (willing/able to accept information): Yes    Patient demonstrates understanding of the following:   - Reason for the appointment, diagnosis and treatment plan: Yes  - Knowledge of proper use of medications and conditions for which they are ordered (with special attention to potential side effects or drug interactions): Yes  - Which situations necessitate calling provider and whom to contact: Yes    Teaching concerns addressed: reviewed activity restrictions if received premeds, potential for bleeding and actions to take if develops any of the issues below    Proper use and care of (medical equipment, care aids, etc.) Yes  Pain management techniques: Yes  Patient instructed on hand hygiene: Yes  How and/when to access community resources: Yes    Infection Control:  Patient demonstrates understanding of the following:   Surgical procedure site care taught NA  Signs and symptoms of infection taught Yes  Wound care taught Yes  Central venous catheter care taught NA    Instructional Materials Used/Given: verbal, print out of post biopsy instructions.     Time spent with patient: 0 minutes.    Specific Concerns: NA

## 2020-08-02 PROBLEM — I25.10 CAD (CORONARY ARTERY DISEASE): Status: ACTIVE | Noted: 2020-01-01

## 2020-08-02 PROBLEM — R52 PAIN CRISIS: Status: ACTIVE | Noted: 2020-01-01

## 2020-08-02 PROBLEM — C79.51 METASTATIC CANCER TO SPINE (H): Status: ACTIVE | Noted: 2020-01-01

## 2020-08-02 PROBLEM — I10 BENIGN ESSENTIAL HYPERTENSION: Status: ACTIVE | Noted: 2020-01-01

## 2020-08-02 PROBLEM — R06.6 HICCUPS: Status: ACTIVE | Noted: 2020-01-01

## 2020-08-02 NOTE — PLAN OF CARE
"Care from 0650-1219.    Status: Admitted for pain control.  Vitals: BP (!) 153/80   Pulse 64   Temp 97.6  F (36.4  C) (Oral)   Resp 20   Ht 1.753 m (5' 9\")   Wt 89.8 kg (198 lb)   SpO2 99%   BMI 29.24 kg/m  . RA. Heart rhythm irregular at baseline. Continuous pulse oximeter on.  Neuros: A&O x4.  IV: PIV infusing LR @ 150 mL/hr & Morphine PCA.  Resp/trach: LS clear & equal.   Diet: Regular diet, calorie counts.  Bowel status: BS+, constipated. Scheduled bowel medications given.  : Voiding spontaneously.  Skin: Intact.  Pain: PCA pump in use. PRN Baclofen and PRN Oxycodone (15 mg) given after MRI d/t being disconnected from PCA pump for approximately 1 hour and 20 minutes.  Activity: Up ad maryanne. Ambulated in the room.  Plan: GI consulted. MRI completed. Will continue to monitor and follow POC.  "

## 2020-08-02 NOTE — ED NOTES
[facility], [place]   ED Nurse to Floor Handoff     Kenneth Tello is a 49 year old male who speaks English and lives with family members,  in a home  They arrived in the ED by car from home    ED Chief Complaint: Back Pain    ED Dx;   Final diagnoses:   Acute midline low back pain without sciatica         Needed?: No    Allergies: No Known Allergies.  Past Medical Hx:   Past Medical History:   Diagnosis Date     Alcohol use disorder, mild, abuse      Atrial fibrillation (H)     coumadin     CAD (coronary artery disease)      Cardiomyopathy (H)      HLD (hyperlipidemia)      HTN (hypertension)      Myocardial infarction (H)      Neuropathy     LLE, left hand post-CVA     Obesity      WATSON (obstructive sleep apnea)     requires CPAP however does not have a machine     Pancreatic cancer (H)     suspected 3/9/2020 at OSH     Stroke (H)     2017, mild left sided deficit     Tobacco dependence      Urinary urgency       Baseline Mental status: WDL  Current Mental Status changes: at basesline    Infection present or suspected this encounter: no  Sepsis suspected: No  Isolation type: No active isolations     Activity level - Baseline/Home:  Independent  Activity Level - Current:   Independent    Bariatric equipment needed?: No    In the ED these meds were given:   Medications   morphine (PF) injection 4 mg (4 mg Intravenous Given 8/1/20 2207)   0.9% sodium chloride BOLUS (1,000 mLs Intravenous New Bag 8/1/20 2214)       Drips running?  No    Home pump  No    Current LDAs  Peripheral IV 08/01/20 Left Lower forearm (Active)   Site Assessment WDL 08/01/20 2146   Line Status Saline locked 08/01/20 2146   Phlebitis Scale 0-->no symptoms 08/01/20 2146   Infiltration Scale 0 08/01/20 2146   Number of days: 0       Labs results:   Labs Ordered and Resulted from Time of ED Arrival Up to the Time of Departure from the ED   CBC WITH PLATELETS DIFFERENTIAL - Abnormal; Notable for the following  "components:       Result Value    WBC 17.9 (*)     RBC Count 3.13 (*)     Hemoglobin 8.6 (*)     Hematocrit 28.8 (*)     MCHC 29.9 (*)     RDW 17.2 (*)     Absolute Neutrophil 15.8 (*)     Absolute Lymphocytes 0.3 (*)     Absolute Monocytes 1.6 (*)     All other components within normal limits   COMPREHENSIVE METABOLIC PANEL - Abnormal; Notable for the following components:    Creatinine 0.46 (*)     Albumin 2.4 (*)     All other components within normal limits   INR - Abnormal; Notable for the following components:    INR 1.29 (*)     All other components within normal limits   LIPASE - Abnormal; Notable for the following components:    Lipase 14 (*)     All other components within normal limits   TROPONIN I   ROUTINE UA WITH MICROSCOPIC   URINE CULTURE AEROBIC BACTERIAL       Imaging Studies: No results found for this or any previous visit (from the past 24 hour(s)).    Recent vital signs:   /81   Pulse 78   Temp 97.8  F (36.6  C) (Oral)   Resp 16   Ht 1.753 m (5' 9\")   Wt 89.8 kg (198 lb)   SpO2 98%   BMI 29.24 kg/m      [place] Coma Scale Score: 15 (08/01/20 2124)       Cardiac Rhythm: Normal Sinus and A fib  Pt needs tele? No  Skin/wound Issues: None    Code Status: Full Code    Pain control: good    Nausea control: pt had none    Abnormal labs/tests/findings requiring intervention:     Family present during ED course? No   Family Comments/Social Situation comments:     Tasks needing completion: None    Flash Jung, RN  [phone] [facility]    "

## 2020-08-02 NOTE — PROGRESS NOTES
"CLINICAL NUTRITION SERVICES - ASSESSMENT NOTE     Nutrition Prescription    RECOMMENDATIONS FOR MDs/PROVIDERS TO ORDER:  Recommend patient be able to consistently consume at least 1400 kcal and 60 g protein daily (~60% estimated kcal and protein needs) vs need for more aggressive nutrition intervention (consider TF if appropriate)    Malnutrition Status:    Unable to determine due to unable to obtain all parameters of malnutrition validation    Recommendations already ordered by Registered Dietitian (RD):  Calorie counts    Future/Additional Recommendations:  If unable to meet PO intake goals and/or weight trends down further, consider need to start TF if appropriate.  If this becomes plan of care, please consult Registered Dietitian to Order TF.  Would recommend goal Isosource 1.5 @ goal 65 ml/hr (1560 ml/day) to provide 2340 kcals (30 kcal/kg/day), 106 g PRO (1.4 g/kg/day), 1186 ml free H2O, 275 g CHO and 23 g Fiber daily.  Would start at 10 mL/hr and advance by 10 mL Q8H as tolerated and if K+/Mg++ >/= nrml and phos >1.9  to goal rate, pt at refeeding risk.     REASON FOR ASSESSMENT  Kenneth Tello is a/an 49 year old male assessed by the dietitian for Provider Order - \"consult\"    NUTRITION HISTORY  Obtained information from chart review, pt unavailable during attempts x2 to call today.  Per chart, pt spoke with outpatient RRD on 4/7/20, at that time was reporting pain and difficulty chewing due to poor teeth and was focusing on eating soft, pureed foods and supplement drinks.  RD at that time educated pt on ways to increase kcals/protein in diet. Pt weighed 210 lbs at that time, pt has lost 12 lbs since then (see weight history below)    Per chart, PMH includes diffuse large B-cell lymphoma status post 6 cycles of R-CHOP with Neulasta.  Pt admit with severe back pain.  Pt also report decreased appetite and PO intakes PTA.    CURRENT NUTRITION ORDERS  Diet: Regular + Boost Shake with meals  Intake/Tolerance: " "Unable to assess, pt just admit last night and no intake documented in flowsheets yet    LABS  Labs reviewed  - K+ 3.2 (L), replacement protocol ordered  - Cr 0.42 (L), may indicate low muscle mass    MEDICATIONS  Medications reviewed  - LR @ 150 mL/hr, scheduled until 8/3 @ 0559    ANTHROPOMETRICS  Height: 175.3 cm (5' 9\")  Most Recent Weight: 89.8 kg (198 lb)    IBW: 72.7 kg   BMI: Overweight BMI 25-29.9  Weight History: overall 21 lb wt loss since 3/23 (9.6% wt loss)  Wt Readings from Last 25 Encounters:   08/02/20 89.8 kg (198 lb)   07/31/20 90.3 kg (199 lb)   07/29/20 89.8 kg (198 lb)   07/02/20 96.4 kg (212 lb 9.6 oz)   07/01/20 97.3 kg (214 lb 9.6 oz)   06/24/20 96.4 kg (212 lb 8 oz)   06/11/20 100.4 kg (221 lb 6.4 oz)   05/20/20 94.6 kg (208 lb 9.6 oz)   04/29/20 95.3 kg (210 lb 1.6 oz)   04/27/20 95.9 kg (211 lb 8 oz)   04/07/20 95.3 kg (210 lb)   03/23/20 99.3 kg (219 lb)   03/18/20 106.5 kg (234 lb 14.4 oz)      Dosing Weight: 77 kg (adjusted based on admit/lowest recent wt 89.8 kg on 8/2 and IBW)    ASSESSED NUTRITION NEEDS  Estimated Energy Needs: 4894-2742 kcals/day (30 - 35 kcals/kg)  Justification: Increased needs with recent cancer treatment, weight loss  Estimated Protein Needs:  grams protein/day (1.2 - 1.5 grams of pro/kg)  Justification: Increased needs with recent cancer treatment, weight loss  Estimated Fluid Needs: (1 mL/kcal)   Justification: Maintenance, or other per provider pending fluid status    PHYSICAL FINDINGS  See malnutrition section below.    MALNUTRITION  % Intake: Unable to assess  % Weight Loss: > 7.5% in 3 months (severe)  Subcutaneous Fat Loss: Unable to assess  Muscle Loss: Unable to assess  Fluid Accumulation/Edema: Trace per provider note last night, does not meet criteria  Malnutrition Diagnosis: Unable to determine due to unable to obtain all parameters of malnutrition validation    NUTRITION DIAGNOSIS  Inadequate oral intake related to decreased appetite as " evidenced by 9.6% wt loss over the past 4 months      INTERVENTIONS  Implementation  Nutrition Education: Unable to complete due to pt not available during 2 attempts to call   Calorie counts    Goals  Patient to consume % of nutritionally adequate meal trays TID, or the equivalent with supplements/snacks.     Monitoring/Evaluation  Progress toward goals will be monitored and evaluated per protocol.     Milvia Hankins, RD, LD  Weekend/Holiday RD Pager: [phone]    Weekday 7D RD Pager: [phone]

## 2020-08-02 NOTE — PLAN OF CARE
Pt afebrile with stable vs. Heart rhythm irregular (baseline), typically takes xeralto, currently receiving heparin subcutaneous. Back pain much improved with morphine pca. Plan for MRI today, safety check list sent. No BM x 4 days. Received miralax. Potassium level 3.2 replaced per protocol. Redraw ordered for 1530. Pt receiving LR at 150cc/hr. Urine output adequate but I>O. Taking some po. Good fluid intake. Up in room independently.

## 2020-08-02 NOTE — CONSULTS
[facility]  Palliative Care Consultation Note    Patient: Kenneth Tello  Date of Admission:  8/1/2020    Requesting Clinician / Team: Gold 11  Reason for consult: Pain management  Symptom management    Recommendations:    Start morphine PCA: 0.8 mg continuous, with 1.6 mg doses available q30 minutes (ordered for you) for acute pain control, and to best understand pt's opiates needs, which seem to have been highly variable.    I have discussed PCA with 7D pharmacist.      Stop other opiates when PCA becomes available.        Per discussion with [facility] team today today, currently relatively high suspicion that patient will still have large amounts of pain on discharge.   Long acting pain mediation (e.g. MS Contin) will likely be needed to be added once we have a better understanding of his needs.   Consistently higher QTc make him an imperfect methadone candidate.      Schedule Tylenol e. 975 mg q6h      Agree with consultation with radiation oncology about possible radiation of mass.   Incompletely clear to us whether pt's pain is related to abdominal mass and is referred to back (similar to how it presented in March, and seems somewhat more probable).   If pain was from vertebrae, trial of dexamethasone 4mg bid may also be helpful (presuming safe from disease treatment standpoint), but suspect would be less likely to help if an abdominal mass.  MRI scheduled by Onc.        Suspect patient is quite constipated, and would benefit from a clearnout.   Will also need more aggressive regimen on discharge, likely including addition for senna.     Today, would start:  A. Senna 2 tabs bid   B. Miralax daily      Pt will benefit from SW consult RE: finances.  Met with Soo Yeon Han on 3/24 (please see her note).   Financial struggles continue      Pt will likely benefit from continued communication about prognosis.  Currently impression is that has highly treatable disease;  which per our discussions with oncology may be likely with his apparent progression      Our team will continue to follow      Ben Grier MD  Palliative Care  pager [phone]    Inpatient Team Consult Pager [phone] (answered 8am-430pm M-F) - ok to text page via ITS Compliance / After-Hours Answering Service [phone] / Palliative Clinic in the [facility] [phone] (scheduling); [phone] (triage).      These recommendations have been discussed with Onc team       Thank you for the opportunity to participate in the care of this patient and family. Our team: will continue to follow.     During regular M-F work hours -- if you are not sure who specifically to contact -- please contact us by sending a text page to our team consult pager at [phone].    After regular work hours and on weekends/holidays, you can call our answering service at [phone]. Also, who's on call for us is available in [employer].       Assessments:  Kenneth Tello is a 49 year old male with a history of a large B-cell lymphoma status post 6 cycles of R-CHOP, history of a CVA and A. fib on Xarelto, who presents to the hospital with severe back pain.  Is noticed to have continued disease progression.    Today, the patient was seen for:  Large B-clel lymphoma  Cancer-related pain  Opiate association pain  Pain crisis    Prognosis, Goals, & Planning:      Functional Status just prior to hospitalization: 1 (Restricted in physically strenuous activity but ambulatory and able to carry out work of a light or sedentary nature)      Prognosis, Goals, and/or Advance Care Planning were addressed today: Yes        Summary/Comments:       Patient's decision making preferences: independently          Patient has decision-making capacity today for complex decisions: Yes            I have concerns about the patient/family's health literacy today: No           Patient has a completed Health Care Directive:  No.       Code status: full code    Coping, Meaning, & Spirituality:   Mood, coping, and/or meaning in the context of serious illness were addressed today: Yes  Summary/Comments:     Social:     [place] away[place]   His 16 year old son lives in the house.  He is quite helpful per pt    Key family / caregivers: son.  Has older daughter (in her 20s) who lives in town    Occupational history: .  working until March 2020    Substance use history: reported h/o alcohol use disorder.  minimal alcohol use lately, though did indicate that he drank a beeer to see if  it would help his pain    Financial concerns: yes, not working since March; getting food from [employer], but minimal other support.  Please see 3/24 SW note    Current in-home services: none    History of Present Illness:  History gathered today from: patient, medical chart    Kenneth Tello is a 49-year-old man with a history of diffuse large B cell lymphoma's status post 6 cycles of R-CHOP who was admitted with a pain crisis.  Patient notes that he has been having pain in the center of his upper to mid back for over a month but is gotten significantly worse in the past several weeks in particular the past several days.  His pain seems to him fairly similar to the pain that he presented with in March 2020 for which pancreatic cancer was initially suspected, though he estimates that was a while ago and is not exactly sure.  It is dull and persistent.  It is perhaps a little bit better when he gets up to move around the does not appear that positional.  He does not think it really radiates or travels.  There is nothing that makes it markedly worse except perhaps staying the same place.    He was previously able to control his pain well with hydrocodone 10-3 25.  As his pain has been progressing he found himself taking this more more and recently saw a doctor for whom he was prescribed oxycodone 5 to 10 mg every 4 hours  as needed.  He indicates he was switched in part because he was also taking Tylenol on the side.  He never thinks he was taking more than perhaps 8 doses per day total of Tylenol.  He indicates that in spite of taking his pain medication he often finds it difficult to sleep more than an hour or 2 at night before waking up related to the pain.  Is not taking a long-acting pain medication.    Pain is markedly increased in the last couple days in the same character.  He finds he is taking his oxycodone as often as every 2 hours.  He sometimes takes 1 tab at other times takes as much as 3.  He feels is been so variable it is hard to quantify a total number of tabs per day.  Other than Tylenol no adjuncts.    Patient notes that his belly is also quite uncomfortable.  He feels that there is a different sort of pain there is more crampy and spread throughout his belly.  He notes he always has a stool about every other day.  Is generally pretty stop soft.  He notes he has been on MiraLAX before and sometimes feels as though is both constipated and has diarrhea.    Poor appetite the last several months.  Notes his weight loss now was in the range of 70 pounds.  Nausea is generally not been significant.  Sleep or related pain.  Quite fatigued but still able to do most of his daily tasks.          Key Palliative Symptom Data:  # Pain severity the last 12 hours: severe  # Dyspnea severity the last 12 hours: none  # Nausea severity the last 12 hours: none  # Anxiety severity the last 12 hours: none    Patient is on opioids: assessed and I made recommendations about bowel care as above.    ROS:  Comprehensive ROS is reviewed and is negative except as here & per HPI: yes, 12 pt ROS otherwise neg     Past Medical History:  Past Medical History:   Diagnosis Date     Alcohol use disorder, mild, abuse      Atrial fibrillation (H)     coumadin     CAD (coronary artery disease)      Cardiomyopathy (H)      HLD (hyperlipidemia)      HTN  (hypertension)      Myocardial infarction (H)      Neuropathy     LLE, left hand post-CVA     Obesity      WATSON (obstructive sleep apnea)     requires CPAP however does not have a machine     Pancreatic cancer (H)     suspected 3/9/2020 at OSH     Stroke (H)     2017, mild left sided deficit     Tobacco dependence      Urinary urgency         Past Surgical History:  Past Surgical History:   Procedure Laterality Date     CHOLECYSTECTOMY, LAPOROSCOPIC           Family History:  Family History   Problem Relation Age of Onset     Cardiovascular Mother      Cardiovascular Father      Diabetes Type 2  Father      Cardiovascular Brother      Cancer Maternal Grandmother         Allergies:  No Known Allergies     Medications:  I have reviewed this patient's medication profile and medications from this hospitalization.   Noted scheduled meds are:  Gabapentin 300 mg nightly  Gabapentin 600 mg every morning    Senna docusate twice daily  MiraLAX daily    Omeprazole    Noted PRN meds are:  Tylenol  Baclofen 10 mg 3 times daily hiccups    Morphine 4 mg every 4 hours breakthrough pain  -got x 3, at 10pm, midnight, 3 am  Oxycodone 10 mg every 4 hours moderate to severe pain  -- got at 6am    Compazine 10 mg nausea/vomiting      Home medications include:  Baclofen 10 mg p.o. hiccups  Gabapentin 600 mg every morning, 300 nightly  Norco 10-3 25*  Oxycodone 5-10 every 6  MiraLAX as needed  Compazine PRN        Physical Exam:  Vital Signs: Temp: 96.6  F (35.9  C) Temp src: Oral BP: (!) 144/70 Pulse: 89   Resp: 16 SpO2: 99 % O2 Device: None (Room air)    Weight: 198 lbs 0 oz  General:  Sitting up in bed.   Appears to be uncomfortable,   HEENT: sclerae anicteric.   MMM  Lungs: Clear bilaterally without wheezing or crackles.  Heart:  irregularly irregular irregular rhythm without murmurs.  Back: mild TTP over lower thoracic spine  Gastrointestinal: Bowel sounds reduced, mild tenderness to deep palpation in epigastric area, no rebound or  guarding  Extremities: +1 lower extremity edema.  Neuro: Grossly non-focal.  Skin/access: No visible rash noted on exam.       Data reviewed:  Recent imaging reviewed, my comments on pertinents:   PET CT: 7/30                                                                     IMPRESSION: In this patient with a history of diffuse large B-cell  lymphoma, status post chemotherapy:  1. Increased size and metabolic activity of large, necrotic  retroperitoneal lymph node, now measuring up to 7.5 cm, indicating  progressive disease response per Lugano criteria. This mass continues  to invade the pancreas and spleen.  2. Multiple new pulmonary nodules scattered throughout the lungs,  including a new 1.4 cm nodule in the left lower lobe with increased  metabolic activity. This is concerning for new pulmonary metastasis.  3. New focal uptake in the posterior T12 vertebral body, concerning  for new bony metastasis.  4. Increased uptake in the ascending colon, which may be physiologic  vs colitis.  5. Diffuse bladder wall thickening, recommend clinical correlation for  Cystitis.    QTc today: 480 ms  QTC yesterday: 483 ms    QTc March 2020 (only other EKG in our chart): 520 ms      Recent lab data reviewed, my comments on pertinents:   Cr nl  ASt/ALT nl  Albumin 2.4  WBC: 12.2  Hg; 7.7

## 2020-08-02 NOTE — ED TRIAGE NOTES
Patient presents ambulatory, A&Ox4 to triage c/o mid back pain since last chemo about a month ago. Hx lymphoma. Patient reports taking prescribed oxycontin, but this does not relieve the pain.

## 2020-08-02 NOTE — H&P
[facility], [place]    History and Physical  Hematology / Oncology     Date of Admission:  8/1/2020  Date of Service (when I saw the patient): 08/02/20    Assessment & Plan   Kenneth Tello is a 49 year old male who presents with back pain.     Mr. Gan is a very pleasant 49-year-old male with medical comorbidities significant for diffuse large B-cell lymphoma status post 6 cycles of R-CHOP with Neulasta, still progressing, CVA, A. fib currently on Xarelto, hypertension, who presented to the emergency room with severe back pain and who is being admitted to the hospital for pain control.    # Intractable back pain  # DLBC status post 6 cycles of R-CHOP with Neulasta support and IT methotrexate  # Increased sized and metabolic activity of a large necrotic retroperitoneal lymph node invading pancreas and spleen indicating progressive disease  # New pulmonary nodules concerning for metastatic disease  # New focal uptake in the posterior T12 vertebral body concerning for new bony metastasis, likely the cause of his pain  # Normocytic anemia, of chronic disease     - New met in T12 likely the source of his pain.   - At home, taking oxycodone 10 mg Q2 hours for intractable pain for the last 2 days without relief. Will place on oxycodone 10-15 mg Q4 hours for moderate to severe pain. Morphine 4mg Q4H PRN for breakthrough pain. Continuous pulse Ox for monitoring of resp depression. Narcane PRN for reversal.   - Patient supposed to be getting EUS biopsy and bone marrow biopsy as per hematology. This was pushed till Tuesday, since he was supposed to be holding Rivaroxiiban. He is currently not taking it.  Will consider doing this as an inpatient.  -Consider consult palliative care in the a.m. for symptom control.   - Nutrition services given decreased appetite and PO intake.     # A. Fib  - will continue to hold rivaroxaban for now in case he undergo biopsy in the next day or 2.  -Meanwhile  I will place on prophylactic heparin 5000 every 8 hours.  This should be held in the morning of any procedure.    # History of CVA  # Hypertension  Continue lisinopril and Coreg    # Hiccups   Currently on baclofen as needed    # Chest discomfort while in the ED  EKG showed questionable T wave inversion.  His troponins were negative.  Upon arrival to the room patient mentioned that his symptoms were due to back pain, and these are currently resolved.  - He is status post 325 mg of p.o. aspirin  -We will continue to monitor for now    Diet: regular with boost  DVT Prophylaxis:  Rivaroxaban on hold, currently on heparin 5000 units subcu every 8 hours  Gonzales Catheter: not present  Code Status: Full, discussed with patient upon admission  Rule Out COVID-19 Handoff:  Kenneth is a LOW SUSPICION PUI.  Follow these instructions:    If COVID test positive -> continue isolation precautions    If COVID test negative -> discontinue COVID-specific isolation precautions     --  Ariel Bond MD PhD  Hematology, Oncology, and Bone Marrow Transplantation Service, [facility], [place]  Pager: [phone]  Please see sticky note for cross cover information      Primary Care Physician   Nina Jeter    Chief Complaint   Back Pain     History is obtained from the patient and EMR     History of Present Illness      Mr. Gan is a very pleasant 49-year-old male with medical comorbidities significant for diffuse large B-cell lymphoma status post 6 cycles of R-CHOP with Neulasta, still progressing, CVA, A. fib currently on Eliquis, hypertension, who presented to the emergency room with severe back pain and who is being admitted to the hospital for pain control.    Patient mentioned that he has been having back pain for a month however this has significantly worsened over the last 24 hours.  He was not able to sleep because of severe pain.  His pain is localized in the middle back and he  rated as 9/10.  He is currently taking oxycodone 10 mg every 2 hours (instead of the recommended dose every 6 hours) without significant benefit.  He denies any trauma.  He also acknowledged constipation and his last bowel movement was 2 days ago.  Also admits to generalized fatigue and decreased appetite and p.o. intake.  He does not have any red flag symptoms for back pain including numbness or tingling in the lower extremities, or lower weakness in the lower extremities no saddle anesthesia or bowel or bladder incontinence.  He denies any fever, cough or shortness of breath.  No nausea or vomiting.    In the ED, patient was afebrile and hemodynamically stable.  His blood pressure was mildly elevated at 140/81.  He was satting 96% on room air.  His electrolytes were normal and at baseline.  His lipase was 14.  His white count was elevated at 17.9.  His hemoglobin was at baseline at 8.6.  His platelets were 222K.  INR was 1.29.   While in the ED, he acknowledged some chest discomfort.  EKG was obtained and it showed A. fib, incomplete right bundle branch block, and T wave inversion in the lateral leads.  Troponins were obtained and were normal.  He received 1 L of normal saline, 324 mg of aspirin, and 2 dosages of 4 mg of IV morphine.    PET CT scan done on 7/31/2020 showed Increased sized and metabolic activity of a large necrotic retroperitoneal lymph node invading pancreas and spleen indicating progressive disease.  New pulmonary nodules concerning for metastatic disease. New focal uptake in the posterior T12 vertebral body concerning for new bony metastasis, likely the cause of his pain.       Past Medical History    Past medical history reviewed with no previously diagnosed medical problems.    Past Surgical History   I have reviewed this patient's surgical history and updated it with pertinent information if needed.  Past Surgical History:   Procedure Laterality Date     CHOLECYSTECTOMY, LAPOROSCOPIC          Prior to Admission Medications   Prior to Admission Medications   Prescriptions Last Dose Informant Patient Reported? Taking?   HYDROcodone-acetaminophen (NORCO)  MG per tablet   Yes No   Potassium Chloride 40 MEQ/15ML (20%) SOLN   No No   Sig: Take 7.5 mLs (20 mEq) by mouth 2 times daily   Patient not taking: Reported on 7/31/2020   atorvastatin (LIPITOR) 40 MG tablet   No No   Sig: Take 1 tablet (40 mg) by mouth daily   baclofen (LIORESAL) 10 MG tablet   No No   Sig: Take 1 tablet (10 mg) by mouth 3 times daily as needed (HICCUPS)   carvedilol (COREG) 12.5 MG tablet   Yes No   Sig: Take 12.5 mg by mouth 2 times daily (with meals)   gabapentin (NEURONTIN) 300 MG capsule   No No   Sig: Take 2 tabs every am, take one tab at bedtime   lisinopril (ZESTRIL) 20 MG tablet   Yes No   Sig: Take 20 mg by mouth daily   omeprazole (PRILOSEC) 40 MG DR capsule   Yes No   Sig: Take 40 mg by mouth daily   oxyCODONE (ROXICODONE) 5 MG tablet   No No   Sig: Take 1-2 tablets (5-10 mg) by mouth every 6 hours as needed for severe pain   polyethylene glycol (MIRALAX) 17 g packet   Yes No   Sig: Take 1 packet by mouth daily as needed for constipation   predniSONE (DELTASONE) 50 MG tablet   No No   Sig: Take 2 tablets (100 mg) by mouth daily for 5 days Take on Days 1 through 5. Take first dose in AM prior to chemotherapy.   prochlorperazine (COMPAZINE) 10 MG tablet   No No   Sig: Take 1 tablet (10 mg) by mouth every 6 hours as needed (Nausea/Vomiting)   Patient not taking: Reported on 6/24/2020   rivaroxaban ANTICOAGULANT (XARELTO ANTICOAGULANT) 20 MG TABS tablet   No No   Sig: Take 1 tablet (20 mg) by mouth daily (with dinner)   Patient not taking: Reported on 7/31/2020   senna-docusate (SENOKOT-S/PERICOLACE) 8.6-50 MG tablet   No No   Sig: Take 1 tablet by mouth 2 times daily as needed for constipation   Patient not taking: Reported on 7/31/2020      Facility-Administered Medications: None     Allergies   No Known  Allergies    Social History   I have reviewed this patient's social history and updated it with pertinent information if needed. Kenneth Tello  reports that he quit smoking about 7 months ago. His smoking use included cigarettes. He started smoking about 3 years ago. He has a 2.00 pack-year smoking history. His smokeless tobacco use includes chew. He reports current alcohol use of about 1.0 - 2.0 standard drinks of alcohol per week. He reports that he does not use drugs.    Family History   I have reviewed this patient's family history and updated it with pertinent information if needed.   Family History   Problem Relation Age of Onset     Cardiovascular Mother      Cardiovascular Father      Diabetes Type 2  Father      Cardiovascular Brother      Cancer Maternal Grandmother        Review of Systems   The 10 point Review of Systems is negative other than noted in the HPI or here.     Physical Exam   Temp: 97.8  F (36.6  C) Temp src: Oral BP: 133/82 Pulse: 70   Resp: 16 SpO2: 100 % O2 Device: None (Room air)    Vital Signs with Ranges  Temp:  [97.8  F (36.6  C)] 97.8  F (36.6  C)  Pulse:  [70-98] 70  Resp:  [16] 16  BP: (133-144)/() 133/82  SpO2:  [98 %-100 %] 100 %  198 lbs 0 oz     General:   In mild distress because of pain.  This was relieved after I gave him 4 mg of morphine while in the room.  HEENT: sclerae anicteric.   Lungs: Clear bilaterally without wheezing or crackles.  Heart:  irregularly irregular irregular rhythm without murmurs.  Gastrointestinal: Bowel sounds reduced, mild tenderness to deep palpation in epigastric area, no rebound or guarding  Extremities: +1 lower extremity edema.  Neuro: Grossly non-focal.  Skin/access: No visible rash noted on exam.    Data   Results for orders placed or performed during the hospital encounter of 08/01/20 (from the past 24 hour(s))   CBC with platelets differential   Result Value Ref Range    WBC 17.9 (H) 4.0 - 11.0 10e9/L    RBC Count 3.13 (L) 4.4 -  5.9 10e12/L    Hemoglobin 8.6 (L) 13.3 - 17.7 g/dL    Hematocrit 28.8 (L) 40.0 - 53.0 %    MCV 92 78 - 100 fl    MCH 27.5 26.5 - 33.0 pg    MCHC 29.9 (L) 31.5 - 36.5 g/dL    RDW 17.2 (H) 10.0 - 15.0 %    Platelet Count 222 150 - 450 10e9/L    Diff Method Automated Method     % Neutrophils 88.6 %    % Lymphocytes 1.6 %    % Monocytes 8.8 %    % Eosinophils 0.1 %    % Basophils 0.2 %    % Immature Granulocytes 0.7 %    Nucleated RBCs 0 0 /100    Absolute Neutrophil 15.8 (H) 1.6 - 8.3 10e9/L    Absolute Lymphocytes 0.3 (L) 0.8 - 5.3 10e9/L    Absolute Monocytes 1.6 (H) 0.0 - 1.3 10e9/L    Absolute Eosinophils 0.0 0.0 - 0.7 10e9/L    Absolute Basophils 0.0 0.0 - 0.2 10e9/L    Abs Immature Granulocytes 0.1 0 - 0.4 10e9/L    Absolute Nucleated RBC 0.0    Comprehensive metabolic panel   Result Value Ref Range    Sodium 135 133 - 144 mmol/L    Potassium 3.6 3.4 - 5.3 mmol/L    Chloride 100 94 - 109 mmol/L    Carbon Dioxide 30 20 - 32 mmol/L    Anion Gap 6 3 - 14 mmol/L    Glucose 93 70 - 99 mg/dL    Urea Nitrogen 10 7 - 30 mg/dL    Creatinine 0.46 (L) 0.66 - 1.25 mg/dL    GFR Estimate >90 >60 mL/min/[1.73_m2]    GFR Estimate If Black >90 >60 mL/min/[1.73_m2]    Calcium 9.3 8.5 - 10.1 mg/dL    Bilirubin Total 1.2 0.2 - 1.3 mg/dL    Albumin 2.4 (L) 3.4 - 5.0 g/dL    Protein Total 7.2 6.8 - 8.8 g/dL    Alkaline Phosphatase 98 40 - 150 U/L    ALT 15 0 - 70 U/L    AST 13 0 - 45 U/L   INR   Result Value Ref Range    INR 1.29 (H) 0.86 - 1.14   Lipase   Result Value Ref Range    Lipase 14 (L) 73 - 393 U/L   EKG 12-lead, tracing only   Result Value Ref Range    Interpretation ECG Click View Image link to view waveform and result    Troponin I   Result Value Ref Range    Troponin I ES 0.043 0.000 - 0.045 ug/L   EKG 12 lead   Result Value Ref Range    Interpretation ECG Click View Image link to view waveform and result    Troponin I   Result Value Ref Range    Troponin I ES 0.042 0.000 - 0.045 ug/L

## 2020-08-02 NOTE — PROGRESS NOTES
Nursing Focus: Admission    D: Arrived at 0310 from Emergency Department via cart. Patient accompanied by NST. Admitted for cancer pain. Complains of back pain.      I: Admission process began.  Patient oriented to room, enviroment, call light.  Md. notified of patients arrival on unit.     A: Vital signs stable, afebrile.  Patient stable at this time.  Complaining of medial back pain.     P: Implement plan of care when available. Continue to monitor patient. Nursing interventions as appropriate. Notify md with changes in pt status.

## 2020-08-02 NOTE — PROGRESS NOTES
Radiation oncology consulted for consideration of radiotherapy for pain control. Patient history and imaging reviewed and discussed with staff, Dr. Marcum. Full consult to follow after review of pending MR imaging, but suspect pain is not related to T12 vertebral body and is more likely from his retroperitoneal mass. Palliative radiotherapy may be reasonable.    Discussed the above with primary team.    Carlos A Lopez MD  PGY-4 Radiation Oncology  Clinic: [phone]  Pager: [phone]

## 2020-08-02 NOTE — CONSULTS
GASTROENTEROLOGY CONSULTATION      Date of Admission:  8/1/2020           Reason for Consultation:   We were asked by primary to evaluate this patient with intractable back pain for consideration of celiac block.            ASSESSMENT AND RECOMMENDATIONS:   Assessment:  49 year old male with a history of diffuse large B-cell lymphoma status post 6 cycles of R-CHOP with Neulasta, still progressing, CVA, A. fib currently on Xarelto, hypertension is admitted for intractable back pain.     # DLBCL  # Intractable back pain, ?? For possible celiac nerve block by GI  # EUS-guided biopsy for retroperitoneal LN by Dr. Owen on 8/4     Recommendations  Discussed with the primary team and the patient, Dr. Owen and the primary team will evaluate tomorrow.   Primary team is holding anticoagulation.     Thank you for involving us in this patient's care. Please do not hesitate to contact the GI service with any questions or concerns.     Pt care plan discussed with GURU Parminder, GI staff physician.    Alex Torres MD  GI fellow   [id number]  -------------------------------------------------------------------------------------------------------------------           History of Present Illness:   Kenneth Tello is a 49 year old male with a history of diffuse large B-cell lymphoma status post 6 cycles of R-CHOP with Neulasta, still progressing, CVA, A. fib currently on Xarelto, hypertension is admitted for intractable back pain.   Patient was admitted for severe back pain for pain control. He has history of DLBCL and is undergoing treatment with R-CHOP and Neulasta. He is followed by oncology. PET CT scan done on 7/31/2020 showed Increased sized and metabolic activity of a large necrotic retroperitoneal lymph node invading pancreas and spleen indicating progressive disease. Oncology wanted GI to see the patient to evaluate for possible celiac block for pain control.  Patient describes improved pain during his  hospitalization, rates it at 8/10, said it was more severe at home.             Past Medical History:   Reviewed and edited as appropriate  Past Medical History:   Diagnosis Date     Alcohol use disorder, mild, abuse      Atrial fibrillation (H)     coumadin     CAD (coronary artery disease)      Cardiomyopathy (H)      HLD (hyperlipidemia)      HTN (hypertension)      Myocardial infarction (H)      Neuropathy     LLE, left hand post-CVA     Obesity      WATSON (obstructive sleep apnea)     requires CPAP however does not have a machine     Pancreatic cancer (H)     suspected 3/9/2020 at OSH     Stroke (H)     2017, mild left sided deficit     Tobacco dependence      Urinary urgency             Past Surgical History:   Reviewed and edited as appropriate   Past Surgical History:   Procedure Laterality Date     CHOLECYSTECTOMY, LAPOROSCOPIC              Previous Endoscopy:   No results found for this or any previous visit.         Social History:   Reviewed and edited as appropriate  Social History     Socioeconomic History     Marital status:      Spouse name: Not on file     Number of children: Not on file     Years of education: Not on file     Highest education level: Not on file   Occupational History     Occupation:    Social Needs     Financial resource strain: Not on file     Food insecurity     Worry: Not on file     Inability: Not on file     Transportation needs     Medical: Not on file     Non-medical: Not on file   Tobacco Use     Smoking status: Former Smoker     Packs/day: 1.00     Years: 2.00     Pack years: 2.00     Types: Cigarettes     Start date: 3/10/2017     Last attempt to quit: 2020     Years since quittin.5     Smokeless tobacco: Current User     Types: Chew     Tobacco comment: daily chew use x39 years, now intermittent   Substance and Sexual Activity     Alcohol use: Yes     Alcohol/week: 1.0 - 2.0 standard drinks     Types: 1 - 2 Cans of beer per week     Drinks per  session: 1 or 2     Binge frequency: Less than monthly     Comment: 1 beer daily, occasional >2 drinks/episode socially     Drug use: Never     Sexual activity: Not on file   Lifestyle     Physical activity     Days per week: Not on file     Minutes per session: Not on file     Stress: Not on file   Relationships     Social connections     Talks on phone: Not on file     Gets together: Not on file     Attends Islam service: Not on file     Active member of club or organization: Not on file     Attends meetings of clubs or organizations: Not on file     Relationship status: Not on file     Intimate partner violence     Fear of current or ex partner: Not on file     Emotionally abused: Not on file     Physically abused: Not on file     Forced sexual activity: Not on file   Other Topics Concern     Not on file   Social History Narrative     Not on file            Family History:   Reviewed and edited as appropriate  Family History   Problem Relation Age of Onset     Cardiovascular Mother      Cardiovascular Father      Diabetes Type 2  Father      Cardiovascular Brother      Cancer Maternal Grandmother      No known history of colorectal cancer, liver disease, or inflammatory bowel disease.         Allergies:   Reviewed and edited as appropriate   No Known Allergies         Medications:     Current Facility-Administered Medications   Medication     acetaminophen (TYLENOL) tablet 650 mg     atorvastatin (LIPITOR) tablet 40 mg     baclofen (LIORESAL) tablet 10 mg     carvedilol (COREG) tablet 12.5 mg     gabapentin (NEURONTIN) capsule 300 mg     gabapentin (NEURONTIN) capsule 600 mg     lactated ringers infusion     lisinopril (ZESTRIL) tablet 20 mg     magnesium sulfate 4 g in 100 mL sterile water (premade)     Medication Instruction     morphine  PCA 1 mg/mL OPIOID TOLERANT     morphine (PF) injection 4 mg     naloxone (NARCAN) injection 0.4 mg     omeprazole (priLOSEC) CR capsule 40 mg     oxyCODONE IR  "(ROXICODONE) tablet 10 mg    Or     oxyCODONE (ROXICODONE) tablet 15 mg     polyethylene glycol (MIRALAX) Packet 17 g     potassium chloride (KLOR-CON) Packet 20-40 mEq     potassium chloride 10 mEq in 100 mL intermittent infusion with 10 mg lidocaine     potassium chloride 10 mEq in 100 mL sterile water intermittent infusion (premix)     potassium chloride 20 mEq in 50 mL intermittent infusion     potassium chloride ER (KLOR-CON M) CR tablet 20-40 mEq     potassium phosphate 15 mmol in D5W 250 mL intermittent infusion     potassium phosphate 20 mmol in D5W 250 mL intermittent infusion     potassium phosphate 20 mmol in D5W 500 mL intermittent infusion     potassium phosphate 25 mmol in D5W 500 mL intermittent infusion     prochlorperazine (COMPAZINE) tablet 10 mg    Or     prochlorperazine (COMPAZINE) injection 10 mg     senna-docusate (SENOKOT-S/PERICOLACE) 8.6-50 MG per tablet 1 tablet    Or     senna-docusate (SENOKOT-S/PERICOLACE) 8.6-50 MG per tablet 2 tablet             Review of Systems:     A complete 10 point review of systems was performed and is negative except as noted in the HPI           Physical Exam:   /74   Pulse 61   Temp 96.1  F (35.6  C) (Oral)   Resp 18   Ht 1.753 m (5' 9\")   Wt 89.8 kg (198 lb)   SpO2 100%   BMI 29.24 kg/m    Wt:   Wt Readings from Last 2 Encounters:   08/02/20 89.8 kg (198 lb)   07/31/20 90.3 kg (199 lb)      Constitutional: cooperative, pleasant, not dyspneic/diaphoretic, no acute distress  Eyes: Sclera anicteric/injected  Ears/nose/mouth/throat: Normal oropharynx without ulcers or exudate, mucus membranes moist, hearing intact  Neck: supple, thyroid normal size  CV: No edema  Respiratory: Unlabored breathing  Lymph: No axillary, submandibular, supraclavicular or inguinal lymphadenopathy  Abd:  Nondistended, +bs, no hepatosplenomegaly, nontender, no peritoneal signs  Skin: warm, perfused, no jaundice  Neuro: AAO x 3, No asterixis  Psych: Normal affect  MSK: No " gross deformities         Data:   Labs and imaging below were independently reviewed and interpreted    BMP  Recent Labs   Lab 08/02/20  1629 08/02/20 0433 08/01/20 2143 07/31/20  0741 07/29/20  1225   NA  --  137 135 134 139   POTASSIUM 3.9 3.2* 3.6 3.5 3.7   CHLORIDE  --  102 100 100 104   STACY  --  8.7 9.3 8.8 9.0   CO2  --  31 30 30 29   BUN  --  9 10 13 12   CR  --  0.42* 0.46* 0.51* 0.55*   GLC  --  80 93 101* 82     CBC  Recent Labs   Lab 08/02/20  0433 08/01/20  2143 07/31/20  0741 07/29/20  1225   WBC 12.2* 17.9* 13.9* 12.4*   RBC 2.74* 3.13* 2.97* 2.98*   HGB 7.7* 8.6* 8.2* 8.4*   HCT 25.5* 28.8* 27.2* 27.6*   MCV 93 92 92 93   MCH 28.1 27.5 27.6 28.2   MCHC 30.2* 29.9* 30.1* 30.4*   RDW 17.4* 17.2* 16.6* 17.0*    222 196 181     INR  Recent Labs   Lab 08/01/20 2143   INR 1.29*     LFTs  Recent Labs   Lab 08/01/20 2143 07/31/20  0741 07/29/20  1225   ALKPHOS 98 90 94   AST 13 11 10   ALT 15 16 17   BILITOTAL 1.2 1.0 1.0   PROTTOTAL 7.2 7.2 7.0   ALBUMIN 2.4* 2.6* 2.6*      PANC  Recent Labs   Lab 08/01/20 2143 07/29/20  1225   LIPASE 14* 16*       Imaging:    PET CT scan on 7/31/20  1. Increased size and metabolic activity of large, necrotic  retroperitoneal lymph node, now measuring up to 7.5 cm, indicating  progressive disease response per Lug[place] criteria. This mass continues  to invade the pancreas and spleen.  2. Multiple new pulmonary nodules scattered throughout the lungs,  including a new 1.4 cm nodule in the left lower lobe with increased  metabolic activity. This is concerning for new pulmonary metastasis.  3. New focal uptake in the posterior T12 vertebral body, concerning  for new bony metastasis.  4. Increased uptake in the ascending colon, which may be physiologic  vs colitis.  5. Diffuse bladder wall thickening, recommend clinical correlation for  cystitis.

## 2020-08-02 NOTE — TELEPHONE ENCOUNTER
Called regarding a worsening severe mid back pain.  Caller states that his back pain has been going on for almost a month. Caller mentioned that his oncologist told him that his spine cancer might be back  States that he was supposed to have a test done on 7/31 (was held because he took his blood thinner).  Caller states he has been taking oxycodone every 6 hours with a little effect.  Caller also reported possible constipation (no stool since 3-4 days.  Advised to go to emergency department since an health care provider will not be able to see him within 4 hours per protocol.  Sayda Kaplan RN  COVID 19 Nurse Triage Plan/Patient Instructions    Please be aware that novel coronavirus (COVID-19) may be circulating in the community. If you develop symptoms such as fever, cough, or SOB or if you have concerns about the presence of another infection including coronavirus (COVID-19), please contact your health care provider or visit [email or web address].     Disposition/Instructions    ED Visit recommended. Follow protocol based instructions.     Bring Your Own Device:  Please also bring your smart device(s) (smart phones, tablets, laptops) and their charging cables for your personal use and to communicate with your care team during your visit.    Thank you for taking steps to prevent the spread of this virus.  o Limit your contact with others.  o Wear a simple mask to cover your cough.  o Wash your hands well and often.    Resources    [facility]: About COVID-19: [email or web address]    CDC: What to Do If You're Sick: www.cdc.gov/coronavirus/2019-ncov/about/steps-when-sick.html    CDC: Ending Home Isolation: www.cdc.gov/coronavirus/2019-ncov/hcp/disposition-in-home-patients.html     CDC: Caring for Someone: www.cdc.gov/coronavirus/2019-ncov/if-you-are-sick/care-for-someone.html     [facility]: Interim Guidance for Hospital Discharge to Home:  [email or web address]/hcp/hospdischarge.pdf    [facility] clinical trials (COVID-19 research studies): [email or web address]     Below are the COVID-19 hotlines at the [facility] ([facility]). Interpreters are available.   o For health questions: Call 290-455-3288 or 1-731.886.3431 (7 a.m. to 7 p.m.)  o For questions about schools and childcare: Call 866-303-0050 or 1-985.156.4988 (7 a.m. to 7 p.m.)                     Additional Information    Negative: Passed out (i.e., lost consciousness, collapsed and was not responding)    Negative: Shock suspected (e.g., cold/pale/clammy skin, too weak to stand, low BP, rapid pulse)    Negative: Sounds like a life-threatening emergency to the triager    Negative: Major injury to the back (e.g., MVA, fall > 10 feet or 3 meters, penetrating injury, etc.)    Negative: Followed a tailbone injury    Negative: [1] Pain in the upper back over the ribs (rib cage) AND [2] radiates (travels, goes) into chest    Negative: [1] Pain in the upper back over the ribs (rib cage) AND [2] worsened by coughing (or clearly increases with breathing)    Negative: Back pain during pregnancy    Negative: Pain mainly in flank (i.e., in the side, over the lower ribs or just below the ribs)    Negative: [1] SEVERE back pain (e.g., excruciating) AND [2] sudden onset AND [3] age > 60    Negative: [1] Loss of bladder or bowel control (urine or bowel incontinence; wetting self, leaking stool) AND [2] new onset    Negative: [1] Unable to urinate (or only a few drops) > 4 hours AND [2] bladder feels very full (e.g., palpable bladder or strong urge to urinate)    Negative: Numbness in groin or rectal area (i.e., loss of sensation)    Negative: [1] SEVERE abdominal pain AND [2] present > 1 hour    Negative: [1] Abdominal pain AND [2] age > 60    Negative: Weakness of a leg or foot (e.g., unable to bear weight, dragging foot)    Negative:  Unable to walk    Negative: Patient sounds very sick or weak to the triager    [1] SEVERE back pain (e.g., excruciating, unable to do any normal activities) AND [2] not improved 2 hours after pain medicine    Protocols used: BACK PAIN-A-AH

## 2020-08-02 NOTE — PROGRESS NOTES
[facility], [place]    Hematology / Oncology Progress Note    Date of Service (when I saw the patient): 08/02/2020     Assessment & Plan   Mr. Gan is a 49-year-old male with medical comorbidities significant for diffuse large B-cell lymphoma status post 6 cycles of R-CHOP with Neulasta, still progressing, CVA, A. fib currently on Xarelto, hypertension, who presented to the emergency room with severe back pain and who is being admitted to the hospital for pain control. Recent PET on 7/31 with disease progression and new focal uptake in T12 vertebral body.     Today:   - Thoracic/lumbar MRI, ordered  - Rad/Onc consult, will formally see following MRI obtained  - GI consult, appreciate recs. Currently on schedule as outpatient for EUS/bx of retroperitoneal LN on 8/4; however, patient will likely remain in hospital until then. Wondering if could do it here along with simultaneous nerve block for pain?  - Palliative consult for ongoing pain management and constipation   - Will hold ppx Heparin morning of 8/3 in case EUS/bx procedure is schedule. Will need to restart Xarelto following procedure.   - Nutrition consult for ongoing poor PO intake  - Patient requesting to speak with SW related to financial matters, consult placed  - Depending how long patient remains hospitalized, may start salvage chemotherapy   - BMbx currently scheduled as outpatient on 8/5      HEME  #DLBCL with primary refractory disease  Follows with Dr. Cary. Oncologic hx adapted OP records.   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  DLBCL-IPI was high (stage, extranodal sites, LDH) and clinical stage IV with extranodal involvement.  He was started on R-CHOP in 03/2020 with IT methotrexate planned with each cycle for CNS prophylaxis.  He has completed 6 cycles of R-CHOP with Neulasta support and IT methotrexate. Last chemo 7/2. CT 7/13 demonstrated increasing gastrohepatic ligament mass concerning for refractory lymphoma.   - PET 7/31 with disease progression with increased size of retroperitoneal LN, multiple new pulmonary nodules, new focal uptake in T12 vertebral body, increased uptake in ascending colon.   - EUS original scheduled for last week; however anticoagulation was not held and procedure was rescheduled to 8/4. BMbx scheduled this week by OP team for 8/6.   - MRI thoracic/lumbar  - GI consulted for EUS and simultaneous nerve block to be done in hospital  - Rad/onc consult for role of RT. Will formally see patient once MRI completed.   - Depending on how long patient remains in hospital, might start salvage chemotherapy    #Back pain, new focal uptake in T12 vertebral body  #Abdominal pain, known abdominal mass  #Chest pain  Unclear what exactly is source of pain. PET 7/31 reveals new focal uptake of T12 vertebral body; however, images reviewed and not significant. Could be related to known central abdominal mass/retroperitoneal LN invading pancreas/spleen. Uncontrolled on Oxycodone at home. Reported chest discomfort in ED. EKG completed showing tachy and T-wave inversion. Troponins negative. Presumably referred pain from abdominal mass. No red flag sx concerning for spinal cord compression.   - Oxycodone 10-15mg Q4H, Morphine 4mgQ4H PRN started upon admission from ED  - Palliative consult, appreciate recs with developing pain regimen  - GI consult for EUS and utility of simultaneous nerve block  - Rad/onc consult to discuss role of radiation    #Anemia likely 2/2 to underlying disease  - Transfuse for Hgb <7, plt <10K    ID  - Not currently neutropenic, ppx not indicated at this juncture    CARDIAC  # A. Fib  - will continue to hold rivaroxaban for now in case he undergo biopsy in the next day or 2.  - Placed on prophylactic heparin  5000 every 8 hours upon admission from ED. Holding AM of 8/3 in case of scheduled procedure. Will need to restart Xarelto following procedure.     # History of CVA  # Hypertension  - Continue lisinopril and Coreg    # Chest discomfort while in the ED  EKG showed questionable T wave inversion.  His troponins were negative.  Upon arrival to the room patient mentioned that his symptoms were due to back pain, and these are currently resolved.  - He is status post 325 mg of p.o. aspirin  -We will continue to monitor for now    GI  #Constipation  Ongoing with use of pain medications at home.   - Bowel regimen escalated to scheduled senokot, miralax  - Palliative following, appreciate recs    MISC  # Hiccups   - Currently on baclofen as needed    #Poor PO intake  Worsening lack of appetite, early satiety over past several weeks.   - Nutrition consult     FEN   - LR started upon admission 8/2 at 150mL/h  - PRN lyte replacement per standing protocol  - Regular diet as tolerated    SOCIAL  - SW consult relating to financial matters    Lines/Drains: Perihperal  Consults: Palliative, GI, Rad/Onc, Nutrition  CODE: FULL  DISPO: Anticipate 3-5 day for management of pain and potentally starting salvage chemotherapy   Follow-up/Referrals: TBD. Primary oncologist is Dr. Cary.     Patient is seen and examined by Dr. Santiago and ELSI.  Assessment and plan are discussed and delivered to the patient.    Marylin Urban PA-C  Hematology/Oncology  Pager: [phone]    Interval History   Patient reports pain is better than when presented to ED. States has continued to worsen since finishing chemotherapy. Really worsened over past week and last 24h became intolerable. Located mid-lower back. No bowel/bladder incontinence, numbness/tingling, or weakness. Additionally reports ongoing abdominal discomfort/early satiety after eating that has worsened over past week as well. Poor PO intake over past week. Denies HA, mouth pain, SOB, cough, CP,  N/V/D,  rash, or swelling. Ongoing constipation with pain medications. Last BM Wednesday.     Physical Exam   Temp: 97.4  F (36.3  C) Temp src: Oral BP: 128/68 Pulse: 69   Resp: 16 SpO2: 96 % O2 Device: None (Room air)    Vitals:    08/01/20 2124 08/02/20 0313 08/02/20 0742   Weight: 89.8 kg (198 lb) 91 kg (200 lb 11.2 oz) 89.8 kg (198 lb)     Vital Signs with Ranges  Temp:  [96.6  F (35.9  C)-97.8  F (36.6  C)] 97.4  F (36.3  C)  Pulse:  [56-98] 69  Resp:  [16] 16  BP: (128-167)/() 128/68  SpO2:  [96 %-100 %] 96 %  I/O last 3 completed shifts:  In: 75 [I.V.:75]  Out: -     General: Awake and interactive. No acute distress. Sitting upright in bed.  Skin: Warm, dry, and intact without rashes or lesions.   Head: Normocephalic and atraumatic  HEENT: PERRLA. Sclera non-icteric. EOM intact. Oral mucosa is pink and moist with no lesions, thrush, or exudates.   Lymph: Neck supple. Normal ROM  Cardiac: Regular rate and rhythm. Normal S1 and S2. No murmurs, rubs, or gallops.   Respiratory: No signs of respiratory distress or accessory muscle use. Lungs CTAB. No wheezes or crackles.   Abd: Soft, symmetric, and non-tender without distension. BS present and normoactive. No masses or organomegaly.   Extremities: No swelling or erythema.  Neuro: A&Ox3 with normal speech. Memory and thought process preserved. Grossly non-focal  Psych: Appropriate mood and affect. Good judgment and insight. No visual/auditory hallucinations.     Medications     lactated ringers 150 mL/hr at 08/02/20 0629     - MEDICATION INSTRUCTIONS -       morphine         atorvastatin  40 mg Oral Daily     carvedilol  12.5 mg Oral BID w/meals     gabapentin  300 mg Oral At Bedtime     gabapentin  600 mg Oral Daily     heparin ANTICOAGULANT  5,000 Units Subcutaneous Q8H     lisinopril  20 mg Oral Daily     omeprazole  40 mg Oral Daily     polyethylene glycol  17 g Oral Daily     senna-docusate  1 tablet Oral BID    Or     senna-docusate  2 tablet Oral BID        Data   Results for orders placed or performed during the hospital encounter of 08/01/20 (from the past 24 hour(s))   CBC with platelets differential   Result Value Ref Range    WBC 17.9 (H) 4.0 - 11.0 10e9/L    RBC Count 3.13 (L) 4.4 - 5.9 10e12/L    Hemoglobin 8.6 (L) 13.3 - 17.7 g/dL    Hematocrit 28.8 (L) 40.0 - 53.0 %    MCV 92 78 - 100 fl    MCH 27.5 26.5 - 33.0 pg    MCHC 29.9 (L) 31.5 - 36.5 g/dL    RDW 17.2 (H) 10.0 - 15.0 %    Platelet Count 222 150 - 450 10e9/L    Diff Method Automated Method     % Neutrophils 88.6 %    % Lymphocytes 1.6 %    % Monocytes 8.8 %    % Eosinophils 0.1 %    % Basophils 0.2 %    % Immature Granulocytes 0.7 %    Nucleated RBCs 0 0 /100    Absolute Neutrophil 15.8 (H) 1.6 - 8.3 10e9/L    Absolute Lymphocytes 0.3 (L) 0.8 - 5.3 10e9/L    Absolute Monocytes 1.6 (H) 0.0 - 1.3 10e9/L    Absolute Eosinophils 0.0 0.0 - 0.7 10e9/L    Absolute Basophils 0.0 0.0 - 0.2 10e9/L    Abs Immature Granulocytes 0.1 0 - 0.4 10e9/L    Absolute Nucleated RBC 0.0    Comprehensive metabolic panel   Result Value Ref Range    Sodium 135 133 - 144 mmol/L    Potassium 3.6 3.4 - 5.3 mmol/L    Chloride 100 94 - 109 mmol/L    Carbon Dioxide 30 20 - 32 mmol/L    Anion Gap 6 3 - 14 mmol/L    Glucose 93 70 - 99 mg/dL    Urea Nitrogen 10 7 - 30 mg/dL    Creatinine 0.46 (L) 0.66 - 1.25 mg/dL    GFR Estimate >90 >60 mL/min/[1.73_m2]    GFR Estimate If Black >90 >60 mL/min/[1.73_m2]    Calcium 9.3 8.5 - 10.1 mg/dL    Bilirubin Total 1.2 0.2 - 1.3 mg/dL    Albumin 2.4 (L) 3.4 - 5.0 g/dL    Protein Total 7.2 6.8 - 8.8 g/dL    Alkaline Phosphatase 98 40 - 150 U/L    ALT 15 0 - 70 U/L    AST 13 0 - 45 U/L   INR   Result Value Ref Range    INR 1.29 (H) 0.86 - 1.14   Lipase   Result Value Ref Range    Lipase 14 (L) 73 - 393 U/L   EKG 12-lead, tracing only   Result Value Ref Range    Interpretation ECG Click View Image link to view waveform and result    Troponin I   Result Value Ref Range    Troponin I ES 0.043 0.000  - 0.045 ug/L   EKG 12 lead   Result Value Ref Range    Interpretation ECG Click View Image link to view waveform and result    Troponin I   Result Value Ref Range    Troponin I ES 0.042 0.000 - 0.045 ug/L   Urine Culture    Specimen: Urine clean catch; Unspecified Urine   Result Value Ref Range    Specimen Description Unspecified Urine     Special Requests Specimen received in preservative     Culture Micro PENDING    UA with Microscopic   Result Value Ref Range    Color Urine Yellow     Appearance Urine Slightly Cloudy     Glucose Urine Negative NEG^Negative mg/dL    Bilirubin Urine Small (A) NEG^Negative    Ketones Urine 40 (A) NEG^Negative mg/dL    Specific Gravity Urine 1.035 1.003 - 1.035    Blood Urine Negative NEG^Negative    pH Urine 5.5 5.0 - 7.0 pH    Protein Albumin Urine 30 (A) NEG^Negative mg/dL    Urobilinogen mg/dL 8.0 (H) 0.0 - 2.0 mg/dL    Nitrite Urine Negative NEG^Negative    Leukocyte Esterase Urine Trace (A) NEG^Negative    Source Urine     WBC Urine 6 (H) 0 - 5 /HPF    RBC Urine 3 (H) 0 - 2 /HPF    Squamous Epithelial /HPF Urine 4 (H) 0 - 1 /HPF    Mucous Urine Present (A) NEG^Negative /LPF   CBC with platelets differential   Result Value Ref Range    WBC 12.2 (H) 4.0 - 11.0 10e9/L    RBC Count 2.74 (L) 4.4 - 5.9 10e12/L    Hemoglobin 7.7 (L) 13.3 - 17.7 g/dL    Hematocrit 25.5 (L) 40.0 - 53.0 %    MCV 93 78 - 100 fl    MCH 28.1 26.5 - 33.0 pg    MCHC 30.2 (L) 31.5 - 36.5 g/dL    RDW 17.4 (H) 10.0 - 15.0 %    Platelet Count 167 150 - 450 10e9/L    Diff Method Automated Method     % Neutrophils 87.4 %    % Lymphocytes 1.8 %    % Monocytes 9.6 %    % Eosinophils 0.1 %    % Basophils 0.2 %    % Immature Granulocytes 0.9 %    Nucleated RBCs 0 0 /100    Absolute Neutrophil 10.7 (H) 1.6 - 8.3 10e9/L    Absolute Lymphocytes 0.2 (L) 0.8 - 5.3 10e9/L    Absolute Monocytes 1.2 0.0 - 1.3 10e9/L    Absolute Eosinophils 0.0 0.0 - 0.7 10e9/L    Absolute Basophils 0.0 0.0 - 0.2 10e9/L    Abs Immature  Granulocytes 0.1 0 - 0.4 10e9/L    Absolute Nucleated RBC 0.0    Basic metabolic panel   Result Value Ref Range    Sodium 137 133 - 144 mmol/L    Potassium 3.2 (L) 3.4 - 5.3 mmol/L    Chloride 102 94 - 109 mmol/L    Carbon Dioxide 31 20 - 32 mmol/L    Anion Gap 4 3 - 14 mmol/L    Glucose 80 70 - 99 mg/dL    Urea Nitrogen 9 7 - 30 mg/dL    Creatinine 0.42 (L) 0.66 - 1.25 mg/dL    GFR Estimate >90 >60 mL/min/[1.73_m2]    GFR Estimate If Black >90 >60 mL/min/[1.73_m2]    Calcium 8.7 8.5 - 10.1 mg/dL   Fibrinogen activity   Result Value Ref Range    Fibrinogen 791 (H) 200 - 420 mg/dL   Lactate Dehydrogenase   Result Value Ref Range    Lactate Dehydrogenase 220 85 - 227 U/L   Magnesium   Result Value Ref Range    Magnesium 1.6 1.6 - 2.3 mg/dL   Phosphorus   Result Value Ref Range    Phosphorus 4.1 2.5 - 4.5 mg/dL   Uric acid   Result Value Ref Range    Uric Acid 4.5 3.5 - 7.2 mg/dL     I have seen, interviewed, and examined the patient independently.  I have reviewed the vital signs and labs.  This note reflects my assessment and plan.    Persistent disease after completion of chemo. Will work up:  XRT eval, MRI to assess T12 hypermetabolism seen on PET, palliative care for pain control.  Will also plan to complete work up in patient: will ask for biopsy of mass, BMBx    Em Santiago MD/PhD

## 2020-08-02 NOTE — PLAN OF CARE
"[id number]    BP (!) 167/93 (BP Location: Left arm)   Pulse 91   Temp 97.5  F (36.4  C) (Oral)   Resp 16   Ht 1.753 m (5' 9\")   Wt 91 kg (200 lb 11.2 oz)   SpO2 100%   BMI 29.64 kg/m      BP elevated on arrival to unit. Afebrile. LS clear, room air. Denies nausea and SOB. Back pain persists, described as a constant nagging feeling, one-time dose of IV Morphine and PRN Oxy 10 mg given. UA sent. LR infusing @ 150ml/hr via PIV. [employer]. SW consult placed for financial issues. Will continue with POC.   "

## 2020-08-02 NOTE — ED PROVIDER NOTES
[facility] EMERGENCY DEPARTMENT ([facility])  8/01/20    History     Chief Complaint   Patient presents with     Back Pain     The history is provided by the patient and medical records.     Kenneth Tello is a 49 year old male with a past medical history of diffuse large B-cell lymphoma, CVA, and afib (anticoagulated on Eliquis) who presents to the Emergency Department for evaluation of back pain.  Patient states that he has been having back pain for a month but it has significantly worsened and reports last night he was unable to sleep due to the pain.  He describes this pain as located in the middle of his back.  He states he is taking 2 oxycontin Q 2 hours, instead of the recommended Q 6hrs, without relief of pain.  He denies fall or injury to the back.  Has had associated constipation for the past 3 days.  He is taking senna for his constipation.  He states he has not eaten in 3 days and admits to some generalized fatigue from this.     The patient denies any new numbness or tingling of the lower extremities, new weakness in the lower extremities, saddle anesthesia, bowel or bladder incontinence, fever, cough, or shortness of breath.    Per chart review patient had a PET scan yesterday with the following impression.    PET ONCOLOGY WHOLE BODY IMPRESSIONS 8/1/2020:  IMPRESSION: In this patient with a history of diffuse large B-cell  lymphoma, status post chemotherapy:  1. Increased size and metabolic activity of large, necrotic  retroperitoneal lymph node, now measuring up to 7.5 cm, indicating  progressive disease response per Lugano criteria. This mass continues  to invade the pancreas and spleen.  2. Multiple new pulmonary nodules scattered throughout the lungs,  including a new 1.4 cm nodule in the left lower lobe with increased  metabolic activity. This is concerning for new pulmonary metastasis.  3. New focal uptake in the posterior T12 vertebral body, concerning  for new bony metastasis.  4.  Increased uptake in the ascending colon, which may be physiologic  vs colitis.  5. Diffuse bladder wall thickening, recommend clinical correlation for  cystitis.    I have reviewed the Medications, Allergies, Past Medical and Surgical History, and Social History in the Epic system.  PAST MEDICAL HISTORY:   Past Medical History:   Diagnosis Date     Alcohol use disorder, mild, abuse      Atrial fibrillation (H)     coumadin     CAD (coronary artery disease)      Cardiomyopathy (H)      HLD (hyperlipidemia)      HTN (hypertension)      Myocardial infarction (H)      Neuropathy     LLE, left hand post-CVA     Obesity      WATSON (obstructive sleep apnea)     requires CPAP however does not have a machine     Pancreatic cancer (H)     suspected 3/9/2020 at OSH     Stroke (H)     2017, mild left sided deficit     Tobacco dependence      Urinary urgency        PAST SURGICAL HISTORY:   Past Surgical History:   Procedure Laterality Date     CHOLECYSTECTOMY, LAPOROSCOPIC         Past medical history, past surgical history, medications, and allergies were reviewed with the patient. Additional pertinent items: None    FAMILY HISTORY:   Family History   Problem Relation Age of Onset     Cardiovascular Mother      Cardiovascular Father      Diabetes Type 2  Father      Cardiovascular Brother      Cancer Maternal Grandmother        SOCIAL HISTORY:   Social History     Tobacco Use     Smoking status: Former Smoker     Packs/day: 1.00     Years: 2.00     Pack years: 2.00     Types: Cigarettes     Start date: 3/10/2017     Last attempt to quit: 2020     Years since quittin.5     Smokeless tobacco: Current User     Types: Chew     Tobacco comment: daily chew use x39 years, now intermittent   Substance Use Topics     Alcohol use: Yes     Alcohol/week: 1.0 - 2.0 standard drinks     Types: 1 - 2 Cans of beer per week     Drinks per session: 1 or 2     Binge frequency: Less than monthly     Comment: 1 beer daily, occasional >2  drinks/episode socially     Social history was reviewed with the patient. Additional pertinent items: None      Patient's Medications   New Prescriptions    No medications on file   Previous Medications    ATORVASTATIN (LIPITOR) 40 MG TABLET    Take 1 tablet (40 mg) by mouth daily    BACLOFEN (LIORESAL) 10 MG TABLET    Take 1 tablet (10 mg) by mouth 3 times daily as needed (HICCUPS)    CARVEDILOL (COREG) 12.5 MG TABLET    Take 12.5 mg by mouth 2 times daily (with meals)    GABAPENTIN (NEURONTIN) 300 MG CAPSULE    Take 2 tabs every am, take one tab at bedtime    HYDROCODONE-ACETAMINOPHEN (NORCO)  MG PER TABLET        LISINOPRIL (ZESTRIL) 20 MG TABLET    Take 20 mg by mouth daily    OMEPRAZOLE (PRILOSEC) 40 MG DR CAPSULE    Take 40 mg by mouth daily    OXYCODONE (ROXICODONE) 5 MG TABLET    Take 1-2 tablets (5-10 mg) by mouth every 6 hours as needed for severe pain    POLYETHYLENE GLYCOL (MIRALAX) 17 G PACKET    Take 1 packet by mouth daily as needed for constipation    POTASSIUM CHLORIDE 40 MEQ/15ML (20%) SOLN    Take 7.5 mLs (20 mEq) by mouth 2 times daily    PROCHLORPERAZINE (COMPAZINE) 10 MG TABLET    Take 1 tablet (10 mg) by mouth every 6 hours as needed (Nausea/Vomiting)    RIVAROXABAN ANTICOAGULANT (XARELTO ANTICOAGULANT) 20 MG TABS TABLET    Take 1 tablet (20 mg) by mouth daily (with dinner)    SENNA-DOCUSATE (SENOKOT-S/PERICOLACE) 8.6-50 MG TABLET    Take 1 tablet by mouth 2 times daily as needed for constipation   Modified Medications    No medications on file   Discontinued Medications    No medications on file        No Known Allergies     Review of Systems   Constitutional: Positive for appetite change (reduced PO intake) and fatigue. Negative for fever.   Respiratory: Negative for cough and shortness of breath.    Gastrointestinal: Positive for constipation.   Musculoskeletal: Positive for back pain.   Neurological: Negative for weakness and numbness.         Physical Exam   BP: (!) 144/101  Pulse:  "98  Temp: 97.8  F (36.6  C)  Resp: 16  Height: 175.3 cm (5' 9\")  Weight: 89.8 kg (198 lb)  SpO2: 98 %      Physical Exam  General: awake, alert, sitting on the side of the bed appearing uncomfortable  Head: normal cephalic  HEENT: pupils equal, conjugate gaze in tact  Neck: Supple  CV: regular rate   Lungs: Breathing comfortably on room air  Abd: soft, non-tender, no guarding, no peritoneal signs  Back: Patient has midline back tenderness in his low thoracic/high lumbar spine.  No flank tenderness bilaterally  EXT: lower extremities without swelling or edema  Neuro: awake, answers questions appropriately.  5 out of 5 strength in his bilateral lower extremities.  Sensation to light touch intact in his bilateral lower extremities.      ED Course   9:38 PM  The patient was seen and examined by Lucas Carpenter MD in Room ED38.      Procedures             Results for orders placed or performed during the hospital encounter of 08/01/20 (from the past 24 hour(s))   CBC with platelets differential   Result Value Ref Range    WBC 17.9 (H) 4.0 - 11.0 10e9/L    RBC Count 3.13 (L) 4.4 - 5.9 10e12/L    Hemoglobin 8.6 (L) 13.3 - 17.7 g/dL    Hematocrit 28.8 (L) 40.0 - 53.0 %    MCV 92 78 - 100 fl    MCH 27.5 26.5 - 33.0 pg    MCHC 29.9 (L) 31.5 - 36.5 g/dL    RDW 17.2 (H) 10.0 - 15.0 %    Platelet Count 222 150 - 450 10e9/L    Diff Method Automated Method     % Neutrophils 88.6 %    % Lymphocytes 1.6 %    % Monocytes 8.8 %    % Eosinophils 0.1 %    % Basophils 0.2 %    % Immature Granulocytes 0.7 %    Nucleated RBCs 0 0 /100    Absolute Neutrophil 15.8 (H) 1.6 - 8.3 10e9/L    Absolute Lymphocytes 0.3 (L) 0.8 - 5.3 10e9/L    Absolute Monocytes 1.6 (H) 0.0 - 1.3 10e9/L    Absolute Eosinophils 0.0 0.0 - 0.7 10e9/L    Absolute Basophils 0.0 0.0 - 0.2 10e9/L    Abs Immature Granulocytes 0.1 0 - 0.4 10e9/L    Absolute Nucleated RBC 0.0    Comprehensive metabolic panel   Result Value Ref Range    Sodium 135 133 - 144 mmol/L    Potassium 3.6 " 3.4 - 5.3 mmol/L    Chloride 100 94 - 109 mmol/L    Carbon Dioxide 30 20 - 32 mmol/L    Anion Gap 6 3 - 14 mmol/L    Glucose 93 70 - 99 mg/dL    Urea Nitrogen 10 7 - 30 mg/dL    Creatinine 0.46 (L) 0.66 - 1.25 mg/dL    GFR Estimate >90 >60 mL/min/[1.73_m2]    GFR Estimate If Black >90 >60 mL/min/[1.73_m2]    Calcium 9.3 8.5 - 10.1 mg/dL    Bilirubin Total 1.2 0.2 - 1.3 mg/dL    Albumin 2.4 (L) 3.4 - 5.0 g/dL    Protein Total 7.2 6.8 - 8.8 g/dL    Alkaline Phosphatase 98 40 - 150 U/L    ALT 15 0 - 70 U/L    AST 13 0 - 45 U/L   INR   Result Value Ref Range    INR 1.29 (H) 0.86 - 1.14   Lipase   Result Value Ref Range    Lipase 14 (L) 73 - 393 U/L   EKG 12-lead, tracing only   Result Value Ref Range    Interpretation ECG Click View Image link to view waveform and result    Troponin I   Result Value Ref Range    Troponin I ES 0.043 0.000 - 0.045 ug/L     Medications   morphine (PF) injection 4 mg (4 mg Intravenous Given 8/1/20 2343)   0.9% sodium chloride BOLUS (0 mLs Intravenous Stopped 8/2/20 0013)   aspirin (ASA) chewable tablet 324 mg (324 mg Oral Given 8/2/20 0013)           EKG Interpretation:      Interpreted by Lucas Carpenter MD  Time reviewed:2301  Symptoms at time of EKG: chest pain    Rhythm:  Atrial fibrillation - controlled  Rate: Normal  Axis: Normal  Ectopy: None  Conduction: Normal  ST Segments/ T Waves: New T wave inversions in her lateral leads I, V5 and V6 and V4  Q Waves: None and v2  Comparison to prior: Lateral T wave inversions are new    Clinical Impression: New lateral T wave changes           EKG Interpretation:      Interpreted by Lucas Carpenter MD  Time reviewed:032  Symptoms at time of EKG: chest pain    Rhythm: Normal sinus  and Atrial fibrillation - controlled  Rate: Normal  Axis: Normal  Ectopy: None  Conduction: Normal  ST Segments/ T Waves: I, V4-V6, t wave inversion   Q Waves: none  Comparison to prior: unchanged from same date.     Clinical Impression: unchanged from same date.             Assessments & Plan (with Medical Decision Making)   Kenneth is a 49-year-old male who presents with midline back pain, subacute chronicity in nature.  Patient denies any neurologic deficits, so low suspicion for an acute neurosurgical emergency.  Additionally he denies fever or other infectious etiology so less concern for infectious cause of his midline back pain.  Given the chronicity lower suspicion for things like ruptured AAA is a potentially life-threatening cause of his back pain.    Here he is uncomfortable appearing, stable vital signs, midline tenderness on exam with neuro exam intact.  I reviewed his PET scan and patient does have a new lesion in the T12 vertebral body which would correspond to where patient is having is maximal pain.  Additionally he also does have a retroperitoneal lymph node that is invading the pancreas and the spleen which could also be contributing to the pain to his back.    Basic labs were obtained and patient was given IV morphine for pain control. After morphine patient stated he developed some chest discomfort, EKG and troponin were obtained at that time.  No other concerning signs such as shortness of breath, swelling, rashes.  Normal physical exam without wheezes, normal cardiac exam.    EKG showed some flipped T waves in the lateral leads that were new, also new for the T waves in lead I.  Troponin was 0.04.  Patient was given aspirin.  We will trend the troponins.    Given his need for pain control feel that patient would benefit from admission for pain control and trending troponin.     I have reviewed the nursing notes.    I have reviewed the findings, diagnosis, plan and need for follow up with the patient.    New Prescriptions    No medications on file       Final diagnoses:   Acute midline low back pain without sciatica       8/1/2020   [facility], [place], EMERGENCY DEPARTMENT  Barbara CALZADA, benitez serving as a trained medical scribe to document services personally  performed by Lucas Carpenter MD, based on the provider's statements to me.     I, Lucas Carpenter MD, was physically present and have reviewed and verified the accuracy of this note documented by Barbara Delong.       Lucas Carpenter MD  08/02/20 0025       Lucas Carpenter MD  08/02/20 0032       Lucas Carpenter MD  08/02/20 0103

## 2020-08-03 NOTE — CONSULTS
"Social Work Services Progress Note    Hospital Day: 3  Date of Initial Social Work Evaluation:  Not completed this admission  Collaborated with:  Patient; Chart Review    Data:  49-year-old male with medical comorbidities significant for diffuse large B-cell lymphoma status post 6 cycles of R-CHOP with Neulasta, still progressing, CVA, A. fib currently on Xarelto, hypertension, who presented to the emergency room with severe back pain and who is being admitted to the hospital for pain control. Recent PET on 7/31 with disease progression and new focal uptake in T12 vertebral body.    Social Work Consult placed to assist patient with financial/insurance concerns.     Intervention:   met with patient in his hospital room; introduced self, explained role and discussed reason for visit.    Patient stated that he had applied for Social Security Disability and unemployment but has not received any assistance.    Discussed Social Security Disability application. Patient stated that he had applied in July but received a letter indicating that the Social Security Administration would re-evaluate his appropriateness in August.  inquired if letter referenced any need for updates prior to reassessment in August; patient denied. Patient expressed understanding that it is 8/3/20 and may take some time for his application to reprocess.     Discussed unemployment income. Patient stated that he had applied but did not receive any check. This writer inquired if the patient continues to apply weekly; patient stated \"no\" explaining that he was unaware that he needed to apply every week.  assisted patient with getting onto the [place] [employer]employment website by utilizing the patient's phone. Patient reports no further questions or concerns for Social Work at this time. Writer provided name and contact number if patient needed any further assistance.     Assessment:  See bedside nurse and medical team " notes.    Plan:    Anticipated Disposition:  Home, no needs identified    Barriers to d/c plan:  Medical stability    Follow Up:  SWer will continue to be available for any discharge planning and needs.    LINDY Frederick   Hematology/Oncology Social Worker  Phone: [phone]  Pager: [phone]  [email or web address]

## 2020-08-03 NOTE — PROGRESS NOTES
[facility]  Palliative Care Daily Progress Note       Recommendations & Counseling       For pain, discontinue continuous portion of the morphine PCA and switch to MS Contin 30mg BID. Plan for ~1pm and 10pm doses today 8/3, switch to 8am/8pm dosing 8/4.     Continue intermittent PRN PCA dosing at current settings to help determine PRN opioid requirements.    Consider scheduled Tylenol.    Agree with continued aggressive bowel regimen medications.    Continue to regularly discuss larger goals of care, therapeutic options, and prognosis.    Agree with mg citrate plus senna and mirlalx for bowel regimen today      Opioid Safety Discussion: We anticipate this patient will be discharged home with opioids. Their Opioid Risk Tool score is 3 which suggests they are at low risk of adverse effects from opioids (due to overuse).   We discussed opioid safety with them, including side effects and potential harms of opioids, taking opioids exactly as prescribed, watching for mood-altering effects of opioids and letting a provider know if they notice any, driving safety, safe opioid storage and disposal.        Alicia Mckinney MD  Med peds resident    Patient seen and evaluated with Dr. Mckinney.   Agree with assessment and recommendations.    Total time spent was 40 minutes,  >50% of time was spent counseling and/or coordination of care regarding pain management plan, support for pt with progressive lymphoma with pain.    [facility]  Palliative Care   Pager [phone]        Assessments          Kenneth Tello is a 49 year old male with a history of a large B-cell lymphoma status post 6 cycles of R-CHOP, history of a CVA and A. fib on Xarelto, who presents to the hospital with severe back pain.  Is noticed to have continued disease progression.    Today, the patient was seen for:  Pain control  Cancer-related pain  Opiate association pain  Large B-cell lymphoma      Prognosis,  Goals, or Advance Care Planning was addressed today with: No.  Mood, coping, and/or meaning in the context of serious illness were addressed today: No.  Summary/Comments: Patient reports not understanding his prognosis or what possible treatments may include. Has son at home. Patient reports good overall coping with his illness.            Interval History:     Chart review/discussion with unit or clinical team members:   Chart reviewed, discussed with hem/onc provider  -Discussing with consultants regarding radiation therapy versus salvage chemotherapy. Per afternoon chart review will not be pursuing radiation therapy.  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     Per patient or family/caregivers today:  Back pain (located mainly at midthoracic level, with some numbness around T12 area) well controlled. Reports doesn't ever completely go away but the PCA bumps do help significantly. Hesitant to use too often because he does not want to become addicted to opioids. Denies feeling foggy or other adverse side effects of medication.  Denies history of opioid use disorder.  Sister alcohol use disorder patient reports he had drink more alcohol in the past was never a significant problem in his life.    He has a 16-year-old son at home that he is her primary caretaker for    Sleeping well.  His anxiety.  No bowel movement in about 1 week.  Drinking magnesium citrate this morning.    Key Palliative Symptoms:  # Pain severity the last 12 hours: low  We are not managing dyspnea in this patient  We are not managing nausea in this patient  We are not managing anxiety in this patient    Patient is on opioids: assessed and I made recommendations about bowel care as above.           Review of Systems:     Besides above, an additional 7 system ROS was reviewed and is unremarkable          Medications:     I have reviewed this patient's medication profile and medications during this  hospitalization.    Noted meds:   -Morphine PCA  -Gabapentin  -MiraLAX  -Senna  -Baclofen as needed           Physical Exam:   Vitals were reviewed  Constitutional:   awake, alert, cooperative, no apparent distress, and appears stated age     Lungs:   No increased work of breathing, good air exchange, clear to auscultation bilaterally, no crackles or wheezing     Cardiovascular:   Regular rate and rhythm, normal S1 and S2, no S3 or S4, and no murmur noted     Abdomen:   No scars, normal bowel sounds, soft, distended, non-tender, no masses palpated, no hepatosplenomegally     Neuropsychiatric:   Level of consciousness: alert / normal  Affect: normal and pleasant  Memory and insight: normal, memory for past and recent events intact and thought process normal                Data Reviewed:     Reviewed recent pertinent imaging, comments:   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 T12 without abnormality.    Reviewed recent labs, comments:   Creatinine stable at about 0.5

## 2020-08-03 NOTE — PLAN OF CARE
[id number]    PCA Morphine in-use: PCA Dose- 1.6 mg q 30 minutes and Continuous- 0.8 mg. Back pain being well managed.     VSS. Afebrile. LS clear, on room air. Denies SOB and nausea. LR stopped at 0600, PIV TKO with PCA. UpAdLib. Voiding well per pt, not saving urine. Possible procedure today. Will continue with POC.

## 2020-08-03 NOTE — CONSULTS
Radiation Oncology Brief Consult Note    CC: back pain    HPI: Mr. Tello is a 49 year old gentleman with a history of stage INDIRA DLBCL progressive after 6 cycles of RCHOP, who presented with uncontrolled back pain. His last cycle was given 7/2/2020, and PET/CT on 7/31/2020 showed progressive disease with new pulmonary nodules and increased size of a retroperitoneal mass with invasion of the pancreas and spleen. There was hypermetabolism in the T12 vertebral body as well. He was admitted on 8/1/2020 with uncontrolled back pain.    MRI lumbar and thoracic spine on 8/2/2020 showed diffuse abnormal bone signal without discrete lesions. Radiation oncology was consulted to consider palliative radiotherapy.     On interview, the patient reports improvement in his pain with narcotic pain medications including PCA. He is neurologically intact but endorses constipation with last bowel movement one week ago.    PHYSICAL EXAM:  Gen: Alert, in NAD, laying in hospital bed  Eyes: EOMI, sclera anicteric  HENT      Head: NC/AT     Ears: No external auricular lesions     Nose/sinus: No rhinorrhea or epistaxis     Oral Cavity/Oropharynx: MMM, no thrush noted  Pulm: Breathing comfortably on room air, no audible wheezes or ronchi  CV: Well-perfused, no cyanosis  Abdominal: Soft, mildly distended, non-tender  Skin: Normal color and turgor  Neurologic/MSK: motor grossly intact, normal gait  Psych: Appropriate mood and affect    Imaging: Reviewed    A/P:  This is a 49 year old gentleman with progressive stage INDIRA DLBCL s/p 6 cycles of RCHOP who presents with severe back pain likely related to a large retroperitoneal mass, now improved with oral and IV narcotics. He is being planned to undergo EUS with biopsy, bone marrow biopsy, and celiac nerve block. Pathology may direct chemotherapy.     Given these interventions and the fact that his pain is well-controlled currently, I discussed with the patient that I recommended deferring any  radiotherapy for now. It remains a potential option in the future, but we would wish to avoid toxicity if he has adequate pain control and potential improved systemic disease control as well. The patient was agreeable to this plan and desired to avoid radiotherapy unless necessary, but he welcomed the ability to discuss further in the future.    The above was discussed with staff, Dr. Cedeno, who agrees with the above assessment and plan.     Carlos A Lopez MD  PGY-4 Radiation Oncology  Clinic: [phone]  Pager: [phone]    I reviewed the patient's history, treatment to date and imaging studies.  I discussed his case with the primary team.  Will hold off radiation therapy for now as he has good pain control.  We'd be happy to see him and discuss radiation therapy if there is indication in the future.      Desiree Cedeno MD

## 2020-08-03 NOTE — PLAN OF CARE
[id number]: VSS, occasionally hypertensive. Back pain controlled with morphine PCA. PCA continuous rate discontinued today and PO morphine added. PCA bumps remain 1.6 mg Q30 minutes. Constipated; LBM 7/29; miralax, senna-docusate, and mag citrate given. Nutrition consulted. Poor appetite; ate ice cream and some vegetable broth. Baclofen for hiccups. Will have EUS with biopsy tomorrow; NPO at midnight. BM bx scheduled for 8/5. Neurosurg consulting. Continue with plan of care.

## 2020-08-03 NOTE — PROGRESS NOTES
[facility], [place]    Hematology / Oncology Progress Note    Date of Service (when I saw the patient): 08/03/2020     Assessment & Plan   Mr. Gan is a 49-year-old male with medical comorbidities significant for diffuse large B-cell lymphoma status post 6 cycles of R-CHOP with Neulasta, still progressing, CVA, A. fib currently on Xarelto, hypertension, who presented to the emergency room with severe back pain and who is being admitted to the hospital for pain control. Recent PET on 7/31 with disease progression and new focal uptake in T12 vertebral body.     Today:  - Overall pain under better control with PCA in place (reports 3/10 today)  - Plan for EUS/Bx and possible Celiac plexus nerve block 8/4   - GI consult, appreciate recs  - Plan for BmBx on 8/5 @ 1230  - Rad/Onc consult, appreciate recs  - Nutrition consult, appreciate recs  - Continue to hold VTE ppx until after procedure tomorrow  - NPO at midnight      HEME  #DLBCL with primary refractory disease  Follows with Dr. Cary. Oncologic history obtained through previous notes and outpatient records  He presented in early 03/2020 with left upper abdominal pain, fatigue, and >10% weight loss, was found to have a samaria-pancreatic mass, and workup confirmed DLBCL, non-GCB type, FISH negative for double/triple hit changes.  DLBCL-IPI was high (stage, extranodal sites, LDH) and clinical stage IV with extranodal involvement.   Started on R-CHOP in 03/2020, now status post 6 cycles of R-CHOP with Neulasta support and IT methotrexate. C6D1 7/2  On 7/13, CT scan showed demonstrated increasing gastrohepatic ligament mass concerning for refractory lymphoma.   - PET 7/31 with disease progression with increased size of retroperitoneal LN, multiple new pulmonary nodules, new focal uptake in T12 vertebral body, increased uptake in ascending colon.   - EUS original scheduled for last week; however anticoagulation was not held and  procedure was rescheduled to 8/4. 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    Concerned for refractory, progressive DLBCL in this patient. Will manage pain control and further work up with BmBx and EUS Bx. Will discuss with primary oncologist (Dr aCry) and patient the next best plan for treatment/care going forward.    - Rad/onc consult for role of RT.   - BmBx scheduled for 8/5 inpatient   - Depending on how long patient remains in hospital, might start salvage chemotherapy    # Back pain, new focal uptake in T12 vertebral body  # Abdominal pain, known retroperitoneal mass  Source of pain unclear. Likely associated with central/retroperitoneal mass, LN invading pancreas/spleen. MRI does not reveal obvious source of pain at T12 site without evidence of mets at this location, though PET on 7/31 reveals new focal uptake of T12 vertebral body; however, images reviewed and not significant. No red flag sx concerning for spinal cord compression.   Patient presented to hospital due to uncontrolled, significant back pain at home, uncontrolled on Oxycodone.   - Palliative consult on board, appreciate recs   - PCA started with significant improvement of pain   - Morphine PCA: 0.8 mg cont., 1.6 mg doses q30 min PRN   - Oxycodone 10-15mg Q4H PRN   - Morphine 4mgQ4H PRN   - GI consulted for EUS/bx and possible simultaneous celiac plexus nerve block on 8/4  - Rad/onc consult for role of RT   - Discussed with rad/onc, they are willing to do palliative radiation for this patient  "but discussed waiting on startingradiation with possible salvage chemotherapy in near future and biopsy planned tomorrow   - Plan to be determined with pending EUS bx, possible celiac nerve block and BmBx     #Anemia likely 2/2 to underlying disease  - Transfuse for Hgb <7, plt <10K     ID  # ID Prophylaxis  - Not currently neutropenic, ppx not indicated currently    Neuro  # History of CVA  Reports having a stroke \"2-3 years ago\". Patient continues to have numbness in left hand/arm and left knee. Reports it does not affect his functionality and does not have weakness in arm or leg    Cardiovascular  # A. Fib  - Currently on rivaroxaban at home   - Will hold VTE ppx due to plan for EUS bx tomorrow  - Continue to hold prophylactic heparin 5000u q8h (started upon admission from ED)  - Will restart xarelto when patient completes planned procedures    # Chest Pain - resolved  Reported chest discomfort in ED. EKG completed showing tachy and T-wave inversion. Troponins negative.  - Presumably referred pain from abdominal mass.      # Hypertension  - Continue pta lisinopril and Coreg    GI  # Constipation  Ongoing with use of pain medications at home.   Has not had a bowel movement since last week.  - Bowel regimen:   - Scheduled senokot, miralax    - Escalated to Mag Citrate today as well  - Palliative following, appreciate recs     Nutrition  # Poor PO intake  Worsening lack of appetite, early satiety over past several weeks.   - Nutrition consult, appreciate recs  - Encourage patient to eat/ utilize Boost shakes     MISC/ Social  # Hiccups   - Currently on baclofen as needed    Financial Concerns  - SW consult relating to financial matters    Fluids/Electrolytes/Nutrition   - LR started upon admission 8/2 at 150mL/hr  - PRN lyte replacement per standing protocol  - Regular diet as tolerated, NPO at midnight for planned procedures 8/4    PPX  VTE: Currently on hold due to planned procedures 8/4, will restart after " "procedures  GI: PPI  Activity: Up as tolerated    Lines/Drains: Peripheral    Code  FULL    Dispo: Anticipate 3-5 day for management of pain and potentally starting salvage chemotherapy     Patient is seen and examined by Dr Wood.  Assessment and plan are discussed and delivered to the patient.    Tara Anthony PA-C  Hematology/Oncology  Pager: [phone]    Interval History   No acute events overnight.   Jd (Kenneth) reports that overall his pain is improved somewhat. States it is a 3/10 and was 7/10 at its worst. Feels the PCA is helping significantly. Indicates the pain in the middle of his back. Denies any radiation of the pain. Has some abdominal pain, with bloating and cramping as well. Has not had a bowel movement since mid last week. Has a history of stroke, which has cause numbness in his left hand/arm and left knee.  Patient is having hiccups. Reports that these can affect his appetite and can cause \"foaming\" after he eats or drinks.  Denies fever, chills, headache, oral sores, chest pain, SOB, cough, abdominal pain, nausea, vomiting, diarrhea, constipation, dysuria, urinary incontinence, urinary dribbling or hesitation, bowel incontinence, new numbness or tingling or swelling.    Physical Exam   Temp: 97.4  F (36.3  C) Temp src: Oral BP: (!) 152/87 Pulse: 67   Resp: 16 SpO2: 100 % O2 Device: None (Room air)    Vitals:    08/02/20 0313 08/02/20 0742 08/03/20 0746   Weight: 91 kg (200 lb 11.2 oz) 89.8 kg (198 lb) 92.4 kg (203 lb 9.6 oz)     Vital Signs with Ranges  Temp:  [96.1  F (35.6  C)-98.2  F (36.8  C)] 97.4  F (36.3  C)  Pulse:  [60-72] 67  Resp:  [16-20] 16  BP: (128-153)/(68-87) 152/87  SpO2:  [96 %-100 %] 100 %  I/O last 3 completed shifts:  In: 4727.5 [P.O.:1460; I.V.:3267.5]  Out: 150 [Urine:150]    Constitutional: Pleasant male sitting in chair with hiccups. Awake and conversational. Non- toxic appearing. No acute distress.   HEENT: Normocephalic, atraumatic. Sclerae anicteric. EOM intact. " Moist mucus membranes  Respiratory: Breathing comfortable with no increased work on room air. No signs of respiratory distress or accessory muscle use. Lungs clear to auscultation bilaterally, no crackles or wheezing noted.  Cardiovascular: Irregular rhythm and regular rate. Normal S1 and S2. No murmurs, rubs, or gallops. No peripheral edema.    GI: Abdomen is distended, non-tender. Bowel sounds present and hypoactive.   Skin: Skin is clean, dry, intact. No jaundice appreciated.   Musculoskeletal/ Extremities: No redness, warmth, or swelling of the joints appreciated. Distal pulses palpable.   Neurologic: Alert and oriented. Speech normal. Grossly nonfocal. Memory and thought process preserved. Motor function grossly normal.   Neuropsychiatric: Calm, affect congruent to situation. Appropriate mood and affect. Good judgment and insight. No visual/auditory hallucinations.     Medications     - MEDICATION INSTRUCTIONS -       morphine         atorvastatin  40 mg Oral Daily     carvedilol  12.5 mg Oral BID w/meals     gabapentin  300 mg Oral At Bedtime     gabapentin  600 mg Oral Daily     lisinopril  20 mg Oral Daily     magnesium citrate  296 mL Oral Once     omeprazole  40 mg Oral Daily     polyethylene glycol  17 g Oral Daily     senna-docusate  1 tablet Oral BID    Or     senna-docusate  2 tablet Oral BID       Data   Results for orders placed or performed during the hospital encounter of 08/01/20 (from the past 24 hour(s))   Potassium   Result Value Ref Range    Potassium 3.9 3.4 - 5.3 mmol/L   MR Lumbar Spine w/o & w Contrast    Narrative    MR LUMBAR SPINE W/O & W CONTRAST 8/2/2020 8:16 PM    Provided History: Back pain, cancer or infection suspected; DLBCL,  worsening back pain despite chemo. Uptake in T12 vertebral body on  PET.    Comparison: Same day MRI thoracic spine. Body PET CT 7/31/2020,  6/8/2020, 3/17/2020, 3/10/2020    Technique: Sagittal T1-weighted, T2-weighted, diffusion weighted, and  axial  T2-weighted images of the lumbar spine were obtained without  intravenous contrast. Post intravenous contrast using gadolinium axial  and sagittal T1-weighted images were obtained with fat saturation.    Contrast: 10 mls Gadavist    Findings: Regarding numbering convention, there are 5 lumbar-type  vertebrae assumed for the purposes of this dictation.  The tip of the  conus medullaris is at L1-2. Minimal retrolisthesis of L5 on S1. Mild  loss of intervertebral disc height at L5-S1. Disc desiccation at L4-5  and L5-S1. Diffuse fatty marrow.    On a level by level basis:    L1-2: Mild facet arthropathy. No spinal canal stenosis or neural  foraminal narrowing.    L2-3: Mild facet arthropathy. No spinal canal stenosis or neural  foraminal narrowing.    L3-4: Mild facet arthropathy. Mild ligamentum flavum thickening on the  left. No spinal canal stenosis or neural foraminal narrowing.    L4-5: Posterior disc bulge with central disc protrusion and annular  fissuring. Bilateral facet arthropathy. Mild ligamentum flavum  thickening. Mild spinal canal narrowing. Mild bilateral neural  foraminal narrowing.    L5-S1: Circumferential disc bulge with superimposed central disc  protrusion. Severe right greater than left facet arthropathy with  complete effacement of the right neural foraminal zone secondary to  facet osteophytosis. Severe neuroforaminal stenosis on the right with  impingement of the exiting right L5 nerve along the bulky right facet  osteophytes. Moderate to severe neuroforaminal stenosis on the left.  Mild spinal canal stenosis with complete effacement of the right  subarticular zone and impingement on the traversing right S1 nerve  root.    Mild lumbar subcutaneous edema.    Contrast-enhanced images demonstrate no abnormal enhancement in the  visualized paraspinous tissues anteriorly or in the spinal canal or  vertebrae.      Impression    Impression:  1.  Multilevel spondylosis of the spine, greatest at L5-S1  "where there  is severe right neuroforaminal stenosis with impingement on the  exiting right L5 nerve root and impingement of the traversing right S1  nerve root in the lateral recess.  2.  Diffuse bone marrow changes consistent with underlying  lymphoproliferative disease.  3.  Please see same-day report for findings of the thoracic spine.    I have personally reviewed the examination and initial interpretation  and I agree with the findings.    ANGELICA BARGER MD   MR Thoracic Spine w/o & w Contrast    Narrative    MR THORACIC SPINE W/O & W CONTRAST 8/2/2020 8:28 PM    Provided History: Bone neoplasm, T-spine, check for local recurrence;  DLBCL, worsening back pain despite chemo. Uptake in T12 vertebral body  on PET    Comparison: 7/31/2020, 6/8/2020, 7/13/2020    Technique:  Sagittal T1- and T2-weighted images through the thoracic spine and  axial T2-weighted images were obtained without intravenous contrast.  Following intravenous administration of gadolinium, axial and sagittal  T1-weighted images with fat saturation were also obtained.    Contrast: 10 mls Gadavist    Findings:  The external marker is at T4-5. The thoracic vertebrae are normally  aligned. Multilevel disc dehydration and mild loss of disc height in  the mid/lower thoracic spine.  There is dorsal indentation of the  thoracic spinal cord, also known as \"scalpel sign\", at the level of T4  (series 3 image 9; series 6 image 8). There is normal signal within  the thoracic spinal cord.     Mild spinal canal narrowing as a consequence of a right central disc  protrusion indenting the ventral cord at T8-9 and small left  subarticular disc protrusion at T10-11. No high-grade neural foraminal  stenosis.    There is no abnormal contrast enhancement within the thoracic spinal  cord, thecal sac or vertebral column. STIR images demonstrate  exaggerated T2 hypointensity throughout the thoracic vertebral bodies.      Impression    Impression:  1.  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 No myelopathic cord  signal.  2.  No abnormal enhancement or evidence of metastatic disease at T12.  3.  Abnormal bone marrow signal, consistent with underlying  lymphoproliferative disease.    I have personally reviewed the examination and initial interpretation  and I agree with the findings.    ANGELICA BARGER MD   CBC with platelets differential   Result Value Ref Range    WBC 10.6 4.0 - 11.0 10e9/L    RBC Count 2.64 (L) 4.4 - 5.9 10e12/L    Hemoglobin 7.2 (L) 13.3 - 17.7 g/dL    Hematocrit 24.7 (L) 40.0 - 53.0 %    MCV 94 78 - 100 fl    MCH 27.3 26.5 - 33.0 pg    MCHC 29.1 (L) 31.5 - 36.5 g/dL    RDW 17.3 (H) 10.0 - 15.0 %    Platelet Count 150 150 - 450 10e9/L    Diff Method Automated Method     % Neutrophils 88.1 %    % Lymphocytes 1.9 %    % Monocytes 9.1 %    % Eosinophils 0.2 %    % Basophils 0.1 %    % Immature Granulocytes 0.6 %    Nucleated RBCs 0 0 /100    Absolute Neutrophil 9.4 (H) 1.6 - 8.3 10e9/L    Absolute Lymphocytes 0.2 (L) 0.8 - 5.3 10e9/L    Absolute Monocytes 1.0 0.0 - 1.3 10e9/L    Absolute Eosinophils 0.0 0.0 - 0.7 10e9/L    Absolute Basophils 0.0 0.0 - 0.2 10e9/L    Abs Immature Granulocytes 0.1 0 - 0.4 10e9/L    Absolute Nucleated RBC 0.0    Lactate Dehydrogenase   Result Value Ref Range    Lactate Dehydrogenase 219 85 - 227 U/L   Comprehensive metabolic panel   Result Value Ref Range    Sodium 136 133 - 144 mmol/L    Potassium 3.8 3.4 - 5.3 mmol/L    Chloride 102 94 - 109 mmol/L    Carbon Dioxide 29 20 - 32 mmol/L    Anion Gap 6 3 - 14 mmol/L    Glucose 97 70 - 99 mg/dL    Urea Nitrogen 6 (L) 7 - 30 mg/dL    Creatinine 0.47 (L) 0.66 - 1.25 mg/dL    GFR Estimate >90 >60 mL/min/[1.73_m2]    GFR Estimate If Black >90 >60 mL/min/[1.73_m2]    Calcium 8.5 8.5 - 10.1 mg/dL    Bilirubin Total 1.0 0.2 - 1.3 mg/dL    Albumin 2.0 (L) 3.4 - 5.0 g/dL    Protein Total 5.8 (L) 6.8 - 8.8 g/dL    Alkaline Phosphatase 85 40 - 150 U/L    ALT 14 0 - 70 U/L    AST 16  0 - 45 U/L       I have seen, interviewed, and examined the patient independently.  I have reviewed the vital signs and labs.  This note reflects my assessment and plan.      MRI does not identify mass at T12 vertebrae. Abnormality seen at T4: will ask neurosrugery whether this needs management/monitoring/intervention    Pain in better control with PCA, appreciate Pall care management of switching to long acting pain management from PCA    XRT: will hold off in light of likely chemo  GI to biopsy mass and perform nerve block by EUS tomorrow  BMBx inpatient Wednesday  Have discussed possibility of inpatient chemo with patient, will review with primary oncologist, Dr. Cary.    Em Santiago MD/PhD

## 2020-08-03 NOTE — CONSULTS
[facility]       NEUROSURGERY CONSULTATION Note    This consultation was requested by Tara Anthony from the Heme/Onc service.    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    Patient reports his back pain began one month ago, as an intermittent, focal, midline, non-radiating pain. He reports his pain started as an intermittent dull pain that responded well to tylenol, but quickly progressed into a constant dull pain felt in midline that is only responsive to scheduled oxycodones. He denies any bowel or bladder incontinence, weakness, numbness, tingling. Patient endorses that he does feel slightly weak on his Left side, but otherwise believes himself to have no weakness. He denies any numbness or tingling in his extremities.     PAST MEDICAL HISTORY:   Past Medical History:   Diagnosis Date     Alcohol use disorder, mild, abuse      Atrial fibrillation (H)     coumadin     CAD (coronary artery disease)      Cardiomyopathy (H)      HLD (hyperlipidemia)      HTN (hypertension)      Myocardial infarction (H)      Neuropathy     LLE, left hand post-CVA     Obesity      WATSON (obstructive sleep apnea)     requires CPAP however does not have a machine     Pancreatic cancer (H)     suspected 3/9/2020 at OSH     Stroke (H)     2017, mild left sided deficit     Tobacco dependence      Urinary urgency        PAST SURGICAL HISTORY:   Past Surgical History:   Procedure Laterality Date     CHOLECYSTECTOMY, LAPOROSCOPIC         FAMILY HISTORY:   Family History   Problem Relation Age of Onset     Cardiovascular  "Mother      Cardiovascular Father      Diabetes Type 2  Father      Cardiovascular Brother      Cancer Maternal Grandmother        SOCIAL HISTORY:   Social History     Tobacco Use     Smoking status: Former Smoker     Packs/day: 1.00     Years: 2.00     Pack years: 2.00     Types: Cigarettes     Start date: 3/10/2017     Last attempt to quit: 2020     Years since quittin.5     Smokeless tobacco: Current User     Types: Chew     Tobacco comment: daily chew use x39 years, now intermittent   Substance Use Topics     Alcohol use: Yes     Alcohol/week: 1.0 - 2.0 standard drinks     Types: 1 - 2 Cans of beer per week     Drinks per session: 1 or 2     Binge frequency: Less than monthly     Comment: 1 beer daily, occasional >2 drinks/episode socially       MEDICATIONS:  No current outpatient medications on file.       Allergies:  No Known Allergies    ROS: 10 point ROS of systems including Constitutional, Eyes, Respiratory, Cardiovascular, Gastroenterology, Genitourinary, Integumentary, Muscularskeletal, Psychiatric were all negative except for pertinent positives noted in my HPI.    Physical exam:   Blood pressure (!) 145/93, pulse 78, temperature 97.8  F (36.6  C), temperature source Oral, resp. rate 16, height 1.753 m (5' 9\"), weight 92.4 kg (203 lb 9.6 oz), SpO2 99 %.  General: awake and alert, in bed, laying comfortably,   Spine: no midline tenderness, no paraspinal tenderness,   HEENT: normocephalic, atraumatic  PULM: breathing comfortably on room air  NEUROLOGIC:  -- Awake; Alert; oriented x 3  -- Follows commands briskly  -- Speech fluent, spontaneous. No aphasia or dysarthria.  -- no gaze preference. No apparent hemineglect.  Cranial Nerves:  -- visual fields full to confrontation, PERRL 3-2mm bilat and brisk, extraocular movements intact  -- face symmetrical, tongue midline  -- sensory V1-V3 intact bilaterally  -- palate elevates symmetrically, uvula midline  -- Cerebellar: romberg sign " negative    Motor:  Normal bulk / tone; no tremor, rigidity, or bradykinesia.  No muscle wasting or fasciculations  No Pronator Drift     Delt Bi Tri Hand Flexion/  Extension Iliopsoas Quadriceps Hamstrings Tibialis Anterior Gastroc    C5 C6 C7 C8/T1 L2 L3 L4-S1 L4 S1   R 5 5 5 5 5 5 5 5 5   L 5 5 5 5 5 5 5 5 5   Sensory:  intact to LT x 4 extremities       Reflexes:       Bi Tri BR Liang Pat Ach Bab     C5-6 C7-8 C6 UMN L2-4 S1 UMN   R 2+ 2+ 2+ Norm 2+ 2+ Norm   L 2+ 2+ 2+ Norm 2+ 2+ Norm      - No Ankle Clonus    Gait: Normal. Able to stand on tippy toes      IMAGING:    PET: IMPRESSION: In this patient with a history of diffuse large B-cell  lymphoma, status post chemotherapy:  1. Increased size and metabolic activity of large, necrotic  retroperitoneal lymph node, now measuring up to 7.5 cm, indicating  progressive disease response per Lugano criteria. This mass continues  to invade the pancreas and spleen.  2. Multiple new pulmonary nodules scattered throughout the lungs,  including a new 1.4 cm nodule in the left lower lobe with increased  metabolic activity. This is concerning for new pulmonary metastasis.  3. New focal uptake in the posterior T12 vertebral body, concerning  for new bony metastasis.  4. Increased uptake in the ascending colon, which may be physiologic  vs colitis.  5. Diffuse bladder wall thickening, recommend clinical correlation for  cystitis.    MRI T-Spine:   Impression:  1.  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 No myelopathic cord  signal.  2.  No abnormal enhancement or evidence of metastatic disease at T12.  3.  Abnormal bone marrow signal, consistent with underlying  lymphoproliferative disease.    MRI L-Spine:  Impression:  1.  Multilevel spondylosis of the spine, greatest at L5-S1 where there  is severe right neuroforaminal stenosis with impingement on the  exiting right L5 nerve root and impingement of the traversing right S1  nerve root in the lateral recess.  2.  Diffuse  bone marrow changes consistent with underlying  lymphoproliferative disease.  3.  Please see same-day report for findings of the thoracic spine.    LABS:   Last Comprehensive Metabolic Panel:  Sodium   Date Value Ref Range Status   08/03/2020 136 133 - 144 mmol/L Final     Potassium   Date Value Ref Range Status   08/03/2020 3.8 3.4 - 5.3 mmol/L Final     Chloride   Date Value Ref Range Status   08/03/2020 102 94 - 109 mmol/L Final     Carbon Dioxide   Date Value Ref Range Status   08/03/2020 29 20 - 32 mmol/L Final     Anion Gap   Date Value Ref Range Status   08/03/2020 6 3 - 14 mmol/L Final     Glucose   Date Value Ref Range Status   08/03/2020 97 70 - 99 mg/dL Final     Urea Nitrogen   Date Value Ref Range Status   08/03/2020 6 (L) 7 - 30 mg/dL Final     Creatinine   Date Value Ref Range Status   08/03/2020 0.47 (L) 0.66 - 1.25 mg/dL Final     GFR Estimate   Date Value Ref Range Status   08/03/2020 >90 >60 mL/min/[1.73_m2] Final     Comment:     Non  GFR Calc  Starting 12/18/2018, serum creatinine based estimated GFR (eGFR) will be   calculated using the Chronic Kidney Disease Epidemiology Collaboration   (CKD-EPI) equation.       Calcium   Date Value Ref Range Status   08/03/2020 8.5 8.5 - 10.1 mg/dL Final     Lab Results   Component Value Date    WBC 10.6 08/03/2020     Lab Results   Component Value Date    RBC 2.64 08/03/2020     Lab Results   Component Value Date    HGB 7.2 08/03/2020     Lab Results   Component Value Date    HCT 24.7 08/03/2020     Lab Results   Component Value Date    MCV 94 08/03/2020     Lab Results   Component Value Date    MCH 27.3 08/03/2020     Lab Results   Component Value Date    MCHC 29.1 08/03/2020     Lab Results   Component Value Date    RDW 17.3 08/03/2020     Lab Results   Component Value Date     08/03/2020     INR   Date Value Ref Range Status   08/01/2020 1.29 (H) 0.86 - 1.14 Final      PTT   Date Value Ref Range Status   03/19/2020 31 22 - 37 sec  Final        ASSESSMENT: 49M, DLBLC with known retroperitoneal mass with pancreatic and splenic invasion (w/interval increase on PET (7/31) compared to 6/2), presenting with 1-month of worsening back pain. MR T-spine showing new T4 subarachnoid web; MR L-Spine showing spondylolisthesis at L5-S1, impingement at L5 and S1 nerve roots. On exam, patient has no focal neurological deficits. Given location of back pain, and interval increase in back pain strength correlating with increase in RP mass, most likely related to disease progression, and unrelated to T4 arachnoid web.     RECOMMENDATIONS:  1. No neurosurgical intervention indicated at this time     Neurosurgery signing off    Harjinder Win MD  Neurosurgery Resident, PGY-1    The patient was discussed with Dr. Bladimir Patel neurosurgery chief resident, and Dr. Garrido, neurosurgery staff.    I have reviewed the history above and agree with the resident's assessment and plan.  JOSUÉ Garrido MD

## 2020-08-03 NOTE — PROGRESS NOTES
8/3/2020 Gastroenterology Brief Note    Chart reviewed. Patient scheduled for EUS with LN biopsy tomorrow in [address]. Will evaluate for celiac neurolysis as well. Please hold anticoagulation and make the patient NPO at midnight.    Discussed with onc team    Lindsay Shelton PA-C  [facility]/Pancreatic[facility] GI Service  Pager *[phone]

## 2020-08-04 NOTE — PLAN OF CARE
Pt afebrile with stable vs. Heart rhythm irregular (baseline). Endoscopic US and bx done today. Awake and alert post procedure. Unable to do nerve block secondary to location of tumor. Back pain tolerable with morphine pca. Still needs to have more stool, only small amount yesterday. Received miralax. Urine dark and small amounts, but forgetting to save. Reminded to save urine to measure. BmBx tomorrow.

## 2020-08-04 NOTE — OR NURSING
Pt tolerated EUS with 2 separate FNA needles being used. Report was called . Pt will return to PCU when he is more awake

## 2020-08-04 NOTE — BRIEF OP NOTE
[facility], [place]    Brief Operative Note    Pre-operative diagnosis: Diffuse large B-cell lymphoma of lymph nodes of multiple regions (H) [C83.38]  Post-operative diagnosis Retroperitoneal mass    Procedure: Procedure(s):  ESOPHAGOGASTRODUODENOSCOPY, WITH FINE NEEDLE ASPIRATION BIOPSY, WITH ENDOSCOPIC ULTRASOUND GUIDANCE  Surgeon: Surgeon(s) and Role:     * Dayton Tobias MD - Primary  Anesthesia: Monitor Anesthesia Care   Estimated blood loss: None  Drains: None  Specimens:   ID Type Source Tests Collected by Time Destination   A : Retroperitoneal mass Other (specify in comments) Other FINE NEEDLE ASPIRATION, OR DOCUMENTATION ONLY Dayton Tobias MD 8/4/2020 11:06 AM    B : Retroperitoneal mass needle asperate Tissue Other LEUKEMIA LYMPHOMA EVALUATION (FLOW CYTOMETRY) Dayton Tboias MD 8/4/2020 11:39 AM    C : Retroperitoneal mass Core biopsy Tissue Other LEUKEMIA LYMPHOMA EVALUATION (FLOW CYTOMETRY) Dayton Tobias MD 8/4/2020 11:45 AM      Findings:   Large retroperitoneal mass posterior to gastric cardia. Mass separate from stomach wall. Needle aspiration performed and preliminary cytology suggested lymphoid material. Aspiration material sent for cytology and flow cytometry. Core biopsy attempted x 3, returning 1 good core and several fragments submitted in RPMI for lymphoma workup.    Endoscopy showed no mucosal abnormality in esophagus or stomach.    The mass precludes access to the celiac region to allow for celiac neurolysis.    OK to resume prior diet when awake if no further testing planned.  OK to resume Xarelto tomorrow if no signs of bleeding and no additional procedures planned.  Complications: None.  Implants: * No implants in log *      .BALDO Tobias MD  Professor of Medicine  Division of Gastroenterology, Hepatology and Nutrition  [facility]

## 2020-08-04 NOTE — PLAN OF CARE
"[id number]    PCA Morphine in-use, PCA Dose increased to 2 mg q 30 minutes around 0245 d/t inadequate pain control, effective. Also, taking scheduled MS Contin BID.     VSS. Afebrile. LS clear, on room air. Denies nausea and SOB. Had a \"very small hard\" BM, continues to take scheduled laxatives. Hiccups persist, baclofen ineffective, obtained an order for Reglan, and this was ineffective as well. NPO since 0000 for EUS, patient verbalized understanding. UpAdLib. Will continue with POC.       "

## 2020-08-04 NOTE — ANESTHESIA CARE TRANSFER NOTE
Patient: Kenneth Tello    Procedure(s):  ESOPHAGOGASTRODUODENOSCOPY, WITH FINE NEEDLE ASPIRATION BIOPSY, WITH ENDOSCOPIC ULTRASOUND GUIDANCE    Diagnosis: Diffuse large B-cell lymphoma of lymph nodes of multiple regions (H) [C83.38]  Diagnosis Additional Information: No value filed.    Anesthesia Type:   MAC     Note:  Airway :Room Air  Patient transferred to: Recovery  Comments: toerated anesthesia wellHandoff Report: Identifed the Patient, Identified the Reponsible Provider, Reviewed the pertinent medical history, Discussed the surgical course, Reviewed Intra-OP anesthesia mangement and issues during anesthesia, Set expectations for post-procedure period and Allowed opportunity for questions and acknowledgement of understanding      Vitals: (Last set prior to Anesthesia Care Transfer)    CRNA VITALS  8/4/2020 1126 - 8/4/2020 1204      8/4/2020             Pulse:  56    Ht Rate:  54    SpO2:  100 %                Electronically Signed By: CORINNA Silver CRNA  August 4, 2020  12:04 PM

## 2020-08-04 NOTE — PROVIDER NOTIFICATION
Provider Notification     Re: Pt reports inadequate pain control with PCA and scheduled MS Contin. Does have an order for PRN Oxy 10-15 mg Q4H. However, in PCA instructions it states not to give PO on top of PCA. Please advise.     Action: Paged #[phone] to Dr. Bond     Response: see MD note

## 2020-08-04 NOTE — PROGRESS NOTES
Calorie Count  Intake recorded for: 8/3  Total Kcals: 548 Total Protein: 16g  Kcals from Hospital Food: 548  Protein: 16g  Kcals from Outside Food (average):0 Protein: 0g  # Meals Recorded: 75% garden vegetable soup, saltines, ice cream  # Supplements Recorded: 75% 1 Boost Shake

## 2020-08-04 NOTE — PROGRESS NOTES
I was paged in regard patient inadequate pain control.    After reading through the palliative care note, I discontinued the oxycodone PRN.  And I increase his PCA dosing to 2 mg every 30 minutes with 4 mg IV morphine maximum per 1 hour.    This will assist the palliative care team in the morning to better estimate the 24-hour opioid requirement of the patient, for later conversion to p.o. meds.

## 2020-08-04 NOTE — PROGRESS NOTES
[facility], [place]    Hematology / Oncology Progress Note    Date of Service (when I saw the patient): 08/04/2020     Assessment & Plan   Mr. Gan is a 49-year-old male with medical comorbidities significant for diffuse large B-cell lymphoma status post 6 cycles of R-CHOP with Neulasta, still progressing, CVA, A. fib currently on Xarelto, hypertension, who presented to the emergency room with severe back pain and who is being admitted to the hospital for pain control. Recent PET on 7/31 with disease progression and new focal uptake in T12 vertebral body.     Today:  - Had some increase in pain yesterday evening/overnight, now with improvement with increase in PCA morphine PRN  - Plan for EUS/Bx and possible Celiac plexus nerve block today, 8/4   - GI consult, appreciate recs  - Plan for BmBx on 8/5 @ 1230  - Continue to hold VTE ppx until after procedure tomorrow  - NPO at midnight    HEME  #DLBCL with primary refractory disease  Follows with Dr. Cary. Oncologic history obtained through previous notes and outpatient records  He presented in early 03/2020 with left upper abdominal pain, fatigue, and >10% weight loss, was found to have a samaria-pancreatic mass, and workup confirmed DLBCL, non-GCB type, FISH negative for double/triple hit changes.  DLBCL-IPI was high (stage, extranodal sites, LDH) and clinical stage IV with extranodal involvement.   Started on R-CHOP in 03/2020, now status post 6 cycles of R-CHOP with Neulasta support and IT methotrexate. C6D1 7/2  On 7/13, CT scan showed demonstrated increasing gastrohepatic ligament mass concerning for refractory lymphoma.   - PET 7/31 with disease progression with increased size of retroperitoneal LN, multiple new pulmonary nodules, new focal uptake in T12 vertebral body, increased uptake in ascending colon.   - EUS original scheduled for last week; however anticoagulation was not held and procedure was rescheduled to 8/4. 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    Concerned for refractory, progressive DLBCL in this patient. Will manage pain control and further work up with BmBx and EUS Bx. Will discuss with primary oncologist (Dr Cary) and patient the next best plan for treatment/care going forward.    - Rad/onc consult for role of RT.   - BmBx scheduled for 8/5 inpatient   - Depending on how long patient remains in hospital, might start salvage chemotherapy    # Back pain, new focal uptake in T12 vertebral body  # Abdominal pain, known retroperitoneal mass  Source of pain unclear. Likely associated with central/retroperitoneal mass, LN invading pancreas/spleen. MRI does not reveal obvious source of pain at T12 site without evidence of mets at this location, though PET on 7/31 reveals new focal uptake of T12 vertebral body; however, images reviewed and not significant. No red flag sx concerning for spinal cord compression.   Patient presented to hospital due to uncontrolled, significant back pain at home, uncontrolled on Oxycodone.   - Palliative consult on board, appreciate recs   - PCA started with significant improvement of pain   - Morphine PCA: 0.8 mg cont., 1.6 mg doses q30 min PRN   - Oxycodone 10-15mg Q4H PRN   - Morphine 4mgQ4H PRN   - GI consulted for EUS/bx and possible simultaneous celiac plexus nerve block on 8/4  - Rad/onc consult for role of RT   - Discussed with rad/onc, they are willing to do palliative radiation for this patient but discussed waiting on starting  "radiation with possible salvage chemotherapy in near future and biopsy planned tomorrow   - Plan to be determined with pending EUS bx, possible celiac nerve block and BmBx     #Anemia likely 2/2 to underlying disease  - Transfuse for Hgb <7, plt <10K     ID  # ID Prophylaxis  - Not currently neutropenic, ppx not indicated currently    Neuro  # History of CVA  Reports having a stroke \"2-3 years ago\". Patient continues to have numbness in left hand/arm and left knee. 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 Fib  - Currently on rivaroxaban at home   - Will hold VTE ppx due to plan for EUS bx tomorrow  - Continue to hold prophylactic heparin 5000u q8h (started upon admission from ED)  - Will restart xarelto when patient completes planned procedures    # Chest Pain - resolved  Reported chest discomfort in ED. EKG completed showing tachy and T-wave inversion. Troponins negative.  - Presumably referred pain from abdominal mass.      # Hypertension  - Continue pta lisinopril and Coreg    GI  # Constipation  Ongoing with use of pain medications at home.   Has not had a bowel movement since last week.  - Bowel regimen:   - Scheduled senokot, miralax    - Escalated to Mag Citrate today as well  - Palliative following, appreciate recs     Nutrition  # Poor PO intake  Worsening lack of appetite, early satiety over past several weeks.   - Nutrition consult, appreciate recs  - Encourage patient to eat/ utilize Boost shakes     MISC/ Social  # Hiccups   - Currently on baclofen or reglan as needed    Financial Concerns  -  consult relating to financial matters    Fluids/Electrolytes/Nutrition   - LR started upon admission 8/2 at 150mL/hr  - " PRN lyte replacement per standing protocol  - Regular diet as tolerated, NPO at midnight for planned procedures 8/4    PPX  VTE: Currently on hold due to planned procedures 8/4, will restart after procedures  GI: PPI  Activity: Up as tolerated    Lines/Drains: Peripheral    Code  FULL    Dispo: Anticipate 3-5 day for management of pain and potentally starting salvage chemotherapy     Patient is seen and examined by Dr Wood.  Assessment and plan are discussed and delivered to the patient.    Tara Anthony PA-C  Hematology/Oncology  Pager: [phone]    Interval History   No acute events overnight.   Jd (Kenneth) states his pain worsened overnight/ last evening with change in medications. Reports the increase in the morphine PRN PCA helped. Overall pain doing better today. Pain remains in the the middle of his back without change to the quality. Denies any radiation of the pain. Reports some abdominal pain last night but states he has none now.   Had a small BM last night. No change to numbness or tingling.  Patient continues to have hiccups. Denies any relief from reglan or baclofen  Denies fever, chills, headache, oral sores, chest pain, SOB, cough, abdominal pain, nausea, vomiting, diarrhea, constipation, dysuria, urinary incontinence, urinary dribbling or hesitation, bowel incontinence, new numbness or tingling or swelling.    Physical Exam   Temp: 98.1  F (36.7  C) Temp src: Oral BP: 132/78 Pulse: 62   Resp: 16 SpO2: 98 % O2 Device: None (Room air)    Vitals:    08/02/20 0313 08/02/20 0742 08/03/20 0746   Weight: 91 kg (200 lb 11.2 oz) 89.8 kg (198 lb) 92.4 kg (203 lb 9.6 oz)     Vital Signs with Ranges  Temp:  [97  F (36.1  C)-98.1  F (36.7  C)] 98.1  F (36.7  C)  Pulse:  [62-78] 62  Resp:  [16-18] 16  BP: (131-167)/(78-96) 132/78  SpO2:  [98 %-100 %] 98 %  I/O last 3 completed shifts:  In: 480 [P.O.:240; I.V.:240]  Out: -     Constitutional: Pleasant male lying in bed. Awake and conversational. Non- toxic  appearing. No acute distress.   HEENT: Normocephalic, atraumatic. Sclerae anicteric. EOM intact. Moist mucus membranes  Respiratory: Breathing comfortable with no increased work on room air. No signs of respiratory distress or accessory muscle use. Lungs clear to auscultation bilaterally, no crackles or wheezing noted.  Cardiovascular: Irregular rhythm and regular rate. Normal S1 and S2. No murmurs, rubs, or gallops. No peripheral edema.    GI: Abdomen is distended, non-tender. Bowel sounds present and hypoactive.   Skin: Skin is clean, dry, intact. No jaundice appreciated.   Musculoskeletal/ Extremities: No redness, warmth, or swelling of the joints appreciated. Distal pulses palpable.   Neurologic: Alert and oriented. Speech normal. Grossly nonfocal. Memory and thought process preserved. Motor function grossly normal.   Neuropsychiatric: Calm, affect congruent to situation. Appropriate mood and affect. Good judgment and insight. No visual/auditory hallucinations.     Medications     - MEDICATION INSTRUCTIONS -       - MEDICATION INSTRUCTIONS -       - MEDICATION INSTRUCTIONS -       morphine       sodium chloride 20 mL/hr at 08/04/20 0306       atorvastatin  40 mg Oral Daily     carvedilol  12.5 mg Oral BID w/meals     gabapentin  300 mg Oral At Bedtime     gabapentin  600 mg Oral Daily     lisinopril  20 mg Oral Daily     morphine  30 mg Oral Q12H ENIO     omeprazole  40 mg Oral Daily     polyethylene glycol  17 g Oral Daily     senna-docusate  1 tablet Oral BID    Or     senna-docusate  2 tablet Oral BID     sodium chloride (PF)  3 mL Intracatheter Q8H       Data   Results for orders placed or performed during the hospital encounter of 08/01/20 (from the past 24 hour(s))   Neurosurgery Adult IP Consult: Patient to be seen: Routine within 24 hrs; Call back #: Tara Anthony PA-C *[phone]; 49 year old male with diffuse large B cell lymphoma s/p 6 cycles of R-CHOP now with refractory/progressive disease. Now with  si...    Narrative    Harjinder Win MD     8/3/2020  8:51 PM  [facility]       NEUROSURGERY CONSULTATION Note    This consultation was requested by Tara Anthony from the   Heme/Onc service.    Reason for Consultation: Back pain in setting of new arachnoid   web at level of T4    HPI:   49M, PMH DLBCL (s/p 6 cycles of R-CHOP with neulasta), afib (on   xarelto; currently being held in setting of LN biopsy tomorrow),   known retroperitoneal mass with pancreatic and spleen invasion   (w/interval increase found on PET on 7/31, compared to 6/2), CVA   in 2017 w/residual L sided weakness, here with worsening back.    Patient reports his back pain began one month ago, as an   intermittent, focal, midline, non-radiating pain. He reports his   pain started as an intermittent dull pain that responded well to   tylenol, but quickly progressed into a constant dull pain felt in   midline that is only responsive to scheduled oxycodones. He   denies any bowel or bladder incontinence, weakness, numbness,   tingling. Patient endorses that he does feel slightly weak on his   Left side, but otherwise believes himself to have no weakness. He   denies any numbness or tingling in his extremities.     PAST MEDICAL HISTORY:   Past Medical History:   Diagnosis Date     Alcohol use disorder, mild, abuse      Atrial fibrillation (H)     coumadin     CAD (coronary artery disease)      Cardiomyopathy (H)      HLD (hyperlipidemia)      HTN (hypertension)      Myocardial infarction (H)      Neuropathy     LLE, left hand post-CVA     Obesity      WATSON (obstructive sleep apnea)     requires CPAP however does not have a machine     Pancreatic cancer (H)     suspected 3/9/2020 at OSH     Stroke (H)     2017, mild left sided deficit     Tobacco dependence      Urinary urgency        PAST SURGICAL HISTORY:   Past Surgical History:   Procedure Laterality Date     CHOLECYSTECTOMY, LAPOROSCOPIC         FAMILY  "HISTORY:   Family History   Problem Relation Age of Onset     Cardiovascular Mother      Cardiovascular Father      Diabetes Type 2  Father      Cardiovascular Brother      Cancer Maternal Grandmother        SOCIAL HISTORY:   Social History     Tobacco Use     Smoking status: Former Smoker     Packs/day: 1.00     Years: 2.00     Pack years: 2.00     Types: Cigarettes     Start date: 3/10/2017     Last attempt to quit: 2020     Years since quittin.5     Smokeless tobacco: Current User     Types: Chew     Tobacco comment: daily chew use x39 years, now intermittent   Substance Use Topics     Alcohol use: Yes     Alcohol/week: 1.0 - 2.0 standard drinks     Types: 1 - 2 Cans of beer per week     Drinks per session: 1 or 2     Binge frequency: Less than monthly     Comment: 1 beer daily, occasional >2 drinks/episode socially       MEDICATIONS:  No current outpatient medications on file.       Allergies:  No Known Allergies    ROS: 10 point ROS of systems including Constitutional, Eyes,   Respiratory, Cardiovascular, Gastroenterology, Genitourinary,   Integumentary, Muscularskeletal, Psychiatric were all negative   except for pertinent positives noted in my HPI.    Physical exam:   Blood pressure (!) 145/93, pulse 78, temperature 97.8  F (36.6    C), temperature source Oral, resp. rate 16, height 1.753 m (5'   9\"), weight 92.4 kg (203 lb 9.6 oz), SpO2 99 %.  General: awake and alert, in bed, laying comfortably,   Spine: no midline tenderness, no paraspinal tenderness,   HEENT: normocephalic, atraumatic  PULM: breathing comfortably on room air  NEUROLOGIC:  -- Awake; Alert; oriented x 3  -- Follows commands briskly  -- Speech fluent, spontaneous. No aphasia or dysarthria.  -- no gaze preference. No apparent hemineglect.  Cranial Nerves:  -- visual fields full to confrontation, PERRL 3-2mm bilat and   brisk, extraocular movements intact  -- face symmetrical, tongue midline  -- sensory V1-V3 intact bilaterally  -- " palate elevates symmetrically, uvula midline  -- Cerebellar: romberg sign negative    Motor:  Normal bulk / tone; no tremor, rigidity, or bradykinesia.  No   muscle wasting or fasciculations  No Pronator Drift     Delt Bi Tri Hand Flexion/  Extension Iliopsoas Quadriceps Hamstrings Tibialis Anterior   Gastroc    C5 C6 C7 C8/T1 L2 L3 L4-S1 L4 S1   R 5 5 5 5 5 5 5 5 5   L 5 5 5 5 5 5 5 5 5   Sensory:  intact to LT x 4 extremities       Reflexes:       Bi Tri BR Liang Pat Ach Bab     C5-6 C7-8 C6 UMN L2-4 S1 UMN   R 2+ 2+ 2+ Norm 2+ 2+ Norm   L 2+ 2+ 2+ Norm 2+ 2+ Norm      - No Ankle Clonus    Gait: Normal. Able to stand on tippy toes      IMAGING:    PET: IMPRESSION: In this patient with a history of diffuse large   B-cell  lymphoma, status post chemotherapy:  1. Increased size and metabolic activity of large, necrotic  retroperitoneal lymph node, now measuring up to 7.5 cm,   indicating  progressive disease response per Lugano criteria. This mass   continues  to invade the pancreas and spleen.  2. Multiple new pulmonary nodules scattered throughout the lungs,  including a new 1.4 cm nodule in the left lower lobe with   increased  metabolic activity. This is concerning for new pulmonary   metastasis.  3. New focal uptake in the posterior T12 vertebral body,   concerning  for new bony metastasis.  4. Increased uptake in the ascending colon, which may be   physiologic  vs colitis.  5. Diffuse bladder wall thickening, recommend clinical   correlation for  cystitis.    MRI T-Spine:   Impression:  1.  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 No myelopathic   cord  signal.  2.  No abnormal enhancement or evidence of metastatic disease at   T12.  3.  Abnormal bone marrow signal, consistent with underlying  lymphoproliferative disease.    MRI L-Spine:  Impression:  1.  Multilevel spondylosis of the spine, greatest at L5-S1 where   there  is severe right neuroforaminal stenosis with impingement on the  exiting right L5  nerve root and impingement of the traversing   right S1  nerve root in the lateral recess.  2.  Diffuse bone marrow changes consistent with underlying  lymphoproliferative disease.  3.  Please see same-day report for findings of the thoracic   spine.    LABS:   Last Comprehensive Metabolic Panel:  Sodium   Date Value Ref Range Status   08/03/2020 136 133 - 144 mmol/L Final     Potassium   Date Value Ref Range Status   08/03/2020 3.8 3.4 - 5.3 mmol/L Final     Chloride   Date Value Ref Range Status   08/03/2020 102 94 - 109 mmol/L Final     Carbon Dioxide   Date Value Ref Range Status   08/03/2020 29 20 - 32 mmol/L Final     Anion Gap   Date Value Ref Range Status   08/03/2020 6 3 - 14 mmol/L Final     Glucose   Date Value Ref Range Status   08/03/2020 97 70 - 99 mg/dL Final     Urea Nitrogen   Date Value Ref Range Status   08/03/2020 6 (L) 7 - 30 mg/dL Final     Creatinine   Date Value Ref Range Status   08/03/2020 0.47 (L) 0.66 - 1.25 mg/dL Final     GFR Estimate   Date Value Ref Range Status   08/03/2020 >90 >60 mL/min/[1.73_m2] Final     Comment:     Non  GFR Calc  Starting 12/18/2018, serum creatinine based estimated GFR (eGFR)   will be   calculated using the Chronic Kidney Disease Epidemiology   Collaboration   (CKD-EPI) equation.       Calcium   Date Value Ref Range Status   08/03/2020 8.5 8.5 - 10.1 mg/dL Final     Lab Results   Component Value Date    WBC 10.6 08/03/2020     Lab Results   Component Value Date    RBC 2.64 08/03/2020     Lab Results   Component Value Date    HGB 7.2 08/03/2020     Lab Results   Component Value Date    HCT 24.7 08/03/2020     Lab Results   Component Value Date    MCV 94 08/03/2020     Lab Results   Component Value Date    MCH 27.3 08/03/2020     Lab Results   Component Value Date    MCHC 29.1 08/03/2020     Lab Results   Component Value Date    RDW 17.3 08/03/2020     Lab Results   Component Value Date     08/03/2020     INR   Date Value Ref Range Status    08/01/2020 1.29 (H) 0.86 - 1.14 Final      PTT   Date Value Ref Range Status   03/19/2020 31 22 - 37 sec Final        ASSESSMENT: 49M, DLBLC with known retroperitoneal mass with   pancreatic and splenic invasion (w/interval increase on PET   (7/31) compared to 6/2), presenting with 1-month of worsening   back pain. MR T-spine showing new T4 subarachnoid web; MR L-Spine   showing significant spondylolisthesis at L5-S1, impingement at L5   and S1 nerve roots. On exam, patient has no focal neurological   deficits. Given location of back pain, and interval increase in   back pain strength correlating with increase in RP mass, most   likely related to disease progression, and unrelated to T4   arachnoid web.     RECOMMENDATIONS:  1. No neurosurgical intervention indicated at this time     Neurosurgery signing off    Harjinder Win MD  Neurosurgery Resident, PGY-1    The patient was discussed with Dr. Bladimir Patel neurosurgery chief   resident, and Dr. Garrido, neurosurgery staff.   CBC with platelets differential   Result Value Ref Range    WBC 11.6 (H) 4.0 - 11.0 10e9/L    RBC Count 3.03 (L) 4.4 - 5.9 10e12/L    Hemoglobin 8.1 (L) 13.3 - 17.7 g/dL    Hematocrit 28.4 (L) 40.0 - 53.0 %    MCV 94 78 - 100 fl    MCH 26.7 26.5 - 33.0 pg    MCHC 28.5 (L) 31.5 - 36.5 g/dL    RDW 17.7 (H) 10.0 - 15.0 %    Platelet Count 175 150 - 450 10e9/L    Diff Method Automated Method     % Neutrophils 90.4 %    % Lymphocytes 1.8 %    % Monocytes 7.1 %    % Eosinophils 0.2 %    % Basophils 0.1 %    % Immature Granulocytes 0.4 %    Nucleated RBCs 0 0 /100    Absolute Neutrophil 10.5 (H) 1.6 - 8.3 10e9/L    Absolute Lymphocytes 0.2 (L) 0.8 - 5.3 10e9/L    Absolute Monocytes 0.8 0.0 - 1.3 10e9/L    Absolute Eosinophils 0.0 0.0 - 0.7 10e9/L    Absolute Basophils 0.0 0.0 - 0.2 10e9/L    Abs Immature Granulocytes 0.1 0 - 0.4 10e9/L    Absolute Nucleated RBC 0.0    Lactate Dehydrogenase   Result Value Ref Range    Lactate Dehydrogenase 250 (H) 85 -  227 U/L   Comprehensive metabolic panel   Result Value Ref Range    Sodium 139 133 - 144 mmol/L    Potassium 4.0 3.4 - 5.3 mmol/L    Chloride 102 94 - 109 mmol/L    Carbon Dioxide 32 20 - 32 mmol/L    Anion Gap 5 3 - 14 mmol/L    Glucose 106 (H) 70 - 99 mg/dL    Urea Nitrogen 5 (L) 7 - 30 mg/dL    Creatinine 0.51 (L) 0.66 - 1.25 mg/dL    GFR Estimate >90 >60 mL/min/[1.73_m2]    GFR Estimate If Black >90 >60 mL/min/[1.73_m2]    Calcium 8.6 8.5 - 10.1 mg/dL    Bilirubin Total 0.9 0.2 - 1.3 mg/dL    Albumin 2.2 (L) 3.4 - 5.0 g/dL    Protein Total 6.6 (L) 6.8 - 8.8 g/dL    Alkaline Phosphatase 106 40 - 150 U/L    ALT 20 0 - 70 U/L    AST 22 0 - 45 U/L       I have seen, interviewed, and examined the patient independently.  I have reviewed the vital signs and labs.  This note reflects my assessment and plan.      MRI does not identify mass at T12 vertebrae. Abnormality seen at T4: neurosrugery reviewed: no need to intervene, unlikely source of pain  Pain in better control with PCA, appreciate Pall care management of switching to long acting pain management from PCA    XRT: will hold off in light of likely chemo  GI: biopsy mass today by EUS. I discussed with Dr. Tobias after the procedure: cores and aspirate sent to [facility]. Unable to access celiac axis due to mass in the way  BMBx inpatient Wednesday  Have discussed possibility of inpatient chemo with patient and primary oncologist, Dr. Cary. Considering clinical trial options as well.    Em Santiago MD/PhD

## 2020-08-04 NOTE — ANESTHESIA PREPROCEDURE EVALUATION
Anesthesia Pre-Procedure Evaluation    Patient: Kenneth eTllo   MRN:     [id number] Gender:   male   Age:    49 year old :      1970        Preoperative Diagnosis: Diffuse large B-cell lymphoma of lymph nodes of multiple regions (H) [C83.38]   Procedure(s):  ENDOSCOPIC ULTRASOUND, ESOPHAGOSCOPY / UPPER GASTROINTESTINAL TRACT (GI)     LABS:  CBC:   Lab Results   Component Value Date    WBC 11.6 (H) 2020    WBC 10.6 2020    HGB 8.1 (L) 2020    HGB 7.2 (L) 2020    HCT 28.4 (L) 2020    HCT 24.7 (L) 2020     2020     2020     BMP:   Lab Results   Component Value Date     2020     2020    POTASSIUM 4.0 2020    POTASSIUM 3.8 2020    CHLORIDE 102 2020    CHLORIDE 102 2020    CO2 32 2020    CO2 29 2020    BUN 5 (L) 2020    BUN 6 (L) 2020    CR 0.51 (L) 2020    CR 0.47 (L) 2020     (H) 2020    GLC 97 2020     COAGS:   Lab Results   Component Value Date    PTT 31 2020    INR 1.29 (H) 2020    FIBR 791 (H) 2020     POC: No results found for: BGM, HCG, HCGS  OTHER:   Lab Results   Component Value Date    LACT 3.4 (H) 2020    STACY 8.6 2020    PHOS 4.1 2020    MAG 1.6 2020    ALBUMIN 2.2 (L) 2020    PROTTOTAL 6.6 (L) 2020    ALT 20 2020    AST 22 2020    ALKPHOS 106 2020    BILITOTAL 0.9 2020    LIPASE 14 (L) 2020        Preop Vitals    BP Readings from Last 3 Encounters:   20 132/78   20 (!) 140/81   20 119/76    Pulse Readings from Last 3 Encounters:   20 62   20 71   20 67      Resp Readings from Last 3 Encounters:   20 16   20 18   20 16    SpO2 Readings from Last 3 Encounters:   20 98%   20 96%   20 98%      Temp Readings from Last 1 Encounters:   20 36.7  C (98.1  F) (Oral)    Ht Readings from  "Last 1 Encounters:   08/01/20 1.753 m (5' 9\")      Wt Readings from Last 1 Encounters:   08/03/20 92.4 kg (203 lb 9.6 oz)    Estimated body mass index is 30.07 kg/m  as calculated from the following:    Height as of this encounter: 1.753 m (5' 9\").    Weight as of this encounter: 92.4 kg (203 lb 9.6 oz).     LDA:  Peripheral IV 08/01/20 Left Lower forearm (Active)   Site Assessment WDL 08/04/20 0933   Line Status Infusing 08/04/20 0933   Phlebitis Scale 0-->no symptoms 08/04/20 0933   Infiltration Scale 0 08/04/20 0933   Infiltration Site Treatment Method  None 08/02/20 0314   Extravasation? No 08/04/20 0933   Dressing Intervention Dressing reinforced 08/04/20 0933   Number of days: 3        Past Medical History:   Diagnosis Date     Alcohol use disorder, mild, abuse      Atrial fibrillation (H)     coumadin     CAD (coronary artery disease)      Cardiomyopathy (H)      HLD (hyperlipidemia)      HTN (hypertension)      Myocardial infarction (H)      Neuropathy     LLE, left hand post-CVA     Obesity      WATSON (obstructive sleep apnea)     requires CPAP however does not have a machine     Pancreatic cancer (H)     suspected 3/9/2020 at OSH     Stroke (H)     2017, mild left sided deficit     Tobacco dependence      Urinary urgency       Past Surgical History:   Procedure Laterality Date     CHOLECYSTECTOMY, LAPOROSCOPIC        No Known Allergies              PHYSICAL EXAM:   Mental Status/Neuro: A/A/O   Airway: Facies: Feasible  Mallampati: I  Mouth/Opening: Full  TM distance: > 6 cm  Neck ROM: Full   Respiratory: Auscultation: CTAB     Resp. Rate: Normal     Resp. Effort: Normal      CV: Rhythm: Regular  Rate: Age appropriate  Heart: Normal Sounds  Edema: None   Comments:      Dental: Normal Dentition                Assessment:   ASA SCORE: 3    H&P: History and physical reviewed and following examination; no interval change.    NPO Status: NPO Appropriate     Plan:   Anes. Type:  MAC   Pre-Medication: None "   Induction:  N/a   Airway: Native Airway   Access/Monitoring: PIV   Maintenance: N/a     Postop Plan:   Postop Pain: None  Postop Sedation/Airway: Not planned  Disposition: Outpatient     PONV Management:    Prevention: Ondansetron     CONSENT: Direct conversation   Plan and risks discussed with: Patient   Blood Products: Consent Deferred (Minimal Blood Loss)                   Christopher J. Behrens, MD

## 2020-08-04 NOTE — PROGRESS NOTES
[facility]  Palliative Care Daily Progress Note       Recommendations & Counseling       Increase MS Contin from 30mg BID to 45mg BID.    Keep intermittent PCA morphine at current dosing.    Increase Miralax from 17g daily to 17g BID.    Increase senna to 3-4 tablets BID.    Elise Mckinney MD  Internal Medicine & Pediatrics, PGY-4  [phone]    Patient seen and evaluated with Dr. Mckinney.   Agree with assessment and recommendations.    Total time spent was 35 minutes,  >50% of time was spent counseling and/or coordination of care regarding symptosm and support--mainly for pain managemnent from progressive lymphoma.    Critical access hospital  Palliative Care   Pager [phone]      Opioid Safety Discussion: Comments: Discussed opioid safety during initial consult.      Assessments          Kenneth Tello is a 49 year old male with a history of a large B-cell lymphoma status post 6 cycles of R-CHOP, history of a CVA and atrial fibrillation on Xarelto, who presented to the hospital with severe back pain. Noticed to have continued disease progression.    Pain: Requiring frequent morphine PRN dosing from PCA to control pain. Given usage, would recommend increasing long acting opioid. May eventually require TID dosing, but would start with increasing BID dosing today. Would keep IV PRN for now until more adequate pain control achieved.    Constipation: Small bowel movement, but requires further clean out. Patient agreeable to finish remaining half of mag citrate bottle at bedside. If no significant improvement over next 1-2 days, would recommend subcutaneous methylnaltrexone.    Today, the patient was seen for:  Pain management  Cancer-related pain  Opiate association pain  Large B-cell lymphoma    Prognosis, Goals, or Advance Care Planning was addressed today with: No.  Mood, coping, and/or meaning in the context of serious illness were addressed today: No.              Interval  History:     Chart review/discussion with unit or clinical team members:   Had endoscopy today with biopsy. Unable to perform celiac neurolysis due to size and location of mass.    Per patient or family/caregivers today:  Pain not well controlled overnight. Pressed PCA button frequently overnight, partly due to pain and fear of anticipatedpain with today's procedure. Feels pain not as well controlled after switch to oral morphine.    Key Palliative Symptoms:  # Pain severity the last 12 hours: severe  We are not managing dyspnea in this patient  We are not managing nausea in this patient  We are not managing anxiety in this patient    Patient is on opioids: assessed and I made recommendations about bowel care as above.           Review of Systems:     Besides above, an additional 5 system ROS was reviewed and is unremarkable          Medications:     I have reviewed this patient's medication profile and medications during this hospitalization.    Noted meds:    MS Contin 30mg BID  Morphine 2mg IV q30min PRN (PCA)  Miralax 17g daily  Senna 1-2 tablets BID    Oral morphine equivalent last 24 hours: 172 (60mg from ms contin and additional 112 oral morphine equivalents from the PCA)           Physical Exam:   Vitals were reviewed  Constitutional:   awake, alert, cooperative, no apparent distress, and appears stated age     Lungs:   No increased work of breathing, good air exchange, clear to auscultation bilaterally, no crackles or wheezing     Cardiovascular:   Regular rate and rhythm, normal S1 and S2, no S3 or S4, and no murmur noted     Abdomen:   No scars, normal bowel sounds, soft, distended, non-tender, no masses palpated, no hepatosplenomegally                Data Reviewed:     Reviewed recent pertinent imaging, comments:   No new imaging    Reviewed recent labs, comments:   Cr stable ~0.5

## 2020-08-05 NOTE — PLAN OF CARE
2300-700. AVSS. Patient is comfortable overnight. Used PCA morphine 6x. Denies SOB, n/v. Still is not saving urine despite nursing encouragement. Sleeping in between cares. BMBX scheduled today/

## 2020-08-05 NOTE — PROCEDURES
Bone Marrow Biopsy Procedure Note  Patient's Name: Kenneth Pham  Date of Procedure: 08/05/20     PROCEDURE:  Unilateral bone marrow biopsy and unilateral aspirate      INDICATION:  Diffuse large B cell lymphoma to assess progression of disease      PERFORMED BY:  Carol Avila PA-C     CONSENT:  Informed consent was obtained from the patient. The risks and benefits of the procedure were explained. The patient agreed to undergo the procedure. The consent form was signed and placed in the paper chart.      PREMEDICATION: 2 mg Versed     PROCEDURE SUMMARY:  The patient's identification was positively verified by ID band. Patient was laid in the prone position. Premedication with Versed 2 mg. The left posterior iliac crest (LPIC) was prepped and draped in a sterile manner. The skin, deeper tissues, and periosteum of the LPIC were anesthetized with approximately 19 mL 1% lidocaine. Following this, a 3mm incision was made. The trephine needle was advanced into the LPIC bone cavity and a 18mm core biopsy was obtained. Next, bone marrow aspirates were obtained from the LPIC; approximately 21 ml of marrow were aspirated. Following the procedure, a sterile pressure dressing was applied to the bone marrow biopsy site.      COMPLICATIONS:  None. The patient tolerated the procedure very well with minimal discomfort.      RECOMMENDATIONS:  The patient was placed in the supine position to maintain pressure on the biopsy site.   The patient was instructed to lie flat for 45-60 minutes and not to remove the dressing or get it wet for 24 hours post-procedure.      TESTS ORDERED:  Morphology  Flow cytometry  Chromosomes  Molecular (hold)    Carol Avila PA-C  Hematology/Oncology  Pager: [phone]

## 2020-08-05 NOTE — PROGRESS NOTES
Calorie Count  Intake recorded for: 8/4  Total Kcals: 730 Total Protein: 17g  Kcals from Hospital Food: 730  Protein: 17g  Kcals from Outside Food (average):0 Protein: 0g  # Meals Recorded: 100% pudding, ice cream, 2 apple juices  # Supplements Recorded: 100% 1 Boost Plus

## 2020-08-05 NOTE — DISCHARGE SUMMARY
[facility] [place]    Discharge Summary  Hematology / Oncology    Date of Admission:  8/1/2020  Date of Discharge:  8/5/2020  Discharging Provider: Tara Anthony  Date of Service (when I saw the patient): 08/05/20    Discharge Diagnoses   Patient Active Problem List   Diagnosis     Diffuse large B-cell lymphoma of lymph nodes of multiple regions (H)     Pain crisis     Benign essential hypertension     CAD (coronary artery disease)     Hiccups     Metastatic cancer to spine (H)      Recommendations for Outpatient:  New/changed medications:   - MS Contin 12hr 45mg po BID      - Morphine IR 15mg po [id number]  Patient was provided enough pain medications for 2.5 days    Follow-Up:   - Follow up visit with Dr Cary prior to admission on 8/7/20    Please follow up on pending pathology results from EUS biopsy (8/4) and pending labs from Bone Marrow Biopsy (8/5)    Summary of Hospitalization:   Jd Tello was admitted on 8/1/20 with uncontrolled pain in his back. Most recent PET on 7/31 showed disease progression and new focal uptake in T12 vertebral body. We were concerned that this back pain may be related to the T12 vertebral body, though the MRI Lumbar/Thoracic did not show metastatic disease in that area. He also has a pretty significant central retroperitoneal mass/enlarged LN which we felt was likely causing referred back pain and is the source of the pain. He underwent an EUS with a biopsy of this mass. They tried for a celiac plexus nerve block but were unsuccessful due to the size and positioning of the mass. He also had a BmBx 8/5/20.   Overall his pain got under control with a morphine PCA (continuous and PRN). He is now switched to oral pain medications and his pain is still under control. Palliative consult on board. Rad/Onc consult on board    The plan at this point will be to discharge Jd today, then have him have a scheduled admission on Friday 8/7. He will have a phone  "visit prior to coming in on Friday as well.     History of Present Illness    Mr. Gan is a 49-year-old male with medical comorbidities significant for diffuse large B-cell lymphoma status post 6 cycles of R-CHOP with Neulasta, still progressing, CVA, A. fib currently on Xarelto, hypertension, who presented to the emergency room with severe back pain and who is being admitted to the hospital for pain control. Recent PET on 7/31 with disease progression and new focal uptake in T12 vertebral body.     Today Jd is feeling okay. His pain is much more controlled at 3/10. Feels that he slept much better last night. Does report that he woke up in the middle of the night but feels this is due to his \"messed up schedule\" vs due to pain. Was able to have a BM yesterday and states he feels \"much better\". Appetite slightly improving but still poor. Continues to have hiccups without improvement from reglan or baclofen. Denies fever, HA, SOB, cough, abdominal pain, N/V, dysuria, hematuria, swelling or new numbness.    Hospital Course   Kenneth Tello was admitted on 8/1/2020.  The following problems were addressed during his hospitalization:    HEME  #DLBCL with primary refractory disease  Follows with Dr. Cary.   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  DLBCL-IPI was high (stage, extranodal sites, LDH) and clinical stage IV with extranodal involvement.   Started on R-CHOP in 03/2020, now status post 6 cycles of R-CHOP with Neulasta support and IT methotrexate. C6D1 7/2  On 7/13, CT scan showed demonstrated increasing gastrohepatic ligament mass concerning for refractory lymphoma.   - PET 7/31 with disease progression with increased size of retroperitoneal LN, multiple new pulmonary nodules, new focal uptake in T12 vertebral body, " increased uptake in ascending colon.   - EUS original scheduled for last week; however anticoagulation was not held and procedure was rescheduled to 8/4. 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   Concerned for refractory, progressive DLBCL in this patient. Will manage pain control and further work up with BmBx and EUS Bx. Will discuss with primary oncologist (Dr Cary) and patient the next best plan for treatment/care going forward.  - Rad/onc consult for role of RT.   - BmBx scheduled for 8/5 inpatient              - Depending on how long patient remains in hospital, might start salvage chemotherapy     # Back pain, new focal uptake in T12 vertebral body  # Abdominal pain, known retroperitoneal mass  Source of pain unclear. Likely associated with central/retroperitoneal mass, LN invading pancreas/spleen. MRI does not reveal obvious source of pain at T12 site without evidence of mets at this location, though PET on 7/31 reveals new focal uptake of T12 vertebral body; however, images reviewed and not significant. No red flag sx concerning for spinal cord compression.   Patient presented to hospital due to uncontrolled, significant back pain at home, uncontrolled on Oxycodone.   - Palliative consult on board, appreciate recs              - PCA started with significant improvement of pain              - Morphine PCA: 0.8 mg cont., 1.6 mg doses q30 min PRN              - Oxycodone 10-15mg Q4H PRN              - Morphine 4mgQ4H PRN   - GI consulted, appreciate recs   -  "EUS/bx completed 8/4   - unable to perform celiac plexus nerve block  - Rad/onc consult for role of RT              - Discussed with rad/onc, they are willing to do palliative radiation for this patient but discussed waiting on starting radiation with possible salvage chemotherapy in near future and biopsy planned tomorrow              - Plan to be determined with pending EUS bx results, possible celiac nerve block and BmBx      #Anemia likely 2/2 to underlying disease  - Transfuse for Hgb <7, plt <10K     ID  # ID Prophylaxis  - Not currently neutropenic, ppx not indicated currently     Neuro  # History of CVA  Reports having a stroke \"2-3 years ago\". Patient continues to have numbness in left hand/arm and left knee. 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 Fib  - Restart rivaroxaban at discharge     # Chest Pain - resolved  Reported chest discomfort in ED. EKG completed showing tachy and T-wave inversion. Troponins negative.  - Presumably referred pain from abdominal mass.       # Hypertension  - Continue pta lisinopril and Coreg     GI  # Constipation  Ongoing with use of pain medications at home.   - Had a BM yesterday  - Discussed importance of continue bowel regimen with use of opioids  - Patient prefers Miralax to Senna   - Will continue schedule miralax and utilize Senna PRN  - Palliative following, appreciate recs     Nutrition  # Poor PO intake  Worsening lack of appetite, early satiety over past several weeks.   - Nutrition consult, appreciate recs  - Encourage patient to eat/ utilize Boost shakes     MISC/ Social  # Hiccups   - Currently on baclofen or reglan as needed     Financial Concerns  - SW consult " relating to financial matters    Tara Anthony PA-C  Hematology/Oncology    Significant Results and Procedures   EUS/Bx, pending path (8/4)  BmBx, pending results (8/5)    Pending Results   These results will be followed up by outpatient team  Unresulted Labs Ordered in the Past 30 Days of this Admission     Date and Time Order Name Status Description    8/4/2020 1142 Leukemia Lymphoma Evaluation Non CSF In process     8/4/2020 1136 Fine needle aspiration In process           Code Status   Full Code    Primary Care Physician   Nina Jeter    Physical Exam   Temp: 97.8  F (36.6  C) Temp src: Oral BP: 124/68 Pulse: 79 Heart Rate: 76 Resp: 16 SpO2: 97 % O2 Device: None (Room air) Oxygen Delivery: 2 LPM  Vitals:    08/03/20 0746 08/04/20 1445 08/05/20 0802   Weight: 92.4 kg (203 lb 9.6 oz) 92.2 kg (203 lb 3.2 oz) 91.9 kg (202 lb 9.6 oz)     Vital Signs with Ranges  Temp:  [96.7  F (35.9  C)-98.5  F (36.9  C)] 97.8  F (36.6  C)  Pulse:  [79] 79  Heart Rate:  [57-76] 76  Resp:  [16-18] 16  BP: (113-147)/(53-84) 124/68  SpO2:  [94 %-100 %] 97 %  I/O last 3 completed shifts:  In: 1340.33 [P.O.:960; I.V.:380.33]  Out: 575 [Urine:575]    Constitutional: Pleasant male lying in bed. Awake and conversational. Non- toxic appearing. No acute distress.   HEENT: Normocephalic, atraumatic. Sclerae anicteric. EOM intact. Moist mucus membranes  Respiratory: Breathing comfortable with no increased work on room air. No signs of respiratory distress or accessory muscle use. Lungs clear to auscultation bilaterally, no crackles or wheezing noted.  Cardiovascular: Irregular rhythm and regular rate. Normal S1 and S2. No murmurs, rubs, or gallops. No peripheral edema.    GI: Abdomen is distended, non-tender. Bowel sounds present and hypoactive.   Skin: Skin is clean, dry, intact. No jaundice appreciated.   Musculoskeletal/ Extremities: No redness, warmth, or swelling of the joints appreciated. Distal pulses palpable.    Neurologic: Alert and oriented. Speech normal. Grossly nonfocal. Memory and thought process preserved. Motor function grossly normal.   Neuropsychiatric: Calm, affect congruent to situation. Appropriate mood and affect. Good judgment and insight. No visual/auditory hallucinations.     Time Spent on this Encounter   Tara CALZADA PA-C, personally saw the patient today and spent greater than 30 minutes discharging this patient.    Discharge Disposition   Discharged to home  Condition at discharge: Stable    Consultations This Hospital Stay   NUTRITION SERVICES ADULT IP CONSULT  ADVANCE DIRECTIVE IP CONSULT  SOCIAL WORK IP CONSULT  PALLIATIVE CARE ADULT IP CONSULT  GI LUMINAL ADULT IP CONSULT  RADIATION ONCOLOGY IP CONSULT  NEUROSURGERY ADULT IP CONSULT    Discharge Orders      Reason for your hospital stay    You came to the hospital due to uncontrolled pain in your back. Through work up in the hospital and prior to the hospital, it was found that your B cell lymphoma was progressing. We then further worked up your diagnosis with a EUS biopsy of the retroperitoneal mass in your abdomen. We also obtained a Bone Marrow Biopsy. We will wait until we get the results from your EUS biopsy, prior to starting the next step in treatment. You will have a phone call visit and a scheduled admission on 8/7/20.     Adult [facility] Follow-up and recommended labs and tests    You will have a follow up phone visit on 8/7/20.  Afterward, you will be admitted to the hospital for chemotherapy on 8/7/20    Appointments on [facility] and/or [facility] (with [facility] or [facility] provider or service). Call [phone] if you haven't heard regarding these appointments within 7 days of discharge.     Activity    Your activity upon discharge: activity as tolerated     When to contact your care team    [facility] cancer clinic triage line at [phone] for temp > or = 100.4, uncontrolled nausea/vomiting/diarrhea/constipation, unrelieved  pain, bleeding not relieved with pressure, dizziness, chest pain, shortness of breath, loss of consciousness, and any new or concerning symptoms.     Discharge Instructions    Please follow up with a phone visit on Friday, 8/7/20. Then you will have a scheduled admission on the same day to return to the hospital to start chemotherapy     Diet    Follow this diet upon discharge: Regular     Discharge Medications   Current Discharge Medication List      START taking these medications    Details   acetaminophen (TYLENOL) 325 MG tablet Take 2 tablets (650 mg) by mouth every 4 hours as needed for mild pain  Qty:        metoclopramide (REGLAN) 10 MG tablet Take 1 tablet (10 mg) by mouth every 6 hours as needed (hiccups)  Qty: 10 tablet, Refills: 0    Associated Diagnoses: Hiccups      morphine (MS CONTIN) 15 MG CR tablet Take 3 tablets (45 mg) by mouth every 12 hours  Qty: 15 tablet, Refills: 0    Associated Diagnoses: Diffuse large B-cell lymphoma of lymph nodes of multiple regions (H); Pain crisis      morphine (MSIR) 15 MG IR tablet Take 1 tablet (15 mg) by mouth every 4 hours as needed for moderate to severe pain  Qty: 15 tablet, Refills: 0    Associated Diagnoses: Diffuse large B-cell lymphoma of lymph nodes of multiple regions (H); Pain crisis      naloxone (NARCAN) 4 MG/0.1ML nasal spray Spray 1 spray (4 mg) into one nostril alternating nostrils as needed for opioid reversal every 2-3 minutes until assistance arrives  Qty: 0.2 mL, Refills: 1    Associated Diagnoses: Diffuse large B-cell lymphoma of lymph nodes of multiple regions (H); Pain crisis      !! polyethylene glycol (MIRALAX) 17 g packet Take 17 g by mouth daily  Qty: 5 packet, Refills: 0    Associated Diagnoses: Diffuse large B-cell lymphoma of lymph nodes of multiple regions (H)       !! - Potential duplicate medications found. Please discuss with provider.      CONTINUE these medications which have CHANGED    Details   senna-docusate  (SENOKOT-S/PERICOLACE) 8.6-50 MG tablet Take 1-2 tablets by mouth 2 times daily as needed for constipation  Qty: 10 tablet, Refills: 0    Associated Diagnoses: Diffuse large B-cell lymphoma of lymph nodes of multiple regions (H)         CONTINUE these medications which have NOT CHANGED    Details   atorvastatin (LIPITOR) 40 MG tablet Take 1 tablet (40 mg) by mouth daily  Qty: 30 tablet, Refills: 0    Associated Diagnoses: Paroxysmal atrial fibrillation (H)      baclofen (LIORESAL) 10 MG tablet Take 1 tablet (10 mg) by mouth 3 times daily as needed (HICCUPS)  Qty: 20 tablet, Refills: 0    Associated Diagnoses: Diffuse large B-cell lymphoma of lymph nodes of multiple regions (H); Abdominal pain, epigastric; Hiccups      carvedilol (COREG) 12.5 MG tablet Take 12.5 mg by mouth 2 times daily (with meals)      gabapentin (NEURONTIN) 300 MG capsule Take 2 tabs every am, take one tab at bedtime  Qty: 90 capsule, Refills: 1    Associated Diagnoses: Neuropathy      lisinopril (ZESTRIL) 20 MG tablet Take 20 mg by mouth daily      omeprazole (PRILOSEC) 40 MG DR capsule Take 40 mg by mouth daily      !! polyethylene glycol (MIRALAX) 17 g packet Take 1 packet by mouth daily as needed for constipation      rivaroxaban ANTICOAGULANT (XARELTO ANTICOAGULANT) 20 MG TABS tablet Take 1 tablet (20 mg) by mouth daily (with dinner)  Qty: 30 tablet, Refills: 3    Associated Diagnoses: Paroxysmal atrial fibrillation (H)      prochlorperazine (COMPAZINE) 10 MG tablet Take 1 tablet (10 mg) by mouth every 6 hours as needed (Nausea/Vomiting)  Qty: 30 tablet, Refills: 7    Associated Diagnoses: Diffuse large B-cell lymphoma of lymph nodes of multiple regions (H)       !! - Potential duplicate medications found. Please discuss with provider.      STOP taking these medications       HYDROcodone-acetaminophen (NORCO)  MG per tablet Comments:   Reason for Stopping:         oxyCODONE (ROXICODONE) 5 MG tablet Comments:   Reason for Stopping:          Potassium Chloride 40 MEQ/15ML (20%) SOLN Comments:   Reason for Stopping:         predniSONE (DELTASONE) 50 MG tablet Comments:   Reason for Stopping:             Allergies   No Known Allergies  Data   Most Recent 3 CBC's:  Recent Labs   Lab Test 08/05/20  0541 08/04/20  0552 08/03/20  0449   WBC 12.1* 11.6* 10.6   HGB 7.7* 8.1* 7.2*   MCV 95 94 94    175 150      Most Recent 3 BMP's:  Recent Labs   Lab Test 08/05/20  0541 08/04/20  0552 08/03/20  0449    139 136   POTASSIUM 3.7 4.0 3.8   CHLORIDE 101 102 102   CO2 34* 32 29   BUN 5* 5* 6*   CR 0.44* 0.51* 0.47*   ANIONGAP 2* 5 6   STACY 8.6 8.6 8.5   * 106* 97     Most Recent 2 LFT's:  Recent Labs   Lab Test 08/05/20  0541 08/04/20  0552   AST 15 22   ALT 19 20   ALKPHOS 102 106   BILITOTAL 0.8 0.9     Most Recent INR's and Anticoagulation Dosing History:  Anticoagulation Dose History     Recent Dosing and Labs Latest Ref Rng & Units 3/17/2020 3/18/2020 3/18/2020 3/18/2020 3/19/2020 3/19/2020 8/1/2020    INR 0.86 - 1.14 2.47(H) 1.74(H) 1.63(H) 1.57(H) 1.35(H) 1.31(H) 1.29(H)        Most Recent 3 Troponin's:  Recent Labs   Lab Test 08/02/20  0107 08/01/20  2305   TROPI 0.042 0.043     Most Recent Cholesterol Panel:No lab results found.  Most Recent 6 Bacteria Isolates From Any Culture (See EPIC Reports for Culture Details):  Recent Labs   Lab Test 08/02/20  0320 03/19/20  1005 03/14/20  1406 03/14/20  1402 03/11/20  0300 03/11/20  0254   CULT No growth No growth No growth No growth No growth No growth     Most Recent TSH, T4 and A1c Labs:No lab results found.  Results for orders placed or performed during the hospital encounter of 08/01/20   MR Lumbar Spine w/o & w Contrast    Narrative    MR LUMBAR SPINE W/O & W CONTRAST 8/2/2020 8:16 PM    Provided History: Back pain, cancer or infection suspected; DLBCL,  worsening back pain despite chemo. Uptake in T12 vertebral body on  PET.    Comparison: Same day MRI thoracic spine. Body PET CT  7/31/2020,  6/8/2020, 3/17/2020, 3/10/2020    Technique: Sagittal T1-weighted, T2-weighted, diffusion weighted, and  axial T2-weighted images of the lumbar spine were obtained without  intravenous contrast. Post intravenous contrast using gadolinium axial  and sagittal T1-weighted images were obtained with fat saturation.    Contrast: 10 mls Gadavist    Findings: Regarding numbering convention, there are 5 lumbar-type  vertebrae assumed for the purposes of this dictation.  The tip of the  conus medullaris is at L1-2. Minimal retrolisthesis of L5 on S1. Mild  loss of intervertebral disc height at L5-S1. Disc desiccation at L4-5  and L5-S1. Diffuse fatty marrow.    On a level by level basis:    L1-2: Mild facet arthropathy. No spinal canal stenosis or neural  foraminal narrowing.    L2-3: Mild facet arthropathy. No spinal canal stenosis or neural  foraminal narrowing.    L3-4: Mild facet arthropathy. Mild ligamentum flavum thickening on the  left. No spinal canal stenosis or neural foraminal narrowing.    L4-5: Posterior disc bulge with central disc protrusion and annular  fissuring. Bilateral facet arthropathy. Mild ligamentum flavum  thickening. Mild spinal canal narrowing. Mild bilateral neural  foraminal narrowing.    L5-S1: Circumferential disc bulge with superimposed central disc  protrusion. Severe right greater than left facet arthropathy with  complete effacement of the right neural foraminal zone secondary to  facet osteophytosis. Severe neuroforaminal stenosis on the right with  impingement of the exiting right L5 nerve along the bulky right facet  osteophytes. Moderate to severe neuroforaminal stenosis on the left.  Mild spinal canal stenosis with complete effacement of the right  subarticular zone and impingement on the traversing right S1 nerve  root.    Mild lumbar subcutaneous edema.    Contrast-enhanced images demonstrate no abnormal enhancement in the  visualized paraspinous tissues anteriorly or in  "the spinal canal or  vertebrae.      Impression    Impression:  1.  Multilevel spondylosis of the spine, greatest at L5-S1 where there  is severe right neuroforaminal stenosis with impingement on the  exiting right L5 nerve root and impingement of the traversing right S1  nerve root in the lateral recess.  2.  Diffuse bone marrow changes consistent with underlying  lymphoproliferative disease.  3.  Please see same-day report for findings of the thoracic spine.    I have personally reviewed the examination and initial interpretation  and I agree with the findings.    ANGELICA BARGER MD   MR Thoracic Spine w/o & w Contrast    Narrative    MR THORACIC SPINE W/O & W CONTRAST 8/2/2020 8:28 PM    Provided History: Bone neoplasm, T-spine, check for local recurrence;  DLBCL, worsening back pain despite chemo. Uptake in T12 vertebral body  on PET    Comparison: 7/31/2020, 6/8/2020, 7/13/2020    Technique:  Sagittal T1- and T2-weighted images through the thoracic spine and  axial T2-weighted images were obtained without intravenous contrast.  Following intravenous administration of gadolinium, axial and sagittal  T1-weighted images with fat saturation were also obtained.    Contrast: 10 mls Gadavist    Findings:  The external marker is at T4-5. The thoracic vertebrae are normally  aligned. Multilevel disc dehydration and mild loss of disc height in  the mid/lower thoracic spine.  There is dorsal indentation of the  thoracic spinal cord, also known as \"scalpel sign\", at the level of T4  (series 3 image 9; series 6 image 8). There is normal signal within  the thoracic spinal cord.     Mild spinal canal narrowing as a consequence of a right central disc  protrusion indenting the ventral cord at T8-9 and small left  subarticular disc protrusion at T10-11. No high-grade neural foraminal  stenosis.    There is no abnormal contrast enhancement within the thoracic spinal  cord, thecal sac or vertebral column. STIR images " demonstrate  exaggerated T2 hypointensity throughout the thoracic vertebral bodies.      Impression    Impression:  1.  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 No myelopathic cord  signal.  2.  No abnormal enhancement or evidence of metastatic disease at T12.  3.  Abnormal bone marrow signal, consistent with underlying  lymphoproliferative disease.    I have personally reviewed the examination and initial interpretation  and I agree with the findings.    ANGELICA BARGER MD     I have seen, interviewed, and examined the patient independently.  I have reviewed the vital signs and labs.  This note reflects my assessment and plan.    We have spent greater than 30 minutes organizing outpatient care, follow up appointments, and medications.    Path is pending on recent biopsy. Pain in good control and patient is stable. I offered to keep him inpatient until biopsy results so we could start chemo, but he wants to go home.     We will plan a scheduled admission on Friday for R-ICE. By then, will have biopsy results. Have discussed chemo plans with primary oncologist.     Em Santiago MD/PhD

## 2020-08-05 NOTE — PROGRESS NOTES
Nursing Focus: Discharge    D: Patient discharged to home at 1720.  All belongings sent with him.    I: Discharge prescriptions sent to discharge pharmacy to be filled. All discharge medications and instructions reviewed with Kenneth. Patient instructed to call clinic triage nurse if he experiences a fever >100.4, uncontrolled nausea, vomiting, diarrhea, or pain; or experiences any signs or symptoms of bleeding. Other phone numbers to call with questions or concerns after discharge reviewed. Follow-up outpatient appointment scheduled reviewed.  PIV removed. Education completed.  Care Plan goals met and adequate for discharge.    A: Pt verbalized understanding of discharge medications and instructions. Patient will  medications at discharge pharmacy.     P: Patient to follow-up with Dr. Cary on Friday via telephone.

## 2020-08-05 NOTE — PROGRESS NOTES
[facility]  Palliative Care Daily Progress Note   Recommendations & Counseling       Since patient is planning on discharge later today, plan to transition to oral pain medicines as patient is currently still on morphine PCA and long acting PO morphine. MME of 202 on 08/04.     Continue MS contin 45 mg twice daily     Start Morphine IR 15 mg Q4H as needed for breakthrough pain    Continue bowel regimen of miralax twice daily and senna 3-4 tabs twice daily    Recommend primary team consulting sleep medicine on discharge for WATSON sleep study (patient has h/o WATSON diagnosed 5 years ago, not on CPAP -insurance coverage issues then- and now interested in treatment/repeat study). He has lost 70 lbs since March. *we were considering going up further on his long acting but given his WATSON history and lack of CPAP use and generally good pain control currently we decided to keep the long acting dose where it is. I think it will be good to get him evaluated for CPAP now especially as he is needing escalating opioid doses.     Honoring choices advance care directive handout given to patient to review with brother and children. eKnneth is fully decisional currently. He wants his brother to be his decision maker if he were unable to make decisions for himself later in disease process.    Outpatient palliative consult for pain management and symptom management on discharge     Opioid Safety Discussion: We discussed opioid safety with them, including side effects and potential harms of opioids, taking opioids exactly as prescribed, watching for mood-altering effects of opioids and letting a provider know if they notice any, driving safety, safe opioid storage and disposal.      Epi Plummer MD  PGY-3 FM Resident      Patient seen and evaluated with Dr. Plummer.   Agree with assessment and recommendations.    Total time spent was 45 minutes,  >50% of time was spent counseling and/or coordination  "of care regarding pain , goals of care and advance care planning for patient with progressive lymphoma.    Chelle Irwin  Palliative Care   Pager [phone]            Assessments          Kenneth Tello is a 49 year old male with a history of a large B-cell lymphoma status post 6 cycles of R-CHOP, history of a CVA and atrial fibrillation on Xarelto, who was admitted with severe back pain. Disease progression has continued.      Today, the patient was seen for:  Pain management  Cancer- related pain/ large B cell lymphoma  Opiate association pain    Pain: Patient reports that his pain has been fairly well controlled with his oral morphine increased from yesterday.  He does report that he is \"sleeping just fine\" without having to use his PCA too much overnight.  He does report using his PCA during the day whenever he feels the beginning symptoms of his back pain and that worked well for him. He is feeling okay about going home today. He will return to the hospital on Friday likely for inpatient chemotherapy pending results from this admission. He recollects that in the past, oxycodone \"didn't do a adair thing\" and that hydrocodone \"was a little better than oxy but not much\". Will therefore plan to use morphien for both long acting and short acting    Current regimen:   -gabapentin 300 at bedtime; 600 during day.   -MS contin- 45 mg PO Q12H  -Morphine PCA: shift totals yesterday were 14, 16, 9.5 = 39.5 mg total   -Morphine 4 mg Q4H as needed for breakthrough pain - not used since 8/2/2020  -tylenol 650 mg Q4H as needed - not used.      Constipation: 1 BM yesterday. Currently ordered miralax twice daily and senna 3 tabs twice daily. Will discharge home on this. Discussed good bowel regimen.     Prognosis, Goals, or Advance Care Planning was addressed today with: Yes.  Mood, coping, and/or meaning in the context of serious illness were addressed today: Yes.  Summary/Comments: Patient is open when discussing his disease " "process today. He discussed having his brother become is decision maker if he were to become unable to make decisions for his healthcare. He also has a goal to see his son, 16 years old, graduate high school. He says if he didn't have that goal that \"I probably wouldn't be here today\". He lives with his 16 year old son. He has 4 total children- 2 daughters (one is RN, oldest) and 2 sons. He enjoys walking and staying busy and hopes he regains strength to participate in things again.             Interval History:     Chart review/discussion with unit or clinical team members:   -EGD with FNA biopsy 8/4/20. Large retroperitoneal mass posterior to gastric cardia appreciated and biopsy sent.   -Back pain noted to have continued after the procedure but tolerable with morphine pca. 1 BM yesterday.  Regimen: miralax twice daily and senna 3 tabs twice daily.  Last night, patient later refused senna because of earlier BM. Fair appetite. Received 3 senna tablets this morning.   -Bone marrow biopsy today.   -plan to discharge home later today per primary team.    Per patient or family/caregivers today:  Pain fairly well controlled with PO morphine and PCA morphine. He uses PCA when he feels \"the beginning\" of his back pain and tries to stay \"on top of it and prevent it from worsening\".     Key Palliative Symptoms:  # Pain severity the last 12 hours: moderate  We are not managing dyspnea in this patient  We are not managing nausea in this patient  We are not managing anxiety in this patient    Patient is on opioids: assessed and bowels ok/no needed changes to plan of care today.           Review of Systems:     Besides above, an additional 5 system ROS was reviewed and is unremarkable          Medications:     I have reviewed this patient's medication profile and medications during this hospitalization.    Noted meds:   MS Contin 45mg BID  Morphine 1mg IV q30min PRN (PCA)  Miralax 17g twice daily   Senna 3-4 tablets TWICE " DAILY    Oral morphine equivalent last 24 hours: 202           Physical Exam:   Vitals were reviewed  Constitutional:   awake, alert, cooperative, no apparent distress, and appears stated age. Pleasant.      Eyes:   Lids and lashes normal, pupils equal, sclera clear, conjunctiva normal     Lungs:   No increased work of breathing, good air exchange, clear to auscultation bilaterally, no crackles or wheezing     Cardiovascular:   regular rate and rhythm, normal S1 and S2, no S3 or S4, and no murmur noted     Abdomen:   No scars, normal bowel sounds, soft, mildly - distended, non-tender                Data Reviewed:     Reviewed recent pertinent imaging, comments:   No new imaging.     Reviewed recent labs, comments:   Cr - 0.45

## 2020-08-05 NOTE — PLAN OF CARE
"[id number]    Pt was afebrile and VSS. Pain comfortably manageable with PCA morphine 2mg per attempt into PIV. 9.5mg given this shift. Pt refused senna due to \"very large\" bowel movement. Remind pt to save urine for accurate recording. Pt is slightly slow to respond, but A/Ox4. Up ad maryanne. Irregular heart rhythm at baseline. Regular diet with calorie counts. Fair appetite.   "

## 2020-08-05 NOTE — PLAN OF CARE
Pt afebrile with stable vs. Heart rhythm irregular (baseline).  Back pain tolerable. Pain well controlled with  scheduled MS contin. PCA d/c'd. Will discharge with MS Contin and MS IR for break through pain. BmBx done. Will discharge this evening and return Friday for inpt chemotherapy.

## 2020-08-06 NOTE — PROGRESS NOTES
Writer called Kenneth to go over plan for tomorrow. He will meet will Doctor Raeann via phone then be admitted. He knows all this but does not. He is thinking it  Is a day chemotherapy. Writer knows a lot is pending results of the EUS and BMB but knows he will be admitted. These questions will be answered. His main complaint today is discomfort at the BMB site and the hiccups.     Writer will continue to follow.

## 2020-08-07 PROBLEM — C83.30 DLBCL (DIFFUSE LARGE B CELL LYMPHOMA) (H): Status: ACTIVE | Noted: 2020-01-01

## 2020-08-07 NOTE — LETTER
8/7/2020     RE: Kenneth Tello  [address]  [place] [address]    Dear Colleague,    Thank you for referring your patient, Kenneth Tello, to the [facility]. Please see a copy of my visit note below.    Kenneth Tello is a 49 year old male who is being evaluated via a billable telephone visit.      Patient did not have any vitals to report.  2/10 pain scale.     Refill(s) on morphine, patient states was only given five when prescribed on 8/4/20.    Jo Bartholomew, JUAN ALBERTO    REASON FOR VISIT:  Management of diffuse large B cell lymphoma (DLBCL)    HISTORY OF PRESENT ILLNESS:  Mr. Tello is a 49 year old man with DLBCL.  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  DLBCL-IPI was high (stage, extranodal sites, LDH) and clinical stage IV with extranodal involvement.  He was started on R-CHOP in 03/2020 with IT methotrexate planned with each cycle for CNS prophylaxis.  Medical history also notable for A fib on anticoagulation, myocardial infarction, and stroke in 2017.  Course has been complicated by constipation and worsening abdominal pain in 08/2020 after completion of treatment.  Imaging demonstrated enlarging mass invading the pancreas and spleen.  Biopsy confirmed refractory DLBCL and additional staging pending.  Visit to discuss diagnosis and treatment plan.    Pain controlled on current regimen.  Ongoing poor energy, fatigue.  Appetite has also been poor.  No fevers, chills, or night sweats.  No headache, vision changes, focal weakness or sensory changes.  No dyspnea, cough, or chest pain.  Endorses some dysphagia as well as occasional episodes of hiccups.  Regular bowel movements, he has been taking Miralax PRN.  No melena or hematochezia.  Endorses urinary  frequency with small amounts of urine.  No lumps or bumps.      REVIEW OF SYSTEMS:  A complete review of systems was negative other than noted.    MEDICATIONS:  Current Outpatient Medications   Medication     acetaminophen (TYLENOL) 325 MG tablet     atorvastatin (LIPITOR) 40 MG tablet     carvedilol (COREG) 12.5 MG tablet     gabapentin (NEURONTIN) 300 MG capsule     lisinopril (ZESTRIL) 20 MG tablet     metoclopramide (REGLAN) 10 MG tablet     morphine (MS CONTIN) 15 MG CR tablet     morphine (MSIR) 15 MG IR tablet     naloxone (NARCAN) 4 MG/0.1ML nasal spray     omeprazole (PRILOSEC) 40 MG DR capsule     polyethylene glycol (MIRALAX) 17 g packet     polyethylene glycol (MIRALAX) 17 g packet     rivaroxaban ANTICOAGULANT (XARELTO ANTICOAGULANT) 20 MG TABS tablet     senna-docusate (SENOKOT-S/PERICOLACE) 8.6-50 MG tablet     No current facility-administered medications for this visit.      PHYSICAL EXAMINATION:  There were no vitals taken for this visit.  Wt Readings from Last 5 Encounters:   08/05/20 91.9 kg (202 lb 9.6 oz)   07/31/20 90.3 kg (199 lb)   07/29/20 89.8 kg (198 lb)   07/02/20 96.4 kg (212 lb 9.6 oz)   07/01/20 97.3 kg (214 lb 9.6 oz)     ECOG 1  A comprehensive physical examination is deferred due to PHE (public health emergency) restrictions    LABORATORY DATA:  Recent Labs   Lab Test 08/05/20  0541 08/04/20  0552 08/03/20  0449  05/20/20  0716  04/29/20  1053   WBC 12.1* 11.6* 10.6   < > 7.0   < >  --    HGB 7.7* 8.1* 7.2*   < > 9.1*   < >  --     175 150   < > 174   < >  --    ANEU 10.6* 10.5* 9.4*   < > 5.7  --   --    ALYM 0.2* 0.2* 0.2*   < > 0.4*  --   --    RETICABSCT  --   --   --   --  105.5*  --  139.6*    < > = values in this interval not displayed.     Recent Labs   Lab Test 08/05/20  0541 08/04/20  0552 08/03/20  0449  08/02/20  0433  06/22/20 06/15/20  03/19/20  0505  03/18/20  0543    139 136  --  137   < > 138 142   < > 139  --  139   POTASSIUM 3.7 4.0 3.8   < > 3.2*   <  > 3.4* 2.9*   < > 4.4   < > 4.6   CHLORIDE 101 102 102  --  102   < > 103 106   < > 106  --  107   CO2 34* 32 29  --  31   < > 27 29   < > 26  --  24   BUN 5* 5* 6*  --  9   < > 6* 13   < > 20  --  20   CR 0.44* 0.51* 0.47*  --  0.42*   < > 0.61* 0.50*   < > 0.56*   < > 0.63*   STACY 8.6 8.6 8.5  --  8.7   < > 8.8 8.30*   < > 8.7  --  8.5   MAG  --   --   --   --  1.6  --   --   --   --  2.1  --  2.0   PHOS  --   --   --   --  4.1  --  4.2 3.8   < > 4.8*   < > 5.2*   URIC 3.8  --   --   --  4.5  --  5.5 5.3   < > 4.5   < > 4.4   * 250* 219  --  220   < >  --   --    < > 689*  --  613*    < > = values in this interval not displayed.     Recent Labs   Lab Test 08/05/20  0541 08/04/20  0552 08/03/20  0449 08/01/20  2143  03/19/20  0505 03/19/20  0212   AST 15 22 16 13   < > 56*  --    ALT 19 20 14 15   < > 48  --    ALKPHOS 102 106 85 98   < > 133  --    BILITOTAL 0.8 0.9 1.0 1.2   < > 0.8  --    INR  --   --   --  1.29*  --  1.31* 1.35*    < > = values in this interval not displayed.     IMPRESSION AND PLAN:  Mr. Tello is a 49 year old man with DLBCL.    He completed 6 cycles of R-CHOP with Neulasta support and IT methotrexate.  Unfortunately, workup has confirmed refractory lymphoma based on EUS biopsy.  Additional staging is pending but treatment is required.  I discussed his situation with our lymphoma group and there are no suitable clinical trials.  We discussed standard salvage chemotherapy with R-ICE (rituximab, ifosfamide, carboplatin, and etoposide) with pegfilgrastim support and autoBMT consolidation with curative intent (about 50% chance of cure).  I reviewed the typical schedule of every 3 weeks for 2-4 cycles with goal of remission or near remission to proceed with autoBMT for best chance for cure.  We discussed expected side effects including but not limited to fatigue, alopecia, bone marrow suppression.  We also reviewed potential toxicities that may include allergic reaction, nausea,  mucositis, infection, bleeding, damage to heart/lung/liver/kidneys, cystitis, rash, and neuropathy, as well as possible long term side effects including myelodysplasia or acute leukemia.  I stressed that complications can be fatal, but that the greatest risk is his refractory lymphoma.  We also discussed the possibility of poor response or relapse of disease despite the planned therapy.  He asked good questions and agrees with the plan.  We will plan to repeat PET/CT after cycle 2 to assess his response.    He has no active infectious issues.  He will receive pegfilgrastim and start prophylactic acyclovir as well as fluconazole and levofloxacin during periods when ANC <1,000/mcL (can start at hospital discharge).  We will plan to address pneumococcal vaccination after he has competed all planned treatment.    He will continue rivaroxaban while platelets are >50,000/mcL for A fib and history of CVA and hold when platelets are <50,000/mcL or for any procedures.    He will continue to work with Dr. Jeter for other medical issues.    He will be admitted today for chemotherapy and we will work on setting up twice weekly labs and possible transfusions following discharge as well as another visit in about 3 weeks in anticipation of cycle 2.  I reminded him to call if he has fever >100.4 F, questions, concerns, or new issues between visits.    Call start time: 9:25 AM  Call end time: 9:42 AM  Call duration: 17 minutes      Avery Cary MD, PhD  Attending Physician, [facility]   of Medicine, [facility]  Division of Hematology, Oncology, and Transplantation  [email or web address] email  [phone] [facility]  [phone] pager

## 2020-08-07 NOTE — PROVIDER NOTIFICATION
DATE/TIME  (DOT-TD, DOT-NOW) CHEMO CHECK ACTIVITY (REGIMEN & DOSE CHECK, DAY, DOSE #, NAME OF CHEMO #1)  CHEMO DRUG #2  CHEMO DRUG #3 NAME OF RN #1 (USE DOT-ME HERE) NAME OF RN#2 (2ND RN TO LOG IN SEPARATELY)   8/7/2020  4:13 PM   RICE protocol check   ZOILA Sheppard RN     8/7/2020  6:56 PM   Rituxan double check   ZOILA Sheppard RN     08/07/20  9:11 PM   Etoposide dose # 1 double check   ZOILA Judd RN     8/8/2020  10:42 AM Carboplatin double check Ifosfamide/mesna dose #1  ZOILA Sheppard)   08/08/20  3:49 PM   Etoposide Dose #2   ZOILA Sheppard RN     8/9/2020  11:40 AM   Etoposide Dose #3   ZOILA Sheppard)

## 2020-08-07 NOTE — PROCEDURES
[facility], [place]    Double Lumen PICC Placement    Date/Time: 8/7/2020 5:26 PM  Performed by: Jonna Drake RN  Authorized by: Carol Zabala PA-C   Indications: Chemotherapy.    UNIVERSAL PROTOCOL   Site Marked: Yes  Prior Images Obtained and Reviewed:  Yes  Required items: Required blood products, implants, devices and special equipment available    Patient identity confirmed:  Verbally with patient and arm band  NA - No sedation, light sedation, or local anesthesia  Confirmation Checklist:  Patient's identity using two indicators, relevant allergies, procedure was appropriate and matched the consent or emergent situation and correct equipment/implants were available  Time out: Immediately prior to the procedure a time out was called    Universal Protocol: the Joint Commission Universal Protocol was followed    Preparation: Patient was prepped and draped in usual sterile fashion           ANESTHESIA    Local Anesthetic: Lidocaine 1% without epinephrine  Anesthetic Total (mL):  1      SEDATION    Patient Sedated: No        Preparation: skin prepped with ChloraPrep  Skin prep agent: skin prep agent completely dried prior to procedure  Sterile barriers: maximum sterile barriers were used: cap, mask, sterile gown, sterile gloves, and large sterile sheet  Hand hygiene: hand hygiene performed prior to central venous catheter insertion  Type of line used: PICC and Power PICC  Catheter type: double lumen  Lumen type: valved  Catheter size: 5 Fr  : BioFlo.  Lot number: [id number]  Placement method: venipuncture and MST  Number of attempts: 1  Successful placement: yes  Orientation: right  Location: basilic vein (Vein Diameter 0.56)  Arm circumference: adults 10 cm  Extremity circumference: 26.5  Visible catheter length: 1  Total catheter length: 43  Dressing and securement: blood cleaned with CHG, statlock, glue, chlorhexidine disc applied, occlusive dressing applied, site cleaned and  sterile dressing applied  Post procedure assessment: free fluid flow, placement verified by x-ray and blood return through all ports  PROCEDURE   Patient Tolerance:  Patient tolerated the procedure well with no immediate complications

## 2020-08-07 NOTE — PROGRESS NOTES
"Kenneth Tello is a 49 year old male who is being evaluated via a billable telephone visit.      The patient has been notified of following:     \"This telephone visit will be conducted via a call between you and your physician/provider. We have found that certain health care needs can be provided without the need for a physical exam.  This service lets us provide the care you need with a short phone conversation.  If a prescription is necessary we can send it directly to your pharmacy.  If lab work is needed we can place an order for that and you can then stop by our lab to have the test done at a later time.    Telephone visits are billed at different rates depending on your insurance coverage. During this emergency period, for some insurers they may be billed the same as an in-person visit.  Please reach out to your insurance provider with any questions.    If during the course of the call the physician/provider feels a telephone visit is not appropriate, you will not be charged for this service.\"    Patient has given verbal consent for Telephone visit?  Yes    What phone number would you like to be contacted at? [phone]    How would you like to obtain your AVS? Laurenhart    Patient did not have any vitals to report.  2/10 pain scale.     Refill(s) on morphine, patient states was only given five when prescribed on 8/4/20.    Jo Bartholomew, [facility]    REASON FOR VISIT:  Management of diffuse large B cell lymphoma (DLBCL)    HISTORY OF PRESENT ILLNESS:  Mr. Tello is a 49 year old man with DLBCL.  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  DLBCL-IPI was high (stage, extranodal sites, LDH) and clinical stage IV with extranodal involvement.  He was started on R-CHOP in 03/2020 with IT methotrexate planned " with each cycle for CNS prophylaxis.  Medical history also notable for A fib on anticoagulation, myocardial infarction, and stroke in 2017.  Course has been complicated by constipation and worsening abdominal pain in 08/2020 after completion of treatment.  Imaging demonstrated enlarging mass invading the pancreas and spleen.  Biopsy confirmed refractory DLBCL and additional staging pending.  Visit to discuss diagnosis and treatment plan.    Pain controlled on current regimen.  Ongoing poor energy, fatigue.  Appetite has also been poor.  No fevers, chills, or night sweats.  No headache, vision changes, focal weakness or sensory changes.  No dyspnea, cough, or chest pain.  Endorses some dysphagia as well as occasional episodes of hiccups.  Regular bowel movements, he has been taking Miralax PRN.  No melena or hematochezia.  Endorses urinary frequency with small amounts of urine.  No lumps or bumps.      REVIEW OF SYSTEMS:  A complete review of systems was negative other than noted.    MEDICATIONS:  Current Outpatient Medications   Medication     acetaminophen (TYLENOL) 325 MG tablet     atorvastatin (LIPITOR) 40 MG tablet     carvedilol (COREG) 12.5 MG tablet     gabapentin (NEURONTIN) 300 MG capsule     lisinopril (ZESTRIL) 20 MG tablet     metoclopramide (REGLAN) 10 MG tablet     morphine (MS CONTIN) 15 MG CR tablet     morphine (MSIR) 15 MG IR tablet     naloxone (NARCAN) 4 MG/0.1ML nasal spray     omeprazole (PRILOSEC) 40 MG DR capsule     polyethylene glycol (MIRALAX) 17 g packet     polyethylene glycol (MIRALAX) 17 g packet     rivaroxaban ANTICOAGULANT (XARELTO ANTICOAGULANT) 20 MG TABS tablet     senna-docusate (SENOKOT-S/PERICOLACE) 8.6-50 MG tablet     No current facility-administered medications for this visit.      PHYSICAL EXAMINATION:  There were no vitals taken for this visit.  Wt Readings from Last 5 Encounters:   08/05/20 91.9 kg (202 lb 9.6 oz)   07/31/20 90.3 kg (199 lb)   07/29/20 89.8 kg (198 lb)    07/02/20 96.4 kg (212 lb 9.6 oz)   07/01/20 97.3 kg (214 lb 9.6 oz)     ECOG 1  A comprehensive physical examination is deferred due to PHE (public health emergency) restrictions    LABORATORY DATA:  Recent Labs   Lab Test 08/05/20  0541 08/04/20  0552 08/03/20  0449  05/20/20  0716  04/29/20  1053   WBC 12.1* 11.6* 10.6   < > 7.0   < >  --    HGB 7.7* 8.1* 7.2*   < > 9.1*   < >  --     175 150   < > 174   < >  --    ANEU 10.6* 10.5* 9.4*   < > 5.7  --   --    ALYM 0.2* 0.2* 0.2*   < > 0.4*  --   --    RETICABSCT  --   --   --   --  105.5*  --  139.6*    < > = values in this interval not displayed.     Recent Labs   Lab Test 08/05/20  0541 08/04/20  0552 08/03/20  0449  08/02/20  0433  06/22/20 06/15/20  03/19/20  0505  03/18/20  0543    139 136  --  137   < > 138 142   < > 139  --  139   POTASSIUM 3.7 4.0 3.8   < > 3.2*   < > 3.4* 2.9*   < > 4.4   < > 4.6   CHLORIDE 101 102 102  --  102   < > 103 106   < > 106  --  107   CO2 34* 32 29  --  31   < > 27 29   < > 26  --  24   BUN 5* 5* 6*  --  9   < > 6* 13   < > 20  --  20   CR 0.44* 0.51* 0.47*  --  0.42*   < > 0.61* 0.50*   < > 0.56*   < > 0.63*   STACY 8.6 8.6 8.5  --  8.7   < > 8.8 8.30*   < > 8.7  --  8.5   MAG  --   --   --   --  1.6  --   --   --   --  2.1  --  2.0   PHOS  --   --   --   --  4.1  --  4.2 3.8   < > 4.8*   < > 5.2*   URIC 3.8  --   --   --  4.5  --  5.5 5.3   < > 4.5   < > 4.4   * 250* 219  --  220   < >  --   --    < > 689*  --  613*    < > = values in this interval not displayed.     Recent Labs   Lab Test 08/05/20  0541 08/04/20  0552 08/03/20  0449 08/01/20  2143  03/19/20  0505 03/19/20  0212   AST 15 22 16 13   < > 56*  --    ALT 19 20 14 15   < > 48  --    ALKPHOS 102 106 85 98   < > 133  --    BILITOTAL 0.8 0.9 1.0 1.2   < > 0.8  --    INR  --   --   --  1.29*  --  1.31* 1.35*    < > = values in this interval not displayed.     IMPRESSION AND PLAN:  Mr. Tello is a 49 year old man with DLBCL.    He completed 6  cycles of R-CHOP with Neulasta support and IT methotrexate.  Unfortunately, workup has confirmed refractory lymphoma based on EUS biopsy.  Additional staging is pending but treatment is required.  I discussed his situation with our lymphoma group and there are no suitable clinical trials.  We discussed standard salvage chemotherapy with R-ICE (rituximab, ifosfamide, carboplatin, and etoposide) with pegfilgrastim support and autoBMT consolidation with curative intent (about 50% chance of cure).  I reviewed the typical schedule of every 3 weeks for 2-4 cycles with goal of remission or near remission to proceed with autoBMT for best chance for cure.  We discussed expected side effects including but not limited to fatigue, alopecia, bone marrow suppression.  We also reviewed potential toxicities that may include allergic reaction, nausea, mucositis, infection, bleeding, damage to heart/lung/liver/kidneys, cystitis, rash, and neuropathy, as well as possible long term side effects including myelodysplasia or acute leukemia.  I stressed that complications can be fatal, but that the greatest risk is his refractory lymphoma.  We also discussed the possibility of poor response or relapse of disease despite the planned therapy.  He asked good questions and agrees with the plan.  We will plan to repeat PET/CT after cycle 2 to assess his response.    He has no active infectious issues.  He will receive pegfilgrastim and start prophylactic acyclovir as well as fluconazole and levofloxacin during periods when ANC <1,000/mcL (can start at hospital discharge).  We will plan to address pneumococcal vaccination after he has competed all planned treatment.    He will continue rivaroxaban while platelets are >50,000/mcL for A fib and history of CVA and hold when platelets are <50,000/mcL or for any procedures.    He will continue to work with Dr. Jeter for other medical issues.    He will be admitted today for chemotherapy and we  will work on setting up twice weekly labs and possible transfusions following discharge as well as another visit in about 3 weeks in anticipation of cycle 2.  I reminded him to call if he has fever >100.4 F, questions, concerns, or new issues between visits.    Call start time: 9:25 AM  Call end time: 9:42 AM  Call duration: 17 minutes    Avery Cary MD, PhD  Attending Physician, [facility]   of Medicine, [facility]  Division of Hematology, Oncology, and Transplantation  [email or web address] email  [phone] [facility]  [phone] pager

## 2020-08-07 NOTE — PLAN OF CARE
Focus: Admit Note  Situation: Pt being admitted today for cycle 1 chemo treatment, RICE regimen. Oriented pt to call light system. VS stable. Lab orders release.   Background: Diffuse Large B-cell lymphoma refactory  Assessment: Pt alert and oriented and doing fine.   Plan: Chemo treatment this evening.

## 2020-08-07 NOTE — H&P
[facility], [place]    History and Physical  Hematology / Oncology     Date of Admission:  (Not on file)  Date of Service (when I saw the patient): 08/07/20    Assessment & Plan   Kenneth Tello is a 49-year-old male with medical history significant for A fib (on Xarelto), CVA (2017), hypertension and refractory DLBCL who presents for a scheduled admission for salvage R-ICE chemotherapy.     Today:  - C1D1 R-ICE today  - PICC placed on admission    HEME  # DLBCL with primary refractory disease  Follows with Dr. Cary. 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 DLBCL-IPI was high (stage, extranodal sites, LDH) and clinical stage IV with extranodal involvement. Started on R-CHOP in 03/2020 with IT methotrexate each cycle for CNS prophylaxis. He completed 6 cycles R-CHOP.  - 7/14 - CT with increasing gastrohepatic ligament mass, concerning for refractory lymphoma, with plan to obtain EUS outpatient to confirm pathology. Plan to obtain PET/CT with BMBx on 7/31 for identification of other lesions/staging.   - 7/31 - PET/CT notable for disease progression with increased size of retroperitoneal LN, multiple new pulmonary nodules, new focal uptake in T12 vertebral body, increased uptake in ascending colon. BMBx not obtained due to anticoagulation not being held.  - 8/1 - 8/5 - Admitted due to severe back pain/constipation. T-spine, L-spine MRI obtained without evidence for metastases. Palliative consulted for pain management EUS obtained inpatient 8/4, BMBx obtained inpatient 8/5. Patient was discharged on 8/5 with the intent to spend a short amount of time at home with family given his pathology was pending.   - 8/7 - Pathology of retroperitoneal mass fine needle aspiration consistent with persistent /  "recurrent large B-cell lymphoma. BMBx flow with rare to absent B-cells. Morphology still pending. Seen by Dr. Cary via virtual visit 8/7 with decision to proceed with admission for salvage R-ICE.   - PICC placed on admission, remove on discharge.  - Follow-up requested - see below.     Treatment Plan - Salvage R-ICE - C1D1 8/7/2020 - Today is Day 1.   - Rituximab 375mg/m2 - D1   - Ifosfamide/Mesna 5000mg/m2 - D2   - Carboplatin 750mg - D2   - Etoposide 210 mg - D1-D3   - Dexamethasone 8mg D1, 12mg D2, 8mg D3-D4   - Supportive measures per protocol    - Neulasta - D5 - requested for 8/11     # Back pain, new focal uptake in T12 vertebral body  # Abdominal pain, known retroperitoneal mass  Thought Source of pain unclear however question association with central/retroperitoneal mass, LN invading pancreas/spleen. PET/CT 7/31 with new focal uptake at T12 vertebral site, however no obvious source on MRI. Rad Onc consulted on previous admission - could consider palliative radiation, however will hold given plan for salvage chemotherapy. Palliative consulted with improved pain control on new regimen as below.  - Continue PTA pain management of MS Contin 45mg BID. Morphine IR 15mg q4h PRN.  - Plan to follow with palliative care outpatient.      # Mild Leukocytosis  WBC 14.1 today.     # Anemia likely 2/2 to underlying disease  - Transfuse for Hgb <7, plt <10K     ID  # ID Prophylaxis  Not currently neutropenic.  - Per Dr. Cary, started on Acyclovir today. Plan to discharge on Fluconazole, Levaquin.      Neuro  # History of CVA  Reports having a stroke \"2-3 years ago\". Patient continues to have numbness in left hand/arm and left knee. 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 Neurosurgery consulted on previous admission with er team, no intervention " needed.     Cardiovascular  # Atrial fibrillation   - Continue Xaralto with plts >50,000      # Hypertension  - Continue PTA Lisinopril and Coreg     GI  # Constipation  Ongoing with use of pain medications at home. Increased on previous admission with improvement.  - Continue PTA Miralax BID, Senna 3-4 tabs BID.      Nutrition  # Poor PO intake  Worsening lack of appetite, early satiety over past several weeks.   - Nutrition consult, appreciate recs  - Encourage patient to eat/utilize Boost shakes     MISC  # Hx of WATSON  Per patient, diagnosed with WATSON 5 years ago. Not currently on CPAP due to insurance coverage, however reports interest in treatment/repeat testing.  - Sleep medicine consult placed for discharge.     # Hiccups   - Continue PTA Baclofen or Reglan PRN    Fluids/Electrolytes/Nutrition   - LR started upon admission 8/2 at 150mL/hr  - PRN lyte replacement per standing protocol  - Regular diet as tolerated, NPO at midnight for planned procedures 8/4     PPX  VTE: Xarelto - hold plts <50,000  GI: PPI  Activity: Up as tolerated     Dispo: Anticipate discharge in 3 days after completion of chemotherapy      Patient discussed with attending physician, Dr. Santiago.    Carol Zabala PA-C  Hematology/Oncology   Pager: [phone]    Code Status   Full Code    Primary Care Physician   Nina Jeter    Chief Complaint   Planned admission for C1 Salvage R-ICE    History obtained from the patient    History of Present Illness   Kenneth Tello is a 49-year-old male with medical history significant for A fib (on Xarelto), MI (2017), CVA (2017), hypertension and refractory DLBCL who presents for scheduled salvage R-ICE chemotherapy. He is now s/p 6 cycles of R-CHOP with refractory disease, confirmed on PET/CT 7/31. He was admitted 8/1 - 8/5 for significant abdominal pain, and a EUS was obtained. He was discharged pending pathology in the attempts to spend time at home with his family. He was seen in clinic by   Raeann today to discuss diagnosis, as well as further treatment options. The decision was made to pursue salvage R-ICE for which he was admitted today.     Since his discharge from the hospital on 8/5, patient states he has had ongoing poor energy and fatigue but overall feeling okay. His biggest concern today is his hiccups, of which he has occasional episodes. His pain has been controlled on current regimen that was started on inpatient admission. Regular bowel movements with Miralax and Senna (also increased last admission). No melena or hematochezia. No rashes. No dyspnea, cough, or chest pain. Spent some time at home with family which was nice. Discussed initiation chemotherapy today and he was ready to get started. Questions answered at bedside.    Past Medical History    I have reviewed this patient's medical history and updated it with pertinent information if needed.   Past Medical History:   Diagnosis Date     Alcohol use disorder, mild, abuse      Atrial fibrillation (H)     coumadin     CAD (coronary artery disease)      Cardiomyopathy (H)      HLD (hyperlipidemia)      HTN (hypertension)      Myocardial infarction (H)      Neuropathy     LLE, left hand post-CVA     Obesity      WATSON (obstructive sleep apnea)     requires CPAP however does not have a machine     Pancreatic cancer (H)     suspected 3/9/2020 at OSH     Stroke (H)     2017, mild left sided deficit     Tobacco dependence      Urinary urgency        Past Surgical History   I have reviewed this patient's surgical history and updated it with pertinent information if needed.  Past Surgical History:   Procedure Laterality Date     CHOLECYSTECTOMY, LAPOROSCOPIC       ESOPHAGOSCOPY, GASTROSCOPY, DUODENOSCOPY (EGD), COMBINED N/A 8/4/2020    Procedure: ESOPHAGOGASTRODUODENOSCOPY, WITH FINE NEEDLE ASPIRATION BIOPSY, WITH ENDOSCOPIC ULTRASOUND GUIDANCE;  Surgeon: Dayton Tobias MD;  Location: [facility]       Prior to Admission Medications   Prior  to Admission Medications   Prescriptions Last Dose Informant Patient Reported? Taking?   acetaminophen (TYLENOL) 325 MG tablet   Yes No   Sig: Take 2 tablets (650 mg) by mouth every 4 hours as needed for mild pain   atorvastatin (LIPITOR) 40 MG tablet   No No   Sig: Take 1 tablet (40 mg) by mouth daily   carvedilol (COREG) 12.5 MG tablet   Yes No   Sig: Take 12.5 mg by mouth 2 times daily (with meals)   gabapentin (NEURONTIN) 300 MG capsule   No No   Sig: Take 2 tabs every am, take one tab at bedtime   lisinopril (ZESTRIL) 20 MG tablet   Yes No   Sig: Take 20 mg by mouth daily   metoclopramide (REGLAN) 10 MG tablet   No No   Sig: Take 1 tablet (10 mg) by mouth every 6 hours as needed (hiccups)   morphine (MS CONTIN) 15 MG CR tablet   No No   Sig: Take 3 tablets (45 mg) by mouth every 12 hours   morphine (MSIR) 15 MG IR tablet   No No   Sig: Take 1 tablet (15 mg) by mouth every 4 hours as needed for moderate to severe pain   naloxone (NARCAN) 4 MG/0.1ML nasal spray   No No   Sig: Spray 1 spray (4 mg) into one nostril alternating nostrils as needed for opioid reversal every 2-3 minutes until assistance arrives   omeprazole (PRILOSEC) 40 MG DR capsule   Yes No   Sig: Take 40 mg by mouth daily   polyethylene glycol (MIRALAX) 17 g packet   Yes No   Sig: Take 1 packet by mouth daily as needed for constipation   polyethylene glycol (MIRALAX) 17 g packet   No No   Sig: Take 17 g by mouth daily   rivaroxaban ANTICOAGULANT (XARELTO ANTICOAGULANT) 20 MG TABS tablet   No No   Sig: Take 1 tablet (20 mg) by mouth daily (with dinner)   senna-docusate (SENOKOT-S/PERICOLACE) 8.6-50 MG tablet   No No   Sig: Take 1-2 tablets by mouth 2 times daily as needed for constipation      Facility-Administered Medications: None     Allergies   No Known Allergies    Social History   I have reviewed this patient's social history and updated it with pertinent information if needed. Kenneth Tello  reports that he quit smoking about 7 months  ago. His smoking use included cigarettes. He started smoking about 3 years ago. He has a 2.00 pack-year smoking history. His smokeless tobacco use includes chew. He reports current alcohol use of about 1.0 - 2.0 standard drinks of alcohol per week. He reports that he does not use drugs.    Family History   I have reviewed this patient's family history and updated it with pertinent information if needed.   Family History   Problem Relation Age of Onset     Cardiovascular Mother      Cardiovascular Father      Diabetes Type 2  Father      Cardiovascular Brother      Cancer Maternal Grandmother        Review of Systems   A complete Review of Systems is negative other than noted in the HPI or here.     Physical Exam   Temp: 96.7  F (35.9  C) Temp src: Oral BP: 138/61 Pulse: 87   Resp: 16 SpO2: 96 % O2 Device: None (Room air)    Vital Signs with Ranges  Temp:  [96.7  F (35.9  C)] 96.7  F (35.9  C)  Pulse:  [87] 87  Resp:  [16] 16  BP: (138)/(61) 138/61  SpO2:  [96 %] 96 %  198 lbs 3.2 oz  Constitutional: Pleasant male lying in bed. Awake and conversational. Non- toxic appearing. No acute distress.  HEENT: Normocephalic, atraumatic. Sclerae anicteric. EOM intact. Moist mucus membranes  Respiratory: Breathing comfortable with no increased work on room air. No signs of respiratory distress or accessory muscle use. Lungs clear to auscultation bilaterally, no crackles or wheezing noted.  Cardiovascular: Irregular rhythm and regular rate. Normal S1 and S2. No murmurs, rubs, or gallops. No peripheral edema.    GI: Abdomen is non-distended, non-tender.   Skin: Skin is clean, dry, intact. No jaundice appreciated.   Musculoskeletal/ Extremities: No redness, warmth, or swelling of the joints appreciated. Distal pulses palpable.   Neurologic: Alert and oriented. Speech normal. Grossly nonfocal. Memory and thought process preserved. Motor function grossly normal.   Neuropsychiatric: Calm, affect congruent to situation. Appropriate  mood and affect. Good judgment and insight. No visual/auditory hallucinations.    Data   Results for orders placed or performed during the hospital encounter of 08/07/20 (from the past 24 hour(s))   CBC with platelets differential   Result Value Ref Range    WBC 14.1 (H) 4.0 - 11.0 10e9/L    RBC Count 2.82 (L) 4.4 - 5.9 10e12/L    Hemoglobin 7.6 (L) 13.3 - 17.7 g/dL    Hematocrit 26.5 (L) 40.0 - 53.0 %    MCV 94 78 - 100 fl    MCH 27.0 26.5 - 33.0 pg    MCHC 28.7 (L) 31.5 - 36.5 g/dL    RDW 17.7 (H) 10.0 - 15.0 %    Platelet Count 170 150 - 450 10e9/L    Diff Method Automated Method     % Neutrophils 85.9 %    % Lymphocytes 2.3 %    % Monocytes 10.6 %    % Eosinophils 0.4 %    % Basophils 0.2 %    % Immature Granulocytes 0.6 %    Nucleated RBCs 0 0 /100    Absolute Neutrophil 12.1 (H) 1.6 - 8.3 10e9/L    Absolute Lymphocytes 0.3 (L) 0.8 - 5.3 10e9/L    Absolute Monocytes 1.5 (H) 0.0 - 1.3 10e9/L    Absolute Eosinophils 0.1 0.0 - 0.7 10e9/L    Absolute Basophils 0.0 0.0 - 0.2 10e9/L    Abs Immature Granulocytes 0.1 0 - 0.4 10e9/L    Absolute Nucleated RBC 0.0    Comprehensive metabolic panel   Result Value Ref Range    Sodium 136 133 - 144 mmol/L    Potassium 4.2 3.4 - 5.3 mmol/L    Chloride 100 94 - 109 mmol/L    Carbon Dioxide 31 20 - 32 mmol/L    Anion Gap 4 3 - 14 mmol/L    Glucose 86 70 - 99 mg/dL    Urea Nitrogen 11 7 - 30 mg/dL    Creatinine 0.51 (L) 0.66 - 1.25 mg/dL    GFR Estimate >90 >60 mL/min/[1.73_m2]    GFR Estimate If Black >90 >60 mL/min/[1.73_m2]    Calcium 8.8 8.5 - 10.1 mg/dL    Bilirubin Total 1.3 0.2 - 1.3 mg/dL    Albumin 2.2 (L) 3.4 - 5.0 g/dL    Protein Total 6.4 (L) 6.8 - 8.8 g/dL    Alkaline Phosphatase 106 40 - 150 U/L    ALT 23 0 - 70 U/L    AST 21 0 - 45 U/L   Phosphorus   Result Value Ref Range    Phosphorus 4.0 2.5 - 4.5 mg/dL   Uric acid   Result Value Ref Range    Uric Acid 4.8 3.5 - 7.2 mg/dL   Lactate Dehydrogenase   Result Value Ref Range    Lactate Dehydrogenase 267 (H) 85 - 227  U/L   Magnesium   Result Value Ref Range    Magnesium 1.8 1.6 - 2.3 mg/dL     I have seen, interviewed, and examined the patient independently.  I have reviewed the vital signs and labs.  This note reflects my assessment and plan.    Progressive DLBL confirmed on Bx, adm for RICE.     Neutrophilia noted. Started on 7/20 and continued. No evidence of infection and PET 7/31 shows no evidence of covert infection. Ok to continue    Em Santiago MD/PhD

## 2020-08-08 NOTE — PROGRESS NOTES
Patient: Kenneth Tello  : 1970     DIAGNOSIS: DLBCL    CODE: C83.38     LABS:  [  ] Check CBC with differential and CMP twice weekly (fax labs to [facility] at [phone]) and as needed.               - Please schedule for labs , , , ,   [  ] Type and cross PRN     TRANSFUSION PARAMETERS:   [  ] Please transfuse 2 (two) units PRBCs if Hgb is less than or equal to 7.  [  ] Please transfuse 1 (one) unit platelets if plt count less than or equal to 10.   [  ] Patient has a history of febrile transfusion reactions, improved with premedication of benadryl and APAP.  If patient experiences transfusion reaction during transfusion:              [  ] 25-50 mg benadryl IV x once PRN              [  ] Tylenol 1000 mg PO x once PRN              [  ] Hydrocortisone 100 mg IV x once PRN              [  ] Zantac 150 mg IV x once PRN              [  ] Notify provider covering infusion clinic per protocol     Please call the [facility] at [phone] for any questions, and ask to speak with the Heather Campos (care coordinator for Dr. Cary, patient's primary oncologist).     Thank you,      Samaria Zabala PA-C     [facility]  Department of Hematology/Oncology  [address] [place] [address]  Pager: [phone]

## 2020-08-08 NOTE — PROGRESS NOTES
[facility], [place]    Hematology / Oncology Progress Note    Date of Service (when I saw the patient): 08/08/2020     Assessment & Plan   Kenneth Tello is a 49-year-old male with medical history significant for A fib (on Xarelto), CVA (2017), hypertension and refractory DLBCL who presents for a scheduled admission for salvage R-ICE chemotherapy.      Today:  - C1D2 R-ICE today. Tentative discharge tomorrow early afternoon.   - Phos slightly elevated with rise in LDH. Will continue TLS labs q8h. Continue Allopurinol.  - Patient would like follow up labs/infusions to be changed to his home clinic in [place], [place]. Regardless he will stay with his brother in [place] until Neulasta/labs/poss infusion 8/11. Discussed with weekend RNCC who will attempt to set up - if unable to get in touch of clinic, will contact outpt RNCC.   - Maalox, TUMS available PRN for heartburn.     HEME  # DLBCL with primary refractory disease  Follows with Dr. Cary. 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 DLBCL-IPI was high (stage, extranodal sites, LDH) and clinical stage IV with extranodal involvement. Started on R-CHOP in 03/2020 with IT methotrexate each cycle for CNS prophylaxis. He completed 6 cycles R-CHOP.  - 7/14 - CT with increasing gastrohepatic ligament mass, concerning for refractory lymphoma, with plan to obtain EUS outpatient to confirm pathology. Plan to obtain PET/CT with BMBx on 7/31 for identification of other lesions/staging.   - 7/31 - PET/CT notable for disease progression with increased size of retroperitoneal LN, multiple new pulmonary nodules, new focal uptake in T12 vertebral body, increased uptake in ascending colon. BMBx not obtained due to anticoagulation not being held.  - 8/1 - 8/5 - Admitted  due to severe back pain/constipation. T-spine, L-spine MRI obtained without evidence for metastases. Palliative consulted for pain management EUS obtained inpatient 8/4, BMBx obtained inpatient 8/5. Patient was discharged on 8/5 with the intent to spend a short amount of time at home with family given his pathology was pending.   - 8/7 - Pathology of retroperitoneal mass fine needle aspiration consistent with persistent / recurrent large B-cell lymphoma. BMBx flow with rare to absent B-cells. Morphology still pending. Seen by Dr. Cary via virtual visit 8/7 with decision to proceed with admission for salvage R-ICE.   - PICC placed on admission, remove on discharge.  - Follow-up requested - see below.       Treatment Plan - Salvage R-ICE - C1D1 8/7/2020 - Today is Day 2.               - Rituximab 375mg/m2 - D1               - Ifosfamide/[place] 5000mg/m2 - D2               - Carboplatin 750mg - D2               - Etoposide 210 mg - D1-D3               - Dexamethasone 8mg D1, 12mg D2, 8mg D3-D4               - Supportive measures per protocol                - Neulasta - D5 - requested for 8/11     # Back pain, new focal uptake in T12 vertebral body  # Abdominal pain, known retroperitoneal mass  Thought Source of pain unclear however question association with central/retroperitoneal mass, LN invading pancreas/spleen. PET/CT 7/31 with new focal uptake at T12 vertebral site, however no obvious source on MRI. Rad Onc consulted on previous admission - could consider palliative radiation, however will hold given plan for salvage chemotherapy. Palliative consulted with improved pain control on new regimen as below.  - Continue PTA pain management of MS Contin 45mg BID. Morphine IR 15mg q4h PRN.  - Plan to follow with palliative care outpatient.      # Mild Leukocytosis, Neutrophilia  WBC 14.1 on admission, 11.7 today. Has been present since 7/20 and continued. No evidence of infection and PET 7/31 shows no evidence of  "covert infection.   - Continue to monitor.      # Anemia likely 2/2 to underlying disease  - Transfuse for Hgb <7, plt <10K     ID  # ID Prophylaxis  Not currently neutropenic.  - Per Dr. Cary, started on Acyclovir today. Plan to discharge on Fluconazole, Levaquin.      Neuro  # History of CVA  Reports having a stroke \"2-3 years ago\". Patient continues to have numbness in left hand/arm and left knee. 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 Neurosurgery consulted on previous admission with er team, no intervention needed.      Cardiovascular  # Atrial fibrillation   - Continue Xarelto with plts >50,000      # Hypertension  - Continue PTA Lisinopril and Coreg     GI  # Constipation  Ongoing with use of pain medications at home. Increased on previous admission with improvement.  - Continue PTA Miralax BID, Senna 3-4 tabs BID.      Nutrition  # Poor PO intake  Worsening lack of appetite, early satiety over past several weeks.   - Nutrition consult, appreciate recs  - Encourage patient to eat/utilize Boost shakes     MISC  # Hx of WATSON  Per patient, diagnosed with WATSON 5 years ago. Not currently on CPAP due to insurance coverage, however reports interest in treatment/repeat testing.  - Sleep medicine consult placed for discharge.      # Hiccups   - Continue PTA Baclofen or Reglan PRN     Fluids/Electrolytes/Nutrition   - LR started upon admission 8/2 at 150mL/hr  - PRN lyte replacement per standing protocol  - Regular diet as tolerated     PPX  VTE: Xarelto - hold plts <50,000  GI: PPI  Activity: Up as tolerated     Dispo: Anticipate discharge in 3 days after completion of chemotherapy   Follow-Up:   - 8/11 - lab/poss transfustion scheduled. Neulasta requested - pending.    - Scheduled for lab/possible transfusions: 8/14, 8/18, 8/21, 8/25, 8/28 - " Per patient he would like these to be moved to [facility] to be closer to home. Discussed with weekend RNCC who will attempt to set up - if unable to get in touch of clinic, will contact outpt RNCC.    - Visit with Carlos SWIFT, planned admission for C2: 8/28     Patient discussed with attending physician, Dr. Santiago.     Carol Zabala PA-C  Hematology/Oncology   Pager: [phone]     Interval History   Overnight no acute events. Tolerated Rituximab well last evening. Only concern is an episode of heartburn this morning with associated small amount of emesis while eating breakfast. Pain completely resolved now. Did not want to take any medications for this heartburn but agreeable to have something available as needed. Had two BMs yesterday. Occasional cramping abdominal pain. No shortness of breath, abdominal pain, cough. Ate well last night but does not feel like eating more this AM after heartburn. No rashes/lesions.     Physical Exam   Temp: 96.5  F (35.8  C) Temp src: Temporal BP: 139/83 Pulse: 55 Heart Rate: 52 Resp: 16 SpO2: 93 % O2 Device: None (Room air)    Vitals:    08/07/20 1402 08/08/20 0744   Weight: 89.9 kg (198 lb 3.2 oz) 90.2 kg (198 lb 14.4 oz)     Vital Signs with Ranges  Temp:  [96  F (35.6  C)-97.1  F (36.2  C)] 96.5  F (35.8  C)  Pulse:  [55-87] 55  Heart Rate:  [52-69] 52  Resp:  [16-18] 16  BP: (128-149)/(61-88) 139/83  SpO2:  [93 %-100 %] 93 %  I/O last 3 completed shifts:  In: 1080 [I.V.:20; IV Piggyback:1060]  Out: 125 [Urine:125]  Constitutional: Pleasant male lying in bed. Awake and conversational. Non- toxic appearing. No acute distress.  HEENT: Normocephalic, atraumatic. Sclerae anicteric. EOM intact. Moist mucus membranes  Respiratory: Breathing comfortable with no increased work on room air. No signs of respiratory distress or accessory muscle use. Lungs clear to auscultation bilaterally, no crackles or wheezing noted.  Cardiovascular: Irregular rhythm and regular rate. Normal S1 and S2. No  murmurs, rubs, or gallops. No peripheral edema.    GI: Abdomen is non-distended, non-tender.   Skin: Skin is clean, dry, intact. No jaundice appreciated. PICC on RUE.   Musculoskeletal/ Extremities: No redness, warmth, or swelling of the joints appreciated. Distal pulses palpable.   Neurologic: Alert and oriented. Speech normal. Grossly nonfocal. Memory and thought process preserved. Motor function grossly normal.   Neuropsychiatric: Calm, affect congruent to situation. Appropriate mood and affect. Good judgment and insight. No visual/auditory hallucinations.    Medications     - MEDICATION INSTRUCTIONS -       - MEDICATION INSTRUCTIONS -       sodium chloride         acyclovir  400 mg Oral BID     allopurinol  300 mg Oral Daily     CARBOplatin (PARAPLATIN) infusion (by AUC)  750 mg Intravenous Once     carvedilol  12.5 mg Oral BID w/meals     Chemotherapy Infusing-Continuous Infusion   Does not apply Q8H     [START ON 8/9/2020] dexamethasone  8 mg Oral Daily     etoposide (TOPOSAR) infusion  100 mg/m2 (Treatment Plan Recorded) Intravenous Q20H     gabapentin  300 mg Oral At Bedtime     gabapentin  600 mg Oral QAM     ifosfamide (IFEX) infusion  5,000 mg/m2 (Treatment Plan Recorded) Intravenous Once     lisinopril  20 mg Oral Daily     morphine  45 mg Oral Q12H     omeprazole  40 mg Oral Daily     polyethylene glycol  17 g Oral BID     rivaroxaban ANTICOAGULANT  20 mg Oral Daily with supper     senna-docusate  3-4 tablet Oral BID     sodium chloride (PF)  10 mL Intracatheter Q7 Days       Data   Results for orders placed or performed during the hospital encounter of 08/07/20 (from the past 24 hour(s))   CBC with platelets differential   Result Value Ref Range    WBC 14.1 (H) 4.0 - 11.0 10e9/L    RBC Count 2.82 (L) 4.4 - 5.9 10e12/L    Hemoglobin 7.6 (L) 13.3 - 17.7 g/dL    Hematocrit 26.5 (L) 40.0 - 53.0 %    MCV 94 78 - 100 fl    MCH 27.0 26.5 - 33.0 pg    MCHC 28.7 (L) 31.5 - 36.5 g/dL    RDW 17.7 (H) 10.0 - 15.0  %    Platelet Count 170 150 - 450 10e9/L    Diff Method Automated Method     % Neutrophils 85.9 %    % Lymphocytes 2.3 %    % Monocytes 10.6 %    % Eosinophils 0.4 %    % Basophils 0.2 %    % Immature Granulocytes 0.6 %    Nucleated RBCs 0 0 /100    Absolute Neutrophil 12.1 (H) 1.6 - 8.3 10e9/L    Absolute Lymphocytes 0.3 (L) 0.8 - 5.3 10e9/L    Absolute Monocytes 1.5 (H) 0.0 - 1.3 10e9/L    Absolute Eosinophils 0.1 0.0 - 0.7 10e9/L    Absolute Basophils 0.0 0.0 - 0.2 10e9/L    Abs Immature Granulocytes 0.1 0 - 0.4 10e9/L    Absolute Nucleated RBC 0.0    Comprehensive metabolic panel   Result Value Ref Range    Sodium 136 133 - 144 mmol/L    Potassium 4.2 3.4 - 5.3 mmol/L    Chloride 100 94 - 109 mmol/L    Carbon Dioxide 31 20 - 32 mmol/L    Anion Gap 4 3 - 14 mmol/L    Glucose 86 70 - 99 mg/dL    Urea Nitrogen 11 7 - 30 mg/dL    Creatinine 0.51 (L) 0.66 - 1.25 mg/dL    GFR Estimate >90 >60 mL/min/[1.73_m2]    GFR Estimate If Black >90 >60 mL/min/[1.73_m2]    Calcium 8.8 8.5 - 10.1 mg/dL    Bilirubin Total 1.3 0.2 - 1.3 mg/dL    Albumin 2.2 (L) 3.4 - 5.0 g/dL    Protein Total 6.4 (L) 6.8 - 8.8 g/dL    Alkaline Phosphatase 106 40 - 150 U/L    ALT 23 0 - 70 U/L    AST 21 0 - 45 U/L   Phosphorus   Result Value Ref Range    Phosphorus 4.0 2.5 - 4.5 mg/dL   Uric acid   Result Value Ref Range    Uric Acid 4.8 3.5 - 7.2 mg/dL   Lactate Dehydrogenase   Result Value Ref Range    Lactate Dehydrogenase 267 (H) 85 - 227 U/L   Magnesium   Result Value Ref Range    Magnesium 1.8 1.6 - 2.3 mg/dL   CBC with platelets differential   Result Value Ref Range    WBC 12.6 (H) 4.0 - 11.0 10e9/L    RBC Count 2.56 (L) 4.4 - 5.9 10e12/L    Hemoglobin 6.9 (LL) 13.3 - 17.7 g/dL    Hematocrit 23.9 (L) 40.0 - 53.0 %    MCV 93 78 - 100 fl    MCH 27.0 26.5 - 33.0 pg    MCHC 28.9 (L) 31.5 - 36.5 g/dL    RDW 17.7 (H) 10.0 - 15.0 %    Platelet Count 137 (L) 150 - 450 10e9/L    Diff Method Automated Method     % Neutrophils 86.1 %    % Lymphocytes 2.0  %    % Monocytes 10.5 %    % Eosinophils 0.5 %    % Basophils 0.2 %    % Immature Granulocytes 0.7 %    Nucleated RBCs 0 0 /100    Absolute Neutrophil 10.9 (H) 1.6 - 8.3 10e9/L    Absolute Lymphocytes 0.3 (L) 0.8 - 5.3 10e9/L    Absolute Monocytes 1.3 0.0 - 1.3 10e9/L    Absolute Eosinophils 0.1 0.0 - 0.7 10e9/L    Absolute Basophils 0.0 0.0 - 0.2 10e9/L    Abs Immature Granulocytes 0.1 0 - 0.4 10e9/L    Absolute Nucleated RBC 0.0    Comprehensive metabolic panel   Result Value Ref Range    Sodium 137 133 - 144 mmol/L    Potassium 4.1 3.4 - 5.3 mmol/L    Chloride 102 94 - 109 mmol/L    Carbon Dioxide 28 20 - 32 mmol/L    Anion Gap 7 3 - 14 mmol/L    Glucose 82 70 - 99 mg/dL    Urea Nitrogen 10 7 - 30 mg/dL    Creatinine 0.48 (L) 0.66 - 1.25 mg/dL    GFR Estimate >90 >60 mL/min/[1.73_m2]    GFR Estimate If Black >90 >60 mL/min/[1.73_m2]    Calcium 8.5 8.5 - 10.1 mg/dL    Bilirubin Total 1.2 0.2 - 1.3 mg/dL    Albumin 2.1 (L) 3.4 - 5.0 g/dL    Protein Total 6.0 (L) 6.8 - 8.8 g/dL    Alkaline Phosphatase 108 40 - 150 U/L    ALT 23 0 - 70 U/L    AST 24 0 - 45 U/L   Phosphorus   Result Value Ref Range    Phosphorus 4.0 2.5 - 4.5 mg/dL   Uric acid   Result Value Ref Range    Uric Acid 4.8 3.5 - 7.2 mg/dL   Lactate Dehydrogenase   Result Value Ref Range    Lactate Dehydrogenase 246 (H) 85 - 227 U/L   XR Chest Port 1 View    Narrative    Exam: XR CHEST PORT 1 VW, 8/7/2020 5:15 PM    Indication: PICC placement    Comparison: 3/9/2020    Findings:   Single portable AP radiograph of the chest. Right upper extremity PICC  line with tip terminating over the cavoatrial junction. Trachea is  midline. The cardiomediastinal silhouette is stable. Right  costophrenic angle is not within view. No left pleural effusion. No  pneumothorax. Bibasilar atelectasis. Mild interstitial airspace  opacities.      Impression    IMPRESSION:  1. Interval placement of right upper extremity PICC line with tip  terminating over the cavoatrial  junction.  2. Basilar atelectasis.  3. Subtle interstitial airspace opacities, may represent mild  pulmonary edema.    I have personally reviewed the examination and initial interpretation  and I agree with the findings.    ERIBERTO OLIVER MD   Double Lumen PICC Placement    Narrative    Jonna Drake RN     8/7/2020  5:31 PM  [facility] [place]    Double Lumen PICC Placement    Date/Time: 8/7/2020 5:26 PM  Performed by: Jonna Drake RN  Authorized by: Carol Zabala PA-C   Indications: Chemotherapy.    UNIVERSAL PROTOCOL   Site Marked: Yes  Prior Images Obtained and Reviewed:  Yes  Required items: Required blood products, implants, devices and special   equipment available    Patient identity confirmed:  Verbally with patient and arm band  NA - No sedation, light sedation, or local anesthesia  Confirmation Checklist:  Patient's identity using two indicators, relevant   allergies, procedure was appropriate and matched the consent or emergent   situation and correct equipment/implants were available  Time out: Immediately prior to the procedure a time out was called    Universal Protocol: the Joint Commission Universal Protocol was followed    Preparation: Patient was prepped and draped in usual sterile fashion           ANESTHESIA    Local Anesthetic: Lidocaine 1% without epinephrine  Anesthetic Total (mL):  1      SEDATION    Patient Sedated: No        Preparation: skin prepped with ChloraPrep  Skin prep agent: skin prep agent completely dried prior to procedure  Sterile barriers: maximum sterile barriers were used: cap, mask, sterile   gown, sterile gloves, and large sterile sheet  Hand hygiene: hand hygiene performed prior to central venous catheter   insertion  Type of line used: PICC and Power PICC  Catheter type: double lumen  Lumen type: valved  Catheter size: 5 Fr  : BioFlo.  Lot number: [id number]  Placement method: venipuncture and MST  Number of attempts: 1  Successful  placement: yes  Orientation: right  Location: basilic vein (Vein Diameter 0.56)  Arm circumference: adults 10 cm  Extremity circumference: 26.5  Visible catheter length: 1  Total catheter length: 43  Dressing and securement: blood cleaned with CHG, statlock, glue,   chlorhexidine disc applied, occlusive dressing applied, site cleaned and   sterile dressing applied  Post procedure assessment: free fluid flow, placement verified by x-ray   and blood return through all ports  PROCEDURE   Patient Tolerance:  Patient tolerated the procedure well with no immediate   complications       CBC with platelets   Result Value Ref Range    WBC 13.7 (H) 4.0 - 11.0 10e9/L    RBC Count 2.71 (L) 4.4 - 5.9 10e12/L    Hemoglobin 7.4 (L) 13.3 - 17.7 g/dL    Hematocrit 25.5 (L) 40.0 - 53.0 %    MCV 94 78 - 100 fl    MCH 27.3 26.5 - 33.0 pg    MCHC 29.0 (L) 31.5 - 36.5 g/dL    RDW 17.7 (H) 10.0 - 15.0 %    Platelet Count 146 (L) 150 - 450 10e9/L   Asymptomatic COVID-19 Virus (Coronavirus) by PCR    Specimen: Nasopharyngeal   Result Value Ref Range    COVID-19 Virus PCR to [facility] - Source Nares     COVID-19 Virus PCR to [facility] - Result       Test received-See reflex to IDDL test SARS CoV2 (COVID-19) Virus RT-PCR   SARS-CoV-2 COVID-19 Virus (Coronavirus) RT-PCR Nasopharyngeal    Specimen: Nasopharyngeal   Result Value Ref Range    SARS-CoV-2 Virus Specimen Source Nares     SARS-CoV-2 PCR Result NEGATIVE     SARS-CoV-2 PCR Comment       Testing was performed using the Xpert Xpress SARS-CoV-2 Assay on the Cepheid Gene-Xpert   Instrument Systems. Additional information about this Emergency Use Authorization (EUA)   assay can be found via the Lab Guide.     CBC with platelets differential   Result Value Ref Range    WBC 11.7 (H) 4.0 - 11.0 10e9/L    RBC Count 2.69 (L) 4.4 - 5.9 10e12/L    Hemoglobin 7.4 (L) 13.3 - 17.7 g/dL    Hematocrit 25.3 (L) 40.0 - 53.0 %    MCV 94 78 - 100 fl    MCH 27.5 26.5 - 33.0 pg    MCHC 29.2 (L) 31.5 - 36.5 g/dL     RDW 17.5 (H) 10.0 - 15.0 %    Platelet Count 132 (L) 150 - 450 10e9/L    Diff Method Automated Method     % Neutrophils 96.1 %    % Lymphocytes 1.0 %    % Monocytes 2.3 %    % Eosinophils 0.0 %    % Basophils 0.1 %    % Immature Granulocytes 0.5 %    Nucleated RBCs 0 0 /100    Absolute Neutrophil 11.2 (H) 1.6 - 8.3 10e9/L    Absolute Lymphocytes 0.1 (L) 0.8 - 5.3 10e9/L    Absolute Monocytes 0.3 0.0 - 1.3 10e9/L    Absolute Eosinophils 0.0 0.0 - 0.7 10e9/L    Absolute Basophils 0.0 0.0 - 0.2 10e9/L    Abs Immature Granulocytes 0.1 0 - 0.4 10e9/L    Absolute Nucleated RBC 0.0    Comprehensive metabolic panel   Result Value Ref Range    Sodium 136 133 - 144 mmol/L    Potassium 5.1 3.4 - 5.3 mmol/L    Chloride 103 94 - 109 mmol/L    Carbon Dioxide 26 20 - 32 mmol/L    Anion Gap 7 3 - 14 mmol/L    Glucose 138 (H) 70 - 99 mg/dL    Urea Nitrogen 9 7 - 30 mg/dL    Creatinine 0.43 (L) 0.66 - 1.25 mg/dL    GFR Estimate >90 >60 mL/min/[1.73_m2]    GFR Estimate If Black >90 >60 mL/min/[1.73_m2]    Calcium 8.7 8.5 - 10.1 mg/dL    Bilirubin Total 1.2 0.2 - 1.3 mg/dL    Albumin 2.0 (L) 3.4 - 5.0 g/dL    Protein Total 6.3 (L) 6.8 - 8.8 g/dL    Alkaline Phosphatase 107 40 - 150 U/L    ALT 22 0 - 70 U/L    AST 22 0 - 45 U/L   Lactate Dehydrogenase   Result Value Ref Range    Lactate Dehydrogenase 334 (H) 85 - 227 U/L   Phosphorus   Result Value Ref Range    Phosphorus 4.6 (H) 2.5 - 4.5 mg/dL   Uric acid   Result Value Ref Range    Uric Acid 3.8 3.5 - 7.2 mg/dL     I have seen, interviewed, and examined the patient independently.  I have reviewed the   vital signs and labs.  This note reflects my assessment and plan.    Feels his pain is already better now (with chemo).     Heart burn this morning, we discussed strategies and prns available to help with that.    Getting dex for antiemetic, which can cause heartburn. On omeprazole, can add prns    Em Santiago MD/PhD

## 2020-08-08 NOTE — PROGRESS NOTES
Provider notification;    JAMISON Mera notified at 1002 Via web page.    Reason for notification Pt requesting something for indigestion    Plan Tums and maalox ordered

## 2020-08-08 NOTE — PLAN OF CARE
7p-11p: No complaints other than feeling tired. Dozes off in between meds. Given dose # 1 of rituximab and dose # 1 of etoposide. Up independently.

## 2020-08-08 NOTE — PROGRESS NOTES
Nursing Focus: Chemotherapy  D: Positive blood return via PICC. Insertion site is clean/dry/intact, dressing intact with no complaints of pain.  Urine output is recorded in intake in Doc Flowsheet.    I: Premedications given per order (see electronic medical administration record). Dose # 1 of rituxan started to infuse at first 100 ml over 30 minutes and the last 400 ml over 60 minutes Dose # 1 etoposide infusing over 1 hour. Reviewed pt teaching on chemotherapy side effects.  Pt denies need for further teaching. Chemotherapy double checked per protocol by two chemotherapy competent RN's.   A: Tolerating procedure well. Denies nausea and or pain.   P: Continue to monitor urine output and symptoms of nausea. Screen for symptoms of toxicity.

## 2020-08-08 NOTE — PROGRESS NOTES
Care Coordinator Progress Note    Admission Date/Time:  8/7/2020  Attending MD:  Em Santiago MD    Data  Chart reviewed, discussed with interdisciplinary team.   Patient was admitted for: Diffuse large B-cell lymphoma of lymph nodes of multiple regions (H).     Assessment  Page received from primary team requesting OP labs and prn RBC/platet infusions. Patient was staying locally with his brother in [place], but was hopeful to return home after his appointment at the [facility] on Tuesday. Patient lives in [place], [place]. Writer attempted to contact patient via hospital room phone, no answer x2. Per chart review, patient has received care at the [facility]. Writer contacted their main line at this time to determine if labs/infusions could be arranged. Spoke with the ED charge nurse, able to draw labs and transfuse as ordered, will need to fax orders at discharge. Patient contacted again, he is agreeable to the plan, will go to the Hospital ED on Thursday as he has in the past for labs and infusions as needed. No additional concerns noted at this time.      [facility]  Hospital Phone: [phone]  Fax: [phone]    Plan  Anticipated Discharge Date:  8/9  Anticipated Discharge Plan:  Home (brother's home locally, then back to his home).     Leslye Dominguez, RNCC, BSN    [facility]    Medicine Group  [address] [place] [id number]    [email or web address]    Office: [phone] Pager: [phone]  To contact the weekend RNCC, page [phone].

## 2020-08-09 NOTE — PROGRESS NOTES
[facility], [place]    Hematology / Oncology Progress Note    Date of Service (when I saw the patient): 08/09/2020     Assessment & Plan   Kenneth Tello is a 49-year-old male with medical history significant for A fib (on Xarelto), CVA (2017), hypertension and refractory DLBCL who presents for a scheduled admission for salvage R-ICE chemotherapy. Complicated by low grade Ifosfamide neurotoxicity on Day 3.      Today:  - C1D3 R-ICE today  - Ifos discontinued @ ~0650 8/9, 5 hours before completion, due to increased confusion, short term memory changes. Low grade neurotoxicity, methylene blue not indicated.   - Will continue to monitor and discharge 8/10 if stable  - Hgb is 7.0- blood consent signed, transfused 1u pRBCs  - Patient would like follow up labs/infusions at his home clinic in [place], [place]. He will stay with his brother in [place] until Neulasta/labs/poss infusion 8/11. He has twice weekly labs and possible transfusions arranged in [place] starting late ~8/13. I sent a message to Kareem to cancel his labs/transfusions on 8/14, 8/18, 8/21, and 8/25 at the [facility], TUMS available PRN for heartburn.     HEME  # DLBCL with primary refractory disease  Follows with Dr. Cary. 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 DLBCL-IPI was high (stage, extranodal sites, LDH) and clinical stage IV with extranodal involvement. Started on R-CHOP in 03/2020 with IT methotrexate each cycle for CNS prophylaxis. He completed 6 cycles R-CHOP.  - 7/14 - CT with increasing gastrohepatic ligament mass, concerning for refractory lymphoma, with plan to obtain EUS outpatient to confirm pathology. Plan to obtain PET/CT with BMBx on 7/31 for identification of other lesions/staging.   - 7/31 - PET/CT notable for  "disease progression with increased size of retroperitoneal LN, multiple new pulmonary nodules, new focal uptake in T12 vertebral body, increased uptake in ascending colon. BMBx not obtained due to anticoagulation not being held.  - 8/1 - 8/5 - Admitted due to severe back pain/constipation. T-spine, L-spine MRI obtained without evidence for metastases. Palliative consulted for pain management EUS obtained inpatient 8/4, BMBx obtained inpatient 8/5. Patient was discharged on 8/5 with the intent to spend a short amount of time at home with family given his pathology was pending.   - 8/7 - Pathology of retroperitoneal mass fine needle aspiration consistent with persistent / recurrent large B-cell lymphoma. BMBx flow with rare to absent B-cells. Morphology still pending. Seen by Dr. Cary via virtual visit 8/7 with decision to proceed with admission for salvage R-ICE.   - PICC placed on admission, remove PICC on discharge.  - 8/9 - RN paged overnight provider at 0630 regarding increased confusion. Patient wasn't able to figure out how to use his TV remote, and claims to have such vivid dreaming that he is seeing people and talking to them \"while sleeping\". Also unable to pay a cable bill. Otherwise A&O x3 and neuro exam was completely unremarkable other than poor short recall. Ifosfamide/mesna were discontinued at 0650 on 8/9, 5 hours before completion. May need to consider adjusting future Ifosfamide doses, will defer to outpatient team      Treatment Plan - Salvage R-ICE - C1D1 8/7/2020 - Today is Day 3.               - Rituximab 375mg/m2 - D1               - Ifosfamide/Mesna 5000mg/m2 - D2. Discontinued 5 hours before completion secondary to neurotoxicity               - Carboplatin 750mg - D2               - Etoposide 210 mg - D1-D3               - Dexamethasone 8mg D1, 12mg D2, 8mg D3-D4               - Supportive measures per protocol                - Neulasta - D5 - requested for 8/11       # Back pain, new " "focal uptake in T12 vertebral body  # Abdominal pain, known retroperitoneal mass  Thought Source of pain unclear however question association with central/retroperitoneal mass, LN invading pancreas/spleen. PET/CT 7/31 with new focal uptake at T12 vertebral site, however no obvious source on MRI. Rad Onc consulted on previous admission - could consider palliative radiation, however will hold given plan for salvage chemotherapy. Palliative consulted with improved pain control on new regimen as below.  - Continue PTA pain management of MS Contin 45mg BID. Morphine IR 15mg q4h PRN.  - Plan to follow with palliative care outpatient.      # Mild Leukocytosis, Neutrophilia  WBC 14.1 on admission, 11.7 today. Has been present since 7/20 and continued. No evidence of infection and PET 7/31 shows no evidence of covert infection.   - Continue to monitor.      # Anemia likely 2/2 to underlying disease  - Transfuse for Hgb <7, plt <10K     ID  # ID Prophylaxis  Not currently neutropenic.  - Per Dr. Cary, started on Acyclovir. Plan to discharge on Fluconazole, Levaquin.      Neuro  # History of CVA  Reports having a stroke \"2-3 years ago\". Patient continues to have numbness in left hand/arm and left knee. 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 Neurosurgery consulted on previous admission with er team, no intervention needed.      Cardiovascular  # Atrial fibrillation   - Continue Xarelto with plts >50,000      # Hypertension  - Continue PTA Lisinopril and Coreg     GI  # Constipation  Ongoing with use of pain medications at home. Increased on previous admission with improvement.  - Continue PTA Miralax BID, Senna 3-4 tabs BID.      Nutrition  # Poor PO intake  Worsening lack of appetite, early satiety over past several weeks.   - Nutrition consult, " "appreciate recs  - Encourage patient to eat/utilize Boost shakes     MISC  # Hx of WATSON  Per patient, diagnosed with WATSON 5 years ago. Not currently on CPAP due to insurance coverage, however reports interest in treatment/repeat testing.  - Sleep medicine consult placed for discharge.      # Hiccups   - Continue PTA Baclofen or Reglan PRN     Fluids/Electrolytes/Nutrition   - LR started upon admission 8/2 at 150mL/hr  - PRN lyte replacement per standing protocol  - Regular diet as tolerated     PPX  VTE: Xarelto - hold plts <50,000  GI: PPI  Activity: Up as tolerated     Dispo: Anticipate discharge in 3 days after completion of chemotherapy   Follow-Up:   - 8/11 - lab/poss transfustion scheduled. Neulasta requested - pending.    - Scheduled for lab/possible transfusions: 8/14, 8/18, 8/21, 8/25 in [place], [place]. Request sent 8/9 to cancel these appts at the [facility]   - Appt with Carlos SWIFT, planned admission for C2: 8/28     Patient discussed with attending physician, Dr. Santiago.     Caitlyn Morrow PA-C  Hematology/Oncology  Pager # [phone]  Phone # [phone]      Interval History   RN paged overnight provider at 0630 regarding increased confusion. Patient wasn't able to figure out how to use his TV remote, and claims to have such vivid dreaming that he is seeing people and talking to them \"while sleeping\". Also unable to pay a cable bill. Otherwise A&O x3 and neuro exam was completely unremarkable other than poor short term recall. Ifosfamide/mesna were discontinued at 0650 on 8/9, 5 hours before completion. He is already starting to become mentally clearer this afternoon. Handwriting is intact    Endorses ongoing heartburn and occasional hiccups on baclofen. Had a BM this morning. Denies abdominal pain. No shortness of breath, abdominal pain, cough. Ate well last night but does not feel like eating more this AM after heartburn. No rashes/lesions.     Physical Exam   Temp: 95.8  F (35.4  C)(drank water) Temp src: Oral " BP: (!) 147/94 Pulse: 67 Heart Rate: 71 Resp: 14 SpO2: 100 % O2 Device: None (Room air)    Vitals:    08/07/20 1402 08/08/20 0744 08/09/20 0739   Weight: 89.9 kg (198 lb 3.2 oz) 90.2 kg (198 lb 14.4 oz) 92 kg (202 lb 12.8 oz)     Vital Signs with Ranges  Temp:  [95.5  F (35.3  C)-98.1  F (36.7  C)] 95.8  F (35.4  C)  Pulse:  [53-84] 67  Heart Rate:  [71] 71  Resp:  [11-18] 14  BP: (108-147)/(57-94) 147/94  SpO2:  [95 %-100 %] 100 %  I/O last 3 completed shifts:  In: 1027 [P.O.:340; I.V.:270; IV Piggyback:417]  Out: 675 [Urine:675]  Constitutional: Pleasant male lying in bed. Awake and conversational. Non- toxic appearing. No acute distress.  HEENT: Normocephalic, atraumatic. Sclerae anicteric. EOM intact. Moist mucus membranes  Respiratory: Breathing comfortable with no increased work on room air. No signs of respiratory distress or accessory muscle use. Lungs clear to auscultation bilaterally, no crackles or wheezing noted.  Cardiovascular: Irregular rhythm and regular rate. Normal S1 and S2. No murmurs, rubs, or gallops. No peripheral edema.    GI: Abdomen is non-distended, non-tender.   Skin: Skin is clean, dry, intact. No jaundice appreciated. PICC on RUE.   Musculoskeletal/ Extremities: No redness, warmth, or swelling of the joints appreciated. Distal pulses palpable.   Neurologic: Alert and oriented. Speech normal. Grossly nonfocal. Poor short term recall. Motor function grossly normal.   Neuropsychiatric: Calm, affect congruent to situation. Appropriate mood and affect. Good judgment and insight. Confused, see above for detail    Medications     - MEDICATION INSTRUCTIONS -       - MEDICATION INSTRUCTIONS -       sodium chloride         acyclovir  400 mg Oral BID     allopurinol  300 mg Oral Daily     carvedilol  12.5 mg Oral BID w/meals     dexamethasone  8 mg Oral Daily     etoposide (TOPOSAR) infusion  100 mg/m2 (Treatment Plan Recorded) Intravenous Q20H     gabapentin  300 mg Oral At Bedtime     gabapentin   600 mg Oral QAM     lisinopril  20 mg Oral Daily     morphine  45 mg Oral Q12H     omeprazole  40 mg Oral Daily     polyethylene glycol  17 g Oral BID     rivaroxaban ANTICOAGULANT  20 mg Oral Daily with supper     senna-docusate  3-4 tablet Oral BID     sodium chloride (PF)  10 mL Intracatheter Q7 Days       Data   Results for orders placed or performed during the hospital encounter of 08/07/20 (from the past 24 hour(s))   Phosphorus   Result Value Ref Range    Phosphorus 5.0 (H) 2.5 - 4.5 mg/dL   Potassium   Result Value Ref Range    Potassium 4.8 3.4 - 5.3 mmol/L   Uric acid   Result Value Ref Range    Uric Acid 4.0 3.5 - 7.2 mg/dL   Calcium   Result Value Ref Range    Calcium 8.5 8.5 - 10.1 mg/dL   Phosphorus   Result Value Ref Range    Phosphorus 3.7 2.5 - 4.5 mg/dL   Potassium   Result Value Ref Range    Potassium 4.2 3.4 - 5.3 mmol/L   Uric acid   Result Value Ref Range    Uric Acid 3.7 3.5 - 7.2 mg/dL   Calcium   Result Value Ref Range    Calcium 8.3 (L) 8.5 - 10.1 mg/dL   CBC with platelets differential   Result Value Ref Range    WBC 11.3 (H) 4.0 - 11.0 10e9/L    RBC Count 2.57 (L) 4.4 - 5.9 10e12/L    Hemoglobin 7.0 (L) 13.3 - 17.7 g/dL    Hematocrit 24.6 (L) 40.0 - 53.0 %    MCV 96 78 - 100 fl    MCH 27.2 26.5 - 33.0 pg    MCHC 28.5 (L) 31.5 - 36.5 g/dL    RDW 17.6 (H) 10.0 - 15.0 %    Platelet Count 128 (L) 150 - 450 10e9/L    Diff Method Automated Method     % Neutrophils 92.6 %    % Lymphocytes 1.0 %    % Monocytes 5.6 %    % Eosinophils 0.0 %    % Basophils 0.0 %    % Immature Granulocytes 0.8 %    Nucleated RBCs 0 0 /100    Absolute Neutrophil 10.5 (H) 1.6 - 8.3 10e9/L    Absolute Lymphocytes 0.1 (L) 0.8 - 5.3 10e9/L    Absolute Monocytes 0.6 0.0 - 1.3 10e9/L    Absolute Eosinophils 0.0 0.0 - 0.7 10e9/L    Absolute Basophils 0.0 0.0 - 0.2 10e9/L    Abs Immature Granulocytes 0.1 0 - 0.4 10e9/L    Absolute Nucleated RBC 0.0    Comprehensive metabolic panel   Result Value Ref Range    Sodium 138 133 -  144 mmol/L    Potassium 4.7 3.4 - 5.3 mmol/L    Chloride 104 94 - 109 mmol/L    Carbon Dioxide 27 20 - 32 mmol/L    Anion Gap 7 3 - 14 mmol/L    Glucose 119 (H) 70 - 99 mg/dL    Urea Nitrogen 13 7 - 30 mg/dL    Creatinine 0.40 (L) 0.66 - 1.25 mg/dL    GFR Estimate >90 >60 mL/min/[1.73_m2]    GFR Estimate If Black >90 >60 mL/min/[1.73_m2]    Calcium 8.7 8.5 - 10.1 mg/dL    Bilirubin Total 0.7 0.2 - 1.3 mg/dL    Albumin 1.8 (L) 3.4 - 5.0 g/dL    Protein Total 5.8 (L) 6.8 - 8.8 g/dL    Alkaline Phosphatase 90 40 - 150 U/L    ALT 18 0 - 70 U/L    AST 20 0 - 45 U/L   Lactate Dehydrogenase   Result Value Ref Range    Lactate Dehydrogenase 341 (H) 85 - 227 U/L   Phosphorus   Result Value Ref Range    Phosphorus 4.2 2.5 - 4.5 mg/dL   Uric acid   Result Value Ref Range    Uric Acid 3.4 (L) 3.5 - 7.2 mg/dL   ABO/Rh type and screen   Result Value Ref Range    Units Ordered 1     ABO O     RH(D) Pos     Antibody Screen Neg     Test Valid Only At          [facility]    Specimen Expires 08/12/2020     Crossmatch Red Blood Cells    Blood component   Result Value Ref Range    Unit Number [id number]     Blood Component Type Red Blood Cells LeukoReduced (Part 2)     Division Number 00     Status of Unit Released to care unit 08/09/2020 0904     Blood Product Code [id number]     Unit Status ISS      I have seen, interviewed, and examined the patient independently.  I have reviewed the   vital signs and labs.  This note reflects my assessment and plan.    Confusion and hallucinations today: he was aware of thinking he is somewhere else and that people were there that weren't. He was also aware that he was confused and couldn't operate his phone or the TV remote. Ifos stopped this am (was due to complete at 12 noon). He was find by the time we saw him later in the morning. Would NOT resume ifos. Will monitor him until tomorrow. Would consider alternate salvage regiment.    Em Santiago  MD/PhD

## 2020-08-09 NOTE — PROGRESS NOTES
Provider notification;    JAMISON Brady notified at 1532 Via web page.    Reason for notification Pt requested to leave tonight as he lives 2 hours away and knows he has a ride. He is not sure if he will be able to get one tomorrow.     Plan No response

## 2020-08-09 NOTE — PLAN OF CARE
"11p-7a: VSS. Afebrile. Blood checks positive from DL picc line. Has CIVI ifosfamide/mesna infusing x 24 hours. Pt stated he is having vivid dreams and is forgetful this morning. Pt describes one dream as having this nurse talking to him behind his back and then waking up and finding out that no one is near him. Also informed this nurse this morning that he is having trouble \"remembering how to shut his television off.\" Will page fellow regarding questionable ifosfamide toxicity symptoms.    Addendum @ 0639: Hospitalist updated on symptoms but is not familiar with this drug and its side effects and stated that he will defer this issue to the day team. Second call placed to attending provider pager number. She called back and stated that she (or another member of the attending team) will come in shortly to assess patient. She stated it is OK to continue with the ifosfamide infusion at this time.     Addendum @ 0703: Provider called back and stated to stop infusion and that she will consult the pharmacist to see if anything (such as mesna) needs to be done during the interrim.   "

## 2020-08-09 NOTE — PLAN OF CARE
"7p-11p: VSS. Afebrile. Up independently. No complaints. Has CIVI ifosfamide/mesna infusing x 24 hours. Blood return positive from DL picc. States appetite is good but was hindered by \"heartburn\" today. Had maalox this afternoon, which helped. Declined an additional dose this evening and will ask for it if he thinks he needs it.     Addendum @ 2203: Given prn baclofen 10 mg now to try as patient has hiccups that are bothering him and \"take his breath away.\"   "

## 2020-08-09 NOTE — PLAN OF CARE
D2 RICE for refractory DLBCL. C/o indigestion and resulting inability to eat-Tums and maalox now available-maalox seems to be most helpful. C/o knee pain that is well controlled from MS contin but keeps pt from walking distances.     Nursing Focus: Chemotherapy    D: Positive blood return via R DL PICC. Insertion site is clean/dry/intact, dressing intact with no complaints of pain.  Urine output is recorded in intake in Doc Flowsheet.      I: Emend, zofran and decadron given as Premedications per order (see electronic medical administration record). Carboplatin administered over 90 minutes. CIVI Ifos/mesna currently being given. Dose #2 of Etoposide started to infuse over 1 hour. Reviewed pt teaching on chemotherapy side effects.  Pt denies need for further teaching. Chemotherapy double checked per protocol by two chemotherapy competent RN's.     A: Tolerating procedure well. Denies nausea and or pain.     P: Continue to monitor urine output and symptoms of nausea. Screen for symptoms of toxicity.

## 2020-08-09 NOTE — PROGRESS NOTES
"Cross cover:    RN notified me of patient having increased confusion this am.  Reported vivid dreams, forgetfulness. \"  Patient told RN that he could not even remember how to turn off the TV\".     ?  Encephalopathy 2/2 ifosfamide toxicity vs others.   - Requested RN to hold ifosfamide for now.   - Hem fellow paged.     MD Iwona Langston.           "

## 2020-08-10 NOTE — PROGRESS NOTES
"CLINICAL NUTRITION SERVICES - ASSESSMENT NOTE     Nutrition Prescription    RECOMMENDATIONS FOR MDs/PROVIDERS TO ORDER:  --Recommend high kcal/high protein soft diet for discharge; Boost Plus (or equivalent) at least BID  --Consider starting an appetite stimulant (re: Remeron, Marinol, or Megace) as appropriate.   --Recommend pt continue to follow with outpatient RD.    Malnutrition Status:    Non-severe malnutrition in the context of acute on chronic illness    Recommendations already ordered by Registered Dietitian (RD):  Continue Boost Shakes as ordered.    Future/Additional Recommendations:  Continue to monitor PO intake, weight trends.      REASON FOR ASSESSMENT  Kenneth Tello is a/an 49 year old male assessed by the dietitian for Admission Nutrition Risk Screen for unintentional loss of 10# or more in the past two months    NUTRITION HISTORY  PMH A fib (on Xarelto), CVA (2017), hypertension and refractory DLBCL who presents for a scheduled admission for salvage R-ICE chemotherapy.     Per H&P: Worsening lack of appetite, early satiety over past several weeks. Nutrition consult was to be placed.     Pt is familiar to Clinical Nutrition Services from recent admissions, last assessed by RD on 8/2/2020. Pt also follows with outpatient RD. Per notes, pt has been following a soft/pureed diet due to poor dentition. Pt was educated on high kcal/high protein diet and utilizing oral nutrition supplements.     CURRENT NUTRITION ORDERS  Diet: regular; Boost Shakes between meals  Intake/Tolerance: 25-75% and fair-good intake per RN flowsheets, providing Boost Shakes as well    LABS  Labs reviewed    MEDICATIONS  Decadron  Miralax  Senna-docusate    ANTHROPOMETRICS  Height: 175.3 cm (5' 9\")  Most Recent Weight: 92 kg (202 lb 12.8 oz): admission weight 90 kg  IBW: 72.7 kg (124% IBW)  BMI: Overweight BMI 25-29.9  Weight History: Difficult to assess due to fluid status; 10% wt loss since 3/11/2020 (5 months, " significant).  Wt Readings from Last 10 Encounters:   08/10/20 92 kg (202 lb 12.8 oz)   08/05/20 91.9 kg (202 lb 9.6 oz)   07/31/20 90.3 kg (199 lb)   07/29/20 89.8 kg (198 lb)   07/02/20 96.4 kg (212 lb 9.6 oz)   07/01/20 97.3 kg (214 lb 9.6 oz)   06/24/20 96.4 kg (212 lb 8 oz)   06/11/20 100.4 kg (221 lb 6.4 oz)   05/20/20 94.6 kg (208 lb 9.6 oz)   04/29/20 95.3 kg (210 lb 1.6 oz)     03/18/20 106.5 kg (234 lb 14.4 oz)   From RD note 3/11/2020: Weight History: Unable to obtain from pt at this time. Per H&P, hx of 40 lbs wt loss reported since November. Based on current wt, 15% wt loss x past 4 mos.      Wt Readings from Last 10 Encounters:   03/11/20 99.7 kg (219 lb 12.8 oz)     Dosing Weight: 77 kg adjusted based on IBW and admission weight of 90 kg    ASSESSED NUTRITION NEEDS  Estimated Energy Needs: 5886-1710 kcals/day (30 - 35 kcals/kg )  Justification: Repletion  Estimated Protein Needs:  grams protein/day (1.2 - 1.5 grams of pro/kg)  Justification: Increased needs with cancer treatment and Repletion  Estimated Fluid Needs: (1 mL/kcal)   Justification: Maintenance and Per provider pending fluid status    PHYSICAL FINDINGS  See malnutrition section below.  Poor dentition     Unable to obtain in-person nutrition history or nutrition focused physical assessment (NFPA) from patient as the number of staff going into rooms is restricted to limit exposure and to minimize use of PPE.    MALNUTRITION  % Intake: < 75% for > 7 days (non-severe)  % Weight Loss: Up to 10% in 6 months (non-severe)  Subcutaneous Fat Loss: Unable to assess  Muscle Loss: Unable to assess  Fluid Accumulation/Edema: 1-2+ edema  Malnutrition Diagnosis: Non-severe malnutrition in the context of acute on chronic illness    NUTRITION DIAGNOSIS  Inadequate oral intake related to poor appetite, early satiety, poor dentition as evidenced by pt report on admission of reduced PO intake since recent admission (x 8 days ago) and significant 10%  weight loss x 5 months.       INTERVENTIONS  Implementation  Medical food supplement therapy     Goals  Patient to consume % of nutritionally adequate meal trays TID, or the equivalent with supplements/snacks.     Monitoring/Evaluation  Progress toward goals will be monitored and evaluated per protocol.    Nicole Hazel MS, RD, LD, [facility]  6A/7D RD Pager: [phone]

## 2020-08-10 NOTE — PLAN OF CARE
7p-11p: VSS. Afebrile. Updated hospitalist on patient's new onset of urinary retention and incontinence. States he does not notice his bladder leakage until his pants become saturated. UA ordered. Patient bladder scanned for 52 ml. Denies pain or nausea. Up ad maryanne.     Addendum @ 7916: Potassium level at 6.0. Will have lab draw again.

## 2020-08-10 NOTE — PLAN OF CARE
"11p-7a: VSS. Afebrile. Slept in between cares and meds. UA resulted. Hospitalist ordered one-time dose of cipro. Did give patient prn baclofen to see if that would help with his bladder spasms. Evening potassium level was resulted as 6.0. Did have lab redraw, and result came back at 4.2. Patient states his \"forgetfulness\" from yesterday is much improved. No reports of vivid dreams. Relays that his mind \"seems clearer\" today.   "

## 2020-08-10 NOTE — PLAN OF CARE
C1D3 Salvage RICE for refractory DLBCL. Ifos/mesna stopped early d/t ifos toxicity. A&O X3 but thoughts trail. Maalox for indigestion. 1 unit PRBC given for hgb 7.0. Probable discharge tomorrow.     Nursing Focus: Chemotherapy  D: Positive blood return via purple lumen on PICC. Insertion site is clean/dry/intact, dressing intact with no complaints of pain.  Urine output is recorded in intake in Doc Flowsheet.    I: No premedications ordered. Premedications given per order (see electronic medical administration record). Dose #3 of Etoposide started to infuse over 3 hours. Reviewed pt teaching on chemotherapy side effects.  Pt denies need for further teaching. Chemotherapy double checked per protocol by two chemotherapy competent RN's.     A: Tolerating procedure well. Denies nausea and or pain.     P: Continue to monitor urine output and symptoms of nausea. Screen for symptoms of toxicity.

## 2020-08-10 NOTE — DISCHARGE SUMMARY
[facility] [place]    Discharge Summary  Hematology / Oncology    Date of Admission:  8/7/2020  Date of Discharge:  8/10/2020  Discharging Provider: Arben Enrique PA-C  Date of Service (when I saw the patient): 08/10/20    Discharge Diagnoses   Active Problems:    DLBCL (diffuse large B cell lymphoma) (H)      Hospital Course   Kenneth Tello was admitted on 8/7/2020 w PMH A fib (on Xarelto), CVA (2017), hypertension and refractory DLBCL who presents for a scheduled admission for salvage R-ICE chemotherapy. Complicated by low grade Ifosfamide neurotoxicity on Day 3. He noted blood coming out of penis on 8/10, states that it follows urination, not necessarily when peeing and that it's associated with incontinence. This was consistent w UA showing  per HPF, microscopic hematuria. However, he states it is a longstanding issue, it does not bother him and he stated he will follow up with primary care provider. He was discharged with plan to come in for neulasta infusion 8/11 and follow up outpatient 8/28 for future treatment coordination.     The following problems were addressed during his hospitalization:    # DLBCL with primary refractory disease  Follows with Dr. Cary. 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 DLBCL-IPI was high (stage, extranodal sites, LDH) and clinical stage IV with extranodal involvement. Started on R-CHOP in 03/2020 with IT methotrexate each cycle for CNS prophylaxis. He completed 6 cycles R-CHOP.  - 7/14 - CT with increasing gastrohepatic ligament mass, concerning for refractory lymphoma, with plan to obtain EUS outpatient to confirm pathology. Plan to obtain PET/CT with BMBx on 7/31 for identification of other lesions/staging.   - 7/31 - PET/CT notable  "for disease progression with increased size of retroperitoneal LN, multiple new pulmonary nodules, new focal uptake in T12 vertebral body, increased uptake in ascending colon. BMBx not obtained due to anticoagulation not being held.  - 8/1 - 8/5 - Admitted due to severe back pain/constipation. T-spine, L-spine MRI obtained without evidence for metastases. Palliative consulted for pain management EUS obtained inpatient 8/4, BMBx obtained inpatient 8/5. Patient was discharged on 8/5 with the intent to spend a short amount of time at home with family given his pathology was pending.   - 8/7 - Pathology of retroperitoneal mass fine needle aspiration consistent with persistent / recurrent large B-cell lymphoma. BMBx flow with rare to absent B-cells. Morphology with no morphologic or immunophenotypic evidence of involvement by B-cell lymphoma   - Seen by Dr. Cary via virtual visit 8/7 with decision to proceed with admission for salvage R-ICE.   - PICC placed on admission, removed PICC on discharge.      - 8/9 - RN paged overnight provider at 0630 regarding increased confusion. Patient wasn't able to figure out how to use his TV remote, and claims to have such vivid dreaming that he is seeing people and talking to them \"while sleeping\". Also unable to pay a cable bill. Otherwise A&O x3 and neuro exam was completely unremarkable other than poor short recall. Ifosfamide/mesna were discontinued at 0650 on 8/9, 5 hours before completion. May consider avoiding Ifosfamide in future, consider alternate regimen, will defer to outpatient team    Treatment Plan - Salvage R-ICE - C1D1 8/7/2020 -                - Rituximab 375mg/m2 - D1               - Ifosfamide/Mesna 5000mg/m2 - D2. Discontinued 5 hours before completion secondary to neurotoxicity               - Carboplatin 750mg - D2               - Etoposide 210 mg - D1-D3               - Dexamethasone 8mg D1, 12mg D2, 8mg D3-D4               - Supportive measures per " "protocol                - Neulasta - D5 - arranged 8/11       # Back pain, new focal uptake in T12 vertebral body  # Abdominal pain, known retroperitoneal mass  Thought Source of pain unclear however question association with central/retroperitoneal mass, LN invading pancreas/spleen. PET/CT 7/31 with new focal uptake at T12 vertebral site, however no obvious source on MRI. Rad Onc consulted on previous admission - could consider palliative radiation, however will hold given plan for salvage chemotherapy. Palliative consulted with improved pain control on new regimen as below.  - Continue PTA pain management of MS Contin 45mg BID on discharge  - Plan to follow with palliative care outpatient.      # Anemia likely 2/2 to underlying disease  - follow outpatient     #microscopic hematuria  Noted on UA w 120 RBC per hpf.   -Although he states it is a longstanding issue, he is not a great historian so it could possibly be related to ifosfamide. Will discharge and he will follow up with primary care provider.     ID  # ID Prophylaxis  Not currently neutropenic.  - Per Dr. Cary, started on Acyclovir. Plan to discharge on Fluconazole, Levaquin as well.      Neuro  # History of CVA  Reports having a stroke \"2-3 years ago\". Patient continues to have numbness in left hand/arm and left knee. 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 Neurosurgery consulted on previous admission with er team, no intervention needed.      Cardiovascular  # Atrial fibrillation   - Continue Xarelto with plts >50,000      # Hypertension  - Continue PTA Lisinopril and Coreg     GI  # Constipation  Ongoing with use of pain medications at home. Increased on previous admission with improvement.  - Continue PTA Miralax BID, Senna 3-4 tabs BID.      Nutrition  # Poor PO " "intake  Worsening lack of appetite, early satiety over past several weeks.   - Nutrition consult, appreciate recs  - Encourage patient to eat/utilize Boost shakes     MISC  # Hx of WATSON  Per patient, diagnosed with WATSON 5 years ago. Not currently on CPAP due to insurance coverage, however reports interest in treatment/repeat testing.  - Sleep medicine consult placed for discharge.      # Hiccups   - Continue PTA Baclofen or Reglan PRN    Active Problems:    DLBCL (diffuse large B cell lymphoma) (H)      Arben Enrique PA-C      Pending Results   These results will be followed up  Unresulted Labs Ordered in the Past 30 Days of this Admission     Date and Time Order Name Status Description    8/9/2020 2136 Urine Culture Preliminary     8/6/2020 1507 FISH In process     8/5/2020 0600 Process and hold DNA In process     8/5/2020 0600 CHROMOSOME BONE MARROW With Professional Interpretation In process           Code Status   Full Code    Primary Care Physician   Nina Jeter      Physical Exam:    Blood pressure (!) 149/74, pulse 68, temperature 96.7  F (35.9  C), temperature source Oral, resp. rate 18, height 1.753 m (5' 9\"), weight 92 kg (202 lb 12.8 oz), SpO2 99 %.        Time Spent on this Encounter   I, Arben Enrique PA-C, personally saw the patient today and spent greater than 30 minutes discharging this patient.    Discharge Disposition   Discharged to home  Condition at discharge: Stable    Consultations This Hospital Stay   VASCULAR ACCESS FOR PICC PLACEMENT ADULT IP CONSULT  SLEEP STUDIES IP CONSULT  SOCIAL WORK IP CONSULT  INTERVENTIONAL RADIOLOGY ADULT/PEDS IP CONSULT    Discharge Orders      Reason for your hospital stay    You came into hospital for chemotherapy regimen for your cancer. You had some altered dreams thought to be associated with ifosfamide. Please follow up with your primary care provider for future regimen adjustments.    You also had some incontinence of urine while " inpatient, thought to be associated with some blood. Please follow up with your primary care provider for this outpatient as we discussed.     - Please come in for your infusion appointment of neulasta 8/11.   - Please follow up for your labs  - Your future appointments are as below.     Follow Up and recommended labs and tests    [facility] cancer clinic triage line at [phone] for temp > or = 100.4, uncontrolled nausea/vomiting/diarrhea/constipation, unrelieved pain, bleeding not relieved with pressure, dizziness, chest pain, shortness of breath, loss of consciousness, and any new or concerning symptoms.    An appointment or multiple appointments for hospital follow up was requested for you. If it is not scheduled by the time you discharge you will be contacted with the date and time. You may call the cancer triage number [phone] to makes changes to this appointment if needed.    Already scheduled appointments are listed below.     Activity    Your activity upon discharge: activity as tolerated     Full Code     Sleep Lab IP Consult    Per patient, diagnosed with WATSON 5 years ago. Not currently on CPAP due to insurance coverage, however currently interested in treatment/repeat testing.     Diet    Follow this diet upon discharge: Regular     Discharge Medications   Current Discharge Medication List      START taking these medications    Details   acyclovir (ZOVIRAX) 400 MG tablet Take 1 tablet (400 mg) by mouth 2 times daily  Qty: 60 tablet, Refills: 1    Associated Diagnoses: Diffuse large B-cell lymphoma of lymph nodes of multiple regions (H)      allopurinol (ZYLOPRIM) 300 MG tablet Take 1 tablet (300 mg) by mouth daily  Qty: 30 tablet, Refills: 1    Associated Diagnoses: Diffuse large B-cell lymphoma of lymph nodes of multiple regions (H)      fluconazole (DIFLUCAN) 200 MG tablet Take 1 tablet (200 mg) by mouth daily for 21 days  Qty: 21 tablet, Refills: 0    Associated Diagnoses: Diffuse large B-cell  lymphoma of lymph nodes of multiple regions (H)      levofloxacin (LEVAQUIN) 250 MG tablet Take 1 tablet (250 mg) by mouth daily for 21 days  Qty: 21 tablet, Refills: 0    Associated Diagnoses: Diffuse large B-cell lymphoma of lymph nodes of multiple regions (H)      ondansetron (ZOFRAN) 4 MG tablet Take 1 tablet (4 mg) by mouth every 8 hours as needed for nausea  Qty: 21 tablet, Refills: 0    Associated Diagnoses: Diffuse large B-cell lymphoma of lymph nodes of multiple regions (H)         CONTINUE these medications which have CHANGED    Details   morphine (MS CONTIN) 15 MG CR tablet Take 3 tablets (45 mg) by mouth every 12 hours  Qty: 180 tablet, Refills: 0    Associated Diagnoses: Diffuse large B-cell lymphoma of lymph nodes of multiple regions (H); Pain crisis         CONTINUE these medications which have NOT CHANGED    Details   acetaminophen (TYLENOL) 325 MG tablet Take 2 tablets (650 mg) by mouth every 4 hours as needed for mild pain  Qty:        atorvastatin (LIPITOR) 40 MG tablet Take 1 tablet (40 mg) by mouth daily  Qty: 30 tablet, Refills: 0    Associated Diagnoses: Paroxysmal atrial fibrillation (H)      carvedilol (COREG) 12.5 MG tablet Take 12.5 mg by mouth 2 times daily (with meals)      gabapentin (NEURONTIN) 300 MG capsule Take 2 tabs every am, take one tab at bedtime  Qty: 90 capsule, Refills: 1    Associated Diagnoses: Neuropathy      lisinopril (ZESTRIL) 20 MG tablet Take 20 mg by mouth daily      metoclopramide (REGLAN) 10 MG tablet Take 1 tablet (10 mg) by mouth every 6 hours as needed (hiccups)  Qty: 10 tablet, Refills: 0    Associated Diagnoses: Hiccups      omeprazole (PRILOSEC) 40 MG DR capsule Take 40 mg by mouth daily      !! polyethylene glycol (MIRALAX) 17 g packet Take 1 packet by mouth daily as needed for constipation      rivaroxaban ANTICOAGULANT (XARELTO ANTICOAGULANT) 20 MG TABS tablet Take 1 tablet (20 mg) by mouth daily (with dinner)  Qty: 30 tablet, Refills: 3    Associated  Diagnoses: Paroxysmal atrial fibrillation (H)      senna-docusate (SENOKOT-S/PERICOLACE) 8.6-50 MG tablet Take 1-2 tablets by mouth 2 times daily as needed for constipation  Qty: 10 tablet, Refills: 0    Associated Diagnoses: Diffuse large B-cell lymphoma of lymph nodes of multiple regions (H)      morphine (MSIR) 15 MG IR tablet Take 1 tablet (15 mg) by mouth every 4 hours as needed for moderate to severe pain  Qty: 15 tablet, Refills: 0    Associated Diagnoses: Diffuse large B-cell lymphoma of lymph nodes of multiple regions (H); Pain crisis      naloxone (NARCAN) 4 MG/0.1ML nasal spray Spray 1 spray (4 mg) into one nostril alternating nostrils as needed for opioid reversal every 2-3 minutes until assistance arrives  Qty: 0.2 mL, Refills: 1    Associated Diagnoses: Diffuse large B-cell lymphoma of lymph nodes of multiple regions (H); Pain crisis      !! polyethylene glycol (MIRALAX) 17 g packet Take 17 g by mouth daily  Qty: 5 packet, Refills: 0    Associated Diagnoses: Diffuse large B-cell lymphoma of lymph nodes of multiple regions (H)       !! - Potential duplicate medications found. Please discuss with provider.        Allergies   No Known Allergies  Data   Most Recent 3 CBC's:  Recent Labs   Lab Test 08/10/20  0506 08/09/20  0539 08/08/20  0642   WBC 12.0* 11.3* 11.7*   HGB 7.8* 7.0* 7.4*   MCV 95 96 94   * 128* 132*      Most Recent 3 BMP's:  Recent Labs   Lab Test 08/10/20  1406 08/10/20  0506 08/09/20  2225 08/09/20  2115  08/09/20  0539  08/08/20  0642   NA  --  137  --   --   --  138  --  136   POTASSIUM 4.2 4.1 4.2 6.0*   < > 4.7   < > 5.1   CHLORIDE  --  105  --   --   --  104  --  103   CO2  --  28  --   --   --  27  --  26   BUN  --  24  --   --   --  13  --  9   CR  --  0.47*  --   --   --  0.40*  --  0.43*   ANIONGAP  --  4  --   --   --  7  --  7   STACY 8.7 9.0  --  9.0   < > 8.7   < > 8.7   GLC  --  114*  --   --   --  119*  --  138*    < > = values in this interval not displayed.     Most  Recent 2 LFT's:  Recent Labs   Lab Test 08/10/20  0506 08/09/20  0539   AST 10 20   ALT 17 18   ALKPHOS 83 90   BILITOTAL 0.7 0.7     Most Recent INR's and Anticoagulation Dosing History:  Anticoagulation Dose History     Recent Dosing and Labs Latest Ref Rng & Units 3/17/2020 3/18/2020 3/18/2020 3/18/2020 3/19/2020 3/19/2020 8/1/2020    INR 0.86 - 1.14 2.47(H) 1.74(H) 1.63(H) 1.57(H) 1.35(H) 1.31(H) 1.29(H)        Most Recent 3 Troponin's:  Recent Labs   Lab Test 08/02/20  0107 08/01/20  2305   TROPI 0.042 0.043     Most Recent Cholesterol Panel:No lab results found.  Most Recent 6 Bacteria Isolates From Any Culture (See EPIC Reports for Culture Details):  Recent Labs   Lab Test 08/09/20  2105 08/02/20  0320 03/19/20  1005 03/14/20  1406 03/14/20  1402 03/11/20  0300   CULT PENDING No growth No growth No growth No growth No growth     Most Recent TSH, T4 and A1c Labs:No lab results found.  Results for orders placed or performed during the hospital encounter of 08/07/20   XR Chest Port 1 View    Narrative    Exam: XR CHEST PORT 1 VW, 8/7/2020 5:15 PM    Indication: PICC placement    Comparison: 3/9/2020    Findings:   Single portable AP radiograph of the chest. Right upper extremity PICC  line with tip terminating over the cavoatrial junction. Trachea is  midline. The cardiomediastinal silhouette is stable. Right  costophrenic angle is not within view. No left pleural effusion. No  pneumothorax. Bibasilar atelectasis. Mild interstitial airspace  opacities.      Impression    IMPRESSION:  1. Interval placement of right upper extremity PICC line with tip  terminating over the cavoatrial junction.  2. Basilar atelectasis.  3. Subtle interstitial airspace opacities, may represent mild  pulmonary edema.    I have personally reviewed the examination and initial interpretation  and I agree with the findings.    ERIBERTO OLIVER MD     I have seen, interviewed, and examined the patient independently.  I have reviewed the  vital signs and labs.  This note reflects my assessment and plan.    We have spent greater than 30 minutes organizing outpatient care, follow up appointments, and medications.    Jd became confused, had trouble operating his cell phone and remote control, and was aware that he was seeing things that were not there. We stopped ifosphamide for presumed neurotoxicity. He received approximately 80% of the dose. Further chemo to be determined by primary oncologist.    He was much clearer by later in the am.    Has had some blood from his penis after urination off and on for the past few months. We reviewed urology, they felt this is an outpatient issue and that he should follow this up with PCP.     Em Santiago MD/PhD

## 2020-08-10 NOTE — PROGRESS NOTES
Discharge to Home:  D: Orders for discharge and outpatient medications written. Pt verbalized readiness to discharge to home this afternoon.  I: PICC line removed per MD order and in accordance to protocol. Home medications and return to clinic schedule reviewed with patient. Discharge instructions and parameters for calling Health Care Provider reviewed. Patient ambulated off of the unit at 1502.   A: Patient verbalized understanding of all discharge medications and discharge paperwork and was ready for discharge.   P: Patient instructed to  medications at the discharge pharmacy prior to leaving the hospital. Follow up as scheduled/documented on discharge paperwork.

## 2020-08-10 NOTE — PROGRESS NOTES
UA: concerning for UTI>   B/P: 152/78, T: 96.3, P: 71, R: 16  Temp (24hrs), Av.3  F (35.7  C), Min:95.5  F (35.3  C), Max:98.1  F (36.7  C)    Give po cipro 500 mg x 1.   Follow-up UC  Further per UC.     Moonlighter.

## 2020-08-10 NOTE — PROVIDER NOTIFICATION
Edgar SWIFT notified pt is disoriented to place and situation. Pt keeps asking about polygamy and if he is a part of it. Having urinary urgency, frequency and incontinence. Urine with dayton blood present.

## 2020-08-11 NOTE — NURSING NOTE
Chief Complaint   Patient presents with     Blood Draw     labs drawn with vpt by rn.  vs taken      Labs drawn with vpt by rn.  Pt tolerated well.  VS taken.  Pt checked in for next appt.    Meaghan Garrido RN

## 2020-08-11 NOTE — PATIENT INSTRUCTIONS
[facility] Triage and after hours / weekends / holidays:  [phone]    Please call the triage or after hours line if you experience a temperature greater than or equal to 100.5, shaking chills, have uncontrolled nausea, vomiting and/or diarrhea, dizziness, shortness of breath, chest pain, bleeding, unexplained bruising, or if you have any other new/concerning symptoms, questions or concerns.      If you are having any concerning symptoms or wish to speak to a provider before your next infusion visit, please call your care coordinator or triage to notify them so we can adequately serve you.     If you need a refill on a narcotic prescription or other medication, please call before your infusion appointment.               August 2020 Sunday Monday Tuesday Wednesday Thursday Friday Saturday                                 1    Admission   9:31 PM   Ariel Bond MD   [address] [place] [address]   (Discharge: 8/5/2020)   2    MR LUMBAR SPINE WWO   6:00 PM   (45 min.)   [id number]   [facility], MRI    MR THORACIC SPINE WWO   6:30 PM   (45 min.)   [id number]   [facility], MRI 3     4    ENDOSCOPIC ULTRASOUND, ESOPHAGOSCOPY / UPPER GASTROINTESTINAL TRACT (GI)  10:00 AM   [place] Tobias MD    GI 5    BONE MARROW BIOPSY TECH  12:30 PM   (90 min.)    BONE MARROW BIOPSY [facility] Procedures 6     7    TELEPHONE VISIT RETURN   9:30 AM   (30 min.)   Avery Cary MD   [facility]    Admission   1:55 PM   Em Santiago MD   [address] [place] [address]   (Discharge: 8/10/2020)    XR CHEST PORT 1 VIEW   5:10 PM   (20 min.)   [id number]   [facility],  Radiology 8       9     10     11    [facility] DRAW  10:15 AM   (15 min.)    [facility] DRAW   [facility] Lab Draw    [facility] ONC INFUSION 360  11:00 AM   (360 min.)    ONCOLOGY INFUSION   [facility] 12     13     14     15       16     17     18     19     20  Happy  Birthday!     21     22       23     24     25     26     27     28    [facility] DRAW   9:00 AM   (15 min.)    [facility] DRAW   [facility] Draw    [facility] RETURN   9:45 AM   (50 min.)   Nicole Giraldo PA-C [facility] 29 30 31 September 2020 Sunday Monday Tuesday Wednesday Thursday Friday Saturday             1     2     3     4     5       6     7     8     9     10     11     12       13     14     15     16     17     18     19       20     21     22     23     24     25     26       27     28     29     30                                   Recent Results (from the past 24 hour(s))   Phosphorus    Collection Time: 08/10/20  2:06 PM   Result Value Ref Range    Phosphorus 3.1 2.5 - 4.5 mg/dL   Potassium    Collection Time: 08/10/20  2:06 PM   Result Value Ref Range    Potassium 4.2 3.4 - 5.3 mmol/L   Uric acid    Collection Time: 08/10/20  2:06 PM   Result Value Ref Range    Uric Acid 2.9 (L) 3.5 - 7.2 mg/dL   Calcium    Collection Time: 08/10/20  2:06 PM   Result Value Ref Range    Calcium 8.7 8.5 - 10.1 mg/dL   Blood component    Collection Time: 08/11/20  1:00 AM   Result Value Ref Range    Unit Number [id number]     Blood Component Type PlateletPheresis,LeukoRed Irrad (Part 2)     Division Number 00     Status of Unit Ready for patient 08/11/2020 0721     Blood Product Code [id number]     Unit Status RACQUEL    CBC with platelets differential    Collection Time: 08/11/20 11:26 AM   Result Value Ref Range    WBC 13.1 (H) 4.0 - 11.0 10e9/L    RBC Count 3.20 (L) 4.4 - 5.9 10e12/L    Hemoglobin 8.8 (L) 13.3 - 17.7 g/dL    Hematocrit 29.0 (L) 40.0 - 53.0 %    MCV 91 78 - 100 fl    MCH 27.5 26.5 - 33.0 pg    MCHC 30.3 (L) 31.5 - 36.5 g/dL    RDW 17.2 (H) 10.0 - 15.0 %    Platelet Count 168 150 - 450 10e9/L    Diff Method Automated Method     % Neutrophils 97.9 %    % Lymphocytes 1.1 %    % Monocytes 0.2 %    % Eosinophils 0.1  %    % Basophils 0.1 %    % Immature Granulocytes 0.6 %    Nucleated RBCs 0 0 /100    Absolute Neutrophil 12.8 (H) 1.6 - 8.3 10e9/L    Absolute Lymphocytes 0.2 (L) 0.8 - 5.3 10e9/L    Absolute Monocytes 0.0 0.0 - 1.3 10e9/L    Absolute Eosinophils 0.0 0.0 - 0.7 10e9/L    Absolute Basophils 0.0 0.0 - 0.2 10e9/L    Abs Immature Granulocytes 0.1 0 - 0.4 10e9/L    Absolute Nucleated RBC 0.0

## 2020-08-11 NOTE — PROGRESS NOTES
Infusion Nursing Note:  Kenneth Tello presents today for Neulasta and possible transfusion.    Patient seen by provider today: No  Discharged from hospital yesterday, 8/10/20.    Treatment Conditions:  Lab Results   Component Value Date    HGB 8.8 08/11/2020     Lab Results   Component Value Date    WBC 13.1 08/11/2020      Lab Results   Component Value Date    ANEU 12.8 08/11/2020     Lab Results   Component Value Date     08/11/2020            Note: Pt with no concerns. Reports urinary frequency which is an ongoing issue, per pt.  Per Discharge orders from yesterday, pt will follow-up with primary doctor.  Currently taking Levaquin and Fluconazole per MD note.     Did not meet parameters today for transfusion.  Hgb 8.8 and Platelet count 168.   Pt stated he will have labs checked locally (lives 2 hours away).  Pt instructed to call with concerns, chills/fever >=100.4.     Post Infusion Assessment:  Patient tolerated Neulasta injection to RLQ abdomen without incident.    Discharge Plan:   Patient declined prescription refills.  Discharge instructions reviewed with: Patient.  Patient and/or family verbalized understanding of discharge instructions and all questions answered.  Copy of AVS reviewed with patient and/or family.  Patient will return 8/28/20 for next appointment.  Patient discharged in stable condition accompanied by: self.  Departure Mode: Ambulatory.  Face to Face time: 0.    Josee Dougherty RN

## 2020-08-14 NOTE — PROGRESS NOTES
"Called Kenneth Tello to go over labs and  Hospital discharge. He is very discouraged. He says he does not know what to do at this oint. His stomach a this time is he main compliant. He says it is \"cramped up\" a lot of the time. Writer spent a lot of time gong over his med's, labs and plan. He will take Zofran at least twice daily(currently not taking) with his Prilosec over the weekend. He will try small frequent meals. Last might he ate a large portion of spaghetti and now is paying for it. Said that might be too acidic for his stomach and to much at once. Told him to try \"carnation instant milk with ice cream. He likes ice cream.     Labs good. Very discouraged. Says he is loosing weight, does not want too. Needing more assistance, having trouble walking. Only due to him being so weak, he says. Denies any neurological s/s/bad knee. Had to move in with his wife who he is  from. Working well though.     Writer will call him on Monday to follow-up     8/17/2020-labs done. Writer saw that triage had called him but writer said on Friday that she would call back for a update on how he did over the weekend. He reports he did \"everything\" writer told him. He feels better. Reviewed labs and said he should get them repeated in a few days. He told writer he would get them when he gets back. He is on his way to [place]. He is with his brother and sister. He is not driving. Plan is to be back on Thursday or Friday. He will get labs as soon as he gets back. Writer reviewed precautions to take with low WBC(0.1) and plts(19). Reviewed neutropenic/bleeding precautions, including good handwashing, staying away from sick people/crowds and calling temperature over 100.4 day or night. If any signs of the above he needs to stop and get medical attention immediately. He states he just needed to go and take this vacation. He did bring med's with him, including hs nausea med's. He is eating better, not good. Had some eggs " "yesterday.     [facility] 7.7    Writer will continue to follow.     8/19/2020-11:15 AM -Called Kenneth early this am, NA, left VM telling him to hold his Xarelto if he was not already doing so. Called back now and he had received writers message and he will hold it. He had not been holding it. He has not talking it yet today. Asked how the trip was going. They are in [place]. Yesterday was tough he said, he was throwing up. \"Might of just been car sick. Says when he got to the hotel he was fine\". Change in altitude might of cause this also. Reminded him to take his Zofran. No signs of bleeding or fevers per him.      Plan still to be back for labs on Friday. Will hold Xarelto until told to restart.     "

## 2020-08-17 NOTE — TELEPHONE ENCOUNTER
DATE:  8/17/20  TIME OF RECEIPT FROM LAB:  0916  LAB TEST:  WBC=0.1, plts=19, Hgb=7.7  RESULTS PAGED TO (PROVIDER):  Carlos DELGADO  919- Carlos has only seen Pt for a cancelled BMBX. Dr Diaz 475.  TIME LAB VALUE REPORTED TO PROVIDER: 1008  If no bleeding recommends redoing labs (CBC, Type & Screen) on Thursday and possible transfusion.   Spoke with Pt, Pt states is typical for post Tx, has appointments with local lab scheduled for Friday.

## 2020-08-18 NOTE — ANESTHESIA POSTPROCEDURE EVALUATION
Anesthesia POST Procedure Evaluation    Patient: Kenneth Tello   MRN:     [id number] Gender:   male   Age:    49 year old :      1970        Preoperative Diagnosis: Diffuse large B-cell lymphoma of lymph nodes of multiple regions (H) [C83.38]   Procedure(s):  ESOPHAGOGASTRODUODENOSCOPY, WITH FINE NEEDLE ASPIRATION BIOPSY, WITH ENDOSCOPIC ULTRASOUND GUIDANCE   Postop Comments: No value filed.     Anesthesia Type: MAC       Disposition: Outpatient   Postop Pain Control: Uneventful            Sign Out: Well controlled pain   PONV: No   Neuro/Psych: Uneventful            Sign Out: Acceptable/Baseline neuro status   Airway/Respiratory: Uneventful            Sign Out: Acceptable/Baseline resp. status   CV/Hemodynamics: Uneventful            Sign Out: Acceptable CV status   Other NRE: NONE   DID A NON-ROUTINE EVENT OCCUR? No         Last Anesthesia Record Vitals:  CRNA VITALS  2020 1126 - 2020 1226      2020             Pulse:  56    Ht Rate:  54    SpO2:  100 %          Last PACU Vitals:  Vitals Value Taken Time   BP     Temp     Pulse     Resp     SpO2     Temp src     NIBP 88/44 2020 11:52 AM   Pulse 56 2020 11:53 AM   SpO2 100 % 2020 11:53 AM   Resp     Temp     Ht Rate 54 2020 11:53 AM   Temp 2 36.2  C (97.2  F) 2020 11:50 AM         Electronically Signed By: Christopher J. Behrens, MD, 2020, 6:08 PM

## 2020-08-21 NOTE — TELEPHONE ENCOUNTER
"Writer  Called Kenneth Tello. He is very weak. Does not know what to do. He says he has felt this way for a while. Just got back from [place]. He had low counts when he left, see previous notes. He said he fell yesterday, hit his knee, \"real bad\". Has a quarter size scab here, no bruising per him. Denies a headache, but slightly off/agitated as he just does not know what to do. Says his [facility] is [place] [facility]. Writer told him he needs to go across the street to his local ER. It is literally across the street. They will do his labs and get him stable and then they can get a plan in place. If needed then they can [facility].    He was agreeable to this plan. He wife will bring him across the street to the ER.    [place] [facility] called with report and to expect him any minute. They will keep writer updated.   "

## 2020-08-21 NOTE — TELEPHONE ENCOUNTER
[facility] Telephone Triage Note    Assessment: Patient called in to triage reporting the following symptoms: patient states he was supposed to have labs drawn this morning but is too weak to go to lab appointment. Patient states he's not sure what's going on but he's weak and feels terrible, has some confusion during conversation. Patient further states that he can't eat or drink anything. Again, patient was very confused during conversation and didn't always make sense with what he was saying.    Recommendations: ER Visit recommended.  Pt states that he feels that he can have his daughter bring him to [facility] as he is not comfortable going to local ER. Writer advised that if needed, he may go to local ER and they can ensure that he is safe in the immediate and transfer as needed to [facility] but that he needs to go now. If daughter unable to take him, he should call for an ambulance.    Follow-Up: Instructed patient to seek care immediately for worsening symptoms, including: fever, chest pain, shortness of breath, dizziness. Writer routing to care team for informational purposes.

## 2020-08-22 NOTE — TELEPHONE ENCOUNTER
Writer received call from ER doctor(Geraldo) at [facility]. Kenneth is there. He has had his labs, WBC is recovering, Hgb 6.4, plts 2    They will admit to observation for 2 units of blood and a 6 pack of platelets. Do repeat labs in the am. They have writer direct line if needed. Writer will reach out to patient in am for update.     Doctor reports a bruise and abrasion where he fell 8/20/20. They will continue to monitor. He reported to writer that Kenneth wanted to be transferred to the [place] but we would give blood just as they would and safer for him not to travel with platelets at .

## 2020-08-22 NOTE — PROGRESS NOTES
"Writer called Kenneth back today as she said she would. He was kept over night for observation. He states he feels better today. They are keeping him because his \"b/p is low. He could not tell me for sure how low. He talked about his trip to [place]. He said if he know he was going to get so sick he would not of gone. He received blood products, current getting IV fluids for his low b/p.  Writer told Kenneth once we know what the plan there was we would let him know more about our plan. Maya called the hospital and was able to talk with same doctor, Ike Chow that writer talked to 8/21/20. They  repeated labs but are not back yet. He will call writer when done. He called writer back with the repeat labs.   Hgb 7.7  plts 11  WBC 4.3  B/p 80/30's    They will monitor his b/p after the current bag is in. If stable they will discharge him and have him come in tomorrow for repeat labs.     Writer will follow closely.  "

## 2020-08-25 NOTE — TELEPHONE ENCOUNTER
DATE:  8/25/2020   TIME OF RECEIPT FROM LAB:  1:07 PM  LAB TEST:  plt   LAB VALUE:  24  RESULTS GIVEN WITH READ-BACK TO (PROVIDER):  KIRILL BOBO  TIME LAB VALUE REPORTED TO PROVIDER:   Paged 1:07 PM    WBC 13  ANC 11.6  Hgb 8

## 2020-08-28 NOTE — LETTER
8/28/2020         RE: Kenneth Tello  [address]  [place] [address]      August 28, 2020     Reason for Visit: F/U diffuse large B cell lymphoma (DLBCL)    Oncology HPI: Mr. Tello is a 50 year old man with DLBCL.  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  DLBCL-IPI was high (stage, extranodal sites, LDH) and clinical stage IV with extranodal involvement.  He was started on R-CHOP in 03/2020 with IT methotrexate planned with each cycle for CNS prophylaxis.  Medical history also notable for A fib on anticoagulation, myocardial infarction, and stroke in 2017.  Course has been complicated by constipation and worsening abdominal pain in 08/2020 after completion of treatment.  Imaging demonstrated enlarging mass invading the pancreas and spleen.  Biopsy confirmed refractory DLBCL and additional staging pending.  Salvage therapy RICE 8/7/20, tolerated poorly with neurotoxicity.     Here today prior to admit for cycle 2.    Interval history:   Kenneth feels overall fatigued. His main complaints are constipation and decreased appetite. He forgot to take his miralax the last couple days so he has not had a BM in a couple days. Has not tried taking the softeners either. Appetite worse after this last cycle chemotherapy. Eating yogurt and soft food because of poor dentition. Has some protein shakes but doesn't consistently drink them. Cognition slowly improving since chemotherapy too. His pain is controlled with the MScontin 45 mg. He was recently in his local ER for platelets and blood. They stopped his Xarelto and blood pressure medications at that time. He also recently took a trip to [place] because his dad wanted him to. He did not feel well at all during this trip. Denies fevers/night  sweats/new pains or bumps. He asks if the treatment he is getting in the hospital is for his lymphoma.      10 point review of systems otherwise negative    Current Outpatient Medications   Medication Sig Dispense Refill     acetaminophen (TYLENOL) 325 MG tablet Take 2 tablets (650 mg) by mouth every 4 hours as needed for mild pain       acyclovir (ZOVIRAX) 400 MG tablet Take 1 tablet (400 mg) by mouth 2 times daily 60 tablet 1     allopurinol (ZYLOPRIM) 300 MG tablet Take 1 tablet (300 mg) by mouth daily 30 tablet 1     atorvastatin (LIPITOR) 40 MG tablet Take 1 tablet (40 mg) by mouth daily 30 tablet 0     carvedilol (COREG) 12.5 MG tablet Take 12.5 mg by mouth 2 times daily (with meals)       fluconazole (DIFLUCAN) 200 MG tablet Take 1 tablet (200 mg) by mouth daily for 21 days 21 tablet 0     gabapentin (NEURONTIN) 300 MG capsule Take 2 tabs every am, take one tab at bedtime 90 capsule 1     levofloxacin (LEVAQUIN) 250 MG tablet Take 1 tablet (250 mg) by mouth daily for 21 days 21 tablet 0     lisinopril (ZESTRIL) 20 MG tablet Take 20 mg by mouth daily       metoclopramide (REGLAN) 10 MG tablet Take 1 tablet (10 mg) by mouth every 6 hours as needed (hiccups) 10 tablet 0     morphine (MS CONTIN) 15 MG CR tablet Take 1 tablet (15 mg) by mouth every 12 hours 180 tablet 0     morphine (MS CONTIN) 30 MG CR tablet Take 1 tablet (30 mg) by mouth every 12 hours maximum 2 tablet(s) per day 60 tablet 0     morphine (MSIR) 15 MG IR tablet Take 1 tablet (15 mg) by mouth every 4 hours as needed for moderate to severe pain 15 tablet 0     naloxone (NARCAN) 4 MG/0.1ML nasal spray Spray 1 spray (4 mg) into one nostril alternating nostrils as needed for opioid reversal every 2-3 minutes until assistance arrives 0.2 mL 1     omeprazole (PRILOSEC) 40 MG DR capsule Take 40 mg by mouth daily       ondansetron (ZOFRAN) 4 MG tablet Take 1 tablet (4 mg) by mouth every 8 hours as needed for nausea 21 tablet 0     polyethylene glycol  (MIRALAX) 17 g packet Take 17 g by mouth daily 5 packet 0     polyethylene glycol (MIRALAX) 17 g packet Take 1 packet by mouth daily as needed for constipation       rivaroxaban ANTICOAGULANT (XARELTO ANTICOAGULANT) 20 MG TABS tablet Take 1 tablet (20 mg) by mouth daily (with dinner) 30 tablet 3     senna-docusate (SENOKOT-S/PERICOLACE) 8.6-50 MG tablet Take 1-2 tablets by mouth 2 times daily as needed for constipation 10 tablet 0        No Known Allergies    Objective  Vital Signs 8/28/2020   Systolic 120   Diastolic 76   Pulse 82   Temperature 98.9   Respirations 18   Weight (LB) 193 lb 4.8 oz   O2 99       General: No acute distress  HEENT: Sclera anicteric. Oral mucosa pink and moist.  No mucositis or thrush  Lymph: No lymphadenopathy palpable  Heart: Regular, rate, and rhythm  Lungs: Clear to ascultation bilaterally  Abdomen: Positive bowel sounds. Soft, non-distended, non-tender. No organomegaly or mass.   Extremities: no lower extremity edema  Neuro: Cranial nerves grossly intact  Rash: none    Labs:    8/28/2020 09:07   Sodium 142   Potassium 3.5   Chloride 107   Carbon Dioxide 29   Urea Nitrogen 6 (L)   Creatinine 0.62 (L)   GFR Estimate >90   GFR Estimate If Black >90   Calcium 8.4 (L)   Anion Gap 7   Albumin 2.6 (L)   Protein Total 6.5 (L)   Bilirubin Total 0.9   Alkaline Phosphatase 132   ALT 16   AST 14   Lactate Dehydrogenase 256 (H)   Glucose 87   WBC 8.1   Hemoglobin 8.1 (L)   Hematocrit 26.0 (L)   Platelet Count 50 (L)   RBC Count 2.91 (L)   MCV 89   MCH 27.8   MCHC 31.2 (L)   RDW 18.2 (H)   Diff Method Automated Method   % Neutrophils 88.0   % Lymphocytes 4.0   % Monocytes 8.0   Absolute Neutrophil 7.2   Absolute Lymphocytes 0.3 (L)   Absolute Monocytes 0.6     Imaging: n/a    Impression/plan:     DLBCL: s/p 6 cycles of R-CHOP with Neulasta support and IT methotrexate.  Unfortunately, workup confirmed refractory lymphoma based on EUS biopsy. Did not tolerate R-ICE--due to neurotoxicity from  ifosfamide. Plan per Dr. Cary to proceed with R-DHAP for following cycles. Reviewed the side effects expected including bone marrow suppression, GI toxicities, increased risk for infection, neurotoxicity, nephrotoxicity, and ototoxicity, and fatigue. We again discussed autoHSCT is the best chance for cure but that his lymphoma needs to be in remission first. Will give 2-4 cycles of chemo. Plan was to admit for RDHAP today though his platelets are 50K today so will delay admission and chemotherapy until labs rechecked Monday (8/31). Goal per Dr. Cary for plt ~100K.   -if plt still not 100k on Monday, will delay by another 2-3 days  -Will  repeat PET after this next cycle and then follow-up with Dr. Cary  -still needs BMT consult, hopefully will be able to do IP  -will need post hospital follow-up with all his previous chemo complications     Ppx: Continue ACV    Pancytopenia 2/2 chemotherapy: Counts are recovering though still has persistent TCP. Will need weekly labs/transfusions. Note Xarelto currently on hold, though can restart when platelet >50 (likely on Monday). No active infx issues.    Constipation: Has been a continual problem with chemotherapy. Was previously hospitalized due to this. Has been inconsistent in his bowel regimen. I stressed the importance of compliance with his miralax and adding prn softeners when needed.     FEN: Continue to push protein supplements as he continues to have poor nutrition due to poor dentition.     Back pain: 2/2 malignancy, retroperitoneal LAD: Stable with MS contin. Not requiring anything for breakthrough.     Ghazal Candelario CNP    The patient was seen in conjunction with Ghazal Candelario CNP who served as a scribe for today's visit. I have reviewed and edited the above note, and agree with the above findings and plan.    Nicole Giraldo PA-C        Chief Complaint   Patient presents with     Blood Draw     VPT blood draw and vitals     Venipuncture labs  drawn from left arm         Nicole Giraldo PA-C

## 2020-08-28 NOTE — NURSING NOTE
"Oncology Rooming Note    August 28, 2020 9:46 AM   Kenneth Tello is a 50 year old male who presents for:    Chief Complaint   Patient presents with     Blood Draw     VPT blood draw and vitals     Oncology Clinic Visit     Metastic cancer of spine     Initial Vitals: /76   Pulse 82   Temp 98.9  F (37.2  C) (Oral)   Resp 18   Wt 87.7 kg (193 lb 4.8 oz)   SpO2 99%   BMI 28.55 kg/m   Estimated body mass index is 28.55 kg/m  as calculated from the following:    Height as of 8/7/20: 1.753 m (5' 9\").    Weight as of this encounter: 87.7 kg (193 lb 4.8 oz). Body surface area is 2.07 meters squared.  No Pain (0) Comment: Data Unavailable   No LMP for male patient.  Allergies reviewed: Yes  Medications reviewed: Yes    Medications: Medication refills not needed today.  Pharmacy name entered into [employer]:    [id number] - [place], [place] - [address] [id number] - [place] - [address] [place] [address] AT [address] [facility]    Clinical concerns: Patient states he has been dealing with painful constipation for the last 2-3 days. Nicole Giraldo was notified.      Arlene Ngo MA            "

## 2020-08-28 NOTE — PROGRESS NOTES
August 28, 2020     Reason for Visit: F/U diffuse large B cell lymphoma (DLBCL)    Oncology HPI: Mr. Tello is a 50 year old man with DLBCL.  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  DLBCL-IPI was high (stage, extranodal sites, LDH) and clinical stage IV with extranodal involvement.  He was started on R-CHOP in 03/2020 with IT methotrexate planned with each cycle for CNS prophylaxis.  Medical history also notable for A fib on anticoagulation, myocardial infarction, and stroke in 2017.  Course has been complicated by constipation and worsening abdominal pain in 08/2020 after completion of treatment.  Imaging demonstrated enlarging mass invading the pancreas and spleen.  Biopsy confirmed refractory DLBCL and additional staging pending.  Salvage therapy RICE 8/7/20, tolerated poorly with neurotoxicity.     Here today prior to admit for cycle 2.    Interval history:   Kenneth feels overall fatigued. His main complaints are constipation and decreased appetite. He forgot to take his miralax the last couple days so he has not had a BM in a couple days. Has not tried taking the softeners either. Appetite worse after this last cycle chemotherapy. Eating yogurt and soft food because of poor dentition. Has some protein shakes but doesn't consistently drink them. Cognition slowly improving since chemotherapy too. His pain is controlled with the MScontin 45 mg. He was recently in his local ER for platelets and blood. They stopped his Xarelto and blood pressure medications at that time. He also recently took a trip to [place] because his dad wanted him to. He did not feel well at all during this trip. Denies fevers/night sweats/new pains or bumps. He asks if the treatment he is getting in the hospital is for his lymphoma.       10 point review of systems otherwise negative    Current Outpatient Medications   Medication Sig Dispense Refill     acetaminophen (TYLENOL) 325 MG tablet Take 2 tablets (650 mg) by mouth every 4 hours as needed for mild pain       acyclovir (ZOVIRAX) 400 MG tablet Take 1 tablet (400 mg) by mouth 2 times daily 60 tablet 1     allopurinol (ZYLOPRIM) 300 MG tablet Take 1 tablet (300 mg) by mouth daily 30 tablet 1     atorvastatin (LIPITOR) 40 MG tablet Take 1 tablet (40 mg) by mouth daily 30 tablet 0     carvedilol (COREG) 12.5 MG tablet Take 12.5 mg by mouth 2 times daily (with meals)       fluconazole (DIFLUCAN) 200 MG tablet Take 1 tablet (200 mg) by mouth daily for 21 days 21 tablet 0     gabapentin (NEURONTIN) 300 MG capsule Take 2 tabs every am, take one tab at bedtime 90 capsule 1     levofloxacin (LEVAQUIN) 250 MG tablet Take 1 tablet (250 mg) by mouth daily for 21 days 21 tablet 0     lisinopril (ZESTRIL) 20 MG tablet Take 20 mg by mouth daily       metoclopramide (REGLAN) 10 MG tablet Take 1 tablet (10 mg) by mouth every 6 hours as needed (hiccups) 10 tablet 0     morphine (MS CONTIN) 15 MG CR tablet Take 1 tablet (15 mg) by mouth every 12 hours 180 tablet 0     morphine (MS CONTIN) 30 MG CR tablet Take 1 tablet (30 mg) by mouth every 12 hours maximum 2 tablet(s) per day 60 tablet 0     morphine (MSIR) 15 MG IR tablet Take 1 tablet (15 mg) by mouth every 4 hours as needed for moderate to severe pain 15 tablet 0     naloxone (NARCAN) 4 MG/0.1ML nasal spray Spray 1 spray (4 mg) into one nostril alternating nostrils as needed for opioid reversal every 2-3 minutes until assistance arrives 0.2 mL 1     omeprazole (PRILOSEC) 40 MG DR capsule Take 40 mg by mouth daily       ondansetron (ZOFRAN) 4 MG tablet Take 1 tablet (4 mg) by mouth every 8 hours as needed for nausea 21 tablet 0     polyethylene glycol (MIRALAX) 17 g packet Take 17 g by mouth daily 5 packet 0     polyethylene glycol (MIRALAX) 17 g packet Take  1 packet by mouth daily as needed for constipation       rivaroxaban ANTICOAGULANT (XARELTO ANTICOAGULANT) 20 MG TABS tablet Take 1 tablet (20 mg) by mouth daily (with dinner) 30 tablet 3     senna-docusate (SENOKOT-S/PERICOLACE) 8.6-50 MG tablet Take 1-2 tablets by mouth 2 times daily as needed for constipation 10 tablet 0        No Known Allergies    Objective  Vital Signs 8/28/2020   Systolic 120   Diastolic 76   Pulse 82   Temperature 98.9   Respirations 18   Weight (LB) 193 lb 4.8 oz   O2 99       General: No acute distress  HEENT: Sclera anicteric. Oral mucosa pink and moist.  No mucositis or thrush  Lymph: No lymphadenopathy palpable  Heart: Regular, rate, and rhythm  Lungs: Clear to ascultation bilaterally  Abdomen: Positive bowel sounds. Soft, non-distended, non-tender. No organomegaly or mass.   Extremities: no lower extremity edema  Neuro: Cranial nerves grossly intact  Rash: none    Labs:    8/28/2020 09:07   Sodium 142   Potassium 3.5   Chloride 107   Carbon Dioxide 29   Urea Nitrogen 6 (L)   Creatinine 0.62 (L)   GFR Estimate >90   GFR Estimate If Black >90   Calcium 8.4 (L)   Anion Gap 7   Albumin 2.6 (L)   Protein Total 6.5 (L)   Bilirubin Total 0.9   Alkaline Phosphatase 132   ALT 16   AST 14   Lactate Dehydrogenase 256 (H)   Glucose 87   WBC 8.1   Hemoglobin 8.1 (L)   Hematocrit 26.0 (L)   Platelet Count 50 (L)   RBC Count 2.91 (L)   MCV 89   MCH 27.8   MCHC 31.2 (L)   RDW 18.2 (H)   Diff Method Automated Method   % Neutrophils 88.0   % Lymphocytes 4.0   % Monocytes 8.0   Absolute Neutrophil 7.2   Absolute Lymphocytes 0.3 (L)   Absolute Monocytes 0.6     Imaging: n/a    Impression/plan:     DLBCL: s/p 6 cycles of R-CHOP with Neulasta support and IT methotrexate.  Unfortunately, workup confirmed refractory lymphoma based on EUS biopsy. Did not tolerate R-ICE--due to neurotoxicity from ifosfamide. Plan per Dr. Cary to proceed with R-DHAP for following cycles. Reviewed the side effects expected  including bone marrow suppression, GI toxicities, increased risk for infection, neurotoxicity, nephrotoxicity, and ototoxicity, and fatigue. We again discussed autoHSCT is the best chance for cure but that his lymphoma needs to be in remission first. Will give 2-4 cycles of chemo. Plan was to admit for RDHAP today though his platelets are 50K today so will delay admission and chemotherapy until labs rechecked Monday (8/31). Goal per Dr. Cary for plt ~100K.   -if plt still not 100k on Monday, will delay by another 2-3 days  -Will  repeat PET after this next cycle and then follow-up with Dr. Cary  -still needs BMT consult, hopefully will be able to do IP  -will need post hospital follow-up with all his previous chemo complications     Ppx: Continue ACV    Pancytopenia 2/2 chemotherapy: Counts are recovering though still has persistent TCP. Will need weekly labs/transfusions. Note Xarelto currently on hold, though can restart when platelet >50 (likely on Monday). No active infx issues.    Constipation: Has been a continual problem with chemotherapy. Was previously hospitalized due to this. Has been inconsistent in his bowel regimen. I stressed the importance of compliance with his miralax and adding prn softeners when needed.     FEN: Continue to push protein supplements as he continues to have poor nutrition due to poor dentition.     Back pain: 2/2 malignancy, retroperitoneal LAD: Stable with MS contin. Not requiring anything for breakthrough.     Ghazal Candelario CNP    The patient was seen in conjunction with Ghazal Candelario CNP who served as a scribe for today's visit. I have reviewed and edited the above note, and agree with the above findings and plan.    Nicole Giraldo PA-C

## 2020-08-31 NOTE — PROVIDER NOTIFICATION
DATE/TIME  (DOT-TD, DOT-NOW) CHEMO CHECK ACTIVITY (REGIMEN & DOSE CHECK, DAY, DOSE #, NAME OF CHEMO #1)  CHEMO DRUG #2  CHEMO DRUG #3 NAME OF RN #1 (USE DOT-ME HERE) NAME OF RN#2 (2ND RN TO LOG IN SEPARATELY)   8/31/2020 12:21 PM R-DHAP protocol double check   Johana Armando, ZOILA Ham RN     8/31/2020  5:18 PM   Rituximab double check   ZIOLA Og RN     8/31/2020  8:26 PM   Cisplatin dose #1 double check   ZOILA Og RN     9/1/2020  5:51 PM   Dose #1 Cytarabine double check   Carlos Fajardo RN   (Kristina)   09/02/20  4:45 AM       Dose # 2 Cytarabine   Double check    ZOILA Choudhury, RN

## 2020-08-31 NOTE — PLAN OF CARE
Nursing Focus: Admission    D: Arrived at 12 PM from home. Patient accompanied by s/o Cullen. Admitted for cycle # 2 R-DHAP.      I: Admission process began.  Patient oriented to room, enviroment, call light.  Md. notified of patients arrival on unit.     A: Vital signs stable, afebrile.  Patient stable at this time.     P: Implement plan of care when available. Continue to monitor patient. Nursing interventions as appropriate. Notify md with changes in pt status.     Asymptomatic COVID swab obtained and sent to lab.

## 2020-08-31 NOTE — PROCEDURES
[facility], [place]    Double Lumen PICC Placement    Date/Time: 8/31/2020 3:05 PM  Performed by: Justine Kuo RN  Authorized by: Rayne Anthony PA-C   Indications: vascular access    UNIVERSAL PROTOCOL   Site Marked: Yes  Prior Images Obtained and Reviewed:  Yes  Required items: Required blood products, implants, devices and special equipment available    Patient identity confirmed:  Verbally with patient and arm band  NA - No sedation, light sedation, or local anesthesia  Confirmation Checklist:  Patient's identity using two indicators, relevant allergies, procedure was appropriate and matched the consent or emergent situation and correct equipment/implants were available  Time out: Immediately prior to the procedure a time out was called    Universal Protocol: the Joint Commission Universal Protocol was followed    Preparation: Patient was prepped and draped in usual sterile fashion           ANESTHESIA    Anesthesia: See MAR for details  Local Anesthetic:  Lidocaine 1% without epinephrine  Anesthetic Total (mL):  3.5      SEDATION    Patient Sedated: No        Preparation: skin prepped with ChloraPrep  Skin prep agent: skin prep agent completely dried prior to procedure  Sterile barriers: maximum sterile barriers were used: cap, mask, sterile gown, sterile gloves, and large sterile sheet  Hand hygiene: hand hygiene performed prior to central venous catheter insertion  Type of line used: PICC and Power PICC  Catheter type: double lumen  Lumen type: non-valved  Catheter size: 5 Fr  Brand: [employer]  Lot number: [id number]  Placement method: venipuncture, MST, ultrasound and tip confirmation system  Number of attempts: 1  Successful placement: yes  Location: basilic vein (0.66 cm vein diameter)  Arm circumference: adults 10 cm  Extremity circumference: 27  Visible catheter length: 2  Total catheter length: 44  Dressing and securement: chlorhexidine disc applied, blood  cleaned with CHG, site cleaned, statlock and sterile dressing applied  Post procedure assessment: blood return through all ports, free fluid flow and placement verified by x-ray  PROCEDURE   Patient Tolerance:  Patient tolerated the procedure well with no immediate complications

## 2020-08-31 NOTE — PROGRESS NOTES
Labs and Transfusion Orders:  Date: 2020    Patient: Kenneth Tello  : 1970    Diagnosis: Diffuse Large B-Cell Lymphoma    Medication administration:  [  ] Neulasta (pegfilgrastim) injection 6 mg x1 subcutaneous on 2020.    LABS:  [  ] Check CBC with differential and CMP twice a week (fax labs to [facility] at [phone]): 9/3, , 9/10, ,   [  ] Type and cross PRN    TRANSFUSION PARAMETERS:   [  ] Please transfuse 2 (two) units PRBCs if Hgb is less than or equal to 8.  [  ] Please transfuse 1 (one) unit platelets if plt count less than or equal to 10,000.  [  ] If patient experiences transfusion reaction during transfusion:   [  ] 25-50 mg benadryl IV x once PRN   [  ] Tylenol 1000 mg PO x once PRN   [  ] Hydrocortisone 100 mg IV x once PRN   [  ] Zantac 150 mg IV x once PRN   [  ] Notify provider covering infusion clinic per protocol      Please call the [facility] at [phone] for any questions, and ask to speak with the care coordinator for Dr. Cary (patient's primary oncologist).    Thank you,         Rayne Anthony PA-C    [facility]  Department of Hematology/Oncology  [address] [place] [address]  Pager: [phone]

## 2020-08-31 NOTE — PROGRESS NOTES
Care Coordinator Progress Note    Admission Date/Time:  8/31/2020  Attending MD:  Avery Cary MD    Data  Chart reviewed, discussed with interdisciplinary team.   Patient was admitted for: Diffuse large B-cell lymphoma of lymph nodes of multiple regions (H).    Concerns with insurance coverage for discharge needs: None.  Current Living Situation: Patient lives with spouse.  Support System: Supportive and Involved  Services Involved: Outpatient Infusion Services  Transportation at Discharge: Car and Family or friend will provide  Transportation to Medical Appointments:   - Not applicable  Barriers to Discharge: Medical Stability    Coordination of Care and Referrals: Provided patient/family with options for Outpatient Infusion Services.       Patient admitted today for Cycle 1 of salvage R-DHAP. Writer received order from provider for Neulasta (pegfilgrastim) injection 6 mg x1 subcutaneous on 09/03/2020 and Check CBC with differential and CMP twice a week on 9/3, 9/7, 9/10, 9/14, 9/17. Per provider, patient would like to go to [facility], Phone: [phone], Fax: [phone].     Orders faxed. [facility] will follow.      Plan  Anticipated Discharge Date:  9/2/20  Anticipated Discharge Plan:  Home with clinic follow-up as recommended.     Radha Castrejon RN, BSN, PHN  Care Coordinator   P: [phone], [facility]

## 2020-08-31 NOTE — NURSING NOTE
Chief Complaint   Patient presents with     Blood Draw     Labs only     Vitals done, labs drawn by venipuncture.  See doc flow sheets for details.  Sana Ramirez CMA

## 2020-08-31 NOTE — H&P
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    HEME  # DLBCL with primary refractory disease  Follows with Dr. Cary. 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 DLBCL-IPI was high (stage, extranodal sites, LDH) and clinical stage IV with extranodal involvement. Started on R-CHOP in 03/2020 with IT methotrexate each cycle for CNS prophylaxis. He completed 6 cycles R-CHOP. CT on 7/14 notable for increasing gastrohepatic ligament mass. PET/CT on 7/31 obtained subsequently and unfortunately notable for disease progression with increased size of retroperitoneal LN, multiple new pulmonary nodules, new focal uptake in T12 vertebral body, increased uptake in ascending colon. EUS of gastrohepatic ligament mass obtained 8/4 and consistent with persistent/recurrent large B-cell lymphoma. BMBx obtained 8/5 and notable for no morphologic or immunophenotypic evidence of involvement by B-cell lymphoma; marrow cellularity of 40-50% with trilineage hematopoiesis and no increase in blasts. Given evidence of refractory disease, decision was made to proceed with salvage R-ICE (Cycle 1, Day 1 = 8/7/2020). Course complicated by neurotoxicity; did not require methylene blue. However, given his poor tolerance of the ifosfamide, patient was subsequently switched to salvage R-DHAP and is admitted today for planned Cycle 1.  - PICC placed on  admission; will remove on discharge.  - Will request Neulasta and labs locally in [place], [place] per patient request.      Treatment Plan: R-DHAP. Cycle 1, Day 1 = 8/31/2020. Today is Day 1.     - Rituximab 375 mg/m2 x1 dose -- Day 1    - Dexamethasone 40 mg Q24H x4 doses -- Days 1-4    - Cisplatin 100 mg/m2 x1 dose over 24 hours -- Day 1    - Cytarabine 2 g/m2 Q12H x2 doses -- Day 2    - Neulasta -- Day 3; requested for 9/3 in [place], [place]    - Supportive care: Allopurinol, prednisolone ophthalmic suspension QID      # Low back pain  # Focal uptake in T12 vertebral body  Required admission 8/1-8/5 in the context of worsening pain. Symptoms initially thought attributable to focal uptake in T12 vertebral body noted on PET/CT (7/31), though there was no correlating lesion noted on MRI. Rad Onc consulted previously; consideration given to palliative radiation. However, will hold off at this time given plan for salvage chemotherapy. Followed by palliative care during previous admissions and as an outpatient.  - Continue PTA MS Contin 45 mg BID, Morphine IR 15mg q4h PRN.     # Normocytic anemia  Due to underlying malignancy and recent chemotherapy.  - Transfuse to keep Hgb >7    # Thrombocytopenia  Likely due to recent chemotherapy.  - Trend daily CBC  - Transfuse to keep plt >10K     ID  # PPx  - Continue  mg BID  - Plan to restart levofloxacin 250 mg and fluconazole 200 mg on discharge; hold while inpatient given ANC >1000     NEURO  # History of CVA  Diagnosed with a right-sided hemorrhagic CVA in 12/2017. Occurred in the setting of profound HTN. Patient with residual left arm/leg numbness and some slight LLE weakness.  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 Query potential contribution to back pain. Neurosurgery consulted previously; no intervention indicated.  - No acute inpatient management  "needs     CV  # Atrial fibrillation   - Continue PTA Xarelto 20 mg daily; hold for platelets <50K  - Continue PTA carvedilol 12.5 mg BID      # Hypertension  History of hypertensive emergency and subsequent CVA in 2017. BP better-controlled recently.  - Continue PTA carvedilol 12.5 mg BID  - Continue PTA lisinopril 20 mg daily    # Hyperlipidemia  - Hold PTA atorvastatin while inpatient; will restart on discharge    # History of NSTEMI  # History of cardiomyopathy  Documented in clinic notes from 01/01/2018. Noted during his hospitalization at [facility] in 12/2017. Per chart review, \"felt due to demand ischemia secondary to hypertensive urgency or stress-induced cardiomyopathy. EF was 43%.\"  - Obtain stat EKG & troponin for any chest pain or other concerning cardiac symptoms  - Most recent echo on 3/10/2020 with normal LVEF of 60-65%; mild concentric wall thickening consistent with left ventricular hypertrophy.     FEN/GI  # Constipation  Ongoing; previously attributed to vincristine (R-CHOP), now ongoing in the context of chronic opiate use. Last BM 5 days PTA. Still passing flatus. No associated abdominal pain, nausea/vomiting, or other red flag symptoms.   - Continue PTA Miralax BID  - Continue senna 2-3 tabs BID  - Mag citrate x1 PRN for refractory symptoms  - Consider KUB to exclude obstruction if symptoms persist or with any worsening    # Hiccoughs  - Continue PTA Baclofen or Reglan PRN     MISC  # Hx of WATSON  Per patient, diagnosed with WATSON 5 years ago. Not currently on CPAP due to insurance coverage; however, reports interest in treatment/repeat testing.  - Outpatient sleep medicine consultation pending     FEN:  -IVF per chemotherapy protocol  -Lyte replacement per standing protocol  -Regular diet as tolerated    Prophy/Misc:  -GI: PTA pantoprazole; bowel regimen as above  -DVT: Xarelto 20 mg daily; hold for plts <50K    Disposition: Inpatient admission to heme malignancy team. " "Anticipate discharge to home following completion of chemotherapy regimen, likely Wednesday, 9/2.     Rayne Anthony PA-C  Hematology/Oncology  Pager: [phone]    Code Status : Full Code (confirmed on admission)    Primary Care Physician   Nina Jeter    History of Present Illness   History obtained from chart and discussed with the patient.    Kenneth Tello is a 50-year-old male with a past medical history significant for atrial fibrillation (on Xarelto), 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    Patient reports that he has been feeling better as of late. He had a \"rough go\" with his first Cycle of R-ICE, both from the neurotoxicity, which was very frightening for him, as well as related to nausea/poor PO intake, etc. His appetite is much improved now, and he's feeling much better overall. His back pain has been well-controlled, and he rarely, if ever, has to take IR morphine in addition to the scheduled MS Contin. He reports some ongoing left leg weakness (related to his previous stroke) and admits he had an episode about a week ago where his knee \"gave out\" and he fell onto his knee, sustaining an abrasion. He denies preceding chest pain, dyspnea, or palpitations and is able to recall the entirety of the event. He did not hit his head or lose consciousness. He's had no recent fevers, chills, or infectious symptoms. He denies any known exposure to individuals with COVID-19.     Recently, patient reports ongoing difficulty with constipation. He has been taking senna 1 tab BID as well as daily Miralax; his last BM was 5 days prior to admission. This was not particularly hard. He denies any rectal bleeding. No recent nausea, vomiting, or abdominal pain. He is still passing flatus. He has increased his bowel regimen in the past when he has had refractory constipation but hasn't tried this yet.     Past Medical " History    Past Medical History:   Diagnosis Date     Alcohol use disorder, mild, abuse      Atrial fibrillation (H)     coumadin     CAD (coronary artery disease)      Cardiomyopathy (H)      HLD (hyperlipidemia)      HTN (hypertension)      Myocardial infarction (H)      Neuropathy     LLE, left hand post-CVA     Obesity      WATSON (obstructive sleep apnea)     requires CPAP however does not have a machine     Pancreatic cancer (H)     suspected 3/9/2020 at OSH     Stroke (H)     2017, mild left sided deficit     Tobacco dependence      Urinary urgency        Past Surgical History   Past Surgical History:   Procedure Laterality Date     CHOLECYSTECTOMY, LAPOROSCOPIC       ESOPHAGOSCOPY, GASTROSCOPY, DUODENOSCOPY (EGD), COMBINED N/A 8/4/2020    Procedure: ESOPHAGOGASTRODUODENOSCOPY, WITH FINE NEEDLE ASPIRATION BIOPSY, WITH ENDOSCOPIC ULTRASOUND GUIDANCE;  Surgeon: Dayton Tobias MD;  Location: [facility]       Prior to Admission Medications   Prior to Admission Medications   Prescriptions Last Dose Informant Patient Reported? Taking?   acetaminophen (TYLENOL) 325 MG tablet More than a month at Unknown time  Yes No   Sig: Take 2 tablets (650 mg) by mouth every 4 hours as needed for mild pain   acyclovir (ZOVIRAX) 400 MG tablet Unknown at Unknown time  No No   Sig: Take 1 tablet (400 mg) by mouth 2 times daily   allopurinol (ZYLOPRIM) 300 MG tablet Unknown at Unknown time  No No   Sig: Take 1 tablet (300 mg) by mouth daily   atorvastatin (LIPITOR) 40 MG tablet Unknown at Unknown time  No No   Sig: Take 1 tablet (40 mg) by mouth daily   carvedilol (COREG) 12.5 MG tablet Unknown at Unknown time  Yes No   Sig: Take 12.5 mg by mouth 2 times daily (with meals)   fluconazole (DIFLUCAN) 200 MG tablet Unknown at Unknown time  No No   Sig: Take 1 tablet (200 mg) by mouth daily for 21 days   gabapentin (NEURONTIN) 300 MG capsule Unknown at Unknown time  No No   Sig: Take 2 tabs every am, take one tab at bedtime   levofloxacin  (LEVAQUIN) 250 MG tablet Unknown at Unknown time  No No   Sig: Take 1 tablet (250 mg) by mouth daily for 21 days   lisinopril (ZESTRIL) 20 MG tablet Unknown at Unknown time  Yes No   Sig: Take 20 mg by mouth daily   metoclopramide (REGLAN) 10 MG tablet Unknown at Unknown time  No No   Sig: Take 1 tablet (10 mg) by mouth every 6 hours as needed (hiccups)   morphine (MS CONTIN) 15 MG CR tablet Unknown at Unknown time  No No   Sig: Take 1 tablet (15 mg) by mouth every 12 hours   morphine (MS CONTIN) 30 MG CR tablet Unknown at Unknown time  No No   Sig: Take 1 tablet (30 mg) by mouth every 12 hours maximum 2 tablet(s) per day   morphine (MSIR) 15 MG IR tablet Unknown at Unknown time  No No   Sig: Take 1 tablet (15 mg) by mouth every 4 hours as needed for moderate to severe pain   naloxone (NARCAN) 4 MG/0.1ML nasal spray Unknown at Unknown time  No No   Sig: Spray 1 spray (4 mg) into one nostril alternating nostrils as needed for opioid reversal every 2-3 minutes until assistance arrives   omeprazole (PRILOSEC) 40 MG DR capsule Unknown at Unknown time  Yes No   Sig: Take 40 mg by mouth daily   ondansetron (ZOFRAN) 4 MG tablet Unknown at Unknown time  No No   Sig: Take 1 tablet (4 mg) by mouth every 8 hours as needed for nausea   polyethylene glycol (MIRALAX) 17 g packet Unknown at Unknown time  Yes No   Sig: Take 1 packet by mouth daily as needed for constipation   polyethylene glycol (MIRALAX) 17 g packet Unknown at Unknown time  No No   Sig: Take 17 g by mouth daily   rivaroxaban ANTICOAGULANT (XARELTO ANTICOAGULANT) 20 MG TABS tablet Unknown at Unknown time  No No   Sig: Take 1 tablet (20 mg) by mouth daily (with dinner)   senna-docusate (SENOKOT-S/PERICOLACE) 8.6-50 MG tablet Unknown at Unknown time  No No   Sig: Take 1-2 tablets by mouth 2 times daily as needed for constipation      Facility-Administered Medications: None     Allergies   No Known Allergies    Social History   Social History     Socioeconomic History      Marital status:      Spouse name: Not on file     Number of children: Not on file     Years of education: Not on file     Highest education level: Not on file   Occupational History     Occupation:    Social Needs     Financial resource strain: Not on file     Food insecurity     Worry: Not on file     Inability: Not on file     Transportation needs     Medical: Not on file     Non-medical: Not on file   Tobacco Use     Smoking status: Former Smoker     Packs/day: 1.00     Years: 2.00     Pack years: 2.00     Types: Cigarettes     Start date: 3/10/2017     Last attempt to quit: 2020     Years since quittin.6     Smokeless tobacco: Current User     Types: Chew     Tobacco comment: daily chew use x39 years, now intermittent   Substance and Sexual Activity     Alcohol use: Yes     Alcohol/week: 1.0 - 2.0 standard drinks     Types: 1 - 2 Cans of beer per week     Drinks per session: 1 or 2     Binge frequency: Less than monthly     Comment: 1 beer daily, occasional >2 drinks/episode socially     Drug use: Never     Sexual activity: Not on file   Lifestyle     Physical activity     Days per week: Not on file     Minutes per session: Not on file     Stress: Not on file   Relationships     Social connections     Talks on phone: Not on file     Gets together: Not on file     Attends Tenriism service: Not on file     Active member of club or organization: Not on file     Attends meetings of clubs or organizations: Not on file     Relationship status: Not on file     Intimate partner violence     Fear of current or ex partner: Not on file     Emotionally abused: Not on file     Physically abused: Not on file     Forced sexual activity: Not on file   Other Topics Concern     Not on file   Social History Narrative     Not on file       Family History   Family History   Problem Relation Age of Onset     Cardiovascular Mother      Cardiovascular Father      Diabetes Type 2  Father      Cardiovascular  Brother      Cancer Maternal Grandmother        Review of Systems   A 14-point ROS is negative unless otherwise noted above in the HPI.    Physical Exam   Vital Signs with Ranges  Temp:  [99.2  F (37.3  C)] 99.2  F (37.3  C)  Pulse:  [95] 95  Resp:  [18] 18  BP: (141)/(85) 141/85  SpO2:  [97 %] 97 %  199 lbs 3.2 oz    Constitutional: Pleasant and cooperative male. Awake, alert, NAD. Seated on the edge of the bed.  HEENT: NC/AT, EOMI, sclera clear, conjunctiva normal, OP with MMM, no intraoral lesions.  Respiratory: No increased work of breathing, CTAB, no crackles or wheezing.  Cardiovascular: RRR, no murmur noted. 1+ bilateral LE pitting edema.  GI: Bowel sounds present throughout; abdomen soft, non-distended and non-tender.  MSK: Normal muscle bulk and tone.  Skin: Warm, dry, well-perfused. Scab to left anterior knee appears well-healing. Otherwise, no rashes or lesions noted.  Neurologic: A&O. Answers questions appropriately. Moves all extremities spontaneously.  Neuropsychiatric: Calm, appropriate affect      Recent Labs  CBC   Recent Labs   Lab 08/31/20  1004 08/28/20  0907 08/25/20   WBC 7.0 8.1 13.0   RBC 2.77* 2.91* 2.89*   HGB 7.9* 8.1* 8.0*   HCT 25.5* 26.0* 25.5*   MCV 92 89 88   MCH 28.5 27.8 27.7   MCHC 31.0* 31.2* 31.4   RDW 19.6* 18.2* 17.2   * 50* 24       CMP   Recent Labs   Lab 08/31/20  1004 08/28/20  0907 08/25/20    142 139   POTASSIUM 3.4 3.5 3.4*   CHLORIDE 107 107 101   CO2 30 29 29   ANIONGAP 5 7 9   * 87 99   BUN 6* 6* 9   CR 0.61* 0.62* 0.53*   GFRESTIMATED >90 >90 175   GFRESTBLACK >90 >90  --    STACY 8.4* 8.4* 8.30*   PHOS  --   --  3.7   PROTTOTAL 6.3* 6.5*  --    ALBUMIN 2.6* 2.6*  --    BILITOTAL 0.6 0.9  --    ALKPHOS 147 132  --    AST 14 14  --    ALT 16 16  --        LFTs:   Recent Labs   Lab 08/31/20  1004   PROTTOTAL 6.3*   ALBUMIN 2.6*   BILITOTAL 0.6   ALKPHOS 147   AST 14   ALT 16       Coagulation Studies: No lab results found in last 7 days.

## 2020-09-01 NOTE — PROGRESS NOTES
Care Coordinator - Discharge Planning    Admission Date/Time:  8/31/2020  Attending MD:  Avery Cary MD     Data  Date of initial CC assessment:  8/31/20  Chart reviewed, discussed with interdisciplinary team.   Patient was admitted for:   1. Diffuse large B-cell lymphoma of lymph nodes of multiple regions (H)         Assessment   Full assessment completed in previous note    Coordination of Care and Referrals:    Per team, patient will now stay local following discharge for BMT consult on 9/3. As patient will be staying locally, patient will also get Neulasta and lab draw on 9/3 as well. Patient will still need local labs at [facility] on 9/7, 9/10, 9/14, and 9/17.     Writer spoke (Phn: [phone]) with Blanka at [facility] to relay above. Blanka will communicate to team to cancel Neulasta and labs on 9/3 and to keep the rest. They will coordinate appointments with patient directly.     AVS updated. RNCC will follow as needed.     Plan  Anticipated Discharge Date:  9/2/20  Anticipated Discharge Plan:  Home with clinic follow-up as recommended.      Radha Castrejon, RN, BSN, PHN  Care Coordinator   P: [phone], [facility]

## 2020-09-01 NOTE — PLAN OF CARE
[id number]    Pt mildly hypertensive, parameters not met to notify provider. OVSS on RA. Denies pain, SOB and N/V. Pt c/o of abdominal discomfort from constipation. Pink lady enema given, Pt had 450 liquid/stool mixture, little relief, still no formed BM. Adequate UOP. Received first dose of cytarabine, tolerated it well. Next dose in AM of 9/2. Pt is up independently and able to make needs known. Continue with POC, plans to discharge tomorrow.

## 2020-09-01 NOTE — PLAN OF CARE
PT -   Discharge Planner PT   Patient plan for discharge: Home with assist from family as needed  Current status: Pt ambulated ~150 with SBA and IV pole. Cued pt to walk without IV pole for another ~350'. Pt demonstrated short stride length and mild lateral trunk lean bilaterally. Pt ascended and descended 4 stairs x 2 with CGA and one railing on L with ascent. Educated pt to use walker when feeling unsteady or going longer distances in order to maintain safety. Plan to gait train with 4WW. VSS with activity.   Barriers to return to prior living situation: Medical status, functional mobility  Recommendations for discharge: Home with assist as needed  Rationale for recommendations: Pt's current functional status indicates safe return to home with 4WW and assist from family as needed.   Entered by: Breanna Diaz 09/01/2020 2:58 PM

## 2020-09-01 NOTE — PROGRESS NOTES
Nursing Focus: Chemotherapy    D: Positive blood return via PICC. Insertion site is clean/dry/intact, dressing intact with no complaints of pain.  Urine output is recorded in intake in Doc Flowsheet.      I: Premedications given per order (see electronic medical administration record). Dose #1 of HD Cytarabine started to infuse over 3 hours. Reviewed pt teaching on chemotherapy side effects.  Pt denies need for further teaching. Chemotherapy double checked per protocol by two chemotherapy competent RN's.     A: Tolerating chemo well. Denies nausea and or pain. Neuro exam at pt's baseline, pt has hx of stroke w/ L side deficits.    P: Continue to monitor urine output and symptoms of nausea. Screen for symptoms of toxicity.

## 2020-09-01 NOTE — PLAN OF CARE
00:00-07:00 am  AF with bradycardic, 55 and rechecked this morning, 73.   OVSS   CIVI Cisplatin continues infusing   Tolerating chemo well thus far  No nausea/vomiting   Denied pain  Voiding well  LBM 8/31  Hgb this am 6.4  dropped from 7.9, questionable likely d/t  dilutional IVF received yesterday   Good O2 sat   Denied SOB or chest pain  No active bleeding noted or reported   Prepare order for 1 unit for PRBC sent if needed   Plan to proceed Cytarabine on day 2 today   No new acute events overnight  Resting/sleeping well   Continue w/POC

## 2020-09-01 NOTE — PLAN OF CARE
AVSS on RA, afebrile. Denies pain, n/v, SOB. Today is day #1 of R-DHAP. PICC placed and okay'd to use.Tolerated Rituxan infusion via rapid infusion method well. Now has CIVI cisplatin infusing w/ positive blood return, will run over 24hrs. Voiding adequate amounts, reported 1 very small BM this evening, is on scheduled stool softners. Ambulating independently to bathroom, steady on feet. PT consult placed d/t recent fall at home. Continue to monitor.

## 2020-09-01 NOTE — PROGRESS NOTES
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    Today:   - Plan for discharge 9/2 AM pending completion of R-DHAP. Today is Day 3. Scheduled for Neulasta 9/3 at 1230.  - Scheduled for BMT consult outpatient 9/3 at 1400.   - Mag Citrate today for continued concerns of constipation.   - Medications sent to discharge pharmacy today.     HEME  # DLBCL with primary refractory disease  Follows with Dr. Cary. 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 DLBCL-IPI was high (stage, extranodal sites, LDH) and clinical stage IV with extranodal involvement. Started on R-CHOP in 03/2020 with IT methotrexate each cycle for CNS prophylaxis. He completed 6 cycles R-CHOP. CT on 7/14 notable for increasing gastrohepatic ligament mass. PET/CT on 7/31 obtained subsequently and unfortunately notable for disease progression with increased size of retroperitoneal LN, multiple new pulmonary nodules, new focal uptake in T12 vertebral body, increased uptake in ascending colon. EUS of gastrohepatic ligament mass obtained 8/4 and consistent with persistent/recurrent large B-cell lymphoma. BMBx obtained 8/5 and notable for no morphologic or immunophenotypic evidence of involvement by B-cell lymphoma; marrow cellularity of 40-50% with trilineage hematopoiesis and no increase in blasts. Given evidence of refractory disease, decision was made to proceed with salvage R-ICE (Cycle 1, Day 1 =  8/7/2020). Course complicated by neurotoxicity; did not require methylene blue. However, given his poor tolerance of the ifosfamide, patient was subsequently switched to salvage R-DHAP and is admitted today for planned Cycle 1.  - PICC placed on admission; will remove on discharge.  - Will request Neulasta and labs locally in [place], [place] per patient request.                             Treatment Plan: R-DHAP. Cycle 1, Day 1 = 8/31/2020. Today is Day 2.                          - Rituximab 375 mg/m2 x1 dose -- Day 1                          - Dexamethasone 40 mg Q24H x4 doses -- Days 1-4                          - Cisplatin 100 mg/m2 x1 dose over 24 hours -- Day 1                          - Cytarabine 2 g/m2 Q12H x2 doses -- Day 2                          - Neulasta -- Day 3 - scheduled at [facility] [place]/3 - 1230                          - Supportive care: Allopurinol, prednisolone ophthalmic suspension QID      # Low back pain  # Focal uptake in T12 vertebral body  Required admission 8/1-8/5 in the context of worsening pain. Symptoms initially thought attributable to focal uptake in T12 vertebral body noted on PET/CT (7/31), though there was no correlating lesion noted on MRI. Rad Onc consulted previously; consideration given to palliative radiation. However, will hold off at this time given plan for salvage chemotherapy. Followed by palliative care during previous admissions and as an outpatient.  - Continue PTA MS Contin 45 mg BID, Morphine IR 15mg q4h PRN.     # Normocytic anemia  Due to underlying malignancy and recent chemotherapy.  - Transfuse to keep Hgb >7.  - Receiving 1u pRBCs today for Hgb 6.4.      # Thrombocytopenia  Likely due to recent chemotherapy.  - Trend daily CBC  - Transfuse to keep plt >10K     ID  # PPx  - Continue  mg BID  - Plan to restart levofloxacin 250 mg and fluconazole 200 mg on discharge; hold while inpatient given ANC >1000     NEURO  # History of CVA  Diagnosed with a right-sided  "hemorrhagic CVA in 12/2017. Occurred in the setting of profound HTN. Patient with residual left arm/leg numbness and some slight LLE weakness.  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 Query potential contribution to back pain. Neurosurgery consulted previously; no intervention indicated.  - No acute inpatient management needs     CV  # Atrial fibrillation   - Continue PTA Xarelto 20 mg daily; hold for platelets <50K  - Continue PTA carvedilol 12.5 mg BID      # Hypertension  History of hypertensive emergency and subsequent CVA in 2017. BP better-controlled recently.  - Continue PTA carvedilol 12.5 mg BID  - Continue PTA lisinopril 20 mg daily     # Hyperlipidemia  - Hold PTA atorvastatin while inpatient; will restart on discharge     # History of NSTEMI  # History of cardiomyopathy  Documented in clinic notes from 01/01/2018. Noted during his hospitalization at [facility] in 12/2017. Per chart review, \"felt due to demand ischemia secondary to hypertensive urgency or stress-induced cardiomyopathy. EF was 43%.\"  - Obtain stat EKG & troponin for any chest pain or other concerning cardiac symptoms  - Most recent echo on 3/10/2020 with normal LVEF of 60-65%; mild concentric wall thickening consistent with left ventricular hypertrophy.     FEN/GI  # Constipation  Ongoing; previously attributed to vincristine (R-CHOP), now ongoing in the context of chronic opiate use. Last BM 5 days PTA. Still passing flatus. No associated abdominal pain, nausea/vomiting, or other red flag symptoms.   - Continue PTA Miralax BID  - Continue senna 2-3 tabs BID  - Will trial Mag citrate x1 today.   - Consider KUB to exclude obstruction if symptoms persist or with any worsening     # Hiccoughs  - Continue PTA Baclofen or Reglan PRN     MISC  # Hx of WATSON  Per patient, diagnosed with WATSON 5 " years ago. Not currently on CPAP due to insurance coverage; however, reports interest in treatment/repeat testing.  - Outpatient sleep medicine consultation pending     FEN:  - IVF per chemotherapy protocol  - Lyte replacement per standing protocol  - Regular diet as tolerated     Prophy/Misc:  - GI: PTA pantoprazole; bowel regimen as above  - DVT: Xarelto 20 mg daily; hold for plts <50K     Disposition: Inpatient admission to [facility] malignancy team. Anticipate discharge to home following completion of chemotherapy regimen, hopeful for Wednesday, 9/2 at 1000.      Carol Zabala PA-C  Hematology/Oncology  Pager #[phone]    Interval History   Overnight no acute events. Patient states he is feeling well this morning. Slept well. Morphine IR has been controlling his pain, has not required PRNs. Only issue has been constipation, as he has not had a BM in about 6 days. Denies abdominal pain, still passing flatus. Wants to try something more aggressive today. Had an episode 4 days ago where he states he tried to have a BM, however only noted small amount of blood on toilet paper. No other bleeding symptoms, hematuria, hemoptysis. No headaches, chest pain, shortness of breath, vision changes. Discussed chemo and treatment plan of which patient is agreeable and understands.     Physical Exam   Temp: 96.9  F (36.1  C) Temp src: Oral BP: 133/88 Pulse: 72   Resp: 18 SpO2: 97 % O2 Device: None (Room air)    Vitals:    08/31/20 1208 09/01/20 0719   Weight: 90.4 kg (199 lb 3.2 oz) 92.8 kg (204 lb 8 oz)     Vital Signs with Ranges  Temp:  [96.2  F (35.7  C)-99.2  F (37.3  C)] 96.9  F (36.1  C)  Pulse:  [55-95] 72  Resp:  [18-20] 18  BP: (104-145)/(66-88) 133/88  SpO2:  [95 %-100 %] 97 %  I/O last 3 completed shifts:  In: 3119.7 [P.O.:800; I.V.:1570; IV Piggyback:749.7]  Out: 1175 [Urine:1175]    Constitutional: Pleasant and cooperative male. Awake, alert, NAD. Seated on the edge of the bed.  HEENT: NC/AT, EOMI, sclera clear,  conjunctiva normal, OP with MMM, no intraoral lesions.  Respiratory: No increased work of breathing, CTAB, no crackles or wheezing.  Cardiovascular: Irregular rhythm with regular rate, no murmur noted. 1+ bilateral LE pitting edema.  GI: Bowel sounds present throughout; abdomen soft, non-distended and non-tender.  MSK: Normal muscle bulk and tone.  Skin: Warm, dry, well-perfused. No rashes or lesions noted.  Neurologic: A&O. Answers questions appropriately. Moves all extremities spontaneously.  Neuropsychiatric: Calm, appropriate affect    Medications     - MEDICATION INSTRUCTIONS -       - MEDICATION INSTRUCTIONS -       IV infusion builder WITH LARGE additive list 150 mL/hr at 09/01/20 0318     sodium chloride         acyclovir  400 mg Oral BID     allopurinol  300 mg Oral Daily     carvedilol  12.5 mg Oral BID w/meals     CISplatin (PLATINOL) infusion  100 mg/m2 (Treatment Plan Recorded) Intravenous Once     cytarabine (CYTOSAR) infusion  2 g/m2 (Treatment Plan Recorded) Intravenous Q12H     dexamethasone  40 mg Oral Q24H     gabapentin  300 mg Oral At Bedtime     gabapentin  600 mg Oral QAM     heparin lock flush  5-10 mL Intracatheter Q24H     lisinopril  20 mg Oral Daily     morphine  45 mg Oral Q12H     omeprazole  40 mg Oral Daily     ondansetron  8 mg Oral Q12H     polyethylene glycol  17 g Oral BID     prednisoLONE acetate  2 drop Both Eyes 4x Daily     rivaroxaban ANTICOAGULANT  20 mg Oral Daily with supper     senna-docusate  2-3 tablet Oral BID     Data   Recent Labs   Lab 09/01/20  0611 08/31/20  1004 08/28/20  0907   WBC 5.5 7.0 8.1   HGB 6.4* 7.9* 8.1*   MCV 94 92 89   PLT 79* 110* 50*   INR 2.38*  --   --     142 142   POTASSIUM 4.3 3.4 3.5   CHLORIDE 113* 107 107   CO2 22 30 29   BUN 6* 6* 6*   CR 0.46* 0.61* 0.62*   ANIONGAP 6 5 7   STACY 7.6* 8.4* 8.4*   * 104* 87   ALBUMIN  --  2.6* 2.6*   PROTTOTAL  --  6.3* 6.5*   BILITOTAL  --  0.6 0.9   ALKPHOS  --  147 132   ALT  --  16 16   AST   --  14 14

## 2020-09-01 NOTE — PROGRESS NOTES
09/01/20 1300   Quick Adds   Type of Visit Initial PT Evaluation   Living Environment   Lives With sibling(s)   Living Arrangements house   Home Accessibility stairs to enter home;stairs within home   Number of Stairs, Main Entrance 4   Stair Railings, Main Entrance railing on right side (ascending)   Number of Stairs, Within Home, Primary 7   Stair Railings, Within Home, Primary railing on left side (ascending)   Transportation Anticipated family or friend will provide   Living Environment Comment planning on living with brother after dicharge, brother can provide help as needed   Self-Care   Usual Activity Tolerance good   Current Activity Tolerance moderate   Regular Exercise No   Equipment Currently Used at Home none   Functional Level Prior   Ambulation 0-->independent   Transferring 0-->independent   Toileting 0-->independent   Bathing 0-->independent   Communication 0-->understands/communicates without difficulty   Swallowing 0-->swallows foods/liquids without difficulty   Cognition 0 - no cognition issues reported   Fall history within last six months yes   Number of times patient has fallen within last six months 2   General Information   Onset of Illness/Injury or Date of Surgery - Date 08/31/20   Patient/Family Goals Statement To improve pain free gait and functional mobility in order to decrease fall risk   Pertinent History of Current Problem (include personal factors and/or comorbidities that impact the POC) Kenneth Tello is a 50-year-old male with a past medical history significant for atrial fibrillation (on Xarelto), 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   Precautions/Limitations no known precautions/limitations   Cognitive Status Examination   Orientation orientation to person, place and time   Level of Consciousness alert   Follows Commands and Answers Questions 100% of the time   Personal Safety  "and Judgment intact   Memory intact   Pain Assessment   Patient Currently in Pain No   Integumentary/Edema   Integumentary/Edema no deficits were identifed   Posture    Posture Not impaired   Range of Motion (ROM)   ROM Quick Adds No deficits were identified   Strength   Manual Muscle Testing Quick Adds No deficits were identified   Bed Mobility   Bed Mobility Comments Indep   Transfer Skills   Transfer Comments IND with all transfers   Gait   Gait Comments SBA ~450' with out an assistive device   Balance   Balance Comments IND in sitting balance, SBA for dynamic balance   Sensory Examination   Sensory Perception Comments Numbness and tingling reported in bilateral LEs and L UE   General Therapy Interventions   Planned Therapy Interventions gait training;balance training;strengthening   Clinical Impression   Criteria for Skilled Therapeutic Intervention yes, treatment indicated   PT Diagnosis Impaired functional mobilty    Influenced by the following impairments numbness and tingling in LEs   Functional limitations due to impairments previous hx of falls, mild gait deviations and unstadiness while walking IND   Clinical Presentation Stable/Uncomplicated   Clinical Presentation Rationale clinical judgement   Clinical Decision Making (Complexity) Low complexity   Therapy Frequency Daily   Predicted Duration of Therapy Intervention (days/wks) 2-3 more sessions   Anticipated Equipment Needs at Discharge other (see comments)   Anticipated Discharge Disposition Home with Assist   Risk & Benefits of therapy have been explained Yes   Patient, Family & other staff in agreement with plan of care Yes   Clinical Impression Comments Anticipated pt discharged with 4WW   [facility] [employer]-PAC TM \"6 Clicks\"   2016, Trustees of [facility], under license to [employer].  All rights reserved.   6 Clicks Short Forms Daily Activity Inpatient Short Form   [facility] AM-PAC  \"6 Clicks\" V.2 Basic Mobility Inpatient Short " Form   1. Turning from your back to your side while in a flat bed without using bedrails? 4 - None   2. Moving from lying on your back to sitting on the side of a flat bed without using bedrails? 4 - None   3. Moving to and from a bed to a chair (including a wheelchair)? 4 - None   4. Standing up from a chair using your arms (e.g., wheelchair, or bedside chair)? 4 - None   5. To walk in hospital room? 4 - None   6. Climbing 3-5 steps with a railing? 4 - None   Basic Mobility Raw Score (Score out of 24.Lower scores equate to lower levels of function) 24   Total Evaluation Time   Total Evaluation Time (Minutes) 10

## 2020-09-01 NOTE — PROGRESS NOTES
"CLINICAL NUTRITION SERVICES - ASSESSMENT NOTE     Nutrition Prescription    RECOMMENDATIONS FOR MDs/PROVIDERS TO ORDER:  - If pt to remain hospitalized > 24 hrs, recommend changing diet to High Kcal/High Protein in order to better meet pt's nutritional needs.    Malnutrition Status:    Unable to determine at this time    Recommendations already ordered by Registered Dietitian (RD):  None at this time    Future/Additional Recommendations:  Continue to monitor for wt maintenance, po tolerance and adequacy of intakes.     REASON FOR ASSESSMENT  Kenneth Tello is a/an 50 year old male assessed by the dietitian for Admission Nutrition Risk Screen for unintentional loss of 10# or more in the past two months    NUTRITION HISTORY  Unable to obtain from pt secondary to busy at time of visit. Per H&P, reported by pt's appetite is much improved and no c/o's recent nausea, vomiting or abd pain.    CURRENT NUTRITION ORDERS  Diet: Regular with Boost Plus with meals  Intake/Tolerance: Good, consuming 100% of his meals per RN/flowsheets.    LABS  BUN 6 (low) today and on admit yesterday; suggestive of low protein intakes     MEDICATIONS  Medications reviewed    ANTHROPOMETRICS  Height: 175.3 cm (5' 9\")  Most Recent Weight: 92.8 kg (204 lb 8 oz) - today's, Admit wt = 90.4 kg (199 lbs) on 8/31 (lowest wt this admission)  IBW: 72.7 kg  BMI: Overweight BMI 25-29.9 (based on lowest wt of 90.4 kg on 8/31)  Weight History: Per chart review, pt with previous admit wts as follows: 100.7 kg (222 lbs) on 3/9 admission, 89.8 kg (198 lbs) on 8/1 admission and 89.9 kg (198 lbs) on 8/7 admission. Since March, pt has lost 23 lbs (>10% loss) x past 6 mos. However, pt's wt has been stable over the past month based on current admit wt of 90.4 kg (199 lbs) on 8/31.    Wt Readings from Last 10 Encounters:   09/01/20 92.8 kg (204 lb 8 oz)   08/28/20 87.7 kg (193 lb 4.8 oz)   08/11/20 89.6 kg (197 lb 8 oz)   08/10/20 92 kg (202 lb 12.8 oz)   08/05/20 " 91.9 kg (202 lb 9.6 oz)   07/31/20 90.3 kg (199 lb)   07/29/20 89.8 kg (198 lb)   07/02/20 96.4 kg (212 lb 9.6 oz)   07/01/20 97.3 kg (214 lb 9.6 oz)   06/24/20 96.4 kg (212 lb 8 oz)       Dosing Weight: 77 kg (adjusted based on lowest wt of 90.4 kg this admission and IBW)    ASSESSED NUTRITION NEEDS  Estimated Energy Needs: 4238-4126 kcals/day (30 - 35 kcals/kg )  Justification: Increased needs d/t wt loss hx  Estimated Protein Needs:  grams protein/day (1.2 - 1.5 grams of pro/kg)  Justification: Increased needs  Estimated Fluid Needs: (1 mL/kcal)   Justification: Maintenance    PHYSICAL FINDINGS  See malnutrition section below.  Unable to assess    MALNUTRITION  % Intake: Unable to assess  % Weight Loss: > 10% in 6 months (severe)  Subcutaneous Fat Loss: Unable to assess  Muscle Loss: Unable to assess  Fluid Accumulation/Edema: Does not meet criteria  Malnutrition Diagnosis: Unable to determine due to unable to complete all parameters of nutrition assessment at this time.    NUTRITION DIAGNOSIS  Predicated inadequate protein-energy intake related to previous h/o wt loss, but stable for the past month and tolerating po with good intakes at present.      INTERVENTIONS  Implementation  Nutrition Education: Unable to complete at this time.    Goals  1. Patient to consume % of nutritionally adequate meal trays TID, or the equivalent with supplements/snacks.  2. Wt not to decline < 90 kg    Monitoring/Evaluation  Progress toward goals will be monitored and evaluated per protocol.    Michelle Perez RD,LD  7D pager [phone]

## 2020-09-01 NOTE — PLAN OF CARE
Hypertensive, OVSS. Denied pain, nausea/vomiting. Up ad maryanne. Good po intake. Adequate urine output. Small loose BM x 1 per pt report. Worked with PT. Currently receiving day # 1 CIVI Cisplatin and Mannitol. Scheduled to receive day # 2 dose # 1 HD Cytarabine this evening. Received 1 unit of pRBC without incident. Plan to discharge to home tomorrow after completion of chemotherapy (before 11 AM).

## 2020-09-02 NOTE — PLAN OF CARE
"00:00-07:00 am  AF HR 58-65  Slightly elevated BP but stable at 140-150/80-90   Neuro checks remains unchanged at baseline prior to dose # 2 Cytarabine given at 06:00 am this am   Pt endorses \"feeling tired\" but otherwise stable  No nausea/vomiting  Denied pain  Voiding spontaneously but still feeling constipated despite multiple bowel regimen given yesterday  Pt is otherwise resting/sleeping comfortably  Possible discharge pending chemo completion this morning and clinically stable.  Continue w/POC      "

## 2020-09-02 NOTE — PLAN OF CARE
PT -   Discharge Planner PT   Patient plan for discharge: home with assist from family as needed  Current status: Pt ambulated to and from bathroom IND. Pt not up for gait training with 4WW today. Provided education on why a 4WW would be helpful for him when ambulating in the community and safe strategies when ambulating with out an assistive device.   Barriers to return to prior living situation: medical status, constipation   Recommendations for discharge: Home with assist as needed  Rationale for recommendations: Pt's current functional status demonstrates safe return to home with assist as needed.    Entered by: Breanna Diaz 09/02/2020 10:12 AM

## 2020-09-02 NOTE — PROGRESS NOTES
Nursing Focus: Chemotherapy    D: Positive blood return via PICC. Insertion site is clean/dry/intact, dressing intact with no complaints of pain.  Urine output is recorded in intake in Doc Flowsheet.  Neuro unchanged at baseline   I: Premedications given per order (see electronic medical administration record). Dose # 2 of HD Cytarabine started to infuse over 3 hours Reviewed pt teaching on chemotherapy side effects.  Pt denies need for further teaching. Chemotherapy double checked per protocol by two chemotherapy competent RN's.   A: Tolerating procedure well. Denies nausea and or pain.   P: Continue to monitor urine output and symptoms of nausea. Screen for symptoms of toxicity.

## 2020-09-02 NOTE — DISCHARGE SUMMARY
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    Summary:  Overall, patient tolerated cycle 1 of salvage R-DHAP well. His course was complicated by constipation, which had been present prior to admission. On admission, patient stated he was not taking his bowel medications (Miralax, Senna) with no bowel movement x 5 days so these were restarted. His bowel regimen was escalated with magnesium citrate and a pink lady enema, with eventual relief.  Discussed with patient importance of continuing with a good bowel regimen after discharge to which he was understanding. On day of discharge patient felt significantly better after relief of constipation and felt safe to discharge to his brother's home. He will return to clinic tomorrow for his Neulasta injection, as well as a BMT consult with Dr. Hamilton. He will then return home to [place], [place] for local labs with possible transfusions, as well as an REGINA visit early next week.     Follow-Up:    Neulasta/Labs @ [facility] on 1215 on 9/3    Outpatient BMT Consult @ [facility] on 1400 on 9/3    Labs/possible transfusions 2x weekly locally in [place], [place] - 9/7, 9/10, 9/14, 9/17    [facility] visit requested for early week of 9/7    Medication Updates:    PTA medications refilled.    Restarted Fluconazole 200mg, Levofloxacin 250mg - prescription sent.     Pred Forte ophthalmic drops - bottle from bedside sent  home with patient.    Dexamethasone - D4 sent to discharge pharmacy.    Rivaroxaban - continued on discharge. Follow up with platelet checks - HOLD for plts <50k.    Bowel regimen - discharged on Senna BID, Miralax qday. Magnesium citrate PRN for significant constipation. Please confirm patient is taking.     Hospital Course   Kenneth Tello was admitted on 8/31/2020.  The following problems were addressed during his hospitalization:    HEME  # DLBCL with primary refractory disease  Follows with Dr. Cary. 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 DLBCL-IPI was high (stage, extranodal sites, LDH) and clinical stage IV with extranodal involvement. Started on R-CHOP in 03/2020 with IT methotrexate each cycle for CNS prophylaxis. He completed 6 cycles R-CHOP. CT on 7/14 notable for increasing gastrohepatic ligament mass. PET/CT on 7/31 obtained subsequently and unfortunately notable for disease progression with increased size of retroperitoneal LN, multiple new pulmonary nodules, new focal uptake in T12 vertebral body, increased uptake in ascending colon. EUS of gastrohepatic ligament mass obtained 8/4 and consistent with persistent/recurrent large B-cell lymphoma. BMBx obtained 8/5 and notable for no morphologic or immunophenotypic evidence of involvement by B-cell lymphoma; marrow cellularity of 40-50% with trilineage hematopoiesis and no increase in blasts. Given evidence of refractory disease, decision was made to proceed with salvage R-ICE (Cycle 1, Day 1 = 8/7/2020). Course complicated by neurotoxicity; did not require methylene blue. However, given his poor tolerance of the ifosfamide, patient was subsequently switched to salvage R-DHAP and is admitted today for planned Cycle 1.  - PICC placed on admission; will  remove on discharge.  - Will request Neulasta and labs locally in [place], [place] per patient request.                             Treatment Plan: R-DHAP. Cycle 1, Day 1 = 8/31/2020. Today is Day 2.                          - Rituximab 375 mg/m2 x1 dose -- Day 1                          - Dexamethasone 40 mg Q24H x4 doses -- Days 1-4                          - Cisplatin 100 mg/m2 x1 dose over 24 hours -- Day 1                          - Cytarabine 2 g/m2 Q12H x2 doses -- Day 2                          - Neulasta -- Day 3 - scheduled at [facility] 9/3 - 1230                          - Supportive care: Allopurinol, prednisolone ophthalmic suspension QID      # Low back pain  # Focal uptake in T12 vertebral body  Required admission 8/1-8/5 in the context of worsening pain. Symptoms initially thought attributable to focal uptake in T12 vertebral body noted on PET/CT (7/31), though there was no correlating lesion noted on MRI. Rad Onc consulted previously; consideration given to palliative radiation. However, will hold off at this time given plan for salvage chemotherapy. Followed by palliative care during previous admissions and as an outpatient.  - Continue PTA MS Contin 45 mg BID, Morphine IR 15mg q4h PRN.     # Normocytic anemia  Due to underlying malignancy and recent chemotherapy.  - Transfuse to keep Hgb >7.     # Thrombocytopenia  Likely due to recent chemotherapy.  - Trend daily CBC  - Transfuse to keep plt >10K     ID  # PPx  - Continue  mg BID  - Plan to restart levofloxacin 250 mg and fluconazole 200 mg on discharge; hold while inpatient given ANC >1000     NEURO  # History of CVA  Diagnosed with a right-sided hemorrhagic CVA in 12/2017. Occurred in the setting of profound HTN. Patient with residual left arm/leg numbness and some slight LLE weakness.  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 Query potential contribution to back pain. Neurosurgery consulted previously; no intervention indicated.  - No acute inpatient management needs     CV  # Atrial fibrillation   - Continue PTA Xarelto 20 mg daily; hold for platelets <50K  - Continue PTA carvedilol 12.5 mg BID      # Hypertension  History of hypertensive emergency and subsequent CVA in 2017. BP better-controlled recently.  - Continue PTA carvedilol 12.5 mg BID  - Continue PTA lisinopril 20 mg daily     # Hyperlipidemia  - Hold PTA atorvastatin while inpatient; will restart on discharge     # History of NSTEMI  # History of cardiomyopathy  Documented in clinic notes from 01/01/2018. Noted during his hospitalization at [facility] in 12/2017. Per chart review, \"felt due to demand ischemia secondary to hypertensive urgency or stress-induced cardiomyopathy. EF was 43%.\"  - Obtain stat EKG & troponin for any chest pain or other concerning cardiac symptoms  - Most recent echo on 3/10/2020 with normal LVEF of 60-65%; mild concentric wall thickening consistent with left ventricular hypertrophy.     FEN/GI  # Constipation  Ongoing; previously attributed to vincristine (R-CHOP), now ongoing in the context of chronic opiate use. Last BM 5 days PTA. Still passing flatus. No associated abdominal pain, nausea/vomiting, or other red flag symptoms.   - Continue PTA Miralax BID  - Continue senna 2-3 tabs BID  - S/p Mag citrate and pink lady enema 9/1 with minimal relief.   - AXR AM 9/2 with moderate volume of stool, no SBO. Patient scheduled for gastrograffin enema due to significance of discomfort/refractoriness to PO stool softeners, however had large BM immediately prior. Repeat AXR with resolution of previously seen constipation.  - Education provided on importance of bowel regimen at home.      # Hiccoughs  - Continue PTA Baclofen or Reglan PRN     MISC  # Hx of WATSON  Per patient, diagnosed with WATSON 5 years ago. Not currently on CPAP " due to insurance coverage; however, reports interest in treatment/repeat testing.  - Outpatient sleep medicine consultation pending     FEN:  - IVF per chemotherapy protocol  - Lyte replacement per standing protocol  - Regular diet as tolerated     Prophy/Misc:  - GI: PTA pantoprazole; bowel regimen as above  - DVT: Xarelto 20 mg daily; hold for plts <50K     Disposition: Inpatient admission to [facility] malignancy team. Anticipate discharge to home following completion of chemotherapy regimen, hopeful for Wednesday, 9/2 at 1000.      Carol Zabala PA-C  Hematology/Oncology  Pager #[phone]    Significant Results and Procedures   See below    Pending Results   These results will be followed up by NA  Unresulted Labs Ordered in the Past 30 Days of this Admission     Date and Time Order Name Status Description    8/10/2020 1314 Platelets prepare order unit In process           Code Status   Full Code    Primary Care Physician   Nina Jeter    Constitutional: Pleasant and cooperative male. Awake, alert, NAD. Seated on the edge of the bed.  HEENT: NC/AT, EOMI, sclera clear, conjunctiva normal, OP with MMM, no intraoral lesions.  Respiratory: No increased work of breathing, CTAB, no crackles or wheezing.  Cardiovascular: Irregular rhythm with regular rate, no murmur noted. 1+ bilateral LE pitting edema.  GI: Bowel sounds present throughout; abdomen soft, distended but non-tender.  MSK: Normal muscle bulk and tone.  Skin: Warm, dry, well-perfused. No rashes or lesions noted.  Neurologic: A&O. Answers questions appropriately. Moves all extremities spontaneously.  Neuropsychiatric: Calm, appropriate affect    Time Spent on this Encounter   Carol CALZADA PA-C, personally saw the patient today and spent greater than 30 minutes discharging this patient.    Discharge Disposition   Discharged to home  Condition at discharge: Stable    Consultations This Hospital Stay   VASCULAR ACCESS FOR PICC PLACEMENT ADULT IP  CONSULT  PHYSICAL THERAPY ADULT IP CONSULT  INTERVENTIONAL RADIOLOGY ADULT/PEDS IP CONSULT    Discharge Orders      Follow Up and recommended labs and tests    The team would like you to have labs (CBC with differential and CMP) locally. The following as been arranged for you:    [facility]  [address] [place] [address]  Phn: [phone]    9/7:   They will call you directly with appointment time.  9/10: They will call you directly with appointment time.  9/14: They will call you directly with appointment time.  9/17: They will call you directly with appointment time.     Reason for your hospital stay    You were hospitalized for Cycle 1 of R-DHAP chemotherapy for your Diffuse Large B-Cell Lymphoma     Follow Up and recommended labs and tests    Follow up as scheduled below, including:  - Neulasta injection @ [facility] 9/3 1230  - Bone Marrow Transplant Consult @ [facility] 9/3 1400  - Labs/Possible Infusions 2x weekly in [place]     Activity    Your activity upon discharge: activity as tolerated     When to contact your care team    [facility] cancer clinic triage line at [phone] for temp > or = 100.4, uncontrolled nausea/vomiting/diarrhea/constipation, unrelieved pain, bleeding not relieved with pressure, dizziness, chest pain, shortness of breath, loss of consciousness, and any new or concerning symptoms.     Diet    Follow this diet upon discharge: Regular Diet Adult     Discharge Medications   Current Discharge Medication List      START taking these medications    Details   dexamethasone (DECADRON) 4 MG tablet Take 10 tablets (40 mg) by mouth every 24 hours on 9/2 at 4pm, then on 9/3 at 4pm  Qty: 20 tablet, Refills: 0    Associated Diagnoses: Diffuse large B-cell lymphoma of lymph nodes of multiple regions (H)      prednisoLONE acetate (PRED FORTE) 1 % ophthalmic suspension Place 2 drops into both eyes 4 times daily until 9/4  Qty: 1 Bottle, Refills: 0    Comments: Ok to send home  bottle from bedside  Associated Diagnoses: Diffuse large B-cell lymphoma of lymph nodes of multiple regions (H)         CONTINUE these medications which have CHANGED    Details   fluconazole (DIFLUCAN) 200 MG tablet Take 1 tablet (200 mg) by mouth daily until seen in clinic  Qty: 21 tablet, Refills: 0    Associated Diagnoses: Diffuse large B-cell lymphoma of lymph nodes of multiple regions (H)      gabapentin (NEURONTIN) 300 MG capsule Take 2 tabs in the morning, 1 tab at bedtime  Qty: 90 capsule, Refills: 1    Associated Diagnoses: Neuropathy      levofloxacin (LEVAQUIN) 250 MG tablet Take 1 tablet (250 mg) by mouth daily until seen in clinic  Qty: 21 tablet, Refills: 0    Associated Diagnoses: Diffuse large B-cell lymphoma of lymph nodes of multiple regions (H)      lisinopril (ZESTRIL) 20 MG tablet Take 1 tablet (20 mg) by mouth daily  Qty: 30 tablet, Refills: 1    Associated Diagnoses: Essential hypertension      !! morphine (MS CONTIN) 15 MG CR tablet Take 1 tablet (15 mg) by mouth every 12 hours in conjunction with a 30mg tablet for a total dosage of 45mg every 12 hours.  Qty: 60 tablet, Refills: 0    Associated Diagnoses: Diffuse large B-cell lymphoma of lymph nodes of multiple regions (H); Pain crisis      !! morphine (MS CONTIN) 30 MG CR tablet Take 1 tablet (30 mg) by mouth every 12 hours in conjunction with a 30mg tablet for a total dosage of 45mg every 12 hours.  Qty: 60 tablet, Refills: 0    Associated Diagnoses: Diffuse large B-cell lymphoma of lymph nodes of multiple regions (H); Pain crisis; Metastatic cancer to spine (H)      morphine (MSIR) 15 MG IR tablet Take 1 tablet (15 mg) by mouth every 4 hours as needed for moderate to severe pain  Qty: 15 tablet, Refills: 0    Associated Diagnoses: Diffuse large B-cell lymphoma of lymph nodes of multiple regions (H); Pain crisis      omeprazole (PRILOSEC) 40 MG DR capsule Take 1 capsule (40 mg) by mouth daily  Qty: 30 capsule, Refills: 1    Associated  Diagnoses: Gastroesophageal reflux disease without esophagitis      ondansetron (ZOFRAN) 4 MG tablet Take 1 tablet (4 mg) by mouth every 8 hours as needed for nausea  Qty: 30 tablet, Refills: 0    Associated Diagnoses: Diffuse large B-cell lymphoma of lymph nodes of multiple regions (H)       !! - Potential duplicate medications found. Please discuss with provider.      CONTINUE these medications which have NOT CHANGED    Details   acetaminophen (TYLENOL) 325 MG tablet Take 2 tablets (650 mg) by mouth every 4 hours as needed for mild pain  Qty:        acyclovir (ZOVIRAX) 400 MG tablet Take 1 tablet (400 mg) by mouth 2 times daily  Qty: 60 tablet, Refills: 1    Associated Diagnoses: Diffuse large B-cell lymphoma of lymph nodes of multiple regions (H)      allopurinol (ZYLOPRIM) 300 MG tablet Take 1 tablet (300 mg) by mouth daily  Qty: 30 tablet, Refills: 1    Associated Diagnoses: Diffuse large B-cell lymphoma of lymph nodes of multiple regions (H)      atorvastatin (LIPITOR) 40 MG tablet Take 1 tablet (40 mg) by mouth daily  Qty: 30 tablet, Refills: 0    Associated Diagnoses: Paroxysmal atrial fibrillation (H)      carvedilol (COREG) 12.5 MG tablet Take 12.5 mg by mouth 2 times daily (with meals)      metoclopramide (REGLAN) 10 MG tablet Take 1 tablet (10 mg) by mouth every 6 hours as needed (hiccups)  Qty: 10 tablet, Refills: 0    Associated Diagnoses: Hiccups      polyethylene glycol (MIRALAX) 17 g packet Take 1 packet by mouth daily as needed for constipation      rivaroxaban ANTICOAGULANT (XARELTO ANTICOAGULANT) 20 MG TABS tablet Take 1 tablet (20 mg) by mouth daily (with dinner)  Qty: 30 tablet, Refills: 3    Associated Diagnoses: Paroxysmal atrial fibrillation (H)      senna-docusate (SENOKOT-S/PERICOLACE) 8.6-50 MG tablet Take 1-2 tablets by mouth 2 times daily as needed for constipation  Qty: 10 tablet, Refills: 0    Associated Diagnoses: Diffuse large B-cell lymphoma of lymph nodes of multiple regions (H)          STOP taking these medications       naloxone (NARCAN) 4 MG/0.1ML nasal spray Comments:   Reason for Stopping:             Allergies   No Known Allergies  Data   Most Recent 3 CBC's:  Recent Labs   Lab Test 09/02/20  0556 09/01/20  0611 08/31/20  1004   WBC 11.0 5.5 7.0   HGB 7.7* 6.4* 7.9*   MCV 95 94 92   * 79* 110*      Most Recent 3 BMP's:  Recent Labs   Lab Test 09/02/20  0556 09/01/20  0611 08/31/20  1004    141 142   POTASSIUM 4.4 4.3 3.4   CHLORIDE 114* 113* 107   CO2 23 22 30   BUN 15 6* 6*   CR 0.53* 0.46* 0.61*   ANIONGAP 6 6 5   STACY 8.0* 7.6* 8.4*   * 122* 104*     Most Recent 2 LFT's:  Recent Labs   Lab Test 08/31/20  1004 08/28/20  0907   AST 14 14   ALT 16 16   ALKPHOS 147 132   BILITOTAL 0.6 0.9     Most Recent INR's and Anticoagulation Dosing History:  Anticoagulation Dose History     Recent Dosing and Labs Latest Ref Rng & Units 3/18/2020 3/18/2020 3/18/2020 3/19/2020 3/19/2020 8/1/2020 9/1/2020    INR 0.86 - 1.14 1.74(H) 1.63(H) 1.57(H) 1.35(H) 1.31(H) 1.29(H) 2.38(H)        Most Recent 3 Troponin's:  Recent Labs   Lab Test 08/02/20  0107 08/01/20  2305   TROPI 0.042 0.043     Most Recent Cholesterol Panel:No lab results found.  Most Recent 6 Bacteria Isolates From Any Culture (See EPIC Reports for Culture Details):  Recent Labs   Lab Test 08/09/20  2105 08/02/20  0320 03/19/20  1005 03/14/20  1406 03/14/20  1402 03/11/20  0300   CULT No growth No growth No growth No growth No growth No growth     Most Recent TSH, T4 and A1c Labs:No lab results found.  Results for orders placed or performed during the hospital encounter of 08/31/20   XR Chest Port 1 View    Narrative    EXAM: XR CHEST PORT 1 VW  8/31/2020 3:04 PM     HISTORY:  PICC Placement       COMPARISON:  Chest radiograph 8/7/2020    FINDINGS:   Single portable AP view of the chest and 60 degrees. Right upper  extremity PICC tip projects over cavoatrial junction. Trachea is  midline. Cardiomediastinal silhouette is  stable. The pulmonary  vasculature is indistinct. Right costophrenic angle is not within  view. No focal airspace opacity, pleural effusion or appreciable  pneumothorax.    No acute osseous abnormality. Visualized upper abdomen is  unremarkable.        Impression    IMPRESSION: Right upper extremity PICC tip projects over cavoatrial  junction.     I have personally reviewed the examination and initial interpretation  and I agree with the findings.    RUTHANN POLANCO MD   XR Abdomen 1 View    Narrative    Exam: XR ABDOMEN 1 VW, 9/2/2020 10:01 AM    Indication: Hx of lymphoma, significant constipation    Comparison: PET/CT 7/31/2020    Findings:   Supine abdominal radiographs. Moderate volume of stool within the  colon demonstrated, mainly in the ascending and transverse region. No  air filled distended small bowel loops. Cholecystectomy clips. Limited  evaluation for free air on supine radiograph. Degenerative changes.      Impression    Impression: Moderate volume of stool consistent with constipation. No  small bowel obstruction.    FEDERICO MARTIN MD   XR Abdomen 1 View    Narrative    Exam: XR ABDOMEN 1 VW, 9/2/2020 3:01 PM    Indication: Hx of lymphoma, significant constipation refractory to  senna, miralax, mag citrate, pink lady enema    Comparison: 9/2/2020, PET/CT 7/31/2020    Findings:   Supine views of the abdomen. This was the  view study prior to a  requested Gastrografin therapeutic enema. Patient reports large bowel  movement following the abdominal radiograph earlier same date.    Lung bases are unremarkable. No acute osseous abnormalities. The  transverse colon is dilated measuring up to 8 cm. The amount of stool  over the ascending and descending colon has significantly decreased  from prior study. No portal venous gas identified. Rounded linear  lucencies of the cecum and descending colon most likely represents  outlining of desiccated stool rather than pneumatosis. This does  not  appear to be linear on the radiograph centered over the upper abdomen  which would also favor same. Evaluation for free air limited on supine  radiograph. Osseous structures stable.      Impression    Impression:   1. Decreased volume of stool within the colon following large reported  bowel movement by the patient. Gas dilated transverse colon measuring  up to 8 cm. Given interval decrease in stool and spontaneous bowel  movements, the therapeutic Gastrografin enema was not performed at  this time following discussion with provider Carol Zabala PA-C.  2. Rounded linear lucencies of the cecum and ascending colon which  most likely represent air outlining some desiccated stool rather than  pneumatosis. No portal venous gas.    I have personally reviewed the examination and initial interpretation  and I agree with the findings.    FEDERICO MARTIN MD

## 2020-09-02 NOTE — PLAN OF CARE
Hypertensive, OVSS. Denied pain, nausea/vomiting. Feeling more fatigued today. Up ad maryanne. Very poor po intake r/t abdominal fullness due to constipation. Adequate urine output. Moderate amount of stool this morning prior to having abdominal xray completed. Chemotherapy completed. Pt currently at xray having gastrografin enema. Possible discharge to home later this afternoon/evening.

## 2020-09-02 NOTE — PROGRESS NOTES
Prior Authorization Approval    ondansetron 4mg tabs  Date Initiated: 9/1/2020  Date Completed: 9/2/2020  Prior Auth Type: Quantity Limit                Status: Approved    Effective Date: 06/02/2020 - 09/02/2021  Copay: 1.00     Filling Pharmacy: [facility] - [place], [place] - [address] [place] [address]    Insurance: [employer] - Phone [phone] Fax [phone]  ID: [id number]  Case Number: [id number]   Submitted Via: Ladarius Childs  [facility] Liaison  Ph: [phone] Page: [phone]

## 2020-09-02 NOTE — PLAN OF CARE
DISCHARGE                         9/2/2020  5:23 PM  ----------------------------------------------------------------------------  Discharge instruction reviewed. Patient verbalized understanding. Jose CUMMINGS removed his picc line. Patient's valuables brought from security and given back to patient. New meds reviewed, patient wheeled to pharmacy to  his discharge meds.Family awaiting in front of the hospital. Patient discharged

## 2020-09-03 NOTE — NURSING NOTE
"Oncology Rooming Note    September 3, 2020 2:03 PM   Kenneth Tello is a 50 year old male who presents for:    Chief Complaint   Patient presents with     Blood Draw     Labs drawn via  by RN in lab. VS taken.      Oncology Clinic Visit     [facility] BMT NEW - B-cell lymphoma     Initial Vitals: BP (!) 144/85   Pulse 58   Temp 98.3  F (36.8  C) (Oral)   Resp 16   Ht 1.753 m (5' 9.02\")   Wt 97.5 kg (215 lb)   SpO2 98%   BMI 31.74 kg/m   Estimated body mass index is 31.74 kg/m  as calculated from the following:    Height as of this encounter: 1.753 m (5' 9.02\").    Weight as of this encounter: 97.5 kg (215 lb). Body surface area is 2.18 meters squared.  No Pain (0) Comment: Data Unavailable   No LMP for male patient.  Allergies reviewed: Yes  Medications reviewed: Yes    Medications: Medication refills not needed today.  Pharmacy name entered into mycujoo:    [id number] - [place], [place] - [address] [id number] - [place], [place] - [address] [place] [address] AT [address]    Clinical concerns: No new concerns. Dr. Hamilton was notified.      Jc Deleon LPN            "

## 2020-09-03 NOTE — LETTER
9/3/2020      RE: Kenneth Tello  [address]  [place] [address]       At the request of Dr. Avery Cary, I today saw Kenneth Tello for consultation regarding possible autologous stem cell transplant and/or cellular therapy for refractory diffuse large B-cell lymphoma.      HISTORY OF PRESENT ILLNESS:  This now 50-year-old man developed low back pain in 03/2020.  CT scan and PET scan showed extensive disease below the diaphragm and in the neck.  He also had extranodal disease in the form of adrenal gland involvement as well.  He subsequently underwent 6 cycles of R-CHOP chemotherapy but unfortunately after the last cycle was already progressing again.  He was at the time of diagnosis found to be a non-germinal cell phenotype with a high (80%-90%) Ki-67 proliferation index.  The malignant cells expressed CD19 and CD20.  FISH studies showed no evidence for double or triple-hit lymphoma.  There was a rearrangement of BCL6 and IGH.  It is not clear to me that a bone marrow aspirate or biopsy was performed.  After he was discovered to have progressed, he was switched over to RICE.  However, he developed rather severe encephalopathy and the complete course of chemotherapy could not be delivered.  He subsequently received a cycle of R-DHAP which he just completed and was discharged from the hospital yesterday.  He now presents for consideration of possible autologous transplant and/or other therapies.      PAST MEDICAL HISTORY:  Remarkable for atrial fibrillation, possible coronary disease, stroke with left-sided hand deficit, and obstructive sleep apnea.       REVIEW OF SYSTEMS:  Currently, he has some numbness in his left hand which has been worsened by the chemotherapy, the primary deficit being from the stroke.  No night sweats, fevers, chills, nausea, vomiting, diarrhea, cough, hemoptysis, shortness of breath, hematuria, dysuria, bruising or bleeding.  The remainder of the 10-point review of  systems is negative.      PHYSICAL EXAMINATION:   GENERAL:  He looked healthy.   VITAL SIGNS:  Unremarkable.   HEENT:  Sclerae anicteric and noninjected.     RESPIRATORY:  His breathing appeared to be normal without shortness of breath or tachypnea.   EYES:  There was no discharge or erythema of the eyes.   SKIN:  Had no visible lesions.     NEUROLOGIC:  He was mentally alert and oriented.     A complete exam was not performed because of the public health emergency.      ASSESSMENT AND PLAN:  This is a 50-year-old with high-risk diffuse large B-cell lymphoma, non-germinal center type.  Unfortunately, he progressed towards the end of his R-CHOP chemotherapy, thus proving to be quite refractory.  He has had part of RICE and a full cycle of DHAP.  A PET/CT is planned for approximately 3-4 weeks after this cycle to assess the response.  In the event he has a good response or better yet near complete response or complete response, he would then be a candidate for autologous transplantation.  I walked the patient and his daughter and wife, who were also present at the visit, through the process of autologous stem cell transplantation.  He would come for a day 5-point outpatient evaluation, during the course of which all major organ systems would be assessed and, to the extent necessarily, appropriate referrals made and interventions undertaken so as to maximize his chance of survival.  He would then be mobilized using either growth factor alone or growth factor with chemotherapy (chemo priming), this to be decided based on the extent of his disease.  He would then be admitted for the procedure itself, the preparative regimen BEAM, he would be hospitalized for approximately 8 days, he would receive stem cells the day before discharge and be observed overnight and then discharge.  Thereafter his counts will all decrease precipitously and he will go through a period of approximately a week and a half or so when he will have  very severe neutropenia.  He can be transfused with red cells and platelets, but there is no practical way to transfuse white cells.  He would likely develop a neutropenic fever and need to be rehospitalized for intensive antibiotic therapy.  Using this approach, approximately 97%-98% of patients survive, but there is approximately 2%-3% chance of succumbing to an overwhelming, usually bacterial infection.  This comprises the majority of the risk of an autologous transplant.  I told him that historically with diffuse large B-cell, patients could expect a 30%-40% chance of long-term disease-free survival, and unfortunately there is about a 40%-50% chance of relapse even after transplant.      I also discussed with him what could be undertaken in the event that the repeat PET/CT does not show a very good or near complete response.  He would then, in my opinion, be a candidate for cellular therapy, most in the form of CAR T, which would be performed on our protocol 2017-45, which compares Kymriah and Yescarta.  I briefly walked them through the theory of CAR T-cell treatments, whether he were to receive Kymriah or Yescarta, he would receive approximately 3-4 days of fludarabine and Cytoxan-based chemotherapy followed by infusion of the cells.  He would first beforehand be pheresed and his T-cells isolated and transfected with anti-CD19, CD28 activation complex.  The expansion would take approximately 2-1/2 weeks.  He would then subsequently undergo lymphodepleting chemotherapy with Cytoxan and fludarabine, and the T-cells would be infused.  I explained to him that the major toxicity of this procedure is cytokine release syndrome.  I told him that the mortality associated with this was approximately 7%-10%, and therefore, this procedure would not be undertaken in a lower risk situation if he has a response to chemotherapy where, obviously we would instead perform an autologous transplant.      Mr. Tello was taken a  bit aback by the results both for the autologous transplant and the prospect of the CAR T therapy but understands the potential risks and benefits of these procedures and is willing to entertain going through either of these procedures if it were to become necessary.  I believe that he has a good understanding of the potential risks and benefits.  I answered appropriate questions to the best of my capacities.     Jose Hamilton M.D.      I spent 60 min face to face today, 50 min in counseling.    Jose Hamilton M.D.

## 2020-09-03 NOTE — Clinical Note
9/3/2020         RE: Kenneth Tello  [address]  [place] [address]        Dear Colleague,    Thank you for referring your patient, Kenneth Tello, to the [facility]. Please see a copy of my visit note below.     Dictation on: 09/03/2020  3:02 PM by: NYA HAMILTON [[id number]]   I spent 60 min face to face today, 50 min in counseling.    Nya Hamilton M.D.        At the request of Dr. Avery Cary, I today saw Kenneth Tello for consultation regarding possible autologous stem cell transplant and/or cellular therapy for refractory diffuse large B-cell lymphoma.      HISTORY OF PRESENT ILLNESS:  This now 50-year-old man developed low back pain in 03/2020.  CT scan and PET scan showed extensive disease below the diaphragm and in the neck.  He also had extranodal disease in the form of adrenal gland involvement as well.  He subsequently underwent 6 cycles of R-CHOP chemotherapy but unfortunately after the last cycle was already progressing again.  He was at the time of diagnosis found to be a non-germinal cell phenotype with a high (80%-90%) Ki-67 proliferation index.  The malignant cells expressed CD19 and CD20.  FISH studies showed no evidence for double or triple-hit lymphoma.  There was a rearrangement of BCL6 and IGH.  It is not clear to me that a bone marrow aspirate or biopsy was performed.  After he was discovered to have progressed, he was switched over to RICE.  However, he developed rather severe encephalopathy and the complete course of chemotherapy could not be delivered.  He subsequently received a cycle of R-DHAP which he just completed and was discharged from the hospital yesterday.  He now presents for consideration of possible autologous transplant and/or other therapies.      PAST MEDICAL HISTORY:  Remarkable for atrial fibrillation, possible coronary disease, stroke with left-sided hand deficit, and obstructive sleep apnea.       REVIEW OF  SYSTEMS:  Currently, he has some numbness in his left hand which has been worsened by the chemotherapy, the primary deficit being from the stroke.  No night sweats, fevers, chills, nausea, vomiting, diarrhea, cough, hemoptysis, shortness of breath, hematuria, dysuria, bruising or bleeding.  The remainder of the 10-point review of systems is negative.      PHYSICAL EXAMINATION:   GENERAL:  He looked healthy.   VITAL SIGNS:  Unremarkable.   HEENT:  Sclerae anicteric and noninjected.     RESPIRATORY:  His breathing appeared to be normal without shortness of breath or tachypnea.   EYES:  There was no discharge or erythema of the eyes.   SKIN:  Had no visible lesions.     NEUROLOGIC:  He was mentally alert and oriented.     A complete exam was not performed because of the public health emergency.      ASSESSMENT AND PLAN:  This is a 50-year-old with high-risk diffuse large B-cell lymphoma, non-germinal center type.  Unfortunately, he progressed towards the end of his R-CHOP chemotherapy, thus proving to be quite refractory.  He has had part of RICE and a full cycle of DHAP.  A PET/CT is planned for approximately 3-4 weeks after this cycle to assess the response.  In the event he has a good response or better yet near complete response or complete response, he would then be a candidate for autologous transplantation.  I walked the patient and his daughter and wife, who were also present at the visit, through the process of autologous stem cell transplantation.  He would come for a day 5-point outpatient evaluation, during the course of which all major organ systems would be assessed and, to the extent necessarily, appropriate referrals made and interventions undertaken so as to maximize his chance of survival.  He would then be mobilized using either growth factor alone or growth factor with chemotherapy (chemo priming), this to be decided based on the extent of his disease.  He would then be admitted for the procedure  itself, the preparative regimen BEAM, he would be hospitalized for approximately 8 days, he would receive stem cells the day before discharge and be observed overnight and then discharge.  Thereafter his counts will all decrease precipitously and he will go through a period of approximately a week and a half or so when he will have very severe neutropenia.  He can be transfused with red cells and platelets, but there is no practical way to transfuse white cells.  He would likely develop a neutropenic fever and need to be rehospitalized for intensive antibiotic therapy.  Using this approach, approximately 97%-98% of patients survive, but there is approximately 2%-3% chance of succumbing to an overwhelming, usually bacterial infection.  This comprises the majority of the risk of an autologous transplant.  I told him that historically with diffuse large B-cell, patients could expect a 30%-40% chance of long-term disease-free survival, and unfortunately there is about a 40%-50% chance of relapse even after transplant.      I also discussed with him what could be undertaken in the event that the repeat PET/CT does not show a very good or near complete response.  He would then, in my opinion, be a candidate for cellular therapy, most in the form of CAR T, which would be performed on our protocol 2017-45, which compares Kymriah and Yescarta.  I briefly walked them through the theory of CAR T-cell treatments, whether he were to receive Kymriah or Yescarta, he would receive approximately 3-4 days of fludarabine and Cytoxan-based chemotherapy followed by infusion of the cells.  He would first beforehand be pheresed and his T-cells isolated and transfected with anti-CD19, CD28 activation complex.  The expansion would take approximately 2-1/2 weeks.  He would then subsequently undergo lymphodepleting chemotherapy with Cytoxan and fludarabine, and the T-cells would be infused.  I explained to him that the major toxicity of this  procedure is cytokine release syndrome.  I told him that the mortality associated with this was approximately 7%-10%, and therefore, this procedure would not be undertaken in a lower risk situation if he has a response to chemotherapy where, obviously we would instead perform an autologous transplant.      Mr. Tello was taken a bit aback by the results both for the autologous transplant and the prospect of the CAR T therapy but understands the potential risks and benefits of these procedures and is willing to entertain going through either of these procedures if it were to become necessary.  I believe that he has a good understanding of the potential risks and benefits.  I answered appropriate questions to the best of my capacities.      Again, thank you for allowing me to participate in the care of your patient.        Sincerely,        Jose Hamilton MD

## 2020-09-03 NOTE — PROGRESS NOTES
Chief Complaint   Patient presents with     Imm/Inj     Patient with  Diffuse large B-cell lymphoma of lymph nodes of multiple regions - here for a Neulasta injection     Patient arrived to clinic for a Neulasta injection today and has no specific complaints and has been feeling well.  Patient declined to speak with an RN today.   No results needed for Neulasta today.  Neulasta injection given to Left Arm without incident and patient tolerated procedure well.  Patient has an appointment after this appoinment.

## 2020-09-03 NOTE — PROGRESS NOTES
Met with Kenneth via teleconference after their consultation with Dr. Hamilton. Explained the role of a BMT Nurse Coordinator. Reviewed the plan as outlined by Dr. Hamilton, the steps followed through work-up week, as well as answered questions pertaining to the transplant process. Patient was provided with contact information for Chacha Mccloud, nurse coordinator, their contact in the BMT office, as well as for Dr. Hamilton. Kenneth will have PET/CT after 2nd cycle of Salvage - RDHAP. Kenneth may be eligible for Auto or CAR-T. Kenneth had no further questions, comments, or concerns at this time.

## 2020-09-03 NOTE — PROGRESS NOTES
BMT Clinical Social Work New Transplant Evaluation    Information for this evaluation on September 3, 2020  was collected via Pt report, consultation with medical team, and medical chart review.     Present:  Patient: Kenneth Tello   Clinical  (CSW): KVNG Sabillon, [facility]    Medical Team:   Nurse Coordinator: Manish Weber RN  BMT Physician: Jose Hamilton MD  Referring Physician: Avery Cary MD    Diagnosis: non-Hodgkin's Lymphoma  Diagnosis Date: 3/9/2020      Presenting Information:   Pt is a 50 year old male diagnosed with NHL who presents to [facility] for consultation regarding an autologous stem cell transplant. Pt was alone for today's visit which took place via telephone due to COVID-19 restrictions.      Contact Information:  Pt Phone: [phone]  Pt Email: [email or web address]  Pt's daughter Devika Phone: [phone]    Special Needs:  Pt will need local lodging.   The pt lives in [place], [place] which is 1 hr and 40 min away. The pt is aware he will need to relocate, discussed options including the [facility] (not currently open), [address] and local hotels. The pt shared his brother lives in [place] and he maybe able to stay with him.    Family Constellation:   Spouse: n/a  Children: 4 children- Devika, Delia, Aditya and Brandi (16)  Parents: Kelli and Josh  Siblings: 3 siblings in [place] and a more in [place]    Education/Employment:  The pt is not currently working and stopped due to his dx. Prior to this he was working as a . He tried applying for SSDI, but may have applied wrong and was denied due to his son. CSW encouraged the pt to try again and offered [employer] as a support option for him.     Finances/Insurance:  The pt has no income at this time. Financial concerns identified at this time, discussed varghese information available during the BMT process.     CSW provided information regarding the insurance authorization  process and the role of the BMT financial case-mangers. CSW provided contact info for the BMT financial case-managers and referred pt to them for future insurance questions.     Caregiver:  CSW discussed with Pt the caregiver role and expectation at length. Pt is agreeable to having a full time caregiver for a minimum of 30 days until cleared by the BMT physician. Pt's identified caregivers are his daughters and brother. Caregiver education and information provided. No caregiver concerns identified.     Healthcare Directive:  No. CSW provided education and forms. CSW encouraged Pt to have discussions with their family regarding their health care wishes.     Resources Provided:  BMT Information Book   BMT Resources Packet:   Healthcare Directive:   Tour of Unit NO   Transplant unit description and information provided.     Identified Concerns:   No concerns identified at this time.     Summary:   Pt presents to [facility] for consultation regarding an autologous stem cell transplant. Pt/family asked good questions regarding psychosocial factors related to BMT; all of which were answered. Pt presented as friendly, but overwhelmed with all the information.     Plan:   CSW provided contact information and encouraged Pt to contact CSW with questions, concerns, resources and for support. CSW will continue to follow Pt to provide supportive counseling and assistance with resources as needed.      KVNG Sabillon, [facility]  Pager [phone]  Phone [phone]

## 2020-09-03 NOTE — LETTER
9/3/2020     RE: Kenneth Tello  [address]  [facility] [address]    Dear Colleague,    Thank you for referring your patient, Kenneth Tello, to the [facility] BLOOD AND MARROW TRANSPL[facility]. Please see a copy of my visit note below.    BMT Clinical Social Work New Transplant Evaluation    Information for this evaluation on September 3, 2020  was collected via Pt report, consultation with medical team, and medical chart review.     Present:  Patient: Kenneth Tello   Clinical  (CSW): KVNG Sabillon, [facility]    Medical Team:   Nurse Coordinator: Manish Weber RN  BMT Physician: Jose Hamilton MD  Referring Physician: Avery Cary MD    Diagnosis: non-Hodgkin's Lymphoma  Diagnosis Date: 3/9/2020      Presenting Information:   Pt is a 50 year old male diagnosed with NHL who presents to [facility] for consultation regarding an autologous stem cell transplant. Pt was alone for today's visit which took place via telephone due to [employer] restrictions.      Contact Information:  Pt Phone: [phone]  Pt Email: [email or web address]  Pt's daughter Devika Phone: [phone]    Special Needs:  Pt will need local lodging.   The pt lives in [place], [place] which is 1 hr and 40 min away. The pt is aware he will need to relocate, discussed options including the [facility] (not currently open), [address] and local hotels. The pt shared his brother lives in [place] and he maybe able to stay with him.    Family Constellation:   Spouse: n/a  Children: 4 children- Devika, Delia, Aditya and Brandi (16)  Parents: Kelli and Josh  Siblings: 3 siblings in [place] and a more in [place]    Education/Employment:  The pt is not currently working and stopped due to his dx. Prior to this he was working as a . He tried applying for SSDI, but may have applied wrong and was denied due to his son. CSW encouraged the pt to try again and offered [employer] as a support  option for him.     Finances/Insurance:  The pt has no income at this time. Financial concerns identified at this time, discussed varghese information available during the BMT process.     CSW provided information regarding the insurance authorization process and the role of the BMT financial case-mangers. CSW provided contact info for the BMT financial case-managers and referred pt to them for future insurance questions.     Caregiver:  CSW discussed with Pt the caregiver role and expectation at length. Pt is agreeable to having a full time caregiver for a minimum of 30 days until cleared by the BMT physician. Pt's identified caregivers are his daughters and brother. Caregiver education and information provided. No caregiver concerns identified.     Healthcare Directive:  No. CSW provided education and forms. CSW encouraged Pt to have discussions with their family regarding their health care wishes.     Resources Provided:  BMT Information Book   BMT Resources Packet:   Healthcare Directive:   Tour of Unit NO   Transplant unit description and information provided.     Identified Concerns:   No concerns identified at this time.     Summary:   Pt presents to [facility] for consultation regarding an autologous stem cell transplant. Pt/family asked good questions regarding psychosocial factors related to BMT; all of which were answered. Pt presented as friendly, but overwhelmed with all the information.     Plan:   CSW provided contact information and encouraged Pt to contact CSW with questions, concerns, resources and for support. CSW will continue to follow Pt to provide supportive counseling and assistance with resources as needed.      KVNG Sabillon, [facility]  Pager [phone]  Phone [phone]      Again, thank you for allowing me to participate in the care of your patient.        Sincerely,        LINDY Goodwin

## 2020-09-03 NOTE — PLAN OF CARE
Physical Therapy Discharge Summary    Reason for therapy discharge:    Discharged to home.    Progress towards therapy goal(s). See goals on Care Plan in [facility] electronic health record for goal details.  Goals met    Therapy recommendation(s):    No further therapy is recommended.

## 2020-09-03 NOTE — LETTER
9/3/2020     RE: Kenneth Tello  [address]  [facility] [address]    Dear Colleague,    Thank you for referring your patient, Kenneth Tello, to the [facility] BLOOD [facility] MARROW TRANSPL[facility]. Please see a copy of my visit note below.    Met with Kenneth via teleconference after their consultation with Dr. Hamilton. Explained the role of a BMT Nurse Coordinator. Reviewed the plan as outlined by Dr. Hamilton, the steps followed through work-up week, as well as answered questions pertaining to the transplant process. Patient was provided with contact information for Chacha Mccloud, nurse coordinator, their contact in the BMT office, as well as for Dr. Hamilton. Kenneth will have PET/CT after 2nd cycle of Salvage - RDHAP. Kenneth may be eligible for Auto or CAR-T. Kenneth had no further questions, comments, or concerns at this time.    Again, thank you for allowing me to participate in the care of your patient.      Sincerely,      BMT Nurse Coordinator

## 2020-09-03 NOTE — PROGRESS NOTES
At the request of Dr. Avery Cary, I today saw Kenneth Tello for consultation regarding possible autologous stem cell transplant and/or cellular therapy for refractory diffuse large B-cell lymphoma.      HISTORY OF PRESENT ILLNESS:  This now 50-year-old man developed low back pain in 03/2020.  CT scan and PET scan showed extensive disease below the diaphragm and in the neck.  He also had extranodal disease in the form of adrenal gland involvement as well.  He subsequently underwent 6 cycles of R-CHOP chemotherapy but unfortunately after the last cycle was already progressing again.  He was at the time of diagnosis found to be a non-germinal cell phenotype with a high (80%-90%) Ki-67 proliferation index.  The malignant cells expressed CD19 and CD20.  FISH studies showed no evidence for double or triple-hit lymphoma.  There was a rearrangement of BCL6 and IGH.  It is not clear to me that a bone marrow aspirate or biopsy was performed.  After he was discovered to have progressed, he was switched over to RICE.  However, he developed rather severe encephalopathy and the complete course of chemotherapy could not be delivered.  He subsequently received a cycle of R-DHAP which he just completed and was discharged from the hospital yesterday.  He now presents for consideration of possible autologous transplant and/or other therapies.      PAST MEDICAL HISTORY:  Remarkable for atrial fibrillation, possible coronary disease, stroke with left-sided hand deficit, and obstructive sleep apnea.       REVIEW OF SYSTEMS:  Currently, he has some numbness in his left hand which has been worsened by the chemotherapy, the primary deficit being from the stroke.  No night sweats, fevers, chills, nausea, vomiting, diarrhea, cough, hemoptysis, shortness of breath, hematuria, dysuria, bruising or bleeding.  The remainder of the 10-point review of systems is negative.      PHYSICAL EXAMINATION:   GENERAL:  He looked healthy.   VITAL  SIGNS:  Unremarkable.   HEENT:  Sclerae anicteric and noninjected.     RESPIRATORY:  His breathing appeared to be normal without shortness of breath or tachypnea.   EYES:  There was no discharge or erythema of the eyes.   SKIN:  Had no visible lesions.     NEUROLOGIC:  He was mentally alert and oriented.     A complete exam was not performed because of the public health emergency.      ASSESSMENT AND PLAN:  This is a 50-year-old with high-risk diffuse large B-cell lymphoma, non-germinal center type.  Unfortunately, he progressed towards the end of his R-CHOP chemotherapy, thus proving to be quite refractory.  He has had part of RICE and a full cycle of DHAP.  A PET/CT is planned for approximately 3-4 weeks after this cycle to assess the response.  In the event he has a good response or better yet near complete response or complete response, he would then be a candidate for autologous transplantation.  I walked the patient and his daughter and wife, who were also present at the visit, through the process of autologous stem cell transplantation.  He would come for a day 5-point outpatient evaluation, during the course of which all major organ systems would be assessed and, to the extent necessarily, appropriate referrals made and interventions undertaken so as to maximize his chance of survival.  He would then be mobilized using either growth factor alone or growth factor with chemotherapy (chemo priming), this to be decided based on the extent of his disease.  He would then be admitted for the procedure itself, the preparative regimen BEAM, he would be hospitalized for approximately 8 days, he would receive stem cells the day before discharge and be observed overnight and then discharge.  Thereafter his counts will all decrease precipitously and he will go through a period of approximately a week and a half or so when he will have very severe neutropenia.  He can be transfused with red cells and platelets, but there  is no practical way to transfuse white cells.  He would likely develop a neutropenic fever and need to be rehospitalized for intensive antibiotic therapy.  Using this approach, approximately 97%-98% of patients survive, but there is approximately 2%-3% chance of succumbing to an overwhelming, usually bacterial infection.  This comprises the majority of the risk of an autologous transplant.  I told him that historically with diffuse large B-cell, patients could expect a 30%-40% chance of long-term disease-free survival, and unfortunately there is about a 40%-50% chance of relapse even after transplant.      I also discussed with him what could be undertaken in the event that the repeat PET/CT does not show a very good or near complete response.  He would then, in my opinion, be a candidate for cellular therapy, most in the form of CAR T, which would be performed on our protocol 2017-45, which compares Kymriah and Yescarta.  I briefly walked them through the theory of CAR T-cell treatments, whether he were to receive Kymriah or Yescarta, he would receive approximately 3-4 days of fludarabine and Cytoxan-based chemotherapy followed by infusion of the cells.  He would first beforehand be pheresed and his T-cells isolated and transfected with anti-CD19, CD28 activation complex.  The expansion would take approximately 2-1/2 weeks.  He would then subsequently undergo lymphodepleting chemotherapy with Cytoxan and fludarabine, and the T-cells would be infused.  I explained to him that the major toxicity of this procedure is cytokine release syndrome.  I told him that the mortality associated with this was approximately 7%-10%, and therefore, this procedure would not be undertaken in a lower risk situation if he has a response to chemotherapy where, obviously we would instead perform an autologous transplant.      Mr. Tello was taken a bit aback by the results both for the autologous transplant and the prospect of the CAR  T therapy but understands the potential risks and benefits of these procedures and is willing to entertain going through either of these procedures if it were to become necessary.  I believe that he has a good understanding of the potential risks and benefits.  I answered appropriate questions to the best of my capacities.     Jose Hamilton M.D.      I spent 60 min face to face today, 50 min in counseling.    Jose Hamilton M.D.

## 2020-09-08 NOTE — PROGRESS NOTES
"Kenneth Tello is a 50 year old male who is being evaluated via a billable telephone visit.      The patient has been notified of following:     \"This telephone visit will be conducted via a call between you and your physician/provider. We have found that certain health care needs can be provided without the need for a physical exam.  This service lets us provide the care you need with a short phone conversation.  If a prescription is necessary we can send it directly to your pharmacy.  If lab work is needed we can place an order for that and you can then stop by our lab to have the test done at a later time.    Telephone visits are billed at different rates depending on your insurance coverage. During this emergency period, for some insurers they may be billed the same as an in-person visit.  Please reach out to your insurance provider with any questions.    If during the course of the call the physician/provider feels a telephone visit is not appropriate, you will not be charged for this service.\"    Patient has given verbal consent for Telephone visit?  Yes    What phone number would you like to be contacted at? [phone]     How would you like to obtain your AVS? Tana     Vitals - Patient Reported  Weight (Patient Reported): 96.2 kg (212 lb)  Height (Patient Reported): 175.3 cm (5' 9.02\")  BMI (Based on Pt Reported Ht/Wt): 31.29  Pain Score: Mild Pain (3)  Pain Loc: Abdomen      I have reviewed and updated the patient's allergies and medication list.    Concerns: No concerns  Refills: No refills needed.        Brooke Vivas CMA    "

## 2020-09-08 NOTE — LETTER
"9/8/2020     RE: Kenneth Tello  [address]  [place] [id number]    Dear Colleague,    Thank you for referring your patient, Kenneth Tello, to the [facility]. Please see a copy of my visit note below.    Kenneth Tello is a 50 year old male who is being evaluated via a billable telephone visit.      The patient has been notified of following:     \"This telephone visit will be conducted via a call between you and your physician/provider. We have found that certain health care needs can be provided without the need for a physical exam.  This service lets us provide the care you need with a short phone conversation.  If a prescription is necessary we can send it directly to your pharmacy.  If lab work is needed we can place an order for that and you can then stop by our lab to have the test done at a later time.    Telephone visits are billed at different rates depending on your insurance coverage. During this emergency period, for some insurers they may be billed the same as an in-person visit.  Please reach out to your insurance provider with any questions.    If during the course of the call the physician/provider feels a telephone visit is not appropriate, you will not be charged for this service.\"    Patient has given verbal consent for Telephone visit?  Yes    What phone number would you like to be contacted at? [phone]     How would you like to obtain your AVS? MyChart     Vitals - Patient Reported  Weight (Patient Reported): 96.2 kg (212 lb)  Height (Patient Reported): 175.3 cm (5' 9.02\")  BMI (Based on Pt Reported Ht/Wt): 31.29  Pain Score: Mild Pain (3)  Pain Loc: Abdomen      I have reviewed and updated the patient's allergies and medication list.    Concerns: No concerns  Refills: No refills needed.        Brooke Vivas CMA      August 28, 2020     Reason for Visit: F/U diffuse large B cell lymphoma (DLBCL)    Oncology HPI: Mr. Tello is a 50 year " old man with DLBCL.  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  DLBCL-IPI was high (stage, extranodal sites, LDH) and clinical stage IV with extranodal involvement.  He was started on R-CHOP in 03/2020 with IT methotrexate planned with each cycle for CNS prophylaxis.  Medical history also notable for A fib on anticoagulation, myocardial infarction, and stroke in 2017.  Course has been complicated by constipation and worsening abdominal pain in 08/2020 after completion of treatment.  Imaging demonstrated enlarging mass invading the pancreas and spleen.  Biopsy confirmed refractory DLBCL and additional staging pending.  Salvage therapy RICE 8/7/20, tolerated poorly with neurotoxicity.     Here today for hospital follow-up after RDHAP.    Interval history:   -Feeling poorly  -Back pain is better now though. Still using MS Contin. Doesn't use short acting. Unsure if he still needs pain medications  -Having cramping pain in his stomach. Has some nausea. Well controlled with medications  -Poor appetite, though still eating, though not much.  Having a couple sandwich and noodles. Using 1 protein shake/day. Doesn't use more because he doesn't like them  -Drinking about 20-30 oz/day. Don't feel like taking in more  -Having knee pain which is stable  -Having looser stools, though not diarrhea. Still using Miralax every day  -energy is low and lack of motivation  -no bleeding currently   -No f/c  -No respiratory concerns     10 point review of systems otherwise negative    Current Outpatient Medications   Medication Sig Dispense Refill     acetaminophen (TYLENOL) 325 MG tablet Take 2 tablets (650 mg) by mouth every 4 hours as needed for mild pain       acyclovir (ZOVIRAX) 400 MG tablet Take 1 tablet (400 mg) by mouth 2 times  daily 60 tablet 1     allopurinol (ZYLOPRIM) 300 MG tablet Take 1 tablet (300 mg) by mouth daily 30 tablet 1     atorvastatin (LIPITOR) 40 MG tablet Take 1 tablet (40 mg) by mouth daily 30 tablet 0     carvedilol (COREG) 12.5 MG tablet Take 12.5 mg by mouth 2 times daily (with meals)       dexamethasone (DECADRON) 4 MG tablet Take 10 tablets (40 mg) by mouth every 24 hours on 9/2 at 4pm, then on 9/3 at 4pm 20 tablet 0     fluconazole (DIFLUCAN) 200 MG tablet Take 1 tablet (200 mg) by mouth daily until seen in clinic 21 tablet 0     gabapentin (NEURONTIN) 300 MG capsule Take 2 tabs in the morning, 1 tab at bedtime 90 capsule 1     levofloxacin (LEVAQUIN) 250 MG tablet Take 1 tablet (250 mg) by mouth daily until seen in clinic 21 tablet 0     lisinopril (ZESTRIL) 20 MG tablet Take 1 tablet (20 mg) by mouth daily 30 tablet 1     metoclopramide (REGLAN) 10 MG tablet Take 1 tablet (10 mg) by mouth every 6 hours as needed (hiccups) 10 tablet 0     morphine (MS CONTIN) 15 MG CR tablet Take 1 tablet (15 mg) by mouth every 12 hours in conjunction with a 30mg tablet for a total dosage of 45mg every 12 hours. 60 tablet 0     morphine (MS CONTIN) 30 MG CR tablet Take 1 tablet (30 mg) by mouth every 12 hours in conjunction with a 30mg tablet for a total dosage of 45mg every 12 hours. 60 tablet 0     morphine (MSIR) 15 MG IR tablet Take 1 tablet (15 mg) by mouth every 4 hours as needed for moderate to severe pain 15 tablet 0     omeprazole (PRILOSEC) 40 MG DR capsule Take 1 capsule (40 mg) by mouth daily 30 capsule 1     ondansetron (ZOFRAN) 4 MG tablet Take 1 tablet (4 mg) by mouth every 8 hours as needed for nausea 30 tablet 0     polyethylene glycol (MIRALAX) 17 g packet Take 1 packet by mouth daily as needed for constipation       prednisoLONE acetate (PRED FORTE) 1 % ophthalmic suspension Place 2 drops into both eyes 4 times daily until 9/4 1 Bottle 0     rivaroxaban ANTICOAGULANT (XARELTO ANTICOAGULANT) 20 MG TABS tablet  Take 1 tablet (20 mg) by mouth daily (with dinner) 30 tablet 3     senna-docusate (SENOKOT-S/PERICOLACE) 8.6-50 MG tablet Take 1-2 tablets by mouth 2 times daily as needed for constipation 10 tablet 0        No Known Allergies    Objective:  General: patient sounds in no audible acute distress, alert and oriented, speech clear and fluid  Resp: Speaking in full sentences, no audible respiratory distress, no cough, no audible wheeze  Psych: able to articulate logical thoughts, able to abstract reason, no tangential thoughts, no hallucinations or delusions His affect is down. Depressed    Labs:    9/8/2020 00:00   Sodium 134 (L)   Potassium 2.5 (<)   Chloride 87 (L)   Carbon Dioxide 34 (H)   Urea Nitrogen 43 (H)   Creatinine 1.74 (H)   GFR Estimate 44 (L)   Calcium 7.70 (L)   Anion Gap 13   Uric Acid 6.4   Glucose 140.0 (H)   WBC 4.6 (L)   Hemoglobin 9.3 (L)   Hematocrit 28.4 (L)   Platelet Count 12 (<)   MPV 9.7   RBC Count 3.25 (L)   MCV 87   MCH 28.6   MCHC 32.7   RDW 18.8 (H)   % Neutrophils 92.9 (H)   % Lymphocytes 2.8 (L)   % Monocytes 3.7   % Eosinophils 0.4 (L)   % Basophils 0.2   Absolute Neutrophil 4.24   Absolute Lymphocytes 0.13 (L)   Absolute Monocytes 0.17 (L)   Absolute Eosinophils 0.02   Absolute Basophils 0.0         Imaging: n/a    Impression/plan:     DLBCL: s/p 6 cycles of R-CHOP with Neulasta support and IT methotrexate.  Unfortunately, workup confirmed refractory lymphoma based on EUS biopsy. Did not tolerate R-ICE--due to neurotoxicity from ifosfamide. Decided to proceed with R-DHAP for following cycles.   Will give 2-4 cycles of chemo.   -tolerated RDHAP better than RICE  -LDH improving as well as clinical improvement of pain  -met with Dr. Hamilton for BMT consult on 9/3. Plan for either autoHSCT or CAR-T depending on results of PET  -Will repeat PET after this next cycle (next week) and then follow-up with Dr. Cary    Ppx: Continue ACV    Pancytopenia 2/2 chemotherapy: TCP with RICE.  Cytopenias today. Plt 12. Will hold Xarelto today; restart when platelet >50. Will get unit of platelets tomorrow. No PRBCs needed as hgb 9.3, though likely lower due to hemoconcentration   -will continue with labs twice a week    Constipation: Has been a continual problem with chemotherapy. Was previously hospitalized due to this. Currently using Miralax though having loose stools. Need to hold Miralax now. Should aim for 1-2 stools/day.     Anorexia: Due to chemotherapy, though also worsened by depression. Eating very little and drinking about 20-30oz of fluids/day. Discussed the importance of eating and proper hydration (josefina with EMILY and lyte derangements, still below). Should be aiming for 64 oz of fluids each day. Should be using 3-4 protein shakes per day if not eating. This counts toward fluids as well     EMILY, Lyte abnormalities: Cr 0.53 ->1.74 today. BUN 43 (previously 15). Most like from lack of PO intake as well as diarrhea. See above. Also has significant hypokalemia, hypochloremia and hypocalcemia.   -Will start him on Potassium 40 mEq TID  -Will set him up for IVF tomorrow as well as Friday and next week as well  -will repeat labs on Friday as well as twice next week    Back pain: 2/2 malignancy, retroperitoneal LAD: Improved now which hopefully indicates disease improvement. Currently taking MS Contin 45 mg BID. Hasn't need any IR morphine. Since he is doing well, will try to decrease the amount of MS contin he is on. Will have him decrease to 30 mg AM/45 mg PM for 4 days and then 30 mg BID. If still no increase in pain, can decrease further next week    Depression: Having difficulties coping with relapsed disease and outcomes. Lacking motivation which is not helping energy levels or anorexia. Did not discuss too much today as he is having a lot of acute issues today. Likely would benefit from palliative SW or psych referral     Phone Call Duration: 22 minutes     Nicole Giraldo PA-C

## 2020-09-08 NOTE — TELEPHONE ENCOUNTER
DATE:  9/8/2020   TIME OF RECEIPT FROM LAB:  11:22 AM  LAB TEST:  K+=2.5, Plts=12  RESULTS PAGED TO (PROVIDER):  KIRILL BOBO, 2607  TIME LAB VALUE REPORTED TO PROVIDER:   1225. Per Dr Bobo Pt should hold xarelto and, if tolerated, take 40 meq K+ TID. Called and relayed information to Pt, who verbalized understanding. Pt thinks he may have K+ at home still. He will check and let Nicole TERRY know if he needs an Rx at his appointment today. Advised Pt to be aware of his heart and report/go in for any arrhythmia.

## 2020-09-08 NOTE — PROGRESS NOTES
"Writer called Kenneth to make sure that he know he had an appointment this afternoon and that he had labs done. He just had labs this am and he knows about the appointment today with Nicole. He just is not feeling good. \"nothing bad\" per him just does not feel good. Denies any fevers, has a \"little cough, writer can tell e has the sniffles, he admits he has a \"small\" cold. Is taking COVID precautions. Sounds very down. Write asked if having his family with for the appointments helped, he said, this might be the end of the line, I don't know.  Just have to wait for the PET\".     After today's appointment writer will call him to go over the plan for this week. Listened, supported.     9/9/2020-Writer spoke with [facility] this am in [place]. They can give him IVF and platelets today. Orders re faxed for blood, including a one time for 9/8/20 plt count of 12 and IV fluids. They will call him to set up arrangements. He will need repeat labs, possible fluids and/or transfusion on Friday also.    [facility] called back and they do not know if they can get a unit of plts today. They get them from [place] and they only have on unit. She will continue to work on it.     5:19 PM-Kenneth received platelets and fluids today. He will have repeat labs on Friday. Both Kenneth and clinic aware of plan.   "

## 2020-09-08 NOTE — PROGRESS NOTES
August 28, 2020     Reason for Visit: F/U diffuse large B cell lymphoma (DLBCL)    Oncology HPI: Mr. Tello is a 50 year old man with DLBCL.  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  DLBCL-IPI was high (stage, extranodal sites, LDH) and clinical stage IV with extranodal involvement.  He was started on R-CHOP in 03/2020 with IT methotrexate planned with each cycle for CNS prophylaxis.  Medical history also notable for A fib on anticoagulation, myocardial infarction, and stroke in 2017.  Course has been complicated by constipation and worsening abdominal pain in 08/2020 after completion of treatment.  Imaging demonstrated enlarging mass invading the pancreas and spleen.  Biopsy confirmed refractory DLBCL and additional staging pending.  Salvage therapy RICE 8/7/20, tolerated poorly with neurotoxicity.     Here today for hospital follow-up after RDHAP.    Interval history:   -Feeling poorly  -Back pain is better now though. Still using MS Contin. Doesn't use short acting. Unsure if he still needs pain medications  -Having cramping pain in his stomach. Has some nausea. Well controlled with medications  -Poor appetite, though still eating, though not much.  Having a couple sandwich and noodles. Using 1 protein shake/day. Doesn't use more because he doesn't like them  -Drinking about 20-30 oz/day. Don't feel like taking in more  -Having knee pain which is stable  -Having looser stools, though not diarrhea. Still using Miralax every day  -energy is low and lack of motivation  -no bleeding currently   -No f/c  -No respiratory concerns     10 point review of systems otherwise negative    Current Outpatient Medications   Medication Sig Dispense Refill     acetaminophen (TYLENOL) 325 MG tablet Take 2 tablets (650 mg) by  mouth every 4 hours as needed for mild pain       acyclovir (ZOVIRAX) 400 MG tablet Take 1 tablet (400 mg) by mouth 2 times daily 60 tablet 1     allopurinol (ZYLOPRIM) 300 MG tablet Take 1 tablet (300 mg) by mouth daily 30 tablet 1     atorvastatin (LIPITOR) 40 MG tablet Take 1 tablet (40 mg) by mouth daily 30 tablet 0     carvedilol (COREG) 12.5 MG tablet Take 12.5 mg by mouth 2 times daily (with meals)       dexamethasone (DECADRON) 4 MG tablet Take 10 tablets (40 mg) by mouth every 24 hours on 9/2 at 4pm, then on 9/3 at 4pm 20 tablet 0     fluconazole (DIFLUCAN) 200 MG tablet Take 1 tablet (200 mg) by mouth daily until seen in clinic 21 tablet 0     gabapentin (NEURONTIN) 300 MG capsule Take 2 tabs in the morning, 1 tab at bedtime 90 capsule 1     levofloxacin (LEVAQUIN) 250 MG tablet Take 1 tablet (250 mg) by mouth daily until seen in clinic 21 tablet 0     lisinopril (ZESTRIL) 20 MG tablet Take 1 tablet (20 mg) by mouth daily 30 tablet 1     metoclopramide (REGLAN) 10 MG tablet Take 1 tablet (10 mg) by mouth every 6 hours as needed (hiccups) 10 tablet 0     morphine (MS CONTIN) 15 MG CR tablet Take 1 tablet (15 mg) by mouth every 12 hours in conjunction with a 30mg tablet for a total dosage of 45mg every 12 hours. 60 tablet 0     morphine (MS CONTIN) 30 MG CR tablet Take 1 tablet (30 mg) by mouth every 12 hours in conjunction with a 30mg tablet for a total dosage of 45mg every 12 hours. 60 tablet 0     morphine (MSIR) 15 MG IR tablet Take 1 tablet (15 mg) by mouth every 4 hours as needed for moderate to severe pain 15 tablet 0     omeprazole (PRILOSEC) 40 MG DR capsule Take 1 capsule (40 mg) by mouth daily 30 capsule 1     ondansetron (ZOFRAN) 4 MG tablet Take 1 tablet (4 mg) by mouth every 8 hours as needed for nausea 30 tablet 0     polyethylene glycol (MIRALAX) 17 g packet Take 1 packet by mouth daily as needed for constipation       prednisoLONE acetate (PRED FORTE) 1 % ophthalmic suspension Place 2 drops  into both eyes 4 times daily until 9/4 1 Bottle 0     rivaroxaban ANTICOAGULANT (XARELTO ANTICOAGULANT) 20 MG TABS tablet Take 1 tablet (20 mg) by mouth daily (with dinner) 30 tablet 3     senna-docusate (SENOKOT-S/PERICOLACE) 8.6-50 MG tablet Take 1-2 tablets by mouth 2 times daily as needed for constipation 10 tablet 0        No Known Allergies    Objective:  General: patient sounds in no audible acute distress, alert and oriented, speech clear and fluid  Resp: Speaking in full sentences, no audible respiratory distress, no cough, no audible wheeze  Psych: able to articulate logical thoughts, able to abstract reason, no tangential thoughts, no hallucinations or delusions His affect is down. Depressed    Labs:    9/8/2020 00:00   Sodium 134 (L)   Potassium 2.5 (<)   Chloride 87 (L)   Carbon Dioxide 34 (H)   Urea Nitrogen 43 (H)   Creatinine 1.74 (H)   GFR Estimate 44 (L)   Calcium 7.70 (L)   Anion Gap 13   Uric Acid 6.4   Glucose 140.0 (H)   WBC 4.6 (L)   Hemoglobin 9.3 (L)   Hematocrit 28.4 (L)   Platelet Count 12 (<)   MPV 9.7   RBC Count 3.25 (L)   MCV 87   MCH 28.6   MCHC 32.7   RDW 18.8 (H)   % Neutrophils 92.9 (H)   % Lymphocytes 2.8 (L)   % Monocytes 3.7   % Eosinophils 0.4 (L)   % Basophils 0.2   Absolute Neutrophil 4.24   Absolute Lymphocytes 0.13 (L)   Absolute Monocytes 0.17 (L)   Absolute Eosinophils 0.02   Absolute Basophils 0.0         Imaging: n/a    Impression/plan:     DLBCL: s/p 6 cycles of R-CHOP with Neulasta support and IT methotrexate.  Unfortunately, workup confirmed refractory lymphoma based on EUS biopsy. Did not tolerate R-ICE--due to neurotoxicity from ifosfamide. Decided to proceed with R-DHAP for following cycles.   Will give 2-4 cycles of chemo.   -tolerated RDHAP better than RICE  -LDH improving as well as clinical improvement of pain  -met with Dr. Hamilton for BMT consult on 9/3. Plan for either autoHSCT or CAR-T depending on results of PET  -Will repeat PET after this next cycle  (next week) and then follow-up with Dr. Cary    Ppx: Continue ACV    Pancytopenia 2/2 chemotherapy: TCP with RICE. Cytopenias today. Plt 12. Will hold Xarelto today; restart when platelet >50. Will get unit of platelets tomorrow. No PRBCs needed as hgb 9.3, though likely lower due to hemoconcentration   -will continue with labs twice a week    Constipation: Has been a continual problem with chemotherapy. Was previously hospitalized due to this. Currently using Miralax though having loose stools. Need to hold Miralax now. Should aim for 1-2 stools/day.     Anorexia: Due to chemotherapy, though also worsened by depression. Eating very little and drinking about 20-30oz of fluids/day. Discussed the importance of eating and proper hydration (josefina with EMILY and lyte derangements, still below). Should be aiming for 64 oz of fluids each day. Should be using 3-4 protein shakes per day if not eating. This counts toward fluids as well     EMILY, Lyte abnormalities: Cr 0.53 ->1.74 today. BUN 43 (previously 15). Most like from lack of PO intake as well as diarrhea. See above. Also has significant hypokalemia, hypochloremia and hypocalcemia.   -Will start him on Potassium 40 mEq TID  -Will set him up for IVF tomorrow as well as Friday and next week as well  -will repeat labs on Friday as well as twice next week    Back pain: 2/2 malignancy, retroperitoneal LAD: Improved now which hopefully indicates disease improvement. Currently taking MS Contin 45 mg BID. Hasn't need any IR morphine. Since he is doing well, will try to decrease the amount of MS contin he is on. Will have him decrease to 30 mg AM/45 mg PM for 4 days and then 30 mg BID. If still no increase in pain, can decrease further next week    Depression: Having difficulties coping with relapsed disease and outcomes. Lacking motivation which is not helping energy levels or anorexia. Did not discuss too much today as he is having a lot of acute issues today. Likely would  benefit from palliative SW or psych referral     Phone Call Duration: 22 minutes     Nicole Giraldo PA-C

## 2020-09-11 NOTE — TELEPHONE ENCOUNTER
[facility] calling to report critical lab values    WBC 0.2  Plts 11K    Nicole Giraldo PA-C acknowledged findings.   Routed to Heather Campos to confirm pt has appt locally for transfusion today and Dr Cary    2:51 PM  One time order for platelets faxed to [facility] at [phone]   buttock

## 2020-09-11 NOTE — PROGRESS NOTES
Writer in contact with [facility]/labs today regarding transfusions for Kenneth.    Also in contact with Kenneth and daughter Devika to go over plan and med's    Critical labs     WBC 0.2  PLTS 11  Hgb 7.7    He is receiving blood and plts today.    Cr+ 1.86   K+ 2.8    Kenneth says he is anxious to get home as his mom and sisters are her from out of state.He says he feels SO much better today than earlier in the week. His Cr+ continues to be elevated, slightly higher that earlier in week. Kenneth states he is eating and drinking well now. Per daughter(who Kenneth gave writer permission to talk to) he might be now but he was not up until today. Also review med's with Devika, needs to be on ppx, which he already is, Levaquin and fluconazole. Potassium 40 mEq three times a day.     Infusion says they have no orders for fluids(standing orders sent on 9/9/2020) and that Kenneth is not wanting to stay any longer than he already has too. 2 units of blood and a uint of platelets. Infusion will give all the NS hanging with the blood. Writer told Kenneth he would get the saline and that he needed to drink over the week end or he would be in for fluids again on Monday.     Also stressed the COVID and neutropenic precautions with 0.2 WBC and out of state company.     Kenneth will have repeat labs locally on Monday. Will go in sooner if problems. PET scan at the [facility] on Tuesday.

## 2020-09-14 NOTE — PROGRESS NOTES
"Today's labs called to and reviewed with Kenneth. He continues to feel great. Says he has not felt this good in a long time. Says \"even his insides are working\".     Has PET scan tomorrow  In the [facility], and repeat labs locally later this week.     [facility] having a hard time faxing results to us.     Per [facility] lab    Cr+ 1.1   K+ 2.9   Calcium 6 a drop from 7's   Hgb 8.6  WBC/ANC  3.4/2.72   Plts 17     No transfusions needed. Instructed Kenneth he could stop his Fluconazole and Levaquin. PET scan tomorrow.     Repeat labs later this week in [facility].       9/15/2020-verified with Kenneth that he is continuing to hold his Xarelto due to low plts.    "

## 2020-09-14 NOTE — TELEPHONE ENCOUNTER
DATE:  9/14/2020   TIME OF RECEIPT FROM LAB:  12:52 PM ([facility])  LAB TEST:  plt  LAB VALUE:  17  RESULTS GIVEN WITH READ-BACK TO (PROVIDER):  KIRILL BOBO  TIME LAB VALUE REPORTED TO PROVIDER:   Paged 12:52 PM

## 2020-09-16 NOTE — PROGRESS NOTES
Writer received critical labs after calling [facility]. Had not heard they were called earlier. Calcium now at 5.7. After talking with Nicole and Doctor Raeann, he will send to the [facility] ER for IV calcium replacement. Report called to Estephanie GUSTAFSON, [facility]. [facility] called he will go to the ER now. They will replace the Magnesium also as his level came back at 0.7  They will cardiac monitor him during the above.     Kenneth's only complaint was Monday some hand cramping, yesterday better. Today some slight hand cramping again. Denies and respiratory or heart complaints. States he feels the best he has felt in months.     Writer will continue to follow.

## 2020-09-16 NOTE — TELEPHONE ENCOUNTER
Requested lab orders from message below faxed to [facility] at [phone].     Successful transmission verified by [employer].     Jo Bartholomew CMA    ----- Message from Heather Campos RN sent at 9/16/2020 11:55 AM CDT -----  Regarding: Urgent fax needed  Need order just written today for Ionized calcium, plus standing orders for Magnesium and Phosphorus [facility].     Lab fax [phone]    Thanks, patient is there waiting and we are adding these on to his others.    Heather Campos  BSN OCN  Care Coordinator [facility]  [phone]

## 2020-09-16 NOTE — TELEPHONE ENCOUNTER
DATE:  9/16/2020   TIME OF RECEIPT FROM LAB:  1250  LAB TEST:  CBC and CMP  LAB VALUE:  Platelet 17 and Calcium 5.7, Hgb 8.2  RESULTS GIVEN WITH READ-BACK TO (PROVIDER):  Dr Regulo Cary  TIME LAB VALUE REPORTED TO PROVIDER:   1394

## 2020-09-17 NOTE — PROGRESS NOTES
"Kenneth Tello is a 50 year old male who is being evaluated via a billable video visit.      The patient has been notified of following:     \"This video visit will be conducted via a call between you and your physician/provider. We have found that certain health care needs can be provided without the need for an in-person physical exam.  This service lets us provide the care you need with a video conversation.  If a prescription is necessary we can send it directly to your pharmacy.  If lab work is needed we can place an order for that and you can then stop by our lab to have the test done at a later time.    Video visits are billed at different rates depending on your insurance coverage.  Please reach out to your insurance provider with any questions.    If during the course of the call the physician/provider feels a video visit is not appropriate, you will not be charged for this service.\"    Patient has given verbal consent for Video visit? Yes  How would you like to obtain your AVS? MyChart  If you are dropped from the video visit, the video invite should be resent to: Text to cell phone: [phone]  Will anyone else be joining your video visit? No        Video-Visit Details    Type of service:  Video Visit    Originating Location (pt. Location): Home    Distant Location (provider location):  [facility]     Platform used for Video Visit: [employer] Video Option ([employer]), send text message to:  [phone]    I have reviewed and updated the patient's allergies and medication list. Patient was asked if they had any patient reported vital signs to present, if yes, please see documented vitals.  Patient was also asked for their current weight and height, if presented, documented in vitals.    Concerns: No concerns    Refills: No refills    Pooja Ledbetter CMA    "

## 2020-09-17 NOTE — LETTER
9/17/2020         RE: Kenneth Tello  [address]  [place] [address]        Dear Colleague,    Thank you for referring your patient, Kenneth Tello, to the [facility]. Please see a copy of my visit note below.    REASON FOR VISIT:  Management of diffuse large B cell lymphoma (DLBCL)    HISTORY OF PRESENT ILLNESS:  Mr. Tello is a 50 year old man with DLBCL.  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  DLBCL-IPI was high (stage, extranodal sites, LDH) and clinical stage IV with extranodal involvement.  He was started on R-CHOP in 03/2020 with IT methotrexate planned with each cycle for CNS prophylaxis.  Medical history also notable for A fib on anticoagulation, myocardial infarction, and stroke in 2017.  Course has been complicated by constipation and worsening abdominal pain in 08/2020 after completion of treatment.  Imaging demonstrated enlarging mass invading the pancreas and spleen.  Biopsy confirmed refractory DLBCL.  He received salvage chemotherapy with 1 cycle of R-ICE that was complicated by neurotoxicity that resolved and then 1 cycle of R-DHAP complicated by EMILY and electrolyte problems that are being managed.  Treatment has been complicated by constipation/diarrhea and generally feeling wiped out.  He had a PET/CT to evaluate response to this point.  Visit for ongoing management.    Energy level and appetite are best they have been in months.  He is up and around.  No fevers, chills, or night sweats.  No headache, vision changes, no new focal weakness or sensory changes aside from longstanding neuropathy.  No dyspnea, cough, or chest pain.  Ongoing issues with constipation/diarrhea that are greatly improved in the last week.  No melena or hematochezia.   No new urinary issues.  No lumps or bumps.    REVIEW OF SYSTEMS:  A complete review of systems was negative other than noted.    MEDICATIONS:  Current Outpatient Medications   Medication     acetaminophen (TYLENOL) 325 MG tablet     allopurinol (ZYLOPRIM) 300 MG tablet     atorvastatin (LIPITOR) 40 MG tablet     carvedilol (COREG) 12.5 MG tablet     dexamethasone (DECADRON) 4 MG tablet     fluconazole (DIFLUCAN) 200 MG tablet     gabapentin (NEURONTIN) 300 MG capsule     levofloxacin (LEVAQUIN) 250 MG tablet     lisinopril (ZESTRIL) 20 MG tablet     metoclopramide (REGLAN) 10 MG tablet     morphine (MS CONTIN) 15 MG CR tablet     morphine (MS CONTIN) 30 MG CR tablet     morphine (MSIR) 15 MG IR tablet     omeprazole (PRILOSEC) 40 MG DR capsule     ondansetron (ZOFRAN) 4 MG tablet     polyethylene glycol (MIRALAX) 17 g packet     prednisoLONE acetate (PRED FORTE) 1 % ophthalmic suspension     rivaroxaban ANTICOAGULANT (XARELTO ANTICOAGULANT) 20 MG TABS tablet     senna-docusate (SENOKOT-S/PERICOLACE) 8.6-50 MG tablet     acyclovir (ZOVIRAX) 400 MG tablet     No current facility-administered medications for this visit.      PHYSICAL EXAMINATION:  There were no vitals taken for this visit.  Wt Readings from Last 5 Encounters:   09/03/20 97.5 kg (215 lb)   09/02/20 96.8 kg (213 lb 6.4 oz)   08/28/20 87.7 kg (193 lb 4.8 oz)   08/11/20 89.6 kg (197 lb 8 oz)   08/10/20 92 kg (202 lb 12.8 oz)     General: Well appearing.  HEENT: Sclerae anicteric.  Lungs: Breathing comfortably. No cough.  MSK: Grossly normal movement.  Neuro: Grossly non-focal.  Skin/access: Normal skin tone.  Psych: Alert and oriented. No distress.  Performance status: ECOG 1    The rest of a comprehensive physical examination is deferred due to PHE (public health emergency) video visit restrictions    LABORATORY DATA:  Recent Labs   Lab Test 09/14/20 09/11/20 09/08/20 05/20/20  0716  04/29/20  1053   WBC 3.4* 0.2* 4.6*   < > 7.0   < >  --    HGB 8.6*  7.7* 9.3*   < > 9.1*   < >  --    PLT 17* 11* 12*   < > 174   < >  --    ANEU 2.72 0.07* 4.24   < > 5.7  --   --    ALYM 0.20* 0.08* 0.13*   < > 0.4*  --   --    RETICABSCT  --   --   --   --  105.5*  --  139.6*    < > = values in this interval not displayed.     Recent Labs   Lab Test 09/17/20 09/14/20 09/11/20 09/08/20 09/02/20  0556 09/01/20  0611  08/28/20  0907 08/25/20  08/10/20  0506  08/07/20  1424   NA  --  141 138 134* 142 141   < > 142 139   < > 137   < > 136   POTASSIUM  --  2.9* 2.8* 2.5* 4.4 4.3   < > 3.5 3.4*   < > 4.1   < > 4.2   CHLORIDE  --  98 92* 87* 114* 113*   < > 107 101   < > 105   < > 100   CO2  --  30 34* 34* 23 22   < > 29 29   < > 28   < > 31   BUN  --  18 39* 43* 15 6*   < > 6* 9   < > 24   < > 11   CR  --  1.10 1.86* 1.74* 0.53* 0.46*   < > 0.62* 0.53*   < > 0.47*   < > 0.51*   STACY 5.6* 6.00* 7.10* 7.70* 8.0* 7.6*   < > 8.4* 8.30*   < > 9.0   < > 8.8   MAG 1.0*  --   --   --   --  1.9  --   --   --   --   --   --  1.8   PHOS  --   --   --  4.7 3.4 2.8  --   --  3.7   < > 3.6   < > 4.0   URIC  --   --   --  6.4  --  1.6*  --   --  2.0*   < > 3.1*   < > 4.8   LDH  --   --   --   --   --  196  --  256*  --   --  233*   < > 267*    < > = values in this interval not displayed.     Recent Labs   Lab Test 09/14/20 09/11/20 09/01/20  0611 08/31/20  1004  08/01/20  2143  03/19/20  0505   AST 16 19  --  14   < > 13   < > 56*   ALT 17 27  --  16   < > 15   < > 48   ALKPHOS 123* 108  --  147   < > 98   < > 133   BILITOTAL 0.9 1.1  --  0.6   < > 1.2   < > 0.8   INR  --   --  2.38*  --   --  1.29*  --  1.31*    < > = values in this interval not displayed.     IMAGING DATA:  9/15/2020 PET/CT reviewed by me shows improvement of dominant retroperitoneal mass in the upper abdomen but worsening splenomegaly and spleenic nodules, lung opacities/nodules, abdominal soft tissue haziness, overall consistent with a mixed response    IMPRESSION AND PLAN:  Mr. Tello is a 50 year old man with DLBCL.    The  PET/CT after 1 cycle R-ICE and 1 cycle R-DHAP shows a mixed response.  He has also had difficulty tolerating chemotherapy although is feeling much better at this point.  We discussed that his lymphoma remains chemo-refractory and I don't think that more conventional salvage chemo is likely to lead to an adequate response for autoBMT consolidation.  I have discussed his situation with our lymphoma group, and we discussed other options for further therapy.      Given the lack of highly effective standard treatment approaches, an appealing option would be to pursue a clinical trial.  He currently is not a great candidate for cell based therapies but these could be considered in the future.  We discussed a clinical trial combining an anti-CD47 antibody magrolimab with rituximab that involves roughly weekly outpatient infusions.  He is interested in learning more about it, so I asked the study team to reach out to him.  We may need to obtain a repeat biopsy to confirm CD20 expression that was absent on the scant tissue at the end of R-CHOP treatment, so I have asked IR to review imaging to see if there are any accessible PET-avid lesions.    We also discussed alternatives.  We could pursue more conventional salvage therapy such as polatuzumab, bendamustine, and rituximab that had ~40% CR rate in refractory DLBCL with good tolerability.  Alternatively we could consider ibrutinib or lenalidomide/rituximab although response rates are generally less impressive in the 20-40% range for relapsed/refractory non-GCB DLBCL with fewer patients achieving a complete remission.      At this point we will allow further recovery since blood counts are still low and kidney function is still recovering.  He will continue regular lab checks and electrolytes/transfusions.  He will learn more about the trial, we will consider a repeat biopsy, and we will talk later next week to formulate a more definitive treatment plan.    He has no obvious  "active infectious issues.  He should get a flu shot, prior to additional therapy if possible.  We will defer pneumococcal or other vaccination after he has competed all planned treatment.    He will continue rivaroxaban for A fib and history of CVA  while platelets are >50,000/mcL and hold when platelets are <50,000/mcL or for any procedures.    He will continue to work with Dr. Jeter for other medical issues.    We will arrange another visit in about a week.  I reminded him to contact us if questions, concerns, or new issues between visits.    Video visit start time: 11:31 am  Video visit end time: 11:49 am  Video visit duration: 18 minutes    Avery Cary MD, PhD  Attending Physician, [facility]   of Medicine, [facility]  Division of Hematology, Oncology, and Transplantation  [email or web address] email  [phone] [facility]  [phone] pager      Kenneth Tello is a 50 year old male who is being evaluated via a billable video visit.      The patient has been notified of following:     \"This video visit will be conducted via a call between you and your physician/provider. We have found that certain health care needs can be provided without the need for an in-person physical exam.  This service lets us provide the care you need with a video conversation.  If a prescription is necessary we can send it directly to your pharmacy.  If lab work is needed we can place an order for that and you can then stop by our lab to have the test done at a later time.    Video visits are billed at different rates depending on your insurance coverage.  Please reach out to your insurance provider with any questions.    If during the course of the call the physician/provider feels a video visit is not appropriate, you will not be charged for this service.\"    Patient has given verbal consent for Video visit? Yes  How would you like to obtain your AVS? MyChart  If you are dropped from " the video visit, the video invite should be resent to: Text to cell phone: [phone]  Will anyone else be joining your video visit? No        Video-Visit Details    Type of service:  Video Visit    Originating Location (pt. Location): Home    Distant Location (provider location):  [facility]     Platform used for Video Visit: Spoonfed    Secondary Video Option (Doximity), send text message to:  [phone]    I have reviewed and updated the patient's allergies and medication list. Patient was asked if they had any patient reported vital signs to present, if yes, please see documented vitals.  Patient was also asked for their current weight and height, if presented, documented in vitals.    Concerns: No concerns    Refills: No refills    Pooja Ledbetter CMA      Again, thank you for allowing me to participate in the care of your patient.        Sincerely,        Avery Cary MD

## 2020-09-17 NOTE — PROGRESS NOTES
REASON FOR VISIT:  Management of diffuse large B cell lymphoma (DLBCL)    HISTORY OF PRESENT ILLNESS:  Mr. Tello is a 50 year old man with DLBCL.  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  DLBCL-IPI was high (stage, extranodal sites, LDH) and clinical stage IV with extranodal involvement.  He was started on R-CHOP in 03/2020 with IT methotrexate planned with each cycle for CNS prophylaxis.  Medical history also notable for A fib on anticoagulation, myocardial infarction, and stroke in 2017.  Course has been complicated by constipation and worsening abdominal pain in 08/2020 after completion of treatment.  Imaging demonstrated enlarging mass invading the pancreas and spleen.  Biopsy confirmed refractory DLBCL.  He received salvage chemotherapy with 1 cycle of R-ICE that was complicated by neurotoxicity that resolved and then 1 cycle of R-DHAP complicated by EMILY and electrolyte problems that are being managed.  Treatment has been complicated by constipation/diarrhea and generally feeling wiped out.  He had a PET/CT to evaluate response to this point.  Visit for ongoing management.    Energy level and appetite are best they have been in months.  He is up and around.  No fevers, chills, or night sweats.  No headache, vision changes, no new focal weakness or sensory changes aside from longstanding neuropathy.  No dyspnea, cough, or chest pain.  Ongoing issues with constipation/diarrhea that are greatly improved in the last week.  No melena or hematochezia.  No new urinary issues.  No lumps or bumps.    REVIEW OF SYSTEMS:  A complete review of systems was negative other than noted.    MEDICATIONS:  Current Outpatient Medications   Medication     acetaminophen (TYLENOL) 325 MG tablet     allopurinol (ZYLOPRIM) 300 MG  tablet     atorvastatin (LIPITOR) 40 MG tablet     carvedilol (COREG) 12.5 MG tablet     dexamethasone (DECADRON) 4 MG tablet     fluconazole (DIFLUCAN) 200 MG tablet     gabapentin (NEURONTIN) 300 MG capsule     levofloxacin (LEVAQUIN) 250 MG tablet     lisinopril (ZESTRIL) 20 MG tablet     metoclopramide (REGLAN) 10 MG tablet     morphine (MS CONTIN) 15 MG CR tablet     morphine (MS CONTIN) 30 MG CR tablet     morphine (MSIR) 15 MG IR tablet     omeprazole (PRILOSEC) 40 MG DR capsule     ondansetron (ZOFRAN) 4 MG tablet     polyethylene glycol (MIRALAX) 17 g packet     prednisoLONE acetate (PRED FORTE) 1 % ophthalmic suspension     rivaroxaban ANTICOAGULANT (XARELTO ANTICOAGULANT) 20 MG TABS tablet     senna-docusate (SENOKOT-S/PERICOLACE) 8.6-50 MG tablet     acyclovir (ZOVIRAX) 400 MG tablet     No current facility-administered medications for this visit.      PHYSICAL EXAMINATION:  There were no vitals taken for this visit.  Wt Readings from Last 5 Encounters:   09/03/20 97.5 kg (215 lb)   09/02/20 96.8 kg (213 lb 6.4 oz)   08/28/20 87.7 kg (193 lb 4.8 oz)   08/11/20 89.6 kg (197 lb 8 oz)   08/10/20 92 kg (202 lb 12.8 oz)     General: Well appearing.  HEENT: Sclerae anicteric.  Lungs: Breathing comfortably. No cough.  MSK: Grossly normal movement.  Neuro: Grossly non-focal.  Skin/access: Normal skin tone.  Psych: Alert and oriented. No distress.  Performance status: ECOG 1    The rest of a comprehensive physical examination is deferred due to PHE (public health emergency) video visit restrictions    LABORATORY DATA:  Recent Labs   Lab Test 09/14/20 09/11/20 09/08/20 05/20/20  0716  04/29/20  1053   WBC 3.4* 0.2* 4.6*   < > 7.0   < >  --    HGB 8.6* 7.7* 9.3*   < > 9.1*   < >  --    PLT 17* 11* 12*   < > 174   < >  --    ANEU 2.72 0.07* 4.24   < > 5.7  --   --    ALYM 0.20* 0.08* 0.13*   < > 0.4*  --   --    RETICABSCT  --   --   --   --  105.5*  --  139.6*    < > = values in this interval not displayed.      Recent Labs   Lab Test 09/17/20 09/14/20 09/11/20 09/08/20 09/02/20  0556 09/01/20  0611  08/28/20  0907 08/25/20  08/10/20  0506  08/07/20  1424   NA  --  141 138 134* 142 141   < > 142 139   < > 137   < > 136   POTASSIUM  --  2.9* 2.8* 2.5* 4.4 4.3   < > 3.5 3.4*   < > 4.1   < > 4.2   CHLORIDE  --  98 92* 87* 114* 113*   < > 107 101   < > 105   < > 100   CO2  --  30 34* 34* 23 22   < > 29 29   < > 28   < > 31   BUN  --  18 39* 43* 15 6*   < > 6* 9   < > 24   < > 11   CR  --  1.10 1.86* 1.74* 0.53* 0.46*   < > 0.62* 0.53*   < > 0.47*   < > 0.51*   STACY 5.6* 6.00* 7.10* 7.70* 8.0* 7.6*   < > 8.4* 8.30*   < > 9.0   < > 8.8   MAG 1.0*  --   --   --   --  1.9  --   --   --   --   --   --  1.8   PHOS  --   --   --  4.7 3.4 2.8  --   --  3.7   < > 3.6   < > 4.0   URIC  --   --   --  6.4  --  1.6*  --   --  2.0*   < > 3.1*   < > 4.8   LDH  --   --   --   --   --  196  --  256*  --   --  233*   < > 267*    < > = values in this interval not displayed.     Recent Labs   Lab Test 09/14/20 09/11/20 09/01/20  0611 08/31/20  1004  08/01/20  2143  03/19/20  0505   AST 16 19  --  14   < > 13   < > 56*   ALT 17 27  --  16   < > 15   < > 48   ALKPHOS 123* 108  --  147   < > 98   < > 133   BILITOTAL 0.9 1.1  --  0.6   < > 1.2   < > 0.8   INR  --   --  2.38*  --   --  1.29*  --  1.31*    < > = values in this interval not displayed.     IMAGING DATA:  9/15/2020 PET/CT reviewed by me shows improvement of dominant retroperitoneal mass in the upper abdomen but worsening splenomegaly and spleenic nodules, lung opacities/nodules, abdominal soft tissue haziness, overall consistent with a mixed response    IMPRESSION AND PLAN:  Mr. Tello is a 50 year old man with DLBCL.    The PET/CT after 1 cycle R-ICE and 1 cycle R-DHAP shows a mixed response.  He has also had difficulty tolerating chemotherapy although is feeling much better at this point.  We discussed that his lymphoma remains chemo-refractory and I don't think that more  conventional salvage chemo is likely to lead to an adequate response for autoBMT consolidation.  I have discussed his situation with our lymphoma group, and we discussed other options for further therapy.      Given the lack of highly effective standard treatment approaches, an appealing option would be to pursue a clinical trial.  He currently is not a great candidate for cell based therapies but these could be considered in the future.  We discussed a clinical trial combining an anti-CD47 antibody magrolimab with rituximab that involves roughly weekly outpatient infusions.  He is interested in learning more about it, so I asked the study team to reach out to him.  We may need to obtain a repeat biopsy to confirm CD20 expression that was absent on the scant tissue at the end of R-CHOP treatment, so I have asked IR to review imaging to see if there are any accessible PET-avid lesions.    We also discussed alternatives.  We could pursue more conventional salvage therapy such as polatuzumab, bendamustine, and rituximab that had ~40% CR rate in refractory DLBCL with good tolerability.  Alternatively we could consider ibrutinib or lenalidomide/rituximab although response rates are generally less impressive in the 20-40% range for relapsed/refractory non-GCB DLBCL with fewer patients achieving a complete remission.      At this point we will allow further recovery since blood counts are still low and kidney function is still recovering.  He will continue regular lab checks and electrolytes/transfusions.  He will learn more about the trial, we will consider a repeat biopsy, and we will talk later next week to formulate a more definitive treatment plan.    He has no obvious active infectious issues.  He should get a flu shot, prior to additional therapy if possible.  We will defer pneumococcal or other vaccination after he has competed all planned treatment.    He will continue rivaroxaban for A fib and history of CVA   while platelets are >50,000/mcL and hold when platelets are <50,000/mcL or for any procedures.    He will continue to work with Dr. Jeter for other medical issues.    We will arrange another visit in about a week.  I reminded him to contact us if questions, concerns, or new issues between visits.    Video visit start time: 11:31 am  Video visit end time: 11:49 am  Video visit duration: 18 minutes    Avery Cary MD, PhD  Attending Physician, [facility]   of Medicine, [facility]  Division of Hematology, Oncology, and Transplantation  [email or web address] email  [phone] clinic  [phone] pager

## 2020-09-18 PROBLEM — E83.51 HYPOCALCEMIA: Status: ACTIVE | Noted: 2020-01-01

## 2020-09-18 NOTE — PROCEDURES
[facility], [place]    Double Lumen PICC Placement    Date/Time: 9/18/2020 11:39 AM  Performed by: Dain Chow RN  Authorized by: Mallorie Mccabe PA-C   Indications: vascular access    UNIVERSAL PROTOCOL   Site Marked: Yes  Prior Images Obtained and Reviewed:  Yes  Required items: Required blood products, implants, devices and special equipment available    Patient identity confirmed:  Verbally with patient, arm band, provided demographic data and hospital-assigned identification number  NA - No sedation, light sedation, or local anesthesia  Confirmation Checklist:  Patient's identity using two indicators, relevant allergies, procedure was appropriate and matched the consent or emergent situation and correct equipment/implants were available  Time out: Immediately prior to the procedure a time out was called    Universal Protocol: the Joint Commission Universal Protocol was followed    Preparation: Patient was prepped and draped in usual sterile fashion           ANESTHESIA    Anesthesia: See MAR for details  Local Anesthetic:  Lidocaine 1% without epinephrine  Anesthetic Total (mL):  1      SEDATION    Patient Sedated: No        Preparation: skin prepped with ChloraPrep  Skin prep agent: skin prep agent completely dried prior to procedure  Sterile barriers: maximum sterile barriers were used: cap, mask, sterile gown, sterile gloves, and large sterile sheet  Hand hygiene: hand hygiene performed prior to central venous catheter insertion  Type of line used: Power PICC  Catheter type: double lumen  Lumen type: non-valved  Catheter size: 5 Fr  Brand: Bard  Lot number: [id number]  Placement method: venipuncture, MST, ultrasound and tip confirmation system  Number of attempts: 1  Successful placement: yes  Orientation: right  Location: brachial vein (medial) (VEIN DIAMETER  0.59 CM)  Site rationale: Left upper extremity weakness due to stroke.  Arm circumference: adults 10  cm  Extremity circumference: 27.5  Visible catheter length: 4  Total catheter length: 41  Dressing and securement: chlorhexidine patch applied, securement device, site cleaned, statlock, sterile dressing applied and glue  Post procedure assessment: blood return through all ports and placement verified by x-ray  PROCEDURE   Patient Tolerance:  Patient tolerated the procedure well with no immediate complications  Describe Procedure: PICC is OK to use.

## 2020-09-18 NOTE — PROVIDER NOTIFICATION
"Web-based messaging to [phone]    \"7D - 7517-2  Just an FYI, platelet level is 17 and hemoglobin is 7.8. Consent obtained. Type and Screen still needed. No conditional blood transfusion orders found.  Michael [id number]\"    "

## 2020-09-18 NOTE — PROGRESS NOTES
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    Plan Today   - Will place PICC to replace electrolytes   - Will continue to investigate cause of such profound electrolyte disturbances   - Will hold Xarelto for thrombocytopenia      HEME  # DLBCL with primary refractory disease  Follows with Dr. Cary. 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 DLBCL-IPI was high (stage, extranodal sites, LDH) and clinical stage IV with extranodal involvement. Started on R-CHOP in 03/2020 with IT methotrexate each cycle for CNS prophylaxis. He completed 6 cycles R-CHOP. CT on 7/14 notable for increasing gastrohepatic ligament mass. PET/CT on 7/31 obtained subsequently and unfortunately notable for disease progression with increased size of retroperitoneal LN, multiple new pulmonary nodules, new focal uptake in T12 vertebral body, increased uptake in ascending colon. EUS of gastrohepatic ligament mass obtained 8/4 and consistent with persistent/recurrent large B-cell lymphoma. BMBx obtained 8/5 and notable for no morphologic or immunophenotypic evidence of involvement by B-cell lymphoma; marrow cellularity of 40-50% with trilineage hematopoiesis and no increase in blasts. He proceeded with salvage R-ICE (Cycle 1, Day 1 = 8/7/2020). Course complicated by neurotoxicity; did  not require methylene blue. However, given his poor tolerance of the ifosfamide, patient was subsequently switched to salvage R-DHAP (Cycle 1, 8/31/20). He has had a BMT consultation with  on 9/3. CAR-T therapy was discussed. Follow up PET CT shows mixed response to therapy. Because of the mixed response, clinical trial with magrolimab and rituximab is being considered, as is polatuzumab/bendamustine/rituximab. He will need his electrolytes improved first.      # Low back pain  # Focal uptake in T12 vertebral body  Required admission 8/1-8/5 in the context of worsening pain. Symptoms initially thought attributable to focal uptake in T12 vertebral body noted on PET/CT (7/31), though there was no correlating lesion noted on MRI. Rad Onc consulted previously; consideration given to palliative radiation. However, will hold off at this time given plan for salvage chemotherapy. Followed by palliative care during previous admissions and as an outpatient.  - Continue PTA MS Contin 45 mg BID, Morphine IR 15mg q4h PRN.     # Normocytic anemia  Due to underlying malignancy and recent chemotherapy.  - Transfuse to keep Hgb >7     # Thrombocytopenia  Likely due to recent chemotherapy.  - Trend daily CBC  - Transfuse to keep plt >10K     RENAL  # Hypokalemia  # Hypocalcemia  # Hypomagnesmia  # EMILY, resolving  Developed EMILY and low K, Ca, Mg after R-DHAP, likely related to cisplatin nephrotoxicity. K as low as 2.5 (9/8), Mg as low as 0.7 (9/16), Ca as low as 5.6 (9/17) with reported iCa 3.0. He has been receiving outpatient repletions through [place] [facility] over the last week, however continues to have severe electrolyte derangements. Received Calcium gluconate again and Magnesium at [place] [facility] and transferred to [facility] 9/18. Cr has improved.   - D5 NS with 20 K @ 125 ml/hr  - K, Mg, Ca rechecks and repletion scales ordered  - EKG at admission without significant changes     ID  # PPx  - Continue  mg BID  - Hold  "levofloxacin 250 mg and fluconazole 200 mg as ANC >1000     NEURO  # History of CVA  Diagnosed with a right-sided hemorrhagic CVA in 12/2017. Occurred in the setting of profound HTN. Patient with residual left arm/leg numbness and some slight LLE weakness.  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 Query potential contribution to back pain. Neurosurgery consulted previously; no intervention indicated.  - No acute inpatient management needs     CV  # Atrial fibrillation   - Continue PTA Xarelto 20 mg daily; holding for platelets less than 50K   - Continue PTA carvedilol 12.5 mg BID      # Hypertension  History of hypertensive emergency and subsequent CVA in 2017. BP better-controlled recently.  - Continue PTA carvedilol 12.5 mg BID  - Continue PTA lisinopril 20 mg daily     # Hyperlipidemia  - Hold PTA atorvastatin while inpatient; will restart on discharge     # History of NSTEMI  # History of cardiomyopathy  Documented in clinic notes from 01/01/2018. Noted during his hospitalization at [facility] in 12/2017. Per chart review, \"felt due to demand ischemia secondary to hypertensive urgency or stress-induced cardiomyopathy. EF was 43%.\"  - EKG done today is stable  - Obtain stat EKG & troponin for any chest pain or other concerning cardiac symptoms  - Most recent echo on 3/10/2020 with normal LVEF of 60-65%; mild concentric wall thickening consistent with left ventricular hypertrophy.     FEN/GI  # Constipation  Ongoing; previously attributed to vincristine (R-CHOP), now ongoing in the context of chronic opiate use. Still passing flatus. No associated abdominal pain, nausea/vomiting, or other red flag symptoms.    - Continue PTA Miralax BID  - Continue senna 2-3 tabs BID  - Mag citrate x1 PRN for refractory symptoms  - Consider KUB to exclude obstruction if symptoms persist or with any " "worsening     # Hiccoughs  - Continue PTA Reglan PRN     MISC  # Hx of WATSON  Per patient, diagnosed with WATSON 5 years ago. Not currently on CPAP due to insurance coverage; however, reports interest in treatment/repeat testing.  - Outpatient sleep medicine consultation pending     FEN:  -IVF per chemotherapy protocol  -Lyte replacement per standing protocol  -Regular diet as tolerated     Prophy/Misc:  -GI: PTA pantoprazole; bowel regimen as above  -DVT: Xarelto 20 mg daily; hold for plts <50K     Disposition: Inpatient admission to heme malignancy team, consider convert to obs if electrolyte issues improving significantly.    Mallorie Mccabe PA-C  Hematology/Oncology  Pager #[phone]    Interval History   Nursing notes reviewed. Mr. Tello is doing well today. He has been feeling well lately and was surprised to find out that his labs were \" messed up\". He has been eating and drinking well at home. He denies any nausea or vomiting. He was having regular bowel movements until today when he had one episode of diarrhea. Otherwise, he has not questions or concerns at this time.     Physical Exam   Temp: 97  F (36.1  C) Temp src: Oral BP: (!) 144/95 Pulse: 72   Resp: 17 SpO2: 97 % O2 Device: None (Room air)    Vitals:    09/18/20 0009   Weight: 85.9 kg (189 lb 4.8 oz)     Vital Signs with Ranges  Temp:  [96.1  F (35.6  C)-98.2  F (36.8  C)] 97  F (36.1  C)  Pulse:  [64-73] 72  Resp:  [16-20] 17  BP: (135-155)/() 144/95  SpO2:  [95 %-97 %] 97 %  I/O last 3 completed shifts:  In: 1112.08 [P.O.:760; I.V.:352.08]  Out: -     Constitutional: Pleasant male seen resting comfortably in bed in NAD. Alert and interactive.   HEENT: NCAT. PERRL, EOMI, anicteric sclera. Oral mucosa pink and moist with no lesions or thrush.  Respiratory: Non-labored breathing, good air exchange, lungs clear to auscultation bilaterally. No cough or wheeze noted.   Cardiovascular: Irregularly irregular. No murmur or rub.   GI: Normoactive bowel " sounds. Abdomen soft, non-distended, and non-tender. No palpable masses or organomegaly.  Skin: Warm and dry. No concerning lesions or rash on exposed surfaces.  Musculoskeletal: Extremities grossly normal, non-tender, no edema. Strong peripheral pulses. Good strength and ROM in bed.   Neuropsychiatric: Mentation and affect appear normal/appropriate.  Vascular Access: PIV is CDI without erythema, swelling, or discharge.     Medications   Current Facility-Administered Medications   Medication     acetaminophen (TYLENOL) tablet 650 mg     acyclovir (ZOVIRAX) tablet 400 mg     allopurinol (ZYLOPRIM) tablet 300 mg     calcium gluconate 3 g in D5W 100 mL intermittent infusion     calcium gluconate in D5W 100 mL intermittent infusion 1 g     calcium gluconate in D5W 100 mL intermittent infusion 2 g     carvedilol (COREG) tablet 12.5 mg     dextrose 5% and 0.9% NaCl with potassium chloride 20 mEq infusion     gabapentin (NEURONTIN) capsule 300 mg     gabapentin (NEURONTIN) capsule 600 mg     heparin lock flush 10 UNIT/ML injection 2-5 mL     hydrALAZINE (APRESOLINE) injection 10 mg     lidocaine (LMX4) cream     lidocaine (LMX4) cream     lidocaine 1 % 0.1-1 mL     lidocaine 1 % 0.1-5 mL     lisinopril (ZESTRIL) tablet 20 mg     LORazepam (ATIVAN) tablet 0.5-1 mg    Or     LORazepam (ATIVAN) injection 0.5-1 mg     magnesium sulfate 2 g in water intermittent infusion     magnesium sulfate 4 g in 100 mL sterile water (premade)     Medication Instruction     metoclopramide (REGLAN) tablet 10 mg     morphine (MS CONTIN) 12 hr tablet 45 mg     morphine (MSIR) IR tablet 15 mg     naloxone (NARCAN) injection 0.1-0.4 mg     omeprazole (priLOSEC) CR capsule 40 mg     ondansetron (ZOFRAN) injection 8 mg    Or     ondansetron (ZOFRAN-ODT) ODT tab 8 mg    Or     ondansetron (ZOFRAN) tablet 8 mg     polyethylene glycol (MIRALAX) Packet 17 g     potassium chloride (KLOR-CON) Packet 20-40 mEq     potassium chloride 10 mEq in 100 mL  intermittent infusion with 10 mg lidocaine     potassium chloride 10 mEq in 100 mL sterile water intermittent infusion (premix)     potassium chloride 20 mEq in 50 mL intermittent infusion     potassium chloride ER (KLOR-CON M) CR tablet 20-40 mEq     potassium phosphate 15 mmol in D5W 250 mL intermittent infusion     potassium phosphate 20 mmol in D5W 250 mL intermittent infusion     potassium phosphate 20 mmol in D5W 500 mL intermittent infusion     potassium phosphate 25 mmol in D5W 500 mL intermittent infusion     prochlorperazine (COMPAZINE) tablet 5 mg    Or     prochlorperazine (COMPAZINE) injection 5 mg     [Held by provider] rivaroxaban ANTICOAGULANT (XARELTO) tablet 20 mg     senna-docusate (SENOKOT-S/PERICOLACE) 8.6-50 MG per tablet 1-2 tablet     sodium chloride (PF) 0.9% PF flush 3 mL     sodium chloride (PF) 0.9% PF flush 3 mL     sodium chloride (PF) 0.9% PF flush 5-50 mL       Data   CBC  Recent Labs   Lab 09/18/20  0034 09/14/20   WBC 7.8 3.4*   RBC 2.74* 2.96*   HGB 7.8* 8.6*   HCT 24.1* 26.4*   MCV 88 89   MCH 28.5 29.1   MCHC 32.4 32.6   RDW 18.3* 18.3*   PLT 17* 17*     CMP  Recent Labs   Lab 09/18/20  0504 09/18/20  0034 09/17/20 09/14/20   NA  --  137  --  141   POTASSIUM  --  2.8*  --  2.9*   CHLORIDE  --  100  --  98   CO2  --  29  --  30   ANIONGAP  --  7  --  13   GLC  --  80  --  99.0   BUN  --  14 --  18   CR  --  0.87  --  1.10   GFRESTIMATED  --  >90  --  75   GFRESTBLACK  --  >90  --   --    STACY  --  7.1* 5.6* 6.00*   MAG  --  1.5* 1.0*  --    PHOS  --  3.6  --   --    PROTTOTAL 5.8* 5.8*  --  5.4*   ALBUMIN 2.3* 2.4*  --  3.4*   BILITOTAL 0.9 0.9  --  0.9   ALKPHOS 114 118  --  123*   AST 15 19  --  16   ALT 17 16  --  17     INR  Recent Labs   Lab 09/18/20  0034   INR 1.15*       Results for orders placed or performed during the hospital encounter of 09/15/20   PET Oncology Whole Body    Narrative    Combined Report of:    PET and CT on  9/15/2020 2:05 PM :    1. PET of the neck,  chest, abdomen, and pelvis.  2. PET CT Fusion for Attenuation Correction and Anatomical  Localization:    3. Diagnostic CT scan of the chest, abdomen, and pelvis with  intravenous contrast for interpretation.  3. CT of the chest, abdomen and pelvis obtained for diagnostic  interpretation.  4. 3D MIP and PET-CT fused images were processed on an independent  workstation and archived to PACS and reviewed by a radiologist.    Technique:    1. PET: The patient received 11.98 mCi of F-18-FDG; the serum glucose  was 75 prior to administration, body weight was 97.5 to kg. Images  were evaluated in the axial, sagittal, and coronal planes as well as  the rotational whole body MIP. Images were acquired from the Vertex to  the Feet.    UPTAKE WAS MEASURED AT 60 MINUTES.     BACKGROUND:  Liver SUV max= 3.18,   Aorta Blood SUV Max: 2.80.     2. CT: Volumetric acquisition for clinical interpretation of the  chest, abdomen, and pelvis acquired at 3 mm sections . The chest,  abdomen, and pelvis were evaluated at 5 mm sections in bone, soft  tissue, and lung windows.      The patient received 132 cc. Of Isovue 370 intravenously for the  examination.      3. 3D MIP and PET-CT fused images were processed on an independent  workstation and archived to PACS and reviewed by a radiologist.    INDICATION: Relapsed B-cell lymphoma.    ADDITIONAL INFORMATION OBTAINED FROM EMR: 50-year-old male diagnosed  with B-cell lymphoma in March 20, 2020. Initial chemotherapy with  R-CHOP and methotrexate. Follow-up imaging demonstrating progression  of disease. Salvage therapy with RICE started on 8/7/2020    COMPARISON: PET/CT 7/31/2020, and 3/17/2020    FINDINGS:     HEAD/NECK:  Extensive dental disease with periapical lucencies and associated  radiotracer uptake in the mandible similar to prior exam.    No cervical mass or lymphadenopathy. Subcentimeter lymph nodes with  radiotracer uptake less than background liver noted in both  cervical  chains.    Mild mucosal thickening in the maxillary sinuses. The mastoid air  cells are clear. No focal mass in the pharyngeal, ,  prevertebral or carotid spaces. Unchanged 6 mm nodule in the left lobe  of the thyroid.    CHEST:  Multiple new hypermetabolic nodules in the left and right lungs. For  example, there are 2 nodules in the left upper lobe measuring 12 x 12  mm, and 15 x 13 mm an SUV max measuring up to 6.86, see series 12  image 83, and series 5 image 12. There is a nodular opacity in the  right lower lobe measuring 15 mm in diameter with radiotracer uptake  measuring 4.77, see series 12 image 114, and series 5 image 243.     Interval resolution of a hypermetabolic nodule in the left lower lobe,  and small nodules adjacent to the right major fissure.    Additional patchy and reticular nodular airspace opacities in the left  and right lungs. Bilateral airway thickening most prominent in the  lower lobes. No pleural effusion or pneumothorax.    There is a paraesophageal lymph node measuring 1.6 x 1.4 cm with  radiotracer uptake measuring 3.76, see series 5 image 217.    Cardiomegaly with coronary artery and aortic valve calcifications  similar to prior exam. Slightly improved pericardial effusion relative  to prior. Thoracic aorta and main pulmonary artery are normal in  caliber. Vascular calcifications of the arch and great vessel origins.    ABDOMEN AND PELVIS:  Interval decrease in the size of a hypermetabolic mass in the  retroperitoneum, at the level of the gastroesophageal junction which  measures approximately 4.0 x 3.2 cm and an SUV max of 10.61, see  series 5 image 277. This compares to approximately 6.6 x 7.1 cm with  an SUV max of 51.91 on prior exam. Interval resolution of a satellite  nodule adjacent to the distal esophagus. The mass is again noted to  abut and possibly infiltrate gastric wall at the cardia. Interval  decrease in extension into the pancreatic parenchyma,  however there is  persistent soft tissue thickening abutting the posterior pancreas, and  extending into the gastrosplenic interface.     Relative to prior exam there has been interval enlargement of the  spleen measuring 17.2 x 9.1 x 15.6 cm, compared to 16.6 x 8.1 x 15.3  cm. Large central splenic infarction is again seen. There is  persistent soft tissue infiltration and effacement of the fat plane  between the spleen and greater curvature. Multiple new hypermetabolic  foci in the spleen. For example there is a 1.5 cm focus near the  splenic hilum with a max SUV of 5.83, see series 5 image 274. There is  a 1.5 cm hypermetabolic focus near the splenic hilum with a max SUV of  6.29, see series 5 image 279. There is a 1.3 cm hypermetabolic focus  along the medial border of the spleen with SUV max measuring 6.25, see  series 5 image 263.    There is hazy soft tissue opacity that extends inferiorly in the  retroperitoneum along the aorta. There are numerous subcentimeter  retroperitoneal and central mesenteric lymph nodes with radiotracer  uptake less than background liver.    There is increased radiotracer uptake in the sigmoid colon measuring  up to 18.88 SUV Max. There is wall thickening in the rectum with  radiotracer uptake measuring up to 10.22 SUV max.    No significant interval change in a 4.8 cm cyst in the left hepatic  lobe. No new hepatic lesion. Cholecystectomy. No suspicious lesions in  the kidneys. Diffuse bladder wall thickening similar to prior exam. No  pelvic mass. Prostatic calcifications. No evidence of large or small  bowel obstruction. No ascites or free air in the abdomen. Chronically  occluded splenic vein. Major vascular structures are otherwise patent.  Vascular calcifications of the infrarenal aorta and iliac branches.    LOWER EXTREMITIES:   No abnormal masses or hypermetabolic lesions.    BONES:   No suspicious osteolytic or osteoblastic lesion. There is diffuse  radiotracer uptake in the  axial and appendicular skeleton consistent  with red marrow activation.    Degenerative changes of the spine and extremities.      Impression    IMPRESSION:   In this patient with a history of B-cell lymphoma currently on salvage  therapy with RICE:    1. There is evidence of response to therapy with interval decrease in  the size and metabolic activity of a retroperitoneal mass in the upper  abdomen.    2. There are a constellation of findings, including worsening  splenomegaly, recrudescence of hypermetabolic foci in the spleen, new  airspace opacities and hypermetabolic nodules in the lungs, diffuse  marrow activation, and hypermetabolism of the distal colon and rectum,  which which are suggestive of inflammatory response and/or  pseudo-progression. Differential would include mixed response to  therapy, and infection.    3. Cardiomegaly with a pericardial effusion, which slightly improved  from prior exam.    I have personally reviewed the examination and initial interpretation  and I agree with the findings.    ERIN YEH MD     ATTENDING ADDENDUM:    I have independently seen and evaluated the patient on September 18, 2020 and reviewed clinical, laboratory, and radiographic findings. I have discussed the plan with the team and agree with the attached note with the following edits:    Kenneth Tello is a 50 year old year old male, with R/R DLBCL, here for severe electrolyte abnl after recent DHAP, feels well.      Ph/E: Vitals reviewed. No distress. Oropharynx clear. Neck supple. Heart RRR. Lungs clear. Abdomen soft. No peripheral edema. No rash. Neuro nonfocal.      A&P: PICC and aggressively fix lytes, discharge tomorrow. Pancytopenia due to chemotherapy       acyclovir  400 mg Oral BID     allopurinol  300 mg Oral Daily     carvedilol  12.5 mg Oral BID w/meals     gabapentin  300 mg Oral At Bedtime     gabapentin  600 mg Oral QAM AC     heparin lock flush  5-10 mL Intracatheter Q24H     lisinopril  20  mg Oral Daily     morphine  45 mg Oral Q12H     omeprazole  40 mg Oral Daily     [Held by provider] rivaroxaban ANTICOAGULANT  20 mg Oral Daily with supper     sodium chloride (PF)  3 mL Intracatheter Q8H       Hubert Helm MD

## 2020-09-18 NOTE — PROVIDER NOTIFICATION
09/18/20 1139   PICC Double Lumen 09/18/20 Right Brachial vein medial   Placement Date/Time: 09/18/20 (c) 1134   Catheter Brand: Bard  Size (Fr): 5 Fr  Lot #: [id number]  Full barrier precautions done: Yes, hand hygiene, sterile gown, sterile gloves, mask, cap, full body drape, chlorhexidine scrub  Consent Signed: Yes  Time...   Site Assessment WDL   External Cath Length (cm) 4 cm   Extremity Circumference (cm) 27.5 cm   Dressing Intervention Chlorhexidine patch;Transparent;Securing device;New dressing   Dressing Change Due 09/25/20   PICC Comment PICC inserted   Lumen A - Color PURPLE   Lumen A - Status blood return noted;saline locked   Lumen A - Cap Change Due 09/22/20   Lumen B - Color RED   Lumen B - Status blood return noted;saline locked   Lumen B - Cap Change Due 09/22/20   Extravasation? No   Line Necessity Yes, meets criteria

## 2020-09-18 NOTE — PROGRESS NOTES
Request for Medical Opinion paperwork received via fax from [facility]. Will be placed in provider folder for signature upon completion.     Fax: [phone]    Jo Millard CMA

## 2020-09-18 NOTE — PLAN OF CARE
[id number]    Pt was afebrile with a max BP of 151/102, OVSS. 2 Loose stools and sweating. MD notified and maintenance fluid rate decreased. UA sent and has trace blood, albumin, and mucous. PIV infusing D5NS with 20mEq of K+. A/Ox4, not a good historian of medical history. SBA. Regular diet. Potassium and Magnesium recheck scheduled for 1100. Parameters for platelet transfusion needs to be obtained. Consent for blood products signed and in chart. On tele, Atrial fibrillation. EKG obtained and documented by EKG tech, not writer. Continue with plan of care.

## 2020-09-18 NOTE — PROVIDER NOTIFICATION
"Web-based messaging to [phone]    \"[id number]  Pt had stable VS at last check with no fluids and BP is now 151/102, OVSS. Pt is on 125mL/hr maintenance, 50mL/hour mag replacement, sweating, having 2 loose stools, and has taken 760 ccs PO for PO potassium replacement. May be fluid overloaded, lungs are clear. Can we stop IV maintenance and continue with electrolyte replacements?  Michael, [id number]\"  "

## 2020-09-18 NOTE — PROGRESS NOTES
OBSERVATION GOALS:    1. Electrolyte replacement: Partially met. Magnesium, potassium and calcium replaced, awaiting recheck results.  2. Eating at baseline: Not met. Pt has not had anything to eat yet today but has just ordered lunch.  3. Ambulating: Partially met. Ambulating independently to the bathroom but has yet to ambulate in day.

## 2020-09-18 NOTE — UTILIZATION REVIEW
"Inpatient to Observation note:    Admission Status; Secondary Review Determination         Under the authority of the Utilization Management Committee, the utilization review process indicated a secondary review on the above patient.  The review outcome is based on review of the medical records, discussions with staff, and applying clinical experience noted on the date of the review.          (x) Observation Status Appropriate - This patient does not meet hospital inpatient criteria and is placed in observation status. If this patient's primary payer is Medicare and was admitted as an inpatient, Condition Code 44 should be used and patient status changed to \"observation\".     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  Overnight patient's electrolytes have been corrected.  Potassium and magnesium have now improved and is normal.  Discussed with heme-onc team, patient patient's electrolyte status is much improved however he will be observed overnight just to make sure that it does not drop significantly as he has had trouble maintaining normal levels as outpatient.  He does not meet inpatient criteria at this time.    The severity of illness, intensity of service provided, expected LOS and risk for adverse outcome make the care appropriate for further observation; however, doesn't meet criteria for hospital inpatient admission. JAMISON Moran, notified of this determination.    This document was produced using voice recognition software.      The information on this document is developed by the utilization review team in order for the business office to ensure compliance.  This only denotes the appropriateness of proper admission status and does not reflect the quality of " care rendered.         The definitions of Inpatient Status and Observation Status used in making the determination above are those provided in the CMS Coverage Manual, Chapter 1 and Chapter 6, section 70.4.      Sincerely,  Mustapha Lyons MD    Utilization Review  Physician Advisor  [facility].

## 2020-09-18 NOTE — PROGRESS NOTES
OBSERVATION GOALS:     1. Electrolyte replacement: Partially met. Magnesium and potassium are now WDL. Awaiting calcium recheck results.  2. Eating at baseline: Partially met. Did not eat breakfast. Ate 100% of lunch. Plans to order dinner.  3. Ambulating: Met. Ambulating independently to the bathroom. Worked with PT.

## 2020-09-18 NOTE — PLAN OF CARE
[id number]  Hypertensive, now normotensive after scheduled morning medications. OVSS. Denied pain, nausea/vomiting. Up ad maryanne. Good po intake. Voiding spontaneously without difficulty, not saving urine to be measured. BM today. PICC line placed. Magnesium and potassium came back WDL. Calcium replaced, recheck 4.1. Will need calcium replaced this evening (has been ordered from pharmacy, awaiting its arrival).  Possible discharge to home tomorrow pending electrolyte stability.

## 2020-09-18 NOTE — H&P
[facility], [place]    History & Physical  Hematology / Oncology     Date of Admission:  09/18/20  Date of Service (when I saw the patient):  09/18/2020    Assessment & Plan   Kenneth Tello is a 50-year-old male with a past medical history significant for atrial fibrillation (on Xarelto), CVA (2017) with residual left-sided deficits, hypertension, and refractory DLBCL who is admitted today for severe hypocalcemia, hypomagnesemia, and hypokalemia despite outpatient replacements.    HEME  # DLBCL with primary refractory disease  Follows with Dr. Cary. 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 DLBCL-IPI was high (stage, extranodal sites, LDH) and clinical stage IV with extranodal involvement. Started on R-CHOP in 03/2020 with IT methotrexate each cycle for CNS prophylaxis. He completed 6 cycles R-CHOP. CT on 7/14 notable for increasing gastrohepatic ligament mass. PET/CT on 7/31 obtained subsequently and unfortunately notable for disease progression with increased size of retroperitoneal LN, multiple new pulmonary nodules, new focal uptake in T12 vertebral body, increased uptake in ascending colon. EUS of gastrohepatic ligament mass obtained 8/4 and consistent with persistent/recurrent large B-cell lymphoma. BMBx obtained 8/5 and notable for no morphologic or immunophenotypic evidence of involvement by B-cell lymphoma; marrow cellularity of 40-50% with trilineage hematopoiesis and no increase in blasts. He proceeded with salvage R-ICE (Cycle 1, Day 1 = 8/7/2020). Course complicated by neurotoxicity; did not require methylene blue. However, given his poor tolerance of the ifosfamide, patient was subsequently switched to salvage R-DHAP (Cycle 1, 8/31/20). He has had a BMT consultation with   on 9/3. CAR-T therapy was discussed. Follow up PET CT shows mixed response to therapy. Because of the mixed response, clinical trial with magrolimab and rituximab is being considered, as is polatuzumab/bendamustine/rituximab. He will need his electrolytes improved first.      # Low back pain  # Focal uptake in T12 vertebral body  Required admission 8/1-8/5 in the context of worsening pain. Symptoms initially thought attributable to focal uptake in T12 vertebral body noted on PET/CT (7/31), though there was no correlating lesion noted on MRI. Rad Onc consulted previously; consideration given to palliative radiation. However, will hold off at this time given plan for salvage chemotherapy. Followed by palliative care during previous admissions and as an outpatient.  - Continue PTA MS Contin 45 mg BID, Morphine IR 15mg q4h PRN.     # Normocytic anemia  Due to underlying malignancy and recent chemotherapy.  - Transfuse to keep Hgb >7    # Thrombocytopenia  Likely due to recent chemotherapy.  - Trend daily CBC  - Transfuse to keep plt >10K     RENAL  # Hypokalemia  # Hypocalcemia  # Hypomagnesmia  # EMILY, resolving  Developed EMILY and low K, Ca, Mg after R-DHAP, likely related to cisplatin nephrotoxicity. K as low as 2.5 (9/8), Mg as low as 0.7 (9/16), Ca as low as 5.6 (9/17) with reported iCa 3.0. He has been receiving outpatient repletions through [place] over the last week, however continues to have severe electrolyte derangements. Received Calcium gluconate again and Magnesium at [facility] and transferred to [facility] 9/18. Cr has improved.   - D5 NS with 20 K @ 125 ml/hr  - K, Mg, Ca rechecks and repletion scales ordered  - EKG at admission without significant changes    ID  # PPx  - Continue  mg BID  - Hold levofloxacin 250 mg and fluconazole 200 mg as ANC >1000     NEURO  # History of CVA  Diagnosed with a right-sided hemorrhagic CVA in 12/2017. Occurred in the setting of profound HTN. Patient with  "residual left arm/leg numbness and some slight LLE weakness.  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 Query potential contribution to back pain. Neurosurgery consulted previously; no intervention indicated.  - No acute inpatient management needs     CV  # Atrial fibrillation   - Continue PTA Xarelto 20 mg daily; hold for platelets <50K  - Continue PTA carvedilol 12.5 mg BID      # Hypertension  History of hypertensive emergency and subsequent CVA in 2017. BP better-controlled recently.  - Continue PTA carvedilol 12.5 mg BID  - Continue PTA lisinopril 20 mg daily    # Hyperlipidemia  - Hold PTA atorvastatin while inpatient; will restart on discharge    # History of NSTEMI  # History of cardiomyopathy  Documented in clinic notes from 01/01/2018. Noted during his hospitalization at [facility] in 12/2017. Per chart review, \"felt due to demand ischemia secondary to hypertensive urgency or stress-induced cardiomyopathy. EF was 43%.\"  - EKG done today is stable  - Obtain stat EKG & troponin for any chest pain or other concerning cardiac symptoms  - Most recent echo on 3/10/2020 with normal LVEF of 60-65%; mild concentric wall thickening consistent with left ventricular hypertrophy.     FEN/GI  # Constipation  Ongoing; previously attributed to vincristine (R-CHOP), now ongoing in the context of chronic opiate use. Last BM 5 days PTA. Still passing flatus. No associated abdominal pain, nausea/vomiting, or other red flag symptoms.   - Continue PTA Miralax BID  - Continue senna 2-3 tabs BID  - Mag citrate x1 PRN for refractory symptoms  - Consider KUB to exclude obstruction if symptoms persist or with any worsening    # Hiccoughs  - Continue PTA Reglan PRN     MISC  # Hx of WATSON  Per patient, diagnosed with WATSON 5 years ago. Not currently on CPAP due to insurance coverage; however, reports interest " in treatment/repeat testing.  - Outpatient sleep medicine consultation pending     FEN:  -IVF per chemotherapy protocol  -Lyte replacement per standing protocol  -Regular diet as tolerated    Prophy/Misc:  -GI: PTA pantoprazole; bowel regimen as above  -DVT: Xarelto 20 mg daily; hold for plts <50K    Disposition: Inpatient admission to heme malignancy team, consider convert to obs if electrolyte issues improving significantly.    Nicola Ansari  Hematology/Oncology  [place]  Pager: [phone]    Code Status : Full Code (confirmed on admission)    Primary Care Physician   Nina Jeter    History of Present Illness   History obtained from chart and discussed with the patient.    Kenneth Tello is a 50-year-old male with a past medical history significant for atrial fibrillation (on Xarelto), CVA (2017) with residual left-sided deficits, hypertension, and refractory DLBCL s/p recent R-DHAP, who presents with severe hypokalemia, hypocalcemia, and hypomagnesemia which has failed outpatient management through his local ED.    Since his last admission which was for R-DHAP, he has been feeling relatively well, with good energy and appetite. He states currently that he continues to feel well. He did tell the ED that something was off but does not feel that way now.    His back pain has continued to be well-controlled, and he rarely, if ever, has to take IR morphine in addition to the scheduled MS Contin. He reports some ongoing left leg weakness (related to his previous stroke).     He's had no recent fevers, chills, or infectious symptoms. He denies any known exposure to individuals with COVID-19.      No recent nausea, vomiting, or abdominal pain. Having BMs..     Past Medical History    Past Medical History:   Diagnosis Date     Alcohol use disorder, mild, abuse      Atrial fibrillation (H)     coumadin     CAD (coronary artery disease)      Cardiomyopathy (H)      HLD (hyperlipidemia)      HTN (hypertension)       Myocardial infarction (H)      Neuropathy     LLE, left hand post-CVA     Obesity      WATSON (obstructive sleep apnea)     requires CPAP however does not have a machine     Pancreatic cancer (H)     suspected 3/9/2020 at OSH     Stroke (H)     2017, mild left sided deficit     Tobacco dependence      Urinary urgency        Past Surgical History   Past Surgical History:   Procedure Laterality Date     CHOLECYSTECTOMY, LAPOROSCOPIC       ESOPHAGOSCOPY, GASTROSCOPY, DUODENOSCOPY (EGD), COMBINED N/A 8/4/2020    Procedure: ESOPHAGOGASTRODUODENOSCOPY, WITH FINE NEEDLE ASPIRATION BIOPSY, WITH ENDOSCOPIC ULTRASOUND GUIDANCE;  Surgeon: Dayton Tobias MD;  Location: [facility]       Prior to Admission Medications   Prior to Admission Medications   Prescriptions Last Dose Informant Patient Reported? Taking?   acetaminophen (TYLENOL) 325 MG tablet Past Month at Unknown time  Yes Yes   Sig: Take 2 tablets (650 mg) by mouth every 4 hours as needed for mild pain   acyclovir (ZOVIRAX) 400 MG tablet 9/17/2020 at Unknown time  No Yes   Sig: Take 1 tablet (400 mg) by mouth 2 times daily   allopurinol (ZYLOPRIM) 300 MG tablet 9/17/2020 at Unknown time  No Yes   Sig: Take 1 tablet (300 mg) by mouth daily   atorvastatin (LIPITOR) 40 MG tablet 9/17/2020 at Unknown time  No Yes   Sig: Take 1 tablet (40 mg) by mouth daily   carvedilol (COREG) 12.5 MG tablet 9/17/2020 at Unknown time  Yes Yes   Sig: Take 12.5 mg by mouth 2 times daily (with meals)   dexamethasone (DECADRON) 4 MG tablet 9/17/2020 at Unknown time  No Yes   Sig: Take 10 tablets (40 mg) by mouth every 24 hours on 9/2 at 4pm, then on 9/3 at 4pm   fluconazole (DIFLUCAN) 200 MG tablet Past Week at Unknown time  No Yes   Sig: Take 1 tablet (200 mg) by mouth daily until seen in clinic   gabapentin (NEURONTIN) 300 MG capsule 9/17/2020 at Unknown time  No Yes   Sig: Take 2 tabs in the morning, 1 tab at bedtime   levofloxacin (LEVAQUIN) 250 MG tablet 9/17/2020 at Unknown time  No Yes    Sig: Take 1 tablet (250 mg) by mouth daily until seen in clinic   lisinopril (ZESTRIL) 20 MG tablet 9/17/2020 at Unknown time  No Yes   Sig: Take 1 tablet (20 mg) by mouth daily   metoclopramide (REGLAN) 10 MG tablet 9/17/2020 at Unknown time  No Yes   Sig: Take 1 tablet (10 mg) by mouth every 6 hours as needed (hiccups)   morphine (MS CONTIN) 15 MG CR tablet 9/17/2020 at Unknown time  No Yes   Sig: Take 1 tablet (15 mg) by mouth every 12 hours in conjunction with a 30mg tablet for a total dosage of 45mg every 12 hours.   morphine (MS CONTIN) 30 MG CR tablet 9/17/2020 at Unknown time  No Yes   Sig: Take 1 tablet (30 mg) by mouth every 12 hours in conjunction with a 30mg tablet for a total dosage of 45mg every 12 hours.   morphine (MSIR) 15 MG IR tablet 9/17/2020 at Unknown time  No Yes   Sig: Take 1 tablet (15 mg) by mouth every 4 hours as needed for moderate to severe pain   omeprazole (PRILOSEC) 40 MG DR capsule 9/17/2020 at Unknown time  No Yes   Sig: Take 1 capsule (40 mg) by mouth daily   ondansetron (ZOFRAN) 4 MG tablet 9/17/2020 at Unknown time  No Yes   Sig: Take 1 tablet (4 mg) by mouth every 8 hours as needed for nausea   polyethylene glycol (MIRALAX) 17 g packet Past Week at Unknown time  Yes Yes   Sig: Take 1 packet by mouth daily as needed for constipation   prednisoLONE acetate (PRED FORTE) 1 % ophthalmic suspension More than a month at Unknown time  No No   Sig: Place 2 drops into both eyes 4 times daily until 9/4   rivaroxaban ANTICOAGULANT (XARELTO ANTICOAGULANT) 20 MG TABS tablet 9/17/2020 at Unknown time  No Yes   Sig: Take 1 tablet (20 mg) by mouth daily (with dinner)   senna-docusate (SENOKOT-S/PERICOLACE) 8.6-50 MG tablet 9/17/2020 at Unknown time  No Yes   Sig: Take 1-2 tablets by mouth 2 times daily as needed for constipation      Facility-Administered Medications: None     Allergies   No Known Allergies    Social History   Social History     Socioeconomic History     Marital status:       Spouse name: Not on file     Number of children: Not on file     Years of education: Not on file     Highest education level: Not on file   Occupational History     Occupation:    Social Needs     Financial resource strain: Not on file     Food insecurity     Worry: Not on file     Inability: Not on file     Transportation needs     Medical: Not on file     Non-medical: Not on file   Tobacco Use     Smoking status: Former Smoker     Packs/day: 1.00     Years: 2.00     Pack years: 2.00     Types: Cigarettes     Start date: 3/10/2017     Last attempt to quit: 2020     Years since quittin.7     Smokeless tobacco: Current User     Types: Chew     Tobacco comment: daily chew use x39 years, now intermittent   Substance and Sexual Activity     Alcohol use: Yes     Alcohol/week: 1.0 - 2.0 standard drinks     Types: 1 - 2 Cans of beer per week     Drinks per session: 1 or 2     Binge frequency: Less than monthly     Comment: 1 beer daily, occasional >2 drinks/episode socially     Drug use: Never     Sexual activity: Not on file   Lifestyle     Physical activity     Days per week: Not on file     Minutes per session: Not on file     Stress: Not on file   Relationships     Social connections     Talks on phone: Not on file     Gets together: Not on file     Attends [employer]ism service: Not on file     Active member of club or organization: Not on file     Attends meetings of clubs or organizations: Not on file     Relationship status: Not on file     Intimate partner violence     Fear of current or ex partner: Not on file     Emotionally abused: Not on file     Physically abused: Not on file     Forced sexual activity: Not on file   Other Topics Concern     Not on file   Social History Narrative     Not on file       Family History   Family History   Problem Relation Age of Onset     Cardiovascular Mother      Cardiovascular Father      Diabetes Type 2  Father      Cardiovascular Brother      Cancer  Maternal Grandmother        Review of Systems   A 14-point ROS is negative unless otherwise noted above in the HPI.    Physical Exam   Vital Signs with Ranges  Temp:  [96.1  F (35.6  C)] 96.1  F (35.6  C)  Pulse:  [73] 73  Resp:  [16] 16  BP: (135)/(64) 135/64  SpO2:  [96 %] 96 %  189 lbs 4.8 oz    Constitutional: Pleasant and cooperative male. Awake, alert, NAD. Resting in bed.  HEENT: NC/AT, EOMI, sclera clear, conjunctiva normal, OP with MMM, no intraoral lesions.  Respiratory: No increased work of breathing, CTAB, no crackles or wheezing.  Cardiovascular: RRR, no murmur noted. 1+ bilateral LE pitting edema.  GI: Bowel sounds present throughout; abdomen soft, non-distended and non-tender.  MSK: Normal muscle bulk and tone.  Skin: Warm, dry, well-perfused. Scab to left anterior knee appears well-healing. Otherwise, no rashes or lesions noted.  Neurologic: A&O. Answers questions appropriately. Moves all extremities spontaneously.  Neuropsychiatric: Calm, appropriate affect      Recent Labs  CBC   Recent Labs   Lab 09/14/20   WBC 3.4*   RBC 2.96*   HGB 8.6*   HCT 26.4*   MCV 89   MCH 29.1   MCHC 32.6   RDW 18.3*   PLT 17*       CMP   Recent Labs   Lab 09/18/20 0034 09/17/20 09/14/20     --  141   POTASSIUM 2.8*  --  2.9*   CHLORIDE 100  --  98   CO2 29  --  30   ANIONGAP 7  --  13   GLC 80  --  99.0   BUN 14  --  18   CR 0.87  --  1.10   GFRESTIMATED >90  --  75   GFRESTBLACK >90  --   --    STACY 7.1* 5.6* 6.00*   MAG 1.5* 1.0*  --    PHOS 3.6  --   --    PROTTOTAL 5.8*  --  5.4*   ALBUMIN 2.4*  --  3.4*   BILITOTAL 0.9  --  0.9   ALKPHOS 118  --  123*   AST 19  --  16   ALT 16  --  17       LFTs:   Recent Labs   Lab 09/18/20 0034   PROTTOTAL 5.8*   ALBUMIN 2.4*   BILITOTAL 0.9   ALKPHOS 118   AST 19   ALT 16       Coagulation Studies:   Recent Labs   Lab 09/18/20  0034   INR 1.15*   PTT 33     ATTENDING ADDENDUM:    I have independently seen and evaluated the patient on September 18, 2020 and reviewed  clinical, laboratory, and radiographic findings. I have discussed the plan with the team and agree with the attached note with the following edits:    Kenneth Tello is a 50 year old year old male, with R/R DLBCL, here for severe electrolyte abnl after recent DHAP, feels well.     Ph/E: Vitals reviewed. No distress. Oropharynx clear. Neck supple. Heart RRR. Lungs clear. Abdomen soft. No peripheral edema. No rash. Neuro nonfocal.     A&P: PICC and aggressively fix lytes, discharge tomorrow. Pancytopenia due to chemotherapy       acyclovir  400 mg Oral BID     allopurinol  300 mg Oral Daily     carvedilol  12.5 mg Oral BID w/meals     gabapentin  300 mg Oral At Bedtime     gabapentin  600 mg Oral QAM AC     lisinopril  20 mg Oral Daily     morphine  45 mg Oral Q12H     omeprazole  40 mg Oral Daily     [Held by provider] rivaroxaban ANTICOAGULANT  20 mg Oral Daily with supper     sodium chloride (PF)  3 mL Intracatheter Q8H       Hubert Helm MD

## 2020-09-18 NOTE — PROGRESS NOTES
09/18/20 1200   Quick Adds   Type of Visit Initial PT Evaluation   Living Environment   Lives With child(cameron), adult;grandchild(cameron)   Living Arrangements house   Home Accessibility stairs to enter home;stairs within home   Number of Stairs, Main Entrance 4   Stair Railings, Main Entrance railing on right side (ascending)   Transportation Anticipated car, drives self;family or friend will provide   Living Environment Comment Pt lives on main level and all needs met   Self-Care   Usual Activity Tolerance good   Current Activity Tolerance good   Regular Exercise No   Equipment Currently Used at Home   (owns 4WW and uses it seldomly)   Activity/Exercise/Self-Care Comment Watching TV   Functional Level Prior   Ambulation 0-->independent   Transferring 0-->independent   Fall history within last six months yes   Number of times patient has fallen within last six months 2   Prior Functional Level Comment Indep with mobility but does endorse falls due to chronic weakness/deconditioning   General Information   Onset of Illness/Injury or Date of Surgery - Date 09/17/20   Patient/Family Goals Statement Wants to go home and be able to see zhao play baseball   Pertinent History of Current Problem (include personal factors and/or comorbidities that impact the POC) Kenneth Tello is a 50-year-old male with a past medical history significant for atrial fibrillation (on Xarelto), CVA (2017) with residual left-sided deficits, hypertension, and refractory DLBCL who is admitted today for severe hypocalcemia, hypomagnesemia, and hypokalemia despite outpatient replacements.   Precautions/Limitations immunosuppressed   General Observations Pt supine in bed; agreeable to session   Cognitive Status Examination   Orientation orientation to person, place and time   Level of Consciousness alert   Follows Commands and Answers Questions 100% of the time   Personal Safety and Judgment intact   Memory intact   Integumentary/Edema  "  Integumentary/Edema no deficits were identifed   Posture    Posture Not impaired   Range of Motion (ROM)   ROM Comment WFL   Strength   Strength Comments WFL as per mobility   Bed Mobility   Bed Mobility Comments Indep   Transfer Skills   Transfer Comments Indep   Gait   Gait Comments SBA x 15' for safety, slow reji, mild dec foot clearance   Balance   Balance Comments SBA for standing dynamic balance   Sensory Examination   Sensory Perception no deficits were identified   General Therapy Interventions   Planned Therapy Interventions gait training;balance training;home program guidelines;progressive activity/exercise   Clinical Impression   Criteria for Skilled Therapeutic Intervention yes, treatment indicated   PT Diagnosis Impaired functional mobility   Influenced by the following impairments Mild deconditioning   Functional limitations due to impairments Gait, balance, endurance   Clinical Presentation Stable/Uncomplicated   Clinical Presentation Rationale Clinical judgement   Clinical Decision Making (Complexity) Low complexity   Therapy Frequency   (1x eval and treat only)   Predicted Duration of Therapy Intervention (days/wks) 1x eval and treat only   Anticipated Discharge Disposition Home with Assist   Risk & Benefits of therapy have been explained Yes   Patient, Family & other staff in agreement with plan of care Yes   [facility] AM-PAC  \"6 Clicks\" V.2 Basic Mobility Inpatient Short Form   1. Turning from your back to your side while in a flat bed without using bedrails? 4 - None   2. Moving from lying on your back to sitting on the side of a flat bed without using bedrails? 4 - None   3. Moving to and from a bed to a chair (including a wheelchair)? 4 - None   4. Standing up from a chair using your arms (e.g., wheelchair, or bedside chair)? 4 - None   5. To walk in hospital room? 3 - A Little   6. Climbing 3-5 steps with a railing? 3 - A Little   Basic Mobility Raw Score (Score out of 24.Lower " scores equate to lower levels of function) 22   Total Evaluation Time   Total Evaluation Time (Minutes) 8

## 2020-09-18 NOTE — PLAN OF CARE
Discharge Planner PT   Patient plan for discharge: Home with PRN assist  Current status: Mobilizing with SBA/indep for bed mobility, transfers, amb 700' (no walker) and negotiated 4 stairs). No further acute PT needs needed. Encouraged increased activity/exercise as able.  Barriers to return to prior living situation: None  Recommendations for discharge: Home with PRN assist  Rationale for recommendations: Current level of function       Entered by: Allen Sheets 09/18/2020 1:22 PM     Physical Therapy Discharge Summary    Reason for therapy discharge:    All goals and outcomes met, no further needs identified.    Progress towards therapy goal(s). See goals on Care Plan in [facility] electronic health record for goal details.  Goals met    Therapy recommendation(s):    No further therapy is recommended.  Continue home exercise program.

## 2020-09-18 NOTE — PHARMACY-ADMISSION MEDICATION HISTORY
"  Admission Medication History Completed by Pharmacy    See [facility] Admission Navigator for allergy information, preferred outpatient pharmacy, prior to admission medications and immunization status.     Medication History Sources:     Patient interview, Dispense history (SureScripts),     Changes made to PTA medication list (reason):    Added: None    Deleted: None    Changed: None    Additional Information:    Patient is a moderate historian as previously stated in chart notes.    Patient stated he didn't take any medications on 9/17/20 \"for some reason\" so he believed last doses of scheduled meds were on 9/16/20.     For his scheduled MS Contin, he stated not taking the 15 mg and only needing the 30 mg. For the PRN IR morphine, he states he rarely needs to use it. Per , his last fills of MS Contin 15 and 30 are from 8/10/20, and his last fill of morphine IR 15 was from 9/1/20.    Prior to Admission medications    Medication Sig Last Dose Taking? Auth Provider   acetaminophen (TYLENOL) 325 MG tablet Take 2 tablets (650 mg) by mouth every 4 hours as needed for mild pain Past Month at Unknown time Yes Tara Anthony PA-C   acyclovir (ZOVIRAX) 400 MG tablet Take 1 tablet (400 mg) by mouth 2 times daily 9/16/2020 at Unknown time Yes Arben Enrique PA-C   allopurinol (ZYLOPRIM) 300 MG tablet Take 1 tablet (300 mg) by mouth daily 9/16/2020 at Unknown time Yes Arben Enrique PA-C   atorvastatin (LIPITOR) 40 MG tablet Take 1 tablet (40 mg) by mouth daily 9/16/2020 at Unknown time Yes Nicole Giraldo PA-C   carvedilol (COREG) 12.5 MG tablet Take 12.5 mg by mouth 2 times daily (with meals) 9/16/2020 at Unknown time Yes Unknown, Entered By History   fluconazole (DIFLUCAN) 200 MG tablet Take 1 tablet (200 mg) by mouth daily until seen in clinic Past Week at Unknown time Yes Carol Zabala PA-C   gabapentin (NEURONTIN) 300 MG capsule Take 2 tabs in the morning, 1 tab at bedtime 9/16/2020 at Unknown time Yes " Carol Zabala PA-C   levofloxacin (LEVAQUIN) 250 MG tablet Take 1 tablet (250 mg) by mouth daily until seen in clinic 9/16/2020 at Unknown time Yes Carol Zabala PA-C   lisinopril (ZESTRIL) 20 MG tablet Take 1 tablet (20 mg) by mouth daily 9/16/2020 at Unknown time Yes Carol Zabala PA-C   morphine (MS CONTIN) 30 MG CR tablet Take 1 tablet (30 mg) by mouth every 12 hours in conjunction with a 30mg tablet for a total dosage of 45mg every 12 hours. 9/16/2020 at Unknown time Yes Carol Zabala PA-C   morphine (MSIR) 15 MG IR tablet Take 1 tablet (15 mg) by mouth every 4 hours as needed for moderate to severe pain Past Month at Unknown time Yes Carol Zabala PA-C   omeprazole (PRILOSEC) 40 MG DR capsule Take 1 capsule (40 mg) by mouth daily 9/16/2020 at Unknown time Yes Carol Zabala PA-C   prednisoLONE acetate (PRED FORTE) 1 % ophthalmic suspension Place 2 drops into both eyes 4 times daily until 9/4 Past Month at Unknown time Yes Carol Zabala PA-C   rivaroxaban ANTICOAGULANT (XARELTO ANTICOAGULANT) 20 MG TABS tablet Take 1 tablet (20 mg) by mouth daily (with dinner) 9/16/2020 at Unknown time Yes Nicole Giraldo PA-C   senna-docusate (SENOKOT-S/PERICOLACE) 8.6-50 MG tablet Take 1-2 tablets by mouth 2 times daily as needed for constipation 9/16/2020 at Unknown time Yes Tara Anthony PA-C   dexamethasone (DECADRON) 4 MG tablet Take 10 tablets (40 mg) by mouth every 24 hours on 9/2 at 4pm, then on 9/3 at 4pm Finished outpatient course  Carol Zabala PA-C   metoclopramide (REGLAN) 10 MG tablet Take 1 tablet (10 mg) by mouth every 6 hours as needed (hiccups) More than a month at Unknown time  Tara Anthony PA-C   morphine (MS CONTIN) 15 MG CR tablet Take 1 tablet (15 mg) by mouth every 12 hours in conjunction with a 30mg tablet for a total dosage of 45mg every 12 hours.   Carol Zabala PA-C   ondansetron (ZOFRAN) 4 MG tablet Take 1 tablet (4 mg) by mouth every 8 hours as  needed for nausea More than a month at Unknown time  Carol Zabala, YARELIS   polyethylene glycol (MIRALAX) 17 g packet Take 1 packet by mouth daily as needed for constipation More than a month at Unknown time  Reported, Patient       Date completed: 09/18/20    Medication history completed by: Mariajose Bertrand, PharmD IV student

## 2020-09-18 NOTE — PROGRESS NOTES
"Nursing Focus: Admission  D: Arrived at 0000 from [facility] via ambulance. Patient unaccompanied. Admitted for hypocalcemia. Half of 250cc bag of 2g calcium gluconate was given via IV on ride to hospital.     I: Admission process began.  Patient oriented to room, enviroment, call light.  MD. notified of patients arrival on unit.     A: Vital signs stable, afebrile. Patient stable at this time. Complains of abdominal tightness, feeling full, and tingling when trying to have a bowel movement. Declined interventions for pain. \"Is comfortable enough\". A/Ox4, not a good historian for medical hx, unsure of what meds were taken when. Full skin assessment WDL except for two old bilateral scabs on back of calves and mild petechiae on both forearms. Pt does not know where scabs came from, pt asked if it was chemo-related. Afib at baseline per pt report and on tele strip obtained.  PIV on right AC. Pt up ad maryanne, ID bracelet and fall band on.     P: Implement plan of care when available. Continue to monitor patient. Nursing interventions as appropriate. Notify md with changes in pt status.       "

## 2020-09-18 NOTE — PROGRESS NOTES
Repeat calcium today is 5.6 after replacement in local clinic over night(2 Grams Calcium Gluconate plus 2 grams Magnesium for a Mag of 0,7)). Ionized calcium came back at 3.0 this afternoon. When talking to Kenneth earlier in week he said how well he is feeling. Tonight he admits something is not right. Says even when he tries to have a BM, its not right. Writer worked with local ER doctor(Raj) nurses, and Doctor Raeann for a plan. Due to the complexity of this patient and limited resources, [facility] is not comfortable keeping this patient. They would prefer a transfer to the [facility]. They will see him in the ER tonight/now and transfer when and how they deem appropriate.     Jason called with the above plan. He will put a few things in a bag and go over to the ER which in right across the street from his home.     [facility] updated about how to put in for a transfer. To call [phone] on his arrival to the ER and they will then help them process the transfer and update them on bed status.

## 2020-09-19 NOTE — PROGRESS NOTES
OBSERVATION GOALS:     1. Electrolyte replacement: Partially met.  Calcium Gluconate supplement given  for 4.1     2. Eating at baseline:  N/A during the night      3. Ambulating: Met. Ambulating independently to the bathroom.

## 2020-09-19 NOTE — PROGRESS NOTES
OBSERVATION GOALS:     1. Electrolyte replacement: Partially met. Magnesium and calcium need to be replaced this morning.    2. Eating at baseline: Met. Ate 100% of breakfast.    3. Ambulating: Met. Ambulating independently in room and day.

## 2020-09-19 NOTE — PLAN OF CARE
Discharge to Home:  D: Orders for discharge and outpatient medications written. Pt verbalized readiness to discharge to home this morning.  I: PICC line heparin locked and left in place per MD order. Home medications and return to clinic schedule reviewed with patient and daughter. Discharge instructions and parameters for calling Health Care Provider reviewed. Patient ambulated off of the unit at 10:50 AM accompanied by his daughter.   A: Patient/daughter verbalized understanding of all discharge medications and discharge paperwork and was ready for discharge.   P: Patient instructed to  medications at the discharge pharmacy prior to leaving the hospital. Follow up as scheduled/documented on discharge paperwork.

## 2020-09-19 NOTE — PLAN OF CARE
02:00-06:00 am    OBSERVATION GOALS:     1. Electrolyte replacement: Partially met.  Calcium Gluconate supplement given  for 4.1      2. Eating at baseline:  N/A during the night       3. Ambulating: Met. Ambulating independently to the bathroom     00:00-07:00   AF Slightly elevated BP, 130's/70's  OVSS   Pt is on observation status  Denied pain  No nausea/vomiting   Calcium replacement for 4.1 done at bedtime and recheck with am lab   Up independently  Voiding spontaneously well, good UOP but no BM   Pt is stable and feeling well  Possible discharge today pending electrolyte WNL   No acute events overnight  Continue w/POC

## 2020-09-19 NOTE — PROGRESS NOTES
Labs and Transfusion orders:  Date: 2020    Patient: Kenneth Tello  : 1970    LABS:  [  ] Check CBC with differential, BMP, Magnesium and Phosphorus level 3 times a week for at least 1 week.   (fax labs to [facility] at [phone])    PICC Line Cares:   [  ] Please change PICC line dressing on 20, please repeat every 7 days while PICC line is in place.    [  ] Please change caps of PICC line with dressing change on 20, please repeat every 7 days with dressing changes while PICC line is in place.       Please call the [facility] at [phone] for any questions, and ask to speak with the care coordinator for Dr. Cary  (patient's primary oncologist).    Thank you,         Mallorie Mccabe PA-C    [facility]  Department of Hematology/Oncology  [address] [place] [address]  Pager: [phone]

## 2020-09-19 NOTE — DISCHARGE SUMMARY
[facility] [place]    Discharge Summary  Hematology / Oncology    Date of Admission:  9/18/2020  Date of Discharge:  9/19/2020 11:07 AM  Discharging Provider: Mallorie Mccabe  Date of Service (when I saw the patient): 09/19/20    Discharge Diagnoses    - DLBCL with primary refractory disease    - Low back pain    - Normocytic anemia    - Thrombocytopenia    - Hypokalemia - improved    - Hypocalcemia - improved    - Hypomagnesmia - improved    - EMILY - resolved    - Hx CVA    - A. 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     During this hospitalization, Mr. Tello continued to feel well overall. He received frequent electrolyte replacements to get him  to baseline. Mr. Tello liked having his PICC line in since he gets frequent labs draws. Patient will discharge with PICC line; daughter is a nurse and is very comfortable with caring for this PICC line at home. Patient has been holding Xarelto due to thrombocytopenia; he will continue to do so on discharge until told to resume by clinic. Daughter and patient are in agreement with this plan.     Discharge Medications   - Klor Con 40 mEq PO  BID for 5 days   - Magnesium Oxide 400 mg PO BID for 5 days   - Calcium Carbonate 500 mg PO TID for 5 days  - Heparin Lock flush 5-10 mLs intra catheter q24h     Follow Up  - Will have patient get CBC, BMP, Mag and Phos checked 3x weekly for at least 1 week. Orders were faxed to [facility] and copy provided to patient's daughter as well. She will call Monday and arrange for  labs as clinic was closed today.   - Dr. Cary follow up on 9/24/20    Hospital Course   The following was addressed during this hospitalization      HEME  # DLBCL with primary refractory disease  Follows with Dr. Cary. 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 DLBCL-IPI was high (stage, extranodal sites, LDH) and clinical stage IV with extranodal involvement. Started on R-CHOP in 03/2020 with IT methotrexate each cycle for CNS prophylaxis. He completed 6 cycles R-CHOP. CT on 7/14 notable for increasing gastrohepatic ligament mass. PET/CT on 7/31 obtained subsequently and unfortunately notable for disease progression with increased size of retroperitoneal LN, multiple new pulmonary nodules, new focal uptake in T12 vertebral body, increased uptake in ascending colon. EUS of gastrohepatic ligament mass obtained 8/4 and consistent with persistent/recurrent large B-cell lymphoma. BMBx obtained 8/5 and notable for no morphologic or immunophenotypic evidence of involvement by B-cell lymphoma; marrow cellularity of 40-50% with trilineage hematopoiesis and no increase in blasts. He proceeded with salvage R-ICE (Cycle 1, Day 1 = 8/7/2020). Course complicated by neurotoxicity; did not require methylene blue. However, given his poor tolerance of the ifosfamide, patient was subsequently switched to salvage R-DHAP (Cycle 1, 8/31/20). He has had a BMT consultation with  on 9/3. CAR-T therapy was discussed. Follow up PET CT shows mixed response to therapy. Because of the mixed response, clinical trial with magrolimab and rituximab is being considered, as is polatuzumab/bendamustine/rituximab. He will need his electrolytes improved first.      # Low back pain  # Focal uptake in T12 vertebral body  Required admission 8/1-8/5  in the context of worsening pain. Symptoms initially thought attributable to focal uptake in T12 vertebral body noted on PET/CT (7/31), though there was no correlating lesion noted on MRI. Rad Onc consulted previously; consideration given to palliative radiation. However, will hold off at this time given plan for salvage chemotherapy. Followed by palliative care during previous admissions and as an outpatient.  - Continue PTA MS Contin 45 mg BID, Morphine IR 15mg q4h PRN.     # Normocytic anemia  Due to underlying malignancy and recent chemotherapy.  - Transfuse to keep Hgb >7     # Thrombocytopenia  Likely due to recent chemotherapy.  - Trend daily CBC  - Transfuse to keep plt >10K     RENAL  # Hypokalemia  # Hypocalcemia  # Hypomagnesmia  # EMILY, resolving  Developed EMILY and low K, Ca, Mg after R-DHAP, likely related to cisplatin nephrotoxicity. K as low as 2.5 (9/8), Mg as low as 0.7 (9/16), Ca as low as 5.6 (9/17) with reported iCa 3.0. He has been receiving outpatient repletions through [facility] over the last week, however continues to have severe electrolyte derangements. Received Calcium gluconate again and Magnesium at [facility] and transferred to [facility] 9/18. Cr has improved.   - D5 NS with 20 K @ 125 ml/hr  - K, Mg, Ca rechecks and repletion scales ordered  - EKG at admission without significant changes     ID  # PPx  - Continue  mg BID  - Hold levofloxacin 250 mg and fluconazole 200 mg as ANC >1000     NEURO  # History of CVA  Diagnosed with a right-sided hemorrhagic CVA in 12/2017. Occurred in the setting of profound HTN. Patient with residual left arm/leg numbness and some slight LLE weakness.  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 Query potential contribution to back pain. Neurosurgery consulted previously; no intervention indicated.  - No acute inpatient management  "needs     CV  # Atrial fibrillation   - Continue PTA Xarelto 20 mg daily; holding for platelets less than 50K   - Continue PTA carvedilol 12.5 mg BID      # Hypertension  History of hypertensive emergency and subsequent CVA in 2017. BP better-controlled recently.  - Continue PTA carvedilol 12.5 mg BID  - Continue PTA lisinopril 20 mg daily     # Hyperlipidemia  - Hold PTA atorvastatin while inpatient; will restart on discharge     # History of NSTEMI  # History of cardiomyopathy  Documented in clinic notes from 01/01/2018. Noted during his hospitalization at [facility] in 12/2017. Per chart review, \"felt due to demand ischemia secondary to hypertensive urgency or stress-induced cardiomyopathy. EF was 43%.\"  - EKG done today is stable  - Obtain stat EKG & troponin for any chest pain or other concerning cardiac symptoms  - Most recent echo on 3/10/2020 with normal LVEF of 60-65%; mild concentric wall thickening consistent with left ventricular hypertrophy.     FEN/GI  # Constipation  Ongoing; previously attributed to vincristine (R-CHOP), now ongoing in the context of chronic opiate use. Still passing flatus. No associated abdominal pain, nausea/vomiting, or other red flag symptoms.    - Continue PTA Miralax BID  - Continue senna 2-3 tabs BID  - Mag citrate x1 PRN for refractory symptoms  - Consider KUB to exclude obstruction if symptoms persist or with any worsening     # Hiccoughs  - Continue PTA Reglan PRN     MISC  # Hx of WATSON  Per patient, diagnosed with WATSON 5 years ago. Not currently on CPAP due to insurance coverage; however, reports interest in treatment/repeat testing.  - Outpatient sleep medicine consultation pending     FEN:  -IVF per chemotherapy protocol  -Lyte replacement per standing protocol  -Regular diet as tolerated     Prophy/Misc:  -GI: PTA pantoprazole; bowel regimen as above  -DVT: Xarelto 20 mg daily; hold for plts <50K     Disposition: Inpatient admission to heme malignancy team, " consider convert to obs if electrolyte issues improving significantly.     Mallorie Mccabe PA-C  Hematology/Oncology  Pager #[phone]    Significant Results and Procedures   PICC placement     Pending Results     Code Status   Full Code - confirmed on admission     Primary Care Physician   Nina Jeter    Physical Exam   Temp: 98.4  F (36.9  C) Temp src: Oral BP: 138/89 Pulse: 65   Resp: 18 SpO2: 95 % O2 Device: None (Room air)    Vitals:    09/18/20 0009 09/19/20 0745   Weight: 85.9 kg (189 lb 4.8 oz) 86.8 kg (191 lb 4.8 oz)     Vital Signs with Ranges  Temp:  [98  F (36.7  C)-98.4  F (36.9  C)] 98.4  F (36.9  C)  Pulse:  [65-88] 65  Resp:  [16-18] 18  BP: (113-147)/(61-89) 138/89  SpO2:  [95 %-99 %] 95 %  I/O last 3 completed shifts:  In: 1455 [I.V.:1455]  Out: 1300 [Urine:1300]    Constitutional: Pleasant male seen resting comfortably in bed in NAD. Alert and interactive.   HEENT: NCAT. PERRL, EOMI, anicteric sclera. Oral mucosa pink and moist with no lesions or thrush.  Respiratory: Non-labored breathing, good air exchange, lungs clear to auscultation bilaterally. No cough or wheeze noted.   Cardiovascular: Irregularly irregular. No murmur or rub.   GI: Normoactive bowel sounds. Abdomen soft, non-distended, and non-tender. No palpable masses or organomegaly.  Skin: Warm and dry. No concerning lesions or rash on exposed surfaces.  Musculoskeletal: Extremities grossly normal, non-tender, no edema. Strong peripheral pulses. Good strength and ROM in bed.   Neuropsychiatric: Mentation and affect appear normal/appropriate.  Vascular Access: PIV is CDI without erythema, swelling, or discharge.     Time Spent on this Encounter       Discharge Disposition   Discharged to home  Condition at discharge: Good    Consultations This Hospital Stay   PHYSICAL THERAPY ADULT IP CONSULT  VASCULAR ACCESS FOR PICC PLACEMENT ADULT IP CONSULT  INTERVENTIONAL RADIOLOGY ADULT/PEDS IP CONSULT    Discharge Orders      Reason for your  hospital stay    You were admitted to the hospital for sever electrolyte abnormalities (low calcium, low potassium and low magnesium). We replenished your electrolytes with IV supplementation. You will be sent home with 5 days of oral supplementation as well.     Adult [facility] Follow-up and recommended labs and tests    Please follow up with appointments listed.     Appointments on [place] and/or [facility] (with [facility] or [facility] provider or service). Call [phone] if you haven't heard regarding these appointments within 7 days of discharge.     Follow Up and recommended labs and tests    Please get CBC with differential, BMP, Magnesium and Phosphorus levels 3x/week for at least 1 week at your local clinic.     Activity    Your activity upon discharge: activity as tolerated     When to contact your care team    [facility] clinic triage line at [phone] for temp >100.4, uncontrolled nausea/vomiting/diarrhea/constipation, unrelieved pain, bleeding not relieved with pressure, dizziness, chest pain, shortness of breath, loss of consciousness, and any new or concerning symptoms.     Full Code     Diet    Follow this diet upon discharge: Regular     Discharge Medications   Discharge Medication List as of 9/19/2020 10:34 AM      START taking these medications    Details   calcium carbonate (TUMS) 500 MG chewable tablet Take 1 tablet (500 mg) by mouth 3 times daily, Disp-15 tablet,R-0, E-Prescribe      heparin lock flush 10 UNIT/ML SOLN injection 5-10 mLs by Intracatheter route every 24 hours, Disp-140 mL,R-0, E-PrescribeThis is for PICC line      magnesium oxide (MAG-OX) 400 (241.3 Mg) MG tablet Take 1 tablet (400 mg) by mouth 2 times daily, Disp-10 tablet,R-0, E-Prescribe      potassium chloride (KLOR-CON) 20 MEQ packet Take 40 mEq by mouth 2 times daily, Disp-18 packet,R-0, E-Prescribe      sodium chloride, PF, 0.9% PF flush 10-20 mLs by Intracatheter route every 24 hours, Disp-280 mL,R-0,  E-PrescribeThis is for PICC line         CONTINUE these medications which have CHANGED    Details   rivaroxaban ANTICOAGULANT (XARELTO ANTICOAGULANT) 20 MG TABS tablet Take 1 tablet (20 mg) by mouth daily (with dinner) PLEASE HOLD UNTIL TOLD TO RESUME BY CLINIC, Disp-30 tablet,R-3, Historical         CONTINUE these medications which have NOT CHANGED    Details   acetaminophen (TYLENOL) 325 MG tablet Take 2 tablets (650 mg) by mouth every 4 hours as needed for mild pain, Historical      acyclovir (ZOVIRAX) 400 MG tablet Take 1 tablet (400 mg) by mouth 2 times daily, Disp-60 tablet,R-1, E-Prescribe      allopurinol (ZYLOPRIM) 300 MG tablet Take 1 tablet (300 mg) by mouth daily, Disp-30 tablet,R-1, E-Prescribe      atorvastatin (LIPITOR) 40 MG tablet Take 1 tablet (40 mg) by mouth daily, Disp-30 tablet,R-0, E-Prescribe      carvedilol (COREG) 12.5 MG tablet Take 12.5 mg by mouth 2 times daily (with meals), Historical      fluconazole (DIFLUCAN) 200 MG tablet Take 1 tablet (200 mg) by mouth daily until seen in clinic, Disp-21 tablet,R-0, E-Prescribe      gabapentin (NEURONTIN) 300 MG capsule Take 2 tabs in the morning, 1 tab at bedtime, Disp-90 capsule,R-1, E-Prescribe      levofloxacin (LEVAQUIN) 250 MG tablet Take 1 tablet (250 mg) by mouth daily until seen in clinic, Disp-21 tablet,R-0, E-Prescribe      lisinopril (ZESTRIL) 20 MG tablet Take 1 tablet (20 mg) by mouth daily, Disp-30 tablet,R-1, E-Prescribe      !! morphine (MS CONTIN) 30 MG CR tablet Take 1 tablet (30 mg) by mouth every 12 hours in conjunction with a 30mg tablet for a total dosage of 45mg every 12 hours., Disp-60 tablet,R-0, Local Print      morphine (MSIR) 15 MG IR tablet Take 1 tablet (15 mg) by mouth every 4 hours as needed for moderate to severe pain, Disp-15 tablet,R-0, Local Print      omeprazole (PRILOSEC) 40 MG DR capsule Take 1 capsule (40 mg) by mouth daily, Disp-30 capsule,R-1, E-Prescribe      prednisoLONE acetate (PRED FORTE) 1 %  ophthalmic suspension Place 2 drops into both eyes 4 times daily until 9/4, Disp-1 Bottle,R-0, No Print OutOk to send home bottle from bedside      senna-docusate (SENOKOT-S/PERICOLACE) 8.6-50 MG tablet Take 1-2 tablets by mouth 2 times daily as needed for constipation, Disp-10 tablet,R-0, E-Prescribe      metoclopramide (REGLAN) 10 MG tablet Take 1 tablet (10 mg) by mouth every 6 hours as needed (hiccups), Disp-10 tablet,R-0, E-Prescribe      !! morphine (MS CONTIN) 15 MG CR tablet Take 1 tablet (15 mg) by mouth every 12 hours in conjunction with a 30mg tablet for a total dosage of 45mg every 12 hours., Disp-60 tablet,R-0, Local Print      ondansetron (ZOFRAN) 4 MG tablet Take 1 tablet (4 mg) by mouth every 8 hours as needed for nausea, Disp-30 tablet,R-0, E-Prescribe      polyethylene glycol (MIRALAX) 17 g packet Take 1 packet by mouth daily as needed for constipation, Historical       !! - Potential duplicate medications found. Please discuss with provider.      STOP taking these medications       dexamethasone (DECADRON) 4 MG tablet Comments:   Reason for Stopping:             Allergies   No Known Allergies  Data   Most Recent 3 CBC's:  Recent Labs   Lab Test 09/19/20 0625 09/18/20 0034 09/14/20   WBC 9.3 7.8 3.4*   HGB 7.2* 7.8* 8.6*   MCV 91 88 89   PLT 21* 17* 17*      Most Recent 3 BMP's:  Recent Labs   Lab Test 09/19/20 0625 09/18/20  1146 09/18/20  0034 09/17/20 09/14/20     --  137  --  141   POTASSIUM 4.0 3.9 2.8*  --  2.9*   CHLORIDE 104  --  100  --  98   CO2 28  --  29  --  30   BUN 13  --  14  --  18   CR 0.90  --  0.87  --  1.10   ANIONGAP 5  --  7  --  13   STACY 7.7*  --  7.1* 5.6* 6.00*   GLC 80  --  80  --  99.0     Most Recent 2 LFT's:  Recent Labs   Lab Test 09/19/20 0625 09/18/20  0504   AST 16 15   ALT 13 17   ALKPHOS 117 114   BILITOTAL 1.2 0.9     Most Recent INR's and Anticoagulation Dosing History:  Anticoagulation Dose History     Recent Dosing and Labs Latest Ref Rng & Units  3/18/2020 3/18/2020 3/19/2020 3/19/2020 8/1/2020 9/1/2020 9/18/2020    INR 0.86 - 1.14 1.63(H) 1.57(H) 1.35(H) 1.31(H) 1.29(H) 2.38(H) 1.15(H)        Most Recent 3 Troponin's:  Recent Labs   Lab Test 08/02/20  0107 08/01/20  2305   TROPI 0.042 0.043     Most Recent Cholesterol Panel:No lab results found.  Most Recent 6 Bacteria Isolates From Any Culture (See EPIC Reports for Culture Details):  Recent Labs   Lab Test 08/09/20  2105 08/02/20  0320 03/19/20  1005 03/14/20  1406 03/14/20  1402 03/11/20  0300   CULT No growth No growth No growth No growth No growth No growth     Most Recent TSH, T4 and A1c Labs:No lab results found.    ATTENDING ADDENDUM:    I have independently seen and evaluated the patient on September 19, 2020 and reviewed clinical, laboratory, and radiographic findings. I have discussed the plan with the team and agree with the attached note with the following edits:    Kenneth Tello is a 50 year old year old male, with r/r dlbcl, recent dhap and severe lyte abnl, got a picc and aggressive replacement, feels well.    Ph/E: Vitals reviewed. No distress. Oropharynx clear. Neck supple. Heart RRR. Lungs clear. Abdomen soft. No peripheral edema. No rash. Neuro nonfocal.     A&P: keep picc and discharge.       acyclovir  400 mg Oral BID     allopurinol  300 mg Oral Daily     carvedilol  12.5 mg Oral BID w/meals     gabapentin  300 mg Oral At Bedtime     gabapentin  600 mg Oral QAM AC     heparin lock flush  5-10 mL Intracatheter Q24H     lisinopril  20 mg Oral Daily     morphine  45 mg Oral Q12H     omeprazole  40 mg Oral Daily     [Held by provider] rivaroxaban ANTICOAGULANT  20 mg Oral Daily with supper     sodium chloride (PF)  3 mL Intracatheter Q8H       Hubert Helm MD

## 2020-09-19 NOTE — PLAN OF CARE
Time: 8381-7290    Reason for Admission: Hypocalcemia, hypomagnesemia, and hypokalemia.    Activity: Independent    Neuro: A&O x4. Calm and cooperative.     GI/: Voiding spontaneously without difficulty.     Diet: Regular, tolerating well.     Incisions/Drains: None    IV Access: R PICC infusing D5NS with 20mEq K at 50mL/hr.     Labs: Last calcium 4.1, replacement given.    Vitals: VSS on RA    Pain: Pt denied any pain or nausea this shift.     New changes this shift: None    Plan: Possible discharge to home tomorrow pending electrolyte stability. Continue with plan of care.

## 2020-09-21 NOTE — TELEPHONE ENCOUNTER
"    Per Nicole: If symptomatic then transfuse locally, if not symptomatic ok to monitor. Hold anticoagulation as plt <50. Called pt and he state he feels \"good\", denied sob or fatigue. Stated he did start vomiting last week, some days more than others. Cannot pinpoint why or when he needs to vomit. Denied precipitating nausea or heartburn. Unsure what is in his pill box as his daughter sets it up for him. Has diarrhea at times but not always consistent with vomiting either.    Advised pt to stay hydrated, continue to monitor vomiting and write down when he does, what he ate beforehand and whether any symptoms came on suddenly such as heartburn, nausea, gas, abd cramping. Continue omeprazole 20 mg daily and nausea meds as needed. He verbalized understanding and will continue to hold Xarelto. Next lab draw Wed 9/23.  "

## 2020-09-21 NOTE — TELEPHONE ENCOUNTER
DATE:  9/21/2020   TIME OF RECEIPT FROM LAB:  12:08 PM ([facility] [place])  LAB TEST:  hgb 7.4, plt 41  RESULTS GIVEN WITH READ-BACK TO (PROVIDER):  AVERY CHAUDHARI  TIME LAB VALUE REPORTED TO PROVIDER:   Paged 12:09 PM

## 2020-09-21 NOTE — PROGRESS NOTES
Medical Opinion paperwork completed, checked for accuracy, signed and faxed to [facility] @ [phone]. A copy was made, sent to scanning and original mailed to patient at home address.    Successful transmission verified in Right Fax.      Brooke Vivas CMA

## 2020-09-21 NOTE — PROGRESS NOTES
Patient discharged on 9/19/20, please follow up per TCM workflow.    Jo Bartholomew, [facility]      Kenneth Tello has appointment with [facility] on Thursday, 9/24. Called he is aware of the appointment. Had labs earlier this week in [place],. Some results sent. Writer will call for the rest.     Still having some problems with bowels. Will discuss with doctor tomorrow. Appreciated writer was not calling him with news he had to go to the ER.     Heather Campos RN OCN   Care Coordinator [employer]   Phone [phone]   Pager [phone]

## 2020-09-24 NOTE — LETTER
"    9/24/2020         RE: Kenneth Tello  [address]  [place] [address]        Dear Colleague,    Thank you for referring your patient, Kenneth Tello, to the [facility]. Please see a copy of my visit note below.    Kenneth Tello is a 50 year old male who is being evaluated via a billable video visit.      The patient has been notified of following:     \"This video visit will be conducted via a call between you and your physician/provider. We have found that certain health care needs can be provided without the need for an in-person physical exam.  This service lets us provide the care you need with a video conversation.  If a prescription is necessary we can send it directly to your pharmacy.  If lab work is needed we can place an order for that and you can then stop by our lab to have the test done at a later time.    Video visits are billed at different rates depending on your insurance coverage.  Please reach out to your insurance provider with any questions.    If during the course of the call the physician/provider feels a video visit is not appropriate, you will not be charged for this service.\"    Patient has given verbal consent for Video visit? Yes  How would you like to obtain your AVS? MyChart  If you are dropped from the video visit, the video invite should be resent to: Send to e-mail at: [email or web address]  Will anyone else be joining your video visit? No      I have reviewed and updated the patient's allergies and medication list.    Concerns: No new concerns.   Refills: None needed.      Vitals - Patient Reported  Weight (Patient Reported): 86.2 kg (190 lb)  Height (Patient Reported): 175.3 cm (5' 9\")  BMI (Based on Pt Reported Ht/Wt): 28.06  Pain Score: No Pain (0)    Jo Millard CMA    Video-Visit Details    Type of service:  Video Visit    Originating Location (pt. Location): Home    Distant Location (provider location):  [facility] " CANCER CLINIC     Platform used for Video Visit: Vesna      REASON FOR VISIT:  Management of diffuse large B cell lymphoma (DLBCL)    HISTORY OF PRESENT ILLNESS:  Mr. Tello is a 50 year old man with DLBCL.  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  DLBCL-IPI was high (stage, extranodal sites, LDH) and clinical stage IV with extranodal involvement.  He was started on R-CHOP in 03/2020 with IT methotrexate planned with each cycle for CNS prophylaxis.  Medical history also notable for A fib on anticoagulation, myocardial infarction, and stroke in 2017.  Course has been complicated by constipation and worsening abdominal pain in 08/2020 after completion of treatment.  Imaging demonstrated enlarging mass invading the pancreas and spleen.  Biopsy confirmed refractory DLBCL.  He received salvage chemotherapy with 1 cycle of R-ICE that was complicated by neurotoxicity that resolved and then 1 cycle of R-DHAP complicated by cisplatin-associated EMILY and electrolyte problems that are improving.  PET/CT after R-ICE x 1 and R-DHAP x 1 showed a mixed response.  We have looked into it, and he is not currently a candidate for any available clinical trials.  Visit for ongoing management.    Energy level and appetite are best they have been in months.  He is up and around.  No fevers, chills, or night sweats.  No headache, vision changes, no new focal weakness or sensory changes aside from longstanding neuropathy.  No dyspnea, cough, or chest pain.  Ongoing issues with constipation/diarrhea that he is managing, currently constipation.  Some rectal discomfort yesterday that has resolved.  No melena or hematochezia.  No new urinary issues.  No lumps or bumps.    REVIEW OF SYSTEMS:  A complete review of systems was negative other  than noted.    MEDICATIONS:  Current Outpatient Medications   Medication     acetaminophen (TYLENOL) 325 MG tablet     allopurinol (ZYLOPRIM) 300 MG tablet     atorvastatin (LIPITOR) 40 MG tablet     calcium carbonate (TUMS) 500 MG chewable tablet     carvedilol (COREG) 12.5 MG tablet     gabapentin (NEURONTIN) 300 MG capsule     heparin lock flush 10 UNIT/ML SOLN injection     lisinopril (ZESTRIL) 20 MG tablet     magnesium oxide (MAG-OX) 400 (241.3 Mg) MG tablet     morphine (MS CONTIN) 15 MG CR tablet     morphine (MSIR) 15 MG IR tablet     omeprazole (PRILOSEC) 40 MG DR capsule     ondansetron (ZOFRAN) 4 MG tablet     polyethylene glycol (MIRALAX) 17 g packet     prednisoLONE acetate (PRED FORTE) 1 % ophthalmic suspension     rivaroxaban ANTICOAGULANT (XARELTO ANTICOAGULANT) 20 MG TABS tablet     senna-docusate (SENOKOT-S/PERICOLACE) 8.6-50 MG tablet     sodium chloride, PF, 0.9% PF flush     acyclovir (ZOVIRAX) 400 MG tablet     No current facility-administered medications for this visit.      PHYSICAL EXAMINATION:  There were no vitals taken for this visit.  Wt Readings from Last 5 Encounters:   09/19/20 86.8 kg (191 lb 4.8 oz)   09/03/20 97.5 kg (215 lb)   09/02/20 96.8 kg (213 lb 6.4 oz)   08/28/20 87.7 kg (193 lb 4.8 oz)   08/11/20 89.6 kg (197 lb 8 oz)     General: Well appearing.  HEENT: Sclerae anicteric.  Lungs: Breathing comfortably. No cough.  MSK: Grossly normal movement.  Neuro: Grossly non-focal.  Skin/access: Normal skin tone.  Psych: Alert and oriented. No distress.  Performance status: ECOG 1    The rest of a comprehensive physical examination is deferred due to PHE (public health emergency) video visit restrictions    LABORATORY DATA:  Recent Labs   Lab Test 09/19/20  0625 09/18/20  0034 09/14/20 05/20/20  0716  04/29/20  1053   WBC 9.3 7.8 3.4*   < > 7.0   < >  --    HGB 7.2* 7.8* 8.6*   < > 9.1*   < >  --    PLT 21* 17* 17*   < > 174   < >  --    ANEU 7.8 6.6 2.72   < > 5.7  --   --    ALCHRISTIANO  0.4* 0.3* 0.20*   < > 0.4*  --   --    RETICABSCT  --   --   --   --  105.5*  --  139.6*    < > = values in this interval not displayed.     Recent Labs   Lab Test 09/21/20 09/19/20 0625 09/18/20  1146 09/18/20  0034 09/17/20 09/14/20 09/08/20 09/01/20 0611 08/28/20  0907   NA  --  136  --  137  --  141   < > 134*   < > 141   < > 142   POTASSIUM  --  4.0 3.9 2.8*  --  2.9*   < > 2.5*   < > 4.3   < > 3.5   CHLORIDE  --  104  --  100  --  98   < > 87*   < > 113*   < > 107   CO2  --  28  --  29  --  30   < > 34*   < > 22   < > 29   BUN  --  13  --  14  --  18   < > 43*   < > 6*   < > 6*   CR  --  0.90  --  0.87  --  1.10   < > 1.74*   < > 0.46*   < > 0.62*   STACY  --  7.7*  --  7.1* 5.6* 6.00*   < > 7.70*   < > 7.6*   < > 8.4*   MAG  --  1.8 2.5* 1.5* 1.0*  --   --   --   --  1.9  --   --    PHOS 3.0 2.8  --  3.6  --   --   --  4.7   < > 2.8  --   --    URIC  --   --   --  2.9*  --   --   --  6.4  --  1.6*  --   --    LDH  --   --   --  400*  --   --   --   --   --  196  --  256*    < > = values in this interval not displayed.     Recent Labs   Lab Test 09/19/20 0625 09/18/20  0504 09/18/20 0034 09/01/20 0611 08/01/20  2143   AST 16 15 19   < >  --    < > 13   ALT 13 17 16   < >  --    < > 15   ALKPHOS 117 114 118   < >  --    < > 98   BILITOTAL 1.2 0.9 0.9   < >  --    < > 1.2   INR  --   --  1.15*  --  2.38*  --  1.29*    < > = values in this interval not displayed.     Outside labs reviewed by me - 9/23 labs Cr 0.79 Mag 1.2 and Calcium improving, WBC 13 Hb 8 plt 106 otherwise unremarkable    IMAGING DATA:  9/15/2020 PET/CT reviewed by me showed improvement of dominant retroperitoneal mass in the upper abdomen but worsening splenomegaly and spleenic nodules, lung opacities/nodules, abdominal soft tissue haziness, overall consistent with a mixed response    IMPRESSION AND PLAN:  Mr. Tello is a 50 year old man with DLBCL.    The PET/CT after 1 cycle R-ICE and 1 cycle R-DHAP shows a mixed response.  He has  also had difficulty tolerating chemotherapy although is feeling much better at this point.  We discussed that his lymphoma remains chemo-refractory and I don't think that more conventional salvage chemo is likely to lead to an adequate response for autoBMT consolidation.  I have discussed his situation with our lymphoma group, and we discussed other options for further therapy.  He is not eligible for any of our clinical trials at this time.  His blood counts and kidney function are recovering.  Therefore, I would favor polatuzumab, bendamustine, and rituximab (nell-BR) that has led to ~40-50% CR rate in refractory DLBCL with good tolerability.  As we discussed previously other options like ibrutinib or lenalidomide/rituximab are generally less impressive with 20-40% response rates and fewer complete remissions for relapsed/refractory non-GCB DLBCL.      We discussed the details of nell-BR including the typical schedule and duration and palliative nature of treatment.  We discussed expected side effects including but not limited to fatigue, bone marrow suppression as well as potential toxicities that may include allergic reaction, nausea, mucositis, infection, bleeding, damage to heart/lung/liver/kidneys, rash, and neuropathy, as well as possible long term side effects including myelodysplasia or acute leukemia.  I stressed that complications can be fatal, but that the greatest risk is lymphoma.  We provided detailed written information about each of the agents.  We also discussed the possibility of poor response or relapse of disease despite the planned therapy.  Clinical trials or other salvage treatment could be considered in the future.  They asked good questions, and agree with the plan.    He has no obvious active infectious issues.  He will stop fluconazole and levofloxacin.  He will start Bactrim for PCP/PJP prophylaxis and continue acyclovir.  He will get a flu shot this week.  We will defer pneumococcal or  other vaccination after he has competed all planned treatment.    Cisplatin associated electrolyte issues are improving.  He will stop potassium and continue oral magnesium and calcium+D for now.  We will try to arrange some IV magnesium as well until it has stably improved.    He will continue rivaroxaban for A fib and history of CVA  while platelets are >50,000/mcL and hold when platelets are <50,000/mcL or for any procedures.    He will continue to work with Dr. Jeter for other medical issues.    He will continue regular lab checks and electrolytes/transfusions.  We will plan to start nell-BR next week with weekly virtual REGINA visit until cycle 2 - 3 weeks later.  I reminded him to contact us if questions, concerns, or new issues between visits.    Video visit start time: 10:55 AM  Video visit end time: 11:26 AM  Video visit duration: 31 minutes    Avery Cary MD, PhD  Attending Physician, [facility]   of Medicine, [facility]  Division of Hematology, Oncology, and Transplantation  [phone] [facility]

## 2020-09-24 NOTE — PROGRESS NOTES
"Kenneth Tello is a 50 year old male who is being evaluated via a billable video visit.      The patient has been notified of following:     \"This video visit will be conducted via a call between you and your physician/provider. We have found that certain health care needs can be provided without the need for an in-person physical exam.  This service lets us provide the care you need with a video conversation.  If a prescription is necessary we can send it directly to your pharmacy.  If lab work is needed we can place an order for that and you can then stop by our lab to have the test done at a later time.    Video visits are billed at different rates depending on your insurance coverage.  Please reach out to your insurance provider with any questions.    If during the course of the call the physician/provider feels a video visit is not appropriate, you will not be charged for this service.\"    Patient has given verbal consent for Video visit? Yes  How would you like to obtain your AVS? MyChart  If you are dropped from the video visit, the video invite should be resent to: Send to e-mail at: [email or web address]  Will anyone else be joining your video visit? No      I have reviewed and updated the patient's allergies and medication list.    Concerns: No new concerns.   Refills: None needed.      Vitals - Patient Reported  Weight (Patient Reported): 86.2 kg (190 lb)  Height (Patient Reported): 175.3 cm (5' 9\")  BMI (Based on Pt Reported Ht/Wt): 28.06  Pain Score: No Pain (0)    Jo Millard CMA    Video-Visit Details    Type of service:  Video Visit    Originating Location (pt. Location): Home    Distant Location (provider location):  [facility]IN[facility]     Platform used for Video Visit: [employer]      REASON FOR VISIT:  Management of diffuse large B cell lymphoma (DLBCL)    HISTORY OF PRESENT ILLNESS:  Mr. Tello is a 50 year old man with DLBCL.  To summarize his course, he presented in early 03/2020 " with left upper abdominal pain, fatigue, and >10% weight loss, was found to have a samaria-pancreatic mass, and workup confirmed DLBCL, non-GCB type, FISH negative for double/triple hit changes.  DLBCL-IPI was high (stage, extranodal sites, LDH) and clinical stage IV with extranodal involvement.  He was started on R-CHOP in 03/2020 with IT methotrexate planned with each cycle for CNS prophylaxis.  Medical history also notable for A fib on anticoagulation, myocardial infarction, and stroke in 2017.  Course has been complicated by constipation and worsening abdominal pain in 08/2020 after completion of treatment.  Imaging demonstrated enlarging mass invading the pancreas and spleen.  Biopsy confirmed refractory DLBCL.  He received salvage chemotherapy with 1 cycle of R-ICE that was complicated by neurotoxicity that resolved and then 1 cycle of R-DHAP complicated by cisplatin-associated EMILY and electrolyte problems that are improving.  PET/CT after R-ICE x 1 and R-DHAP x 1 showed a mixed response.  We have looked into it, and he is not currently a candidate for any available clinical trials.  Visit for ongoing management.    Energy level and appetite are best they have been in months.  He is up and around.  No fevers, chills, or night sweats.  No headache, vision changes, no new focal weakness or sensory changes aside from longstanding neuropathy.  No dyspnea, cough, or chest pain.  Ongoing issues with constipation/diarrhea that he is managing, currently constipation.  Some rectal discomfort yesterday that has resolved.  No melena or hematochezia.  No new urinary issues.  No lumps or bumps.    REVIEW OF SYSTEMS:  A complete review of systems was negative other than noted.    MEDICATIONS:  Current Outpatient Medications   Medication     acetaminophen (TYLENOL) 325 MG tablet     allopurinol (ZYLOPRIM) 300 MG tablet     atorvastatin (LIPITOR) 40 MG tablet     calcium carbonate (TUMS) 500 MG chewable tablet     carvedilol  (COREG) 12.5 MG tablet     gabapentin (NEURONTIN) 300 MG capsule     heparin lock flush 10 UNIT/ML SOLN injection     lisinopril (ZESTRIL) 20 MG tablet     magnesium oxide (MAG-OX) 400 (241.3 Mg) MG tablet     morphine (MS CONTIN) 15 MG CR tablet     morphine (MSIR) 15 MG IR tablet     omeprazole (PRILOSEC) 40 MG DR capsule     ondansetron (ZOFRAN) 4 MG tablet     polyethylene glycol (MIRALAX) 17 g packet     prednisoLONE acetate (PRED FORTE) 1 % ophthalmic suspension     rivaroxaban ANTICOAGULANT (XARELTO ANTICOAGULANT) 20 MG TABS tablet     senna-docusate (SENOKOT-S/PERICOLACE) 8.6-50 MG tablet     sodium chloride, PF, 0.9% PF flush     acyclovir (ZOVIRAX) 400 MG tablet     No current facility-administered medications for this visit.      PHYSICAL EXAMINATION:  There were no vitals taken for this visit.  Wt Readings from Last 5 Encounters:   09/19/20 86.8 kg (191 lb 4.8 oz)   09/03/20 97.5 kg (215 lb)   09/02/20 96.8 kg (213 lb 6.4 oz)   08/28/20 87.7 kg (193 lb 4.8 oz)   08/11/20 89.6 kg (197 lb 8 oz)     General: Well appearing.  HEENT: Sclerae anicteric.  Lungs: Breathing comfortably. No cough.  MSK: Grossly normal movement.  Neuro: Grossly non-focal.  Skin/access: Normal skin tone.  Psych: Alert and oriented. No distress.  Performance status: ECOG 1    The rest of a comprehensive physical examination is deferred due to PHE (public health emergency) video visit restrictions    LABORATORY DATA:  Recent Labs   Lab Test 09/19/20  0625 09/18/20  0034 09/14/20 05/20/20  0716  04/29/20  1053   WBC 9.3 7.8 3.4*   < > 7.0   < >  --    HGB 7.2* 7.8* 8.6*   < > 9.1*   < >  --    PLT 21* 17* 17*   < > 174   < >  --    ANEU 7.8 6.6 2.72   < > 5.7  --   --    ALYM 0.4* 0.3* 0.20*   < > 0.4*  --   --    RETICABSCT  --   --   --   --  105.5*  --  139.6*    < > = values in this interval not displayed.     Recent Labs   Lab Test 09/21/20 09/19/20  0625 09/18/20  1146 09/18/20  0034 09/17/20 09/14/20 09/08/20 09/01/20  0611   08/28/20  0907   NA  --  136  --  137  --  141   < > 134*   < > 141   < > 142   POTASSIUM  --  4.0 3.9 2.8*  --  2.9*   < > 2.5*   < > 4.3   < > 3.5   CHLORIDE  --  104  --  100  --  98   < > 87*   < > 113*   < > 107   CO2  --  28  --  29  --  30   < > 34*   < > 22   < > 29   BUN  --  13  --  14  --  18   < > 43*   < > 6*   < > 6*   CR  --  0.90  --  0.87  --  1.10   < > 1.74*   < > 0.46*   < > 0.62*   STACY  --  7.7*  --  7.1* 5.6* 6.00*   < > 7.70*   < > 7.6*   < > 8.4*   MAG  --  1.8 2.5* 1.5* 1.0*  --   --   --   --  1.9  --   --    PHOS 3.0 2.8  --  3.6  --   --   --  4.7   < > 2.8  --   --    URIC  --   --   --  2.9*  --   --   --  6.4  --  1.6*  --   --    LDH  --   --   --  400*  --   --   --   --   --  196  --  256*    < > = values in this interval not displayed.     Recent Labs   Lab Test 09/19/20  0625 09/18/20  0504 09/18/20  0034  09/01/20  0611  08/01/20  2143   AST 16 15 19   < >  --    < > 13   ALT 13 17 16   < >  --    < > 15   ALKPHOS 117 114 118   < >  --    < > 98   BILITOTAL 1.2 0.9 0.9   < >  --    < > 1.2   INR  --   --  1.15*  --  2.38*  --  1.29*    < > = values in this interval not displayed.     Outside labs reviewed by me - 9/23 labs Cr 0.79 Mag 1.2 and Calcium improving, WBC 13 Hb 8 plt 106 otherwise unremarkable    IMAGING DATA:  9/15/2020 PET/CT reviewed by me showed improvement of dominant retroperitoneal mass in the upper abdomen but worsening splenomegaly and spleenic nodules, lung opacities/nodules, abdominal soft tissue haziness, overall consistent with a mixed response    IMPRESSION AND PLAN:  Mr. Tello is a 50 year old man with DLBCL.    The PET/CT after 1 cycle R-ICE and 1 cycle R-DHAP shows a mixed response.  He has also had difficulty tolerating chemotherapy although is feeling much better at this point.  We discussed that his lymphoma remains chemo-refractory and I don't think that more conventional salvage chemo is likely to lead to an adequate response for autoBMT  consolidation.  I have discussed his situation with our lymphoma group, and we discussed other options for further therapy.  He is not eligible for any of our clinical trials at this time.  His blood counts and kidney function are recovering.  Therefore, I would favor polatuzumab, bendamustine, and rituximab (nell-BR) that has led to ~40-50% CR rate in refractory DLBCL with good tolerability.  As we discussed previously other options like ibrutinib or lenalidomide/rituximab are generally less impressive with 20-40% response rates and fewer complete remissions for relapsed/refractory non-GCB DLBCL.      We discussed the details of nell-BR including the typical schedule and duration and palliative nature of treatment.  We discussed expected side effects including but not limited to fatigue, bone marrow suppression as well as potential toxicities that may include allergic reaction, nausea, mucositis, infection, bleeding, damage to heart/lung/liver/kidneys, rash, and neuropathy, as well as possible long term side effects including myelodysplasia or acute leukemia.  I stressed that complications can be fatal, but that the greatest risk is lymphoma.  We provided detailed written information about each of the agents.  We also discussed the possibility of poor response or relapse of disease despite the planned therapy.  Clinical trials or other salvage treatment could be considered in the future.  They asked good questions, and agree with the plan.    He has no obvious active infectious issues.  He will stop fluconazole and levofloxacin.  He will start Bactrim for PCP/PJP prophylaxis and continue acyclovir.  He will get a flu shot this week.  We will defer pneumococcal or other vaccination after he has competed all planned treatment.    Cisplatin associated electrolyte issues are improving.  He will stop potassium and continue oral magnesium and calcium+D for now.  We will try to arrange some IV magnesium as well until it  has stably improved.    He will continue rivaroxaban for A fib and history of CVA  while platelets are >50,000/mcL and hold when platelets are <50,000/mcL or for any procedures.    He will continue to work with Dr. Jeter for other medical issues.    He will continue regular lab checks and electrolytes/transfusions.  We will plan to start nell-BR next week with weekly virtual REGINA visit until cycle 2 - 3 weeks later.  I reminded him to contact us if questions, concerns, or new issues between visits.    Video visit start time: 10:55 AM  Video visit end time: 11:26 AM  Video visit duration: 31 minutes    Avery Cary MD, PhD  Attending Physician, [facility]   of Medicine, [facility]  Division of Hematology, Oncology, and Transplantation  [phone] [facility]

## 2020-09-30 NOTE — TELEPHONE ENCOUNTER
PA Initiation    Medication: Lorazepam - Submitted  Insurance Company: [facility] - Phone [phone] Fax [phone]  Pharmacy Filling the Rx:    Filling Pharmacy Phone:    Filling Pharmacy Fax:    Start Date: 9/30/2020        Breanna Whalen CPhT  [facility]  Oncology Pharmacy Liaison  [email or web address]  Phone: [phone]  Fax: [phone]

## 2020-09-30 NOTE — TELEPHONE ENCOUNTER
PA Initiation    Medication: Ondansetron - Submitted  Insurance Company: [facility] - Phone [phone] Fax [phone]  Pharmacy Filling the Rx:    Filling Pharmacy Phone:    Filling Pharmacy Fax:    Start Date: 9/30/2020        Breanna Whalen CPhT  [facility]  Oncology Pharmacy Liaison  [email or web address]  Phone: [phone]  Fax: [phone]

## 2020-09-30 NOTE — PROGRESS NOTES
Writer called Kenneth to make sure he was aware and Ok with the plan. Writer finally received labs drawn earlier this week. Osei faxed them but Kareem is not receiving them. They emailed to triage and they forwarded them to writer. Magnesium is low at 1.2 Other labs stable    Unable to replace locally due to appointments in [facility]. Instructed Kenneth to take oral replacements tonight. Two doses. He said he just picked up his replacement yesterday and had not taken any today. Past conversations with Kenneth he reports he has been on Magnesium. Kenneth could not say. Not a good historian with med's.     Kenneth is on route to the [facility] for his new chemotherapy regimen. Wife bringing him, will stay locally overnight for early am appointments.     Polatuzumab+BR    With R-DHAP regimen his electrolytes fell, requiring replacement and hospitalization. Kenneth is hopeful this one will be easier on him.  He has had Rituxan in the past. Educated on Bentamustine and Polatuzumab. Possible infusion reaction with each of the drugs. The Polatuzumab is given over 90 minutes the first time and them a 90 minutes observation period needed before starting other drugs. This time frame can be decreased for  subsequent cycles if tolerated. Side effects including but not limited to, lowered blood counts, fatigue, neuropathy, infection along with others GI s/e. We will be monitoring his labs and him frequently.    First day is a long infusion day. Second day is a shorter day with only Bentamustine.     Writer will continue to follow closely.

## 2020-09-30 NOTE — PROGRESS NOTES
"Kenneth Tello is a 50 year old male who is being evaluated via a billable video visit.      The patient has been notified of following:     \"This video visit will be conducted via a call between you and your physician/provider. We have found that certain health care needs can be provided without the need for an in-person physical exam.  This service lets us provide the care you need with a video conversation.  If a prescription is necessary we can send it directly to your pharmacy.  If lab work is needed we can place an order for that and you can then stop by our lab to have the test done at a later time.    Video visits are billed at different rates depending on your insurance coverage.  Please reach out to your insurance provider with any questions.    If during the course of the call the physician/provider feels a video visit is not appropriate, you will not be charged for this service.\"    Patient has given verbal consent for Video visit? Yes  How would you like to obtain your AVS? MyChart  If you are dropped from the video visit, the video invite should be resent to: Text to cell phone: [phone]  Will anyone else be joining your video visit? No      I have reviewed and updated the patient's allergies and medication list.    Concerns: No new concerns.   Refills: None needed.     Vitals - Patient Reported  Weight (Patient Reported): 86.6 kg (191 lb)  Height (Patient Reported): 175.3 cm (5' 9\")  BMI (Based on Pt Reported Ht/Wt): 28.21  Pain Score: No Pain (0)    Jo Millard CMA    Video-Visit Details    Type of service:  Video Visit    Video Start Time: 7:18 am  Video End Time: 7:28 AM    Originating Location (pt. Location): Other [facility]    Distant Location (provider location):  [facility]     Platform used for Video Visit: Vesna Montgomery PA-C    Oncology/Hematology Visit Note  Oct 1, 2020    Reason for Visit: Follow up of diffuse large B cell lymphoma " (DLBCL)    History of Present Illness:   Mr. Tello is a 50 year old man with DLBCL.  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  DLBCL-IPI was high (stage, extranodal sites, LDH) and clinical stage IV with extranodal involvement.  He was started on R-CHOP in 03/2020 with IT methotrexate planned with each cycle for CNS prophylaxis.  Medical history also notable for A fib on anticoagulation, myocardial infarction, and stroke in 2017.  Course has been complicated by constipation and worsening abdominal pain in 08/2020 after completion of treatment.  Imaging demonstrated enlarging mass invading the pancreas and spleen.  Biopsy confirmed refractory DLBCL.  He received salvage chemotherapy with 1 cycle of R-ICE that was complicated by neurotoxicity that resolved and then 1 cycle of R-DHAP complicated by cisplatin-associated EMILY and electrolyte problems that are improving.  PET/CT after R-ICE x 1 and R-DHAP x 1 showed a mixed response.   10/1/2020- start polatuzumab, bendamustine, and rituximab    Interval History:  Kenneth Tello was met with over video for follow up. He is ready to start his new treatment.   - Energy level is overall good since he has had so much time off since his last chemo. Taking occasional naps, able to get things done around the house, sleeping well at night.   - Continues to note fluctuant bowel habits. He tends to alternate between constipation and diarrhea. Taking miralax and senna as needed. Notes mild stomach pain. No nausea. Oral intake is not as good when he is constipated, but otherwise he does fine.   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 No significant change. Would be interested in PT near his home town. No other focal weakness.   -  "Notes some mild pain with urination with the initiation of his stream. States this has been going on for \"a while\" and is unchanged. No unusual color, smell, frequency, urgency. No back pain.     Review of Systems:  Patient denies fevers, chills, night sweats, unexplained weight changes, headaches, dizziness, vision or hearing changes, new lumps or bumps, chest pain, shortness of breath, cough, abdominal pain, nausea, vomiting, changes to bowel or bladder, swelling of extremities, bleeding issues, or rash.    Current Outpatient Medications   Medication Sig Dispense Refill     acetaminophen (TYLENOL) 325 MG tablet Take 2 tablets (650 mg) by mouth every 4 hours as needed for mild pain       acyclovir (ZOVIRAX) 400 MG tablet Take 1 tablet (400 mg) by mouth 2 times daily 60 tablet 1     allopurinol (ZYLOPRIM) 300 MG tablet Take 1 tablet (300 mg) by mouth daily 30 tablet 0     atorvastatin (LIPITOR) 40 MG tablet Take 1 tablet (40 mg) by mouth daily 30 tablet 0     calcium carb-cholecalciferol (OS-STACY) 500-200 MG-UNIT tablet Take 1 tablet by mouth 2 times daily (with meals) 60 tablet 0     calcium carbonate (TUMS) 500 MG chewable tablet Take 1 tablet (500 mg) by mouth 3 times daily 15 tablet 0     carvedilol (COREG) 12.5 MG tablet Take 12.5 mg by mouth 2 times daily (with meals)       gabapentin (NEURONTIN) 300 MG capsule Take 2 tabs in the morning, 1 tab at bedtime 90 capsule 1     heparin lock flush 10 UNIT/ML SOLN injection 5-10 mLs by Intracatheter route every 24 hours 140 mL 0     lisinopril (ZESTRIL) 20 MG tablet Take 1 tablet (20 mg) by mouth daily 30 tablet 1     magnesium oxide (MAG-OX) 400 (241.3 Mg) MG tablet Take 1 tablet (400 mg) by mouth 2 times daily 60 tablet 0     morphine (MS CONTIN) 15 MG CR tablet Take 1 tablet (15 mg) by mouth every 12 hours in conjunction with a 30mg tablet for a total dosage of 45mg every 12 hours. 60 tablet 0     morphine (MSIR) 15 MG IR tablet Take 1 tablet (15 mg) by mouth every " 4 hours as needed for moderate to severe pain 15 tablet 0     omeprazole (PRILOSEC) 40 MG DR capsule Take 1 capsule (40 mg) by mouth daily 30 capsule 1     ondansetron (ZOFRAN) 4 MG tablet Take 1 tablet (4 mg) by mouth every 8 hours as needed for nausea 30 tablet 0     polyethylene glycol (MIRALAX) 17 g packet Take 1 packet by mouth daily as needed for constipation       prednisoLONE acetate (PRED FORTE) 1 % ophthalmic suspension Place 2 drops into both eyes 4 times daily until 9/4 1 Bottle 0     rivaroxaban ANTICOAGULANT (XARELTO ANTICOAGULANT) 20 MG TABS tablet Take 1 tablet (20 mg) by mouth daily (with dinner) PLEASE HOLD UNTIL TOLD TO RESUME BY CLINIC 30 tablet 3     senna-docusate (SENOKOT-S/PERICOLACE) 8.6-50 MG tablet Take 1-2 tablets by mouth 2 times daily as needed for constipation 10 tablet 0     sodium chloride, PF, 0.9% PF flush 10-20 mLs by Intracatheter route every 24 hours 280 mL 0       Physical Examination:  There were no vitals taken for this visit.  Wt Readings from Last 10 Encounters:   09/19/20 86.8 kg (191 lb 4.8 oz)   09/03/20 97.5 kg (215 lb)   09/02/20 96.8 kg (213 lb 6.4 oz)   08/28/20 87.7 kg (193 lb 4.8 oz)   08/11/20 89.6 kg (197 lb 8 oz)   08/10/20 92 kg (202 lb 12.8 oz)   08/05/20 91.9 kg (202 lb 9.6 oz)   07/31/20 90.3 kg (199 lb)   07/29/20 89.8 kg (198 lb)   07/02/20 96.4 kg (212 lb 9.6 oz)     Video physical exam  General: Patient appears well in no acute distress. Normal body habitus.  Skin: No visualized rash or lesions on visualized skin  Eyes: EOMI, no erythema, sclera icterus or discharge noted  Resp: Appears to be breathing comfortably without accessory muscle usage, speaking in full sentences, no cough  MSK: Appears to have normal range of motion based on visualized movements  Neurologic: No apparent tremors, facial movements symmetric  Psych: affect normal, alert and oriented    The rest of a comprehensive physical examination is deferred due to PHE (public health  emergency) video restrictions    Laboratory Data:  Results for RICKEY WATKINS (MRN [id number]) as of 10/1/2020 07:10   Ref. Range 9/29/2020 00:00   Sodium Latest Units: mmol/L 138   Potassium Latest Units: mmol/L 3.3   Chloride Latest Units: mmol/L 102   Carbon Dioxide Latest Units: mmol/L 27   Urea Nitrogen Latest Units: mg/dL 14   Creatinine Latest Units: mg/dL 0.77   GFR Estimate Latest Units: ml/min/1.73m2 114   Calcium Latest Units: mg/dL 8.00   Anion Gap Latest Units: mmol/L 9   Glucose Latest Ref Range: 70 - 99 mg/dL 85.0   WBC Latest Units: 10^9/L 7.2   Hemoglobin Latest Ref Range: 13.3 - 17.7 g/dL 7.8 (A)   Hematocrit Latest Units: % 24.5   Platelet Count Latest Units: 10^9/L 102   RBC Count Latest Units: 10^12/L 2.65   MCV Latest Units: fl 93   MCH Latest Units: pg 29.4   MCHC Latest Units: g/dL 31.8   RDW Latest Units: % 21.6         Assessment and Plan:   ONC  DLBCL  - PET/CT after 1 cycle R-ICE and 1 cycle R-DHAP shows a mixed response. His lymphoma remains chemo-refractory - unlikely that more conventional salvage chemo is likely to lead to an adequate response for autoBMT consolidation.  He is not eligible for any of our clinical trials at this time.    - Plan to start polatuzumab, bendamustine, and rituximab (nell-BR) today. Plan for weekly REGINA visits until cycle 2.    -  Clinical trials or other salvage treatment could be considered in the future.  They asked good questions, and agree with the plan.    HEME  Cytopenias- treatments contributing. Will check weekly. Will transfuse for hgb <7.5 or symptomatic, plt <10. Hopeful this can be arranged near his home. Will try for a blood transfusion for with today or tomorrow pending infusion availability.      ID  Off fluconazole and levofloxacin.   On Bactrim for PCP/PJP prophylaxis and acyclovir.    - We will defer pneumococcal or other vaccination after he has competed all planned treatment.     Nephro  Cisplatin associated electrolyte issues- will  continue to monitor weekly.   - On oral magnesium 2 per day (just started yesterday) and calcium+D (just picked up yesterday- unsure if taking). Will rx 20 meq PO KCl today x 7 days and replace K and Mg per protocol in infusion.     Cards  A fib with history of CVA.  Continue rivaroxaban  while platelets are >50,000/mcL and hold when platelets are <50,000/mcL or for any procedures.    MSK  Left knee weakness- residual from stroke. Will refer to PT    GI  Fluctuant bowel habits. Advised trial of daily metamucil. Uses senna and miralax prn for constipation.       Chronic pain with initiation of urination. UA 9/18 unremarkable and he reports no change in symptoms since collection. Advised to monitor and alert us if symptoms change.     Rosa Isela Montgomery PA-C  [facility]  [address], [place] [id number] [phone]

## 2020-10-01 NOTE — TELEPHONE ENCOUNTER
Prior Authorization Approval    Authorization Effective Date: 10/1/2020  Authorization Expiration Date: 10/1/2021  Medication: Lorazepam - APPROVED  Approved Dose/Quantity: 0.5/30  Reference #:     Insurance Company: [employer] [place] - Phone [phone] Fax [phone]  Expected CoPay:       CoPay Card Available:      Foundation Assistance Needed:    Which Pharmacy is filling the prescription (Not needed for infusion/clinic administered):    Pharmacy Notified:    Patient Notified:          Breanna Whalen CPhT  [facility]  Oncology Pharmacy Liaison  [email or web address]  Phone: [phone]  Fax: [phone]

## 2020-10-01 NOTE — TELEPHONE ENCOUNTER
Prior Authorization Approval    Authorization Effective Date: 10/1/2020  Authorization Expiration Date: 10/1/2021  Medication: Ondansetron - APPROVED  Approved Dose/Quantity:   Reference #:     Insurance Company: [employer] [place] - Phone [phone] Fax [phone]  Expected CoPay:       CoPay Card Available:      Foundation Assistance Needed:    Which Pharmacy is filling the prescription (Not needed for infusion/clinic administered):    Pharmacy Notified:    Patient Notified:          Breanna Whalen CPhT  [facility]  Oncology Pharmacy Liaison  [email or web address]  Phone: [phone]  Fax: [phone]

## 2020-10-01 NOTE — PATIENT INSTRUCTIONS
Contact Numbers  [facility]: [phone] (for symptom and scheduling needs)    Please call the [facility] Triage line if you experience a temperature greater than or equal to 100.4, shaking chills, have uncontrolled nausea, vomiting and/or diarrhea, dizziness, shortness of breath, chest pain, bleeding, unexplained bruising, or if you have any other new/concerning symptoms, questions or concerns.     If you are having any concerning symptoms or wish to speak to a provider before your next infusion visit, please call your care coordinator or triage to notify them so we can adequately serve you.     If you need a refill on a narcotic prescription or other medication, please call triage before your infusion appointment.          October 2020 Sunday Monday Tuesday Wednesday Thursday Friday Saturday                       1    [facility] LAB DRAW   6:45 AM   (15 min.)    [facility] LAB DRAW   [facility]    VIDEO VISIT RETURN   6:55 AM   (50 min.)   Rosa Isela Montgomery PA-C   [facility] ONC INFUSION 360   8:00 AM   (360 min.)   [facility] ONCOL[facility]Y INFUSION   [facility] 2    P ONC INFUSION 60   8:00 AM   (60 min.)   [facility] ONCOL[facility] INFUSION   [facility] 3       4     5     6     7     8    VIDEO VISIT RETURN   6:55 AM   (50 min.)   Rosa Isela Montgomery PA-C   [facility] 9     10       11     12     13     14    VIDEO VISIT RETURN   1:35 PM   (50 min.)   Kiki Kaplan PA-C   [facility] 15    [facility] LAB DRAW   6:30 AM   (15 min.)   [facility] DRAW   [facility] ONC INFUSION 360   7:00 AM   (360 min.)   [facility] ONCOLOGY INFUSION   [facility] 16    [facility] ONC INFUSION 60   9:30 AM   (60 min.)   [facility] ONCOL[facility]Y INFUSION   [facility] 17       18     19     20      21    VIDEO VISIT RETURN   7:45 AM   (30 min.)   Avery Cary MD   [facility] 22    P ONC INFUSION 360   8:00 AM   (360 min.)    ONCOLOGY INFUSION   [facility] 23    [facility] ONC INFUSION 60   7:00 AM   (60 min.)   [facility] ONCOLOGY INFUSION   [facility] 24       25     26     27     28     29     30     31 November 2020 Sunday Monday Tuesday Wednesday Thursday Friday Saturday   1     2     3     4     5     6     7       8     9     10     11     12     13     14       15     16     17     18     19     20     21       22     23     24     25     26     27     28       29     30                                              Lab Results:  Recent Results (from the past 12 hour(s))   CBC with platelets differential    Collection Time: 10/01/20  7:01 AM   Result Value Ref Range    WBC 7.1 4.0 - 11.0 10e9/L    RBC Count 2.41 (L) 4.4 - 5.9 10e12/L    Hemoglobin 7.2 (L) 13.3 - 17.7 g/dL    Hematocrit 22.3 (L) 40.0 - 53.0 %    MCV 93 78 - 100 fl    MCH 29.9 26.5 - 33.0 pg    MCHC 32.3 31.5 - 36.5 g/dL    RDW 21.9 (H) 10.0 - 15.0 %    Platelet Count 82 (L) 150 - 450 10e9/L    Diff Method Automated Method     % Neutrophils 83.1 %    % Lymphocytes 5.2 %    % Monocytes 10.8 %    % Eosinophils 0.1 %    % Basophils 0.4 %    % Immature Granulocytes 0.4 %    Nucleated RBCs 0 0 /100    Absolute Neutrophil 5.9 1.6 - 8.3 10e9/L    Absolute Lymphocytes 0.4 (L) 0.8 - 5.3 10e9/L    Absolute Monocytes 0.8 0.0 - 1.3 10e9/L    Absolute Eosinophils 0.0 0.0 - 0.7 10e9/L    Absolute Basophils 0.0 0.0 - 0.2 10e9/L    Abs Immature Granulocytes 0.0 0 - 0.4 10e9/L    Absolute Nucleated RBC 0.0    Comprehensive metabolic panel    Collection Time: 10/01/20  7:01 AM   Result Value Ref Range    Sodium 141 133 - 144 mmol/L    Potassium 3.1 (L) 3.4 - 5.3 mmol/L    Chloride 105 94 - 109 mmol/L    Carbon Dioxide 28 20 - 32 mmol/L    Anion Gap 8 3  - 14 mmol/L    Glucose 80 70 - 99 mg/dL    Urea Nitrogen 14 7 - 30 mg/dL    Creatinine 0.94 0.66 - 1.25 mg/dL    GFR Estimate >90 >60 mL/min/[1.73_m2]    GFR Estimate If Black >90 >60 mL/min/[1.73_m2]    Calcium 7.9 (L) 8.5 - 10.1 mg/dL    Bilirubin Total 0.7 0.2 - 1.3 mg/dL    Albumin 2.6 (L) 3.4 - 5.0 g/dL    Protein Total 5.8 (L) 6.8 - 8.8 g/dL    Alkaline Phosphatase 105 40 - 150 U/L    ALT 10 0 - 70 U/L    AST 11 0 - 45 U/L   Magnesium    Collection Time: 10/01/20  7:01 AM   Result Value Ref Range    Magnesium 1.4 (L) 1.6 - 2.3 mg/dL   Uric acid    Collection Time: 10/01/20  7:01 AM   Result Value Ref Range    Uric Acid 3.7 3.5 - 7.2 mg/dL

## 2020-10-01 NOTE — NURSING NOTE
Chief Complaint   Patient presents with     Blood Draw     dressing change needed; labs drawn via cvc by RN in lab     BP (!) 148/81 (BP Location: Left arm, Patient Position: Chair, Cuff Size: Adult Regular)   Pulse 80   Temp 98.1  F (36.7  C) (Oral)   Resp 16   Wt 87.1 kg (192 lb 1.6 oz)   SpO2 99%   BMI 28.37 kg/m      Lines accessed by RN in lab. Labs collected through red lumen and sent. Both caps changed, lines flushed with Normal Saline & Heparin. Pt tolerated well.   Pt checked in for next appointment.    Nurys Hitchcock RN

## 2020-10-01 NOTE — LETTER
"    10/1/2020         RE: Kenneth Tello  [address]  [place] [address]        Dear Colleague,    Thank you for referring your patient, Kenneth Tello, to the [facility]. Please see a copy of my visit note below.    Kenneth Tello is a 50 year old male who is being evaluated via a billable video visit.      The patient has been notified of following:     \"This video visit will be conducted via a call between you and your physician/provider. We have found that certain health care needs can be provided without the need for an in-person physical exam.  This service lets us provide the care you need with a video conversation.  If a prescription is necessary we can send it directly to your pharmacy.  If lab work is needed we can place an order for that and you can then stop by our lab to have the test done at a later time.    Video visits are billed at different rates depending on your insurance coverage.  Please reach out to your insurance provider with any questions.    If during the course of the call the physician/provider feels a video visit is not appropriate, you will not be charged for this service.\"    Patient has given verbal consent for Video visit? Yes  How would you like to obtain your AVS? MyChart  If you are dropped from the video visit, the video invite should be resent to: Text to cell phone: [phone]  Will anyone else be joining your video visit? No      I have reviewed and updated the patient's allergies and medication list.    Concerns: No new concerns.   Refills: None needed.     Vitals - Patient Reported  Weight (Patient Reported): 86.6 kg (191 lb)  Height (Patient Reported): 175.3 cm (5' 9\")  BMI (Based on Pt Reported Ht/Wt): 28.21  Pain Score: No Pain (0)    Jo Millard CMA    Video-Visit Details    Type of service:  Video Visit    Video Start Time: 7:18 am  Video End Time: 7:28 AM    Originating Location (pt. Location): Other " [facility] [place]    Distant Location (provider location):  [facility]     Platform used for Video Visit: Vesna Montgomery PA-C    Oncology/Hematology Visit Note  Oct 1, 2020    Reason for Visit: Follow up of diffuse large B cell lymphoma (DLBCL)    History of Present Illness:   Mr. Tello is a 50 year old man with DLBCL.  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  DLBCL-IPI was high (stage, extranodal sites, LDH) and clinical stage IV with extranodal involvement.  He was started on R-CHOP in 03/2020 with IT methotrexate planned with each cycle for CNS prophylaxis.  Medical history also notable for A fib on anticoagulation, myocardial infarction, and stroke in 2017.  Course has been complicated by constipation and worsening abdominal pain in 08/2020 after completion of treatment.  Imaging demonstrated enlarging mass invading the pancreas and spleen.  Biopsy confirmed refractory DLBCL.  He received salvage chemotherapy with 1 cycle of R-ICE that was complicated by neurotoxicity that resolved and then 1 cycle of R-DHAP complicated by cisplatin-associated EMILY and electrolyte problems that are improving.  PET/CT after R-ICE x 1 and R-DHAP x 1 showed a mixed response.   10/1/2020- start polatuzumab, bendamustine, and rituximab    Interval History:  Kenneth Tello was met with over video for follow up. He is ready to start his new treatment.   - Energy level is overall good since he has had so much time off since his last chemo. Taking occasional naps, able to get things done around the house, sleeping well at night.   - Continues to note fluctuant bowel habits. He tends to alternate between constipation and diarrhea. Taking miralax and senna as needed. Notes mild stomach pain. No nausea. Oral  "intake is not as good when he is constipated, but otherwise he does fine.   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 No significant change. Would be interested in PT near his home town. No other focal weakness.   - Notes some mild pain with urination with the initiation of his stream. States this has been going on for \"a while\" and is unchanged. No unusual color, smell, frequency, urgency. No back pain.     Review of Systems:  Patient denies fevers, chills, night sweats, unexplained weight changes, headaches, dizziness, vision or hearing changes, new lumps or bumps, chest pain, shortness of breath, cough, abdominal pain, nausea, vomiting, changes to bowel or bladder, swelling of extremities, bleeding issues, or rash.    Current Outpatient Medications   Medication Sig Dispense Refill     acetaminophen (TYLENOL) 325 MG tablet Take 2 tablets (650 mg) by mouth every 4 hours as needed for mild pain       acyclovir (ZOVIRAX) 400 MG tablet Take 1 tablet (400 mg) by mouth 2 times daily 60 tablet 1     allopurinol (ZYLOPRIM) 300 MG tablet Take 1 tablet (300 mg) by mouth daily 30 tablet 0     atorvastatin (LIPITOR) 40 MG tablet Take 1 tablet (40 mg) by mouth daily 30 tablet 0     calcium carb-cholecalciferol (OS-STACY) 500-200 MG-UNIT tablet Take 1 tablet by mouth 2 times daily (with meals) 60 tablet 0     calcium carbonate (TUMS) 500 MG chewable tablet Take 1 tablet (500 mg) by mouth 3 times daily 15 tablet 0     carvedilol (COREG) 12.5 MG tablet Take 12.5 mg by mouth 2 times daily (with meals)       gabapentin (NEURONTIN) 300 MG capsule Take 2 tabs in the morning, 1 tab at bedtime 90 capsule 1     heparin lock flush 10 UNIT/ML SOLN injection 5-10 mLs by Intracatheter route every 24 hours 140 mL 0     lisinopril (ZESTRIL) 20 MG tablet Take 1 tablet (20 mg) by mouth daily 30 tablet 1     magnesium oxide (MAG-OX) 400 (241.3 Mg) MG tablet Take 1 tablet (400 mg) by mouth 2 times daily 60 " tablet 0     morphine (MS CONTIN) 15 MG CR tablet Take 1 tablet (15 mg) by mouth every 12 hours in conjunction with a 30mg tablet for a total dosage of 45mg every 12 hours. 60 tablet 0     morphine (MSIR) 15 MG IR tablet Take 1 tablet (15 mg) by mouth every 4 hours as needed for moderate to severe pain 15 tablet 0     omeprazole (PRILOSEC) 40 MG DR capsule Take 1 capsule (40 mg) by mouth daily 30 capsule 1     ondansetron (ZOFRAN) 4 MG tablet Take 1 tablet (4 mg) by mouth every 8 hours as needed for nausea 30 tablet 0     polyethylene glycol (MIRALAX) 17 g packet Take 1 packet by mouth daily as needed for constipation       prednisoLONE acetate (PRED FORTE) 1 % ophthalmic suspension Place 2 drops into both eyes 4 times daily until 9/4 1 Bottle 0     rivaroxaban ANTICOAGULANT (XARELTO ANTICOAGULANT) 20 MG TABS tablet Take 1 tablet (20 mg) by mouth daily (with dinner) PLEASE HOLD UNTIL TOLD TO RESUME BY CLINIC 30 tablet 3     senna-docusate (SENOKOT-S/PERICOLACE) 8.6-50 MG tablet Take 1-2 tablets by mouth 2 times daily as needed for constipation 10 tablet 0     sodium chloride, PF, 0.9% PF flush 10-20 mLs by Intracatheter route every 24 hours 280 mL 0       Physical Examination:  There were no vitals taken for this visit.  Wt Readings from Last 10 Encounters:   09/19/20 86.8 kg (191 lb 4.8 oz)   09/03/20 97.5 kg (215 lb)   09/02/20 96.8 kg (213 lb 6.4 oz)   08/28/20 87.7 kg (193 lb 4.8 oz)   08/11/20 89.6 kg (197 lb 8 oz)   08/10/20 92 kg (202 lb 12.8 oz)   08/05/20 91.9 kg (202 lb 9.6 oz)   07/31/20 90.3 kg (199 lb)   07/29/20 89.8 kg (198 lb)   07/02/20 96.4 kg (212 lb 9.6 oz)     Video physical exam  General: Patient appears well in no acute distress. Normal body habitus.  Skin: No visualized rash or lesions on visualized skin  Eyes: EOMI, no erythema, sclera icterus or discharge noted  Resp: Appears to be breathing comfortably without accessory muscle usage, speaking in full sentences, no cough  MSK: Appears to  have normal range of motion based on visualized movements  Neurologic: No apparent tremors, facial movements symmetric  Psych: affect normal, alert and oriented    The rest of a comprehensive physical examination is deferred due to PHE (public health emergency) video restrictions    Laboratory Data:  Results for RICKEY WATKINS (MRN [id number]) as of 10/1/2020 07:10   Ref. Range 9/29/2020 00:00   Sodium Latest Units: mmol/L 138   Potassium Latest Units: mmol/L 3.3   Chloride Latest Units: mmol/L 102   Carbon Dioxide Latest Units: mmol/L 27   Urea Nitrogen Latest Units: mg/dL 14   Creatinine Latest Units: mg/dL 0.77   GFR Estimate Latest Units: ml/min/1.73m2 114   Calcium Latest Units: mg/dL 8.00   Anion Gap Latest Units: mmol/L 9   Glucose Latest Ref Range: 70 - 99 mg/dL 85.0   WBC Latest Units: 10^9/L 7.2   Hemoglobin Latest Ref Range: 13.3 - 17.7 g/dL 7.8 (A)   Hematocrit Latest Units: % 24.5   Platelet Count Latest Units: 10^9/L 102   RBC Count Latest Units: 10^12/L 2.65   MCV Latest Units: fl 93   MCH Latest Units: pg 29.4   MCHC Latest Units: g/dL 31.8   RDW Latest Units: % 21.6         Assessment and Plan:   ONC  DLBCL  - PET/CT after 1 cycle R-ICE and 1 cycle R-DHAP shows a mixed response. His lymphoma remains chemo-refractory - unlikely that more conventional salvage chemo is likely to lead to an adequate response for autoBMT consolidation.  He is not eligible for any of our clinical trials at this time.    - Plan to start polatuzumab, bendamustine, and rituximab (nell-BR) today. Plan for weekly REGINA visits until cycle 2.    -  Clinical trials or other salvage treatment could be considered in the future.  They asked good questions, and agree with the plan.    HEME  Cytopenias- treatments contributing. Will check weekly. Will transfuse for hgb <7.5 or symptomatic, plt <10. Hopeful this can be arranged near his home. Will try for a blood transfusion for with today or tomorrow pending infusion availability.       ID  Off fluconazole and levofloxacin.   On Bactrim for PCP/PJP prophylaxis and acyclovir.    - We will defer pneumococcal or other vaccination after he has competed all planned treatment.     Nephro  Cisplatin associated electrolyte issues- will continue to monitor weekly.   - On oral magnesium 2 per day (just started yesterday) and calcium+D (just picked up yesterday- unsure if taking). Will rx 20 meq PO KCl today x 7 days and replace K and Mg per protocol in infusion.     Cards  A fib with history of CVA.  Continue rivaroxaban  while platelets are >50,000/mcL and hold when platelets are <50,000/mcL or for any procedures.    MSK  Left knee weakness- residual from stroke. Will refer to PT    GI  Fluctuant bowel habits. Advised trial of daily metamucil. Uses senna and miralax prn for constipation.       Chronic pain with initiation of urination. UA 9/18 unremarkable and he reports no change in symptoms since collection. Advised to monitor and alert us if symptoms change.     Rosa Isela Montgomery PA-C  [facility]  [address] [place] [address] [phone]

## 2020-10-01 NOTE — PROGRESS NOTES
Infusion Nursing Note:  Kenneth Tello presents today for Cycle 1 Day 1 Polatuzumab, rituximab-abbs, and Bendamustine.  Patient seen by provider today: Yes: JAMISON Borjas prior to infusion   present during visit today: Not Applicable.    Note: Patient arrives to infusion today feeling well.. Potassium level 3.1 and magnesium 1.4 today. JAMISON Borjas notified.     TORB @ 4263 JAMISON Borjas/ Floresita Salmon RN:  - Replace electrolyte levels today per protocol  - Hgb 7.2, patient should receive 1 unit of PRBC's tomorrow 10/2 to shorten length of infusion time today  - Prescription sent for potassium to patient's home pharmacy     Intravenous Access:  PICC.  Dressing changed.    Treatment Conditions:  Lab Results   Component Value Date    HGB 7.2 10/01/2020     Lab Results   Component Value Date    WBC 7.1 10/01/2020      Lab Results   Component Value Date    ANEU 5.9 10/01/2020     Lab Results   Component Value Date    PLT 82 10/01/2020      Lab Results   Component Value Date     10/01/2020                   Lab Results   Component Value Date    POTASSIUM 3.1 10/01/2020           Lab Results   Component Value Date    MAG 1.4 10/01/2020            Lab Results   Component Value Date    CR 0.94 10/01/2020                   Lab Results   Component Value Date    STACY 7.9 10/01/2020                Lab Results   Component Value Date    BILITOTAL 0.7 10/01/2020           Lab Results   Component Value Date    ALBUMIN 2.6 10/01/2020                    Lab Results   Component Value Date    ALT 10 10/01/2020           Lab Results   Component Value Date    AST 11 10/01/2020       Results reviewed, labs MET treatment parameters, ok to proceed with treatment.    Post Infusion Assessment:  Patient tolerated infusion without incident.  Patient observed for 90 minutes post Polatuzumab per protocol.  Blood return noted pre and post infusion.  Site patent and intact, free from redness, edema or discomfort.  No  evidence of extravasations.    Discharge Plan:   Prescription refills given for Bactrim, Ativan, Compazine, Zofran, and Magnesium. Patient counseled on medications by pharmacy.  Discharge instructions reviewed with: Patient.  Patient and/or family verbalized understanding of discharge instructions and all questions answered.  Copy of AVS reviewed with patient and/or family.  Patient will return tomorrow 10/2 for next appointment.  Patient discharged in stable condition accompanied by: self.  Departure Mode: Ambulatory.    Ita Franco RN

## 2020-10-02 NOTE — PROGRESS NOTES
Infusion Nursing Note:  Kenneth Tello presents today for Cycle 1 Day 2 Bendamustine, 1 unit PRBCs.    Patient seen by provider today: No   present during visit today: Not Applicable.    Note: Kenneth presents to infusion today stating he feels well. He denies any changes, symptoms, or concerns overnight. He denies any pain today.     Intravenous Access:  PICC.    Treatment Conditions:  Lab Results   Component Value Date    HGB 7.2 10/01/2020     Lab Results   Component Value Date    WBC 7.1 10/01/2020      Lab Results   Component Value Date    ANEU 5.9 10/01/2020     Lab Results   Component Value Date    PLT 82 10/01/2020      Lab Results   Component Value Date     10/01/2020                   Lab Results   Component Value Date    POTASSIUM 3.1 10/01/2020           Lab Results   Component Value Date    MAG 1.4 10/01/2020            Lab Results   Component Value Date    CR 0.94 10/01/2020                   Lab Results   Component Value Date    STACY 7.9 10/01/2020                Lab Results   Component Value Date    BILITOTAL 0.7 10/01/2020           Lab Results   Component Value Date    ALBUMIN 2.6 10/01/2020                    Lab Results   Component Value Date    ALT 10 10/01/2020           Lab Results   Component Value Date    AST 11 10/01/2020       Results reviewed, labs MET treatment parameters, ok to proceed with treatment.  Blood transfusion consent signed 4/29/20.      Post Infusion Assessment:  Patient tolerated infusion without incident.  Blood return noted pre and post infusion.  Site patent and intact, free from redness, edema or discomfort.  No evidence of extravasations.  Access discontinued per protocol.       Discharge Plan:   Patient declined prescription refills.  Discharge instructions reviewed with: Patient.  Patient and/or family verbalized understanding of discharge instructions and all questions answered.  AVS to patient via [employer].  Patient will return 10/8/20 for next  appointment.   Patient discharged in stable condition accompanied by: self.  Departure Mode: Ambulatory.    Jo Paul RN

## 2020-10-05 NOTE — TELEPHONE ENCOUNTER
DATE:  10/5/2020   TIME OF RECEIPT FROM LAB:  1439  LAB TEST:  Platelets  LAB VALUE:  47  RESULTS GIVEN WITH READ-BACK TO (PROVIDER):  DAPHNE CHENG  TIME LAB VALUE REPORTED TO PROVIDER:   Daphne Cheng PA-C paged at 1443    Spoke with YARELIS Natarajan patient to stop Xarelto. Called patient, informed of provider message. He verbalizes understanding. Patient will monitor for unusual bleeding or bruising related to decreased platelet levels.

## 2020-10-07 NOTE — PROGRESS NOTES
"Kenneth Tello is a 50 year old male who is being evaluated via a billable video visit.      The patient has been notified of following:     \"This video visit will be conducted via a call between you and your physician/provider. We have found that certain health care needs can be provided without the need for an in-person physical exam.  This service lets us provide the care you need with a video conversation.  If a prescription is necessary we can send it directly to your pharmacy.  If lab work is needed we can place an order for that and you can then stop by our lab to have the test done at a later time.    Video visits are billed at different rates depending on your insurance coverage.  Please reach out to your insurance provider with any questions.    If during the course of the call the physician/provider feels a video visit is not appropriate, you will not be charged for this service.\"    Patient has given verbal consent for Video visit? Yes    How would you like to obtain your AVS? Mail a copy    If you are dropped from the video visit, the video invite should be resent to: Text to cell phone: [phone] -- patient prefers text message invite.    Will anyone else be joining your video visit? No         I have reviewed and updated the patient's allergies and medication list.    Concerns: No new concerns.   Refills: None needed.     Vitals - Patient Reported  Weight (Patient Reported): 87.1 kg (192 lb)  Height (Patient Reported): 175.3 cm (5' 9\")  BMI (Based on Pt Reported Ht/Wt): 28.35  Pain Score: No Pain (0)      Jo Millard CMA        Video-Visit Details    Type of service:  Video Visit    Video Start Time: 7:16 AM  Video End Time: 7:22 AM    Originating Location (pt. Location): Home    Distant Location (provider location):  [facility]     Platform used for Video Visit: Vesna Montgomery PA-C        Oncology/Hematology Visit Note  Oct 8, 2020    Reason for Visit: " Follow up of diffuse large B cell lymphoma (DLBCL)    History of Present Illness:   Mr. Tello is a 50 year old man with DLBCL.  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  DLBCL-IPI was high (stage, extranodal sites, LDH) and clinical stage IV with extranodal involvement.  He was started on R-CHOP in 03/2020 with IT methotrexate planned with each cycle for CNS prophylaxis.  Medical history also notable for A fib on anticoagulation, myocardial infarction, and stroke in 2017.  Course has been complicated by constipation and worsening abdominal pain in 08/2020 after completion of treatment.  Imaging demonstrated enlarging mass invading the pancreas and spleen.  Biopsy confirmed refractory DLBCL.  He received salvage chemotherapy with 1 cycle of R-ICE that was complicated by neurotoxicity that resolved and then 1 cycle of R-DHAP complicated by cisplatin-associated EMILY and electrolyte problems that are improving.  PET/CT after R-ICE x 1 and R-DHAP x 1 showed a mixed response.   10/1/2020- started polatuzumab, bendamustine, and rituximab    Interval History:  Kenneth Tello was met with over video.  - He states the last 2 days he has felt better than he has in a long time. Energy level is improved, knees feel better. Mentioned that he got a call from his care coordinator that he needs a transfusion. Was surprised by this as he was feeling so swell.  - Denies any new adverse effects from his treatment last week.   - Last BM was Tuesday- taking miralax daily and 1 senna twice a day. No abdominal pain, nausea.   - Numbness in left hand is stable- no progression.     Review of Systems:  Patient denies fevers, chills, night sweats,  headaches, dizziness, vision or hearing changes, new lumps or bumps, chest pain,  shortness of breath, cough,  changes to  bladder, swelling of extremities, bleeding issues, or rash.    Current Outpatient Medications   Medication Sig Dispense Refill     acetaminophen (TYLENOL) 325 MG tablet Take 2 tablets (650 mg) by mouth every 4 hours as needed for mild pain       acyclovir (ZOVIRAX) 400 MG tablet Take 1 tablet (400 mg) by mouth 2 times daily Viral Prophylaxis. 60 tablet 5     acyclovir (ZOVIRAX) 400 MG tablet Take 1 tablet (400 mg) by mouth 2 times daily 60 tablet 1     allopurinol (ZYLOPRIM) 300 MG tablet Take 1 tablet (300 mg) by mouth daily 30 tablet 0     atorvastatin (LIPITOR) 40 MG tablet Take 1 tablet (40 mg) by mouth daily 30 tablet 0     calcium carb-cholecalciferol (OS-STACY) 500-200 MG-UNIT tablet Take 1 tablet by mouth 2 times daily (with meals) 60 tablet 0     calcium carbonate (TUMS) 500 MG chewable tablet Take 1 tablet (500 mg) by mouth 3 times daily 15 tablet 0     carvedilol (COREG) 12.5 MG tablet Take 12.5 mg by mouth 2 times daily (with meals)       gabapentin (NEURONTIN) 300 MG capsule Take 2 tabs in the morning, 1 tab at bedtime 90 capsule 1     heparin lock flush 10 UNIT/ML SOLN injection 5-10 mLs by Intracatheter route every 24 hours 140 mL 0     lisinopril (ZESTRIL) 20 MG tablet Take 1 tablet (20 mg) by mouth daily 30 tablet 1     LORazepam (ATIVAN) 0.5 MG tablet Take 1 tablet (0.5 mg) by mouth every 4 hours as needed (Anxiety, Nausea/Vomiting or Sleep) 30 tablet 5     magnesium oxide (MAG-OX) 400 (241.3 Mg) MG tablet Take 1 tablet (400 mg) by mouth 2 times daily 60 tablet 0     morphine (MS CONTIN) 15 MG CR tablet Take 1 tablet (15 mg) by mouth every 12 hours in conjunction with a 30mg tablet for a total dosage of 45mg every 12 hours. 60 tablet 0     morphine (MSIR) 15 MG IR tablet Take 1 tablet (15 mg) by mouth every 4 hours as needed for moderate to severe pain 15 tablet 0     omeprazole (PRILOSEC) 40 MG DR capsule Take 1 capsule (40 mg) by mouth daily 30 capsule 1      ondansetron (ZOFRAN) 4 MG tablet Take 1 tablet (4 mg) by mouth every 8 hours as needed for nausea 30 tablet 0     ondansetron (ZOFRAN) 8 MG tablet Take 1 tablet (8 mg) by mouth every 8 hours as needed for nausea (vomiting) 30 tablet 5     polyethylene glycol (MIRALAX) 17 g packet Take 1 packet by mouth daily as needed for constipation       potassium chloride ER (KLOR-CON M) 20 MEQ CR tablet Take 1 tablet (20 mEq) by mouth daily 7 tablet 0     prednisoLONE acetate (PRED FORTE) 1 % ophthalmic suspension Place 2 drops into both eyes 4 times daily until 9/4 1 Bottle 0     prochlorperazine (COMPAZINE) 10 MG tablet Take 1 tablet (10 mg) by mouth every 6 hours as needed (Nausea/Vomiting) 30 tablet 5     rivaroxaban ANTICOAGULANT (XARELTO ANTICOAGULANT) 20 MG TABS tablet Take 1 tablet (20 mg) by mouth daily (with dinner) PLEASE HOLD UNTIL TOLD TO RESUME BY CLINIC 30 tablet 3     senna-docusate (SENOKOT-S/PERICOLACE) 8.6-50 MG tablet Take 1-2 tablets by mouth 2 times daily as needed for constipation 10 tablet 0     sodium chloride, PF, 0.9% PF flush 10-20 mLs by Intracatheter route every 24 hours 280 mL 0     sulfamethoxazole-trimethoprim (BACTRIM DS) 800-160 MG tablet Take 1 tablet by mouth Every Mon, Tues two times daily 16 tablet 5       Physical Examination:  There were no vitals taken for this visit.  Wt Readings from Last 10 Encounters:   10/01/20 87.1 kg (192 lb 1.6 oz)   09/19/20 86.8 kg (191 lb 4.8 oz)   09/03/20 97.5 kg (215 lb)   09/02/20 96.8 kg (213 lb 6.4 oz)   08/28/20 87.7 kg (193 lb 4.8 oz)   08/11/20 89.6 kg (197 lb 8 oz)   08/10/20 92 kg (202 lb 12.8 oz)   08/05/20 91.9 kg (202 lb 9.6 oz)   07/31/20 90.3 kg (199 lb)   07/29/20 89.8 kg (198 lb)     Video physical exam  General: Patient appears well in no acute distress. Normal body habitus.  Skin: No visualized rash or lesions on visualized skin  Eyes: EOMI, no erythema, sclera icterus or discharge noted  Resp: Appears to be breathing comfortably without  accessory muscle usage, speaking in full sentences, no cough  MSK: Appears to have normal range of motion based on visualized movements  Neurologic: No apparent tremors, facial movements symmetric  Psych: affect normal, alert and oriented    The rest of a comprehensive physical examination is deferred due to PHE (public health emergency) video restrictions    Laboratory Data:  Not available at time of visit.     Assessment and Plan:   ONC  DLBCL  - PET/CT after 1 cycle R-ICE and 1 cycle R-DHAP shows a mixed response. His lymphoma remains chemo-refractory - unlikely that more conventional salvage chemo is likely to lead to an adequate response for autoBMT consolidation.  He is not eligible for any of our clinical trials at this time.    - Started polatuzumab, bendamustine, and rituximab (nell-BR) 10/1/2020- tolerated well. Plan for weekly REGINA visits until cycle 2.    -  Clinical trials or other salvage treatment could be considered in the future.      HEME  Cytopenias- treatments contributing. Will check weekly. Will transfuse for hgb <7.5 or symptomatic, plt <10. Hopeful this can be arranged near his home. Had blood transfusion on day 2 of treatment last week.   - Will attempt to see blood results from outside lab.      ID  Off fluconazole and levofloxacin- start if ANC <1.0.   On Bactrim for PCP/PJP prophylaxis and acyclovir.    - We will defer pneumococcal or other vaccination after he has competed all planned treatment.     Nephro  Cisplatin associated electrolyte issues- will continue to monitor weekly.   - On oral magnesium 2 per day and calcium+D. Will attempt to see labs from outside lab and replace if needed.     Cards  A fib with history of CVA.  Continue rivaroxaban  while platelets are >50,000/mcL and hold when platelets are <50,000/mcL or for any procedures.    MSK  Left knee weakness- residual from stroke. Will refer to PT    GI  Fluctuant bowel habits.  Uses senna and miralax prn for constipation.  Advised to increase use tomorrow if no BM.       Chronic pain with initiation of urination. UA 9/18 unremarkable and he reports no change in symptoms since collection. Advised to monitor and alert us if symptoms change.     Rosa Isela Montgomery PA-C  [facility]  [address] [place] [address] [phone]

## 2020-10-08 NOTE — PROGRESS NOTES
"Maya called Kenneth Tello 10/7/20 with preliminary labs. No labs still received from [place] yet. Writer has called it and they are working on this issue. called Kenneth and he states he feels \"fantastic\". Best he has since he was diagnosed, although Kenneth has said this before. His Hgb is 7.3 unofficially, he says he does not feel like it is. He will take blood if we want him too. He has his sons ball game Friday night and he does not want to miss it. He says his bowels are not quiet what his would want them to be but otherwise this chemotherapy is much better tolerated than the last. He wanted to know when the bottom was going to drop out on him.   Magnesium is low at 1.2. He does not know if he is taking the magnesium, for sure not three times a day. He will check when he gets home and then start or increase to three times a day. His plts are unofficially 54. Has repeat labs on Friday. Will wait to restart his Xarelto until know if they will continue to go up.     Likes that he has his PICC line. Needs more flushes, sent to his local pharmacy. He sounds good, says even his knee is good. Was going to have PT but has not started, does not want that to get in the way of other essential things.    Some of 10/7 labs have come though but not the CBC. Has repeat labs this Friday. If a transfusion is needed he wants it early enough to get to his [employer] game Friday or Saturday.     Writer will continue to follow.       "

## 2020-10-08 NOTE — LETTER
"    10/8/2020         RE: Kenneth Tello  [address]  [place] [address]        Dear Colleague,    Thank you for referring your patient, Kenneth Tello, to the [facility]. Please see a copy of my visit note below.    Kenneth Tlelo is a 50 year old male who is being evaluated via a billable video visit.      The patient has been notified of following:     \"This video visit will be conducted via a call between you and your physician/provider. We have found that certain health care needs can be provided without the need for an in-person physical exam.  This service lets us provide the care you need with a video conversation.  If a prescription is necessary we can send it directly to your pharmacy.  If lab work is needed we can place an order for that and you can then stop by our lab to have the test done at a later time.    Video visits are billed at different rates depending on your insurance coverage.  Please reach out to your insurance provider with any questions.    If during the course of the call the physician/provider feels a video visit is not appropriate, you will not be charged for this service.\"    Patient has given verbal consent for Video visit? Yes    How would you like to obtain your AVS? Mail a copy    If you are dropped from the video visit, the video invite should be resent to: Text to cell phone: [phone] -- patient prefers text message invite.    Will anyone else be joining your video visit? No         I have reviewed and updated the patient's allergies and medication list.    Concerns: No new concerns.   Refills: None needed.     Vitals - Patient Reported  Weight (Patient Reported): 87.1 kg (192 lb)  Height (Patient Reported): 175.3 cm (5' 9\")  BMI (Based on Pt Reported Ht/Wt): 28.35  Pain Score: No Pain (0)      Jo Millard CMA        Video-Visit Details    Type of service:  Video Visit    Video Start Time: 7:16 AM  Video End Time: " 7:22 AM    Originating Location (pt. Location): Home    Distant Location (provider location):  [facility]     Platform used for Video Visit: Vesna Montgomery PA-C        Oncology/Hematology Visit Note  Oct 8, 2020    Reason for Visit: Follow up of diffuse large B cell lymphoma (DLBCL)    History of Present Illness:   Mr. Tello is a 50 year old man with DLBCL.  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  DLBCL-IPI was high (stage, extranodal sites, LDH) and clinical stage IV with extranodal involvement.  He was started on R-CHOP in 03/2020 with IT methotrexate planned with each cycle for CNS prophylaxis.  Medical history also notable for A fib on anticoagulation, myocardial infarction, and stroke in 2017.  Course has been complicated by constipation and worsening abdominal pain in 08/2020 after completion of treatment.  Imaging demonstrated enlarging mass invading the pancreas and spleen.  Biopsy confirmed refractory DLBCL.  He received salvage chemotherapy with 1 cycle of R-ICE that was complicated by neurotoxicity that resolved and then 1 cycle of R-DHAP complicated by cisplatin-associated EMILY and electrolyte problems that are improving.  PET/CT after R-ICE x 1 and R-DHAP x 1 showed a mixed response.   10/1/2020- started polatuzumab, bendamustine, and rituximab    Interval History:  Kenneth Tello was met with over video.  - He states the last 2 days he has felt better than he has in a long time. Energy level is improved, knees feel better. Mentioned that he got a call from his care coordinator that he needs a transfusion. Was surprised by this as he was feeling so swell.  - Denies any new adverse effects from his treatment last week.   - Last BM was Tuesday- taking miralax  daily and 1 senna twice a day. No abdominal pain, nausea.   - Numbness in left hand is stable- no progression.     Review of Systems:  Patient denies fevers, chills, night sweats,  headaches, dizziness, vision or hearing changes, new lumps or bumps, chest pain, shortness of breath, cough,  changes to  bladder, swelling of extremities, bleeding issues, or rash.    Current Outpatient Medications   Medication Sig Dispense Refill     acetaminophen (TYLENOL) 325 MG tablet Take 2 tablets (650 mg) by mouth every 4 hours as needed for mild pain       acyclovir (ZOVIRAX) 400 MG tablet Take 1 tablet (400 mg) by mouth 2 times daily Viral Prophylaxis. 60 tablet 5     acyclovir (ZOVIRAX) 400 MG tablet Take 1 tablet (400 mg) by mouth 2 times daily 60 tablet 1     allopurinol (ZYLOPRIM) 300 MG tablet Take 1 tablet (300 mg) by mouth daily 30 tablet 0     atorvastatin (LIPITOR) 40 MG tablet Take 1 tablet (40 mg) by mouth daily 30 tablet 0     calcium carb-cholecalciferol (OS-STACY) 500-200 MG-UNIT tablet Take 1 tablet by mouth 2 times daily (with meals) 60 tablet 0     calcium carbonate (TUMS) 500 MG chewable tablet Take 1 tablet (500 mg) by mouth 3 times daily 15 tablet 0     carvedilol (COREG) 12.5 MG tablet Take 12.5 mg by mouth 2 times daily (with meals)       gabapentin (NEURONTIN) 300 MG capsule Take 2 tabs in the morning, 1 tab at bedtime 90 capsule 1     heparin lock flush 10 UNIT/ML SOLN injection 5-10 mLs by Intracatheter route every 24 hours 140 mL 0     lisinopril (ZESTRIL) 20 MG tablet Take 1 tablet (20 mg) by mouth daily 30 tablet 1     LORazepam (ATIVAN) 0.5 MG tablet Take 1 tablet (0.5 mg) by mouth every 4 hours as needed (Anxiety, Nausea/Vomiting or Sleep) 30 tablet 5     magnesium oxide (MAG-OX) 400 (241.3 Mg) MG tablet Take 1 tablet (400 mg) by mouth 2 times daily 60 tablet 0     morphine (MS CONTIN) 15 MG CR tablet Take 1 tablet (15 mg) by mouth every 12 hours in conjunction with a 30mg tablet for a total dosage of  45mg every 12 hours. 60 tablet 0     morphine (MSIR) 15 MG IR tablet Take 1 tablet (15 mg) by mouth every 4 hours as needed for moderate to severe pain 15 tablet 0     omeprazole (PRILOSEC) 40 MG DR capsule Take 1 capsule (40 mg) by mouth daily 30 capsule 1     ondansetron (ZOFRAN) 4 MG tablet Take 1 tablet (4 mg) by mouth every 8 hours as needed for nausea 30 tablet 0     ondansetron (ZOFRAN) 8 MG tablet Take 1 tablet (8 mg) by mouth every 8 hours as needed for nausea (vomiting) 30 tablet 5     polyethylene glycol (MIRALAX) 17 g packet Take 1 packet by mouth daily as needed for constipation       potassium chloride ER (KLOR-CON M) 20 MEQ CR tablet Take 1 tablet (20 mEq) by mouth daily 7 tablet 0     prednisoLONE acetate (PRED FORTE) 1 % ophthalmic suspension Place 2 drops into both eyes 4 times daily until 9/4 1 Bottle 0     prochlorperazine (COMPAZINE) 10 MG tablet Take 1 tablet (10 mg) by mouth every 6 hours as needed (Nausea/Vomiting) 30 tablet 5     rivaroxaban ANTICOAGULANT (XARELTO ANTICOAGULANT) 20 MG TABS tablet Take 1 tablet (20 mg) by mouth daily (with dinner) PLEASE HOLD UNTIL TOLD TO RESUME BY CLINIC 30 tablet 3     senna-docusate (SENOKOT-S/PERICOLACE) 8.6-50 MG tablet Take 1-2 tablets by mouth 2 times daily as needed for constipation 10 tablet 0     sodium chloride, PF, 0.9% PF flush 10-20 mLs by Intracatheter route every 24 hours 280 mL 0     sulfamethoxazole-trimethoprim (BACTRIM DS) 800-160 MG tablet Take 1 tablet by mouth Every Mon, Tues two times daily 16 tablet 5       Physical Examination:  There were no vitals taken for this visit.  Wt Readings from Last 10 Encounters:   10/01/20 87.1 kg (192 lb 1.6 oz)   09/19/20 86.8 kg (191 lb 4.8 oz)   09/03/20 97.5 kg (215 lb)   09/02/20 96.8 kg (213 lb 6.4 oz)   08/28/20 87.7 kg (193 lb 4.8 oz)   08/11/20 89.6 kg (197 lb 8 oz)   08/10/20 92 kg (202 lb 12.8 oz)   08/05/20 91.9 kg (202 lb 9.6 oz)   07/31/20 90.3 kg (199 lb)   07/29/20 89.8 kg (198 lb)      Video physical exam  General: Patient appears well in no acute distress. Normal body habitus.  Skin: No visualized rash or lesions on visualized skin  Eyes: EOMI, no erythema, sclera icterus or discharge noted  Resp: Appears to be breathing comfortably without accessory muscle usage, speaking in full sentences, no cough  MSK: Appears to have normal range of motion based on visualized movements  Neurologic: No apparent tremors, facial movements symmetric  Psych: affect normal, alert and oriented    The rest of a comprehensive physical examination is deferred due to PHE (public health emergency) video restrictions    Laboratory Data:  Not available at time of visit.     Assessment and Plan:   ONC  DLBCL  - PET/CT after 1 cycle R-ICE and 1 cycle R-DHAP shows a mixed response. His lymphoma remains chemo-refractory - unlikely that more conventional salvage chemo is likely to lead to an adequate response for autoBMT consolidation.  He is not eligible for any of our clinical trials at this time.    - Started polatuzumab, bendamustine, and rituximab (nell-BR) 10/1/2020- tolerated well. Plan for weekly REGINA visits until cycle 2.    -  Clinical trials or other salvage treatment could be considered in the future.      HEME  Cytopenias- treatments contributing. Will check weekly. Will transfuse for hgb <7.5 or symptomatic, plt <10. Hopeful this can be arranged near his home. Had blood transfusion on day 2 of treatment last week.   - Will attempt to see blood results from outside lab.      ID  Off fluconazole and levofloxacin- start if ANC <1.0.   On Bactrim for PCP/PJP prophylaxis and acyclovir.    - We will defer pneumococcal or other vaccination after he has competed all planned treatment.     Nephro  Cisplatin associated electrolyte issues- will continue to monitor weekly.   - On oral magnesium 2 per day and calcium+D. Will attempt to see labs from outside lab and replace if needed.     Cards  A fib with history of CVA.   Continue rivaroxaban  while platelets are >50,000/mcL and hold when platelets are <50,000/mcL or for any procedures.    MSK  Left knee weakness- residual from stroke. Will refer to PT    GI  Fluctuant bowel habits.  Uses senna and miralax prn for constipation. Advised to increase use tomorrow if no BM.       Chronic pain with initiation of urination. UA 9/18 unremarkable and he reports no change in symptoms since collection. Advised to monitor and alert us if symptoms change.     Rosa Isela Montgomery PA-C  [facility]  [address] [place], [place] [id number] [phone]

## 2020-10-09 NOTE — PROGRESS NOTES
Received message from pt's daughter, Devika, stating pt is 20 mEq twice a day currently.  Will need additional prescription of the crystal tabs of potassium no matter what dosing he is to start.    Per communication with Rosa Isela Montgomery PA-C, increase potassium to 40 mEq twice a day and send new prescription for 1 month supply.  Spoke with Devika, informing her new dose of potassium supplement should now be 40 mEq twice a day.  Instructed her to have him start the 40mEq this evening.  Will send new prescription to local pharmacy.  She appreciated information and will communicate with her father about change and new prescription.  She is sets up his medications for him.

## 2020-10-09 NOTE — PROGRESS NOTES
Spoke with Loren clarifying when pt due in today for lab draw (11:00) and add CMP once a week (starting today) to his other current lab orders.  Discussed new blood transfusion plan with change in Hgb parameter to less than 7.5 or symptomatic.  Verified they are currently sending results to fax and triage e-mail.  Gave contact info and told RN covering for coworker, Heather today and Monday.  Orders went thru per [facility] system.  Spoke with pt and reviewed today's CBC/d/p results including:  WBC 4.3 with ANC 3.4  Hgb 7.6  Plts 70    Instructed to re-start his Xarelto today since plts 70K.  Discussed Mg dose with pt.  He reported he was taking Mg 2 times a day but started increase dose of 3 times a day today.      Shortly after received today's CMP and Mg which included:  K+ 3.3  Mg+ 1.1    Per discussion with GAYATHRI Hernandez and Mikaela, pharmacist at [facility], pt can get IV Mg today.  Per Mikaela, they usually give 1 to 2 Gm IV diluted in 100 ml NS and infusion 1 Gm over one hour.    Per conversation with Rosa Isela Montgomery PA-C, pt to get at least 1 Gm IV Mg today and remainder (2 Gm) IV tomorrow for total of 3 Gm IV re: Mg+ of 1.1 today.  Continue Mg+ 3 times a day orally.  Pt also to increase his oral K supplement to twice a day re: K+ of 3.3 today.  Rosa Isela entered generic therapy plan.  RN spoke with ZOILA Moscoso, (charge nurse?) [facility] today.  Reviewed above information regarding pt needing at least 1 Gm Mg IV today and total of 3 Gm Mg IV between today and tomorrow for Mg+ 1.1 today as above.  She will call pt to arrange IV Mg today and tomorrow.  She will also inform pt of need to continue his oral Mg supplement 3 times a day and increase his K supplement to twice a day.  Author gave Blanka contact information should they have any questions.

## 2020-10-14 NOTE — TELEPHONE ENCOUNTER
DATE:  10/14/2020   TIME OF RECEIPT FROM LAB:  4:24 PM  LAB TEST:  Hgb=7.1  RESULTS PAGED TO (PROVIDER):  Rosa Isela Montgomery, [id number]  FYI Pt coming in to get 1 unit of blood tomorrow at local facility  TIME LAB VALUE REPORTED TO PROVIDER:   9967, no further action needed by triage.

## 2020-10-14 NOTE — PROGRESS NOTES
Writer called Kenneth to review labs done earlier this week. Overall labs improving. No answer, left message in identified VM stating his labs are looking better. Writer will call back tomorrow to review with him. Magnesium at 1.3 so he should be taking the Mag supplement three times daily. Instructed to call if he needed anything.     Critical Hgb of 7.1 came back from labs from today(10/14). Kenneth to receive a unit of blood in [place] on Thursday. No other labs received yet.

## 2020-10-15 NOTE — PROGRESS NOTES
"Kenneth Tello is a 50 year old male who is being evaluated via a billable video visit.      The patient has been notified of following:     \"This video visit will be conducted via a call between you and your physician/provider. We have found that certain health care needs can be provided without the need for an in-person physical exam.  This service lets us provide the care you need with a video conversation.  If a prescription is necessary we can send it directly to your pharmacy.  If lab work is needed we can place an order for that and you can then stop by our lab to have the test done at a later time.    Video visits are billed at different rates depending on your insurance coverage.  Please reach out to your insurance provider with any questions.    If during the course of the call the physician/provider feels a video visit is not appropriate, you will not be charged for this service.\"    Patient has given verbal consent for Video visit? Yes    How would you like to obtain your AVS? MyChart     If you are dropped from the video visit, the video invite should be resent to: Text to cell phone: [phone]     Will anyone else be joining your video visit? No         Vitals - Patient Reported  Weight (Patient Reported): (doesn't know weight)  Height (Patient Reported): 175.3 cm (5' 9.02\")  Pain Score: No Pain (0)    Patient hasn't had any appetite since Sunday. Has had constant nausea and has ate very little since Sunday. Tries to force self to eat the past 6 days, but isn't working. Increased fatigue. Not constipated and urine is normal.    Jc Deleon LPN    Video-Visit Details    Type of service:  Video Visit    Video Start Time: 10:08 AM  Video End Time: 10:26 AM    Originating Location (pt. Location): Home    Distant Location (provider location):  [facility]     Platform used for Video Visit: Vesna Montgomery PA-C      Oncology/Hematology Visit Note  Oct 16, " 2020    Reason for Visit: Follow up of diffuse large B cell lymphoma (DLBCL)    History of Present Illness:   Mr. Tello is a 50 year old man with DLBCL.  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  DLBCL-IPI was high (stage, extranodal sites, LDH) and clinical stage IV with extranodal involvement.  He was started on R-CHOP in 03/2020 with IT methotrexate planned with each cycle for CNS prophylaxis.  Medical history also notable for A fib on anticoagulation, myocardial infarction, and stroke in 2017.  Course has been complicated by constipation and worsening abdominal pain in 08/2020 after completion of treatment.  Imaging demonstrated enlarging mass invading the pancreas and spleen.  Biopsy confirmed refractory DLBCL.  He received salvage chemotherapy with 1 cycle of R-ICE that was complicated by neurotoxicity that resolved and then 1 cycle of R-DHAP complicated by cisplatin-associated EMILY and electrolyte problems that are improving.  PET/CT after R-ICE x 1 and R-DHAP x 1 showed a mixed response.   10/1/2020- started polatuzumab, bendamustine, and rituximab    Interval History:  Kenneth Tello was met with for follow up.  - He has not eaten since Sunday. He states that on Monday he developed nausea. He took Zofran that slightly helped, but has not taken since. Whenever he tried to eat he feels like he is going to throw up so he has been limiting his oral intake. Has been trying to stay hydrated. Notes only mild stomach pain. Feels bowels are moving regularly and denies that they are dark or red. No other bleeding. Noting persistent hiccups. Taking PPI. Denies lightheadedness/dizziness. No headaches. Sleeping 16-18 hours a day. Remains on blood thinner.     Review of Systems:  Patient denies fevers, chills,   headaches, dizziness, vision or hearing changes, new lumps or bumps, chest pain, shortness of breath, cough,  changes to  bladder, swelling of extremities, bleeding issues, or rash.    Current Outpatient Medications   Medication Sig Dispense Refill     acetaminophen (TYLENOL) 325 MG tablet Take 2 tablets (650 mg) by mouth every 4 hours as needed for mild pain       acyclovir (ZOVIRAX) 400 MG tablet Take 1 tablet (400 mg) by mouth 2 times daily Viral Prophylaxis. 60 tablet 5     acyclovir (ZOVIRAX) 400 MG tablet Take 1 tablet (400 mg) by mouth 2 times daily 60 tablet 1     allopurinol (ZYLOPRIM) 300 MG tablet Take 1 tablet (300 mg) by mouth daily 30 tablet 0     atorvastatin (LIPITOR) 40 MG tablet Take 1 tablet (40 mg) by mouth daily 30 tablet 0     calcium carb-cholecalciferol (OS-STACY) 500-200 MG-UNIT tablet Take 1 tablet by mouth 2 times daily (with meals) 60 tablet 0     calcium carbonate (TUMS) 500 MG chewable tablet Take 1 tablet (500 mg) by mouth 3 times daily 15 tablet 0     carvedilol (COREG) 12.5 MG tablet Take 12.5 mg by mouth 2 times daily (with meals)       gabapentin (NEURONTIN) 300 MG capsule Take 2 tabs in the morning, 1 tab at bedtime 90 capsule 1     heparin lock flush 10 UNIT/ML SOLN injection 5-10 mLs by Intracatheter route every 24 hours 140 mL 1     lisinopril (ZESTRIL) 20 MG tablet Take 1 tablet (20 mg) by mouth daily 30 tablet 1     LORazepam (ATIVAN) 0.5 MG tablet Take 1 tablet (0.5 mg) by mouth every 4 hours as needed (Anxiety, Nausea/Vomiting or Sleep) 30 tablet 5     magnesium oxide (MAG-OX) 400 (241.3 Mg) MG tablet Take 1 tablet (400 mg) by mouth 2 times daily 60 tablet 0     morphine (MS CONTIN) 15 MG CR tablet Take 1 tablet (15 mg) by mouth every 12 hours in conjunction with a 30mg tablet for a total dosage of 45mg every 12 hours. 60 tablet 0     morphine (MSIR) 15 MG IR tablet Take 1 tablet (15 mg) by mouth every 4 hours as needed for moderate to severe pain 15 tablet 0     omeprazole  (PRILOSEC) 40 MG DR capsule Take 1 capsule (40 mg) by mouth daily 30 capsule 1     ondansetron (ZOFRAN) 4 MG tablet Take 1 tablet (4 mg) by mouth every 8 hours as needed for nausea 30 tablet 0     ondansetron (ZOFRAN) 8 MG tablet Take 1 tablet (8 mg) by mouth every 8 hours as needed for nausea (vomiting) 30 tablet 5     polyethylene glycol (MIRALAX) 17 g packet Take 1 packet by mouth daily as needed for constipation       potassium chloride ER (KLOR-CON M) 20 MEQ CR tablet Take 2 tablets (40 mEq) by mouth 2 times daily 120 tablet 0     prednisoLONE acetate (PRED FORTE) 1 % ophthalmic suspension Place 2 drops into both eyes 4 times daily until 9/4 1 Bottle 0     prochlorperazine (COMPAZINE) 10 MG tablet Take 1 tablet (10 mg) by mouth every 6 hours as needed (Nausea/Vomiting) 30 tablet 5     rivaroxaban ANTICOAGULANT (XARELTO ANTICOAGULANT) 20 MG TABS tablet Take 1 tablet (20 mg) by mouth daily (with dinner) PLEASE HOLD UNTIL TOLD TO RESUME BY CLINIC 30 tablet 3     senna-docusate (SENOKOT-S/PERICOLACE) 8.6-50 MG tablet Take 1-2 tablets by mouth 2 times daily as needed for constipation 10 tablet 0     sodium chloride, PF, 0.9% PF flush 10-20 mLs by Intracatheter route every 24 hours 280 mL 0     sulfamethoxazole-trimethoprim (BACTRIM DS) 800-160 MG tablet Take 1 tablet by mouth Every Mon, Tues two times daily 16 tablet 5       Physical Examination:  There were no vitals taken for this visit.  Wt Readings from Last 10 Encounters:   10/01/20 87.1 kg (192 lb 1.6 oz)   09/19/20 86.8 kg (191 lb 4.8 oz)   09/03/20 97.5 kg (215 lb)   09/02/20 96.8 kg (213 lb 6.4 oz)   08/28/20 87.7 kg (193 lb 4.8 oz)   08/11/20 89.6 kg (197 lb 8 oz)   08/10/20 92 kg (202 lb 12.8 oz)   08/05/20 91.9 kg (202 lb 9.6 oz)   07/31/20 90.3 kg (199 lb)   07/29/20 89.8 kg (198 lb)     Telephone visit was done today  Voice sounded strong, able to follow thought processes, did not sound to be in acute distress    Laboratory Data:  Results for  RICKEY WATKINS (MRN [id number]) as of 10/16/2020 18:26   Ref. Range 10/14/2020 00:00   Sodium Latest Units: mmol/L 131   Potassium Latest Units: mmol/L 5.4   Chloride Latest Units: mmol/L 100   Carbon Dioxide Latest Units: mmol/L 25   Urea Nitrogen Latest Units: mg/dL 19   Creatinine Latest Units: mg/dL 1.36   GFR Estimate Latest Units: ml/min/1.73m2 59   Calcium Latest Units: mg/dL 8.50   Anion Gap Latest Units: mmol/L 6   Glucose Latest Ref Range: 70 - 99 mg/dL 104.0 (A)   WBC Latest Units: 10^9/L 4.7   Hemoglobin Latest Ref Range: 13.3 - 17.7 g/dL 7.1 (A)   Hematocrit Latest Units: % 22.1   Platelet Count Latest Units: 10^9/L 52   RBC Count Latest Units: 10^12/L 2.32   MCV Latest Units: fl 95   MCH Latest Units: pg 30.6   MCHC Latest Units: g/dL 32.1   RDW Latest Units: % 19.6   % Neutrophils Latest Units: % 83.7   % Lymphocytes Latest Units: % 0.15   % Monocytes Latest Units: % 12.1   % Eosinophils Latest Units: % 0.8   % Basophils Latest Units: % 0.2   Absolute Neutrophil Unknown 3.94   Absolute Lymphocytes Unknown 0.15   Absolute Monocytes Unknown 0.57   Absolute Eosinophils Unknown 0.04   Absolute Basophils Unknown 0.0     Outside report today: Hg 7.0, Cr 1.3, plt 47k    Assessment and Plan:   ONC  DLBCL  - PET/CT after 1 cycle R-ICE and 1 cycle R-DHAP shows a mixed response. His lymphoma remains chemo-refractory - unlikely that more conventional salvage chemo is likely to lead to an adequate response for autoBMT consolidation.  He is not eligible for any of our clinical trials at this time.    - Started polatuzumab, bendamustine, and rituximab (nell-BR) 10/1/2020- tolerated well previously but now with nausea, poor PO intake, sleeping 16-18 hours a day, and declining hgb despite transfusion yesterday. Recommended to present to the ED for work up. Follow up next week if out of the hospital   -  Clinical trials or other salvage treatment could be considered in the future.      HEME  Cytopenias- treatments  contributing. Will check weekly. Will transfuse for hgb <7.5 or symptomatic, plt <10.   - Hgb 7.0 today despite 1 unit of blood yesterday for hgb of 7.1. Plt 47k. Concern for bleed somewhere. Recommended to present to ED for transfusion and further work up. Advised to hold   rivaroxaban     ID  Off fluconazole and levofloxacin- start if ANC <1.0.   On Bactrim for PCP/PJP prophylaxis and acyclovir.    - We will defer pneumococcal or other vaccination after he has competed all planned treatment.     Nephro  H/o Cisplatin associated electrolyte issues- will continue to monitor weekly. Noted increase in Cr to 1.3 this week- suspect dehydration contributing. Advised to present to ED for IVF.   - On oral magnesium 2 per day and calcium+D. Will attempt to see labs from outside lab and replace if needed.     Cards  A fib with history of CVA.  Continue rivaroxaban  while platelets are >50,000/mcL and hold when platelets are <50,000/mcL or for any procedures.    MSK  Left knee weakness- residual from stroke. Will refer to PT    GI  Fluctuant bowel habits.  Uses senna and miralax prn for constipation.     Hiccups: can try baclofen, sent to pharmacy while awaiting repeat labs today.     Uncontrolled nausea, no PO intake the past 6 days- he has not been optimizing anti emetics. Asked to take Zofran every 8 hours and compazine alternating with ativan every 3 hours as needed. Hopeful this will be addressed in ED.       Chronic pain with initiation of urination. UA 9/18 unremarkable and he reports no change in symptoms since collection. Advised to monitor and alert us if symptoms change.     NEURO  Increase in fatigue- sleeping 16-18 hours a day. Low hgb and poor PO intake likely contributing. Recommended ED presentation, as above.     Rosa Isela Montgomery PA-C  [facility]  [address] [place] [address] [phone]

## 2020-10-16 NOTE — LETTER
"    10/16/2020         RE: Kenneth Tello  [address]  [facility] [id number]        Dear Colleague,    Thank you for referring your patient, Kenneth Tello, to the [facility]. Please see a copy of my visit note below.    Kenneth Tello is a 50 year old male who is being evaluated via a billable video visit.      The patient has been notified of following:     \"This video visit will be conducted via a call between you and your physician/provider. We have found that certain health care needs can be provided without the need for an in-person physical exam.  This service lets us provide the care you need with a video conversation.  If a prescription is necessary we can send it directly to your pharmacy.  If lab work is needed we can place an order for that and you can then stop by our lab to have the test done at a later time.    Video visits are billed at different rates depending on your insurance coverage.  Please reach out to your insurance provider with any questions.    If during the course of the call the physician/provider feels a video visit is not appropriate, you will not be charged for this service.\"    Patient has given verbal consent for Video visit? Yes    How would you like to obtain your AVS? MyChart     If you are dropped from the video visit, the video invite should be resent to: Text to cell phone: [phone]     Will anyone else be joining your video visit? No         Vitals - Patient Reported  Weight (Patient Reported): (doesn't know weight)  Height (Patient Reported): 175.3 cm (5' 9.02\")  Pain Score: No Pain (0)    Patient hasn't had any appetite since Sunday. Has had constant nausea and has ate very little since Sunday. Tries to force self to eat the past 6 days, but isn't working. Increased fatigue. Not constipated and urine is normal.    Jc Deleon LPN    Video-Visit Details    Type of service:  Video Visit    Video Start Time: 10:08 " AM  Video End Time: 10:26 AM    Originating Location (pt. Location): Home    Distant Location (provider location):  [facility]     Platform used for Video Visit: Vesna Montgomery PA-C      Oncology/Hematology Visit Note  Oct 16, 2020    Reason for Visit: Follow up of diffuse large B cell lymphoma (DLBCL)    History of Present Illness:   Mr. Tello is a 50 year old man with DLBCL.  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  DLBCL-IPI was high (stage, extranodal sites, LDH) and clinical stage IV with extranodal involvement.  He was started on R-CHOP in 03/2020 with IT methotrexate planned with each cycle for CNS prophylaxis.  Medical history also notable for A fib on anticoagulation, myocardial infarction, and stroke in 2017.  Course has been complicated by constipation and worsening abdominal pain in 08/2020 after completion of treatment.  Imaging demonstrated enlarging mass invading the pancreas and spleen.  Biopsy confirmed refractory DLBCL.  He received salvage chemotherapy with 1 cycle of R-ICE that was complicated by neurotoxicity that resolved and then 1 cycle of R-DHAP complicated by cisplatin-associated EMILY and electrolyte problems that are improving.  PET/CT after R-ICE x 1 and R-DHAP x 1 showed a mixed response.   10/1/2020- started polatuzumab, bendamustine, and rituximab    Interval History:  Kenneth Tello was met with for follow up.  - He has not eaten since Sunday. He states that on Monday he developed nausea. He took Zofran that slightly helped, but has not taken since. Whenever he tried to eat he feels like he is going to throw up so he has been limiting his oral intake. Has been trying to stay hydrated. Notes only mild stomach pain. Feels bowels are moving  regularly and denies that they are dark or red. No other bleeding. Noting persistent hiccups. Taking PPI. Denies lightheadedness/dizziness. No headaches. Sleeping 16-18 hours a day. Remains on blood thinner.     Review of Systems:  Patient denies fevers, chills,  headaches, dizziness, vision or hearing changes, new lumps or bumps, chest pain, shortness of breath, cough,  changes to  bladder, swelling of extremities, bleeding issues, or rash.    Current Outpatient Medications   Medication Sig Dispense Refill     acetaminophen (TYLENOL) 325 MG tablet Take 2 tablets (650 mg) by mouth every 4 hours as needed for mild pain       acyclovir (ZOVIRAX) 400 MG tablet Take 1 tablet (400 mg) by mouth 2 times daily Viral Prophylaxis. 60 tablet 5     acyclovir (ZOVIRAX) 400 MG tablet Take 1 tablet (400 mg) by mouth 2 times daily 60 tablet 1     allopurinol (ZYLOPRIM) 300 MG tablet Take 1 tablet (300 mg) by mouth daily 30 tablet 0     atorvastatin (LIPITOR) 40 MG tablet Take 1 tablet (40 mg) by mouth daily 30 tablet 0     calcium carb-cholecalciferol (OS-STACY) 500-200 MG-UNIT tablet Take 1 tablet by mouth 2 times daily (with meals) 60 tablet 0     calcium carbonate (TUMS) 500 MG chewable tablet Take 1 tablet (500 mg) by mouth 3 times daily 15 tablet 0     carvedilol (COREG) 12.5 MG tablet Take 12.5 mg by mouth 2 times daily (with meals)       gabapentin (NEURONTIN) 300 MG capsule Take 2 tabs in the morning, 1 tab at bedtime 90 capsule 1     heparin lock flush 10 UNIT/ML SOLN injection 5-10 mLs by Intracatheter route every 24 hours 140 mL 1     lisinopril (ZESTRIL) 20 MG tablet Take 1 tablet (20 mg) by mouth daily 30 tablet 1     LORazepam (ATIVAN) 0.5 MG tablet Take 1 tablet (0.5 mg) by mouth every 4 hours as needed (Anxiety, Nausea/Vomiting or Sleep) 30 tablet 5     magnesium oxide (MAG-OX) 400 (241.3 Mg) MG tablet Take 1 tablet (400 mg) by mouth 2 times daily 60 tablet 0     morphine (MS CONTIN) 15 MG CR tablet Take 1 tablet (15  mg) by mouth every 12 hours in conjunction with a 30mg tablet for a total dosage of 45mg every 12 hours. 60 tablet 0     morphine (MSIR) 15 MG IR tablet Take 1 tablet (15 mg) by mouth every 4 hours as needed for moderate to severe pain 15 tablet 0     omeprazole (PRILOSEC) 40 MG DR capsule Take 1 capsule (40 mg) by mouth daily 30 capsule 1     ondansetron (ZOFRAN) 4 MG tablet Take 1 tablet (4 mg) by mouth every 8 hours as needed for nausea 30 tablet 0     ondansetron (ZOFRAN) 8 MG tablet Take 1 tablet (8 mg) by mouth every 8 hours as needed for nausea (vomiting) 30 tablet 5     polyethylene glycol (MIRALAX) 17 g packet Take 1 packet by mouth daily as needed for constipation       potassium chloride ER (KLOR-CON M) 20 MEQ CR tablet Take 2 tablets (40 mEq) by mouth 2 times daily 120 tablet 0     prednisoLONE acetate (PRED FORTE) 1 % ophthalmic suspension Place 2 drops into both eyes 4 times daily until 9/4 1 Bottle 0     prochlorperazine (COMPAZINE) 10 MG tablet Take 1 tablet (10 mg) by mouth every 6 hours as needed (Nausea/Vomiting) 30 tablet 5     rivaroxaban ANTICOAGULANT (XARELTO ANTICOAGULANT) 20 MG TABS tablet Take 1 tablet (20 mg) by mouth daily (with dinner) PLEASE HOLD UNTIL TOLD TO RESUME BY CLINIC 30 tablet 3     senna-docusate (SENOKOT-S/PERICOLACE) 8.6-50 MG tablet Take 1-2 tablets by mouth 2 times daily as needed for constipation 10 tablet 0     sodium chloride, PF, 0.9% PF flush 10-20 mLs by Intracatheter route every 24 hours 280 mL 0     sulfamethoxazole-trimethoprim (BACTRIM DS) 800-160 MG tablet Take 1 tablet by mouth Every Mon, Tues two times daily 16 tablet 5       Physical Examination:  There were no vitals taken for this visit.  Wt Readings from Last 10 Encounters:   10/01/20 87.1 kg (192 lb 1.6 oz)   09/19/20 86.8 kg (191 lb 4.8 oz)   09/03/20 97.5 kg (215 lb)   09/02/20 96.8 kg (213 lb 6.4 oz)   08/28/20 87.7 kg (193 lb 4.8 oz)   08/11/20 89.6 kg (197 lb 8 oz)   08/10/20 92 kg (202 lb 12.8 oz)    08/05/20 91.9 kg (202 lb 9.6 oz)   07/31/20 90.3 kg (199 lb)   07/29/20 89.8 kg (198 lb)     Telephone visit was done today  Voice sounded strong, able to follow thought processes, did not sound to be in acute distress    Laboratory Data:  Results for RICKEY WATKINS (MRN [id number]) as of 10/16/2020 18:26   Ref. Range 10/14/2020 00:00   Sodium Latest Units: mmol/L 131   Potassium Latest Units: mmol/L 5.4   Chloride Latest Units: mmol/L 100   Carbon Dioxide Latest Units: mmol/L 25   Urea Nitrogen Latest Units: mg/dL 19   Creatinine Latest Units: mg/dL 1.36   GFR Estimate Latest Units: ml/min/1.73m2 59   Calcium Latest Units: mg/dL 8.50   Anion Gap Latest Units: mmol/L 6   Glucose Latest Ref Range: 70 - 99 mg/dL 104.0 (A)   WBC Latest Units: 10^9/L 4.7   Hemoglobin Latest Ref Range: 13.3 - 17.7 g/dL 7.1 (A)   Hematocrit Latest Units: % 22.1   Platelet Count Latest Units: 10^9/L 52   RBC Count Latest Units: 10^12/L 2.32   MCV Latest Units: fl 95   MCH Latest Units: pg 30.6   MCHC Latest Units: g/dL 32.1   RDW Latest Units: % 19.6   % Neutrophils Latest Units: % 83.7   % Lymphocytes Latest Units: % 0.15   % Monocytes Latest Units: % 12.1   % Eosinophils Latest Units: % 0.8   % Basophils Latest Units: % 0.2   Absolute Neutrophil Unknown 3.94   Absolute Lymphocytes Unknown 0.15   Absolute Monocytes Unknown 0.57   Absolute Eosinophils Unknown 0.04   Absolute Basophils Unknown 0.0     Outside report today: Hg 7.0, Cr 1.3, plt 47k    Assessment and Plan:   ONC  DLBCL  - PET/CT after 1 cycle R-ICE and 1 cycle R-DHAP shows a mixed response. His lymphoma remains chemo-refractory - unlikely that more conventional salvage chemo is likely to lead to an adequate response for autoBMT consolidation.  He is not eligible for any of our clinical trials at this time.    - Started polatuzumab, bendamustine, and rituximab (nell-BR) 10/1/2020- tolerated well previously but now with nausea, poor PO intake, sleeping 16-18 hours a day,  and declining hgb despite transfusion yesterday. Recommended to present to the ED for work up. Follow up next week if out of the hospital   -  Clinical trials or other salvage treatment could be considered in the future.      HEME  Cytopenias- treatments contributing. Will check weekly. Will transfuse for hgb <7.5 or symptomatic, plt <10.   - Hgb 7.0 today despite 1 unit of blood yesterday for hgb of 7.1. Plt 47k. Concern for bleed somewhere. Recommended to present to ED for transfusion and further work up. Advised to hold   rivaroxaban     ID  Off fluconazole and levofloxacin- start if ANC <1.0.   On Bactrim for PCP/PJP prophylaxis and acyclovir.    - We will defer pneumococcal or other vaccination after he has competed all planned treatment.     Nephro  H/o Cisplatin associated electrolyte issues- will continue to monitor weekly. Noted increase in Cr to 1.3 this week- suspect dehydration contributing. Advised to present to ED for IVF.   - On oral magnesium 2 per day and calcium+D. Will attempt to see labs from outside lab and replace if needed.     Cards  A fib with history of CVA.  Continue rivaroxaban  while platelets are >50,000/mcL and hold when platelets are <50,000/mcL or for any procedures.    MSK  Left knee weakness- residual from stroke. Will refer to PT    GI  Fluctuant bowel habits.  Uses senna and miralax prn for constipation.     Hiccups: can try baclofen, sent to pharmacy while awaiting repeat labs today.     Uncontrolled nausea, no PO intake the past 6 days- he has not been optimizing anti emetics. Asked to take Zofran every 8 hours and compazine alternating with ativan every 3 hours as needed. Hopeful this will be addressed in ED.       Chronic pain with initiation of urination. UA 9/18 unremarkable and he reports no change in symptoms since collection. Advised to monitor and alert us if symptoms change.     NEURO  Increase in fatigue- sleeping 16-18 hours a day. Low hgb and poor PO intake likely  contributing. Recommended ED presentation, as above.     Rosa Isela Montgomery PA-C  [facility]  [address] [place] [address] [phone]

## 2020-10-16 NOTE — TELEPHONE ENCOUNTER
Refill Request    Date of most recent appointment:  10/16/20  Next upcoming appointment:   TBD    Medication requested:  morphine (MS CONTIN) 15 MG CR tablet  Quantity:  60 tablets  Last fill date:  9/1/20  Person requesting refill:  Pharmacy sent in fax request for refill  Notes: unable to check , not a listed delegate for provider.

## 2020-10-16 NOTE — TELEPHONE ENCOUNTER
[facility] lab calling with critical lab values. Plts 47K and Hgb 7.0. Had 1 unit blood yesterday. Lab has contacted  to determine if with get blood today or tmrw    Paged to Rosa Isela Montgomery PA-C @[phone] who acknowledges findings. Rosa Isela will call [facility].

## 2020-10-16 NOTE — PROGRESS NOTES
"Writer still having difficulty getting labs from [place], [place]. Ongoing issue. Kenneth met with Rosa Isela today and is not feeling/doing well. Writer had left him a message earlier in week and then not able to reach stating if he was not feeling well to call. No call. Did receive blood yesterday for Hgb of 7.1 in [place]. . He will have repeat labs today and they will both e-mail and fax the results as soon as they get resulted.     Talked with Kenneth. He says he \"feels OK\" , just not able to eat. When asked why he did not call writer he replied, \"don't notice anything\". He says he was feeling fantastic until Sunday. Since Sunday he has not been able to eat. Does not know what made him turn. Able to take liquids, no emesis, just dry hiving at times. Passing gas and liquid brown stool. Some abdominal pain, more of abdominal cramping. Rates at a 3-4. Denies it being hard or distended. Denies any lightheadedness or dizziness. Sleeps a lot. Has not fainted or passed out. Says he does not know why his Hgb is low again. He wished he knew he is getting tired of getting blood. When suggested he might need to go to the ER, he does not want to because he wants to go to his son's football game tonight. \"don't know how many more I'll get to go to\". He watches it from the car.     Labs received via e-mail, forwarded to Rosa Isela Montgomery. Do not look that bad. Rosa Isela will call Kenneth with a plan. Writer will continue to follow.     10/19/2020-per Kenneth, he went to the ER on Saturday. His \"numbers\" were better so they did not give hm anything. Says he is feeling somewhat better. Has heartburn which he associated with a  hamburger he was craving and ate this weekend. Also associates the hiccups with eating. Has baclofen and Prilosec for the above. He will get more labs today or tomorrow. Osei will send us the ER report from Saturday with associated labs.   "

## 2020-10-19 NOTE — TELEPHONE ENCOUNTER
Critical, platelet count: 42,000.  Hgb was 8.0., WBC was up to 10    Hematology is primary.    DATE:  10/19/2020   TIME OF RECEIPT FROM LAB:  5:32pm  LAB TEST:  Platsundar  LAB VALUE:  42,000  RESULTS GIVEN WITH READ-BACK TO (PROVIDER):  Dr. Ovalle paged at 5:46pm  TIME LAB VALUE REPORTED TO PROVIDER:   6:03pm.      Advised to route lab values to the clinic, no action needed tonight, due to patient's history and review of lab results in the last week.    Alaina Bryan RN on 10/19/2020 at 6:03 PM        
165.1

## 2020-10-20 PROBLEM — I63.9 CEREBROVASCULAR ACCIDENT (CVA), UNSPECIFIED MECHANISM (H): Status: ACTIVE | Noted: 2020-01-01

## 2020-10-20 PROBLEM — G93.40 ENCEPHALOPATHY: Status: ACTIVE | Noted: 2020-01-01

## 2020-10-20 PROBLEM — C83.398 DIFFUSE LARGE B-CELL LYMPHOMA OF EXTRANODAL SITE: Status: ACTIVE | Noted: 2020-01-01

## 2020-10-20 PROBLEM — I48.92 ATRIAL FLUTTER, UNSPECIFIED TYPE (H): Status: ACTIVE | Noted: 2020-01-01

## 2020-10-20 PROBLEM — M62.81 GENERALIZED MUSCLE WEAKNESS: Status: ACTIVE | Noted: 2020-01-01

## 2020-10-20 PROBLEM — N17.9 ACUTE KIDNEY INJURY (H): Status: ACTIVE | Noted: 2020-01-01

## 2020-10-20 NOTE — PROGRESS NOTES
"Received a call from Kenneth's wife, Cullen this am early asking what they should do. Writer called her back. She reports that they were in the ER until early this am. Difficult to fully understand what went on. Writer did then call the ER to get more details. Per wife, Kenneth in the background, Kenneth has been sleeping more. He actually slept though writers calls yesterday and his labs. When he is awake he is \"zoned\" out. He went to get labs yesterday after writer called him, then went home. Sometime later last night they brought him to the ER after he was having hallucinations, trouble walking, and slurred speech. At one point he tried to find the clinic/hospital which is right across the street, drove around the block, could not find it and went home. [facility] but [facility] refused, did not want to ride in an ambulance again. The would come up in the am.     Denies any visual problems or headaches. No fevers. More unsteady/difficulty walking, denies more weakness on one side or another. Face symmetrical. Pain in two areas, abdomen and back. Tested for COVID in [place] ER-negative. Plts in 30's, Hgb 7.3-per Kenneth.     CT brain done 10/19/20, shows some possible acute changes/infarct, non hemorraghic on right, (per verbal report only) they will bring the CD and report with them. Also old areas of infarct, hx of old stroke 2-3 years ago with left sided weakness.     Writer talked to Doctor Cary and he should go to the ER to be evaluated not direct admit this am. Writer called Cullen and Kenneth with he above.  Instructed them if Kenneth ran into more medical problems on the way they need to pull off and dial 911. They will leave shortly and expected to arrive around noon. Report called to [place] [facility].     Writer will continue to follow.   "
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
## 2020-10-20 NOTE — LETTER
[facility] [address] [address]  [phone]    FACSIMILE TRANSMITTAL SHEET    TO: [facility]   COMPANY: [facility]  FAX NUMBER: [phone]  PHONE NUMBER:      FROM: YONATAN Dsouza   PHONE: [phone]  DATE: 10/24/20  NUMBER OF PAGES:     _____URGENT _____REVIEW ONLY _____PLEASE COMMENT____PLEASE REPLY    NOTES/COMMENTS:   Patient would like to use your services for ordered Home PT and OT. Please call patient to set up. Anticipated discharge date in 10/25/20.                                        IF YOU DID NOT RECEIVE THE CORRECT NUMBER OF PAGES OR THE FAX DID NOT COME THROUGH CLEARLY, PLEASE CALL THE SENDER     CONFIDENTIALITY STATEMENT: Confidential information that may accompany this transmission contains protected health information under state and federal law and is legally privileged. This information is intended only for the use of the individual or entity named above and may be used only for carrying out treatment, payment or other healthcare operations. The recipient or person responsible for delivering this information is prohibited by law from disclosing this information without proper authorization to any other party, unless required to do so by law or regulation. If you are not the intended recipient, you are hereby notified that any review, dissemination, distribution, or copying of this message is strictly prohibited. If you have received this communication in error, please destroy the materials and contact us immediately by calling the number listed above. No response indicates that the information was received by the appropriate authorized party

## 2020-10-21 NOTE — PROGRESS NOTES
"CLINICAL NUTRITION SERVICES - ASSESSMENT NOTE     Nutrition Prescription    RECOMMENDATIONS FOR MDs/PROVIDERS TO ORDER:  Would consider trial of appetite stimulant (unless contraindicated d/t altered mental status), to help increase po intakes.    Malnutrition Status:    Unable to determine at this time    Recommendations already ordered by Registered Dietitian (RD):  Order daily multivitamin with mineral supplement to ensure micronutrient needs met.  Order lab add on to check Mg for review    Future/Additional Recommendations:  - Monitor level of alertness and need for SLP consult to determine if pt safe for oral intakes.  - If results of calorie counts meeting < 75% of po intakes (< 1575 calories and 75 g PRO), recommend nutrition initiating nutritional support (TF vs PN), pending GI status and GOC.     REASON FOR ASSESSMENT  Kenneth Tello is a/an 50 year old male assessed by the dietitian for Provider Order - Malnutrition, poor oral intake    NUTRITION HISTORY  Unable to obtain from pt via phone at this time. Per H&P, pt admitted with altered mental status over past several days PTA, in addition to reduced appetite with intermittent N/V.    CURRENT NUTRITION ORDERS  Diet: Regular with Boost Plus shakes betw meals  Intake/Tolerance: Poor per RN, despite pt receiving assistance with feeding. Informed that pt has no desire to eat. Able to take pills orally without difficulty.    Calorie counts order to start today    LABS  K+ trending downward from 5.8 (high) on 10/20 to 5.2 today  PO4 WNL today, No Mg ordered yet this admission  Negative Ketones on admit    MEDICATIONS  Medications reviewed    ANTHROPOMETRICS  Height: 0 cm (Data Unavailable). Height of 175.3 cm (5'9\") per chart review  Most Recent Weight: 83.6 kg (184 lb 6.4 oz) -today's (lowest wt this admission).  IBW: 72.7 kg  BMI: Overweight BMI 25-29.9  Weight History: Previous admit wt of 90.4 kg (199 lbs) on 8/31 admission and 85.9 kg on 9/18 admission. " Current wt down 6.8 kg (7.5% loss) x past 6 weeks.   Wt Readings from Last 10 Encounters:   10/21/20 83.6 kg (184 lb 6.4 oz)   10/01/20 87.1 kg (192 lb 1.6 oz)   09/19/20 86.8 kg (191 lb 4.8 oz)   09/03/20 97.5 kg (215 lb)   09/02/20 96.8 kg (213 lb 6.4 oz)   08/28/20 87.7 kg (193 lb 4.8 oz)   08/11/20 89.6 kg (197 lb 8 oz)   08/10/20 92 kg (202 lb 12.8 oz)   08/05/20 91.9 kg (202 lb 9.6 oz)   07/31/20 90.3 kg (199 lb)       Dosing Weight: 84 kg (based on lowest wt of 83.6 kg on 10/21)    ASSESSED NUTRITION NEEDS  Estimated Energy Needs: 5027-5258 kcals/day (25 - 30 kcals/kg)  Justification: Maintenance  Estimated Protein Needs: 100-125 grams protein/day (1.2 - 1.5 grams of pro/kg)  Justification: Hypercatabolism with acute illness  Estimated Fluid Needs: (1 mL/kcal)   Justification: Maintenance    PHYSICAL FINDINGS  See malnutrition section below.  Loose stools x 2 today --> C.diff ordered   Poor dentition    MALNUTRITION  % Intake: Unable to assess  % Weight Loss: > 5% in 1 month (severe)  Subcutaneous Fat Loss: Unable to assess  Muscle Loss: Unable to assess  Fluid Accumulation/Edema: None noted  Malnutrition Diagnosis: Unable to determine due to unable to complete all parameters of nutrition assessment at this time    NUTRITION DIAGNOSIS  Unintended weight loss related to question inconsistent nutritional intakes d/t illness, poor appetite, lethary as evidenced by 6.8 kg (7.5% loss) x past 6 weeks.       INTERVENTIONS  Implementation  Nutrition Education: No education needs assessed at this time   Collaboration with RN regarding pt's     Goals   Ave po intakes per calorie counts x 3 days to meet at least 75% of po intakes (1575 calories and 75 g PRO)     Monitoring/Evaluation  Progress toward goals will be monitored and evaluated per protocol.    Michelle Perez RD,LD  7D pager [phone]

## 2020-10-21 NOTE — PLAN OF CARE
VSS. Afebrile. O2 saturations >92% on 3 LPM by NC. No nonverbal indicators of pain. Patient intermittently disoriented to situation but otherwise A&O x3. Lethargic/somnolent; arouses to voice and touch. Inconsistently follow commands; able to move extremities against gravity but jerking movements noted; per conversation with provider he has baseline tremors. Intermittently unable to track with eyes; eyes intermittently appear dysconjugate; provider aware (see provider notification note). Float ICU RN assessed patient; did not escalate further.     R PICC: infusing NS @ 125 ml/hr. Intermittent cough when encouraged; poor force; congested cough but patient unable to produce sputum, appears to swallow it. Bed alarm on for safety. Urinal at bedside.

## 2020-10-21 NOTE — PROGRESS NOTES
Brief Crosscover note    Nursing notified me of 2 very large extremely loose stools, wondering if we could order a cdiff study. Patient not reporting abdominal pain per nursing. Given recent decreased PO intake and confusion with diarrhea, I think it's reasonable to assess Cdiff, ordered.     Caitlyn Morrow PA-C  Hematology/Oncology  Pager # [phone]  Phone # [phone]

## 2020-10-21 NOTE — PROVIDER NOTIFICATION
Gladis ([id number]) paged that patient appears to have jerking/tremor movements in extremities when held to gravity.     Spoke with provider on the phone. Described the patient's movements in his bilateral upper and lower extremities as tremors/shaking/jerking when his arms or legs are not resting flat on the bed. Provider then informed writer that the patient exhibits this at baseline. Also described to provider that patient's appears to intermittently have dysconjugate extraocular movement (eyes go in opposite directions) when patient is awake and interacting with staff. Provider asked to be updated with any further changes or concerns.     Writer called float ICU RN and asked them to assess patient, specifically with regard to intermittent dysconjugate extraocular movement. Float ICU RN assessed patient, noting that the patient's extraocular movement was conjugate at the time of assessment.
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
## 2020-10-21 NOTE — PLAN OF CARE
Kenneth has been afe with IVAB and B/P 100/50's  Neuro status unchanged throughout am.  Oriented to self but inconsistent with orientation to time and place.  Is disoriented to situation.  Upper extremities with purposeful movement but also with tremors and jerking.  Up with SBA of 2 without difficult.  In bed with alarm on.  Pupils 2mm and little and slow response - assessed by MD and neuro.  Is slow to respond but will answer questions. No urine output noted over night and unable to void   Bladder scan 689 and str cath for 1000 ml.  Hgb 6.3 and received 1URBC.  PT/OT ordered.  LS coarse in bases, O2 at 1 1/2 L with sats 93%.  Pt with large watery stool - MD aware of this.
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
## 2020-10-21 NOTE — PROVIDER NOTIFICATION
Paged [phone]: lab called and stated sputum test that was sent in ED needs to be canceled d/t oral contamination. Do you want to order another test for PCU collect?    Provider placed order for new sputum collection

## 2020-10-22 NOTE — PLAN OF CARE
Kenneth remains afe and IVAB stopped today.  LS diminished in bases and sat 95% on RA.  More alert today and oriented X4.  Steady on feet and walked halls with SBA.  Continue to use bed alarm. Hgb 6.8 and received 1 URBC.  Not eating -takes 2 bites of food and c/o heartburn.  PA aware of this.  Loose stool X3 - CDiff was neg.  Able to spont void today with PVR scan 15cc.  Red area on lower back from bedpan yesterday.
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
## 2020-10-22 NOTE — PROGRESS NOTES
Calorie Count  Intake recorded for: 10/21  Total Kcals: 290 Total Protein: 0g  Kcals from Hospital Food: 290  Protein: 0g  Kcals from Outside Food (average):0 Protein: 0g  # Meals Ordered from Kitchen: 1 meal + food from nursing floor  # Meals Recorded: 100% pudding, 8 oz apple juice, popsicle - from nursing floor  # Supplements Recorded: 0
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
## 2020-10-22 NOTE — CONSULTS
Care Management Initial Consult    General Information  Assessment completed with: Patient, VM-chart review   Type of CM/SW Visit: Initial Assessment     Reason for Consult: Care coordination, Discharge Planning  Advance Care Planning: Reviewed: Yes         Communication Assessment  Patient's communication style: spoken language (English or Bilingual)  Hearing Difficulty or Deaf: no Wear Glasses or Blind: no    Cognitive  Cognitive/Neuro/Behavioral: WDL  Level of Consciousness: alert  Arousal Level: arouses to voice  Orientation: oriented x 4  Mood/Behavior: calm  Best Language: 0 - No aphasia  Speech: clear    Living Environment:   People in home: Spouse, Children   Current living Arrangements: House          Family/Social Support:  Care provided by: Self, spouse/significant other  Provides care for: No one           Description of Support System: Supportive, Involved    Current Resources:   Skilled Home Care Services: N/A  Community Resources: None  Equipment currently used at home: None  Supplies currently used at home: None    Employment:  Employment Status: Unemployed        Financial/Environmental Concerns: No concerns identified          Lifestyle & Psychosocial Needs:        Socioeconomic History     Marital status:      Spouse name: Not on file     Number of children: Not on file     Years of education: Not on file     Highest education level: Not on file   Occupational History     Occupation:      Tobacco Use     Smoking status: Former Smoker     Packs/day: 1.00     Years: 2.00     Pack years: 2.00     Types: Cigarettes     Start date: 3/10/2017     Quit date: 2020     Years since quittin.8     Smokeless tobacco: Current User     Types: Chew     Tobacco comment: daily chew use x39 years, now intermittent   Substance and Sexual Activity     Alcohol use: Yes     Alcohol/week: 1.0 - 2.0 standard drinks     Types: 1 - 2 Cans of beer per week     Drinks per session: 1 or 2     Binge  frequency: Less than monthly     Comment: 1 beer daily, occasional >2 drinks/episode socially     Drug use: Never       Functional Status:  Prior to admission patient needed assistance: N/A        Mental Health Status: WDL           Values/Beliefs:  Spiritual, Cultural Beliefs, [employer] Practices, Values that affect care: No               Additional Information:    Per chart review, PT/OT is recommending home with assist and OP PT/OT at discharge.     Per team, patient could possibly discharge tomorrow. Writer was asked to arrange a local lab appointment on 10/26/20 for CBC w/diff.     Per chart review, patient goes to [facility], Phone: [phone], Fax: [phone] for lab draws. Order received and faxed. AVS updated.     RNCC will follow.     Radha Castrejon RN, BSN, PHN  Care Coordinator   P: [phone], [facility]

## 2020-10-22 NOTE — PLAN OF CARE
D AVSS with sat's 95% on room air. Heart regular and lungs decreased in bases otherwise clear. Voiced generalized pain which has been controlled with Tylenol and has denied nausea. See neg Covid test but unable to run Cdiff test. Voiding good amounts of icteric urine with minimal residual.   I Vital's, assessment and med's per order.   A Resting in bed with call light in reach.   P Continue to monitor and update MD with changes.
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
## 2020-10-23 NOTE — PLAN OF CARE
[id number]    Pt was afebrile with a max BP of 151/96, rechecked to be 145/76, provider notified. OVSS on RA. Morphine PO given x2 for back pain with some relief. 2 emesis episodes, zofran given x1. 2 new red wounds found surrounding coccyx and right hip. Pt would benefit from WOC consult. LR was discontinued per MAR. PICC heparin locked. PTA med consult ordered this shift per daughter Devika, calling and informing staff of recent dose changes. No BM this shift. Enteric precautions removed, positive fecal lactoferrin result requires standard precautions per phone call with Infection Prevention. Bladder scan residuals did not meet order parameters. Calorie Counts. Poor appetite, only ate skim milk and corn flakes. Hgb recheck in AM. Bed alarm on. A/Ox4. SBA. Continue with plan of care.

## 2020-10-23 NOTE — PLAN OF CARE
[id number]: Hypertensive this AM, improved after AM meds. Other VSS on RA. Continues to have L shoulder/back pain, managed adequately today with 15 mg IR morphine q4h (given x3 this shift), and restarted 15 mg MS contin. Mild nausea and reflux all day, managed somewhat with zofran x1, scheduled tums. PICC patent. Voiding adequately, pt not saving but declines bladder scan as he felt that he was emptying well. Continue with POC.

## 2020-10-23 NOTE — PHARMACY-ADMISSION MEDICATION HISTORY
Admission medication history interview status for the 10/20/2020 admission is complete. See Epic admission navigator for allergy information, pharmacy, prior to admission medications and immunization status.     Medication history interview sources:  Patient's daughter Devika and medication bottles.    Changes made to PTA medication list (reason)  Added: none  Deleted: Duplicate acyclovir and ondansetron entries.   Changed:   - Tums changed to PRN.   - Patient hasn't taken lisinopril for more than a month per his daughter, because his BP has been low.  - MgOx changed from BID to QID.   - Mirtazapine clarified as 15mg at bedtime. Recently started 10/18/20.  - MS Contin updated to a total of 45mg (15mg + 30mg tabs) BID.   - Ondansetron updated to scheduled TID recently.  - Xarelto is currently on HOLD out-pt per his clinic.   - Senna/docusate updated to 1 tablet bid PRN.       Additional medication history information (including reliability of information, actions taken by pharmacist): Will leave in sign out to make the hematology team aware of the updates to his PTA medication list.       Prior to Admission medications    Medication Sig Last Dose Taking? Auth Provider   acyclovir (ZOVIRAX) 400 MG tablet Take 1 tablet (400 mg) by mouth 2 times daily Viral Prophylaxis.  Yes Avery Cary MD   allopurinol (ZYLOPRIM) 300 MG tablet Take 1 tablet (300 mg) by mouth daily  Yes Avery Cary MD   atorvastatin (LIPITOR) 40 MG tablet Take 1 tablet (40 mg) by mouth daily  Yes Nicole Giraldo PA-C   baclofen (LIORESAL) 10 MG tablet Take 1 tablet (10 mg) by mouth 3 times daily  Yes Rosa Isela Montgomery PA-C   calcium carb-cholecalciferol (OS-STACY) 500-200 MG-UNIT tablet Take 1 tablet by mouth 2 times daily (with meals)  Yes Avery Cary MD   calcium carbonate (TUMS) 500 MG chewable tablet Take 1 tablet (500 mg) by mouth 3 times daily  Patient taking differently: Take 1 chew tab by mouth 3 times daily as needed    Yes Mallorie Mccabe PA-C   carvedilol (COREG) 12.5 MG tablet Take 12.5 mg by mouth 2 times daily (with meals)  Yes Unknown, Entered By History   gabapentin (NEURONTIN) 300 MG capsule Take 2 tabs in the morning, 1 tab at bedtime  Yes Rosa Isela Montgomery PA-C   heparin lock flush 10 UNIT/ML SOLN injection 5-10 mLs by Intracatheter route every 24 hours  Yes Avery Cary MD   magnesium oxide (MAG-OX) 400 (241.3 Mg) MG tablet Take 1 tablet (400 mg) by mouth 2 times daily  Patient taking differently: Take 400 mg by mouth 4 times daily  Past Week at Unknown time Yes Avery Cary MD   mirtazapine (REMERON) 15 MG tablet Take 15 mg by mouth At Bedtime Started 10/18/20per daughter and caretaker, Devika   Yes Reported, Patient   morphine (MS CONTIN) 15 MG CR tablet Take 1 tablet (15 mg) by mouth every 12 hours in conjunction with a 30mg tablet for a total dosage of 45mg every 12 hours.  Patient taking differently: Take 45 mg by mouth every 12 hours in conjunction with a 30mg tablet for a total dosage of 45mg every 12 hours.  Yes Rosa Isela Montgomery PA-C   morphine (MSIR) 15 MG IR tablet Take 1 tablet (15 mg) by mouth every 4 hours as needed for moderate to severe pain  Yes Carol Zabala PA-C   omeprazole (PRILOSEC) 40 MG DR capsule Take 1 capsule (40 mg) by mouth daily  Yes Carol Zabala PA-C   ondansetron (ZOFRAN) 4 MG tablet Take 1 tablet (4 mg) by mouth every 8 hours as needed for nausea  Patient taking differently: Take 4 mg by mouth 3 times daily  Past Week at Unknown time Yes Carol Zabala PA-C   polyethylene glycol (MIRALAX) 17 g packet Take 1 packet by mouth daily as needed for constipation  Yes Reported, Patient   potassium chloride ER (KLOR-CON M) 20 MEQ CR tablet Take 2 tablets (40 mEq) by mouth 2 times daily  Yes Rosa Isela Montgomery PA-C   senna-docusate (SENOKOT-S/PERICOLACE) 8.6-50 MG tablet Take 1-2 tablets by mouth 2 times daily as needed for constipation  Patient taking differently:  Take 1 tablet by mouth 2 times daily as needed for constipation   Yes Tara Anthony PA-C   sodium chloride, PF, 0.9% PF flush 10-20 mLs by Intracatheter route every 24 hours  Yes Mallorie Mccabe PA-C   sulfamethoxazole-trimethoprim (BACTRIM DS) 800-160 MG tablet Take 1 tablet by mouth Every Mon, Tues two times daily  Yes Avery Cary MD   acetaminophen (TYLENOL) 325 MG tablet Take 2 tablets (650 mg) by mouth every 4 hours as needed for mild pain   Tara Anthony PA-C   lisinopril (ZESTRIL) 20 MG tablet Take 1 tablet (20 mg) by mouth daily More than a month at Unknown time  Carol Zabala PA-C   LORazepam (ATIVAN) 0.5 MG tablet Take 1 tablet (0.5 mg) by mouth every 4 hours as needed (Anxiety, Nausea/Vomiting or Sleep)   Avery Cary MD   prednisoLONE acetate (PRED FORTE) 1 % ophthalmic suspension Place 2 drops into both eyes 4 times daily until 9/4   Carol Zabala PA-C   prochlorperazine (COMPAZINE) 10 MG tablet Take 1 tablet (10 mg) by mouth every 6 hours as needed (Nausea/Vomiting)   Avery Cary MD   rivaroxaban ANTICOAGULANT (XARELTO ANTICOAGULANT) 20 MG TABS tablet Take 1 tablet (20 mg) by mouth daily (with dinner) PLEASE HOLD UNTIL TOLD TO RESUME BY CLINIC   Avery Cary MD         Medication history completed by: Roque Iraheta, AnderD
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
## 2020-10-23 NOTE — PROGRESS NOTES
Care Management Follow Up Note    Length of Stay (days) 3    Patient plan of care discussed at Interdisciplinary Rounds: Yes  Expected Discharge Date: 10/24/20  Concerns to be Addressed: OP Lab appointment       Anticipated Discharge Disposition: Home    Anticipated Discharge Services: OP infusion services   Anticipated Discharge DME:  None    Plan:    Per team, patient will likely discharge tomorrow. Writer was given an additional lab order for BMP to also be drawn on 10/26/20. Order faxed ([phone]) to [facility] and [facility].    Writer called ([phone]) and spoke with Loren at [facility]. Lab orders received and they will plan to see patient at 11am on 10/26.     AVS updated. RNCC will follow as needed.    Radha Castrejon RN, BSN, PHN  Care Coordinator   P: [phone], [facility]

## 2020-10-23 NOTE — PROGRESS NOTES
Calorie Count    Intake recorded for: 10/22/2020  Total Kcals: 170 Total Protein: 10g    Kcals from Hospital Food: 170   Protein: 10g    Kcals from Outside Food (average):0 Protein: 0g    # Meals Ordered from Kitchen: 1 meal ordered     # Meals Recorded: 1 recorded (100% corn flakes, 8oz skim milk)    # Supplements Recorded: no intake recorded

## 2020-10-23 NOTE — PLAN OF CARE
"Alert and oriented X 4. /91. OVSS. Denies headache, dizziness, chest pressure or palpitations. Denies SOB, nausea or abdominal discomfort. C/o pain in chest. Pointing at xyphoid process when asked to specify pain location. Pain not new per pt. Morphine given with decrease in pain. Sleeping intermittently.     This morning pt expresses frustration at inadequate pain control. \"To be honest, I don't think I can go on like this.\" He states morphine helps but it takes too long to kick in. At this time he is c/o pain in upper back. Morphine given. Will notify provider.   "
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
## 2020-10-23 NOTE — PROVIDER NOTIFICATION
"Web-based messaging to [phone]    \"7D - 7514 - JFUNMILAYO.   Just an FYI, BP was 151/96. Recheck was 145/76.   Michael, [id number]\"    "

## 2020-10-24 NOTE — PROGRESS NOTES
Care Management Assessment and Discharge Consult    General Information  Assessment completed with:: Patient,    Type of CM/SW Visit: Offer D/C Planning           Reason for Consult: discharge planning  Advance Care Planning: Advance Care Planning Reviewed: (Yes)          Communication Assessment  Patient's communication style: spoken language (English or Bilingual)  Hearing Difficulty or Deaf: no Wear Glasses or Blind: no    Cognitive  Cognitive/Neuro/Behavioral: WDL  Level of Consciousness: alert  Arousal Level: opens eyes spontaneously  Orientation: oriented x 4  Mood/Behavior: calm, cooperative  Best Language: 0 - No aphasia  Speech: clear, logical    Living Environment:   People in home: child(cameron), adult     Current living Arrangements: house          Family/Social Support:  Care provided by: self, spouse/significant other  Provides care for: no one           Description of Support System: Supportive, Involved  Description of Support System: Supportive, Involved           Description of Support System: Supportive, Involved       Current Resources:   Skilled Home Care Services: (N/A)  Community Resources: None  Equipment currently used at home: none  Supplies currently used at home: None    Employment:  Employment Status: unemployed        Financial/Environmental Concerns: No concerns identified          Lifestyle & Psychosocial Needs:        Socioeconomic History     Marital status:      Spouse name: Not on file     Number of children: Not on file     Years of education: Not on file     Highest education level: Not on file   Occupational History     Occupation:      Tobacco Use     Smoking status: Former Smoker     Packs/day: 1.00     Years: 2.00     Pack years: 2.00     Types: Cigarettes     Start date: 3/10/2017     Quit date: 2020     Years since quittin.8     Smokeless tobacco: Current User     Types: Chew     Tobacco comment: daily chew use x39 years, now intermittent    Substance and Sexual Activity     Alcohol use: Yes     Alcohol/week: 1.0 - 2.0 standard drinks     Types: 1 - 2 Cans of beer per week     Drinks per session: 1 or 2     Binge frequency: Less than monthly     Comment: 1 beer daily, occasional >2 drinks/episode socially     Drug use: Never       Functional Status:  Prior to admission patient needed assistance: (N/A)        Mental Health Status:  WDL                            Values/Beliefs:  Spiritual, Cultural Beliefs, Alevism Practices, Values that affect care: no                 Discharge Planning:  Expected Discharge Date: 10/24/20     Concerns to be Addressed: all concerns addressed in this encounter       Anticipated Discharge Disposition: Home  Anticipated Discharge Services:  PT and OT through [facility]  Anticipated Discharge DME: (none)    Patient/family educated on Medicare website which has current facility and service quality ratings: yes  Referrals Placed by CM/SW:  yes  Education Provided on the Discharge Plan:    Patient/Family in Agreement with the Plan: yes     Disposition Comments:      Selected Continued Care - Admitted Since 10/20/2020    No services have been selected for the patient.         Additional Information:  [address] [address]  Phone Number  [phone]  Clinic: [phone]      DAREN Sotomayor., BSN., RN  Nurse Care Coordinator      [facility]  Care [address] [address]  [email or web address]  Employed by [employer] [employer]

## 2020-10-24 NOTE — PLAN OF CARE
0778-1231  Pt. Afebrile. AVSS on room air. A&Ox4 complains of pain in lower back and gave scheduled MS contin and PRN morphine x2 with good relief. Denies sob or nausea. Up independently to bathroom- educated on saving urine but patient voided in toilet without saving x2. Pt declined need for bladder scan. No bms this shift. No acute events. Will continue with poc.

## 2020-10-24 NOTE — PROGRESS NOTES
Calorie Count  Intake recorded for:10/23  Total Kcals: 196 Total Protein: 6g  Kcals from Hospital Food: 196  Protein: 6g  Kcals from Outside Food (average):0 Protein: 0g  # Meals Ordered from Kitchen: 1 meal ordered  # Meals Recorded: 1 meal (100% tomato soup w/ crackers, 25% grilled cheese)  # Supplements Recorded: No intake recorded.

## 2020-10-24 NOTE — PLAN OF CARE
[id number]: AVSS on RA. Lower back pain managed with 15 mg IR morphine x2, with good relief. Continues on MS contin. 1 unit plts transfused for 13k in preparation for discharge, 1 unit PRBCs transfused for Hgb 8.0; no signs/symptoms of reaction noted. Denies nausea, reflux discomfort appears to be better today too. Voiding adequately. Up independently in room. Ok'd for discharge.    Discharge  D: Orders for discharge and outpatient medications written.  I: Home medications and return to clinic schedule reviewed with patient. Discharge instructions and parameters for calling Health Care Provider reviewed. Medications reviewed, wound care reviewed and sacral mepilex provided, PICC cares reviewed. Patient left at 1350, transported by brother.   A: Patient/family verbalized understanding and was ready for discharge.   P: Staff picked up medications from discharge pharmacy. Follow up as scheduled 10/26 at [facility] for labs.

## 2020-10-26 NOTE — TELEPHONE ENCOUNTER
DATE:  10/26/2020   TIME OF RECEIPT FROM LAB:  11:54 AM ([facility])  LAB TEST:  WBC 1.5, Plt 12  -stated pt was scheduled for possible transfusion but does not meet parameters (Hgb was 9.3, to transfuse PRBC if <7.5 or Plt if <10)  RESULTS GIVEN WITH READ-BACK TO (PROVIDER):  Rosa Isela Montgomery PAC (Dr. Cary out today)  TIME LAB VALUE REPORTED TO PROVIDER:   Paged 11:55 AM

## 2020-10-26 NOTE — PROGRESS NOTES
Patient has had a transplant within 6 months (BMT) and should be contacted by the transplant RNCC. Will close post- hospital call at this time.     Lelo Reyes CMA, [facility]  Post Hospital Discharge Team

## 2020-10-26 NOTE — PROGRESS NOTES
Prior Authorization Approval    omeprazole 40mg caps  Date Initiated: 10/23/2020  Date Completed: 10/23/2020  Prior Auth Type: Quantity Limit                Status: Approved    Effective Date: 07/24/2020 - 10/24/2021  Copay: 0.00     Filling Pharmacy: [facility] - [place], [place] - [address] [place] [address]    Insurance: [facility] - Phone [phone] Fax [phone]  ID: [id number]  Case Number: [id number]   Submitted Via: Ladarius Childs  [facility] Liaison  Ph: [phone] Page: [phone]

## 2020-10-26 NOTE — TELEPHONE ENCOUNTER
"Per Rosa Isela Montgomery PAC: recommend pt have plt transfusion asap and make sure he is holding blood thinner. Called [facility] lab and spoke with Melisa who will order platelets for 10/27 mid-morning and nurses will contact pt with exact time. Writer called pt to inform of such and he confirmed he has held Xarelto since discharge, denied any symptoms except feeling a little \"tired and sore around the mid-section\".  "

## 2020-10-27 NOTE — PROGRESS NOTES
"Called Kenneth post hospitalization for AMS. He had labs 10/26/20, writer received some of them today. CBC still pending except critical called to triage yesterday. Cr+ with 10/26/20 labs elevated to 1.76, valve not called. Kenneth reports that he is drinking when first asked. When asked more, he reports that every time he drinks plain water he gets bad heartburn so then admits he is drinking very little. When he does he states \"pepsi\" goes down the best. Writer talked to him about how bad this could be on his stomach and the heartburn, he should try to avoid caffeine. He realizes this but just does not know what to do. He is taking the Prilosec but the heartburn continues. Encouraged him to take hs nausea med's also. His food intake is poor too.Other complaint is his pain. Now worse in his shoulder.     He will have repeat labs tomorrow. Will try to drink more before then. If Cr+ continues to be high, order to be sent for IV fluids. Writer checked with Home infusion and they are checking to see if he has home benefits for fluids. OK with Doctor Cary to do fluids 3 times weekly following labs.     Kenneth received platelets today for plt count of 12 10/26/20. Holding Xarelto. Taking med's as daughter Devika has set up.     Wife reports he looks like he did last week, \"zombie\". Kenneth states that is because he cant do much. He does not feel steady when up walking, so just sits. Encouraged him to move his legs and change positions when just sitting to prevent clots and complications. And drink water. Currently staying with wife.     Writer will follow tomorrows labs. Has virtual appointment with Doctor Cary in am. Kenneth aware of both need for repeat labs locally and appointment tomorrow.   "

## 2020-10-27 NOTE — TELEPHONE ENCOUNTER
Placed call to Kenneth Tello to complete Medication Therapy Management (MTM) visit for transitions of care.     Unable to reach patient on second outreach attempt. Will route to MTM scheduling team to attempt to schedule a visit at a later date.     Kimmy Miller, PharmD  Medication Therapy Management Pharmacist  [facility]  [phone]

## 2020-10-27 NOTE — TELEPHONE ENCOUNTER
Placed call to Kenneth Tello to complete Medication Therapy Management (MTM) visit for transitions of care.     I was unable to reach the patient so I left a message detailing our scheduled appointment and my number for him to call back.     Kimmy Miller, PharmD  Medication Therapy Management Pharmacist  [facility]  [phone]

## 2020-10-28 PROBLEM — G93.40 ENCEPHALOPATHY: Status: RESOLVED | Noted: 2020-01-01 | Resolved: 2020-01-01

## 2020-10-28 PROBLEM — I50.9 CHF (CONGESTIVE HEART FAILURE) (H): Status: ACTIVE | Noted: 2020-01-01

## 2020-10-28 NOTE — PROGRESS NOTES
IVF and CT scan orders have been faxed to the [facility] attn:Tosin at [phone].

## 2020-10-28 NOTE — TELEPHONE ENCOUNTER
DATE:  10/28/2020   TIME OF RECEIPT FROM LAB:  1204 from [facility]  LAB TEST:  Platelets  LAB VALUE:  14  TIME LAB VALUE REPORTED TO PROVIDER:   Paged Dr. Cary as FYI per protocol at 5281

## 2020-10-28 NOTE — TELEPHONE ENCOUNTER
Called [email or web address]. They will be sending over remaining results via fax today.  Laura gave the following results from today's lab work:    WBC 1.9  Hgb 9.1  ANC 1.45  Creatinine 0.88  GFR >60  Na+ 135  K+ 4.9  Magnesium 1.5  Chloride 102  Ca+ 8.3

## 2020-10-28 NOTE — PROGRESS NOTES
"Kenneth Tello is a 50 year old male who is being evaluated via a billable video visit.      The patient has been notified of following:     \"This video visit will be conducted via a call between you and your physician/provider. We have found that certain health care needs can be provided without the need for an in-person physical exam.  This service lets us provide the care you need with a video conversation.  If a prescription is necessary we can send it directly to your pharmacy.  If lab work is needed we can place an order for that and you can then stop by our lab to have the test done at a later time.    Video visits are billed at different rates depending on your insurance coverage.  Please reach out to your insurance provider with any questions.    If during the course of the call the physician/provider feels a video visit is not appropriate, you will not be charged for this service.\"    Patient has given verbal consent for Video visit? Yes  How would you like to obtain your AVS? MyChart  If you are dropped from the video visit, the video invite should be resent to: Send to e-mail at: [email or web address]  Will anyone else be joining your video visit? No    LORAINE Reaves    Video-Visit Details    Type of service:  Video Visit    Video Start Time: 8:53  Video End Time: 9:28    Originating Location (pt. Location): Home    Distant Location (provider location):  [facility]     Platform used for Video Visit: Vesna Ansari MD      REASON FOR VISIT:  Management of diffuse large B cell lymphoma (DLBCL)    HISTORY OF PRESENT ILLNESS:  Mr. Tello is a 50 year old man with DLBCL.  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  DLBCL-IPI was high (stage, extranodal sites, LDH) " and clinical stage IV with extranodal involvement.  He was started on R-CHOP in 03/2020 with IT methotrexate planned with each cycle for CNS prophylaxis.  Medical history also notable for A fib on anticoagulation, myocardial infarction, and stroke in 2017.  Course has been complicated by constipation and worsening abdominal pain in 08/2020 after completion of treatment.  Imaging demonstrated enlarging mass invading the pancreas and spleen.  Biopsy confirmed refractory DLBCL.  He received salvage chemotherapy with 1 cycle of R-ICE that was complicated by neurotoxicity that resolved and then 1 cycle of R-DHAP complicated by cisplatin-associated EMILY and electrolyte problems that are improving.  PET/CT after R-ICE x 1 and R-DHAP x 1 showed a mixed response.  He has had a BMT consultation with  on 9/3. CAR-T therapy was discussed. Follow up PET CT shows mixed response to therapy. Because of the mixed response, he started polatuzumab, bendamustine, and rituximab (nell-BR) (Cycle 1, Day 1 = 10/1/20).  After chemotherapy he was experiencing significant somnolence likely from opioid side effects, sleeping up to 16 hours per day and not eating or drinking much, and he was discharged from the hospital 10/24.  We have looked into it, and he is not currently a candidate for any available clinical trials.  Visit for ongoing management.    Jd is seen via video with his daughter Devika and his wife Cullen.  Energy level and appetite are still down.  He is only eating small amounts and not motivated to keep down fluids.  He is still spending a lot of time off his feet.  No fevers, chills, or night sweats.  No headache, vision changes, no new focal weakness or sensory changes aside from longstanding neuropathy.  No dyspnea, cough, or chest pain.  Ongoing issues with constipation/diarrhea, currently constipation, especially with poor appetite.  Last BM was yesterday.  No melena or hematochezia.  No new urinary issues.  No  lumps or bumps.  He has a hard time keeping his multiple medications down.  Worsening pain mid back that radiates to left side, not well controlled with current regimen.    REVIEW OF SYSTEMS:  A complete review of systems was negative other than noted.    MEDICATIONS:  Current Outpatient Medications   Medication     acetaminophen (TYLENOL) 325 MG tablet     acyclovir (ZOVIRAX) 400 MG tablet     allopurinol (ZYLOPRIM) 300 MG tablet     atorvastatin (LIPITOR) 40 MG tablet     baclofen (LIORESAL) 10 MG tablet     calcium carb-cholecalciferol (OS-STACY) 500-200 MG-UNIT tablet     calcium carbonate (TUMS) 500 MG chewable tablet     carvedilol (COREG) 12.5 MG tablet     gabapentin (NEURONTIN) 300 MG capsule     gabapentin (NEURONTIN) 300 MG capsule     heparin lock flush 10 UNIT/ML SOLN injection     LORazepam (ATIVAN) 0.5 MG tablet     magnesium oxide (MAG-OX) 400 (241.3 Mg) MG tablet     megestrol (MEGACE) 40 MG/ML suspension     morphine (MS CONTIN) 15 MG CR tablet     morphine (MSIR) 15 MG IR tablet     naloxone (NARCAN) 4 MG/0.1ML nasal spray     omeprazole (PRILOSEC) 40 MG DR capsule     ondansetron (ZOFRAN) 4 MG tablet     ondansetron (ZOFRAN) 8 MG tablet     polyethylene glycol (MIRALAX) 17 g packet     potassium chloride ER (KLOR-CON M) 20 MEQ CR tablet     prochlorperazine (COMPAZINE) 10 MG tablet     rivaroxaban ANTICOAGULANT (XARELTO ANTICOAGULANT) 20 MG TABS tablet     senna-docusate (SENOKOT-S/PERICOLACE) 8.6-50 MG tablet     sodium chloride, PF, 0.9% PF flush     sulfamethoxazole-trimethoprim (BACTRIM DS) 800-160 MG tablet     No current facility-administered medications for this visit.      PHYSICAL EXAMINATION:  There were no vitals taken for this visit.  Wt Readings from Last 5 Encounters:   10/24/20 85.9 kg (189 lb 6.4 oz)   10/01/20 87.1 kg (192 lb 1.6 oz)   09/19/20 86.8 kg (191 lb 4.8 oz)   09/03/20 97.5 kg (215 lb)   09/02/20 96.8 kg (213 lb 6.4 oz)     General: Ill appearing.  HEENT: Sclerae  anicteric.  Lungs: Breathing comfortably. No cough.  MSK: Grossly normal movement.  Neuro: Grossly non-focal.  Skin/access: Normal skin tone.  Psych: Alert and oriented. No distress.  Performance status: ECOG 2    The rest of a comprehensive physical examination is deferred due to PHE (public health emergency) video visit restrictions    LABORATORY DATA:  Recent Labs   Lab Test 10/24/20  0615 10/23/20  0601 10/22/20  0639 10/21/20  0841   WBC 2.9* 4.9 3.9* 4.7   HGB 8.0* 8.2* 6.8* 6.3*   PLT 13* 15* 17* 25*   ANEU 1.7 3.9 3.3 3.9   ALYM 1.2 0.8 0.5* 0.6*     Recent Labs   Lab Test 10/26/20 10/21/20  0841 10/21/20  0841 10/21/20  0018 10/01/20  0701 10/01/20  0701 09/18/20  0034 09/18/20  0034 09/08/20  0000 09/08/20      < > 141 141   < > 141   < > 137   < > 134*   POTASSIUM 4.5   < > 5.2 5.4*   < > 3.1*   < > 2.8*   < > 2.5*   CHLORIDE 104   < > 111* 112*   < > 105   < > 100   < > 87*   CO2 24   < > 24 22   < > 28   < > 29   < > 34*   BUN 19   < > 30 33*   < > 14   < > 14   < > 43*   CR 1.76   < > 1.40* 1.48*   < > 0.94   < > 0.87   < > 1.74*   STACY 7.80   < > 7.9* 7.5*   < > 7.9*   < > 7.1*   < > 7.70*   MAG  --   --  1.9  --    < > 1.4*   < > 1.5*   < >  --    PHOS  --   --  3.5  --    < >  --    < > 3.6  --  4.7   LDH  --   --   --  713*  --   --   --  400*  --   --    URIC  --   --  2.8*  --   --  3.7  --  2.9*  --  6.4    < > = values in this interval not displayed.     Recent Labs   Lab Test 10/20/20  1633 10/16/20 10/09/20 10/01/20  0701   AST 33 11 10 11   ALT 17 5 6 10   ALKPHOS 125 64 94 105   BILITOTAL 0.8 1.1 0.7 0.7     Outside labs and imaging reviewed    IMPRESSION AND PLAN:  Mr. Tello is a 50 year old man with primary refractory non-GCB DLBCL.    His lymphoma has been refractory to R-CHOP, R-ICE, and R-DHAP with poor tolerance of chemotherapy regimens overall.  His most recent salvage therapy was polatuzumab, bendamustine, and rituximab (nell-BR) started on 10/2/2020.  He initially did  magot but developed poor intake and EMILY with somnolence thought to be related to pain meds in setting of poor intake and EMILY.  This transiently improved after hospitalization but now about 4 weeks out he has still been dealing with prolonged cytopenias and ongoing anorexia and issues with EMILY.  He has been feeling poorly and his appetite and energy are quite poor.  He is in pain, I suspect from his lymphoma, but with his pain medication he has had issues with oversedation, which may also be related to his fluctuating kidney function.    We need to see if his lymphoma has responded to the current chemotherapy regimen.  Since he lives so far away and with ongoing EMILY, we discussed a non-contrast CT CAP done at his local hospital, with images pushed to us ASA, for disease evaluation to guide further management.     He is not in good shape for another cycle of chemotherapy at this time, based on his CBC and his creatinine. In the short run we can try steroids to try to improve his symptoms, depending on what his CT shows.  If his condition improves we could consider alternative regimens such as rituximab and polatuzumab without the bendamustine or ibrutinib or lenalidomide/rituximab, but it is not clear what he could tolerate with his declining performance status.  This would continue to be palliative in nature.  As we discussed previously other options like ibrutinib or lenalidomide/rituximab have 20-40% response rates and fewer complete remissions for relapsed/refractory non-GCB DLBCL.  His performance status has been too poor to consider clinical trials or CAR-T therapy.  We will restage his lymphoma with a non-contrast CT and discuss further options based on that.    In terms of his EMILY and electrolyte issues, we will continue to work with his local hospital to see if he can get regular IV fluids at least 3x/week.  With his polypharmacy we discussed keeping magnesium twice a day and holding potassium  supplementation.  This is likely related to his poor appetite and megace was started as an inpatient.    For his symptom management, it would be helpful for him to connect with palliative care especially in terms of adjusting pain medications while minimizing side effects. He can stop the gabapentin as it doesn't seem to be helping.  He should continue with MS Contin 15mg BID and morphine IR 15mg Q4H PRN for breakthrough pain for now.    He has no obvious active infectious issues.  He will hold fluconazole and levofloxacin with his ANC improved.  He will continue acyclovir.  He can hold the bactrim because of polypharmacy concerns and EMILY.  He got a flu shot this year.  We will defer pneumococcal or other vaccination after he has competed all planned treatment. He will continue wound cares for his stage 1 pressure ulcer on the lower back which was noted in the most recent hospitalization.    With his most recent platelet count, he will continue to hold his rivaroxaban for A fib and history of CVA.  He can restart when platelets are >50,000/mcL and hold when platelets are <50,000/mcL or for any procedures.    In terms of polypharmacy issues, he can stop his atorvastatin.  Tums would be fine instead of the Oscal.     We will try to complete additional workup and management in the outpatient setting but warned that if things deteriorate it may be necessary to admit him to the hospital to expedite things, better control his active issues, and to formulate a plan.  Given the refractory nature of his lymphoma and poor tolerance of treatment we may also need to consider more supportive options and/or hospice and will continue to evaluate realistic goals of care.     We will keep his PCP Dr. Jeter in the loop.    At this point we will tentatively setup 3 times a week lab checks and infusion visits at [facility] for IV fluids and transfusions if needed.  We will set up a CT scan at [facility].  There are also  significant logistical issues with coordinating with his local hospital.  We will continue to work with his daughter Devika who is a nurse with the local hospital system.    I reminded him to contact us if questions, concerns, or new issues between visits.    Video visit start time: 8:53 AM  Video visit end time: 9:28 AM  Video visit duration: 31 minutes    Nicola Ansari MD  Hematology/Oncology Fellow    ATTENDING ADDENDUM:  I was present for the entire virtual visit.  I have reviewed the vital signs, medications, labs, and imaging results independently, and have discussed the patient and plan with the fellow, and agree with the findings and plan outlined in this note.  The impression and plan were jointly made.    ADDENDUM:  CT scan was obtained and report and images were pushed into our system the afternoon after our visit.  I reviewed the imaging that indicates considerable progression of his lymphoma as well as pleural/pericardial fluid that definitely can explain his deterioration.  I called the patient and his wife and separately contacted his daughter at his request.  I explained the imaging findings and recommended hospital admission as soon as possible to try to stabilize/improve his symptoms and determine realistic options for moving forward.  I stressed that the findings were worrisome.  We agreed he will start palliative prednisone tonight and admit tomorrow.  I notified the inpatient team.  They agree with the plan and will call and/or go to ED if his condition deteriorates.      Avery Cary MD, PhD  Attending Physician, [facility]   [facility], [facility]  Division of Hematology, Oncology, and Transplantation  [phone] [facility]

## 2020-10-28 NOTE — LETTER
"    10/28/2020         RE: Kenneth Tello  [address] [place] [phone]        Dear Colleague,    Thank you for referring your patient, Kenneth Tello, to the [facility]. Please see a copy of my visit note below.    Kenneth Tello is a 50 year old male who is being evaluated via a billable video visit.      The patient has been notified of following:     \"This video visit will be conducted via a call between you and your physician/provider. We have found that certain health care needs can be provided without the need for an in-person physical exam.  This service lets us provide the care you need with a video conversation.  If a prescription is necessary we can send it directly to your pharmacy.  If lab work is needed we can place an order for that and you can then stop by our lab to have the test done at a later time.    Video visits are billed at different rates depending on your insurance coverage.  Please reach out to your insurance provider with any questions.    If during the course of the call the physician/provider feels a video visit is not appropriate, you will not be charged for this service.\"    Patient has given verbal consent for Video visit? Yes  How would you like to obtain your AVS? MyChart  If you are dropped from the video visit, the video invite should be resent to: Send to e-mail at: [email or web address]  Will anyone else be joining your video visit? No    LORAINE Reaves    Video-Visit Details    Type of service:  Video Visit    Video Start Time: 8:53  Video End Time: 9:28    Originating Location (pt. Location): Home    Distant Location (provider location):  [facility]     Platform used for Video Visit: Vesna Ansari MD      REASON FOR VISIT:  Management of diffuse large B cell lymphoma (DLBCL)    HISTORY OF PRESENT ILLNESS:  Mr. Tello is a 50 year old man with DLBCL.  To summarize his course, he " presented in early 03/2020 with left upper abdominal pain, fatigue, and >10% weight loss, was found to have a samaria-pancreatic mass, and workup confirmed DLBCL, non-GCB type, FISH negative for double/triple hit changes.  DLBCL-IPI was high (stage, extranodal sites, LDH) and clinical stage IV with extranodal involvement.  He was started on R-CHOP in 03/2020 with IT methotrexate planned with each cycle for CNS prophylaxis.  Medical history also notable for A fib on anticoagulation, myocardial infarction, and stroke in 2017.  Course has been complicated by constipation and worsening abdominal pain in 08/2020 after completion of treatment.  Imaging demonstrated enlarging mass invading the pancreas and spleen.  Biopsy confirmed refractory DLBCL.  He received salvage chemotherapy with 1 cycle of R-ICE that was complicated by neurotoxicity that resolved and then 1 cycle of R-DHAP complicated by cisplatin-associated EMILY and electrolyte problems that are improving.  PET/CT after R-ICE x 1 and R-DHAP x 1 showed a mixed response.  He has had a BMT consultation with  on 9/3. CAR-T therapy was discussed. Follow up PET CT shows mixed response to therapy. Because of the mixed response, he started polatuzumab, bendamustine, and rituximab (nell-BR) (Cycle 1, Day 1 = 10/1/20).  After chemotherapy he was experiencing significant somnolence likely from opioid side effects, sleeping up to 16 hours per day and not eating or drinking much, and he was discharged from the hospital 10/24.  We have looked into it, and he is not currently a candidate for any available clinical trials.  Visit for ongoing management.    Jd is seen via video with his daughter Devika and his wife Cullen.  Energy level and appetite are still down.  He is only eating small amounts and not motivated to keep down fluids.  He is still spending a lot of time off his feet.  No fevers, chills, or night sweats.  No headache, vision changes, no new focal  weakness or sensory changes aside from longstanding neuropathy.  No dyspnea, cough, or chest pain.  Ongoing issues with constipation/diarrhea, currently constipation, especially with poor appetite.  Last BM was yesterday.  No melena or hematochezia.  No new urinary issues.  No lumps or bumps.  He has a hard time keeping his multiple medications down.  Worsening pain mid back that radiates to left side, not well controlled with current regimen.    REVIEW OF SYSTEMS:  A complete review of systems was negative other than noted.    MEDICATIONS:  Current Outpatient Medications   Medication     acetaminophen (TYLENOL) 325 MG tablet     acyclovir (ZOVIRAX) 400 MG tablet     allopurinol (ZYLOPRIM) 300 MG tablet     atorvastatin (LIPITOR) 40 MG tablet     baclofen (LIORESAL) 10 MG tablet     calcium carb-cholecalciferol (OS-STACY) 500-200 MG-UNIT tablet     calcium carbonate (TUMS) 500 MG chewable tablet     carvedilol (COREG) 12.5 MG tablet     gabapentin (NEURONTIN) 300 MG capsule     gabapentin (NEURONTIN) 300 MG capsule     heparin lock flush 10 UNIT/ML SOLN injection     LORazepam (ATIVAN) 0.5 MG tablet     magnesium oxide (MAG-OX) 400 (241.3 Mg) MG tablet     megestrol (MEGACE) 40 MG/ML suspension     morphine (MS CONTIN) 15 MG CR tablet     morphine (MSIR) 15 MG IR tablet     naloxone (NARCAN) 4 MG/0.1ML nasal spray     omeprazole (PRILOSEC) 40 MG DR capsule     ondansetron (ZOFRAN) 4 MG tablet     ondansetron (ZOFRAN) 8 MG tablet     polyethylene glycol (MIRALAX) 17 g packet     potassium chloride ER (KLOR-CON M) 20 MEQ CR tablet     prochlorperazine (COMPAZINE) 10 MG tablet     rivaroxaban ANTICOAGULANT (XARELTO ANTICOAGULANT) 20 MG TABS tablet     senna-docusate (SENOKOT-S/PERICOLACE) 8.6-50 MG tablet     sodium chloride, PF, 0.9% PF flush     sulfamethoxazole-trimethoprim (BACTRIM DS) 800-160 MG tablet     No current facility-administered medications for this visit.      PHYSICAL EXAMINATION:  There were no vitals  taken for this visit.  Wt Readings from Last 5 Encounters:   10/24/20 85.9 kg (189 lb 6.4 oz)   10/01/20 87.1 kg (192 lb 1.6 oz)   09/19/20 86.8 kg (191 lb 4.8 oz)   09/03/20 97.5 kg (215 lb)   09/02/20 96.8 kg (213 lb 6.4 oz)     General: Ill appearing.  HEENT: Sclerae anicteric.  Lungs: Breathing comfortably. No cough.  MSK: Grossly normal movement.  Neuro: Grossly non-focal.  Skin/access: Normal skin tone.  Psych: Alert and oriented. No distress.  Performance status: ECOG 2    The rest of a comprehensive physical examination is deferred due to PHE (public health emergency) video visit restrictions    LABORATORY DATA:  Recent Labs   Lab Test 10/24/20  0615 10/23/20  0601 10/22/20  0639 10/21/20  0841   WBC 2.9* 4.9 3.9* 4.7   HGB 8.0* 8.2* 6.8* 6.3*   PLT 13* 15* 17* 25*   ANEU 1.7 3.9 3.3 3.9   ALYM 1.2 0.8 0.5* 0.6*     Recent Labs   Lab Test 10/26/20 10/21/20  0841 10/21/20  0841 10/21/20  0018 10/01/20  0701 10/01/20  0701 09/18/20  0034 09/18/20  0034 09/08/20  0000 09/08/20      < > 141 141   < > 141   < > 137   < > 134*   POTASSIUM 4.5   < > 5.2 5.4*   < > 3.1*   < > 2.8*   < > 2.5*   CHLORIDE 104   < > 111* 112*   < > 105   < > 100   < > 87*   CO2 24   < > 24 22   < > 28   < > 29   < > 34*   BUN 19   < > 30 33*   < > 14   < > 14   < > 43*   CR 1.76   < > 1.40* 1.48*   < > 0.94   < > 0.87   < > 1.74*   STACY 7.80   < > 7.9* 7.5*   < > 7.9*   < > 7.1*   < > 7.70*   MAG  --   --  1.9  --    < > 1.4*   < > 1.5*   < >  --    PHOS  --   --  3.5  --    < >  --    < > 3.6  --  4.7   LDH  --   --   --  713*  --   --   --  400*  --   --    URIC  --   --  2.8*  --   --  3.7  --  2.9*  --  6.4    < > = values in this interval not displayed.     Recent Labs   Lab Test 10/20/20  1633 10/16/20 10/09/20 10/01/20  0701   AST 33 11 10 11   ALT 17 5 6 10   ALKPHOS 125 64 94 105   BILITOTAL 0.8 1.1 0.7 0.7     Outside labs and imaging reviewed    IMPRESSION AND PLAN:  Mr. Tello is a 50 year old man with primary  refractory non-GCB DLBCL.    His lymphoma has been refractory to R-CHOP, R-ICE, and R-DHAP with poor tolerance of chemotherapy regimens overall.  His most recent salvage therapy was polatuzumab, bendamustine, and rituximab (nell-BR) started on 10/2/2020.  He initially did alight but developed poor intake and EMILY with somnolence thought to be related to pain meds in setting of poor intake and EMILY.  This transiently improved after hospitalization but now about 4 weeks out he has still been dealing with prolonged cytopenias and ongoing anorexia and issues with EMILY.  He has been feeling poorly and his appetite and energy are quite poor.  He is in pain, I suspect from his lymphoma, but with his pain medication he has had issues with oversedation, which may also be related to his fluctuating kidney function.    We need to see if his lymphoma has responded to the current chemotherapy regimen.  Since he lives so far away and with ongoing EMILY, we discussed a non-contrast CT CAP done at his local hospital, with images pushed to us ASAP, for disease evaluation to guide further management.     He is not in good shape for another cycle of chemotherapy at this time, based on his CBC and his creatinine. In the short run we can try steroids to try to improve his symptoms, depending on what his CT shows.  If his condition improves we could consider alternative regimens such as rituximab and polatuzumab without the bendamustine or ibrutinib or lenalidomide/rituximab, but it is not clear what he could tolerate with his declining performance status.  This would continue to be palliative in nature.  As we discussed previously other options like ibrutinib or lenalidomide/rituximab have 20-40% response rates and fewer complete remissions for relapsed/refractory non-GCB DLBCL.  His performance status has been too poor to consider clinical trials or CAR-T therapy.  We will restage his lymphoma with a non-contrast CT and discuss further  options based on that.    In terms of his EMILY and electrolyte issues, we will continue to work with his local hospital to see if he can get regular IV fluids at least 3x/week.  With his polypharmacy we discussed keeping magnesium twice a day and holding potassium supplementation.  This is likely related to his poor appetite and megace was started as an inpatient.    For his symptom management, it would be helpful for him to connect with palliative care especially in terms of adjusting pain medications while minimizing side effects. He can stop the gabapentin as it doesn't seem to be helping.  He should continue with MS Contin 15mg BID and morphine IR 15mg Q4H PRN for breakthrough pain for now.    He has no obvious active infectious issues.  He will hold fluconazole and levofloxacin with his ANC improved.  He will continue acyclovir.  He can hold the bactrim because of polypharmacy concerns and EMILY.  He got a flu shot this year.  We will defer pneumococcal or other vaccination after he has competed all planned treatment. He will continue wound cares for his stage 1 pressure ulcer on the lower back which was noted in the most recent hospitalization.    With his most recent platelet count, he will continue to hold his rivaroxaban for A fib and history of CVA.  He can restart when platelets are >50,000/mcL and hold when platelets are <50,000/mcL or for any procedures.    In terms of polypharmacy issues, he can stop his atorvastatin.  Tums would be fine instead of the Oscal.     We will try to complete additional workup and management in the outpatient setting but warned that if things deteriorate it may be necessary to admit him to the hospital to expedite things, better control his active issues, and to formulate a plan.  Given the refractory nature of his lymphoma and poor tolerance of treatment we may also need to consider more supportive options and/or hospice and will continue to evaluate realistic goals of care.      We will keep his PCP Dr. Jeter in the loop.    At this point we will tentatively setup 3 times a week lab checks and infusion visits at [facility] for IV fluids and transfusions if needed.  We will set up a CT scan at [facility].  There are also significant logistical issues with coordinating with his local hospital.  We will continue to work with his daughter Devika who is a nurse with the local hospital system.    I reminded him to contact us if questions, concerns, or new issues between visits.    Video visit start time: 8:53 AM  Video visit end time: 9:28 AM  Video visit duration: 31 minutes    Nicola Ansari MD  Hematology/Oncology Fellow    ATTENDING ADDENDUM:  I was present for the entire virtual visit.  I have reviewed the vital signs, medications, labs, and imaging results independently, and have discussed the patient and plan with the fellow, and agree with the findings and plan outlined in this note.  The impression and plan were jointly made.    ADDENDUM:  CT scan was obtained and report and images were pushed into our system the afternoon after our visit.  I reviewed the imaging that indicates considerable progression of his lymphoma as well as pleural/pericardial fluid that definitely can explain his deterioration.  I called the patient and his wife and separately contacted his daughter at his request.  I explained the imaging findings and recommended hospital admission as soon as possible to try to stabilize/improve his symptoms and determine realistic options for moving forward.  I stressed that the findings were worrisome.  We agreed he will start palliative prednisone tonight and admit tomorrow.  I notified the inpatient team.  They agree with the plan and will call and/or go to ED if his condition deteriorates.      Avery Cary MD, PhD  Attending Physician, [facility]   of Medicine, [facility]  Division of Hematology, Oncology, and  Transplantation  [phone] clinic

## 2020-10-29 PROBLEM — R52 UNCONTROLLED PAIN: Status: ACTIVE | Noted: 2020-01-01

## 2020-10-29 NOTE — CONSULTS
RADIATION ONCOLOGY CONSULT NOTE  Date of Visit: Oct 28, 2020    Kenneth Tello  MRN: [id number]  : 1970    Diagnosis: Diffuse large B cell lymphoma  Stage: IV    HISTORY OF PRESENT ILLNESS:  Mr. Tello is a 50 year old man with refractory diffuse large B cell lymphoma. Radiation Oncology has been consulted for bulky refractory disease in the abdomen.     The patient initially presented in 2020 with abdominal pain, fatigue, and weight loss. He was found to have a samaria-pancreatic mass on CT C/A/P on 3/9/20 at an outside hospital. CT C/A/P 3/10/20 at [facility] showed a large mass centered in the left upper quadrant, arising from spleen, and involving stomach and pancreas. There was also retroperitoneal lymphadenopathy. He underwent CT-guided biopsy of a left retroperitoneal mass 3/11/20, and pathology showed diffuse large B cell lymphoma, non-germinal center subtype. PET/CT 3/17/20 showed disease involving stomach, L adrenal gland, L diaphragm, spleen, pancreas, and many retroperitoneal / mesenteric hypermetabolic lymphadenopathy. There was also a single R neck lymphadenopathy.     He started R-CHOP in late 2020. PET/CT 20 showed partial response. PET/CT 20 showed enlarging mass invading the pancreas and spleen. FNA biopsy 20 of a retroperitoneal mass revealed refractory DLBCL.     He then underwent salvage chemotherapy with R-ICE on 20 that was poorly tolerated due to neurotoxicity (1 cycle given). Then, he started cycle 1 of R-DHAP on 20 and this was associated with acute kidney injury and electrolyte imbalance afterwards. He saw Dr. Hamilton for BMT consultation 9/3/20, and during that time CAR-T therapy was discussed. PET/CT 9/15/20 showed response to therapy of a retroperitoneal mass but worsening hypermetabolic foci in spleen, nodules in the lungs, marrow activation, and hypermetabolism of colon and rectum. Given that PET/CT showed mixed response to therapy, he started  polatuzumab, bendamustine, and rituximab on 10/1/20. He was hospitalized 10/20-10/24/20 given poor intake, EMILY, and somnolence thought to be related to pain medication.    He had a video visit with Dr. Cary on 10/28/20. A CT C/A/P was ordered to evaluate chemotherapy response and to further evaluate ongoing anorexia and pain. Outside CT 10/28/20 showed progressive disease and a large 12 cm gastrohepatic ligament mass. Dr. Cary recommended direct admission to the hospital. His current performance status is evaluated to be too poor to consider CAR-T therapy. Radiation Oncology was consulted to consider palliative radiotherapy to treat the bulky abdominal mass.     Today, the patient reports that he continues to experience pain in the back and the abdomen. He has been unable to eat much for some time now. He feels nauseated at times and vomits if he eats too much.     REVIEW OF SYSTEMS: A 12 point review of systems was obtained. Pertinent findings are noted in the HPI and are otherwise unremarkable.     CHEMOTHERAPY HISTORY: As noted in HPI  PACEMAKER: None  PAST RADIATION THERAPY HISTORY: None    PAST MEDICAL HISTORY:  Atrial fibrillation  Coronary artery disease  Stroke   Hyperlipidemia  Hypertension    PAST SURGICAL HISTORY:  Past Surgical History:   Procedure Laterality Date     CHOLECYSTECTOMY, LAPOROSCOPIC       ESOPHAGOSCOPY, GASTROSCOPY, DUODENOSCOPY (EGD), COMBINED N/A 8/4/2020    Procedure: ESOPHAGOGASTRODUODENOSCOPY, WITH FINE NEEDLE ASPIRATION BIOPSY, WITH ENDOSCOPIC ULTRASOUND GUIDANCE;  Surgeon: Dayton Tobias MD;  Location: [facility]     PICC DOUBLE LUMEN PLACEMENT Right 09/18/2020    5Fr - 41cm (4cm external), Medial brachial vein, mid SVC       ALLERGIES:  Allergies as of 10/28/2020     (No Known Allergies)     MEDICATIONS:  No current outpatient medications on file.      FAMILY HISTORY:  Family History   Problem Relation Age of Onset     Cardiovascular Mother      Cardiovascular Father       Diabetes Type 2  Father      Cardiovascular Brother      Cancer Maternal Grandmother        SOCIAL HISTORY:    Former smoker    PHYSICAL EXAM:  VITALS: /71 (BP Location: Left arm)   Pulse 80   Temp 97.7  F (36.5  C) (Oral)   Resp 18   Wt 85.9 kg (189 lb 6.4 oz)   SpO2 95%   BMI 27.97 kg/m    GEN: Alert, oriented, and in NAD, sitting up on edge of bed  NECK: Supple, full ROM  CV: RRR, warm and well-perfused  RESP: Breathing comfortably on room air  ABDOMEN: Soft, mildly distended  SKIN: Normal color and turgor  NEURO: Alert and oriented x3  PSYCH: Appropriate mood and affect    ECOG PERFORMANCE STATUS: 2-3    IMPRESSION: Mr. Tello is a 50 year old man with refractory diffuse large B cell lymphoma and an enlarging abdominal mass now measuring 12 cm.     RECOMMENDATION:    We recommended palliative radiotherapy to the abdomen to treat the bulky disease that showed invasion to parts of the spleen, stomach and pancreas on the lastest CT. The patient's wife was present for the conversation as well. We explained our rationale, as well as the logistics, risks, benefits, and alternatives to radiotherapy. Potential toxicities of treatment were outlined, including fatigue, nausea, esophageal irritation, and nephrotoxicity. We encouraged Mr. Tello to ask questions, which we answered to the best of our ability.    The patient would like to proceed with radiotherapy. Informed consent was obtained. He will undergo CT simulation in our department this morning - we will plan to start treatment this afternoon. We plan to administer 5 fractions.     The patient was seen and discussed with staff, Dr. Newman. Thank you for involving us in the care of this patient.     Johana Waddell MD PGY-2  Radiation Oncology, [facility]    I saw the patient with the resident.  I agree with the resident's note and plan of care.      TARI Newman M.D.  Department of Radiation Oncology  [facility]  Center
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
## 2020-10-29 NOTE — PROGRESS NOTES
This is a recent snapshot of the patient's [facility] medical record.  For current drug dose and complete information and questions, call [phone] or In Basket pool, [facility] home infusion ([id number])  CSN Number:  [id number]

## 2020-10-29 NOTE — PROGRESS NOTES
Nursing Focus: Admission    D: Arrived at 1100 hours from home via personal transportation. Patient accompanied by his wife, Cullen. Admitted for further workup of disease progression, management of pain and goals of care . Complains of back and chest pain.      I: Admission process began.  Patient oriented to room, enviroment, call light.  Md. notified of patients arrival on unit.     A: Vital signs stable, afebrile.  Patient stable at this time.  Complaining of back and chest pain. Tylenol given with some improvement. Palliative care consulted to manage pain. Radiation/oncology consulted to assess for treatment for progressing lymphoma. PICC dressing changed as it was due to be changed yesterday. COVID symptomatic swab sent as patient has multiple symptoms (but could be explained by other factors).     P: Implement plan of care when available. Continue to monitor patient. Nursing interventions as appropriate. Notify md with changes in pt status.

## 2020-10-29 NOTE — PROGRESS NOTES
Palliative Care Brief Note:    Palliative team consulted for pain.   COVID swab remains pending.   I discussed with REGINA Coates, and we agreed to make basic pain recs today with full consult tomorrow.    Speaking with pt; he reports increasing pain in the same areas as previous (back, spine), but more intense over the last several weeks.  He has been taking MS Contin 15 mg bid and MS-IR 15mg-- has been increasing to take about 4 times per day.   Voices that this generally works pretty well, though says he'd probably take more pain meds if he was not worried about becoming dependent.   Moves around well during day and finds it tolerable though; mostly at night is the problem.  Feels relief with the short acting meds, and brings to an acceptable level    Recommended to team that for today:  A. MS Contin 15 mg qam  B. MS Contin 30 mg qpm  C. MS-IR PO 15 mg PO q3h prn pain  D. Moprhine IV 4-8 mg q3h prn pain.    We will plan to see tomorrow.      Ben Grier MD  Palliative Care  pager [phone]      Inpatient Team Consult Pager [phone] (answered 8am-430pm M-F) - ok to text page via "[employer] / After-Hours Answering Service [phone] / Palliative Clinic in the [facility] at the [facility] - [phone] (scheduling); [phone] (triage).

## 2020-10-30 NOTE — PLAN OF CARE
VSS. Afebrile. O2 saturations >92% on RA. C/o back pain; given scheduled MS Contin and PRN Morphine x1 with improvement. Denied nausea, dizziness, lightheadedness. R PICC; NS @ 75 ml/hr. Mepilex applied to coccyx. COVID test resulted as negative. Regular diet; poor appetite. LBM 10/28/20.     Addendum: plan for BMBx tomorrow at 1300; plan for radiation treatment tomorrow at 1500

## 2020-10-30 NOTE — PROGRESS NOTES
Simulation Note    Date: October 30, 2020    Patient: Kenneth Tello    Diagnosis: Diffuse large B-cell lymphoma of lymph nodes of multiple regions (H)    Type of Simulation:  Palliation     Patient Position: Supine    Patient Immobilization: Wing Board  Custom:    Simulation Aid(s): None    Image Acquisition: Conventional CT simulation without 4D and Other:    Total Dose Planned: 2000 cGy      Dose/fraction:400 cGy    Energy of machine:  10 MV           Type of Radiotherapy Technique: AP/PA with customized MLC blocking      Continuing Physcis/Dosimetry  and Dose calculations are ordered.  Simple simulations will be done prior to new start and changes in fields.  Weekly on treat visit.    A total 5 treatments planned      TARI Newman M.D.  Department of Radiation Oncology  [facility]
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
## 2020-10-30 NOTE — PROVIDER NOTIFICATION
Paged #[phone]: urine output since 1500 totaled to 140 ml. MIVF @ 75 ml/hr. Bladder scan @ 2255 was 25 ml.    Provider ordered 1 L NS bolus.

## 2020-10-30 NOTE — UTILIZATION REVIEW
Admission Status; Secondary Review Determination    Under the authority of the Utilization Management Committee, the utilization review process indicated a secondary review on the above patient. The review outcome is based on review of the medical records, discussions with staff, and applying clinical experience noted on the date of the review.    (x) Inpatient Status Appropriate - This patient's medical care is consistent with medical management for inpatient care and reasonable inpatient medical practice.    RATIONALE FOR DETERMINATION: 50-year-old male with refractive diffuse large B-cell lymphoma that has progressed on multiple lines of chemotherapy and now has significant progression with bulky disease invading parts of the spleen, stomach and pancreas.  This resulted in inadequate oral intake, increasing difficulty with abdominal/back pain with intermittent encephalopathy secondary to narcotics.  Patient will need urgent radiation therapy, support of IV fluids and pain control over multiple night hospital stay appropriate for inpatient management.    At the time of admission with the information available to the attending physician more than 2 nights Hospital complex care was anticipated, based on patient risk of adverse outcome if treated as outpatient and complex care required. Inpatient admission is appropriate based on the Medicare guidelines.    This document was produced using voice recognition software    The information on this document is developed by the utilization review team in order for the business office to ensure compliance. This only denotes the appropriateness of proper admission status and does not reflect the quality of care rendered.    The definitions of Inpatient Status and Observation Status used in making the determination above are those provided in the CMS Coverage Manual, Chapter 1 and Chapter 6, section 70.4.    Sincerely,    Mickey Shea MD  Utilization Review  Physician  Advisor  [facility].

## 2020-10-30 NOTE — CONSULTS
Social Work Services Progress Note    Social Work Consult placed to assist patient with discharge planning needs and local lodging. Per REGINA, patient will be following up locally for daily radiation and may need lodging resources.      placed the [facility] Local Lodging list and contact information in patient's discharge instructions. No further needs or concerns reported at this time.    LINDY Frederick  7D Hematology/Oncology Social Worker  Phone: [phone]  Pager: [phone]  [email or web address]

## 2020-10-30 NOTE — CONSULTS
[facility]  Palliative Care Consultation Note    Patient: Kenneth Tello  Date of Admission:  10/29/2020    Requesting Clinician / Team: JAMISON Coates, Hematology-Oncology  Reason for consult: Symptom management  Goals of care  Decisional support    Recommendations:  Symptom Management:    Pain:  Patient has had tolerable or nearly tolerable pain on MS Contin 15 BID with 15mg IR up to 4 times per day, or 90 OME.  Given diffculty in past with toxicity with poor po intake and acute kidney injury, would benefit from transition to methadone.  Recommend starting with methadone 5mg po BID, first dose this evening about 10pm.  We can follow up over the weekend to ensure adequate pain control without toxicity.  Given need to go to football game tonight, would recommend one additional dose of MS Contin 15mg at 3pm.  Discontinue MSContin after that final dose.  Given somnolence with morphine, also recommend discharge with change to hydromorphone for breakthrough pain, 2-4mg q3 hours as needed.    Dr. Irwin is on call this weekend and will check in with patient and family (daughter Devika is an RN) to assess pain relief and side effects of opioids.  Discussed with family to keep MSContin and MSIR on hand in case need to revert to that, but to NOT use it in conjunction with hydromorphone and methadone.    Nausea:  Less prominent for him, would recommend discharge with prn ondansetron, 4-8mg every eight hours    Constipation: Patient with severe opioid related constipation.  Recommend the following:    methylnaltrexone 450mg daily for up to three days    Day after methylnaltrexone, restart Senna, 3-4 po BID    Day after methylnaltrexone, Miralax 17g daily until loose stools, then return to Miralax 17g daily prn no stools for two days      Of note will be getting 100mg prednisone x 5 days per heme/onc for lymphoma disease control, after completion of that, could consider  dex 4mg daily for ongoing adjunct pain control, may also help with energy and appetite.     Goals of care: Patient and family will continue goals of care conversations with primary oncologist, Dr. Cary, follow up next week.  Patient wants to prioritize being at home and time with his family, most importantly his adolescent son.    These recommendations have been discussed with JAMISON Coates, Hematology/Oncology.      Thank you for the opportunity to participate in the care of this patient and family. Our team: will continue to follow.     During regular M-F work hours -- if you are not sure who specifically to contact -- please contact us by sending a text page to our team consult pager at [phone].    After regular work hours and on weekends/holidays, you can call our answering service at [phone]. Also, who's on call for us is available in [facility] Smart Web.     Carmen Patterson MD  Palliative Medicine Fellow  Patient seen and examined with Chelle Irwin MD, Palliative Medicine Staff      Patient seen and evaluated with Dr. Patterson.   Agree with assessment and recommendations.    Total time spent was 75 minutes,  >50% of time was spent counseling and/or coordination of care regarding pain management, opioid induced constipation, goals of care for patient with treatment refractory large B cell lymphoma.    Chelle Irwin  Palliative Care   Pager [phone]      Assessments:  Kenneth Tello is a 50 year old male with diffuse large B cell lymphoma (upper abdominal/retroperitoneal mass, lumg involvement, T12 involvement) refractory to chemotherapy, recently received 4th line treatment with polatuzumab, bendamustine, and rituximab on 10/1/20, progressive disease demonstrated on 10/28/20 CT.    His course has been complicated by pain, treated with morphine with treatment complicated by toxicities related to acute kidney injury in setting of poor po intake.    He was admitted yesterday with poorly  controlled pain, lethargy and poor oral intake.      Patient prioritizes time with his family and his HS cheyanne son's last two football games of the season (this evening and next week).      Today, the patient was seen for:  Diffuse large B cell lymphoma  Back pain  Constipation  Goals of Care    Prognosis, Goals, & Planning:      Functional Status just prior to hospitalization: 3 (Capable of only limited self-care; needs help with ADLs; in bed/chair >50% of waking hours)      Prognosis, Goals, and/or Advance Care Planning were addressed today: Yes        Summary/Comments:       Patient's decision making preferences: shared with support from loved ones          Patient has decision-making capacity today for complex decisions: Yes            I have concerns about the patient/family's health literacy today: No           Patient has a completed Health Care Directive: No.       Code status: No CPR / No Intubation    Coping, Meaning, & Spirituality:   Mood, coping, and/or meaning in the context of serious illness were addressed today: Yes  Summary/Comments:     Social:     Living situation: Lives during the week with his wife from whom he is , she has been helping care for him.  Weekends usually stays with his daughter, and RN and nurse manager at a nursing home in [place]    [place] family / caregivers: daughter, wife    Current in-home services: None    History of Present Illness:  History gathered today from: patient, family/loved ones, medical chart, medical team members, unit team members    Kenneth is a 49 y/o man with DLBCL, on 4th line chemotherapy with progressive disease.  He was admitted due to worsening pain control in conjunction with nausea and vomiting, drowsiness, for improved symptom management and also to continue conversation about planning for care.    Key Palliative Symptom Data:  # Pain severity the last 12 hours: moderate  # Dyspnea severity the last 12 hours: none  # Nausea severity the last 12  hours: low  # Anxiety severity the last 12 hours: none    Patient is on opioids: assessed and I made recommendations about bowel care as above.    ROS:  Comprehensive ROS is reviewed and is negative except as per HPI.     Past Medical History:  Past Medical History:   Diagnosis Date     Alcohol use disorder, mild, abuse      Atrial fibrillation (H)     coumadin     CAD (coronary artery disease)      Cardiomyopathy (H)      Encephalopathy 10/20/2020     HLD (hyperlipidemia)      HTN (hypertension)      Myocardial infarction (H)      Neuropathy     LLE, left hand post-CVA     Obesity      WATSON (obstructive sleep apnea)     requires CPAP however does not have a machine     Pancreatic cancer (H)     suspected 3/9/2020 at OSH     Stroke (H)     2017, mild left sided deficit     Tobacco dependence      Urinary urgency         Past Surgical History:  Past Surgical History:   Procedure Laterality Date     CHOLECYSTECTOMY, LAPOROSCOPIC       ESOPHAGOSCOPY, GASTROSCOPY, DUODENOSCOPY (EGD), COMBINED N/A 8/4/2020    Procedure: ESOPHAGOGASTRODUODENOSCOPY, WITH FINE NEEDLE ASPIRATION BIOPSY, WITH ENDOSCOPIC ULTRASOUND GUIDANCE;  Surgeon: Dayton Tobias MD;  Location: [facility]     PICC DOUBLE LUMEN PLACEMENT Right 09/18/2020    5Fr - 41cm (4cm external), Medial brachial vein, mid SVC         Family History:  Family History   Problem Relation Age of Onset     Cardiovascular Mother      Cardiovascular Father      Diabetes Type 2  Father      Cardiovascular Brother      Cancer Maternal Grandmother          Allergies:  No Known Allergies     Medications:  I have reviewed this patient's medication profile and medications from this hospitalization.   Noted:  MSContin 15qam/30qpm  MSIR 15mg po q4 hour prn  MS IV 4mg [id number] prn  Senna 2 po BID  Baclofen 5 TID  Prednisone 100mg po daily  Ondansetron prn      Physical Exam:  Vital Signs: Temp: 97.8  F (36.6  C) Temp src: Oral BP: 125/83 Pulse: 66   Resp: 16 SpO2: 94 % O2 Device: None  (Room air)    Weight: 189 lbs 6.4 oz   patient lying in bed, appears uncomfortable with shifting and movement - guarding and grimacing  HEENT:  No icterus  Lungs CTA  Cor regular  abd distended, mild diffuse tenderness, +BS although hyopactive  Extrem:  1+ edema bilaterally  Neuro:  Drowsy, but able to engage in conversation, oriented, affect congruent to conversation    Data reviewed:  Recent imaging reviewed, my comments on pertinents:   CT Chest 10/28 at [facility]:  Interval progressoin of upper abdominal necrotic mass, 12cm at largest diameter compared to 4.5cm previously.  Invades stomach, spleen, pancrease, left adrenal gland.  Also left sided pleural effusion.    Recent lab data reviewed, my comments on pertinents:   GFR normal, liver enzymes normal, albumin 1.8.

## 2020-10-30 NOTE — PLAN OF CARE
VSS, pt afebrile. Pt denies nausea or SOB. Irregular apical rhythm heard on ausculation, pt asymptomatic, & MD notified. A fib hx known, MD recommended to continue to monitor pt and no additional tests needed at this time. Pt reporting abdominal pain, prn po morphine given X1. 1000 mL NS bolus given on top of IVMF, to increase UOP. Pt voided spontaneously, pt flushed urine before output could be recorded. Pt feeling overwhelmed with recent health news. Pt slept well overnight. Pt wanting to discharge home today to be able to see [employer] game. Continue to monitor.

## 2020-10-30 NOTE — PLAN OF CARE
Discharge  D: Orders for discharge and outpatient medications written.  I: Home medications and return to clinic schedule reviewed with patient. Discharge instructions and parameters for calling Health Care Provider reviewed. Patient left around 1430 accompanied by spouse.  A: Patient/family verbalized understanding and was ready for discharge.   P: Patient instructed to  medications in Pharmacy. Follow up as scheduled w/radiation.     Care conference done. Received radiation prior to discharge. Per order, last dose of MS contin given prior to discharge. Discharge medications picked up prior to leaving. Discharge paperwork was discussed and all questions answered.

## 2020-10-31 NOTE — LETTER
Transition Communication Hand-off for Care Transitions to Next Level of Care Provider    Name: Kenneth Tello  : 1970  MRN #: [id number]  Primary Care Provider: Nina Jeter     Primary Clinic: [facility] [address] [facility] [address]     Reason for Hospitalization:  diffuse large B cell lymphoma, pain control  Uncontrolled pain  Admit Date/Time: 10/31/2020  9:56 PM  Discharge Date: 20         Reason for Communication Hand-off Referral: Care Transitions    Discharge Plan: Home with hospice services    [facility]  P: [phone]  F: [phone]     Discharge Needs Assessment:  Needs      Most Recent Value   Equipment Currently Used at Home  none        Follow-up specialty is recommended: No      Follow-up plan:  No future appointments.    LINDY Frederick Hematology/Oncology Social Worker  Phone: [phone]  Pager: [phone]  [email or web address]
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
## 2020-11-01 NOTE — PLAN OF CARE
"11p-7a: VSS. Afebrile. Given 1 L NS fluid bolus over two hours. UA obtained and resulted. Patient remains confused. Disoriented to time, date, place and situation. Is cooperative with taking of medications and cares. Was highly impulsive and restless. Setting bed alarm off every few minutes. Unable to sleep although appeared to \"space out\" at times and had head bobbing. Voices he is \"so tired\" but \"can't get comfortable.\" Sat out with this nurse at the nursing station because of his agitation. Assisted with eating and drinking due to confusion. Tries to drink out of the end of the spoon or thinks the food containers are drinks. Did drink three apple juices, one pudding and one ice cream with assistance. Took meds without problems. No swallowing difficulty noted. Obtained a recliner chair as patient seems more comfortable in a semi-upright position. Staff needing to sit with him due to impulsivity, removing clothing repeatedly, trying to get out of bed and picking at IV line. Given seroquel x 1, dilaudid 2 mg po x 1 (for complaints of back pain), and IV dilaudid 0.5 mg x 1. Able to finally fall asleep around 0200.   "
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
## 2020-11-01 NOTE — PLAN OF CARE
0700 - 1530: Pt is on comfort care measure, bedside attendant ordered, family/daughter in the room for now, been calm mostly morning. Palliative doctor seen pt (see note), on iv dilaudid 0.5mg q2hr and po dilaudid 4mg q3hrs.   Pt is alert but disoriented x 3(time/place/situation), CHA his pain d/t mentation since pt can't express if he is in pain, its good to stay on top of his pain med to make him comfortable.   Last bm was charted on 10/29, senna, miralax & Relistor given and had a large watery stool.  Appetite is poor, need a lot of encouragement to drink & eat and need help with feeding/supervision.   Plan is to go on home hospice,  consult placed.

## 2020-11-01 NOTE — UTILIZATION REVIEW
[facility]  Admission Status; Secondary Review Determination   Admission date:  10/31/2020  9:56 PM    Under the authority of the Utilization Management Committee, the utilization review process indicated a secondary review on the above patient. The review outcome is based on review of the medical records, discussions with staff, and applying clinical experience noted on the date of the review.     (x) Inpatient Status Appropriate - This patient's medical care is consistent with medical management for inpatient care and reasonable inpatient medical practice.     RATIONALE FOR DETERMINATION   50-year-old male with a history of diffuse large B-cell lymphoma which is progressive despite multiple lines of chemotherapy, stroke, atrial fibrillation, hypertension was admitted yesterday with uncontrolled pain, failure to thrive, and clinical deterioration.  He was actually just discharged the day prior for multiple issues related to progressive malignancy.  On admission yesterday he had encephalopathy and severe pain he is requiring IV Dilaudid for pain control.  He has met with the oncology service and planning to transition to comfort care and hospice approach.  This patient is complicated, high level cares requiring extended care coordination and goals of care discussions, requiring IV narcotics, has failed outpatient management, and is appropriate for inpatient hospitalization at the time of this review.  The severity of illness, intensity of service provided, expected LOS and risk for adverse outcome make the care complex, high risk and appropriate for hospital admission.    At the time of admission with the information available to the attending physician more than 2 nights Hospital complex care was anticipated, based on patient risk of adverse outcome if treated as outpatient and complex care required.  Inpatient admission is appropriate based on the Medicare guidelines.      The information on this document is developed  by the utilization review team in order for the business office to ensure compliance. This only denotes the appropriateness of proper admission status and does not reflect the quality of care rendered.   The definitions of Inpatient Status and Observation Status used in making the determination above are those provided in the CMS Coverage Manual, Chapter 1 and Chapter 6, section 70.4.     Sincerely,   Matthew Roberts DO MPH   Physician Advisor  Utilization Review  [facility]

## 2020-11-01 NOTE — TELEPHONE ENCOUNTER
Received call from patients family that his pain is difficult to control. On Friday we saw him inpatient and switched him from MS contin to methadone and from immediate release morphine to hydromorphone due to sedation with the morphine. He discharged that day because he really wanted to get to see his son's football game. We had discussed how this plan may lead him to need to come back to the hospital again soon but he was willing to take that risk given his goals. We discussed that if his sympotms are not controlled over weekend and he needs to come in we would work on getting a direct admission set up for him. Also of note though he lives 2 hours away he is planned to be back to [place] Monday for radiation treatment (will be staying locally with family member) and family wonders if it would be better for him to get that inpatient due to poor symptom control Per family he is still drowsy and confused at times and in a lot of pain. He had been prescribed dilaudid 2-4mg q 3 hours prn and they had only tried the 2mg dose for fear of oversedation. They called me early evening around 415pm and I told them to try a 4mg dose. I called back an hour later and they said he was very tired, confused and still in pain. We made plan for him to come back into hospital. I reached out to admitted hospitalist who was able to set up the direct admission and we discussed likely pian plan of continuing the methadone and setting up a low dose dilaudid PCA for overnight. I will see him tomorrow.           Chelle Irwin MD / Palliative Medicine / Pager [phone] / [facility] Inpatient Team Consult Pager [phone] (answered 8am-430pm M-F) - ok to text page via [employer] / After-Hours Answering Service [phone] / Palliative Clinic in the [facility] at the [facility] - [phone] (scheduling); [phone] (triage).

## 2020-11-01 NOTE — PLAN OF CARE
"7p-11p: Patient admitted to the floor around 2130 as a direct admit from home. (Was recently admitted and discharged to home yesterday). Here for pain control and possible plan for comfort care/hospice services. Pt very weak requiring two assist to transfer and ambulate. Confused. Thought it was \"2021\" and \"November 30th.\" K[facility] the [facility] but thought the city was \"[place].\" Unable to answer admit questions so most information was obtained from records. Does not understand why he is here. Cooperative with cares but does not follow commands at times or needs commands to be repeated several times before he is able to comprehend what to do. Bed alarm on due to unsteadiness. Did void 100 ml of orange colored, concentrated urine. Skin is jaundiced, but appears intact. Has DL picc in place. VSS. Denies pain.   "

## 2020-11-01 NOTE — CONSULTS
[facility]  Palliative Care Consultation Note    Patient: Kenneth Tello  Date of Admission:  10/31/2020    Requesting Clinician / Team: heme/onc  Reason for consult:   Symptom management  Goals of care  Patient and family support    Recommendations:    Transitioning to comfort care, family planning to visit    Planning for home hospice, would place  hospice consult - of note family tells me they wish to use [facility]    Stop baclofen    Would restart methadone but at half dose 2.5mg bid; use pills for now but if struggles with pills switch to liquid solution and for discharge please send home with liquid solution in anticipation that he will struggle with pills in near future    Stop high dose prednisone (could be adding to confusion)    Start lower dose steroids for pain control - recommend dexamethasone 4mg daily    Cancel plans for radiation oncology    Continue prn dilaudid as ordered    Would give methylnaltrexone injection today and continue scheduled senna 3-4 bid and miralax daily (concern his constipation is adding to his abdominal pain complaints especially because pain seems episodic and cramping)    Should complete a POLST form before discharge        These recommendations have been discussed with heme/onc team, bedside RN, charge RN, family and patient.    Thank you for the opportunity to participate in the care of this patient and family. Our team: will continue to follow.       Total time spent was 75 minutes,  >50% of time was spent counseling and/or coordination of care regarding goals of care, pain and delirium adnconstipation management for pt with refractory lymphoma.    Chelle Irwin MD / Palliative Medicine / Pager [phone] / [facility] Inpatient Team Consult Pager [phone] (answered 8am-430pm M-F) - ok to text page via [employer] / After-Hours Answering Service [phone] / Palliative Clinic in the [facility] at the  [facility] - [phone] (scheduling); [phone] (triage).      Assessments:  Kenneth Tello is a 50 year old male with treatment refractory large B cell lymphoma, pain and delirium.   Today, the patient was seen for:  Delirium  Pain  Opioid induced constipation  Treatment refractory lymphoma  Goals of care/end of life and hospice planning    Prognosis, Goals, & Planning:      Functional Status just prior to hospitalization: 3 (Capable of only limited self-care; needs help with ADLs; in bed/chair >50% of waking hours)      Prognosis, Goals, and/or Advance Care Planning were addressed today: Yes        Summary/Comments:       Patient's decision making preferences: shared with support from loved ones          Patient has decision-making capacity today for complex decisions: No            I have concerns about the patient/family's health literacy today: No           Patient has a completed Health Care Directive: No.       Code status: No CPR / No Intubation    Coping, Meaning, & Spirituality:   Mood, coping, and/or meaning in the context of serious illness were addressed today: Yes  Summary/Comments: pt too confused to comment but family seems to be fully aware and at peace with plan for hospice    Social:     Living situation: Lives during the week with his wife from whom he is , she has been helping care for him.  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 Discharged rapidly on Friday per pt preference to be able to see his sons football game with full undersatnding he would likely need re-admission. Pain and mental status not well  controlled and family called me Saturday for help. We tried escalation of prn dilaudid without benefit so planned a direct admission last night.    Overnight was confused and drowsy and methadone was held. He has received a few doses of prn dilaudid for episodic pain. Pt cannot provide any hx as he is disoriented. Daughter says he goes through frequent fluctuations between being calm and no pain to lots of pain and frequent repositioning. She says mental status was confused even prior to switching from MS contin to methadone so she doesn't think its really much worse.     During my visit pt was disoriented to place and situation and did not report pain at current time and allowed me to palpate his abdomen without cringing. But daughter emphasized that he does frequently complain of pain.  He has not had a BM despite increasing the bowel regimen.     Not eating. No reported nausea or vomiting  No SOB or cough  No fevers    Key Palliative Symptom Data:  # Pain severity the last 12 hours: low  # Dyspnea severity the last 12 hours: none  # Nausea severity the last 12 hours: none  # Anxiety severity the last 12 hours: low    Patient is on opioids: assessed and I made recommendations about bowel care as above.    ROS:  Comprehensive ROS is reviewed and is negative except as here & per HPI:      Past Medical History:  Past Medical History:   Diagnosis Date     Alcohol use disorder, mild, abuse      Atrial fibrillation (H)     coumadin     CAD (coronary artery disease)      Cardiomyopathy (H)      Encephalopathy 10/20/2020     HLD (hyperlipidemia)      HTN (hypertension)      Myocardial infarction (H)      Neuropathy     LLE, left hand post-CVA     Obesity      WATSON (obstructive sleep apnea)     requires CPAP however does not have a machine     Pancreatic cancer (H)     suspected 3/9/2020 at OSH     Stroke (H)     2017, mild left sided deficit     Tobacco dependence      Urinary urgency         Past Surgical History:  Past  Surgical History:   Procedure Laterality Date     CHOLECYSTECTOMY, LAPOROSCOPIC       ESOPHAGOSCOPY, GASTROSCOPY, DUODENOSCOPY (EGD), COMBINED N/A 8/4/2020    Procedure: ESOPHAGOGASTRODUODENOSCOPY, WITH FINE NEEDLE ASPIRATION BIOPSY, WITH ENDOSCOPIC ULTRASOUND GUIDANCE;  Surgeon: Dayton Tobias MD;  Location: [facility]     PICC DOUBLE LUMEN PLACEMENT Right 09/18/2020    5Fr - 41cm (4cm external), Medial brachial vein, mid SVC         Family History:  Family History   Problem Relation Age of Onset     Cardiovascular Mother      Cardiovascular Father      Diabetes Type 2  Father      Cardiovascular Brother      Cancer Maternal Grandmother          Allergies:  No Known Allergies     Medications:  I have reviewed this patient's medication profile and medications from this hospitalization.   Noted:  * home methadone on hold due to AMS  Baclofen 5 tid  megastrol  Omeprazole  miralax daily  Prednisone 100mg daily  Bactrim while on high dose prednisone  Senna-s 3 tab bid    PRN:  Tylenol  Hydromorphone 2-4mg q 3 hours prn-2mg x 1 overnight  hydormorphone IV 0.3-0.5mg q 2 hours prn - 0.5mg x 1 overnight  Ondansetron  Quetiapine 12.5mg bid prn  sucralfate      Physical Exam:  Vital Signs: Temp: 96.4  F (35.8  C) Temp src: Temporal BP: 107/71 Pulse: 65   Resp: 16 SpO2: 96 % O2 Device: None (Room air)    Weight: 0 lbs 0 oz   Gen: sitting/lying in bed. Appears comfortable but confused  HEENT: NCAT. Conjunctiva clear. Sclera anicteric .  CV: RRR , Peripheral perfusion intact.   Resp: unlabored work of breathing, dull inLLL  Abd: soft, mildly distended, nd, +BS   Msk: no gross deformity, no sarcopenia  Skin:  no jaundice  Ext: warm, well perfused. 2+ LE edema to mid shin  Neuro: face symmetric. EOM, vision, hearing grossly intact. Speech fluent. Moves all extremities   Mental status/Psych: drowsy but awake. Oriented to person only. Not agitation but does reposition himself frequently. Unable to provide hx, thinks he is in  [place] and plans to order chicken nuggets.Affect is calm      Data reviewed:  Recent imaging reviewed, my comments on pertinents:     CT chest/ab/pelv 10/28 from outside hospital: interval increase in size of abdominal mass now 12cm, new left lower lobe consolidation and left pleural effusion and pulmonary nodules     Recent lab data reviewed, my comments on pertinents:   GFR>58, cr 1.4 (up from cr 1.14 and GFR 74)  Albumin 1.8  Normal LFTs  Na 139  Wbc 7.5  Hg 9.1  Platelets 13  Uric acid 2.5

## 2020-11-02 NOTE — CONSULTS
Social Work Services Discharge Note      Patient Name:  Kenneth Tello     Anticipated Discharge Date:  11/2/20    Discharge Disposition: Home with hospice services    [facility]  P: [phone]  F: [phone]    Following MD:  Assigned per hospice      Additional Services/Equipment Arranged:  Daughter, Devika, to transport at time of discharge.      Patient / Family response to discharge plan:  In agreement     Persons notified of above discharge plan:  Patient; patient's family; Carol Zabala PA-C; charge nurse; bedside nurse; [facility]    CTS Handoff completed:  YES    Medicare Notice of Rights provided to the patient/family:  NO - not needed    LINDY Frederick  7D Hematology/Oncology Social Worker  Phone: [phone]  Pager: [phone]  [email or web address]

## 2020-11-02 NOTE — PROGRESS NOTES
Care Management Discharge Note    Discharge Planning:  Expected Discharge Date:  11/2/20     Concerns to be Addressed: Home Hospice Services Established        Anticipated Discharge Disposition:  Home with hospice - [facility]  Anticipated Discharge Services:  Hospice  Anticipated Discharge DME:  Hospital bed, over the bed table, bedside commode     Patient/family educated on Medicare website which has current facility and service quality ratings:  Yes    Referrals Placed by CM/SW:  [facility]    Education Provided on the Discharge Plan:  Yes    Patient/Family in Agreement with the Plan:  Yes     Additional Information:  Early report received from primary care team - patient and family wishing to pursue home with hospice services at time of discharge. Family wishes for [facility].      called [facility] (P: [phone], F: [phone]). Initial referral submitted. Agency to review records and follow-up with this writer to confirm discharge plans.     LINDY Frederick Hematology/Oncology Social Worker  Phone: [phone]  Pager: [phone]  [email or web address]

## 2020-11-02 NOTE — PROGRESS NOTES
Prior Authorization Approval    hydromorphone 2mg tabs  Date Initiated: 10/30/2020  Date Completed: 10/30/2020  Prior Auth Type: Quantity Limit                Status: Approved    Effective Date: 07/30/2020 - 04/30/2021  Copay: 0.00     Filling Pharmacy: [facility] - [place], [place] - [address] [place] [address]    Insurance: [facility] - Phone [phone] Fax [phone]  ID: [id number]  Case Number: [id number]   Submitted Via: Ladarius Childs  [facility] Liaison  Ph: [phone] Page: [phone]

## 2020-11-02 NOTE — PROGRESS NOTES
Prior Authorization Approval    methadone 5mg tabs  Date Initiated: 10/30/2020  Date Completed: 10/30/2020  Prior Auth Type: Non-Formulary                Status: Approved    Effective Date: 07/30/2020 - 10/30/2021  Copay: 0.00     Filling Pharmacy: [facility] - [place], [place] - [address] [place] [address]    Insurance: [facility] - Phone [phone] Fax [phone]  ID: [id number]  Case Number: [id number]   Submitted Via: Ladarius Childs  [facility] Liaison  Ph: [phone] Page: [phone]

## 2020-11-02 NOTE — DISCHARGE SUMMARY
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  _____________________________________________________________________________  __        Procedure  Echocardiogram with two-dimensional, color and spectral Doppler performed.  _____________________________________________________________________________  __        Interpretation Summary  Global and regional left ventricular function is hyperkinetic with an EF >70%.  The right ventricle is normal size. Global right ventricular function is  normal.  Severe left atrial enlargement is present.  IVC diameter >2.1 cm collapsing <50% with sniff suggests a high RA pressure  estimated at 15 mmHg or greater.  This study was compared with the study from 3/10/2020, the estimated RA  pressure is higher.  _____________________________________________________________________________  __        Left Ventricle  Global and regional left ventricular function is hyperkinetic with an EF >70%.  Left ventricular size is normal. Left ventricular wall thickness is normal.  Left ventricular diastolic function is not assessable. No regional wall motion  abnormalities are seen.     Right Ventricle  The right  ventricle is normal size. Global right ventricular function is  normal.     Atria  Severe left atrial enlargement is present. Mild right atrial enlargement is  present.     Mitral Valve  The mitral valve is normal. Mild mitral insufficiency is present.        Aortic Valve  Aortic valve is normal in structure and function. The aortic valve is  tricuspid.     Tricuspid Valve  The tricuspid valve is normal. Trace to mild tricuspid insufficiency is  present. The right ventricular systolic pressure is approximated at 34.4 mmHg  plus the right atrial pressure.     Pulmonic Valve  The pulmonic valve is normal.     Vessels  The aorta root is normal. The inferior vena cava is normal. Annulus 2.9 cm.  IVC diameter >2.1 cm collapsing <50% with sniff suggests a high RA pressure  estimated at 15 mmHg or greater. Dilation of the inferior vena cava is present  with abnormal respiratory variation in diameter.     Pericardium  Small circumferential pericardial effusion is present without any hemodynamic  significance.        Compared to Previous Study  This study was compared with the study from 3/10/2020 . The estimated RA  pressure is higher.  _____________________________________________________________________________  __  MMode/2D Measurements & Calculations  IVSd: 0.91 cm     LVIDd: 5.0 cm  LVIDs: 3.2 cm  LVPWd: 1.1 cm  FS: 37.3 %  LV mass(C)d: 181.4 grams  LV mass(C)dI: 90.4 grams/m2  Ao root diam: 2.9 cm  asc Aorta Diam: 2.9 cm  LVOT diam: 2.0 cm  LVOT area: 3.1 cm2  LA Volume (BP): 157.0 ml  LA Volume Index (BP): 78.1 ml/m2  RWT: 0.42           Doppler Measurements & Calculations  Ao V2 max: 161.0 cm/sec  Ao max PG: 10.0 mmHg  MEENU(V,D): 2.6 cm2  LV V1 max P.2 mmHg  LV V1 max: 134.0 cm/sec  LV V1 VTI: 23.3 cm  SV(LVOT): 73.2 ml  SI(LVOT): 36.5 ml/m2  PA V2 max: 166.0 cm/sec  PA max P.0 mmHg  PA acc time: 0.13 sec  TR max david: 293.0 cm/sec  TR max P.4 mmHg  AV David Ratio (DI): 0.83      _____________________________________________________________________________  __           Report approved by: MD New Reid 10/21/2020 07:03 PM

## 2020-11-02 NOTE — PROGRESS NOTES
[facility]  Palliative Care Daily Progress Note       Recommendations & Counseling       Given family concerns about patient response to hydromorphone (concern for increased agitation), recommend switch back to prn morphine.  Given difficulty with pills, recommend liquid morphine 10mg/5ML, 15-30mg q3 hours as needed for pain in addition to methadone, continue at 2.5mg po BID      For agitation, recommend change from quetiapine (no liquid formulation) to olanzapine disintegrating tabs, olanzapine 2.5mg SL at bedtime, 2.5mg SL BID prn.  Recommend trying first dose prior to discharge as needed.      For constipation, recommend discharge liquid formulation senna 8.8mg/10ML, 40ML BID      Recommend discharge home with lorazepam prn. Staring dose lorazepam oral solution 2mg/ML; 0.5-1.0mg po q4 hours as needed for agitation      Recommend discontinuing megestrol.      We have completed the POLST form with patient's wife (daughter aware).  Original in chart, patient family with copy to bring home      Please give family our number in case hospice is not able to enroll them until tomorrow. After hours answering service [phone]     Carmen Patterson MD  Palliative Medicine Fellow  Patient seen and examined with Letty Calle MD, Palliative Medicine Staff    Thank you for the opportunity to continue to participate in the care of this patient and family.  Please feel free to contact on-call palliative provider with any emergent needs.  We can be reached via team pager [phone] (answered 8-4:30 Monday-Friday); after-hours answering service ([phone]);   Attestation:   Patient seen and evaluated with Dr Patterson and I agree with/confirm her findings/recs in this note. TT: 50 minutes, with > 50% spent in C/C/E patient/family/care teams re: POC, Sx management.   Letty Calle MD / Palliative Medicine / Pager [phone]        Assessments          Kenneth Tello  is a 51 y/o pain with refractory DLBCL admitted 10/29 with pain/delirium, discharged 10/30 with medication adjustments per patient and family preference (wanted to get to HS son's football game that evening).  Readmitted 10/31 with pain/confusion.  Now he and his wife have opted for comfort care with discharge home with hospice.  Ongoing challenges with pain/delirium management and with constipation.    Today, the patient was seen for:  DLBCL  Pain management  Delirium  Discharge planning to hospice    Prognosis, Goals, or Advance Care Planning was addressed today with: Yes.  Mood, coping, and/or meaning in the context of serious illness were addressed today: No.  Summary/Comments: POLST completed            Interval History:     Chart review/discussion with unit or clinical team members:   Patient had difficulty with delirium overnight.    Per patient or family/caregivers today:  Wife, Otf, is concerned about the dilaudid - worried it makes him more agitated and confused than he was on morphine.  Hard night with delirium.  He is having pain with lying back - only able to do so for a minute or so at a time - sits up due to back pain.    Key Palliative Symptoms:  # Pain severity the last 12 hours: moderate  # Dyspnea severity the last 12 hours: none  # Nausea severity the last 12 hours: low  # Anxiety severity the last 12 hours: low    Patient is on opioids: assessed and I made recommendations about bowel care as above.           Review of Systems:     Besides above, an additional  ROS not possible given patient's delirium          Medications:     I have reviewed this patient's medication profile and medications during this hospitalization.    Noted meds:    Methadone 2.5 BID (changed from 5 BID on 11/1, received total of 3 doses of 5mg)  Hydromorphone IV/po prn, received total of 16mg po, 2.5mg IV last 24 hours = 114 OME [written 2-4mg po u1meaun as needed, 0.3-0.5mgIV q2 hours as needed for severe  pain]  Methylnaltrexone 12mg subcutaneous on 11/1           Physical Exam:   Vitals were reviewed      BP: 105/64              Patient sitting up in recliner, leaning forward, with briefs on  Able to track, able to respond to simple questions with yes / no answer.  Easily confused, easily redirectable, does not appear agitated  HEENT:  No icterus; temporal mm wasting  Lungs:  No accessory mm use, regular respirations  Abd:  Distended, marking from radiation oncology  Extrem:  Mm wasting, no prominent edema  Neuro: alert, not oriented reliably, easily redirectable although confused  Psych:  Not agitated           Data Reviewed:     Reviewed recent pertinent imaging, comments:   None new    Reviewed recent labs, comments:   GFR 58, normal liver enzymes except AST 47, low albumin

## 2020-11-02 NOTE — PROGRESS NOTES
Hydromorphone 0.5 mg iv x1 for uncontrolled pain.   Per RN: wife prefers to switch to morphine as prior.   Defer to primary team in am.     Moonlighter.

## 2020-11-02 NOTE — PLAN OF CARE
"11p-7a: Continues on comfort cares. No vital signs being done. Wife at bedside. Staff attendant present as patient remains confused and impulsive. Restless the majority of the shift. Given IV dilaudid x 2, po dilaudid x 1 and tylenol x 1. Difficult to assess if patient is having pain as he is unable to answer questions appropriately. Wife is concerned that his \"medications need to be adjusted\" and that his restlessness and agitation are the reasons \"why he is here.\" Hospitalist updated and did order a one-time dose of IV dilaudid as he feels the patient's agitation may be due to the need to \"catch up on his pain\" management. And that he would like to defer major medication changes to the primary care team who will be in to assess him in the morning.   "

## 2020-11-02 NOTE — PLAN OF CARE
Discharge to Home with [facility]:    D: Orders for discharge and outpatient medications written. Pt and family verbalized readiness to discharge to home this afternoon with Hospice.  I: PICC line discontinued per MD order. Medications reviewed with patient and daughter Devika. Discharge instructions and parameters for calling Health Care Provider reviewed. Patient left at 1400 via wheelchair accompanied by his daughter Devika.   A: Patient/daughter verbalized understanding of all discharge medications and discharge paperwork and was ready for discharge.   P: Discharge medications hand delivered to pt and daughter prior to leaving the hospital. Hospice liaison to meet pt and daughter at their home this afternoon.

## 2020-11-02 NOTE — PROGRESS NOTES
1500 - 2330: Pt been calm but disoriented x time/place/situation, pt couldn't express his pain d/t mentation.   On comfort care, only giving his painmeds and seroquel. Pt been up on the edge of the bed most of the evening shift and not ready to lay down in bed or get up right away. Pt's spouse at bedside, request for any med to keep him rested and helps for sleep. Gave prn ativan 0.5mg and tucked in bed.   Up with assist x 1 to bathroom, voiding spontaneously, had bm x 2 today, very poor appetite, declining to eat anything.   Bedside attendant active.  Plan is to go on home hospice, SW consults are in.

## 2020-11-02 NOTE — PROGRESS NOTES
Prior Authorization Denial    relistor 150mg tabs  Date Initiated: 10/30/2020  Date Completed: 10/30/2020  Prior Auth Type: Clinical                Status: Denied    Denial Date: 10/30/2020   Denial Reasoning: [facility]RY prefers symproic.    Insurance: [employer] [place] - Phone [phone] Fax [phone]  ID: [id number]  Case Number: [id number]   Submitted Via: Ladarius Childs  [facility] Liaison  Ph: [phone] Page: [phone]

## 2020-11-10 ENCOUNTER — ONCOLOGY VISIT (OUTPATIENT)
Dept: RADIATION ONCOLOGY | Facility: CLINIC | Age: 50
End: 2020-11-10

## 2020-12-06 ENCOUNTER — HEALTH MAINTENANCE LETTER (OUTPATIENT)
Age: 50
End: 2020-12-06

## 2020-12-07 ENCOUNTER — PATIENT OUTREACH (OUTPATIENT)
Dept: ONCOLOGY | Facility: CLINIC | Age: 50
End: 2020-12-07

## 2020-12-08 NOTE — PROGRESS NOTES
NOTIFICATION OF A  PATIENT  EMAIL THIS COMPLETED INFORMATION TO THE APPROPRIATE ENTITY:    MARLENY: [email or web address]   [facility]:  [email or web address]   RANGE: [email or web address]   UMP:  [email or web address]     PATIENT INFORMATION   Last name: Omer First name: Kenneth   Medical Record Number: [id number] County of Death: [facility]   YOB: 1970 Date of Death: 2020   PARENT (FOR PATIENTS UNDER 18) OR LEGAL GUARDIAN (IF APPLICABLE):   Relationship to  patient:   Last name: First name:   Date of Birth: Telephone Number:   Address:     City: State: Zip Code:   SURVIVING SPOUSE INFORMATION (IF THERE IS A SURVIVING SPOUSE)   Last name:PEYMAN ARSHAD First name:   Date of Birth: Telephone number:   Address:     City: State: Zip Code:   PERSON THAT INFORMED US OF PATIENT'S DEATH   Last name:[facility] verified [place] [employer]    First name:   Relationship to  patient: Telephone number:

## 2021-07-31 NOTE — DISCHARGE INSTRUCTIONS
[facility] LODGING    Reservations  When you make a reservation, please identify yourself as a patient, relative or visitor at [facility] or [facility].    Hospital shuttle and security,  escort services: [phone]  A 24-hour  service to  nearby destinations is available.    Accommodations Office:  [phone] or [phone] (toll free)  Visit [email or web address] for more information.    Hotels/Motels  [facility]  [phone]    [employer]-  [facility])  [phone]    [employer]-  [facility]  [phone]    [facility]  [phone]    [facility]  [phone]    [facility]  [phone]    [employer] by [facility]  [phone]    [facility]  [phone]    [employer] [facility]  [phone]    [employer] [employer]  [phone] or [phone]    Extended stay  [facility]  [phone]    [facility]  and Guest Rooms  [phone]    [facility]  [phone]    [facility]  [phone]     No

## 2025-01-27 NOTE — PROCEDURES
Radiotherapy Treatment Summary          Date of Report: November 10, 2020     PATIENT: RICKEY WATKINS  MEDICAL RECORD NO: [id number]  : 1970     DIAGNOSIS: C83.3 Diffuse large B-cell lymphoma  INTENT OF RADIOTHERAPY: Palliation  STAGE: IV     Details of the treatments summarized below are found in records kept in the Department of   Radiation Oncology at [facility].     Treatment Summary:  Radiation Oncology - Course: 1 Protocol:   Treatment Site    Current Dose Modality        From       To          Elapsed Days Fx.  Abdomen                   400 cGy     18 X      10/30/2020 10/30/2020         11  1             Dose per Fraction:  400 cGy      Total Dose:   400 cGy           COMMENTS:                      Mr. Watkins is a 50 year old man with refractory diffuse large B cell lymphoma and an   enlarging abdominal mass most recently measuring 12 cm. Palliative radiotherapy was   recommended for symptom control. A total of 2000 cGy in 5 fractions was planned. He received   the first fraction on 10/30/20 as outlined above. He was then admitted 10/31/20 with altered   mental status and pain; the patient and his family elected to pursue home hospice the next day.      Acute Toxicity Profile by CTC v5.0: None     PAIN MANAGEMENT: No additional pain management required during radiotherapy                       FOLLOW UP PLAN: Patient elected to purse home hospice through [facility] on 20                         Resident Physician: Johana Waddell M.D.   Staff Physician: TARI Newman M.D.  Physicist: Allen Corrigan, PhD                                     Radiation Oncology:  [facility] [address] [place] [address]                    Gabapentin 300mg capsules

## (undated) RX ORDER — PROPOFOL 10 MG/ML
INJECTION, EMULSION INTRAVENOUS
Status: DISPENSED
Start: 2020-01-01

## (undated) RX ORDER — BUPIVACAINE HYDROCHLORIDE 2.5 MG/ML
INJECTION, SOLUTION EPIDURAL; INFILTRATION; INTRACAUDAL
Status: DISPENSED
Start: 2020-01-01

## (undated) RX ORDER — ONDANSETRON 2 MG/ML
INJECTION INTRAMUSCULAR; INTRAVENOUS
Status: DISPENSED
Start: 2020-01-01

## (undated) RX ORDER — HEPARIN SOD,PORCINE/0.9 % NACL 5K/1000 ML
INTRAVENOUS SOLUTION INTRAVENOUS
Status: DISPENSED
Start: 2020-01-01

## (undated) RX ORDER — FENTANYL CITRATE 50 UG/ML
INJECTION, SOLUTION INTRAMUSCULAR; INTRAVENOUS
Status: DISPENSED
Start: 2020-01-01

## (undated) RX ORDER — LIDOCAINE HYDROCHLORIDE 10 MG/ML
INJECTION, SOLUTION EPIDURAL; INFILTRATION; INTRACAUDAL; PERINEURAL
Status: DISPENSED
Start: 2020-01-01